# Patient Record
Sex: FEMALE | Race: WHITE | Employment: OTHER | URBAN - METROPOLITAN AREA
[De-identification: names, ages, dates, MRNs, and addresses within clinical notes are randomized per-mention and may not be internally consistent; named-entity substitution may affect disease eponyms.]

---

## 2017-01-14 ENCOUNTER — HOSPITAL ENCOUNTER (EMERGENCY)
Age: 72
Discharge: HOME | End: 2017-01-15
Attending: EMERGENCY MEDICINE
Payer: MEDICARE

## 2017-01-14 DIAGNOSIS — R00.1 BRADYCARDIA: Primary | ICD-10-CM

## 2017-01-14 PROCEDURE — 84484 ASSAY OF TROPONIN QUANT: CPT | Performed by: EMERGENCY MEDICINE

## 2017-01-14 PROCEDURE — 84100 ASSAY OF PHOSPHORUS: CPT | Performed by: EMERGENCY MEDICINE

## 2017-01-14 PROCEDURE — 83735 ASSAY OF MAGNESIUM: CPT | Performed by: EMERGENCY MEDICINE

## 2017-01-14 PROCEDURE — 85025 COMPLETE CBC W/AUTO DIFF WBC: CPT | Performed by: EMERGENCY MEDICINE

## 2017-01-14 PROCEDURE — 99284 EMERGENCY DEPT VISIT MOD MDM: CPT | Performed by: EMERGENCY MEDICINE

## 2017-01-14 PROCEDURE — 80053 COMPREHEN METABOLIC PANEL: CPT | Performed by: EMERGENCY MEDICINE

## 2017-01-14 PROCEDURE — 84443 ASSAY THYROID STIM HORMONE: CPT | Performed by: EMERGENCY MEDICINE

## 2017-01-14 PROCEDURE — 85610 PROTHROMBIN TIME: CPT | Performed by: EMERGENCY MEDICINE

## 2017-01-15 VITALS
DIASTOLIC BLOOD PRESSURE: 73 MMHG | RESPIRATION RATE: 16 BRPM | BODY MASS INDEX: 24.91 KG/M2 | WEIGHT: 155 LBS | HEART RATE: 67 BPM | HEIGHT: 66 IN | OXYGEN SATURATION: 95 % | TEMPERATURE: 98.5 F | SYSTOLIC BLOOD PRESSURE: 166 MMHG

## 2017-01-15 LAB
ALBUMIN SERPL BCP-MCNC: 3.3 G/DL (ref 3.2–4.6)
ALBUMIN/GLOB SERPL: 0.8 {RATIO} (ref 1.2–3.5)
ALP SERPL-CCNC: 120 U/L (ref 50–136)
ALT SERPL-CCNC: 37 U/L (ref 12–65)
ANION GAP BLD CALC-SCNC: 9 MMOL/L (ref 7–16)
AST SERPL W P-5'-P-CCNC: 25 U/L (ref 15–37)
BASOPHILS # BLD AUTO: 0 K/UL (ref 0–0.2)
BASOPHILS # BLD: 0 % (ref 0–2)
BILIRUB SERPL-MCNC: 0.2 MG/DL (ref 0.2–1.1)
BUN SERPL-MCNC: 14 MG/DL (ref 8–23)
CALCIUM SERPL-MCNC: 8.3 MG/DL (ref 8.3–10.4)
CHLORIDE SERPL-SCNC: 109 MMOL/L (ref 98–107)
CO2 SERPL-SCNC: 27 MMOL/L (ref 21–32)
CREAT SERPL-MCNC: 0.64 MG/DL (ref 0.6–1)
DIFFERENTIAL METHOD BLD: ABNORMAL
EOSINOPHIL # BLD: 0.1 K/UL (ref 0–0.8)
EOSINOPHIL NFR BLD: 2 % (ref 0.5–7.8)
ERYTHROCYTE [DISTWIDTH] IN BLOOD BY AUTOMATED COUNT: 17.9 % (ref 11.9–14.6)
GLOBULIN SER CALC-MCNC: 4.3 G/DL (ref 2.3–3.5)
GLUCOSE SERPL-MCNC: 112 MG/DL (ref 65–100)
HCT VFR BLD AUTO: 35 % (ref 35.8–46.3)
HGB BLD-MCNC: 10.3 G/DL (ref 11.7–15.4)
IMM GRANULOCYTES # BLD: 0 K/UL (ref 0–0.5)
IMM GRANULOCYTES NFR BLD AUTO: 0 % (ref 0–5)
INR PPP: 2 (ref 0.9–1.2)
LYMPHOCYTES # BLD AUTO: 28 % (ref 13–44)
LYMPHOCYTES # BLD: 2.1 K/UL (ref 0.5–4.6)
MAGNESIUM SERPL-MCNC: 2 MG/DL (ref 1.8–2.4)
MCH RBC QN AUTO: 24.9 PG (ref 26.1–32.9)
MCHC RBC AUTO-ENTMCNC: 29.4 G/DL (ref 31.4–35)
MCV RBC AUTO: 84.5 FL (ref 79.6–97.8)
MONOCYTES # BLD: 0.6 K/UL (ref 0.1–1.3)
MONOCYTES NFR BLD AUTO: 8 % (ref 4–12)
NEUTS SEG # BLD: 4.8 K/UL (ref 1.7–8.2)
NEUTS SEG NFR BLD AUTO: 62 % (ref 43–78)
PHOSPHATE SERPL-MCNC: 2.7 MG/DL (ref 2.3–3.7)
PLATELET # BLD AUTO: 308 K/UL (ref 150–450)
PMV BLD AUTO: 10.9 FL (ref 10.8–14.1)
POTASSIUM SERPL-SCNC: 3.8 MMOL/L (ref 3.5–5.1)
PROT SERPL-MCNC: 7.6 G/DL (ref 6.3–8.2)
PROTHROMBIN TIME: 21.7 SEC (ref 9.6–12)
RBC # BLD AUTO: 4.14 M/UL (ref 4.05–5.25)
SODIUM SERPL-SCNC: 145 MMOL/L (ref 136–145)
TROPONIN I SERPL-MCNC: <0.02 NG/ML (ref 0.02–0.05)
TSH SERPL DL<=0.005 MIU/L-ACNC: 1.59 UIU/ML (ref 0.36–3.74)
WBC # BLD AUTO: 7.6 K/UL (ref 4.3–11.1)

## 2017-01-15 PROCEDURE — 93005 ELECTROCARDIOGRAM TRACING: CPT | Performed by: EMERGENCY MEDICINE

## 2017-01-15 NOTE — DISCHARGE INSTRUCTIONS
Bradycardia: Care Instructions  Your Care Instructions    Bradycardia is a slow heart rate. If your heart beats too slowly, it can't supply your body with enough blood. This can make you weak or dizzy. Or it may make you pass out. Sometimes medicine can cause this problem. If this happens, your doctor may have you adjust one of your medicines. If a medicine is not the problem, your doctor may recommend a pacemaker. It is important to treat bradycardia so that you don't get more serious health problems. Your doctor will want to see you on a routine schedule to make sure that your heartbeat is normal.  Follow-up care is a key part of your treatment and safety. Be sure to make and go to all appointments, and call your doctor if you are having problems. It's also a good idea to know your test results and keep a list of the medicines you take. How can you care for yourself at home? · Take your medicines exactly as prescribed. Call your doctor if you think you are having a problem with your medicine. If your bradycardia is caused by another disease, your doctor will try to treat the disease. If it is caused by heart medicines, he or she will adjust your medicines. · Make lifestyle changes to improve your heart health. ¨ To control your cholesterol, avoid foods with a lot of fat, saturated fat, or sodium. Try to eat more fiber. And if your doctor says it's okay, get some exercise on most days. ¨ Do not smoke. Smoking can make your heart condition worse. If you need help quitting, talk to your doctor about stop-smoking programs and medicines. These can increase your chances of quitting for good. ¨ Limit alcohol to 2 drinks a day for men and 1 drink a day for women. Too much alcohol can cause health problems. Pacemaker  If you have a pacemaker, you will get more specific information about it. Be sure to:  · Check your pulse as your doctor tells you.   · Have your pacemaker checked as often as your doctor recommends. You may be able to do this over the phone or computer. · Avoid strong magnetic or electrical fields. These include wand metal detectors used in airports, MRIs, welding equipment, and generators. · You will be checked several times right after you get your pacemaker and when it is time to have the battery changed. Batteries last for 5 to 15 years. · You can talk on a cell phone. But keep it 6 inches away from your pacemaker. · Microwaves, TVs, radios, and kitchen and bathroom appliances won't harm you. When should you call for help? Call 911 anytime you think you may need emergency care. For example, call if:  · You passed out (lost consciousness). · You have symptoms of a heart attack. These may include:  ¨ Chest pain or pressure, or a strange feeling in the chest.  ¨ Sweating. ¨ Shortness of breath. ¨ Nausea or vomiting. ¨ Pain, pressure, or a strange feeling in the back, neck, jaw, or upper belly or in one or both shoulders or arms. ¨ Lightheadedness or sudden weakness. ¨ A fast or irregular heartbeat. After you call 911, the  may tell you to chew 1 adult-strength or 2 to 4 low-dose aspirin. Wait for an ambulance. Do not try to drive yourself. · You have symptoms of a stroke. These may include:  ¨ Sudden numbness, tingling, weakness, or loss of movement in your face, arm, or leg, especially on only one side of your body. ¨ Sudden vision changes. ¨ Sudden trouble speaking. ¨ Sudden confusion or trouble understanding simple statements. ¨ Sudden problems with walking or balance. ¨ A sudden, severe headache that is different from past headaches. Call your doctor now or seek immediate medical care if:  · You are dizzy or lightheaded, or you feel like you may faint. · You have new or increased shortness of breath. · You had a pacemaker implanted and you have signs of infection, such as:  ¨ Increased pain, swelling, warmth, or redness.   ¨ Red streaks leading from the cut (incision). ¨ Pus draining from the incision. ¨ A fever. Watch closely for changes in your health, and be sure to contact your doctor if you have any problems. Where can you learn more? Go to http://giulia-angelina.info/. Enter J107 in the search box to learn more about \"Bradycardia: Care Instructions. \"  Current as of: January 27, 2016  Content Version: 11.1  © 2310-5856 [employer]. Care instructions adapted under license by [employer] (which disclaims liability or warranty for this information). If you have questions about a medical condition or this instruction, always ask your healthcare professional. [address] any warranty or liability for your use of this information.

## 2017-01-15 NOTE — ED PROVIDER NOTES
HPI Comments: 56-year-old female states she felt weak upon waking this morning. She checked her heart rate and it was 44. She states she sat around for most the day and it increased to the 60s. Before going to bed, she had the same sensation of weakness and her heart rate was 40. She denies any chest pain, shortness of breath, headache, recent illness, vomiting, diarrhea. She has had no recent changes in medications except starting iron 3 weeks ago. She has a history of paroxysmal atrial fibrillation status post ablation in 2015. Also had mitral and aortic tissue valve replacements in 2015. She is currently on Coumadin and tikosyn, norvasc, and lopressor cardiac agents. Patient is a 80 y.o. female presenting with sinus bradycardia. The history is provided by the patient. Slow Heart Rate    Pertinent negatives include no abdominal pain, no back pain, no cough, no fever, no headaches, no nausea, no palpitations, no shortness of breath, no vomiting and no weakness.         Past Medical History:   Diagnosis Date    Afib ([address]      a-fib    Aortic valve insufficiency     CAD (coronary artery disease)      aortic and mitral valve     Diabetes (HCC)      Type II    Diabetes mellitus, insulin dependent (IDDM), controlled ([address] 5/11/2016    Fracture, femur ([address] 9/21/2009    GERD (gastroesophageal reflux disease)     Heart failure ([address]     History of cardioversion     History of petit-mal seizures      none in 15 years    Hyperlipidemia     Hypertension     Hypothyroidism     Hypothyroidism, postsurgical 10/21/2015    Mitral stenosis with insufficiency     Other unknown and unspecified cause of morbidity or mortality      HLD    Paroxysmal atrial fibrillation (HCC)     Seizure ([address] 9/21/2009    Seizures ([address]     Thyroid nodule 6/29/2011    Type 1 diabetes mellitus ([address]     Unspecified essential hypertension        Past Surgical History:   Procedure Laterality Date    Hx tubal ligation  Hx thyroidectomy      Hx heart catheterization      Hx hip fracture tx Left 2009     pins    Hx mitral valve replacement  5/4/15     Dr. Magallanes Odette Hx aortic valve replacement  5/4/15     Dr. Sona Pino Hx appendectomy      Hx orthopaedic       L hip fracture/repair    Hx heent       Thyroidectomy    Hx colonoscopy  2014     neg    Hx other surgical       ablation 10/6/2015, Dr Savannah Russell         Family History:   Problem Relation Age of Onset    Diabetes Mother     Heart Disease Mother     Heart Disease Father     Diabetes Sister     Diabetes Brother     Heart Disease Brother     Diabetes Brother     Heart Disease Brother     Diabetes Sister     Heart Disease Sister     Breast Cancer Neg Hx        Social History     Social History    Marital status:      Spouse name: N/A    Number of children: N/A    Years of education: N/A     Occupational History    Not on file. Social History Main Topics    Smoking status: Never Smoker    Smokeless tobacco: Never Used    Alcohol use Yes      Comment: special occasion     Drug use: No    Sexual activity: Not on file     Other Topics Concern    Not on file     Social History Narrative         ALLERGIES: Fosamax [alendronate] and Ibuprofen    Review of Systems   Constitutional: Positive for fatigue. Negative for chills and fever. HENT: Negative for hearing loss. Eyes: Negative for visual disturbance. Respiratory: Negative for cough and shortness of breath. Cardiovascular: Negative for chest pain and palpitations. Gastrointestinal: Negative for abdominal pain, diarrhea, nausea and vomiting. Musculoskeletal: Negative for back pain. Skin: Negative for rash. Neurological: Negative for weakness and headaches. Psychiatric/Behavioral: Negative for confusion.        Vitals:    01/14/17 2204   BP: 165/64   Pulse: 62   Resp: 16   Temp: 98.5 °F (36.9 °C)   SpO2: 96%   Weight: 70.3 kg (155 lb)   Height: 5' 6\" (1.676 m) Physical Exam   Constitutional: She appears well-developed and well-nourished. HENT:   Head: Normocephalic and atraumatic. Right Ear: External ear normal.   Left Ear: External ear normal.   Nose: Nose normal.   Mouth/Throat: Oropharynx is clear and moist.   Eyes: Conjunctivae are normal. Pupils are equal, round, and reactive to light. Neck: Normal range of motion. Neck supple. Cardiovascular: Regular rhythm, normal heart sounds and intact distal pulses. Pulmonary/Chest: Effort normal and breath sounds normal. No respiratory distress. She has no wheezes. Abdominal: Soft. Bowel sounds are normal. She exhibits no distension. There is no tenderness. Musculoskeletal: Normal range of motion. She exhibits no edema. Neurological: She is alert. Skin: Skin is warm and dry. Psychiatric: Judgment normal.   Nursing note and vitals reviewed. MDM  Number of Diagnoses or Management Options  Diagnosis management comments: Parts of this document were created using dragon voice recognition software. The chart has been reviewed but errors may still be present. HR currently in the 60's. Will check labs and repeat EKG.    1:31 AM  No episodes of profound bradycardia while in the emergency department. Discussed with cardiology, Dr. Bladimir Rivera. He suggested stopping metoprolol for now and close follow-up in the office. No ischemia on EKG. I discussed the results of all labs, procedures, radiographs, and treatments with the patient and available family. Treatment plan is agreed upon and the patient is ready for discharge. Questions about treatment in the ED and differential diagnosis of presenting condition were answered. Patient was given verbal discharge instructions including, but not limited to, importance of returning to the emergency department for any concern of worsening or continued symptoms. Instructions were given to follow up with a primary care provider or specialist within 1-2 days. Adverse effects of medications, if prescribed, were discussed and patient was advised to refrain from significant physical activity until followed up by primary care physician and to not drive or operate heavy machinery after taking any sedating substances.            Amount and/or Complexity of Data Reviewed  Clinical lab tests: ordered and reviewed (Results for orders placed or performed during the hospital encounter of 01/14/17  -CBC WITH AUTOMATED DIFF       Result                                            Value                         Ref Range                       WBC                                               7.6                           4.3 - 11.1 K/uL                 RBC                                               4.14                          4.05 - 5.25 M/uL                HGB                                               10.3 (L)                      11.7 - 15.4 g/dL                HCT                                               35.0 (L)                      35.8 - 46.3 %                   MCV                                               84.5                          79.6 - 97.8 FL                  MCH                                               24.9 (L)                      26.1 - 32.9 PG                  MCHC                                              29.4 (L)                      31.4 - 35.0 g/dL                RDW                                               17.9 (H)                      11.9 - 14.6 %                   PLATELET                                          308                           150 - 450 K/uL                  MPV                                               10.9                          10.8 - 14.1 FL                  DF                                                AUTOMATED                                                     NEUTROPHILS                                       62                            43 - 78 %                       LYMPHOCYTES 28                            13 - 44 %                       MONOCYTES                                         8                             4.0 - 12.0 %                    EOSINOPHILS                                       2                             0.5 - 7.8 %                     BASOPHILS                                         0                             0.0 - 2.0 %                     IMMATURE GRANULOCYTES                             0.0                           0.0 - 5.0 %                     ABS. NEUTROPHILS                                  4.8                           1.7 - 8.2 K/UL                  ABS. LYMPHOCYTES                                  2.1                           0.5 - 4.6 K/UL                  ABS. MONOCYTES                                    0.6                           0.1 - 1.3 K/UL                  ABS. EOSINOPHILS                                  0.1                           0.0 - 0.8 K/UL                  ABS. BASOPHILS                                    0.0                           0.0 - 0.2 K/UL                  ABS. IMM.  GRANS.                                  0.0                           0.0 - 0.5 K/UL             -METABOLIC PANEL, COMPREHENSIVE       Result                                            Value                         Ref Range                       Sodium                                            145                           136 - 145 mmol/L                Potassium                                         3.8                           3.5 - 5.1 mmol/L                Chloride                                          109 (H)                       98 - 107 mmol/L                 CO2                                               27                            21 - 32 mmol/L                  Anion gap                                         9                             7 - 16 mmol/L                   Glucose 112 (H)                       65 - 100 mg/dL                  BUN                                               14                            8 - 23 MG/DL                    Creatinine                                        0.64                          0.6 - 1.0 MG/DL                 GFR est AA                                        >60                           >60 ml/min/1.73m2               GFR est non-AA                                    >60                           >60 ml/min/1.73m2               Calcium                                           8.3                           8.3 - 10.4 MG/DL                Bilirubin, total                                  0.2                           0.2 - 1.1 MG/DL                 ALT                                               37                            12 - 65 U/L                     AST                                               25                            15 - 37 U/L                     Alk. phosphatase                                  120                           50 - 136 U/L                    Protein, total                                    7.6                           6.3 - 8.2 g/dL                  Albumin                                           3.3                           3.2 - 4.6 g/dL                  Globulin                                          4.3 (H)                       2.3 - 3.5 g/dL                  A-G Ratio                                         0.8 (L)                       1.2 - 3.5                  -MAGNESIUM       Result                                            Value                         Ref Range                       Magnesium                                         2.0                           1.8 - 2.4 mg/dL            -PHOSPHORUS       Result                                            Value                         Ref Range                       Phosphorus                                        2.7 2.3 - 3.7 MG/DL            -TSH 3RD GENERATION       Result                                            Value                         Ref Range                       TSH                                               1.590                         0.358 - 3.740 uIU/mL       -PROTHROMBIN TIME + INR       Result                                            Value                         Ref Range                       Prothrombin time                                  21.7 (H)                      9.6 - 12.0 sec                  INR                                               2.0 (H)                       0.9 - 1.2                  -TROPONIN I       Result                                            Value                         Ref Range                       Troponin-I, Qt.                                   <0.02 (L)                     0.02 - 0.05 NG/ML          )      ED Course       Procedures

## 2017-01-15 NOTE — ED TRIAGE NOTES
Pt c/o bradycardia x 1 day. Spouse states her HR was in the 40's today. Pt denies any pain. Pt denies any shortness of breath. Pt denies any N/V/D. Pt alert and oriented x 4. Respirations are even and unlabored. Pt appears in no acute distress at this time.

## 2017-01-16 ENCOUNTER — PATIENT OUTREACH (OUTPATIENT)
Dept: CASE MANAGEMENT | Age: 72
End: 2017-01-16

## 2017-01-16 LAB
ATRIAL RATE: 70 BPM
CALCULATED P AXIS, ECG09: 83 DEGREES
CALCULATED R AXIS, ECG10: 41 DEGREES
CALCULATED T AXIS, ECG11: 80 DEGREES
DIAGNOSIS, 93000: NORMAL
DIASTOLIC BP, ECG02: NORMAL MMHG
P-R INTERVAL, ECG05: 126 MS
Q-T INTERVAL, ECG07: 432 MS
QRS DURATION, ECG06: 88 MS
QTC CALCULATION (BEZET), ECG08: 466 MS
SYSTOLIC BP, ECG01: NORMAL MMHG
VENTRICULAR RATE, ECG03: 70 BPM

## 2017-01-16 NOTE — PROGRESS NOTES
This note will not be viewable in [address]. Date/Time of Call:   1/16/2017 12:23pm   What was the patient seen in the ED for? Patient was seen in ED for dx of:  bradycardia   Does the patient understand his/her diagnosis and/or treatment and what happened during the ED visit? Spoke with patient who verbalized understanding of diagnosis and treatment plan. Patient reports she is feeling better today, denies any dizziness, chest pain or other symptoms. Did the patient receive discharge instructions from the ED? yes   Review any discharge instructions (see notes in [facility]). Ask patient if they understand these. Do they have any questions? Care Coordinator reviewed discharge instructions. Patient verbalized understanding of instructions. Were home services ordered (nursing, PT, OT, ST, etc.)? No     If so, has the first visit occurred? If not, why? (Assist with coordination of services if necessary.) N/A   Was any DME ordered? No   If so, has it been received? If not, why?  (Assist with coordination of arranging DME orders if necessary.) N/A   Complete a review of all medications (new, continued and discontinued meds per the D/C instructions and medication tab in [facility]). Patient reviewed meds with Care Coordinator. Were all new prescriptions filled? If not, why?  (Assist with obtainment of medications if necessary.) No new medications prescribed. Verified with patient to hold metoprolol until seen by cardiologist per ER physician. Does the patient understand the purpose and dosing instructions for all medications? (If patient has questions, provide explanation and education.) Patient reports understanding of purpose and instructions for all medications. Does the patient have any problems in performing ADLs? (If patient is unable to perform ADLs  what is the limiting factor(s)?   Do they have a support system that can assist? If no support system is present, discuss possible assistance that they may be able to obtain.) Patient does not require assistance with ADLs. Does the patient have all follow-up appointments scheduled? Has transportation been arranged? [facility] follow-up should be within 7 days of discharge; all others should have PCP follow-up within 7 days of discharge; follow-ups with other specialists as appropriate or ordered.) Patient has scheduled a follow up appointment with cardiologist on 1/18/17. Patient also advised to call and schedule follow up with PCP within 7 days. Patient has no problems with transportation. Any other questions or concerns expressed by the patient? Patient verbalized no further concerns or questions and was appreciative of call.          TEJ Call Completed By: Ayla Rey LPN   Care Coordinator  [facility]

## 2017-01-18 PROBLEM — R00.1 BRADYCARDIA: Status: ACTIVE | Noted: 2017-01-18

## 2017-04-20 ENCOUNTER — HOSPITAL ENCOUNTER (OUTPATIENT)
Dept: MAMMOGRAPHY | Age: 72
Discharge: HOME | End: 2017-04-20
Attending: INTERNAL MEDICINE
Payer: MEDICARE

## 2017-04-20 DIAGNOSIS — Z12.31 VISIT FOR SCREENING MAMMOGRAM: ICD-10-CM

## 2017-04-20 PROCEDURE — 77067 SCR MAMMO BI INCL CAD: CPT

## 2018-04-26 ENCOUNTER — HOSPITAL ENCOUNTER (OUTPATIENT)
Dept: MAMMOGRAPHY | Age: 73
Discharge: HOME | End: 2018-04-26
Attending: INTERNAL MEDICINE
Payer: MEDICARE

## 2018-04-26 DIAGNOSIS — Z12.31 VISIT FOR SCREENING MAMMOGRAM: ICD-10-CM

## 2018-04-26 PROCEDURE — 77067 SCR MAMMO BI INCL CAD: CPT

## 2018-05-29 PROBLEM — Z79.01 LONG TERM (CURRENT) USE OF ANTICOAGULANTS: Status: ACTIVE | Noted: 2018-05-29

## 2018-08-16 PROBLEM — E11.65 TYPE 2 DIABETES MELLITUS WITH HYPERGLYCEMIA, WITH LONG-TERM CURRENT USE OF INSULIN (HCC): Status: ACTIVE | Noted: 2018-08-16

## 2018-08-16 PROBLEM — E11.29 TYPE 2 DIABETES MELLITUS WITH MICROALBUMINURIA, WITH LONG-TERM CURRENT USE OF INSULIN (HCC): Status: ACTIVE | Noted: 2018-08-16

## 2018-08-16 PROBLEM — R80.9 TYPE 2 DIABETES MELLITUS WITH MICROALBUMINURIA, WITH LONG-TERM CURRENT USE OF INSULIN (HCC): Status: ACTIVE | Noted: 2018-08-16

## 2018-08-16 PROBLEM — Z79.4 TYPE 2 DIABETES MELLITUS WITH MICROALBUMINURIA, WITH LONG-TERM CURRENT USE OF INSULIN (HCC): Status: ACTIVE | Noted: 2018-08-16

## 2018-08-16 PROBLEM — Z79.4 TYPE 2 DIABETES MELLITUS WITH HYPERGLYCEMIA, WITH LONG-TERM CURRENT USE OF INSULIN (HCC): Status: ACTIVE | Noted: 2018-08-16

## 2018-11-06 PROCEDURE — 75810000053 HC SPLINT APPLICATION: Performed by: EMERGENCY MEDICINE

## 2018-11-06 PROCEDURE — 99284 EMERGENCY DEPT VISIT MOD MDM: CPT | Performed by: EMERGENCY MEDICINE

## 2018-11-06 PROCEDURE — L3908 WHO COCK-UP NONMOLDE PRE OTS: HCPCS

## 2018-11-07 ENCOUNTER — APPOINTMENT (OUTPATIENT)
Dept: GENERAL RADIOLOGY | Age: 73
End: 2018-11-07
Attending: EMERGENCY MEDICINE
Payer: MEDICARE

## 2018-11-07 ENCOUNTER — HOSPITAL ENCOUNTER (EMERGENCY)
Age: 73
Discharge: HOME | End: 2018-11-07
Attending: EMERGENCY MEDICINE
Payer: MEDICARE

## 2018-11-07 VITALS
WEIGHT: 150 LBS | OXYGEN SATURATION: 100 % | DIASTOLIC BLOOD PRESSURE: 78 MMHG | BODY MASS INDEX: 24.11 KG/M2 | TEMPERATURE: 98 F | HEART RATE: 74 BPM | RESPIRATION RATE: 20 BRPM | HEIGHT: 66 IN | SYSTOLIC BLOOD PRESSURE: 156 MMHG

## 2018-11-07 DIAGNOSIS — M25.532 WRIST PAIN, LEFT: Primary | ICD-10-CM

## 2018-11-07 PROCEDURE — 74011250637 HC RX REV CODE- 250/637: Performed by: EMERGENCY MEDICINE

## 2018-11-07 PROCEDURE — 73110 X-RAY EXAM OF WRIST: CPT

## 2018-11-07 PROCEDURE — L3908 WHO COCK-UP NONMOLDE PRE OTS: HCPCS

## 2018-11-07 PROCEDURE — 75810000053 HC SPLINT APPLICATION: Performed by: EMERGENCY MEDICINE

## 2018-11-07 RX ORDER — TRAMADOL HYDROCHLORIDE 50 MG/1
50 TABLET ORAL
Status: COMPLETED | OUTPATIENT
Start: 2018-11-07 | End: 2018-11-07

## 2018-11-07 RX ORDER — TRAMADOL HYDROCHLORIDE 50 MG/1
50 TABLET ORAL
Qty: 15 TAB | Refills: 0 | Status: SHIPPED | OUTPATIENT
Start: 2018-11-07 | End: 2019-02-27

## 2018-11-07 RX ADMIN — TRAMADOL HYDROCHLORIDE 50 MG: 50 TABLET, FILM COATED ORAL at 00:29

## 2018-11-07 NOTE — ED NOTES
Medium Left wrist Velcro splint applied, patient tolerated well, sent home with ice pack to continue use at home

## 2018-11-07 NOTE — ED TRIAGE NOTES
Pt sts \"about 7pm my hand started hurting so bad. \" Pt denies any recent injury or strain to hand. Pt has broken that wrist before \"All I did was sweep the porch today\" Pt localizes pain to left wrist

## 2018-11-07 NOTE — ED NOTES
I have reviewed discharge instructions with the patient. The patient verbalized understanding. Patient left ED via Discharge Method: ambulatory to Home with ,dricing patient home. Opportunity for questions and clarification provided. Patient given 1 scripts. To continue your aftercare when you leave the hospital, you may receive an automated call from our care team to check in on how you are doing. This is a free service and part of our promise to provide the best care and service to meet your aftercare needs.  If you have questions, or wish to unsubscribe from this service please call [phone]. Thank you for Choosing our [facility] Emergency Department.

## 2018-11-07 NOTE — DISCHARGE INSTRUCTIONS
Joint Pain: Care Instructions  Your Care Instructions    Many people have small aches and pains from overuse or injury to muscles and joints. Joint injuries often happen during sports or recreation, work tasks, or projects around the home. An overuse injury can happen when you put too much stress on a joint or when you do an activity that stresses the joint over and over, such as using the computer or rowing a boat. You can take action at home to help your muscles and joints get better. You should feel better in 1 to 2 weeks, but it can take 3 months or more to heal completely. Follow-up care is a key part of your treatment and safety. Be sure to make and go to all appointments, and call your doctor if you are having problems. It's also a good idea to know your test results and keep a list of the medicines you take. How can you care for yourself at home? · Do not put weight on the injured joint for at least a day or two. · For the first day or two after an injury, do not take hot showers or baths, and do not use hot packs. The heat could make swelling worse. · Put ice or a cold pack on the sore joint for 10 to 20 minutes at a time. Try to do this every 1 to 2 hours for the next 3 days (when you are awake) or until the swelling goes down. Put a thin cloth between the ice and your skin. · Wrap the injury in an elastic bandage. Do not wrap it too tightly because this can cause more swelling. · Prop up the sore joint on a pillow when you ice it or anytime you sit or lie down during the next 3 days. Try to keep it above the level of your heart. This will help reduce swelling. · Take an over-the-counter pain medicine, such as acetaminophen (Tylenol), ibuprofen (Advil, Motrin), or naproxen (Aleve). Read and follow all instructions on the label. · After 1 or 2 days of rest, begin moving the joint gently.  While the joint is still healing, you can begin to exercise using activities that do not strain or hurt the painful joint. When should you call for help? Call your doctor now or seek immediate medical care if:    · You have signs of infection, such as:  ? Increased pain, swelling, warmth, and redness. ? Red streaks leading from the joint. ? A fever.    Watch closely for changes in your health, and be sure to contact your doctor if:    · Your movement or symptoms are not getting better after 1 to 2 weeks of home treatment. Where can you learn more? Go to http://giulia-angelina.info/. Enter P205 in the search box to learn more about \"Joint Pain: Care Instructions. \"  Current as of: November 29, 2017  Content Version: 11.8  © 7381-6285 [employer]. Care instructions adapted under license by [employer] (which disclaims liability or warranty for this information). If you have questions about a medical condition or this instruction, always ask your healthcare professional. [address] any warranty or liability for your use of this information.

## 2018-11-07 NOTE — ED PROVIDER NOTES
Patient is a 61-year-old female who is coming in with several hours of severe pain to the left wrist.  She has a history of Having a fracture about 5 years ago that healed nonsurgically. She denies any chronic pain. She denies any history of carpal tunnel. She denies doing anything different except for some sweeping tonight. But the pain actually started while she was watching TV. The pain has been severe and constant in the wrist since. She did take 2 Tylenol but has not tried anything else. Past Medical History:  
Diagnosis Date  Afib ([address] a-fib  Aortic valve insufficiency  CAD (coronary artery disease) aortic and mitral valve  Diabetes mellitus, insulin dependent (IDDM), controlled ([address] 5/11/2016  Fracture, femur ([address] 9/21/2009  GERD (gastroesophageal reflux disease)  Heart failure ([address]  History of cardioversion  History of petit-mal seizures   
 none in 15 years  Hyperlipidemia  Hypertension  Hypothyroidism  Hypothyroidism, postsurgical 10/21/2015  Mitral stenosis with insufficiency  Other unknown and unspecified cause of morbidity or mortality HLD  Paroxysmal atrial fibrillation (HCC)  Seizure ([address] 9/21/2009  Thyroid nodule 6/29/2011  Unspecified essential hypertension Past Surgical History:  
Procedure Laterality Date  HX AORTIC VALVE REPLACEMENT  5/4/15 Dr. Thierno Prakash  HX APPENDECTOMY  HX COLONOSCOPY  2014  
 neg  HX HEART CATHETERIZATION    
 HX HEENT Thyroidectomy  HX HIP FRACTURE TX Left 2009  
 pins  HX MITRAL VALVE REPLACEMENT  5/4/15 Dr. Thierno Prakash  HX ORTHOPAEDIC    
 L hip fracture/repair  HX OTHER SURGICAL    
 ablation 10/6/2015, Dr Mariama Horowitz  HX THYROIDECTOMY  HX TUBAL LIGATION Family History:  
Problem Relation Age of Onset  Diabetes Mother  Heart Disease Mother  Heart Disease Father  Diabetes Sister  Diabetes Brother  Heart Disease Brother  Diabetes Brother  Heart Disease Brother  Diabetes Sister  Heart Disease Sister  Breast Cancer Neg Hx Social History Socioeconomic History  Marital status:  Spouse name: Not on file  Number of children: Not on file  Years of education: Not on file  Highest education level: Not on file Social Needs  Financial resource strain: Not on file  Food insecurity - worry: Not on file  Food insecurity - inability: Not on file  Transportation needs - medical: Not on file  Transportation needs - non-medical: Not on file Occupational History  Not on file Tobacco Use  Smoking status: Never Smoker  Smokeless tobacco: Never Used Substance and Sexual Activity  Alcohol use: No  
  Frequency: Never  Drug use: No  
 Sexual activity: Yes  
  Partners: Male Other Topics Concern [address] Service Not Asked  Blood Transfusions Not Asked  Caffeine Concern Not Asked  Occupational Exposure Not Asked [employer] Hazards Not Asked  Sleep Concern Not Asked  Stress Concern Not Asked  Weight Concern Not Asked  Special Diet Not Asked  Back Care Not Asked  Exercise Not Asked  Bike Helmet Not Asked [address] Yes  Self-Exams Yes Social History Narrative Denies physical or sexual abuse ALLERGIES: Fosamax [alendronate] and Ibuprofen Review of Systems Constitutional: Negative for chills and fever. Musculoskeletal: Positive for joint swelling and myalgias. Negative for arthralgias. Skin: Negative for color change, pallor and wound. Neurological: Negative for weakness and numbness. Vitals:  
 11/07/18 0006 11/07/18 0016 BP: 149/65 Pulse: 68 Resp: 16 Temp: 97.6 °F (36.4 °C) SpO2: 93% 99% Weight: 68 kg (150 lb) Height: 5' 5.5\" (1.664 m) Physical Exam  
Constitutional: She is oriented to person, place, and time.  She appears well-developed and well-nourished. No distress. HENT:  
Head: Normocephalic and atraumatic. Eyes: Conjunctivae are normal. Right eye exhibits no discharge. Left eye exhibits no discharge. No scleral icterus. Neck: Normal range of motion. Neck supple. Cardiovascular: Intact distal pulses. Pulmonary/Chest: Effort normal. No respiratory distress. Musculoskeletal:  
Left wrist is swollen and very tender to palpation when I palpate the region. Movement is limited by pain. Neurological: She is alert and oriented to person, place, and time. No focal weakness speech normal  
Skin: Skin is warm and dry. Psychiatric: She has a normal mood and affect. Her behavior is normal.  
Nursing note and vitals reviewed. MDM Number of Diagnoses or Management Options Diagnosis management comments: Patient has Negative x-ray. I am treating symptomatically with some pain medication will immobilize joint and refer to orthopedics. Tendonitis is a possibility. Tasha Segovia MD; 11/7/2018 @1:09 AM Voice dictation software was used during the making of this note. This software is not perfect and grammatical and other typographical errors may be present. This note has not been proofread for errors. 
=================================================================== Amount and/or Complexity of Data Reviewed Tests in the radiology section of CPT®: ordered and reviewed (XR WRIST LT AP/LAT/OBL MIN 3V Final Result IMPRESSION:  Normal left wrist. ) Procedures

## 2019-01-08 ENCOUNTER — HOSPITAL ENCOUNTER (OUTPATIENT)
Dept: LAB | Age: 74
Discharge: HOME | End: 2019-01-08
Payer: MEDICARE

## 2019-01-08 DIAGNOSIS — I48.91 ATRIAL FIBRILLATION, UNSPECIFIED TYPE (HCC): ICD-10-CM

## 2019-01-08 LAB
ANION GAP SERPL CALC-SCNC: 6 MMOL/L
BUN SERPL-MCNC: 17 MG/DL (ref 8–23)
CALCIUM SERPL-MCNC: 8 MG/DL (ref 8.3–10.4)
CHLORIDE SERPL-SCNC: 108 MMOL/L (ref 98–107)
CO2 SERPL-SCNC: 26 MMOL/L (ref 21–32)
CREAT SERPL-MCNC: 0.7 MG/DL (ref 0.6–1)
GLUCOSE SERPL-MCNC: 135 MG/DL (ref 65–100)
MAGNESIUM SERPL-MCNC: 1.7 MG/DL (ref 1.8–2.4)
POTASSIUM SERPL-SCNC: 4.1 MMOL/L (ref 3.5–5.1)
SODIUM SERPL-SCNC: 140 MMOL/L (ref 136–145)

## 2019-01-08 PROCEDURE — 80048 BASIC METABOLIC PNL TOTAL CA: CPT

## 2019-01-08 PROCEDURE — 83735 ASSAY OF MAGNESIUM: CPT

## 2019-01-08 PROCEDURE — 36415 COLL VENOUS BLD VENIPUNCTURE: CPT

## 2019-05-10 ENCOUNTER — HOSPITAL ENCOUNTER (OUTPATIENT)
Dept: MAMMOGRAPHY | Age: 74
Discharge: HOME | End: 2019-05-10
Attending: INTERNAL MEDICINE
Payer: MEDICARE

## 2019-05-10 DIAGNOSIS — Z12.31 VISIT FOR SCREENING MAMMOGRAM: ICD-10-CM

## 2019-05-10 PROCEDURE — 77067 SCR MAMMO BI INCL CAD: CPT

## 2020-01-04 PROBLEM — R80.9 TYPE 2 DIABETES MELLITUS WITH MICROALBUMINURIA, WITH LONG-TERM CURRENT USE OF INSULIN (HCC): Chronic | Status: ACTIVE | Noted: 2018-08-16

## 2020-01-04 PROBLEM — E11.65 TYPE 2 DIABETES MELLITUS WITH HYPERGLYCEMIA, WITH LONG-TERM CURRENT USE OF INSULIN (HCC): Chronic | Status: ACTIVE | Noted: 2018-08-16

## 2020-01-04 PROBLEM — Z79.4 TYPE 2 DIABETES MELLITUS WITH HYPERGLYCEMIA, WITH LONG-TERM CURRENT USE OF INSULIN (HCC): Chronic | Status: ACTIVE | Noted: 2018-08-16

## 2020-01-04 PROBLEM — Z79.4 TYPE 2 DIABETES MELLITUS WITH MICROALBUMINURIA, WITH LONG-TERM CURRENT USE OF INSULIN (HCC): Chronic | Status: ACTIVE | Noted: 2018-08-16

## 2020-01-04 PROBLEM — E11.29 TYPE 2 DIABETES MELLITUS WITH MICROALBUMINURIA, WITH LONG-TERM CURRENT USE OF INSULIN (HCC): Chronic | Status: ACTIVE | Noted: 2018-08-16

## 2020-07-22 ENCOUNTER — HOSPITAL ENCOUNTER (OUTPATIENT)
Dept: MAMMOGRAPHY | Age: 75
Discharge: HOME | End: 2020-07-22
Attending: INTERNAL MEDICINE
Payer: MEDICARE

## 2020-07-22 DIAGNOSIS — Z12.31 VISIT FOR SCREENING MAMMOGRAM: ICD-10-CM

## 2020-07-22 PROCEDURE — 77067 SCR MAMMO BI INCL CAD: CPT

## 2021-01-08 ENCOUNTER — HOSPITAL ENCOUNTER (OUTPATIENT)
Dept: DIABETES SERVICES | Age: 76
Discharge: HOME | End: 2021-01-08
Payer: MEDICARE

## 2021-01-08 PROCEDURE — G0108 DIAB MANAGE TRN  PER INDIV: HCPCS

## 2021-01-08 NOTE — PROGRESS NOTES
This is a one on one appointment. Due to being during AV[place]- public health emergency, social distancing and mandatory precautions are in place and utilized. Client attended two hour yearly follow up session today. Topics discussed were client driven. Topics included exercise, treating low blood sugars, eating out, label reading, heart healthy food choices and blood sugar goals. Also educated on carbohydrate counting. Practiced  intense carbohydrate counting and  using insulin to carbohydrate ratios. Pt and spouse demonstrated counting carbohydrates correctly and could accurately figure out amount of insulin needed for different insulin to carbohydrate ratios. Provided written info.

## 2021-07-07 ENCOUNTER — TRANSCRIBE ORDER (OUTPATIENT)
Dept: REGISTRATION | Age: 76
End: 2021-07-07

## 2021-07-07 DIAGNOSIS — Z12.31 SCREENING MAMMOGRAM FOR HIGH-RISK PATIENT: Primary | ICD-10-CM

## 2021-07-20 ENCOUNTER — ANESTHESIA EVENT (OUTPATIENT)
Dept: ENDOSCOPY | Age: 76
End: 2021-07-20
Payer: MEDICARE

## 2021-07-20 RX ORDER — SODIUM CHLORIDE 0.9 % (FLUSH) 0.9 %
5-40 SYRINGE (ML) INJECTION AS NEEDED
Status: CANCELLED | OUTPATIENT
Start: 2021-07-20

## 2021-07-20 RX ORDER — SODIUM CHLORIDE 0.9 % (FLUSH) 0.9 %
5-40 SYRINGE (ML) INJECTION EVERY 8 HOURS
Status: CANCELLED | OUTPATIENT
Start: 2021-07-20

## 2021-07-20 RX ORDER — SODIUM CHLORIDE, SODIUM LACTATE, POTASSIUM CHLORIDE, CALCIUM CHLORIDE 600; 310; 30; 20 MG/100ML; MG/100ML; MG/100ML; MG/100ML
100 INJECTION, SOLUTION INTRAVENOUS CONTINUOUS
Status: CANCELLED | OUTPATIENT
Start: 2021-07-20

## 2021-07-21 ENCOUNTER — HOSPITAL ENCOUNTER (OUTPATIENT)
Age: 76
Setting detail: OUTPATIENT SURGERY
Discharge: HOME | End: 2021-07-21
Attending: INTERNAL MEDICINE | Admitting: INTERNAL MEDICINE
Payer: MEDICARE

## 2021-07-21 ENCOUNTER — ANESTHESIA (OUTPATIENT)
Dept: ENDOSCOPY | Age: 76
End: 2021-07-21
Payer: MEDICARE

## 2021-07-21 VITALS
OXYGEN SATURATION: 93 % | DIASTOLIC BLOOD PRESSURE: 63 MMHG | TEMPERATURE: 96.8 F | RESPIRATION RATE: 16 BRPM | HEART RATE: 54 BPM | SYSTOLIC BLOOD PRESSURE: 136 MMHG

## 2021-07-21 LAB
ANION GAP SERPL CALC-SCNC: 4 MMOL/L (ref 7–16)
ATRIAL RATE: 76 BPM
BUN SERPL-MCNC: 7 MG/DL (ref 8–23)
CALCIUM SERPL-MCNC: 8.4 MG/DL (ref 8.3–10.4)
CALCULATED R AXIS, ECG10: -18 DEGREES
CALCULATED T AXIS, ECG11: 57 DEGREES
CHLORIDE SERPL-SCNC: 110 MMOL/L (ref 98–107)
CO2 SERPL-SCNC: 29 MMOL/L (ref 21–32)
CREAT SERPL-MCNC: 0.53 MG/DL (ref 0.6–1)
DIAGNOSIS, 93000: NORMAL
GLUCOSE BLD STRIP.AUTO-MCNC: 107 MG/DL (ref 65–100)
GLUCOSE SERPL-MCNC: 102 MG/DL (ref 65–100)
INR BLD: 1.1 (ref 0.9–1.2)
MAGNESIUM SERPL-MCNC: 2 MG/DL (ref 1.8–2.4)
POTASSIUM SERPL-SCNC: 4.7 MMOL/L (ref 3.5–5.1)
PT BLD: 13.6 SECS (ref 9.6–11.6)
Q-T INTERVAL, ECG07: 464 MS
QRS DURATION, ECG06: 92 MS
QTC CALCULATION (BEZET), ECG08: 467 MS
SERVICE CMNT-IMP: ABNORMAL
SODIUM SERPL-SCNC: 143 MMOL/L (ref 136–145)
VENTRICULAR RATE, ECG03: 61 BPM

## 2021-07-21 PROCEDURE — 74011250636 HC RX REV CODE- 250/636: Performed by: ANESTHESIOLOGY

## 2021-07-21 PROCEDURE — 82962 GLUCOSE BLOOD TEST: CPT

## 2021-07-21 PROCEDURE — 74011250636 HC RX REV CODE- 250/636: Performed by: REGISTERED NURSE

## 2021-07-21 PROCEDURE — 93005 ELECTROCARDIOGRAM TRACING: CPT | Performed by: ANESTHESIOLOGY

## 2021-07-21 PROCEDURE — 2709999900 HC NON-CHARGEABLE SUPPLY: Performed by: INTERNAL MEDICINE

## 2021-07-21 PROCEDURE — 80048 BASIC METABOLIC PNL TOTAL CA: CPT

## 2021-07-21 PROCEDURE — 77030021593 HC FCPS BIOP ENDOSC BSC -A: Performed by: INTERNAL MEDICINE

## 2021-07-21 PROCEDURE — 76040000025: Performed by: INTERNAL MEDICINE

## 2021-07-21 PROCEDURE — 74011000250 HC RX REV CODE- 250: Performed by: REGISTERED NURSE

## 2021-07-21 PROCEDURE — 76060000032 HC ANESTHESIA 0.5 TO 1 HR: Performed by: INTERNAL MEDICINE

## 2021-07-21 PROCEDURE — 83735 ASSAY OF MAGNESIUM: CPT

## 2021-07-21 PROCEDURE — 77030013992 HC SNR POLYP ENDOSC BSC -B: Performed by: INTERNAL MEDICINE

## 2021-07-21 PROCEDURE — 88305 TISSUE EXAM BY PATHOLOGIST: CPT

## 2021-07-21 PROCEDURE — 85610 PROTHROMBIN TIME: CPT

## 2021-07-21 RX ORDER — GLYCOPYRROLATE 0.2 MG/ML
INJECTION INTRAMUSCULAR; INTRAVENOUS AS NEEDED
Status: DISCONTINUED | OUTPATIENT
Start: 2021-07-21 | End: 2021-07-21 | Stop reason: HOSPADM

## 2021-07-21 RX ORDER — LIDOCAINE HYDROCHLORIDE 20 MG/ML
INJECTION, SOLUTION EPIDURAL; INFILTRATION; INTRACAUDAL; PERINEURAL AS NEEDED
Status: DISCONTINUED | OUTPATIENT
Start: 2021-07-21 | End: 2021-07-21 | Stop reason: HOSPADM

## 2021-07-21 RX ORDER — PROPOFOL 10 MG/ML
INJECTION, EMULSION INTRAVENOUS
Status: DISCONTINUED | OUTPATIENT
Start: 2021-07-21 | End: 2021-07-21 | Stop reason: HOSPADM

## 2021-07-21 RX ORDER — SODIUM CHLORIDE, SODIUM LACTATE, POTASSIUM CHLORIDE, CALCIUM CHLORIDE 600; 310; 30; 20 MG/100ML; MG/100ML; MG/100ML; MG/100ML
100 INJECTION, SOLUTION INTRAVENOUS CONTINUOUS
Status: DISCONTINUED | OUTPATIENT
Start: 2021-07-21 | End: 2021-07-21 | Stop reason: HOSPADM

## 2021-07-21 RX ORDER — PROPOFOL 10 MG/ML
INJECTION, EMULSION INTRAVENOUS AS NEEDED
Status: DISCONTINUED | OUTPATIENT
Start: 2021-07-21 | End: 2021-07-21 | Stop reason: HOSPADM

## 2021-07-21 RX ADMIN — GLYCOPYRROLATE 0.2 MG: 0.2 INJECTION INTRAMUSCULAR; INTRAVENOUS at 08:14

## 2021-07-21 RX ADMIN — PROPOFOL 140 MCG/KG/MIN: 10 INJECTION, EMULSION INTRAVENOUS at 07:57

## 2021-07-21 RX ADMIN — SODIUM CHLORIDE, SODIUM LACTATE, POTASSIUM CHLORIDE, AND CALCIUM CHLORIDE 100 ML/HR: 600; 310; 30; 20 INJECTION, SOLUTION INTRAVENOUS at 07:02

## 2021-07-21 RX ADMIN — LIDOCAINE HYDROCHLORIDE 100 MG: 20 INJECTION, SOLUTION EPIDURAL; INFILTRATION; INTRACAUDAL; PERINEURAL at 07:57

## 2021-07-21 RX ADMIN — PROPOFOL 10 MG: 10 INJECTION, EMULSION INTRAVENOUS at 07:59

## 2021-07-21 RX ADMIN — PROPOFOL 50 MG: 10 INJECTION, EMULSION INTRAVENOUS at 07:57

## 2021-07-21 NOTE — PROCEDURES
Endoscopic Gastroduodenoscopy Procedure Note    Indications: ESDRAS    Anesthesia/Sedation: MAC IV     Pre-Procedure Physical:    Current Facility-Administered Medications   Medication Dose Route Frequency    lactated Ringers infusion  100 mL/hr IntraVENous CONTINUOUS     Facility-Administered Medications Ordered in Other Encounters   Medication Dose Route Frequency    propofoL (DIPRIVAN) 10 mg/mL injection   IntraVENous PRN    lidocaine (PF) (XYLOCAINE) 20 mg/mL (2 %) injection   IntraVENous PRN    propofoL (DIPRIVAN) 10 mg/mL injection   IntraVENous CONTINUOUS      Fosamax [alendronate] and Ibuprofen    Patient Vitals for the past 8 hrs:   BP Temp Pulse Resp SpO2   07/21/21 0754 (!) 166/69 -- 61 18 98 %   07/21/21 0700 (!) 145/64 97.6 °F (36.4 °C) 71 18 94 %       Exam      Airway: clear   Heart: normal S1and S2    Lungs: clear bilateral  Abdomen: soft, nontender, bowel sounds present and normal in all quads   Mental Status: awake, alert and oriented to person, place and time          Procedure Details     Informed consent was obtained for the procedure, including conscious sedation. Risks of pancreatitis, infection, perforation, hemorrhage, adverse drug reaction and aspiration were discussed. The patient was placed in the left lateral decubitus position. Based on the pre-procedure assessment, including review of the patient's medical history, medications, allergies, and review of systems, she had been deemed to be an appropriate candidate for conscious sedation; she was therefore sedated with the medications listed below. She was monitored continuously with ECG tracing, pulse oximetry, blood pressure monitoring, and direct observation. The EGD gastroscope was inserted into the mouth and advanced under direct vision to the second portion of the duodenum.   A careful inspection was made as the gastroscope was withdrawn, including a retroflexed view of the proximal stomach; findings and interventions are described below. Appropriate photodocumentation was obtained. Findings:   Esophagus- Normal.  Stomach- Normal.  Duodenum- Duodenal atrophy. Biopsied. Therapies: Biopsy    Specimens: Duodenal    Estimated Blood Loss: 0 cc           Complications:   None; patient tolerated the procedure well. Attending Attestation:  I performed the procedure. Impression:    Duodenal atrophy. Biopsied.     Recommendations:  Await path results

## 2021-07-21 NOTE — DISCHARGE INSTRUCTIONS
Gastrointestinal Esophagogastroduodenoscopy (EGD) - Upper Exam Discharge Instructions    1. Call Dr. Crystal Ball at [phone] for any problems or questions. 2. Contact the doctor's office for follow up appointment as directed. 3. Medication may cause drowsiness for several hours, therefore:  · Do not drive or operate machinery for remainder of the day. · No alcohol today. · Do not make any important or legal decisions for 24 hours. · Do not sign any legal documents for 24 hours. 5. Ordinarily, you may resume regular diet and activity after exam unless otherwise              specified by your physician. 6. For mild soreness in your throat you may use Cepacol throat lozenges or warm               salt-water gargles as needed. Any additional instructions: You may resume Coumadin on Friday and resume your Andi Joyce today. Instructions given to Aliza Flores and other family members. Gastrointestinal Colonoscopy/Flexible Sigmoidoscopy - Lower Exam Discharge Instructions  1. Call Dr. Crystal Ball at [phone] for any problems or questions. 2. Contact the doctors office for follow up appointment as directed  3. Medication may cause drowsiness for several hours, therefore:  · Do not drive or operate machinery for reminder of the day. · No alcohol today. · Do not make any important or legal decisions for 24 hours. · Do not sign any legal documents for 24 hours. 4. Ordinarily, you may resume regular diet and activity after exam unless otherwise specified by your physician. 5. Because of air put into your colon during exam, you may experience some abdominal distension, relieved by the passage of gas, for several hours. 6. Contact your physician if you have any of the following:  · Excessive amount of bleeding - large amount when having a bowel movement. Occasional specks of blood with bowel movement would not be unusual.  · Severe abdominal pain  · Fever or Chills  7.  Polyp Removal - follow these additional instructions  · Clear liquid diet for the next meal (jell-o, broth, clear drinks)  · Soft diet for 24 hours, then resume regular diet   · Take Metamucil - 1 tablespoon in juice every morning for 3 days  · No Aspirin, Advil, Aleve, Nuprin, Ibuprofen, or medications that contain these drugs for 2 weeks. Any additional instructions: Will call with pathology report. Instructions given to Thanh Kirklin and other family members.

## 2021-07-21 NOTE — PROCEDURES
Colonoscopy Procedure Note    Indications: [facility]    Anesthesia/Sedation: MAC IV     Pre-Procedure Physical:  Current Facility-Administered Medications   Medication Dose Route Frequency    lactated Ringers infusion  100 mL/hr IntraVENous CONTINUOUS     Facility-Administered Medications Ordered in Other Encounters   Medication Dose Route Frequency    propofoL (DIPRIVAN) 10 mg/mL injection   IntraVENous PRN    lidocaine (PF) (XYLOCAINE) 20 mg/mL (2 %) injection   IntraVENous PRN    propofoL (DIPRIVAN) 10 mg/mL injection   IntraVENous CONTINUOUS      Fosamax [alendronate] and Ibuprofen    Patient Vitals for the past 8 hrs:   BP Temp Pulse Resp SpO2   07/21/21 0754 (!) 166/69 -- 61 18 98 %   07/21/21 0700 (!) 145/64 97.6 °F (36.4 °C) 71 18 94 %       Exam:    Airway: clear   Heart: normal S1and S2    Lungs: clear bilateral  Abdomen: soft, nontender, bowel sounds present and normal in all quads   Mental Status: awake, alert and oriented to person, place and time        Procedure Details      Informed consent was obtained for the procedure, including sedation. Risks of perforation, hemorrhage, adverse drug reaction and aspiration were discussed. The patient was placed in the left lateral decubitus position. Based on the pre-procedure assessment, including review of the patient's medical history, medications, allergies, and review of systems, she had been deemed to be an appropriate candidate for conscious sedation; she was therefore sedated with the medications listed below. The patient was monitored continuously with ECG tracing, pulse oximetry, blood pressure monitoring, and direct observations. A rectal examination was performed. The colonoscope was inserted into the rectum and advanced under direct vision to the cecum. The quality of the colonic preparation was good.  A careful inspection was made as the colonoscope was withdrawn, including a retroflexed view of the rectum; findings and interventions are described below. Appropriate photodocumentation was obtained. Findings:   Large 2 cm semi-pedunculated polyp removed by hot snare from rectum (15 from anal verge). Two more smaller 8 mm sessile polyps removed by cold snare from the ascending and descending colon. Specimens: Polyps    Estimated Blood Loss: 0 cc           Complications: None; patient tolerated the procedure well. Attending Attestation: I performed the procedure. Impression:    Large 2 cm semi-pedunculated polyp removed by hot snare from rectum (15 from anal verge). Two more smaller 8 mm sessile polyps removed by cold snare from the ascending and descending colon.     Recommendations:   Await path results  Next colonoscopy in 1 year

## 2021-07-21 NOTE — ANESTHESIA POSTPROCEDURE EVALUATION
Procedure(s):  ESOPHAGOGASTRODUODENOSCOPY (EGD)  COLONOSCOPY/ BMI 27  ESOPHAGOGASTRODUODENAL (EGD) BIOPSY  ENDOSCOPIC POLYPECTOMY. total IV anesthesia    Anesthesia Post Evaluation      Multimodal analgesia: multimodal analgesia used between 6 hours prior to anesthesia start to PACU discharge  Patient location during evaluation: PACU  Patient participation: complete - patient participated  Level of consciousness: awake  Pain management: adequate  Airway patency: patent  Anesthetic complications: no  Cardiovascular status: acceptable and hemodynamically stable  Respiratory status: acceptable  Hydration status: acceptable  Comments: Acceptable for discharge from PACU. Post anesthesia nausea and vomiting:  none  Final Post Anesthesia Temperature Assessment:  Normothermia (36.0-37.5 degrees C)      INITIAL Post-op Vital signs:   Vitals Value Taken Time   /60 07/21/21 0839   Temp     Pulse 59 07/21/21 0846   Resp     SpO2 97 % 07/21/21 0846   Vitals shown include unvalidated device data.

## 2021-07-21 NOTE — ANESTHESIA PREPROCEDURE EVALUATION
Relevant Problems   CARDIOVASCULAR   (+) CAD (coronary artery disease)   (+) Essential hypertension   (+) Mitral valve insufficiency   (+) Paroxysmal atrial fibrillation (HCC)      GASTROINTESTINAL   (+) GERD (gastroesophageal reflux disease)      ENDOCRINE   (+) Hypothyroidism, postsurgical   (+) Type 2 diabetes mellitus with hyperglycemia, with long-term current use of insulin (HCC)   (+) Type 2 diabetes mellitus with microalbuminuria, with long-term current use of insulin (HCC)      HEMATOLOGY   (+) Anemia       Anesthetic History   No history of anesthetic complications            Review of Systems / Medical History  Patient summary reviewed and pertinent labs reviewed    Pulmonary  Within defined limits                 Neuro/Psych     seizures (Childhood - no meds)         Cardiovascular    Hypertension: well controlled  Valvular problems/murmurs (s/p AVR/MVR 2015)      Dysrhythmias (s/p ablation; on Tikosyn) : atrial fibrillation  Hyperlipidemia    Exercise tolerance: >4 METS  Comments: Echo 9/20 - normal EF, normal functioning prosthetic AV and MV    Coumadin held x 3d  Tikosyn last dose 7/20   GI/Hepatic/Renal     GERD: well controlled           Endo/Other    Diabetes: well controlled, type 2, using insulin  Hypothyroidism: well controlled       Other Findings              Physical Exam    Airway  Mallampati: III  TM Distance: 4 - 6 cm  Neck ROM: normal range of motion   Mouth opening: Normal     Cardiovascular    Rhythm: regular  Rate: normal         Dental  No notable dental hx       Pulmonary  Breath sounds clear to auscultation               Abdominal         Other Findings            Anesthetic Plan    ASA: 3  Anesthesia type: total IV anesthesia            Anesthetic plan and risks discussed with: Patient      QT normal, K and MG wnl

## 2021-07-21 NOTE — H&P
[facility] Consult Note           Referring Physician:     Consult Date: 7/21/2021    Reason for Consult: [place] [facility]    History of Present Illness:  Patient is a 77 y.o. female who is seen in consultation for [place] [facility].      Past Medical History:   Diagnosis Date    [address]ib ([facility]     a-fib    Aortic valve insufficiency     CAD (coronary artery disease)     aortic and mitral valve     Diabetes mellitus, insulin dependent (IDDM), controlled 5/11/2016    Fracture, femur ([address] 9/21/2009    GERD (gastroesophageal reflux disease)     Heart failure ([address]     History of cardioversion     History of petit-mal seizures     none in 15 years    Hyperlipidemia     Hypertension     Hypothyroidism     Hypothyroidism, postsurgical 10/21/2015    Mitral stenosis with insufficiency     Other unknown and unspecified cause of morbidity or mortality     HLD    Paroxysmal atrial fibrillation (HCC)     Seizure ([facility] 9/21/2009    Thyroid nodule 6/29/2011    Unspecified essential hypertension       Past Surgical History:   Procedure Laterality Date    HX APPENDECTOMY      HX COLONOSCOPY  2014    neg    HX HEART CATHETERIZATION      HX HEENT      Thyroidectomy    HX HIP FRACTURE TX Left 2009    pins    HX MITRAL VALVE REPLACEMENT  5/4/15     and Aortic Valve Replacement, Dr. Deedra Goldberg    HX ORTHOPAEDIC      L hip fracture/repair    HX OTHER SURGICAL      ablation 10/6/2015, Dr Shahla Orozco    HX THYROIDECTOMY      HX TUBAL LIGATION        Family History   Problem Relation Age of Onset    Diabetes Mother     Heart Disease Mother     Heart Disease Father     Diabetes Sister     Diabetes Brother     Heart Disease Brother     Diabetes Brother     Heart Disease Brother     Diabetes Sister     Heart Disease Sister     Breast Cancer Neg Hx      Social History     Occupational History    Not on file   Tobacco Use    Smoking status: Never Smoker    Smokeless tobacco: Never Used   Substance and Sexual Activity    Alcohol use: No    Drug use: No    Sexual activity: Yes     Partners: Male       Hospital Medications:  Current Facility-Administered Medications   Medication Dose Route Frequency    lactated Ringers infusion  100 mL/hr IntraVENous CONTINUOUS       Allergies: Allergies   Allergen Reactions    Fosamax [Alendronate] Unknown (comments)     \" I don't remember that\"    Ibuprofen Rash       Review of Systems:  A comprehensive review of systems was negative except for that written in the History of Present Illness. Objective:     Physical Exam:  Vitals:  Visit Vitals  BP (!) 145/64   Pulse 71   Temp 97.6 °F (36.4 °C)   Resp 18   SpO2 94%   Breastfeeding No       General: No acute distress. Skin:  Extremities and face reveal no rashes. No hansen erythema. No telangiectasias on the chest wall. HEENT: Sclerae anicteric. No oral ulcers. No abnormal pigmentation of the lips. The neck is supple. Cardiovascular: Regular rate and rhythm. No murmurs, gallops, or rubs. Respiratory:  Comfortable breathing  With no accessory muscle use. Clear breath sounds with no wheezes, rales, or rhonchi. GI:  Abdomen nondistended, soft, and nontender. Normal active bowel sounds. No enlargement of the liver or spleen. No masses palpable. Musculoskeletal:  No pitting edema of the lower legs. Extremities have good range of motion. Neurological:  Gross memory appears intact. Patient is alert and oriented. Psychiatric:  Mood appears appropriate with judgement intact. Lymphatic:  No cervical or supraclavicular adenopathy. Laboratory:    No results for input(s): WBC, RBC, HGB, HCT, PLT, HGBEXT, HCTEXT, PLTEXT in the last 72 hours. Recent Labs     07/21/21  0658   *      K 4.7   *   CO2 29   BUN 7*   CREA 0.53*   CA 8.4     Recent Labs     07/21/21  0652   INR 1.1     No results for input(s): TBIL, CBIL, AP, ALB, TP, AML, LPSE in the last 72 hours.     No lab exists for component: SGOT, GPT    Assessment:       A 77 y.o. female with ESDRAS and [facility]      Plan:       EGD and colonoscopy    Signed By: Larwence Schwab, MD     July 21, 2021

## 2021-07-22 ENCOUNTER — HOSPITAL ENCOUNTER (OUTPATIENT)
Dept: CT IMAGING | Age: 76
Discharge: HOME | End: 2021-07-22
Attending: INTERNAL MEDICINE
Payer: MEDICARE

## 2021-07-22 ENCOUNTER — TRANSCRIBE ORDER (OUTPATIENT)
Dept: SCHEDULING | Age: 76
End: 2021-07-22

## 2021-07-22 DIAGNOSIS — C80.1 MALIGNANT NEOPLASM WITHOUT SPECIFICATION OF SITE (HCC): Primary | ICD-10-CM

## 2021-07-22 DIAGNOSIS — R10.13 EPIGASTRIC PAIN: ICD-10-CM

## 2021-07-22 DIAGNOSIS — C80.1 MALIGNANT NEOPLASM WITHOUT SPECIFICATION OF SITE (HCC): ICD-10-CM

## 2021-07-22 PROCEDURE — 74011000258 HC RX REV CODE- 258: Performed by: INTERNAL MEDICINE

## 2021-07-22 PROCEDURE — 74177 CT ABD & PELVIS W/CONTRAST: CPT

## 2021-07-22 PROCEDURE — 74011000636 HC RX REV CODE- 636: Performed by: INTERNAL MEDICINE

## 2021-07-22 RX ORDER — SODIUM CHLORIDE 0.9 % (FLUSH) 0.9 %
10 SYRINGE (ML) INJECTION
Status: COMPLETED | OUTPATIENT
Start: 2021-07-22 | End: 2021-07-22

## 2021-07-22 RX ADMIN — SODIUM CHLORIDE 100 ML: 900 INJECTION, SOLUTION INTRAVENOUS at 15:48

## 2021-07-22 RX ADMIN — DIATRIZOATE MEGLUMINE AND DIATRIZOATE SODIUM 15 ML: 660; 100 LIQUID ORAL; RECTAL at 15:48

## 2021-07-22 RX ADMIN — Medication 10 ML: at 15:48

## 2021-07-22 RX ADMIN — IOPAMIDOL 100 ML: 755 INJECTION, SOLUTION INTRAVENOUS at 15:48

## 2021-08-06 ENCOUNTER — HOSPITAL ENCOUNTER (OUTPATIENT)
Dept: MAMMOGRAPHY | Age: 76
Discharge: HOME | End: 2021-08-06
Attending: NURSE PRACTITIONER
Payer: MEDICARE

## 2021-08-06 DIAGNOSIS — Z12.31 SCREENING MAMMOGRAM FOR HIGH-RISK PATIENT: ICD-10-CM

## 2021-08-06 PROCEDURE — 77067 SCR MAMMO BI INCL CAD: CPT

## 2021-08-13 PROBLEM — K63.89 COLONIC MASS: Status: ACTIVE | Noted: 2021-08-13

## 2021-08-17 ENCOUNTER — ANESTHESIA EVENT (OUTPATIENT)
Dept: ENDOSCOPY | Age: 76
End: 2021-08-17
Payer: MEDICARE

## 2021-08-17 RX ORDER — SODIUM CHLORIDE 0.9 % (FLUSH) 0.9 %
5-40 SYRINGE (ML) INJECTION AS NEEDED
Status: CANCELLED | OUTPATIENT
Start: 2021-08-17

## 2021-08-17 RX ORDER — SODIUM CHLORIDE 0.9 % (FLUSH) 0.9 %
5-40 SYRINGE (ML) INJECTION EVERY 8 HOURS
Status: CANCELLED | OUTPATIENT
Start: 2021-08-17

## 2021-08-17 RX ORDER — SODIUM CHLORIDE, SODIUM LACTATE, POTASSIUM CHLORIDE, CALCIUM CHLORIDE 600; 310; 30; 20 MG/100ML; MG/100ML; MG/100ML; MG/100ML
100 INJECTION, SOLUTION INTRAVENOUS CONTINUOUS
Status: CANCELLED | OUTPATIENT
Start: 2021-08-17

## 2021-08-17 NOTE — PROGRESS NOTES
Called and confirmed scheduled procedure for patient. Informed on patients arrival time (2 hours prior to scheduled procedure because patient is on Tikosyn), entry location, and  policy. Opportunity for questions provided, no voiced concerns.

## 2021-08-18 ENCOUNTER — ANESTHESIA (OUTPATIENT)
Dept: ENDOSCOPY | Age: 76
End: 2021-08-18
Payer: MEDICARE

## 2021-08-18 ENCOUNTER — HOSPITAL ENCOUNTER (OUTPATIENT)
Age: 76
Setting detail: OUTPATIENT SURGERY
Discharge: HOME | End: 2021-08-18
Attending: INTERNAL MEDICINE | Admitting: INTERNAL MEDICINE
Payer: MEDICARE

## 2021-08-18 VITALS
HEIGHT: 66 IN | TEMPERATURE: 98.5 F | DIASTOLIC BLOOD PRESSURE: 65 MMHG | OXYGEN SATURATION: 96 % | WEIGHT: 149 LBS | RESPIRATION RATE: 16 BRPM | BODY MASS INDEX: 23.95 KG/M2 | SYSTOLIC BLOOD PRESSURE: 151 MMHG | HEART RATE: 50 BPM

## 2021-08-18 LAB
ANION GAP SERPL CALC-SCNC: 5 MMOL/L (ref 7–16)
ATRIAL RATE: 55 BPM
BUN SERPL-MCNC: 9 MG/DL (ref 8–23)
CALCIUM SERPL-MCNC: 8.5 MG/DL (ref 8.3–10.4)
CALCULATED R AXIS, ECG10: 45 DEGREES
CALCULATED T AXIS, ECG11: 68 DEGREES
CHLORIDE SERPL-SCNC: 110 MMOL/L (ref 98–107)
CO2 SERPL-SCNC: 27 MMOL/L (ref 21–32)
CREAT SERPL-MCNC: 0.62 MG/DL (ref 0.6–1)
DIAGNOSIS, 93000: NORMAL
GLUCOSE BLD STRIP.AUTO-MCNC: 107 MG/DL (ref 65–100)
GLUCOSE SERPL-MCNC: 113 MG/DL (ref 65–100)
MAGNESIUM SERPL-MCNC: 2.2 MG/DL (ref 1.8–2.4)
P-R INTERVAL, ECG05: 168 MS
POTASSIUM SERPL-SCNC: 4.1 MMOL/L (ref 3.5–5.1)
Q-T INTERVAL, ECG07: 462 MS
QRS DURATION, ECG06: 88 MS
QTC CALCULATION (BEZET), ECG08: 441 MS
SERVICE CMNT-IMP: ABNORMAL
SODIUM SERPL-SCNC: 142 MMOL/L (ref 136–145)
VENTRICULAR RATE, ECG03: 55 BPM

## 2021-08-18 PROCEDURE — 76060000031 HC ANESTHESIA FIRST 0.5 HR: Performed by: INTERNAL MEDICINE

## 2021-08-18 PROCEDURE — 77030029929: Performed by: INTERNAL MEDICINE

## 2021-08-18 PROCEDURE — 83735 ASSAY OF MAGNESIUM: CPT

## 2021-08-18 PROCEDURE — 82962 GLUCOSE BLOOD TEST: CPT

## 2021-08-18 PROCEDURE — 2709999900 HC NON-CHARGEABLE SUPPLY: Performed by: INTERNAL MEDICINE

## 2021-08-18 PROCEDURE — 76040000025: Performed by: INTERNAL MEDICINE

## 2021-08-18 PROCEDURE — 93005 ELECTROCARDIOGRAM TRACING: CPT | Performed by: ANESTHESIOLOGY

## 2021-08-18 PROCEDURE — 80048 BASIC METABOLIC PNL TOTAL CA: CPT

## 2021-08-18 PROCEDURE — 77030020018 HC MRKR ENDOSC SPOT 5ML SYR GISP -B: Performed by: INTERNAL MEDICINE

## 2021-08-18 PROCEDURE — 74011250636 HC RX REV CODE- 250/636: Performed by: NURSE ANESTHETIST, CERTIFIED REGISTERED

## 2021-08-18 PROCEDURE — 74011250636 HC RX REV CODE- 250/636: Performed by: STUDENT IN AN ORGANIZED HEALTH CARE EDUCATION/TRAINING PROGRAM

## 2021-08-18 RX ORDER — SODIUM CHLORIDE, SODIUM LACTATE, POTASSIUM CHLORIDE, CALCIUM CHLORIDE 600; 310; 30; 20 MG/100ML; MG/100ML; MG/100ML; MG/100ML
100 INJECTION, SOLUTION INTRAVENOUS CONTINUOUS
Status: DISCONTINUED | OUTPATIENT
Start: 2021-08-18 | End: 2021-08-18 | Stop reason: HOSPADM

## 2021-08-18 RX ORDER — PROPOFOL 10 MG/ML
INJECTION, EMULSION INTRAVENOUS
Status: DISCONTINUED | OUTPATIENT
Start: 2021-08-18 | End: 2021-08-18 | Stop reason: HOSPADM

## 2021-08-18 RX ORDER — PROPOFOL 10 MG/ML
INJECTION, EMULSION INTRAVENOUS AS NEEDED
Status: DISCONTINUED | OUTPATIENT
Start: 2021-08-18 | End: 2021-08-18 | Stop reason: HOSPADM

## 2021-08-18 RX ADMIN — PROPOFOL 140 MCG/KG/MIN: 10 INJECTION, EMULSION INTRAVENOUS at 12:14

## 2021-08-18 RX ADMIN — SODIUM CHLORIDE, SODIUM LACTATE, POTASSIUM CHLORIDE, AND CALCIUM CHLORIDE 100 ML/HR: 600; 310; 30; 20 INJECTION, SOLUTION INTRAVENOUS at 10:29

## 2021-08-18 RX ADMIN — PROPOFOL 50 MG: 10 INJECTION, EMULSION INTRAVENOUS at 12:14

## 2021-08-18 NOTE — ANESTHESIA PREPROCEDURE EVALUATION
Relevant Problems   CARDIOVASCULAR   (+) CAD (coronary artery disease)   (+) Essential hypertension   (+) Mitral valve insufficiency   (+) Paroxysmal atrial fibrillation (HCC)      GASTROINTESTINAL   (+) GERD (gastroesophageal reflux disease)      ENDOCRINE   (+) Hypothyroidism, postsurgical   (+) Type 2 diabetes mellitus with hyperglycemia, with long-term current use of insulin (HCC)   (+) Type 2 diabetes mellitus with microalbuminuria, with long-term current use of insulin (HCC)      HEMATOLOGY   (+) Anemia       Anesthetic History   No history of anesthetic complications            Review of Systems / Medical History  Patient summary reviewed and pertinent labs reviewed    Pulmonary  Within defined limits                 Neuro/Psych     seizures (Childhood - no meds)         Cardiovascular    Hypertension: well controlled  Valvular problems/murmurs (s/p AVR/MVR 2015)      Dysrhythmias (s/p ablation; on Tikosyn) : atrial fibrillation  Hyperlipidemia    Exercise tolerance: >4 METS  Comments: Echo 9/20 - normal EF, normal functioning prosthetic AV and MV     GI/Hepatic/Renal     GERD: well controlled           Endo/Other    Diabetes: well controlled, type 2, using insulin  Hypothyroidism: well controlled       Other Findings              Physical Exam    Airway  Mallampati: III  TM Distance: 4 - 6 cm  Neck ROM: normal range of motion   Mouth opening: Normal     Cardiovascular    Rhythm: regular  Rate: normal         Dental  No notable dental hx       Pulmonary  Breath sounds clear to auscultation               Abdominal         Other Findings            Anesthetic Plan    ASA: 3  Anesthesia type: total IV anesthesia          Induction: Intravenous  Anesthetic plan and risks discussed with: Patient and Family      QT normal, K and MG wnl

## 2021-08-18 NOTE — PROCEDURES
Flex Sig Procedure Note    Indications: Tattoo rectal cancer polypectomy site     Anesthesia/Sedation: MAC IV     Pre-Procedure Physical:  Current Facility-Administered Medications   Medication Dose Route Frequency    lactated Ringers infusion  100 mL/hr IntraVENous CONTINUOUS     Facility-Administered Medications Ordered in Other Encounters   Medication Dose Route Frequency    propofoL (DIPRIVAN) 10 mg/mL injection   IntraVENous PRN    propofoL (DIPRIVAN) 10 mg/mL injection   IntraVENous CONTINUOUS      Fosamax [alendronate] and Ibuprofen    Patient Vitals for the past 8 hrs:   BP Temp Pulse Resp SpO2 Height Weight   08/18/21 1210 (!) 180/77 -- 63 18 -- -- --   08/18/21 1023 (!) 188/70 98.2 °F (36.8 °C) 61 15 97 % 5' 6\" (1.676 m) 67.6 kg (149 lb)       Exam:    Airway: clear   Heart: normal S1and S2    Lungs: clear bilateral  Abdomen: soft, nontender, bowel sounds present and normal in all quads   Mental Status: awake, alert and oriented to person, place and time        Procedure Details      Informed consent was obtained for the procedure, including sedation. Risks of perforation, hemorrhage, adverse drug reaction and aspiration were discussed. The patient was placed in the left lateral decubitus position. Based on the pre-procedure assessment, including review of the patient's medical history, medications, allergies, and review of systems, she had been deemed to be an appropriate candidate for conscious sedation; she was therefore sedated with the medications listed below. The patient was monitored continuously with ECG tracing, pulse oximetry, blood pressure monitoring, and direct observations. A rectal examination was performed. The colonoscope was inserted into the rectum and advanced under direct vision to the sigmoid. The quality of the colonic preparation was good.  A careful inspection was made as the colonoscope was withdrawn, including a retroflexed view of the rectum; findings and interventions are described below. Appropriate photodocumentation was obtained. Findings:   Prior rectal polypectomy site scar identified at 15 cm from anal verge. Tattoo ink was injected proximal and distal to the lesion. Specimens: None    Estimated Blood Loss: 0 cc           Complications: None; patient tolerated the procedure well. Attending Attestation: I performed the procedure. Impression:    Prior rectal polypectomy site scar identified at 15 cm from anal verge. Tattoo ink was injected proximal and distal to the lesion. Recommendations:   Dr. Ruby Stringer to proceed with surgical resection.

## 2021-08-18 NOTE — ANESTHESIA POSTPROCEDURE EVALUATION
Procedure(s):  SIGMOIDOSCOPY FLEXIBLE  INJECTION. total IV anesthesia    Anesthesia Post Evaluation      Multimodal analgesia: multimodal analgesia used between 6 hours prior to anesthesia start to PACU discharge  Patient location during evaluation: bedside  Patient participation: complete - patient participated  Level of consciousness: awake and alert  Pain management: adequate  Airway patency: patent  Anesthetic complications: no  Cardiovascular status: acceptable  Respiratory status: acceptable  Hydration status: acceptable  Post anesthesia nausea and vomiting:  controlled  Final Post Anesthesia Temperature Assessment:  Normothermia (36.0-37.5 degrees C)      INITIAL Post-op Vital signs:   Vitals Value Taken Time   /65 08/18/21 1258   Temp 36.9 °C (98.5 °F) 08/18/21 1228   Pulse 56 08/18/21 1301   Resp 16 08/18/21 1258   SpO2 95 % 08/18/21 1300   Vitals shown include unvalidated device data.

## 2021-08-18 NOTE — DISCHARGE INSTRUCTIONS
Gastrointestinal Colonoscopy/Flexible Sigmoidoscopy - Lower Exam Discharge Instructions  1. Call Dr. Ivone Pitts at [phone] for any problems or questions. 2. Contact the doctors office for follow up appointment as directed  3. Medication may cause drowsiness for several hours, therefore:  · Do not drive or operate machinery for reminder of the day. · No alcohol today. · Do not make any important or legal decisions for 24 hours. · Do not sign any legal documents for 24 hours. 4. Ordinarily, you may resume regular diet and activity after exam unless otherwise specified by your physician. 5. Because of air put into your colon during exam, you may experience some abdominal distension, relieved by the passage of gas, for several hours. 6. Contact your physician if you have any of the following:  · Excessive amount of bleeding - large amount when having a bowel movement.   Occasional specks of blood with bowel movement would not be unusual.  · Severe abdominal pain  · Fever or Chills  Any additional instructions:  Dr. Stacy Brice to proceed with surgical resection

## 2021-08-18 NOTE — PROGRESS NOTES
Spiritual Care Visit, initial visit. ,  Pre surgery visit. Visited with patient at bedside. Prayed for patient's healing and health. Visit by Elvi Ireland, Staff .  Rosalino., Gordon.B., B.A.

## 2021-08-18 NOTE — H&P
[facility] Consult Note           Referring Physician:     Consult Date: 8/18/2021    Reason for Consult: Tattoo rectal cancer site    History of Present Illness:  Patient is a 77 y.o. female who is seen in consultation for Tattoo rectal cancer site.      Past Medical History:   Diagnosis Date    Afib ([facility]     a-fib    Aortic valve insufficiency     CAD (coronary artery disease)     aortic and mitral valve     Colon cancer ([address]     Diabetes mellitus, insulin dependent (IDDM), controlled 5/11/2016    avg fbs 125-150, A1c 7.3, insulin    Fracture, femur ([address] 9/21/2009    GERD (gastroesophageal reflux disease)     states rarely PRN medication    Heart failure ([address]     History of cardioversion     History of petit-mal seizures     none in 15 years    Hyperlipidemia     Hypertension     Hypothyroidism     Hypothyroidism, postsurgical 10/21/2015    Mitral stenosis with insufficiency     Other unknown and unspecified cause of morbidity or mortality     HLD    Paroxysmal atrial fibrillation (HCC)     Seizure ([facility] 9/21/2009    Thyroid nodule 6/29/2011    Unspecified essential hypertension       Past Surgical History:   Procedure Laterality Date    COLONOSCOPY N/A 7/21/2021    COLONOSCOPY/ BMI 27 performed by Natacha Mustafa MD at [facility] ENDOSCOPY    HX APPENDECTOMY      HX COLONOSCOPY  2014    neg    HX HEART CATHETERIZATION      HX HEENT      Thyroidectomy    HX HIP FRACTURE TX Left 2009    pins    HX MITRAL VALVE REPLACEMENT  5/4/15     and Aortic Valve Replacement, Dr. Deedra Goldberg    HX ORTHOPAEDIC      L hip fracture/repair    HX OTHER SURGICAL      ablation 10/6/2015, Dr Shahla Orozco    HX THYROIDECTOMY      HX TUBAL LIGATION        Family History   Problem Relation Age of Onset    Diabetes Mother     Heart Disease Mother     Heart Disease Father     Diabetes Sister     Breast Cancer Sister 22    Diabetes Brother     Heart Disease Brother     Diabetes Brother     Heart Disease Brother     Diabetes Sister     Heart Disease Sister      Social History     Occupational History    Not on file   Tobacco Use    Smoking status: Never Smoker    Smokeless tobacco: Never Used   Vaping Use    Vaping Use: Never used   Substance and Sexual Activity    Alcohol use: No    Drug use: No    Sexual activity: Yes     Partners: Male       Hospital Medications:  Current Facility-Administered Medications   Medication Dose Route Frequency    lactated Ringers infusion  100 mL/hr IntraVENous CONTINUOUS       Allergies: Allergies   Allergen Reactions    Fosamax [Alendronate] Unknown (comments)     \" I don't remember that\"    Ibuprofen Rash       Review of Systems:  A comprehensive review of systems was negative except for that written in the History of Present Illness. Objective:     Physical Exam:  Vitals:  Visit Vitals  BP (!) 188/70   Pulse 61   Temp 98.2 °F (36.8 °C)   Resp 15   Ht 5' 6\" (1.676 m)   Wt 67.6 kg (149 lb)   SpO2 97%   BMI 24.05 kg/m²       General: No acute distress. Skin:  Extremities and face reveal no rashes. No hansen erythema. No telangiectasias on the chest wall. HEENT: Sclerae anicteric. No oral ulcers. No abnormal pigmentation of the lips. The neck is supple. Cardiovascular: Regular rate and rhythm. No murmurs, gallops, or rubs. Respiratory:  Comfortable breathing  With no accessory muscle use. Clear breath sounds with no wheezes, rales, or rhonchi. GI:  Abdomen nondistended, soft, and nontender. Normal active bowel sounds. No enlargement of the liver or spleen. No masses palpable. Musculoskeletal:  No pitting edema of the lower legs. Extremities have good range of motion. Neurological:  Gross memory appears intact. Patient is alert and oriented. Psychiatric:  Mood appears appropriate with judgement intact. Lymphatic:  No cervical or supraclavicular adenopathy.     Laboratory:    No results for input(s): WBC, RBC, HGB, HCT, PLT, HGBEXT, HCTEXT, PLTEXT in the last 72 hours. Recent Labs     08/18/21  1022   *      K 4.1   *   CO2 27   BUN 9   CREA 0.62   CA 8.5     No results for input(s): PTP, INR, APTT, INREXT in the last 72 hours. No results for input(s): TBIL, CBIL, AP, ALB, TP, AML, LPSE in the last 72 hours.     No lab exists for component: SGOT, GPT    Assessment:       A 77 y.o. female here for Tattoo rectal cancer site      Plan:       Flex sig    Signed By: Korey Dorman MD     August 18, 2021

## 2021-08-25 ENCOUNTER — HOSPITAL ENCOUNTER (OUTPATIENT)
Dept: SURGERY | Age: 76
Discharge: HOME | End: 2021-08-25
Attending: SURGERY
Payer: MEDICARE

## 2021-08-25 VITALS
BODY MASS INDEX: 24.53 KG/M2 | HEART RATE: 59 BPM | OXYGEN SATURATION: 95 % | DIASTOLIC BLOOD PRESSURE: 61 MMHG | TEMPERATURE: 97.5 F | HEIGHT: 66 IN | WEIGHT: 152.6 LBS | RESPIRATION RATE: 18 BRPM | SYSTOLIC BLOOD PRESSURE: 162 MMHG

## 2021-08-25 LAB
ERYTHROCYTE [DISTWIDTH] IN BLOOD BY AUTOMATED COUNT: 18.6 % (ref 11.9–14.6)
GLUCOSE BLD STRIP.AUTO-MCNC: 153 MG/DL (ref 65–100)
HCT VFR BLD AUTO: 35.7 % (ref 35.8–46.3)
HGB BLD-MCNC: 10.4 G/DL (ref 11.7–15.4)
MCH RBC QN AUTO: 25.3 PG (ref 26.1–32.9)
MCHC RBC AUTO-ENTMCNC: 29.1 G/DL (ref 31.4–35)
MCV RBC AUTO: 86.9 FL (ref 79.6–97.8)
NRBC # BLD: 0 K/UL (ref 0–0.2)
PLATELET # BLD AUTO: 284 K/UL (ref 150–450)
PMV BLD AUTO: 9.1 FL (ref 9.4–12.3)
RBC # BLD AUTO: 4.11 M/UL (ref 4.05–5.2)
SERVICE CMNT-IMP: ABNORMAL
WBC # BLD AUTO: 6.2 K/UL (ref 4.3–11.1)

## 2021-08-25 PROCEDURE — 82962 GLUCOSE BLOOD TEST: CPT

## 2021-08-25 PROCEDURE — 85027 COMPLETE CBC AUTOMATED: CPT

## 2021-08-25 NOTE — PERIOP NOTES
PLEASE CONTINUE TAKING ALL PRESCRIPTION MEDICATIONS UP TO THE DAY OF SURGERY UNLESS OTHERWISE DIRECTED BELOW. DISCONTINUE all vitamins and supplements 7 days prior to surgery. DISCONTINUE Non-Steriodal Anti-Inflammatory (NSAIDS) such as Advil and Aleve 5 days prior to surgery. Home Medications to take  the day of surgery   Amlodipine (Norvasc)  Aspirin 81 mg    Levothyroxine (Synthroid)  Loratadine (Claritin)   Omeprazole (Prilosec)    Per anesthesia protocol, take 80% of usual dose of Tresiba morning of procedure. Normal dose 54. Adjusted dose 43. Home Medications   to Hold   Hold warfarin 3-4 days prior to surgery as instructed by surgeon   AM of surgery hold tikosyn  Am of surgery hold regular insulin (Novolog), Losartan (Cozaar)       Comments    Covid test 8/30/21 @ 2 2 [facility],[address] [place], [place]    On the day before surgery please take Acetaminophen 1000mg in the morning and then again before bed. You may substitute for Tylenol 650 mg. Please do not bring home medications with you on the day of surgery unless otherwise directed by your nurse. If you are instructed to bring home medications, please give them to your nurse as they will be administered by the nursing staff. If you have any questions, please call [facility] [phone] or [facility] [phone]. A copy of this note was provided to the patient for reference.

## 2021-08-25 NOTE — PERIOP NOTES
Patient verified name and     Order for consent not found in EHR; patient verified procedure. Type III surgery, walk-in assessment complete. Labs per surgeon: none  Labs per anesthesia protocol: cbc, bmp (bmp result from 21 on chart); DOS EKG, bmp, Mag (pt on tikosyn) T&S, poc glucose, poc pt/inr(pt on coumadin)  EKG: done 21    Patient COVID test date 21; Patient aware of the appointment. The testing center is located at the [address], [place]. If appointment is needed patient provided telephone number of [phone]. Hospital approved surgical skin cleanser and instructions given per hospital policy. Patient provided with and instructed on educational handouts including Guide to Surgery, Pain Management, Hand Hygiene, Blood Transfusion Education, and [facility] Brochure. Patient answered medical/surgical history questions at their best of ability. All prior to admission medications documented in [facility]. Original medication prescription bottle not visualized during patient appointment. Patient instructed to hold all vitamins 7 days prior to surgery and NSAIDS 5 days prior to surgery, patient verbalized understanding. Patient teach back successful and patient demonstrates knowledge of instructions.

## 2021-08-25 NOTE — PERIOP NOTES
Recent Results (from the past 12 hour(s))   AMB POC PT/INR    Collection Time: 08/25/21  9:07 AM   Result Value Ref Range    VALID INTERNAL CONTROL POC Yes     Prothrombin time (POC)      INR POC 2.1    CBC W/O DIFF    Collection Time: 08/25/21 11:33 AM   Result Value Ref Range    WBC 6.2 4.3 - 11.1 K/uL    RBC 4.11 4.05 - 5.2 M/uL    HGB 10.4 (L) 11.7 - 15.4 g/dL    HCT 35.7 (L) 35.8 - 46.3 %    MCV 86.9 79.6 - 97.8 FL    MCH 25.3 (L) 26.1 - 32.9 PG    MCHC 29.1 (L) 31.4 - 35.0 g/dL    RDW 18.6 (H) 11.9 - 14.6 %    PLATELET 182 350 - 833 K/uL    MPV 9.1 (L) 9.4 - 12.3 FL    ABSOLUTE NRBC 0.00 0.0 - 0.2 K/uL   GLUCOSE, POC    Collection Time: 08/25/21 11:47 AM   Result Value Ref Range    Glucose (POC) 153 (H) 65 - 100 mg/dL    Performed by [employer]

## 2021-08-31 ENCOUNTER — ANESTHESIA EVENT (OUTPATIENT)
Dept: SURGERY | Age: 76
DRG: 331 | End: 2021-08-31
Payer: MEDICARE

## 2021-09-01 ENCOUNTER — HOSPITAL ENCOUNTER (INPATIENT)
Age: 76
LOS: 3 days | Discharge: HOME | DRG: 331 | End: 2021-09-04
Attending: SURGERY | Admitting: SURGERY
Payer: MEDICARE

## 2021-09-01 ENCOUNTER — ANESTHESIA (OUTPATIENT)
Dept: SURGERY | Age: 76
DRG: 331 | End: 2021-09-01
Payer: MEDICARE

## 2021-09-01 DIAGNOSIS — C18.9 MALIGNANT NEOPLASM OF COLON, UNSPECIFIED PART OF COLON (HCC): ICD-10-CM

## 2021-09-01 DIAGNOSIS — Z90.49 S/P PARTIAL RESECTION OF COLON: ICD-10-CM

## 2021-09-01 LAB
ABO + RH BLD: NORMAL
ANION GAP SERPL CALC-SCNC: 4 MMOL/L (ref 7–16)
ANION GAP SERPL CALC-SCNC: 7 MMOL/L (ref 7–16)
ATRIAL RATE: 51 BPM
BLOOD GROUP ANTIBODIES SERPL: NORMAL
BUN SERPL-MCNC: 8 MG/DL (ref 8–23)
BUN SERPL-MCNC: 9 MG/DL (ref 8–23)
CALCIUM SERPL-MCNC: 7.9 MG/DL (ref 8.3–10.4)
CALCIUM SERPL-MCNC: 8.1 MG/DL (ref 8.3–10.4)
CALCULATED R AXIS, ECG10: -10 DEGREES
CALCULATED T AXIS, ECG11: 66 DEGREES
CHLORIDE SERPL-SCNC: 111 MMOL/L (ref 98–107)
CHLORIDE SERPL-SCNC: 112 MMOL/L (ref 98–107)
CO2 SERPL-SCNC: 26 MMOL/L (ref 21–32)
CO2 SERPL-SCNC: 27 MMOL/L (ref 21–32)
CREAT SERPL-MCNC: 0.53 MG/DL (ref 0.6–1)
CREAT SERPL-MCNC: 0.57 MG/DL (ref 0.6–1)
DIAGNOSIS, 93000: NORMAL
GLUCOSE BLD STRIP.AUTO-MCNC: 105 MG/DL (ref 65–100)
GLUCOSE BLD STRIP.AUTO-MCNC: 160 MG/DL (ref 65–100)
GLUCOSE BLD STRIP.AUTO-MCNC: 172 MG/DL (ref 65–100)
GLUCOSE SERPL-MCNC: 105 MG/DL (ref 65–100)
GLUCOSE SERPL-MCNC: 170 MG/DL (ref 65–100)
INR BLD: 1.1 (ref 0.9–1.2)
INR PPP: 1.2
MAGNESIUM SERPL-MCNC: 1.7 MG/DL (ref 1.8–2.4)
MAGNESIUM SERPL-MCNC: 1.8 MG/DL (ref 1.8–2.4)
POTASSIUM SERPL-SCNC: 3 MMOL/L (ref 3.5–5.1)
POTASSIUM SERPL-SCNC: 3.5 MMOL/L (ref 3.5–5.1)
PROTHROMBIN TIME: 15.7 SEC (ref 12.6–14.5)
PT BLD: 13.4 SECS (ref 9.6–11.6)
Q-T INTERVAL, ECG07: 498 MS
QRS DURATION, ECG06: 92 MS
QTC CALCULATION (BEZET), ECG08: 480 MS
SERVICE CMNT-IMP: ABNORMAL
SODIUM SERPL-SCNC: 143 MMOL/L (ref 136–145)
SODIUM SERPL-SCNC: 144 MMOL/L (ref 136–145)
SPECIMEN EXP DATE BLD: NORMAL
VENTRICULAR RATE, ECG03: 56 BPM

## 2021-09-01 PROCEDURE — 76010000876 HC OR TIME 2 TO 2.5HR INTENSV - TIER 2: Performed by: SURGERY

## 2021-09-01 PROCEDURE — 74011250636 HC RX REV CODE- 250/636: Performed by: SURGERY

## 2021-09-01 PROCEDURE — 77030037088 HC TUBE ENDOTRACH ORAL NSL COVD-A: Performed by: NURSE ANESTHETIST, CERTIFIED REGISTERED

## 2021-09-01 PROCEDURE — 74011250636 HC RX REV CODE- 250/636: Performed by: ANESTHESIOLOGY

## 2021-09-01 PROCEDURE — 86901 BLOOD TYPING SEROLOGIC RH(D): CPT

## 2021-09-01 PROCEDURE — 77030003029 HC SUT VCRL J&J -B: Performed by: SURGERY

## 2021-09-01 PROCEDURE — 77030008462 HC STPLR SKN PROX J&J -A: Performed by: SURGERY

## 2021-09-01 PROCEDURE — 82962 GLUCOSE BLOOD TEST: CPT

## 2021-09-01 PROCEDURE — 2709999900 HC NON-CHARGEABLE SUPPLY

## 2021-09-01 PROCEDURE — 2709999900 HC NON-CHARGEABLE SUPPLY: Performed by: SURGERY

## 2021-09-01 PROCEDURE — 77030019908 HC STETH ESOPH SIMS -A: Performed by: NURSE ANESTHETIST, CERTIFIED REGISTERED

## 2021-09-01 PROCEDURE — 77030010285 HC STPLR INT PRSTRNG COVD -B: Performed by: SURGERY

## 2021-09-01 PROCEDURE — 74011250637 HC RX REV CODE- 250/637: Performed by: SURGERY

## 2021-09-01 PROCEDURE — 74011250636 HC RX REV CODE- 250/636: Performed by: NURSE ANESTHETIST, CERTIFIED REGISTERED

## 2021-09-01 PROCEDURE — 77030040923 HC STPLR ENDOSC ECHELON J&J -E: Performed by: SURGERY

## 2021-09-01 PROCEDURE — 44207 L COLECTOMY/COLOPROCTOSTOMY: CPT | Performed by: SURGERY

## 2021-09-01 PROCEDURE — 77030035278 HC STPLR SEAL ENDOWR INTU -B: Performed by: SURGERY

## 2021-09-01 PROCEDURE — 80048 BASIC METABOLIC PNL TOTAL CA: CPT

## 2021-09-01 PROCEDURE — 85610 PROTHROMBIN TIME: CPT

## 2021-09-01 PROCEDURE — 76060000035 HC ANESTHESIA 2 TO 2.5 HR: Performed by: SURGERY

## 2021-09-01 PROCEDURE — 77030027138 HC INCENT SPIROMETER -A

## 2021-09-01 PROCEDURE — 77030021678 HC GLIDESCP STAT DISP VERT -B: Performed by: ANESTHESIOLOGY

## 2021-09-01 PROCEDURE — 77030035277 HC OBTRTR BLDELSS DISP INTU -B: Performed by: SURGERY

## 2021-09-01 PROCEDURE — 74011000250 HC RX REV CODE- 250: Performed by: NURSE ANESTHETIST, CERTIFIED REGISTERED

## 2021-09-01 PROCEDURE — 74011250637 HC RX REV CODE- 250/637: Performed by: ANESTHESIOLOGY

## 2021-09-01 PROCEDURE — 77030002996 HC SUT SLK J&J -A: Performed by: SURGERY

## 2021-09-01 PROCEDURE — 77030008756 HC TU IRR SUC STRY -B: Performed by: SURGERY

## 2021-09-01 PROCEDURE — 74011000250 HC RX REV CODE- 250: Performed by: SURGERY

## 2021-09-01 PROCEDURE — 77030040361 HC SLV COMPR DVT MDII -B: Performed by: SURGERY

## 2021-09-01 PROCEDURE — 36415 COLL VENOUS BLD VENIPUNCTURE: CPT

## 2021-09-01 PROCEDURE — 83735 ASSAY OF MAGNESIUM: CPT

## 2021-09-01 PROCEDURE — 77030037088 HC TUBE ENDOTRACH ORAL NSL COVD-A: Performed by: ANESTHESIOLOGY

## 2021-09-01 PROCEDURE — 77030039283 HC SHR HARM INSRT DISP DAVNC INTU -F: Performed by: SURGERY

## 2021-09-01 PROCEDURE — 77030040830 HC CATH URETH FOL MDII -A: Performed by: SURGERY

## 2021-09-01 PROCEDURE — 88305 TISSUE EXAM BY PATHOLOGIST: CPT

## 2021-09-01 PROCEDURE — 65270000029 HC RM PRIVATE

## 2021-09-01 PROCEDURE — 93005 ELECTROCARDIOGRAM TRACING: CPT | Performed by: ANESTHESIOLOGY

## 2021-09-01 PROCEDURE — 77030016151 HC PROTCTR LNS DFOG COVD -B: Performed by: SURGERY

## 2021-09-01 PROCEDURE — 88307 TISSUE EXAM BY PATHOLOGIST: CPT

## 2021-09-01 PROCEDURE — 77030016154: Performed by: SURGERY

## 2021-09-01 PROCEDURE — 77030008606 HC TRCR ENDOSC KII AMR -B: Performed by: SURGERY

## 2021-09-01 PROCEDURE — 77030008518 HC TBNG INSUF ENDO STRY -B: Performed by: SURGERY

## 2021-09-01 PROCEDURE — 77030039425 HC BLD LARYNG TRULITE DISP TELE -A: Performed by: NURSE ANESTHETIST, CERTIFIED REGISTERED

## 2021-09-01 PROCEDURE — 77030039425 HC BLD LARYNG TRULITE DISP TELE -A: Performed by: ANESTHESIOLOGY

## 2021-09-01 PROCEDURE — 77030040922 HC BLNKT HYPOTHRM STRY -A: Performed by: NURSE ANESTHETIST, CERTIFIED REGISTERED

## 2021-09-01 PROCEDURE — 77030032523 HC RELD STPL PK ENDORS INTU -C: Performed by: SURGERY

## 2021-09-01 PROCEDURE — 77030035489 HC REDUCR CANN ENDOWR INTU -C: Performed by: SURGERY

## 2021-09-01 PROCEDURE — 76210000006 HC OR PH I REC 0.5 TO 1 HR: Performed by: SURGERY

## 2021-09-01 RX ORDER — POTASSIUM CHLORIDE 29.8 MG/ML
INJECTION INTRAVENOUS AS NEEDED
Status: DISCONTINUED | OUTPATIENT
Start: 2021-09-01 | End: 2021-09-01 | Stop reason: HOSPADM

## 2021-09-01 RX ORDER — SODIUM CHLORIDE 0.9 % (FLUSH) 0.9 %
5-40 SYRINGE (ML) INJECTION AS NEEDED
Status: DISCONTINUED | OUTPATIENT
Start: 2021-09-01 | End: 2021-09-01 | Stop reason: HOSPADM

## 2021-09-01 RX ORDER — CEFAZOLIN SODIUM/WATER 2 G/20 ML
2 SYRINGE (ML) INTRAVENOUS EVERY 8 HOURS
Status: COMPLETED | OUTPATIENT
Start: 2021-09-01 | End: 2021-09-02

## 2021-09-01 RX ORDER — OXYCODONE AND ACETAMINOPHEN 7.5; 325 MG/1; MG/1
1 TABLET ORAL
Status: DISCONTINUED | OUTPATIENT
Start: 2021-09-01 | End: 2021-09-04 | Stop reason: HOSPADM

## 2021-09-01 RX ORDER — MIDAZOLAM HYDROCHLORIDE 1 MG/ML
2 INJECTION, SOLUTION INTRAMUSCULAR; INTRAVENOUS ONCE
Status: DISCONTINUED | OUTPATIENT
Start: 2021-09-01 | End: 2021-09-01 | Stop reason: HOSPADM

## 2021-09-01 RX ORDER — METRONIDAZOLE 500 MG/100ML
500 INJECTION, SOLUTION INTRAVENOUS ONCE
Status: COMPLETED | OUTPATIENT
Start: 2021-09-01 | End: 2021-09-01

## 2021-09-01 RX ORDER — FLUMAZENIL 0.1 MG/ML
0.2 INJECTION INTRAVENOUS
Status: DISCONTINUED | OUTPATIENT
Start: 2021-09-01 | End: 2021-09-01 | Stop reason: HOSPADM

## 2021-09-01 RX ORDER — DEXAMETHASONE SODIUM PHOSPHATE 4 MG/ML
INJECTION, SOLUTION INTRA-ARTICULAR; INTRALESIONAL; INTRAMUSCULAR; INTRAVENOUS; SOFT TISSUE AS NEEDED
Status: DISCONTINUED | OUTPATIENT
Start: 2021-09-01 | End: 2021-09-01 | Stop reason: HOSPADM

## 2021-09-01 RX ORDER — ONDANSETRON 2 MG/ML
INJECTION INTRAMUSCULAR; INTRAVENOUS AS NEEDED
Status: DISCONTINUED | OUTPATIENT
Start: 2021-09-01 | End: 2021-09-01 | Stop reason: HOSPADM

## 2021-09-01 RX ORDER — NEOSTIGMINE METHYLSULFATE 1 MG/ML
INJECTION, SOLUTION INTRAVENOUS AS NEEDED
Status: DISCONTINUED | OUTPATIENT
Start: 2021-09-01 | End: 2021-09-01 | Stop reason: HOSPADM

## 2021-09-01 RX ORDER — MIDAZOLAM HYDROCHLORIDE 1 MG/ML
INJECTION, SOLUTION INTRAMUSCULAR; INTRAVENOUS AS NEEDED
Status: DISCONTINUED | OUTPATIENT
Start: 2021-09-01 | End: 2021-09-01 | Stop reason: HOSPADM

## 2021-09-01 RX ORDER — NALOXONE HYDROCHLORIDE 0.4 MG/ML
0.1 INJECTION, SOLUTION INTRAMUSCULAR; INTRAVENOUS; SUBCUTANEOUS AS NEEDED
Status: DISCONTINUED | OUTPATIENT
Start: 2021-09-01 | End: 2021-09-01 | Stop reason: HOSPADM

## 2021-09-01 RX ORDER — DIPHENHYDRAMINE HYDROCHLORIDE 50 MG/ML
12.5 INJECTION, SOLUTION INTRAMUSCULAR; INTRAVENOUS
Status: DISCONTINUED | OUTPATIENT
Start: 2021-09-01 | End: 2021-09-01 | Stop reason: HOSPADM

## 2021-09-01 RX ORDER — GLYCOPYRROLATE 0.2 MG/ML
INJECTION INTRAMUSCULAR; INTRAVENOUS AS NEEDED
Status: DISCONTINUED | OUTPATIENT
Start: 2021-09-01 | End: 2021-09-01 | Stop reason: HOSPADM

## 2021-09-01 RX ORDER — SODIUM CHLORIDE 9 MG/ML
50 INJECTION, SOLUTION INTRAVENOUS CONTINUOUS
Status: DISCONTINUED | OUTPATIENT
Start: 2021-09-01 | End: 2021-09-04 | Stop reason: HOSPADM

## 2021-09-01 RX ORDER — CEFAZOLIN SODIUM/WATER 2 G/20 ML
2 SYRINGE (ML) INTRAVENOUS ONCE
Status: COMPLETED | OUTPATIENT
Start: 2021-09-01 | End: 2021-09-01

## 2021-09-01 RX ORDER — KETAMINE HYDROCHLORIDE 50 MG/ML
INJECTION, SOLUTION INTRAMUSCULAR; INTRAVENOUS AS NEEDED
Status: DISCONTINUED | OUTPATIENT
Start: 2021-09-01 | End: 2021-09-01 | Stop reason: HOSPADM

## 2021-09-01 RX ORDER — PANTOPRAZOLE SODIUM 40 MG/1
40 TABLET, DELAYED RELEASE ORAL
Status: DISCONTINUED | OUTPATIENT
Start: 2021-09-02 | End: 2021-09-04 | Stop reason: HOSPADM

## 2021-09-01 RX ORDER — METRONIDAZOLE 500 MG/100ML
500 INJECTION, SOLUTION INTRAVENOUS EVERY 8 HOURS
Status: COMPLETED | OUTPATIENT
Start: 2021-09-01 | End: 2021-09-02

## 2021-09-01 RX ORDER — ONDANSETRON 2 MG/ML
4 INJECTION INTRAMUSCULAR; INTRAVENOUS
Status: DISCONTINUED | OUTPATIENT
Start: 2021-09-01 | End: 2021-09-04 | Stop reason: HOSPADM

## 2021-09-01 RX ORDER — FENTANYL CITRATE 50 UG/ML
100 INJECTION, SOLUTION INTRAMUSCULAR; INTRAVENOUS AS NEEDED
Status: DISCONTINUED | OUTPATIENT
Start: 2021-09-01 | End: 2021-09-01 | Stop reason: HOSPADM

## 2021-09-01 RX ORDER — HYDROMORPHONE HYDROCHLORIDE 2 MG/ML
0.5 INJECTION, SOLUTION INTRAMUSCULAR; INTRAVENOUS; SUBCUTANEOUS
Status: DISCONTINUED | OUTPATIENT
Start: 2021-09-01 | End: 2021-09-01 | Stop reason: HOSPADM

## 2021-09-01 RX ORDER — FENTANYL CITRATE 50 UG/ML
INJECTION, SOLUTION INTRAMUSCULAR; INTRAVENOUS AS NEEDED
Status: DISCONTINUED | OUTPATIENT
Start: 2021-09-01 | End: 2021-09-01 | Stop reason: HOSPADM

## 2021-09-01 RX ORDER — LIDOCAINE HYDROCHLORIDE 10 MG/ML
0.1 INJECTION INFILTRATION; PERINEURAL AS NEEDED
Status: DISCONTINUED | OUTPATIENT
Start: 2021-09-01 | End: 2021-09-01 | Stop reason: HOSPADM

## 2021-09-01 RX ORDER — HYDROMORPHONE HYDROCHLORIDE 1 MG/ML
1 INJECTION, SOLUTION INTRAMUSCULAR; INTRAVENOUS; SUBCUTANEOUS
Status: DISCONTINUED | OUTPATIENT
Start: 2021-09-01 | End: 2021-09-04 | Stop reason: HOSPADM

## 2021-09-01 RX ORDER — BUPIVACAINE HYDROCHLORIDE 5 MG/ML
INJECTION, SOLUTION EPIDURAL; INTRACAUDAL AS NEEDED
Status: DISCONTINUED | OUTPATIENT
Start: 2021-09-01 | End: 2021-09-01 | Stop reason: HOSPADM

## 2021-09-01 RX ORDER — APREPITANT 40 MG/1
40 CAPSULE ORAL ONCE
Status: COMPLETED | OUTPATIENT
Start: 2021-09-01 | End: 2021-09-01

## 2021-09-01 RX ORDER — LIDOCAINE HYDROCHLORIDE 20 MG/ML
INJECTION, SOLUTION EPIDURAL; INFILTRATION; INTRACAUDAL; PERINEURAL AS NEEDED
Status: DISCONTINUED | OUTPATIENT
Start: 2021-09-01 | End: 2021-09-01 | Stop reason: HOSPADM

## 2021-09-01 RX ORDER — SODIUM CHLORIDE 0.9 % (FLUSH) 0.9 %
5-40 SYRINGE (ML) INJECTION EVERY 8 HOURS
Status: DISCONTINUED | OUTPATIENT
Start: 2021-09-01 | End: 2021-09-01 | Stop reason: HOSPADM

## 2021-09-01 RX ORDER — SODIUM CHLORIDE, SODIUM LACTATE, POTASSIUM CHLORIDE, CALCIUM CHLORIDE 600; 310; 30; 20 MG/100ML; MG/100ML; MG/100ML; MG/100ML
100 INJECTION, SOLUTION INTRAVENOUS CONTINUOUS
Status: DISCONTINUED | OUTPATIENT
Start: 2021-09-01 | End: 2021-09-01 | Stop reason: HOSPADM

## 2021-09-01 RX ORDER — DIPHENHYDRAMINE HYDROCHLORIDE 50 MG/ML
25 INJECTION, SOLUTION INTRAMUSCULAR; INTRAVENOUS
Status: DISCONTINUED | OUTPATIENT
Start: 2021-09-01 | End: 2021-09-04 | Stop reason: HOSPADM

## 2021-09-01 RX ORDER — ROCURONIUM BROMIDE 10 MG/ML
INJECTION, SOLUTION INTRAVENOUS AS NEEDED
Status: DISCONTINUED | OUTPATIENT
Start: 2021-09-01 | End: 2021-09-01 | Stop reason: HOSPADM

## 2021-09-01 RX ORDER — SODIUM CHLORIDE, SODIUM LACTATE, POTASSIUM CHLORIDE, CALCIUM CHLORIDE 600; 310; 30; 20 MG/100ML; MG/100ML; MG/100ML; MG/100ML
75 INJECTION, SOLUTION INTRAVENOUS CONTINUOUS
Status: DISCONTINUED | OUTPATIENT
Start: 2021-09-01 | End: 2021-09-01 | Stop reason: HOSPADM

## 2021-09-01 RX ORDER — AMLODIPINE BESYLATE 5 MG/1
5 TABLET ORAL DAILY
Status: DISCONTINUED | OUTPATIENT
Start: 2021-09-02 | End: 2021-09-04 | Stop reason: HOSPADM

## 2021-09-01 RX ORDER — PROPOFOL 10 MG/ML
INJECTION, EMULSION INTRAVENOUS AS NEEDED
Status: DISCONTINUED | OUTPATIENT
Start: 2021-09-01 | End: 2021-09-01 | Stop reason: HOSPADM

## 2021-09-01 RX ORDER — OXYCODONE HYDROCHLORIDE 5 MG/1
5 TABLET ORAL
Status: COMPLETED | OUTPATIENT
Start: 2021-09-01 | End: 2021-09-01

## 2021-09-01 RX ORDER — ENOXAPARIN SODIUM 100 MG/ML
40 INJECTION SUBCUTANEOUS EVERY 24 HOURS
Status: DISCONTINUED | OUTPATIENT
Start: 2021-09-02 | End: 2021-09-04 | Stop reason: HOSPADM

## 2021-09-01 RX ORDER — EPHEDRINE SULFATE/0.9% NACL/PF 50 MG/5 ML
SYRINGE (ML) INTRAVENOUS AS NEEDED
Status: DISCONTINUED | OUTPATIENT
Start: 2021-09-01 | End: 2021-09-01 | Stop reason: HOSPADM

## 2021-09-01 RX ORDER — ACETAMINOPHEN 500 MG
1000 TABLET ORAL ONCE
Status: DISCONTINUED | OUTPATIENT
Start: 2021-09-01 | End: 2021-09-01 | Stop reason: HOSPADM

## 2021-09-01 RX ADMIN — GLYCOPYRROLATE 0.6 MG: 0.2 INJECTION, SOLUTION INTRAMUSCULAR; INTRAVENOUS at 10:15

## 2021-09-01 RX ADMIN — SODIUM CHLORIDE, SODIUM LACTATE, POTASSIUM CHLORIDE, AND CALCIUM CHLORIDE 100 ML/HR: 600; 310; 30; 20 INJECTION, SOLUTION INTRAVENOUS at 07:18

## 2021-09-01 RX ADMIN — GLYCOPYRROLATE 0.2 MG: 0.2 INJECTION, SOLUTION INTRAMUSCULAR; INTRAVENOUS at 08:49

## 2021-09-01 RX ADMIN — MIDAZOLAM 2 MG: 1 INJECTION INTRAMUSCULAR; INTRAVENOUS at 09:05

## 2021-09-01 RX ADMIN — CEFAZOLIN 2 G: 10 INJECTION, POWDER, FOR SOLUTION INTRAVENOUS at 15:54

## 2021-09-01 RX ADMIN — Medication 5 MG: at 10:15

## 2021-09-01 RX ADMIN — ONDANSETRON 4 MG: 2 INJECTION INTRAMUSCULAR; INTRAVENOUS at 08:48

## 2021-09-01 RX ADMIN — ROCURONIUM BROMIDE 50 MG: 10 INJECTION, SOLUTION INTRAVENOUS at 08:23

## 2021-09-01 RX ADMIN — DEXAMETHASONE SODIUM PHOSPHATE 4 MG: 4 INJECTION, SOLUTION INTRAMUSCULAR; INTRAVENOUS at 08:48

## 2021-09-01 RX ADMIN — SODIUM CHLORIDE, SODIUM LACTATE, POTASSIUM CHLORIDE, AND CALCIUM CHLORIDE: 600; 310; 30; 20 INJECTION, SOLUTION INTRAVENOUS at 10:00

## 2021-09-01 RX ADMIN — METRONIDAZOLE 500 MG: 500 INJECTION, SOLUTION INTRAVENOUS at 07:18

## 2021-09-01 RX ADMIN — OXYCODONE HYDROCHLORIDE AND ACETAMINOPHEN 1 TABLET: 7.5; 325 TABLET ORAL at 14:57

## 2021-09-01 RX ADMIN — APREPITANT 40 MG: 40 CAPSULE ORAL at 07:18

## 2021-09-01 RX ADMIN — OXYCODONE 5 MG: 5 TABLET ORAL at 12:25

## 2021-09-01 RX ADMIN — ROCURONIUM BROMIDE 5 MG: 10 INJECTION, SOLUTION INTRAVENOUS at 09:47

## 2021-09-01 RX ADMIN — Medication 7.5 MG: at 09:23

## 2021-09-01 RX ADMIN — Medication 10 MG: at 08:30

## 2021-09-01 RX ADMIN — CEFAZOLIN 2 G: 1 INJECTION, POWDER, FOR SOLUTION INTRAVENOUS at 08:30

## 2021-09-01 RX ADMIN — KETAMINE HYDROCHLORIDE 20 MG: 50 INJECTION INTRAMUSCULAR; INTRAVENOUS at 09:31

## 2021-09-01 RX ADMIN — KETAMINE HYDROCHLORIDE 40 MG: 50 INJECTION INTRAMUSCULAR; INTRAVENOUS at 08:30

## 2021-09-01 RX ADMIN — PROPOFOL 150 MG: 10 INJECTION, EMULSION INTRAVENOUS at 08:23

## 2021-09-01 RX ADMIN — CEFAZOLIN 2 G: 10 INJECTION, POWDER, FOR SOLUTION INTRAVENOUS at 23:38

## 2021-09-01 RX ADMIN — SODIUM CHLORIDE 75 ML/HR: 900 INJECTION, SOLUTION INTRAVENOUS at 12:58

## 2021-09-01 RX ADMIN — POTASSIUM CHLORIDE 40 MEQ: 400 INJECTION, SOLUTION INTRAVENOUS at 08:45

## 2021-09-01 RX ADMIN — LIDOCAINE HYDROCHLORIDE 100 MG: 20 INJECTION, SOLUTION EPIDURAL; INFILTRATION; INTRACAUDAL; PERINEURAL at 08:23

## 2021-09-01 RX ADMIN — METRONIDAZOLE 500 MG: 500 INJECTION, SOLUTION INTRAVENOUS at 23:40

## 2021-09-01 RX ADMIN — METRONIDAZOLE 500 MG: 500 INJECTION, SOLUTION INTRAVENOUS at 15:54

## 2021-09-01 RX ADMIN — OXYCODONE HYDROCHLORIDE AND ACETAMINOPHEN 1 TABLET: 7.5; 325 TABLET ORAL at 19:33

## 2021-09-01 RX ADMIN — FENTANYL CITRATE 100 MCG: 50 INJECTION INTRAMUSCULAR; INTRAVENOUS at 08:23

## 2021-09-01 NOTE — BRIEF OP NOTE
Brief Postoperative Note    Patient: Jasper Chappell  YOB: 1945  MRN: [id number]    Date of Procedure: 9/1/2021     Pre-Op Diagnosis: rectal cancer     Post-Op Diagnosis: same  Procedure(s):  ROBOTIC ASSISTED low anterior resection    Surgeon(s):  Evelyn Delgadillo MD    Surgical Assistant: None    Anesthesia: General     Estimated Blood Loss (mL): less than 50     Complications: None    Specimens:   ID Type Source Tests Collected by Time Destination   1 : Low anterior colon Fresh Colon  Evelyn Delgadillo MD 1/1/7321 0930 Pathology   2 : Margins Fresh Abdomen  Evelyn Delgadillo MD 1/6/6704 1012 Pathology        Implants: * No implants in log *    Drains: * No LDAs found *    Findings: tattoo approximately 3cm above the peritoneal reflexion     Electronically Signed by Laura Cisneros MD on 4/9/5909 at 10:31 AM

## 2021-09-01 NOTE — PERIOP NOTES
TRANSFER - OUT REPORT:    Verbal report given to Oxana Du on Lis Ranks  being transferred to  for routine progression of care       Report consisted of patients Situation, Background, Assessment and   Recommendations(SBAR). Information from the following report(s) SBAR, Kardex, OR Summary, Procedure Summary, Intake/Output, MAR, Recent Results and Cardiac Rhythm afib was reviewed with the receiving nurse. Lines:   Peripheral IV 09/01/21 Left;Posterior Hand (Active)   Site Assessment Clean, dry, & intact 09/01/21 1124   Phlebitis Assessment 0 09/01/21 1124   Infiltration Assessment 0 09/01/21 1124   Dressing Status Clean, dry, & intact 09/01/21 1124   Dressing Type Transparent;Tape 09/01/21 1124   Hub Color/Line Status Patent 09/01/21 1124        Opportunity for questions and clarification was provided. Patient transported with:   O2 @ 3 liters  Patient's medications from home  Tech    VTE prophylaxis orders have been written for [facility]. Patient and family given floor number and nurses name. Family updated re: pt status after security code verified.

## 2021-09-01 NOTE — PROGRESS NOTES
09/01/21 1317   Dual Skin Pressure Injury Assessment   Dual Skin Pressure Injury Assessment WDL   Second Care Provider (Based on [address] [place] [address]) CHIOMA Figueroa   Skin Integumentary   Skin Integumentary (WDL) X    Pressure  Injury Documentation No Pressure Injury Noted-Pressure Ulcer Prevention Initiated   Skin Integrity Incision (comment)  (Lap sites to abd)   Wound Prevention and Protection Methods   Orientation of Wound Prevention Posterior   Location of Wound Prevention Sacrum/Coccyx   Dressing Present  No   Wound Offloading (Prevention Methods) Bed, pressure reduction mattress

## 2021-09-01 NOTE — ANESTHESIA PREPROCEDURE EVALUATION
Relevant Problems   CARDIOVASCULAR   (+) CAD (coronary artery disease)   (+) Essential hypertension   (+) Mitral valve insufficiency   (+) Paroxysmal atrial fibrillation (HCC)      GASTROINTESTINAL   (+) GERD (gastroesophageal reflux disease)      ENDOCRINE   (+) Hypothyroidism, postsurgical   (+) Type 2 diabetes mellitus with hyperglycemia, with long-term current use of insulin (HCC)   (+) Type 2 diabetes mellitus with microalbuminuria, with long-term current use of insulin (HCC)      HEMATOLOGY   (+) Anemia       Anesthetic History   No history of anesthetic complications            Review of Systems / Medical History  Patient summary reviewed and pertinent labs reviewed    Pulmonary  Within defined limits                 Neuro/Psych     seizures (Childhood - no meds)         Cardiovascular    Hypertension: well controlled  Valvular problems/murmurs (s/p AVR/MVR 2015 - on warfarin - held perioperatively )      Dysrhythmias (s/p ablation; on Tikosyn) : atrial fibrillation  Hyperlipidemia    Exercise tolerance: >4 METS: Reports she remains fairly active and denied chest pain, SOB, syncope   Comments: Echo 9/20 - normal EF, normal functioning prosthetic AV and MV     GI/Hepatic/Renal     GERD: well controlled           Endo/Other    Diabetes: well controlled, type 2, using insulin  Hypothyroidism: well controlled  Cancer (GI) and anemia (Hb 10.5)     Other Findings            Physical Exam    Airway  Mallampati: II    Neck ROM: normal range of motion   Mouth opening: Normal    Comments: Short chin and mild overbite  Cardiovascular    Rhythm: irregular  Rate: normal    Murmur, Mitral area and Aortic area  Pertinent negatives: No peripheral edema   Dental  No notable dental hx       Pulmonary  Breath sounds clear to auscultation               Abdominal  GI exam deferred       Other Findings            Anesthetic Plan    ASA: 3  Anesthesia type: general  ETT  Glidescope   Ketamine         Induction: Intravenous  Anesthetic plan and risks discussed with: Patient      On tikosyn and warfarin (held perioperatively)- awaiting EKG, labs.  Discussed possible abdominal wall block in setting of conversion to open procedure

## 2021-09-01 NOTE — PROGRESS NOTES
END OF SHIFT NOTE:    INTAKE/OUTPUT  No intake/output data recorded. Voiding: YES  Catheter: YES  Drain:              Flatus: Patient does not have flatus present. Stool:  0 occurrences. Characteristics:       Emesis: 0 occurrences. Characteristics:        VITAL SIGNS  Patient Vitals for the past 12 hrs:   Temp Pulse Resp BP SpO2   09/01/21 1547 97.4 °F (36.3 °C) 75 16 (!) 154/73 93 %   09/01/21 1317 97.9 °F (36.6 °C) (!) 57 16 (!) 126/52 91 %   09/01/21 1229 -- (!) 55 17 128/61 93 %   09/01/21 1224 -- (!) 52 18 (!) 140/62 93 %   09/01/21 1219 -- (!) 55 16 130/63 91 %   09/01/21 1214 -- (!) 53 18 136/63 92 %   09/01/21 1209 -- (!) 52 17 139/63 92 %   09/01/21 1204 -- (!) 52 18 132/62 90 %   09/01/21 1159 -- (!) 51 16 130/63 90 %   09/01/21 1154 -- (!) 48 15 (!) 141/65 92 %   09/01/21 1149 -- (!) 51 16 (!) 140/62 91 %   09/01/21 1143 -- (!) 51 17 (!) 136/59 90 %   09/01/21 1135 -- (!) 46 16 134/61 91 %   09/01/21 1129 -- (!) 50 16 139/62 94 %   09/01/21 1124 98 °F (36.7 °C) (!) 52 18 133/63 93 %   09/01/21 1119 -- (!) 50 17 139/64 96 %   09/01/21 1114 -- (!) 51 16 (!) 146/65 100 %   09/01/21 1109 -- (!) 53 14 (!) 153/67 100 %   09/01/21 1104 -- (!) 51 14 (!) 143/65 100 %   09/01/21 1059 -- (!) 55 13 (!) 149/67 99 %   09/01/21 1053 -- (!) 54 12 (!) 152/65 96 %   09/01/21 1049 -- (!) 55 11 139/64 96 %   09/01/21 1043 99.2 °F (37.3 °C) (!) 56 12 137/64 96 %   09/01/21 0656 98.1 °F (36.7 °C) 62 17 (!) 118/58 93 %       Pain Assessment  Pain Intensity 1: 4 (09/01/21 1543)  Pain Location 1: Abdomen  Pain Intervention(s) 1: Medication (see MAR)  Patient Stated Pain Goal: 0    Ambulating  No    Shift report given to oncoming nurse at the bedside.     Julee Zaldivar RN

## 2021-09-01 NOTE — ADDENDUM NOTE
Addendum  created 09/01/21 1257 by Flaca Vasquez CRNA    Flowsheet accepted, Intraprocedure Flowsheets edited

## 2021-09-01 NOTE — PROGRESS NOTES
TRANSFER - IN REPORT:    Verbal report received from Jaimie Story RN on Christian Mcardle  being received from PACU for routine post - op      Report consisted of patients Situation, Background, Assessment and   Recommendations(SBAR). Information from the following report(s) SBAR, OR Summary, Intake/Output, MAR and Recent Results was reviewed with the receiving nurse. Opportunity for questions and clarification was provided. Assessment completed upon patients arrival to unit and care assumed.

## 2021-09-01 NOTE — ANESTHESIA POSTPROCEDURE EVALUATION
Procedure(s):  ROBOTIC ASSISTED SIGMOID COLECTOMY. general    Anesthesia Post Evaluation      Multimodal analgesia: multimodal analgesia used between 6 hours prior to anesthesia start to PACU discharge  Patient location during evaluation: PACU  Patient participation: complete - patient participated  Level of consciousness: awake  Pain management: adequate  Airway patency: patent  Anesthetic complications: no  Cardiovascular status: acceptable and hemodynamically stable  Respiratory status: acceptable  Hydration status: acceptable  Comments: Acceptable for discharge from PACU. Post anesthesia nausea and vomiting:  none  Final Post Anesthesia Temperature Assessment:  Normothermia (36.0-37.5 degrees C)      INITIAL Post-op Vital signs:   Vitals Value Taken Time   /67 09/01/21 1109   Temp 37.3 °C (99.2 °F) 09/01/21 1043   Pulse 52 09/01/21 1113   Resp 14 09/01/21 1109   SpO2 100 % 09/01/21 1113   Vitals shown include unvalidated device data.

## 2021-09-01 NOTE — PERIOP NOTES
Debrief completed yes    Correct procedure yes    Count completed and verified yes    Specimen collected and verified yes    Wound classification clean/contaminated    Other no questions/concerns

## 2021-09-01 NOTE — PROGRESS NOTES
Problem: Falls - Risk of  Goal: *Absence of Falls  Description: Document Ann Marie Damon Fall Risk and appropriate interventions in the flowsheet.   Outcome: Progressing Towards Goal  Note: Fall Risk Interventions:  Mobility Interventions: Communicate number of staff needed for ambulation/transfer, Patient to call before getting OOB         Medication Interventions: Teach patient to arise slowly, Patient to call before getting OOB    Elimination Interventions: Call light in reach, Patient to call for help with toileting needs              Problem: Patient Education: Go to Patient Education Activity  Goal: Patient/Family Education  Outcome: Progressing Towards Goal

## 2021-09-01 NOTE — PROGRESS NOTES
's pre-procedure visit and prayer with patient as requested.     Victor M Mccullough MDiv, BS  Board Certified

## 2021-09-02 LAB
GLUCOSE BLD STRIP.AUTO-MCNC: 131 MG/DL (ref 65–100)
GLUCOSE BLD STRIP.AUTO-MCNC: 138 MG/DL (ref 65–100)
GLUCOSE BLD STRIP.AUTO-MCNC: 156 MG/DL (ref 65–100)
GLUCOSE BLD STRIP.AUTO-MCNC: 190 MG/DL (ref 65–100)
GLUCOSE BLD STRIP.AUTO-MCNC: 209 MG/DL (ref 65–100)
GLUCOSE BLD STRIP.AUTO-MCNC: 212 MG/DL (ref 65–100)
SERVICE CMNT-IMP: ABNORMAL

## 2021-09-02 PROCEDURE — 74011636637 HC RX REV CODE- 636/637: Performed by: NURSE PRACTITIONER

## 2021-09-02 PROCEDURE — 74011250637 HC RX REV CODE- 250/637: Performed by: NURSE PRACTITIONER

## 2021-09-02 PROCEDURE — 74011000250 HC RX REV CODE- 250: Performed by: SURGERY

## 2021-09-02 PROCEDURE — 82962 GLUCOSE BLOOD TEST: CPT

## 2021-09-02 PROCEDURE — 2709999900 HC NON-CHARGEABLE SUPPLY

## 2021-09-02 PROCEDURE — 65270000029 HC RM PRIVATE

## 2021-09-02 PROCEDURE — 74011250636 HC RX REV CODE- 250/636: Performed by: SURGERY

## 2021-09-02 PROCEDURE — 74011636637 HC RX REV CODE- 636/637: Performed by: SURGERY

## 2021-09-02 PROCEDURE — 74011250637 HC RX REV CODE- 250/637: Performed by: SURGERY

## 2021-09-02 PROCEDURE — 99024 POSTOP FOLLOW-UP VISIT: CPT | Performed by: NURSE PRACTITIONER

## 2021-09-02 RX ORDER — LOSARTAN POTASSIUM 50 MG/1
50 TABLET ORAL 2 TIMES DAILY
Status: DISCONTINUED | OUTPATIENT
Start: 2021-09-02 | End: 2021-09-04 | Stop reason: HOSPADM

## 2021-09-02 RX ORDER — DOFETILIDE 0.5 MG/1
500 CAPSULE ORAL 2 TIMES DAILY
Status: DISCONTINUED | OUTPATIENT
Start: 2021-09-02 | End: 2021-09-04 | Stop reason: HOSPADM

## 2021-09-02 RX ADMIN — LEVOTHYROXINE SODIUM 137 MCG: 0.11 TABLET ORAL at 05:24

## 2021-09-02 RX ADMIN — INSULIN HUMAN 3 UNITS: 100 INJECTION, SOLUTION PARENTERAL at 17:11

## 2021-09-02 RX ADMIN — LOSARTAN POTASSIUM 50 MG: 50 TABLET, FILM COATED ORAL at 17:11

## 2021-09-02 RX ADMIN — LOSARTAN POTASSIUM 50 MG: 50 TABLET, FILM COATED ORAL at 11:55

## 2021-09-02 RX ADMIN — OXYCODONE HYDROCHLORIDE AND ACETAMINOPHEN 1 TABLET: 7.5; 325 TABLET ORAL at 03:42

## 2021-09-02 RX ADMIN — METRONIDAZOLE 500 MG: 500 INJECTION, SOLUTION INTRAVENOUS at 08:15

## 2021-09-02 RX ADMIN — INSULIN HUMAN 6 UNITS: 100 INJECTION, SOLUTION PARENTERAL at 11:52

## 2021-09-02 RX ADMIN — DOFETILIDE 500 MCG: 0.5 CAPSULE ORAL at 11:00

## 2021-09-02 RX ADMIN — ENOXAPARIN SODIUM 40 MG: 40 INJECTION SUBCUTANEOUS at 11:52

## 2021-09-02 RX ADMIN — OXYCODONE HYDROCHLORIDE AND ACETAMINOPHEN 1 TABLET: 7.5; 325 TABLET ORAL at 15:28

## 2021-09-02 RX ADMIN — SODIUM CHLORIDE 75 ML/HR: 900 INJECTION, SOLUTION INTRAVENOUS at 03:48

## 2021-09-02 RX ADMIN — PANTOPRAZOLE SODIUM 40 MG: 40 TABLET, DELAYED RELEASE ORAL at 05:22

## 2021-09-02 RX ADMIN — CEFAZOLIN 2 G: 10 INJECTION, POWDER, FOR SOLUTION INTRAVENOUS at 08:14

## 2021-09-02 RX ADMIN — SODIUM CHLORIDE 75 ML/HR: 900 INJECTION, SOLUTION INTRAVENOUS at 17:16

## 2021-09-02 RX ADMIN — INSULIN HUMAN 3 UNITS: 100 INJECTION, SOLUTION PARENTERAL at 08:53

## 2021-09-02 RX ADMIN — AMLODIPINE BESYLATE 5 MG: 5 TABLET ORAL at 08:21

## 2021-09-02 RX ADMIN — DOFETILIDE 500 MCG: 0.5 CAPSULE ORAL at 18:00

## 2021-09-02 NOTE — PROGRESS NOTES
Problem: Falls - Risk of  Goal: *Absence of Falls  Description: Document [place] Fall Risk and appropriate interventions in the flowsheet.   Outcome: Progressing Towards Goal  Note: Fall Risk Interventions:  Mobility Interventions: Communicate number of staff needed for ambulation/transfer, Patient to call before getting OOB         Medication Interventions: Patient to call before getting OOB, Teach patient to arise slowly    Elimination Interventions: Call light in reach, Patient to call for help with toileting needs              Problem: Patient Education: Go to Patient Education Activity  Goal: Patient/Family Education  Outcome: Progressing Towards Goal

## 2021-09-02 NOTE — PROGRESS NOTES
Problem: Falls - Risk of  Goal: *Absence of Falls  Description: Document [place] Fall Risk and appropriate interventions in the flowsheet.   Outcome: Progressing Towards Goal  Note: Fall Risk Interventions:  Mobility Interventions: Communicate number of staff needed for ambulation/transfer, Patient to call before getting OOB         Medication Interventions: Teach patient to arise slowly, Patient to call before getting OOB    Elimination Interventions: Call light in reach, Patient to call for help with toileting needs              Problem: Patient Education: Go to Patient Education Activity  Goal: Patient/Family Education  Outcome: Progressing Towards Goal

## 2021-09-02 NOTE — PROGRESS NOTES
END OF SHIFT NOTE:    INTAKE/OUTPUT  09/01 0701 - 09/02 0700  In: 2587 [P.O.:118; I.V.:2469]  Out: 9131 [Urine:1215]  Voiding: NO  Catheter: YES. Draining. Drain:              Flatus: Patient does not have flatus present. Stool:  0 occurrences. Characteristics:       Emesis: 0 occurrences. Characteristics:        VITAL SIGNS  Patient Vitals for the past 12 hrs:   Temp Pulse Resp BP SpO2   09/01/21 2334 97.4 °F (36.3 °C) 65 16 (!) 151/82 93 %   09/01/21 1829 97.5 °F (36.4 °C) 76 17 (!) 156/74 91 %       Pain Assessment  Pain Intensity 1: 0 (09/02/21 0517)  Pain Location 1: Abdomen  Pain Intervention(s) 1: Medication (see MAR)  Patient Stated Pain Goal: 0    Ambulating  No    Shift report to be given to oncoming nurse at the bedside.     Malinda Doran RN

## 2021-09-02 NOTE — PROGRESS NOTES
Pt concerned about glucose. Checked glucose level, resulted at 212. Will notify day shift of results. All needs met at this time.

## 2021-09-02 NOTE — PROGRESS NOTES
PLAN:  ADULT DIET Clear Liquid  IVFs  SCD/IS/Protonix/Lovenox for prophylactic measures  OOB and ambulate  Pain/nausea control  Monitor labs  DC vee  PTA meds   FSBS AC/HS with SSI      ASSESSMENT:  Admit Date: 9/1/2021   1 Day Post-Op  Procedure(s):  ROBOTIC ASSISTED SIGMOID COLECTOMY    Active Problems:    Colon cancer ([address] (9/1/2021)         SUBJECTIVE:  9/2/21 1 Day Post-Op awake in bed, no complaints. Pain controlled. AF, mildly  Hypertensive. Intake/Output Summary (Last 24 hours) at 9/2/2021 0906  Last data filed at 9/2/2021 0528  Gross per 24 hour   Intake 1787 ml   Output 1015 ml   Net 772 ml     OBJECTIVE:  Constitutional: Alert oriented cooperative patient in no acute distress; appears stated age   Visit Vitals  /65 (BP 1 Location: Right upper arm, BP Patient Position: At rest)   Pulse (!) 58   Temp 97.4 °F (36.3 °C)   Resp 16   Ht 5' 6\" (1.676 m)   Wt 151 lb 9.6 oz (68.8 kg)   SpO2 94%   BMI 24.47 kg/m²     Eyes:Sclera are clear. ENMT: no external lesions gross hearing normal; no obvious neck masses, no ear or lip lesions  CV: RRR. Normal perfusion  Resp: No JVD. Breathing is  non-labored; no audible wheezing. GI: soft and non-distended     Musculoskeletal: unremarkable with normal function. No embolic signs or cyanosis.    Neuro:  Oriented; moves all 4; no focal deficits  Psychiatric: normal affect and mood, no memory impairment      Patient Vitals for the past 24 hrs:   BP Temp Pulse Resp SpO2   09/02/21 0810 132/65 97.4 °F (36.3 °C) (!) 58 16 94 %   09/02/21 0625 (!) 141/59 98.2 °F (36.8 °C) 61 16 95 %   09/01/21 2334 (!) 151/82 97.4 °F (36.3 °C) 65 16 93 %   09/01/21 1829 (!) 156/74 97.5 °F (36.4 °C) 76 17 91 %   09/01/21 1547 (!) 154/73 97.4 °F (36.3 °C) 75 16 93 %   09/01/21 1317 (!) 126/52 97.9 °F (36.6 °C) (!) 57 16 91 %   09/01/21 1229 128/61 -- (!) 55 17 93 %   09/01/21 1224 (!) 140/62 -- (!) 52 18 93 %   09/01/21 1219 130/63 -- (!) 55 16 91 %   09/01/21 1214 136/63 -- (!) 53 18 92 %   09/01/21 1209 139/63 -- (!) 52 17 92 %   09/01/21 1204 132/62 -- (!) 52 18 90 %   09/01/21 1159 130/63 -- (!) 51 16 90 %   09/01/21 1154 (!) 141/65 -- (!) 48 15 92 %   09/01/21 1149 (!) 140/62 -- (!) 51 16 91 %   09/01/21 1143 (!) 136/59 -- (!) 51 17 90 %   09/01/21 1135 134/61 -- (!) 46 16 91 %   09/01/21 1129 139/62 -- (!) 50 16 94 %   09/01/21 1124 133/63 98 °F (36.7 °C) (!) 52 18 93 %   09/01/21 1119 139/64 -- (!) 50 17 96 %   09/01/21 1114 (!) 146/65 -- (!) 51 16 100 %   09/01/21 1109 (!) 153/67 -- (!) 53 14 100 %   09/01/21 1104 (!) 143/65 -- (!) 51 14 100 %   09/01/21 1059 (!) 149/67 -- (!) 55 13 99 %   09/01/21 1053 (!) 152/65 -- (!) 54 12 96 %   09/01/21 1049 139/64 -- (!) 55 11 96 %   09/01/21 1043 137/64 99.2 °F (37.3 °C) (!) 56 12 96 %     Labs:    Recent Labs     09/01/21  1333      K 3.5   *   CO2 27   BUN 9   CREA 0.57*   *   PTP 15.7*   INR 1.2       Signed:  Summer Alvarenga NP

## 2021-09-03 PROBLEM — C20 RECTAL CANCER (HCC): Status: ACTIVE | Noted: 2021-09-01

## 2021-09-03 LAB
ANION GAP SERPL CALC-SCNC: 4 MMOL/L (ref 7–16)
BUN SERPL-MCNC: 9 MG/DL (ref 8–23)
CALCIUM SERPL-MCNC: 7.9 MG/DL (ref 8.3–10.4)
CHLORIDE SERPL-SCNC: 111 MMOL/L (ref 98–107)
CO2 SERPL-SCNC: 28 MMOL/L (ref 21–32)
CREAT SERPL-MCNC: 0.56 MG/DL (ref 0.6–1)
ERYTHROCYTE [DISTWIDTH] IN BLOOD BY AUTOMATED COUNT: 18.6 % (ref 11.9–14.6)
GLUCOSE BLD STRIP.AUTO-MCNC: 145 MG/DL (ref 65–100)
GLUCOSE BLD STRIP.AUTO-MCNC: 148 MG/DL (ref 65–100)
GLUCOSE BLD STRIP.AUTO-MCNC: 163 MG/DL (ref 65–100)
GLUCOSE BLD STRIP.AUTO-MCNC: 181 MG/DL (ref 65–100)
GLUCOSE SERPL-MCNC: 159 MG/DL (ref 65–100)
HCT VFR BLD AUTO: 28.9 % (ref 35.8–46.3)
HGB BLD-MCNC: 8.2 G/DL (ref 11.7–15.4)
MCH RBC QN AUTO: 24.1 PG (ref 26.1–32.9)
MCHC RBC AUTO-ENTMCNC: 28.4 G/DL (ref 31.4–35)
MCV RBC AUTO: 85 FL (ref 79.6–97.8)
NRBC # BLD: 0 K/UL (ref 0–0.2)
PLATELET # BLD AUTO: 193 K/UL (ref 150–450)
PMV BLD AUTO: 8.5 FL (ref 9.4–12.3)
POTASSIUM SERPL-SCNC: 3.6 MMOL/L (ref 3.5–5.1)
RBC # BLD AUTO: 3.4 M/UL (ref 4.05–5.2)
SERVICE CMNT-IMP: ABNORMAL
SODIUM SERPL-SCNC: 143 MMOL/L (ref 136–145)
WBC # BLD AUTO: 8.7 K/UL (ref 4.3–11.1)

## 2021-09-03 PROCEDURE — 80048 BASIC METABOLIC PNL TOTAL CA: CPT

## 2021-09-03 PROCEDURE — 65270000029 HC RM PRIVATE

## 2021-09-03 PROCEDURE — 0DBN4ZZ EXCISION OF SIGMOID COLON, PERCUTANEOUS ENDOSCOPIC APPROACH: ICD-10-PCS | Performed by: SURGERY

## 2021-09-03 PROCEDURE — 74011250636 HC RX REV CODE- 250/636: Performed by: SURGERY

## 2021-09-03 PROCEDURE — 0DTP4ZZ RESECTION OF RECTUM, PERCUTANEOUS ENDOSCOPIC APPROACH: ICD-10-PCS | Performed by: SURGERY

## 2021-09-03 PROCEDURE — 74011636637 HC RX REV CODE- 636/637: Performed by: NURSE PRACTITIONER

## 2021-09-03 PROCEDURE — 74011250637 HC RX REV CODE- 250/637: Performed by: SURGERY

## 2021-09-03 PROCEDURE — 2709999900 HC NON-CHARGEABLE SUPPLY

## 2021-09-03 PROCEDURE — 74011250637 HC RX REV CODE- 250/637: Performed by: NURSE PRACTITIONER

## 2021-09-03 PROCEDURE — 82962 GLUCOSE BLOOD TEST: CPT

## 2021-09-03 PROCEDURE — 36415 COLL VENOUS BLD VENIPUNCTURE: CPT

## 2021-09-03 PROCEDURE — 8E0W4CZ ROBOTIC ASSISTED PROCEDURE OF TRUNK REGION, PERCUTANEOUS ENDOSCOPIC APPROACH: ICD-10-PCS | Performed by: SURGERY

## 2021-09-03 PROCEDURE — 85027 COMPLETE CBC AUTOMATED: CPT

## 2021-09-03 PROCEDURE — S2900 ROBOTIC SURGICAL SYSTEM: HCPCS | Performed by: SURGERY

## 2021-09-03 PROCEDURE — 0DJD8ZZ INSPECTION OF LOWER INTESTINAL TRACT, VIA NATURAL OR ARTIFICIAL OPENING ENDOSCOPIC: ICD-10-PCS | Performed by: SURGERY

## 2021-09-03 RX ADMIN — AMLODIPINE BESYLATE 5 MG: 5 TABLET ORAL at 09:03

## 2021-09-03 RX ADMIN — LEVOTHYROXINE SODIUM 137 MCG: 0.11 TABLET ORAL at 05:43

## 2021-09-03 RX ADMIN — INSULIN HUMAN 3 UNITS: 100 INJECTION, SOLUTION PARENTERAL at 21:02

## 2021-09-03 RX ADMIN — DOFETILIDE 500 MCG: 0.5 CAPSULE ORAL at 09:03

## 2021-09-03 RX ADMIN — ENOXAPARIN SODIUM 40 MG: 40 INJECTION SUBCUTANEOUS at 11:59

## 2021-09-03 RX ADMIN — DOFETILIDE 500 MCG: 0.5 CAPSULE ORAL at 17:14

## 2021-09-03 RX ADMIN — OXYCODONE HYDROCHLORIDE AND ACETAMINOPHEN 1 TABLET: 7.5; 325 TABLET ORAL at 05:43

## 2021-09-03 RX ADMIN — LOSARTAN POTASSIUM 50 MG: 50 TABLET, FILM COATED ORAL at 09:02

## 2021-09-03 RX ADMIN — SODIUM CHLORIDE 75 ML/HR: 900 INJECTION, SOLUTION INTRAVENOUS at 09:02

## 2021-09-03 RX ADMIN — LOSARTAN POTASSIUM 50 MG: 50 TABLET, FILM COATED ORAL at 17:12

## 2021-09-03 RX ADMIN — INSULIN HUMAN 3 UNITS: 100 INJECTION, SOLUTION PARENTERAL at 17:12

## 2021-09-03 RX ADMIN — PANTOPRAZOLE SODIUM 40 MG: 40 TABLET, DELAYED RELEASE ORAL at 05:43

## 2021-09-03 RX ADMIN — OXYCODONE HYDROCHLORIDE AND ACETAMINOPHEN 1 TABLET: 7.5; 325 TABLET ORAL at 21:03

## 2021-09-03 RX ADMIN — ONDANSETRON 4 MG: 2 INJECTION INTRAMUSCULAR; INTRAVENOUS at 00:41

## 2021-09-03 NOTE — PROGRESS NOTES
Problem: Falls - Risk of  Goal: *Absence of Falls  Description: Document [place] Fall Risk and appropriate interventions in the flowsheet.   Outcome: Progressing Towards Goal  Note: Fall Risk Interventions:  Mobility Interventions: Communicate number of staff needed for ambulation/transfer, Patient to call before getting OOB         Medication Interventions: Patient to call before getting OOB, Teach patient to arise slowly    Elimination Interventions: Call light in reach, Patient to call for help with toileting needs              Problem: Patient Education: Go to Patient Education Activity  Goal: Patient/Family Education  Outcome: Progressing Towards Goal     Problem: Surgical Pathway Post-Op Day 1  Goal: *No signs and symptoms of infection or wound complications  Outcome: Progressing Towards Goal  Goal: *Optimal pain control at patient's stated goal  Outcome: Progressing Towards Goal  Goal: *Adequate urinary output (equal to or greater than 30 milliliters/hour)  Outcome: Progressing Towards Goal  Goal: *Hemodynamically stable  Outcome: Progressing Towards Goal  Goal: *Tolerating diet  Outcome: Progressing Towards Goal  Goal: *Demonstrates progressive activity  Outcome: Progressing Towards Goal  Goal: *Lungs clear or at baseline  Outcome: Progressing Towards Goal

## 2021-09-03 NOTE — PROGRESS NOTES
PLAN:  ADULT DIET Full Liquid; No Concentrated Sweets  IVFs Decrease to 50mL hr  SCD/IS/Protonix/Lovenox for prophylactic measures  OOB and ambulate  Pain/nausea control  Monitor labs  Wu out 9/2/21  PTA meds   FSBS AC/HS with SSI  Possibly home 24-48 hours      ASSESSMENT:  Admit Date: 9/1/2021   2 Day Post-Op  Procedure(s):  ROBOTIC ASSISTED SIGMOID COLECTOMY    Active Problems:    Colon cancer ([address] (9/1/2021)         SUBJECTIVE:  9/2/21 1 Day Post-Op awake in bed, no complaints. Pain controlled. AF, mildly  Hypertensive. 9/3/21 2 Day Post-Op Up ambulating in room. No complaints. Pain controlled. +flatus/-BM. Reports some burping. AF, mildly  Hypertensive. Intake/Output Summary (Last 24 hours) at 9/3/2021 1018  Last data filed at 9/3/2021 0331  Gross per 24 hour   Intake 893 ml   Output 850 ml   Net 43 ml     OBJECTIVE:  Constitutional: Alert oriented cooperative patient in no acute distress; appears stated age   Visit Vitals  BP (!) 160/64 (BP 1 Location: Right upper arm, BP Patient Position: At rest;Sitting)   Pulse 69   Temp 98.1 °F (36.7 °C)   Resp 18   Ht 5' 6\" (1.676 m)   Wt 151 lb 9.6 oz (68.8 kg)   SpO2 93%   BMI 24.47 kg/m²     Eyes: Sclera are clear. ENMT: no external lesions gross hearing normal; no obvious neck masses, no ear or lip lesions  CV: RRR. Normal perfusion  Resp: No JVD. Breathing is  non-labored; no audible wheezing. GI: soft and non-distended, appropriately tender, Post op dressings removed. [place] c/d/i     Musculoskeletal: unremarkable with normal function. No embolic signs or cyanosis.    Neuro:  Oriented; moves all 4; no focal deficits  Psychiatric: normal affect and mood, no memory impairment      Patient Vitals for the past 24 hrs:   BP Temp Pulse Resp SpO2   09/03/21 0801 (!) 160/64 98.1 °F (36.7 °C) 69 18 93 %   09/03/21 0623 132/64 97.9 °F (36.6 °C) 75 17 91 %   09/03/21 0539 (!) 154/55 -- 73 -- --   09/03/21 0254 (!) 170/69 98.5 °F (36.9 °C) 71 17 91 % 09/02/21 2319 (!) 167/56 98.4 °F (36.9 °C) 69 17 92 %   09/02/21 1859 (!) 148/62 98 °F (36.7 °C) 65 18 90 %   09/02/21 1552 (!) 141/63 98.4 °F (36.9 °C) 62 17 90 %   09/02/21 1155 (!) 146/57 -- (!) 55 -- --   09/02/21 1118 (!) 146/57 98.4 °F (36.9 °C) (!) 55 17 95 %   09/02/21 1100 (!) 146/57 -- (!) 55 -- --     Labs:    Recent Labs     09/03/21  0409 09/01/21  1333 09/01/21  1333   WBC 8.7  --   --    HGB 8.2*  --   --      --   --       < > 143   K 3.6   < > 3.5   *   < > 112*   CO2 28   < > 27   BUN 9   < > 9   CREA 0.56*   < > 0.57*   *   < > 170*   PTP  --   --  15.7*   INR  --   --  1.2    < > = values in this interval not displayed.        Signed:  Micah Galeazzi, NP

## 2021-09-03 NOTE — PROGRESS NOTES
END OF SHIFT NOTE:    INTAKE/OUTPUT  09/02 0701 - 09/03 0700  In: 547 [I.V.:893]  Out: 850 [Urine:850]  Voiding: YES  Catheter: NO  Drain:              Flatus: Patient does have flatus present. Stool:  0 occurrences. Characteristics:       Emesis: 0 occurrences. Characteristics:        VITAL SIGNS  Patient Vitals for the past 12 hrs:   Temp Pulse Resp BP SpO2   09/03/21 0623 97.9 °F (36.6 °C) 75 17 132/64 91 %   09/03/21 0539 -- 73 -- (!) 154/55 --   09/03/21 0254 98.5 °F (36.9 °C) 71 17 (!) 170/69 91 %   09/02/21 2319 98.4 °F (36.9 °C) 69 17 (!) 167/56 92 %       Pain Assessment  Pain Intensity 1: 0 (09/02/21 1944)  Pain Location 1: Abdomen  Pain Intervention(s) 1: Medication (see MAR)  Patient Stated Pain Goal: 0    Ambulating  Yes    Shift report given to oncoming nurse at the bedside.     Angelique, RN

## 2021-09-03 NOTE — PROGRESS NOTES
CM continues to follow for discharge planning and/or CM needs. No needs noted or voiced at this time. Will continue to follow and update as needed.

## 2021-09-03 NOTE — PROGRESS NOTES
END OF SHIFT NOTE:    INTAKE/OUTPUT  09/02 0701 - 09/03 0700  In: 728 [I.V.:893]  Out: 850 [Urine:850]  Voiding: YES  Catheter: NO  Drain:              Flatus: Patient does have flatus present. Stool:  0 occurrences. Characteristics:       Emesis: 0 occurrences. Characteristics:        VITAL SIGNS  Patient Vitals for the past 12 hrs:   Temp Pulse Resp BP SpO2   09/03/21 1712 -- 81 -- (!) 148/69 --   09/03/21 1511 98.2 °F (36.8 °C) 77 18 (!) 165/55 93 %   09/03/21 1127 98.1 °F (36.7 °C) 69 16 (!) 144/61 91 %   09/03/21 0801 98.1 °F (36.7 °C) 69 18 (!) 160/64 93 %       Pain Assessment  Pain Intensity 1: 0 (09/03/21 0726)  Pain Location 1: Abdomen  Pain Intervention(s) 1: Medication (see MAR)  Patient Stated Pain Goal: 0    Ambulating  Yes    Shift report given to oncoming nurse at the bedside.     Maura Sanders, RN

## 2021-09-04 VITALS
WEIGHT: 151.6 LBS | OXYGEN SATURATION: 93 % | TEMPERATURE: 98.3 F | RESPIRATION RATE: 17 BRPM | HEIGHT: 66 IN | BODY MASS INDEX: 24.36 KG/M2 | HEART RATE: 79 BPM | DIASTOLIC BLOOD PRESSURE: 66 MMHG | SYSTOLIC BLOOD PRESSURE: 160 MMHG

## 2021-09-04 LAB
GLUCOSE BLD STRIP.AUTO-MCNC: 126 MG/DL (ref 65–100)
GLUCOSE BLD STRIP.AUTO-MCNC: 182 MG/DL (ref 65–100)
SERVICE CMNT-IMP: ABNORMAL
SERVICE CMNT-IMP: ABNORMAL

## 2021-09-04 PROCEDURE — 74011250636 HC RX REV CODE- 250/636: Performed by: SURGERY

## 2021-09-04 PROCEDURE — 77010033678 HC OXYGEN DAILY

## 2021-09-04 PROCEDURE — 74011250637 HC RX REV CODE- 250/637: Performed by: SURGERY

## 2021-09-04 PROCEDURE — 94761 N-INVAS EAR/PLS OXIMETRY MLT: CPT

## 2021-09-04 PROCEDURE — 82962 GLUCOSE BLOOD TEST: CPT

## 2021-09-04 PROCEDURE — 74011636637 HC RX REV CODE- 636/637: Performed by: NURSE PRACTITIONER

## 2021-09-04 PROCEDURE — 74011250637 HC RX REV CODE- 250/637: Performed by: NURSE PRACTITIONER

## 2021-09-04 RX ORDER — OXYCODONE AND ACETAMINOPHEN 5; 325 MG/1; MG/1
1 TABLET ORAL
Qty: 20 TABLET | Refills: 0 | Status: SHIPPED | OUTPATIENT
Start: 2021-09-04 | End: 2021-09-09

## 2021-09-04 RX ADMIN — LEVOTHYROXINE SODIUM 137 MCG: 0.11 TABLET ORAL at 05:45

## 2021-09-04 RX ADMIN — PANTOPRAZOLE SODIUM 40 MG: 40 TABLET, DELAYED RELEASE ORAL at 05:45

## 2021-09-04 RX ADMIN — DOFETILIDE 500 MCG: 0.5 CAPSULE ORAL at 08:49

## 2021-09-04 RX ADMIN — LOSARTAN POTASSIUM 50 MG: 50 TABLET, FILM COATED ORAL at 08:48

## 2021-09-04 RX ADMIN — INSULIN HUMAN 3 UNITS: 100 INJECTION, SOLUTION PARENTERAL at 12:46

## 2021-09-04 RX ADMIN — AMLODIPINE BESYLATE 5 MG: 5 TABLET ORAL at 08:48

## 2021-09-04 RX ADMIN — ENOXAPARIN SODIUM 40 MG: 40 INJECTION SUBCUTANEOUS at 12:46

## 2021-09-04 NOTE — DISCHARGE INSTRUCTIONS
Follow-up with Dr. Crystal Beltran 3/0/82 @ 00:24  Keep incisions clean and dry, may remain uncovered. Do not apply lotions, creams or ointments to incisions.     Diet - as tolerated - Soft foods diet. Activity - ambulate - as tolerated - no heavy lifting >10lb. May shower - no tub baths or soaking/submerging. Rx: Percocet   No driving while taking narcotics. Do not drink alcohol while taking narcotics. Resume other home medications.      If problems or questions arise, please call our office at [phone]. DISCHARGE SUMMARY from Nurse    PATIENT INSTRUCTIONS:    After general anesthesia or intravenous sedation, for 24 hours or while taking prescription Narcotics:  · Limit your activities  · Do not drive and operate hazardous machinery  · Do not make important personal or business decisions  · Do  not drink alcoholic beverages  · If you have not urinated within 8 hours after discharge, please contact your surgeon on call. Report the following to your surgeon:  · Excessive pain, swelling, redness or odor of or around the surgical area  · Temperature over 100.5  · Nausea and vomiting lasting longer than 4 hours or if unable to take medications  · Any signs of decreased circulation or nerve impairment to extremity: change in color, persistent  numbness, tingling, coldness or increase pain  · Any questions    What to do at Home:  Recommended activity: Activity as tolerated, no heavy lifting over 10 lbs. If you experience any of the following symptoms fever greater than 100.5, nausea/vomiting lasting longer than 4 hours and not relieved by medication, new onset of pain/swelling/redness/drainage/odor coming from surgical sites, please follow up with Dr. Crystal Beltran. *  Please give a list of your current medications to your Primary Care Provider.     *  Please update this list whenever your medications are discontinued, doses are      changed, or new medications (including over-the-counter products) are added.    *  Please carry medication information at all times in case of emergency situations. These are general instructions for a healthy lifestyle:    No smoking/ No tobacco products/ Avoid exposure to second hand smoke  Surgeon General's Warning:  Quitting smoking now greatly reduces serious risk to your health. Obesity, smoking, and sedentary lifestyle greatly increases your risk for illness    A healthy diet, regular physical exercise & weight monitoring are important for maintaining a healthy lifestyle    You may be retaining fluid if you have a history of heart failure or if you experience any of the following symptoms:  Weight gain of 3 pounds or more overnight or 5 pounds in a week, increased swelling in our hands or feet or shortness of breath while lying flat in bed. Please call your doctor as soon as you notice any of these symptoms; do not wait until your next office visit. The discharge information has been reviewed with the patient. The patient verbalized understanding. Discharge medications reviewed with the patient and appropriate educational materials and side effects teaching were provided. Patient Education   Patient Education        High Blood Pressure: Care Instructions  Overview     It's normal for blood pressure to go up and down throughout the day. But if it stays up, you have high blood pressure. Another name for high blood pressure is hypertension. Despite what a lot of people think, high blood pressure usually doesn't cause headaches or make you feel dizzy or lightheaded. It usually has no symptoms. But it does increase your risk of stroke, heart attack, and other problems. You and your doctor will talk about your risks of these problems based on your blood pressure. Your doctor will give you a goal for your blood pressure. Your goal will be based on your health and your age.   Lifestyle changes, such as eating healthy and being active, are always important to help lower blood pressure. You might also take medicine to reach your blood pressure goal.  Follow-up care is a key part of your treatment and safety. Be sure to make and go to all appointments, and call your doctor if you are having problems. It's also a good idea to know your test results and keep a list of the medicines you take. How can you care for yourself at home? Medical treatment  · If you stop taking your medicine, your blood pressure will go back up. You may take one or more types of medicine to lower your blood pressure. Be safe with medicines. Take your medicine exactly as prescribed. Call your doctor if you think you are having a problem with your medicine. · Talk to your doctor before you start taking aspirin every day. Aspirin can help certain people lower their risk of a heart attack or stroke. But taking aspirin isn't right for everyone, because it can cause serious bleeding. · See your doctor regularly. You may need to see the doctor more often at first or until your blood pressure comes down. · If you are taking blood pressure medicine, talk to your doctor before you take decongestants or anti-inflammatory medicine, such as ibuprofen. Some of these medicines can raise blood pressure. · Learn how to check your blood pressure at home. Lifestyle changes  · Stay at a healthy weight. This is especially important if you put on weight around the waist. Losing even 10 pounds can help you lower your blood pressure. · If your doctor recommends it, get more exercise. Walking is a good choice. Bit by bit, increase the amount you walk every day. Try for at least 30 minutes on most days of the week. You also may want to swim, bike, or do other activities. · Avoid or limit alcohol. Talk to your doctor about whether you can drink any alcohol. · Try to limit how much sodium you eat to less than 2,300 milligrams (mg) a day. Your doctor may ask you to try to eat less than 1,500 mg a day.   · Eat plenty of fruits (such as bananas and oranges), vegetables, legumes, whole grains, and low-fat dairy products. · Lower the amount of saturated fat in your diet. Saturated fat is found in animal products such as milk, cheese, and meat. Limiting these foods may help you lose weight and also lower your risk for heart disease. · Do not smoke. Smoking increases your risk for heart attack and stroke. If you need help quitting, talk to your doctor about stop-smoking programs and medicines. These can increase your chances of quitting for good. When should you call for help? Call  911 anytime you think you may need emergency care. This may mean having symptoms that suggest that your blood pressure is causing a serious heart or blood vessel problem. Your blood pressure may be over 180/120. For example, call 911 if:    · You have symptoms of a heart attack. These may include:  ? Chest pain or pressure, or a strange feeling in the chest.  ? Sweating. ? Shortness of breath. ? Nausea or vomiting. ? Pain, pressure, or a strange feeling in the back, neck, jaw, or upper belly or in one or both shoulders or arms. ? Lightheadedness or sudden weakness. ? A fast or irregular heartbeat.     · You have symptoms of a stroke. These may include:  ? Sudden numbness, tingling, weakness, or loss of movement in your face, arm, or leg, especially on only one side of your body. ? Sudden vision changes. ? Sudden trouble speaking. ? Sudden confusion or trouble understanding simple statements. ? Sudden problems with walking or balance. ? A sudden, severe headache that is different from past headaches.     · You have severe back or belly pain. Do not wait until your blood pressure comes down on its own. Get help right away.   Call your doctor now or seek immediate care if:    · Your blood pressure is much higher than normal (such as 180/120 or higher), but you don't have symptoms.     · You think high blood pressure is causing symptoms, such as:  ? Severe headache.  ? Blurry vision. Watch closely for changes in your health, and be sure to contact your doctor if:    · Your blood pressure measures higher than your doctor recommends at least 2 times. That means the top number is higher or the bottom number is higher, or both.     · You think you may be having side effects from your blood pressure medicine. Where can you learn more? Go to http://www.gray.com/  Enter [id number] in the search box to learn more about \"High Blood Pressure: Care Instructions. \"  Current as of: August 31, 2020               Content Version: 12.8  © 1667-2069 [employer]. Care instructions adapted under license by [employer] (which disclaims liability or warranty for this information). If you have questions about a medical condition or this instruction, always ask your healthcare professional. [address] any warranty or liability for your use of this information. Learning About Hypoxemia  What is hypoxemia? Hypoxemia means that you don't have enough oxygen in your blood. It's a result of diseases that affect your heart or lungs. These include heart failure, COPD, and pulmonary fibrosis (scarring of the lungs). Being at high altitudes can also lead to hypoxemia. What happens when you have hypoxemia? Oxygen gets into your blood through your lungs. Your blood carries the oxygen to all parts of your body. When you have too little oxygen in your blood, your body doesn't get enough of it. With too little oxygen, your heart and other parts of your body don't work very well. What are the symptoms? In addition to the symptoms of whatever is causing your hypoxemia, you may:  · Get tired quickly. · Be short of breath when you are active. · Feel like your heart is pounding or racing. · Feel weak or dizzy. · Become confused. How is hypoxemia treated?   Your doctor will do tests to find out how much oxygen is in your blood. He or she will look for the cause of your hypoxemia and treat that problem. For example, if you have heart failure, you may need medicines that help your heart pump better. · If your hypoxemia is not severe, your doctor may give you oxygen through a mask or nasal cannula (say \"Jennifer\"). A cannula is a thin tube with two openings that fit just inside your nose. · If your hypoxemia is severe, you may have a breathing tube put into your windpipe. The breathing tube is attached to a machine that pushes air into your lungs. This machine is called a ventilator. · If you have a long-term problem with hypoxemia, your doctor may recommend that you use oxygen regularly. Some people need it all the time. Others need it from time to time throughout the day or overnight. Your doctor will tell you how much oxygen you need and how often to use it. Follow-up care is a key part of your treatment and safety. Be sure to make and go to all appointments, and call your doctor if you are having problems. It's also a good idea to know your test results and keep a list of the medicines you take. Where can you learn more? Go to http://www.gray.com/  Enter M375 in the search box to learn more about \"Learning About Hypoxemia. \"  Current as of: October 26, 2020               Content Version: 12.8  © 2006-2021 [employer]. Care instructions adapted under license by [employer] (which disclaims liability or warranty for this information). If you have questions about a medical condition or this instruction, always ask your healthcare professional. [place] [address] any warranty or liability for your use of this information. Patient Education        Laparoscopic Bowel Resection: What to Expect at [facility] . had part of your small or large intestine taken out.  You are likely to have pain that comes and goes for the next few days. You may feel like you have the flu. You also may have a low fever and feel tired and nauseated. This is common. You should feel better after 1 to 2 weeks and will probably be back to normal in 2 to 4 weeks. Your bowel movements may not be regular for several weeks. Also, you may have some blood in your stool. This care sheet gives you a general idea about how long it will take for you to recover. But each person recovers at a different pace. Follow the steps below to get better as quickly as possible. How can you care for yourself at home? Activity    · Rest when you feel tired. Getting enough sleep will help you recover.     · Try to walk each day. Start by walking a little more than you did the day before. Bit by bit, increase the amount you walk. Walking boosts blood flow and helps prevent pneumonia and constipation.     · Avoid strenuous activities, such as biking, jogging, weight lifting, or aerobic exercise, until your doctor says it is okay.     · Avoid lifting anything that would make you strain. This may include heavy grocery bags and milk containers, a heavy briefcase or backpack, cat litter or dog food bags, a vacuum , or a child.     · Ask your doctor when you can drive again.     · You will probably need to take 2 to 4 weeks off from work. It depends on the type of work you do and how you feel.     · You may shower 24 to 48 hours after surgery, if your doctor says it is okay. Pat the cut (incision) dry. Do not take a bath for the first 2 weeks, or until your doctor tells you it is okay.     · Ask your doctor when it is okay for you to have sex. Diet    · You may not have much appetite after the surgery. But try to eat a healthy diet. Your doctor will tell you about any foods you should not eat.     · Eat a low-fiber diet for several weeks after surgery. Eat many small meals throughout the day. Add high-fiber foods a little at a time.     · Eat yogurt.  It puts good bacteria into your colon and helps prevent diarrhea.     · Try to avoid nuts, seeds, and corn for a while. They may be hard to digest.     · You may need to take vitamins that contain sodium and potassium. Your doctor will tell you whether you should take any vitamins or supplements.     · Drink plenty of fluids to prevent dehydration. Choose water and other caffeine-free clear liquids until you feel better. If you have kidney, heart, or liver disease and have to limit fluids, talk with your doctor before you increase the amount of fluids you drink. Medicines    · Your doctor will tell you if and when you can restart your medicines. You will also be given instructions about taking any new medicines.     · If you take aspirin or some other blood thinner, ask your doctor if and when to start taking it again. Make sure that you understand exactly what your doctor wants you to do.     · Take pain medicines exactly as directed. ? If the doctor gave you a prescription medicine for pain, take it as prescribed. ? If you are not taking a prescription pain medicine, ask your doctor if you can take an over-the-counter medicine.     · If your doctor prescribed antibiotics, take them as directed. Do not stop taking them just because you feel better. You need to take the full course of antibiotics.     · You may need to take some medicines in a different form. You will be told whether to crush pills or take a liquid form of the medicine.     · If your doctor gives you a stool softener, take it as directed. Incision care    · If you have strips of tape on the incisions, leave the tape on for a week or until it falls off.     · Wash the area daily with warm, soapy water and pat it dry. Don't use hydrogen peroxide or alcohol, which can slow healing. You may cover the area with a gauze bandage if it weeps or rubs against clothing. Change the bandage every day. Follow-up care is a key part of your treatment and safety.  Be sure to make and go to all appointments, and call your doctor if you are having problems. It's also a good idea to know your test results and keep a list of the medicines you take. When should you call for help? Call 911 anytime you think you may need emergency care. For example, call if:    · You passed out (lost consciousness).     · You are short of breath. Call your doctor now or seek immediate medical care if:    · You have pain that does not get better after you take pain medicine.     · You cannot pass stool or gas.     · You are sick to your stomach and cannot keep fluids down.     · Bright red blood has soaked through your bandage.     · You have loose stitches, or your incision comes open.     · You have signs of a blood clot in your leg (called a deep vein thrombosis), such as:  ? Pain in your calf, back of the knee, thigh, or groin. ? Redness and swelling in your leg or groin.     · You have signs of infection, such as:  ? Increased pain, swelling, warmth, or redness. ? Red streaks leading from the incision. ? Pus draining from the incision. ? A fever. Watch closely for any changes in your health, and be sure to contact your doctor if you have any problems. Where can you learn more? Go to http://www.gray.com/  Enter H191 in the search box to learn more about \"Laparoscopic Bowel Resection: What to Expect at Home. \"  Current as of: April 15, 2020               Content Version: 12.8  © 3855-3937 [employer]. Care instructions adapted under license by [employer] (which disclaims liability or warranty for this information). If you have questions about a medical condition or this instruction, always ask your healthcare professional. [address] any warranty or liability for your use of this information.          ___________________________________________________________________________________________________________________________________

## 2021-09-04 NOTE — PROGRESS NOTES
The patient is medically stable for discharge. CM spoke with patient via phone regarding discharge needs. Patient denies any/discharge supportive care needs at this time. Patient to discharge home this day. Spouse to transport the patient home. Please consult or notify CM if any needs shall arise. CM remains available. Care Management Interventions  PCP Verified by CM:  (Celeste Lewis NP)  Mode of Transport at Discharge:  Other (see comment) (Patient's Spouse Melodie Novoa [phone])  Transition of Care Consult (CM Consult): Discharge Planning  Discharge Durable Medical Equipment: No  Physical Therapy Consult: No  Occupational Therapy Consult: No  Speech Therapy Consult: No  Current Support Network: Lives with Spouse, Own Home  Confirm Follow Up Transport: Family  [place] Resource Information Provided?: No  Discharge Location  Discharge Placement: Home

## 2021-09-04 NOTE — PROGRESS NOTES
END OF SHIFT NOTE:    INTAKE/OUTPUT  09/03 0701 - 09/04 0700  In: 426 [I.V.:426]  Out: 1200 [Urine:1200]  Voiding: YES  Catheter: NO  Drain:              Flatus: Patient does have flatus present. Stool:  1 occurrences. Characteristics:  Stool Assessment  Stool Color: Red (per pt)  Stool Amount: Smear    Emesis: 0 occurrences. Characteristics:        VITAL SIGNS  Patient Vitals for the past 12 hrs:   Temp Pulse Resp BP SpO2   09/04/21 0620 -- -- -- -- 91 %   09/04/21 0239 98 °F (36.7 °C) 71 17 (!) 157/75 90 %   09/03/21 2254 98.6 °F (37 °C) 76 18 (!) 141/57 90 %   09/03/21 1940 98.2 °F (36.8 °C) 73 17 (!) 142/63 96 %       Pain Assessment  Pain Intensity 1: 0 (09/03/21 2133)  Pain Location 1: Abdomen  Pain Intervention(s) 1: Medication (see MAR)  Patient Stated Pain Goal: 0    Ambulating  Yes    Shift report given to oncoming nurse at the bedside.     Angelique RN

## 2021-09-04 NOTE — PROGRESS NOTES
Update: Patient requiring o2. DME order for home oxygen sent to [facility]. Portable o2 tank to be provided for discharge. Patient to discharge home this day with Home O2 arranged by [facility].    Please consult or notify CM if any needs shall arise. CM remains available. Care Management Interventions  PCP Verified by CM:  (Tee Grover, NP)  Mode of Transport at Discharge:  Other (see comment) (Patient's Spouse Evita Plan [phone])  Transition of Care Consult (CM Consult): Discharge Planning  Discharge Durable Medical Equipment: Yes (o2 ordered with [facility]. )  Physical Therapy Consult: No  Occupational Therapy Consult: No  Speech Therapy Consult: No  Current Support Network: Lives with Spouse, Own Home  Confirm Follow Up Transport: Family  [place] Resource Information Provided?: No  Discharge Location  Discharge Placement: Home

## 2021-09-04 NOTE — PROGRESS NOTES
PLAN:  Soft diet  DC IVFs   SCD/IS/Protonix/Lovenox for prophylactic measures  OOB and ambulate  Pain/nausea control  Monitor labs  Wu out 9/2/21  PTA meds   FSBS AC/HS with SSI  DC home today      ASSESSMENT:  Admit Date: 9/1/2021   3 Day Post-Op  Procedure(s):  ROBOTIC ASSISTED SIGMOID COLECTOMY    Principal Problem:    Rectal cancer ([facility] [address] (9/1/2021)    Active Problems:    Colon cancer ([facility] [address] (9/1/2021)         SUBJECTIVE:  9/2/21 1 Day Post-Op awake in bed, no complaints. Pain controlled. AF, mildly  Hypertensive. 9/3/21 2 Day Post-Op Up ambulating in room. No complaints. Pain controlled. +flatus/-BM. Reports some burping. AF, mildly  Hypertensive. 9/4/21 3 Day Post-Op Up Awake in bed. Feeling good and wants to go home. Pain controlled. Tolerating Full Liquids. +flatus/+BM. Voiding without difficulty. AF, mildly  Hypertensive. Intake/Output Summary (Last 24 hours) at 9/4/2021 7751  Last data filed at 9/3/2021 2103  Gross per 24 hour   Intake 426 ml   Output 1200 ml   Net -774 ml     OBJECTIVE:  Constitutional: Alert oriented cooperative patient in no acute distress; appears stated age   Visit Vitals  BP (!) 160/66   Pulse 79   Temp 98.3 °F (36.8 °C)   Resp 17   Ht 5' 6\" (1.676 m)   Wt 151 lb 9.6 oz (68.8 kg)   SpO2 91%   BMI 24.47 kg/m²     Eyes: Sclera are clear. ENMT: no external lesions gross hearing normal; no obvious neck masses, no ear or lip lesions  CV: RRR. Normal perfusion  Resp: No JVD. Breathing is  non-labored; no audible wheezing. GI: soft and non-distended, appropriately tender, Vi c/d/i     Musculoskeletal: unremarkable with normal function. No embolic signs or cyanosis.    Neuro:  Oriented; moves all 4; no focal deficits  Psychiatric: normal affect and mood, no memory impairment      Patient Vitals for the past 24 hrs:   BP Temp Pulse Resp SpO2   09/04/21 0752 (!) 160/66 98.3 °F (36.8 °C) 79 -- --   09/04/21 0620 -- -- -- -- 91 %   09/04/21 0239 (!) 157/75 98 °F (36.7 °C) 71 17 90 %   09/03/21 2254 (!) 141/57 98.6 °F (37 °C) 76 18 90 %   09/03/21 1940 (!) 142/63 98.2 °F (36.8 °C) 73 17 96 %   09/03/21 1712 (!) 148/69 -- 81 -- --   09/03/21 1511 (!) 165/55 98.2 °F (36.8 °C) 77 18 93 %   09/03/21 1127 (!) 144/61 98.1 °F (36.7 °C) 69 16 91 %     Labs:    Recent Labs     09/03/21  0409 09/01/21  1333 09/01/21  1333   WBC 8.7  --   --    HGB 8.2*  --   --      --   --       < > 143   K 3.6   < > 3.5   *   < > 112*   CO2 28   < > 27   BUN 9   < > 9   CREA 0.56*   < > 0.57*   *   < > 170*   PTP  --   --  15.7*   INR  --   --  1.2    < > = values in this interval not displayed.        Signed:  Shayla Greene NP

## 2021-09-04 NOTE — DISCHARGE SUMMARY
Physician Discharge Summary     Patient ID:  Rod Simmons  [id number]  77 y.o.  1945    Allergies: Fosamax [alendronate] and Ibuprofen    Admit Date: 9/1/2021    Discharge Date: 9/4/2021     HPI: Ms. Bari Marquez an 77 y.o. female who was referred for evaluation and treatment of a mass located in the sigmoid colon or rectsigmoid junction, measured at approximately 15cm on colonoscopy. Mass was identified by colonoscopy. Current symptoms include Anemia - 285.9. The mass was biopsied. Pathology is positive for adenocarcinoma. The lesion was not tattooed. Colonoscopy Colonoscopy was complete to cecum. Pt underwent a Robotic assisted Low anterior resection 6/6/49 by Dr. Kris Petty. * Admission Diagnoses: Malignant neoplasm of colon, unspecified part of colon ([facility] [C18.9]  Colon cancer ([facility] [C18.9]    * Discharge Diagnoses:    Hospital Problems as of 9/4/2021 Date Reviewed: 8/13/2021        Codes Class Noted - Resolved POA    Colon cancer ([facility] ICD-10-CM: C18.9  ICD-9-CM: 153.9  9/1/2021 - Present Unknown        * (Principal) Rectal cancer ([facility] ICD-10-CM: C20  ICD-9-CM: 154.1  9/1/2021 - Present Unknown               Admission Condition: Stable    * Discharge Condition: stable    * Procedures: Procedure(s):  ROBOTIC ASSISTED SIGMOID COLECTOMY    * Hospital Course:   Normal hospital course for this procedure. Consults: None    Significant Diagnostic Studies: labs    * Disposition: Home    Discharge Medications:   Current Discharge Medication List      START taking these medications    Details   oxyCODONE-acetaminophen (Percocet) 5-325 mg per tablet Take 1 Tablet by mouth every four (4) hours as needed for Pain for up to 5 days. Max Daily Amount: 6 Tablets.   Qty: 20 Tablet, Refills: 0  Start date: 9/4/2021, End date: 9/9/2021    Associated Diagnoses: Malignant neoplasm of colon, unspecified part of colon ([facility]         CONTINUE these medications which have NOT CHANGED    Details   potassium chloride (K-DUR, KLOR-CON) 20 mEq tablet Take 1 Tablet by mouth two (2) times a day. Qty: 90 Tablet, Refills: 3      levothyroxine (SYNTHROID) 137 mcg tablet Take 137 mcg by mouth Daily (before breakfast). Indications: additional treatment for thyroid cancer, a condition with low thyroid hormone levels  Qty: 90 Tablet, Refills: 1    Associated Diagnoses: Hypothyroidism, postsurgical      insulin degludec Estefanía Clas FlexTouch U-100) 100 unit/mL (3 mL) inpn 54 Units by SubCUTAneous route nightly. Indications: type 2 diabetes mellitus  Qty: 15 Pen, Refills: 3    Associated Diagnoses: Type 1 diabetes mellitus with microalbuminuria (HCC)      insulin aspart U-100 (NovoLOG Flexpen U-100 Insulin) 100 unit/mL (3 mL) inpn Before meals: If blood sugar is: Less than 150 No additional insulin; 151 to 200 2 units; 201 to 250 4 units; 251 to 300 6 units; 301 to 350 8 units; 351 to 400 10 units; 401 or greater 12 units  Indications: type 2 diabetes mellitus  Qty: 15 Adjustable Dose Pre-filled Pen Syringe, Refills: 3    Associated Diagnoses: Type 1 diabetes mellitus with microalbuminuria (HCC)      atorvastatin (LIPITOR) 20 mg tablet TAKE 1/2 TABLET DAILY  Qty: 45 Tab, Refills: 3    Associated Diagnoses: Coronary artery disease involving native coronary artery of native heart without angina pectoris; Mixed hyperlipidemia      amLODIPine (NORVASC) 5 mg tablet TAKE 1 TABLET DAILY  Qty: 90 Tab, Refills: 3    Associated Diagnoses: Essential hypertension with goal blood pressure less than 130/80      losartan (COZAAR) 50 mg tablet TAKE 1 TABLET TWICE A DAY  Qty: 180 Tab, Refills: 3      dofetilide (Tikosyn) 500 mcg capsule Take 1 Cap by mouth two (2) times a day. Qty: 180 Cap, Refills: 3      cholecalciferol (VITAMIN D3) 1,000 unit tablet Take 1,000 Units by mouth two (2) times a day. omeprazole (PRILOSEC) 20 mg capsule Take 1 Cap by mouth as needed.  Indications: HEARTBURN  Qty: 90 Cap, Refills: 3      aspirin delayed-release 81 mg tablet Take 1 Tab by mouth daily. Qty: 30 Tab, Refills: 3      multivitamin (ONE A DAY) tablet Take 1 Tab by mouth every morning. Indications: hold until after surgery      glucose blood VI test strips (Contour Next Test Strips) strip Check blood sugar four times daily  Dx E11.9  Qty: 400 Strip, Refills: 3    Associated Diagnoses: Type 2 diabetes mellitus with hyperglycemia, with long-term current use of insulin ([facility])      warfarin (Jantoven) 5 mg tablet TAKE 1 AND 1/2 TABLETS     DAILY AS DIRECTED  Qty: 135 Tab, Refills: 3      Insulin Syringe-Needle U-100 1 mL 31 gauge x 5/16 syrg 33 units q am and 53 units at dinner  Qty: 100 Syringe, Refills: 11    Associated Diagnoses: Type 2 diabetes mellitus with hyperglycemia, with long-term current use of insulin ([address] Type 2 diabetes mellitus with microalbuminuria, with long-term current use of insulin ([facility])      Insulin Needles, Disposable, (EASY TOUCH) 31 gauge x 5/16\" ndle by Does Not Apply route two (2) times a day. For injecting insulin two times daily  Qty: 200 Pen Needle, Refills: 4    Associated Diagnoses: Type 1 diabetes mellitus with microalbuminuria ([address] Type 1 diabetes mellitus with other specified complication ([facility])      Blood Sugar Diagnostic, Drum (ACCU-CHEK COMPACT PLUS TEST) strp USE TO CHECK BLOOD SUGARS 4 TIMES A DAY OR AS DIRECTED  Qty: 600 Strip, Refills: 3    Associated Diagnoses: Type 2 diabetes mellitus with hyperglycemia, with long-term current use of insulin ([address]  Type 2 diabetes mellitus with microalbuminuria, with long-term current use of insulin ([facility])      Blood-Glucose Meter monitoring kit DM type 1, Checks blood sugars 4 times aday  Qty: 1 Kit, Refills: 0    Associated Diagnoses: Type 1 diabetes mellitus with other specified complication ([facility])      lancets 28 gauge misc Check blood sugar four times daily or as directed  Qty: 200 Lancet, Refills: 2    Associated Diagnoses: Type 1 diabetes mellitus with other specified complication (HCC)      loratadine (CLARITIN) 10 mg tablet Take 10 mg by mouth daily as needed. fluticasone (FLONASE) 50 mcg/actuation nasal spray 2 Sprays by Both Nostrils route as needed. DOCUSATE SODIUM (COLACE PO) Take 1 Tab by mouth daily as needed. Insulin Syringes, Disposable, 1 mL syrg Injects insulin twice a day  Qty: 60 Syringe, Refills: 11    Associated Diagnoses: Type 1 diabetes mellitus without complication (HCC)         STOP taking these medications       amoxicillin 500 mg tab Comments:   Reason for Stopping:               * Follow-up Care/Patient Instructions: Activity: Activity as tolerated and No heavy lifting   Diet: Soft Diabetic diet  Wound Care: Keep wound clean and dry    Follow-up Information     Follow up With Specialties Details Why Angus Miles MD General Surgery On 9/9/2021 10:45 [address] [facility]   [address] Jhonny Shrestha NP Internal Medicine   [address] [place] [address] [place] [id number]  [phone]          Discharge Instructions/Follow-up Plans:   MD Instructions:     Follow-up with Dr. Sridhar Gates [birth year] @ 79:96  Keep incisions clean and dry, may remain uncovered. Do not apply lotions, creams or ointments to incisions.     Diet - as tolerated - Soft foods diet. Activity - ambulate - as tolerated - no heavy lifting >10lb. May shower - no tub baths or soaking/submerging. Rx: Percocet   No driving while taking narcotics. Do not drink alcohol while taking narcotics.   Resume other home medications.      If problems or questions arise, please call our office at [phone].     Greater than 30 minutes were spent discharging the patient        Signed:  Shayla Greene NP  9/4/2021  8:28 AM

## 2021-09-04 NOTE — PROGRESS NOTES
Oxygen Qualifier       Room air: SpO2 with O2 and liter flow   Resting SpO2  89%  95% on 3L   Ambulating SpO2  82% 85% on 1L  88% on 2L  92% on 3L     Pt ambulated with minimal SOB but does require supplemental O2 with exercise.      Completed by:    Janiya Frye

## 2021-09-04 NOTE — PROGRESS NOTES
D/c paperwork reviewed with pt. All questions answered. PIV removed. O2 education completed with pt. Prescriptions given to pt. No further questions at this time. No acute signs of distress.

## 2021-10-04 ENCOUNTER — HOSPITAL ENCOUNTER (OUTPATIENT)
Dept: GENERAL RADIOLOGY | Age: 76
Discharge: HOME | End: 2021-10-04

## 2021-10-04 DIAGNOSIS — R09.02 HYPOXIA: ICD-10-CM

## 2021-12-01 ENCOUNTER — HOSPITAL ENCOUNTER (OUTPATIENT)
Dept: CARDIAC CATH/INVASIVE PROCEDURES | Age: 76
Discharge: HOME | End: 2021-12-01
Attending: INTERNAL MEDICINE | Admitting: INTERNAL MEDICINE
Payer: MEDICARE

## 2021-12-01 VITALS
HEART RATE: 71 BPM | BODY MASS INDEX: 23.46 KG/M2 | HEIGHT: 66 IN | DIASTOLIC BLOOD PRESSURE: 61 MMHG | WEIGHT: 146 LBS | OXYGEN SATURATION: 94 % | SYSTOLIC BLOOD PRESSURE: 152 MMHG

## 2021-12-01 DIAGNOSIS — Z95.2 S/P AVR (AORTIC VALVE REPLACEMENT): ICD-10-CM

## 2021-12-01 DIAGNOSIS — Z95.2 S/P MITRAL VALVE REPLACEMENT: ICD-10-CM

## 2021-12-01 DIAGNOSIS — I48.0 PAROXYSMAL ATRIAL FIBRILLATION (HCC): Chronic | ICD-10-CM

## 2021-12-01 LAB
ANION GAP SERPL CALC-SCNC: 6 MMOL/L (ref 7–16)
ATRIAL RATE: 63 BPM
B PERT DNA SPEC QL NAA+PROBE: NOT DETECTED
BORDETELLA PARAPERTUSSIS PCR, BORPAR: NOT DETECTED
BUN SERPL-MCNC: 16 MG/DL (ref 8–23)
C PNEUM DNA SPEC QL NAA+PROBE: NOT DETECTED
CALCIUM SERPL-MCNC: 8.8 MG/DL (ref 8.3–10.4)
CALCULATED P AXIS, ECG09: 106 DEGREES
CALCULATED R AXIS, ECG10: 30 DEGREES
CALCULATED T AXIS, ECG11: 61 DEGREES
CHLORIDE SERPL-SCNC: 109 MMOL/L (ref 98–107)
CO2 SERPL-SCNC: 24 MMOL/L (ref 21–32)
CREAT SERPL-MCNC: 0.73 MG/DL (ref 0.6–1)
DIAGNOSIS, 93000: NORMAL
ERYTHROCYTE [DISTWIDTH] IN BLOOD BY AUTOMATED COUNT: 19.6 % (ref 11.9–14.6)
FLUAV SUBTYP SPEC NAA+PROBE: NOT DETECTED
FLUBV RNA SPEC QL NAA+PROBE: NOT DETECTED
GLUCOSE SERPL-MCNC: 175 MG/DL (ref 65–100)
HADV DNA SPEC QL NAA+PROBE: NOT DETECTED
HCOV 229E RNA SPEC QL NAA+PROBE: NOT DETECTED
HCOV HKU1 RNA SPEC QL NAA+PROBE: NOT DETECTED
HCOV NL63 RNA SPEC QL NAA+PROBE: NOT DETECTED
HCOV OC43 RNA SPEC QL NAA+PROBE: NOT DETECTED
HCT VFR BLD AUTO: 37.5 % (ref 35.8–46.3)
HGB BLD-MCNC: 10.9 G/DL (ref 11.7–15.4)
HMPV RNA SPEC QL NAA+PROBE: NOT DETECTED
HPIV1 RNA SPEC QL NAA+PROBE: NOT DETECTED
HPIV2 RNA SPEC QL NAA+PROBE: NOT DETECTED
HPIV3 RNA SPEC QL NAA+PROBE: NOT DETECTED
HPIV4 RNA SPEC QL NAA+PROBE: NOT DETECTED
INR PPP: 1.5
M PNEUMO DNA SPEC QL NAA+PROBE: NOT DETECTED
MAGNESIUM SERPL-MCNC: 2.1 MG/DL (ref 1.8–2.4)
MCH RBC QN AUTO: 25.2 PG (ref 26.1–32.9)
MCHC RBC AUTO-ENTMCNC: 29.1 G/DL (ref 31.4–35)
MCV RBC AUTO: 86.8 FL (ref 79.6–97.8)
NRBC # BLD: 0 K/UL (ref 0–0.2)
P-R INTERVAL, ECG05: 146 MS
PLATELET # BLD AUTO: 290 K/UL (ref 150–450)
PMV BLD AUTO: 9.2 FL (ref 9.4–12.3)
POTASSIUM SERPL-SCNC: 5.3 MMOL/L (ref 3.5–5.1)
PROTHROMBIN TIME: 18 SEC (ref 12.6–14.5)
Q-T INTERVAL, ECG07: 470 MS
QRS DURATION, ECG06: 82 MS
QTC CALCULATION (BEZET), ECG08: 480 MS
RBC # BLD AUTO: 4.32 M/UL (ref 4.05–5.2)
RSV RNA SPEC QL NAA+PROBE: NOT DETECTED
RV+EV RNA SPEC QL NAA+PROBE: NOT DETECTED
SARS-COV-2 PCR, COVPCR: NOT DETECTED
SODIUM SERPL-SCNC: 139 MMOL/L (ref 136–145)
VENTRICULAR RATE, ECG03: 63 BPM
WBC # BLD AUTO: 7.2 K/UL (ref 4.3–11.1)

## 2021-12-01 PROCEDURE — 80048 BASIC METABOLIC PNL TOTAL CA: CPT

## 2021-12-01 PROCEDURE — 93010 ELECTROCARDIOGRAM REPORT: CPT | Performed by: INTERNAL MEDICINE

## 2021-12-01 PROCEDURE — 85027 COMPLETE CBC AUTOMATED: CPT

## 2021-12-01 PROCEDURE — 93005 ELECTROCARDIOGRAM TRACING: CPT | Performed by: INTERNAL MEDICINE

## 2021-12-01 PROCEDURE — 0202U NFCT DS 22 TRGT SARS-COV-2: CPT

## 2021-12-01 PROCEDURE — 85610 PROTHROMBIN TIME: CPT

## 2021-12-01 PROCEDURE — 83735 ASSAY OF MAGNESIUM: CPT

## 2021-12-01 RX ORDER — METOPROLOL SUCCINATE 25 MG/1
12.5 TABLET, EXTENDED RELEASE ORAL DAILY
Qty: 45 TABLET | Refills: 3 | Status: SHIPPED | OUTPATIENT
Start: 2021-12-01 | End: 2021-12-02 | Stop reason: SDUPTHER

## 2021-12-01 RX ORDER — SODIUM CHLORIDE 9 MG/ML
75 INJECTION, SOLUTION INTRAVENOUS CONTINUOUS
Status: DISCONTINUED | OUTPATIENT
Start: 2021-12-01 | End: 2021-12-01 | Stop reason: HOSPADM

## 2021-12-01 NOTE — PROGRESS NOTES
Patient received to [facility] room # 15  Ambulatory from [facility]. Patient scheduled for SOL/CVN today with Dr Chirag Del Toro. Procedure reviewed & questions answered, voiced good understanding consent obtained & placed on chart. All medications and medical history reviewed. Will prep patient per orders. Patient & family updated on plan of care. The patient has a fraility score of 3-MANAGING WELL, based on patient A&Ox3, patient able to ambulate to room without difficulty.

## 2021-12-01 NOTE — PROGRESS NOTES
Patient in NSR. Dr. Abena Schmidt notified. Dr. Abena Schmidt at bedside. Will cancel procedure and follow up in clinic.

## 2022-01-23 PROBLEM — E61.1 IRON DEFICIENCY: Status: ACTIVE | Noted: 2022-01-23

## 2022-01-23 PROBLEM — Z79.899 LONG-TERM USE OF HIGH-RISK MEDICATION: Status: ACTIVE | Noted: 2022-01-23

## 2022-01-26 ENCOUNTER — TELEPHONE (OUTPATIENT)
Dept: DIABETES SERVICES | Age: 77
End: 2022-01-26

## 2022-01-26 NOTE — TELEPHONE ENCOUNTER
Call to patient about education. She scheduled a 2 hour f/u for Diabetes education . She will do an hour on Nutrition and an hour on Medical.    Discussed Descom G6. She has not received it yet from [facility] She will call me when it comes in the schedule Dexcom start.

## 2022-02-03 ENCOUNTER — HOSPITAL ENCOUNTER (OUTPATIENT)
Dept: DIABETES SERVICES | Age: 77
Discharge: HOME | End: 2022-02-03
Payer: MEDICARE

## 2022-02-03 PROCEDURE — G0108 DIAB MANAGE TRN  PER INDIV: HCPCS

## 2022-02-03 NOTE — PROGRESS NOTES
This is a one on one appointment. Due to being during LKMNX-21 public health emergency, social distancing and mandatory precautions are in place and utilized. Spouse and client attended one hour of her two hour yearly follow up diabetes education. Topics discussed were client driven. Topics included label reading, eating out, treating low blood sugars, eating a protein food with meals/snacks, protein food sources, coumadin and being consistent in her intake of high vitamin K foods, increasing iron and high iron foods and foods that may inhibit iron absorption, and using the [employer] cortney to identify healthier food options at the grocery store. Current diet: 3 meals, occasional snack of nuts. Drinks >64 oz water/day. Most meals eaten at home. Diet recall suggests low carbohydrate intake, low sodium and adequate water. Suggested using heart healthy cheeses with 3 grams fat or less. Exercise: Walks daily 30 minutes. Pt has second hour of her two hour yearly education with AMOR BEDOLLA on 2/21/22. Pt waiting for her CGM supplies to arrive. Provided written info and AMOR IVEY name and phone number for f/u questions.

## 2022-02-10 ENCOUNTER — TELEPHONE (OUTPATIENT)
Dept: DIABETES SERVICES | Age: 77
End: 2022-02-10

## 2022-02-10 NOTE — TELEPHONE ENCOUNTER
Called and asked if they have received their Dexcom supplies yet? Also reminded them of their appointment for medical diabetes education on 2-21-22. Have not forgotten will need assist with Dexcom once receive. Instructed to let me know if comes in before the 21st or can get an update then.

## 2022-02-10 NOTE — TELEPHONE ENCOUNTER
called back They still not have anything on the Dexcom. Instructed him to call Tessa Deras and let her know so she can follow up . He is calling her today.

## 2022-02-21 ENCOUNTER — HOSPITAL ENCOUNTER (OUTPATIENT)
Dept: DIABETES SERVICES | Age: 77
Discharge: HOME | End: 2022-02-21
Payer: MEDICARE

## 2022-02-21 PROCEDURE — G0108 DIAB MANAGE TRN  PER INDIV: HCPCS

## 2022-02-21 NOTE — PROGRESS NOTES
This is a one on one appointment. Due to being during YCXRP-91 public health emergency, social distancing and mandatory precautions are in place and utilized  Patient attended second hour of her two hour follow up today. Topics included: Characteristics/pathophysiology type 2 diabetes; Goal/acceptable blood glucose ranges/Hgb A1C/interpreting/using results;meters, continuous glucose monitors. Medications for safety such ad glucagon; Sick day management including ketone strip use ; treating low blood sugars, signs and symptoms of high/low blood sugars and  foot care. Verbalized understanding Goal for next session Two hour follow up next year.   -Anticipated adherence is good   -Problems/barriers none anticipated   Medication Reconciliation Completed. No surgery or procedure. Patient still has not received her Dexcom system. Sent in basket message to [employer]. They will also go by their office.

## 2022-03-07 ENCOUNTER — TELEPHONE (OUTPATIENT)
Dept: DIABETES SERVICES | Age: 77
End: 2022-03-07

## 2022-03-07 NOTE — TELEPHONE ENCOUNTER
Michaela Farrell called saying they heard from [address] [place] [address] last Thursday and Dexcom should come to them in 5-6 business days. Wants to schedule appointment.  Scheduled for 3- at 2 PM.

## 2022-03-16 ENCOUNTER — TELEPHONE (OUTPATIENT)
Dept: DIABETES SERVICES | Age: 77
End: 2022-03-16

## 2022-03-16 NOTE — TELEPHONE ENCOUNTER
Patient  called patient is sick and wants to reschedule Dexcom start. Rescheduled for next Monday afternoon.

## 2022-03-18 PROBLEM — C18.9 COLON CANCER (HCC): Status: ACTIVE | Noted: 2021-09-01

## 2022-03-18 PROBLEM — Z79.899 LONG-TERM USE OF HIGH-RISK MEDICATION: Status: ACTIVE | Noted: 2022-01-23

## 2022-03-18 PROBLEM — K63.89 COLONIC MASS: Status: ACTIVE | Noted: 2021-08-13

## 2022-03-18 PROBLEM — E61.1 IRON DEFICIENCY: Status: ACTIVE | Noted: 2022-01-23

## 2022-03-19 PROBLEM — E11.65 TYPE 2 DIABETES MELLITUS WITH HYPERGLYCEMIA, WITH LONG-TERM CURRENT USE OF INSULIN (HCC): Status: ACTIVE | Noted: 2018-08-16

## 2022-03-19 PROBLEM — Z79.4 TYPE 2 DIABETES MELLITUS WITH HYPERGLYCEMIA, WITH LONG-TERM CURRENT USE OF INSULIN (HCC): Status: ACTIVE | Noted: 2018-08-16

## 2022-03-19 PROBLEM — R00.1 BRADYCARDIA: Status: ACTIVE | Noted: 2017-01-18

## 2022-03-19 PROBLEM — E11.29 TYPE 2 DIABETES MELLITUS WITH MICROALBUMINURIA, WITH LONG-TERM CURRENT USE OF INSULIN (HCC): Status: ACTIVE | Noted: 2018-08-16

## 2022-03-19 PROBLEM — R80.9 TYPE 2 DIABETES MELLITUS WITH MICROALBUMINURIA, WITH LONG-TERM CURRENT USE OF INSULIN (HCC): Status: ACTIVE | Noted: 2018-08-16

## 2022-03-19 PROBLEM — C20 RECTAL CANCER (HCC): Status: ACTIVE | Noted: 2021-09-01

## 2022-03-19 PROBLEM — Z79.4 TYPE 2 DIABETES MELLITUS WITH MICROALBUMINURIA, WITH LONG-TERM CURRENT USE OF INSULIN (HCC): Status: ACTIVE | Noted: 2018-08-16

## 2022-03-21 ENCOUNTER — HOSPITAL ENCOUNTER (OUTPATIENT)
Dept: DIABETES SERVICES | Age: 77
Discharge: HOME | End: 2022-03-21
Payer: MEDICARE

## 2022-03-21 PROCEDURE — 95249 CONT GLUC MNTR PT PROV EQP: CPT

## 2022-03-21 NOTE — PROGRESS NOTES
This is a one on one appointment. Due to being [facility], social distancing and mandatory precautions are in place and utilized. Pt and  seen today for initial insertion of Dexcom G6 system. Pt instructed on overview of continuous glucose monitoring (CGM) device and use. Instructed on sensor, transmitter and .  and phone will be used. They are going to just use her phone so they do not have to keep up with two items. Pt instructed on download of Dexcom cortney to phone. Time and date and transmitter ID verified. Instructed on troubleshooting, alerts, alarms, calibration, communication between sensor and , insertion of sensor and transmitter, starting sensor session, start up calibration, ending sensor session, removing sensor pod and transmitter and site rotation. Reviewed CGM guidelines and restrictions on use including no MRI, CT scans, or diathermy electrical treatments, bug spray, sun tanning lotions medications such as-Tylenol greater than standard dose ( 1 gram or 1000 mg every 6 hours) can make Dexcom G6 sensor readings look higher than they really are, or Hydroxyurea used for some cancers or sickle cell anemia - Your sensor readings will be higher than your actual glucose. Instructed only need to calibrate Dexcom G6, if blood glucose is 20 points off when blood glucose is under 70 mg/dl, or if blood sugar is 20% off when over 70 mg/dl. Downloaded Clarity cortney. All questions and concerns addressed. Provided Dexcom technical support phone number. Medication Reconciliation. No surgery's or procedures.

## 2022-05-18 LAB — INR BLD: 2.4

## 2022-06-09 DIAGNOSIS — E11.29 TYPE 2 DIABETES MELLITUS WITH MICROALBUMINURIA, WITH LONG-TERM CURRENT USE OF INSULIN (HCC): ICD-10-CM

## 2022-06-09 DIAGNOSIS — I10 ESSENTIAL HYPERTENSION: Primary | ICD-10-CM

## 2022-06-09 DIAGNOSIS — I34.0 MITRAL VALVE INSUFFICIENCY, UNSPECIFIED ETIOLOGY: ICD-10-CM

## 2022-06-09 DIAGNOSIS — E78.2 MIXED HYPERLIPIDEMIA: ICD-10-CM

## 2022-06-09 DIAGNOSIS — Z79.4 TYPE 2 DIABETES MELLITUS WITH MICROALBUMINURIA, WITH LONG-TERM CURRENT USE OF INSULIN (HCC): ICD-10-CM

## 2022-06-09 DIAGNOSIS — R80.9 TYPE 2 DIABETES MELLITUS WITH MICROALBUMINURIA, WITH LONG-TERM CURRENT USE OF INSULIN (HCC): ICD-10-CM

## 2022-06-09 DIAGNOSIS — I25.10 CORONARY ARTERY DISEASE INVOLVING NATIVE HEART WITHOUT ANGINA PECTORIS, UNSPECIFIED VESSEL OR LESION TYPE: ICD-10-CM

## 2022-06-09 DIAGNOSIS — E61.1 IRON DEFICIENCY: ICD-10-CM

## 2022-06-15 ENCOUNTER — ANTI-COAG VISIT (OUTPATIENT)
Dept: CARDIOLOGY CLINIC | Age: 77
End: 2022-06-15
Payer: MEDICARE

## 2022-06-15 DIAGNOSIS — Z95.2 S/P MITRAL VALVE REPLACEMENT: ICD-10-CM

## 2022-06-15 DIAGNOSIS — Z79.01 LONG TERM CURRENT USE OF ANTICOAGULANT THERAPY: Primary | ICD-10-CM

## 2022-06-15 DIAGNOSIS — I48.0 PAROXYSMAL ATRIAL FIBRILLATION (HCC): ICD-10-CM

## 2022-06-15 DIAGNOSIS — Z95.2 S/P AVR (AORTIC VALVE REPLACEMENT): ICD-10-CM

## 2022-06-15 LAB
POC INR: 2.6
PROTHROMBIN TIME, POC: NORMAL
VALID INTERNAL CONTROL, POC: NORMAL

## 2022-06-15 PROCEDURE — 85610 PROTHROMBIN TIME: CPT | Performed by: INTERNAL MEDICINE

## 2022-06-15 PROCEDURE — 93793 ANTICOAG MGMT PT WARFARIN: CPT | Performed by: INTERNAL MEDICINE

## 2022-06-15 NOTE — PATIENT INSTRUCTIONS
Reminder: Please contact the Coumadin Clinic at [phone] when you have medication changes. Examples, new medications, antibiotics, discontinued medications, new supplements, missed doses of warfarin or if you took extra doses of warfarin. This also includes OTC medications. Notifying us helps reduce the possibility of high and low INR's. In addition, if warfarin needs to be held for any procedures, please have surgeon or physician's office contact us before holding anticoagulant. Thanks, [facility].

## 2022-06-15 NOTE — PROGRESS NOTES
Anticoagulation Summary  As of 6/15/2022    INR goal:  2.0-3.0   TTR:  --   INR used for dosin.40 (2022)   Warfarin maintenance plan:  7.5 mg (5 mg x 1.5) every Mon, Wed, Fri; 5 mg (5 mg x 1) all other days   Weekly warfarin total:  42.5 mg   Plan last modified:  Kyra Horne MA (6/15/2022)   Next INR check:  2022   Target end date: Indefinite    Indications    Long term current use of anticoagulant therapy [Z79.01]  S/P AVR (aortic valve replacement) [Z95.2]  Paroxysmal atrial fibrillation (HCC) [I48.0]  S/P mitral valve replacement [Z95.2]             Anticoagulation Episode Summary     INR check location:  Anticoagulation Clinic    Preferred lab:      [facility]ers [facility]  [address] [facility] [address]    Comments:  [facility]      Anticoagulation Care Providers     Provider Role Specialty Phone number    Alberto Bueno MD Responsible Cardiology [phone]          Tablet strength and weekly dosing schedule confirmed today.

## 2022-06-24 ENCOUNTER — TELEPHONE (OUTPATIENT)
Dept: CARDIOLOGY CLINIC | Age: 77
End: 2022-06-24

## 2022-06-24 NOTE — TELEPHONE ENCOUNTER
Cardiac Clearance        Physician or Practice Requesting:Dr Bunn  : same  Contact Phone Number: [phone]  Fax Number: [phone]  Date of Surgery/Procedure: 8/17/22  Type of Surgery or Procedure: COLO  Type of Anesthesia: na  Medication to Hold:Warfarin  Days to Hold: 5 days prior to procedure

## 2022-07-07 ASSESSMENT — ENCOUNTER SYMPTOMS
DIARRHEA: 0
PHOTOPHOBIA: 0
SHORTNESS OF BREATH: 0
ABDOMINAL PAIN: 0
COUGH: 0
CONSTIPATION: 0
SORE THROAT: 0
ABDOMINAL DISTENTION: 0

## 2022-07-07 NOTE — PROGRESS NOTES
[facility] CARDIOLOGY  [address], [address], [address]  [place], [address] [facility]  PHONE: [phone]        NAME:  Mary Brown  : 1945  MRN: [id number]     PCP:  RICCI Anderson NP      SUBJECTIVE:   Mary Brown is a 77 y.o. female seen for a follow up visit regarding the following:     Chief Complaint   Patient presents with    Coronary Artery Disease     had echo       HPI:  Doing well since last visit and exercising regularly without limitations but recently diagnosed with colon CA and s/p partial colon resection without adverse cardiac event. Having repeat surgery again soon, okay to hold warfarin 4 to 5 days prior to the procedure as needed, see below. No interval palpitations to suggest recurrence of paroxysmal A. fib with RVR since her last visit. She has been completely asymptomatic otherwise without any angina, CHF, presyncope or syncope and remains compliant with tikosyn and coumadin without any missed doses other than around the time of  surgery in 2021. Euvolemic and compliant with all meds. No bleeding with coumadin. Doing well since last visit in the hospital without interval angina, CHF, palpitations, edema, presyncope or syncope. Vitals controlled and tolerating meds well. Staying active without any significant limitations. Echo 2022:  Left Ventricle Left ventricle size is normal. Normal wall thickness. Normal wall motion. Normal left ventricular systolic function with a visually estimated EF of 55 - 60%. Indeterminate diastolic function due to mitral valve disease. Left Atrium Left atrial volume index is mildly increased (35-41 mL/m2). Interatrial Septum No interatrial shunt visualized with color Doppler. Right Ventricle Right ventricle size is normal. Normal wall thickness. Normal systolic function. Right Atrium Right atrium size is normal.   Aortic Valve CE bovine bioprosthetic valve with a size of 21 mm.  No regurgitation. Mild stenosis of the aortic valve. Noted by the apical view. AV mean gradient is 20 mmHg. AV peak gradient is 38 mmHg. AV peak velocity is 3.1 m/s. AV AT is 94.00 ms. LVOT:AV VTI Index is 0.41. AV area by continuity VTI is 1.2 Square Centimeter. Mitral Valve CE-2015 bovine bioprosthetic valve with a size of 25 mm. No transvalvular regurgitation. No paravalvular regurgitation. Mild stenosis noted. MV mean gradient is 7 mmHg. MV PHT is 127.0 ms. MV area by PHT is 1.7 Square Centimeter. Tricuspid Valve Valve structure is normal. Trace paravalvular regurgitation. No stenosis noted. Pulmonic Valve Valve structure is normal. No transvalvular regurgitation. No stenosis noted. Aorta Normal sized aortic root and ascending aorta. IVC/Hepatic Veins IVC diameter is less than or equal to 21 mm and decreases greater than 50% during inspiration; therefore the estimated right atrial pressure is normal (~3 mmHg). IVC size is normal.   Pericardium No pericardial effusion. Carotid duplex October 2021:   1. Chronically occluded left internal carotid artery   2. Mild less than 50% right internal carotid artery stenosis   3. Bilateral antegrade vertebral arteries    Past Medical History, Past Surgical History, Family history, Social History, and Medications were all reviewed with the patient today and updated as necessary.      Current Outpatient Medications   Medication Sig Dispense Refill    amLODIPine (NORVASC) 5 MG tablet Take 5 mg by mouth daily      atorvastatin (LIPITOR) 20 MG tablet Take 10 mg by mouth every evening      vitamin D (CHOLECALCIFEROL) 25 MCG (1000 UT) TABS tablet Take 1,000 Units by mouth 2 times daily      dofetilide (TIKOSYN) 500 MCG capsule Take 500 mcg by mouth 2 times daily      fluticasone (FLONASE) 50 MCG/ACT nasal spray 2 sprays by Nasal route as needed      CONTOUR NEXT TEST strip USE 1 STRIP TO CHECK GLUCOSE THREE TIMES DAILY      levothyroxine (SYNTHROID) 137 MCG tablet Take 137 mcg by mouth every morning (before breakfast)      loratadine (CLARITIN) 10 MG tablet Take 10 mg by mouth daily as needed      losartan (COZAAR) 50 MG tablet TAKE 1 TABLET TWICE A DAY      magnesium oxide (MAG-OX) 400 MG tablet Take 400 mg by mouth daily      omeprazole (PRILOSEC) 20 MG delayed release capsule Take 20 mg by mouth daily      potassium chloride (KLOR-CON M) 20 MEQ extended release tablet Take 20 mEq by mouth daily      warfarin (COUMADIN) 5 MG tablet TAKE 1 AND 1/2 TABLETS     DAILY AS DIRECTED. No current facility-administered medications for this visit. Allergies   Allergen Reactions    Ibuprofen Rash       Patient Active Problem List    Diagnosis Date Noted    Iron deficiency 01/23/2022    Long-term use of high-risk medication 01/23/2022    Blood loss anemia     Long term current use of anticoagulant therapy     Mixed hyperlipidemia     [facility] 09/01/2021    Rectal cancer ([facility] 09/01/2021    Colonic mass 08/13/2021    Type 2 diabetes mellitus with microalbuminuria, with long-term current use of insulin ([facility] 08/16/2018    Type 2 diabetes mellitus with hyperglycemia, with long-term current use of insulin ([facility] 08/16/2018    Bradycardia 01/18/2017    Osteoporosis      Overview Note:     BMD done 9/12/2005-Osteopenia. Treated with Fosamax. 3/3/2010.-   Osteoporosis. Forteo for 2 years. Back on Bisphosphonate but stopped due   to anemia.          Hypothyroidism, postsurgical 10/21/2015    S/P AVR (aortic valve replacement) 05/04/2015     Overview Note:     5/4/15:  Dr. Mandeep Balderrama with 25 mm Oliveira bovine pericardial valve   AORTIC VALVE REPLACEMENT (AVR) with 21 mm Oliveira bovine pericardial valve  MAZE PROCEDURE and closure CRISTEL        Mitral valve insufficiency 05/04/2015    Paroxysmal atrial fibrillation ([facility] 05/04/2015    Hypoxia 05/04/2015    S/P mitral valve replacement 05/04/2015     Overview Note: Bioprosthetic May 2015        Carotid stenosis     GERD (gastroesophageal reflux disease)     CAD (coronary artery disease)     Essential hypertension 09/21/2009        Past Surgical History:   Procedure Laterality Date    ABLATION OF DYSRHYTHMIC FOCUS  2015    AORTIC VALVE REPLACEMENT  2015    APPENDECTOMY      CARDIAC CATHETERIZATION      COLONOSCOPY N/A 7/21/2021    COLONOSCOPY/ BMI 27 performed by Brandie Roman MD at [facility]  2014    neg    FLEXIBLE SIGMOIDOSCOPY N/A 8/18/2021    SIGMOIDOSCOPY FLEXIBLE performed by Brandie Roman MD at [facility]      Thyroidectomy    HIP FRACTURE SURGERY Left 2009    pins    MITRAL VALVE REPLACEMENT  5/4/15     and Aortic Valve Replacement, Dr. Roel JHA hip fracture/repair    OTHER SURGICAL HISTORY      ablation 10/6/2015, Dr Julio Newton  2021    colon surg    THYROIDECTOMY      TUBAL LIGATION         Family History   Problem Relation Age of Onset    Diabetes Mother     Diabetes Sister     Heart Disease Father     Diabetes Sister     Breast Cancer Sister 22    Diabetes Brother     Heart Disease Mother     Heart Disease Sister     Heart Disease Brother     Diabetes Brother     Heart Disease Brother         Social History     Tobacco Use    Smoking status: Never Smoker    Smokeless tobacco: Never Used   Substance Use Topics    Alcohol use: No       ROS:    Review of Systems   Constitutional: Negative for appetite change, chills, diaphoresis and fatigue. HENT: Negative for congestion, mouth sores, nosebleeds, sore throat and tinnitus. Eyes: Negative for photophobia and visual disturbance. Respiratory: Negative for cough and shortness of breath. Cardiovascular: Negative for chest pain, palpitations and leg swelling. Gastrointestinal: Negative for abdominal distention, abdominal pain, constipation and diarrhea.    Endocrine: Negative for cold intolerance, heat intolerance, polydipsia and polyuria. Genitourinary: Negative for dysuria and hematuria. Musculoskeletal: Negative for arthralgias, joint swelling and myalgias. Skin: Negative for rash. Allergic/Immunologic: Negative for environmental allergies and food allergies. Neurological: Negative for dizziness, seizures, syncope and light-headedness. Hematological: Negative for adenopathy. Does not bruise/bleed easily. Psychiatric/Behavioral: Negative for agitation, behavioral problems, dysphoric mood and hallucinations. The patient is not nervous/anxious. PHYSICAL EXAM:     Vitals:    07/08/22 1407   BP: 112/62   Pulse: 80   Weight: 149 lb 6.4 oz (67.8 kg)   Height: 5' 5\" (1.651 m)      Wt Readings from Last 3 Encounters:   07/08/22 149 lb 6.4 oz (67.8 kg)   04/21/22 147 lb 3.2 oz (66.8 kg)   04/11/22 147 lb (66.7 kg)      BP Readings from Last 3 Encounters:   07/08/22 112/62   04/21/22 128/68   04/11/22 (!) 180/70        Physical Exam  Constitutional:       Appearance: Normal appearance. She is normal weight. HENT:      Head: Normocephalic and atraumatic. Nose: Nose normal.      Mouth/Throat:      Mouth: Mucous membranes are moist.      Pharynx: Oropharynx is clear. Eyes:      Extraocular Movements: Extraocular movements intact. Pupils: Pupils are equal, round, and reactive to light. Neck:      Vascular: Carotid bruit (Soft bilateral bruits, left greater than right) present. No JVD. Cardiovascular:      Rate and Rhythm: Normal rate and regular rhythm. Heart sounds: Murmur (1/6 PI, TR, MR murmurs. Soft systolic ejection murmur right upper sternal border) heard. No friction rub. No gallop. Pulmonary:      Effort: Pulmonary effort is normal.      Breath sounds: Normal breath sounds. No wheezing or rhonchi. Abdominal:      General: Abdomen is flat. Bowel sounds are normal. There is no distension. Palpations: Abdomen is soft. Tenderness: There is no abdominal tenderness. Musculoskeletal:         General: No swelling. Normal range of motion. Cervical back: Normal range of motion and neck supple. No tenderness. Skin:     General: Skin is warm and dry. Neurological:      General: No focal deficit present. Mental Status: She is alert and oriented to person, place, and time. Mental status is at baseline. Psychiatric:         Mood and Affect: Mood normal.         Behavior: Behavior normal.          Medical problems and test results were reviewed with the patient today. Lab Results   Component Value Date    CHOL 79 (L) 04/13/2022    CHOL 83 (L) 01/05/2022    CHOL 85 (L) 09/27/2021     Lab Results   Component Value Date    TRIG 57 04/13/2022    TRIG 87 01/05/2022    TRIG 74 09/27/2021     Lab Results   Component Value Date    HDL 28 (L) 04/13/2022    HDL 27 (L) 01/05/2022    HDL 29 (L) 09/27/2021     Lab Results   Component Value Date    LDLCALC 37 04/13/2022    LDLCALC 38 01/05/2022    LDLCALC 40 09/27/2021     Lab Results   Component Value Date    LABVLDL 17 07/29/2020    LABVLDL 23 04/22/2020    LABVLDL 19 12/19/2019    VLDL 14 04/13/2022    VLDL 18 01/05/2022    VLDL 16 09/27/2021     No results found for: [facility]     Lab Results   Component Value Date/Time     04/13/2022 11:33 AM    K 5.0 04/13/2022 11:33 AM     04/13/2022 11:33 AM    CO2 25 04/13/2022 11:33 AM    BUN 16 04/13/2022 11:33 AM    CREATININE 0.70 04/13/2022 11:33 AM    GLUCOSE 90 04/13/2022 11:33 AM    CALCIUM 8.6 04/13/2022 11:33 AM         No results for input(s): WBC, HGB, HCT, MCV, PLT in the last 720 hours.      Lab Results   Component Value Date    LABA1C 6.3 (H) 04/13/2022     Lab Results   Component Value Date     04/13/2022        No results found for: BNP     Lab Results   Component Value Date    TSH 4.450 04/13/2022        Results for orders placed or performed in visit on 07/08/22   PT/INR (Resulted at [facility]/in-office AND billed by [facility])   Result Value Ref Range    VALID INTERNAL CONTROL, POC yes     Prothrombin time, POC      POC INR 2.2         ASSESSMENT and PLAN    Diagnoses and all orders for this visit:      Atrial fibrillation and flutter ([facility] - s/p MAZE and eventual catheter based AF ablation, maintaining NSR on Tikosyn without interval tachycardia/palpitations since her last visit- She has been compliant  with Coumadin and Tikosyn without any missed doses. No interval palpitations or tachycardia and in normal sinus rhythm on ECG today. Continue current meds and clinical monitoring.   -     EKG      Essential hypertension with goal blood pressure less than 130/80 - increased norvasc to BID in the past and -130's consistently at home without postural symptoms, recently as low as 110mmHg. Discussed lifestyle modification with plans for low carb/low sugar/low fat diet. Try to eat more lean protein, vegetables, and salads. Try to get at least 20-30min moderate intensity cardiovascular exercise at least 4-5 times weekly as tolerated with  goal of weight loss in the next year. Call with systolic>140 at home. Coronary artery disease involving native coronary artery of native heart without unstable angina pectoris (HCC) - no angina or CHF, continue meds      Bilateral carotid artery stenosis- occluded left ICA, mild-mod (~50%) right ICA disease by recent duplex, re-check duplex next Summer 2023, check CTA carotids with any acute symptoms, continue coumadin  as tolerated      Non-rheumatic mitral regurgitation- resolved, continue meds       Diabetes mellitus, insulin dependent (IDDM), controlled ([facility]- stable, see above- per Dr. Saint Clair      Hypothyroidism, postsurgical- stable, continue meds with outpatient surveillance      Mixed hyperlipidemia-stable, outpatient surveillance per Dr. Saint Clair. OK from my standpoint to continue low dose 10mg lipitor since lipids excellent.       S/P mitral valve replacement- echo looks good, stable gradient compared to last echo, continue to follow yearly-recheck echo in 12 months to ensure gradients are not progressively increasing      S/P AVR (aortic valve replacement)-stable gradient compared to last echo, continue to follow- recheck echo in 12 months to ensure gradients are not progressively increasing      Bradycardia- asymptomatic, probably PAC induced and negative tele monitor (see above). Continue clinical surveillance and monitoring. Return in about 6 months (around 1/8/2023).          Albin Clements MD  7/8/2022  2:49 PM

## 2022-07-08 ENCOUNTER — ANTI-COAG VISIT (OUTPATIENT)
Dept: CARDIOLOGY CLINIC | Age: 77
End: 2022-07-08
Payer: MEDICARE

## 2022-07-08 ENCOUNTER — OFFICE VISIT (OUTPATIENT)
Dept: CARDIOLOGY CLINIC | Age: 77
End: 2022-07-08
Payer: MEDICARE

## 2022-07-08 VITALS
HEIGHT: 65 IN | HEART RATE: 80 BPM | WEIGHT: 149.4 LBS | DIASTOLIC BLOOD PRESSURE: 62 MMHG | BODY MASS INDEX: 24.89 KG/M2 | SYSTOLIC BLOOD PRESSURE: 112 MMHG

## 2022-07-08 DIAGNOSIS — E78.2 MIXED HYPERLIPIDEMIA: ICD-10-CM

## 2022-07-08 DIAGNOSIS — Z95.2 S/P AVR (AORTIC VALVE REPLACEMENT): ICD-10-CM

## 2022-07-08 DIAGNOSIS — I25.10 CORONARY ARTERY DISEASE INVOLVING NATIVE CORONARY ARTERY OF NATIVE HEART WITHOUT ANGINA PECTORIS: Primary | ICD-10-CM

## 2022-07-08 DIAGNOSIS — Z79.01 LONG TERM CURRENT USE OF ANTICOAGULANT THERAPY: Primary | ICD-10-CM

## 2022-07-08 DIAGNOSIS — Z95.2 S/P MITRAL VALVE REPLACEMENT: ICD-10-CM

## 2022-07-08 DIAGNOSIS — I48.0 PAROXYSMAL ATRIAL FIBRILLATION (HCC): ICD-10-CM

## 2022-07-08 DIAGNOSIS — I10 ESSENTIAL HYPERTENSION: ICD-10-CM

## 2022-07-08 LAB
POC INR: 2.2
PROTHROMBIN TIME, POC: NORMAL
VALID INTERNAL CONTROL, POC: YES

## 2022-07-08 PROCEDURE — 3017F COLORECTAL CA SCREEN DOC REV: CPT | Performed by: INTERNAL MEDICINE

## 2022-07-08 PROCEDURE — G8427 DOCREV CUR MEDS BY ELIG CLIN: HCPCS | Performed by: INTERNAL MEDICINE

## 2022-07-08 PROCEDURE — 85610 PROTHROMBIN TIME: CPT | Performed by: INTERNAL MEDICINE

## 2022-07-08 PROCEDURE — G8400 PT W/DXA NO RESULTS DOC: HCPCS | Performed by: INTERNAL MEDICINE

## 2022-07-08 PROCEDURE — 1036F TOBACCO NON-USER: CPT | Performed by: INTERNAL MEDICINE

## 2022-07-08 PROCEDURE — 1090F PRES/ABSN URINE INCON ASSESS: CPT | Performed by: INTERNAL MEDICINE

## 2022-07-08 PROCEDURE — G8420 CALC BMI NORM PARAMETERS: HCPCS | Performed by: INTERNAL MEDICINE

## 2022-07-08 PROCEDURE — 99214 OFFICE O/P EST MOD 30 MIN: CPT | Performed by: INTERNAL MEDICINE

## 2022-07-08 PROCEDURE — 1123F ACP DISCUSS/DSCN MKR DOCD: CPT | Performed by: INTERNAL MEDICINE

## 2022-07-08 RX ORDER — DOFETILIDE 0.5 MG/1
500 CAPSULE ORAL 2 TIMES DAILY
COMMUNITY
Start: 2022-01-04

## 2022-07-08 RX ORDER — FLUTICASONE PROPIONATE 50 MCG
2 SPRAY, SUSPENSION (ML) NASAL PRN
COMMUNITY

## 2022-07-08 RX ORDER — AMLODIPINE BESYLATE 5 MG/1
2.5 TABLET ORAL 2 TIMES DAILY
COMMUNITY
Start: 2022-01-04

## 2022-07-08 RX ORDER — ATORVASTATIN CALCIUM 20 MG/1
10 TABLET, FILM COATED ORAL EVERY EVENING
COMMUNITY
Start: 2022-01-04

## 2022-07-08 RX ORDER — LORATADINE 10 MG/1
10 TABLET ORAL DAILY PRN
COMMUNITY

## 2022-07-08 RX ORDER — MAGNESIUM OXIDE 400 MG/1
400 TABLET ORAL NIGHTLY
COMMUNITY

## 2022-07-08 RX ORDER — LEVOTHYROXINE SODIUM 137 UG/1
137 TABLET ORAL
COMMUNITY
Start: 2022-01-11

## 2022-07-08 RX ORDER — OMEPRAZOLE 20 MG/1
20 CAPSULE, DELAYED RELEASE ORAL AS NEEDED
COMMUNITY
Start: 2022-01-04

## 2022-07-08 RX ORDER — WARFARIN SODIUM 5 MG/1
TABLET ORAL
COMMUNITY
Start: 2022-01-04

## 2022-07-08 RX ORDER — LOSARTAN POTASSIUM 50 MG/1
TABLET ORAL
COMMUNITY
Start: 2022-01-04

## 2022-07-08 RX ORDER — PERPHENAZINE 16 MG/1
TABLET, FILM COATED ORAL
COMMUNITY
Start: 2022-05-06 | End: 2022-08-15

## 2022-07-08 RX ORDER — POTASSIUM CHLORIDE 20 MEQ/1
20 TABLET, EXTENDED RELEASE ORAL DAILY
COMMUNITY
Start: 2022-01-04

## 2022-07-08 NOTE — PROGRESS NOTES
Anticoagulation Summary  As of 2022    INR goal:  2.0-3.0   TTR:  100.0 % (2 wk)   INR used for dosin.2 (2022)   Warfarin maintenance plan:  7.5 mg (5 mg x 1.5) every Mon, Wed, Fri; 5 mg (5 mg x 1) all other days   Weekly warfarin total:  42.5 mg   Plan last modified:  Rosendo Aguilar MA (6/15/2022)   Next INR check:  2022   Target end date:   Indefinite    Indications    Long term current use of anticoagulant therapy [Z79.01]  S/P AVR (aortic valve replacement) [Z95.2]  Paroxysmal atrial fibrillation (HCC) [I48.0]  S/P mitral valve replacement [Z95.2]             Anticoagulation Episode Summary     INR check location:  Anticoagulation Clinic    Preferred lab:      Send IN[facility] reminders [facility]:  [id number] [facility] [address]    Comments:  [address]      Anticoagulation Care Providers     Provider Role Specialty Phone number    Funmilayo Escobar MD [facility] Cardiology [phone]

## 2022-07-17 SDOH — HEALTH STABILITY: PHYSICAL HEALTH: ON AVERAGE, HOW MANY DAYS PER WEEK DO YOU ENGAGE IN MODERATE TO STRENUOUS EXERCISE (LIKE A BRISK WALK)?: 5 DAYS

## 2022-07-17 SDOH — HEALTH STABILITY: PHYSICAL HEALTH: ON AVERAGE, HOW MANY MINUTES DO YOU ENGAGE IN EXERCISE AT THIS LEVEL?: 30 MIN

## 2022-07-17 ASSESSMENT — PATIENT HEALTH QUESTIONNAIRE - PHQ9
SUM OF ALL RESPONSES TO PHQ QUESTIONS 1-9: 0
SUM OF ALL RESPONSES TO PHQ QUESTIONS 1-9: 0
2. FEELING DOWN, DEPRESSED OR HOPELESS: 0
1. LITTLE INTEREST OR PLEASURE IN DOING THINGS: 0
SUM OF ALL RESPONSES TO PHQ QUESTIONS 1-9: 0
SUM OF ALL RESPONSES TO PHQ9 QUESTIONS 1 & 2: 0
SUM OF ALL RESPONSES TO PHQ QUESTIONS 1-9: 0

## 2022-07-17 ASSESSMENT — LIFESTYLE VARIABLES
HOW MANY STANDARD DRINKS CONTAINING ALCOHOL DO YOU HAVE ON A TYPICAL DAY: 0
HOW OFTEN DO YOU HAVE SIX OR MORE DRINKS ON ONE OCCASION: 1
HOW OFTEN DO YOU HAVE A DRINK CONTAINING ALCOHOL: 1
HOW MANY STANDARD DRINKS CONTAINING ALCOHOL DO YOU HAVE ON A TYPICAL DAY: PATIENT DOES NOT DRINK
HOW OFTEN DO YOU HAVE A DRINK CONTAINING ALCOHOL: NEVER

## 2022-07-26 ENCOUNTER — OFFICE VISIT (OUTPATIENT)
Dept: INTERNAL MEDICINE CLINIC | Facility: CLINIC | Age: 77
End: 2022-07-26
Payer: MEDICARE

## 2022-07-26 VITALS
HEIGHT: 66 IN | BODY MASS INDEX: 23.63 KG/M2 | OXYGEN SATURATION: 97 % | SYSTOLIC BLOOD PRESSURE: 136 MMHG | DIASTOLIC BLOOD PRESSURE: 70 MMHG | HEART RATE: 66 BPM | WEIGHT: 147 LBS

## 2022-07-26 DIAGNOSIS — E61.1 IRON DEFICIENCY: ICD-10-CM

## 2022-07-26 DIAGNOSIS — Z78.0 ASYMPTOMATIC MENOPAUSAL STATE: ICD-10-CM

## 2022-07-26 DIAGNOSIS — E11.29 TYPE 2 DIABETES MELLITUS WITH MICROALBUMINURIA, WITH LONG-TERM CURRENT USE OF INSULIN (HCC): ICD-10-CM

## 2022-07-26 DIAGNOSIS — I10 ESSENTIAL HYPERTENSION: ICD-10-CM

## 2022-07-26 DIAGNOSIS — I25.10 CORONARY ARTERY DISEASE INVOLVING NATIVE HEART WITHOUT ANGINA PECTORIS, UNSPECIFIED VESSEL OR LESION TYPE: ICD-10-CM

## 2022-07-26 DIAGNOSIS — I34.0 MITRAL VALVE INSUFFICIENCY, UNSPECIFIED ETIOLOGY: ICD-10-CM

## 2022-07-26 DIAGNOSIS — R80.9 TYPE 2 DIABETES MELLITUS WITH MICROALBUMINURIA, WITH LONG-TERM CURRENT USE OF INSULIN (HCC): ICD-10-CM

## 2022-07-26 DIAGNOSIS — E78.2 MIXED HYPERLIPIDEMIA: ICD-10-CM

## 2022-07-26 DIAGNOSIS — Z79.4 TYPE 2 DIABETES MELLITUS WITH MICROALBUMINURIA, WITH LONG-TERM CURRENT USE OF INSULIN (HCC): ICD-10-CM

## 2022-07-26 DIAGNOSIS — Z00.00 MEDICARE ANNUAL WELLNESS VISIT, SUBSEQUENT: Primary | ICD-10-CM

## 2022-07-26 LAB
ALBUMIN SERPL-MCNC: 3.6 G/DL (ref 3.2–4.6)
ALBUMIN/GLOB SERPL: 0.9 {RATIO} (ref 1.2–3.5)
ALP SERPL-CCNC: 106 U/L (ref 50–136)
ALT SERPL-CCNC: 46 U/L (ref 12–65)
ANION GAP SERPL CALC-SCNC: ABNORMAL MMOL/L (ref 7–16)
AST SERPL-CCNC: 25 U/L (ref 15–37)
BASOPHILS # BLD: 0 K/UL (ref 0–0.2)
BASOPHILS NFR BLD: 1 % (ref 0–2)
BILIRUB SERPL-MCNC: 0.2 MG/DL (ref 0.2–1.1)
BUN SERPL-MCNC: 24 MG/DL (ref 8–23)
CALCIUM SERPL-MCNC: 8.8 MG/DL (ref 8.3–10.4)
CHLORIDE SERPL-SCNC: 111 MMOL/L (ref 98–107)
CO2 SERPL-SCNC: 29 MMOL/L (ref 21–32)
CREAT SERPL-MCNC: 0.8 MG/DL (ref 0.6–1)
DIFFERENTIAL METHOD BLD: ABNORMAL
EOSINOPHIL # BLD: 0.1 K/UL (ref 0–0.8)
EOSINOPHIL NFR BLD: 1 % (ref 0.5–7.8)
ERYTHROCYTE [DISTWIDTH] IN BLOOD BY AUTOMATED COUNT: 14.9 % (ref 11.9–14.6)
EST. AVERAGE GLUCOSE BLD GHB EST-MCNC: 128 MG/DL
GLOBULIN SER CALC-MCNC: 3.9 G/DL (ref 2.3–3.5)
GLUCOSE SERPL-MCNC: 97 MG/DL (ref 65–100)
HBA1C MFR BLD: 6.1 % (ref 4.8–5.6)
HCT VFR BLD AUTO: 43.3 % (ref 35.8–46.3)
HGB BLD-MCNC: 13.4 G/DL (ref 11.7–15.4)
IMM GRANULOCYTES # BLD AUTO: 0 K/UL (ref 0–0.5)
IMM GRANULOCYTES NFR BLD AUTO: 0 % (ref 0–5)
IRON SERPL-MCNC: 173 UG/DL (ref 35–150)
LYMPHOCYTES # BLD: 1.5 K/UL (ref 0.5–4.6)
LYMPHOCYTES NFR BLD: 22 % (ref 13–44)
MCH RBC QN AUTO: 31.4 PG (ref 26.1–32.9)
MCHC RBC AUTO-ENTMCNC: 30.9 G/DL (ref 31.4–35)
MCV RBC AUTO: 101.4 FL (ref 79.6–97.8)
MONOCYTES # BLD: 0.6 K/UL (ref 0.1–1.3)
MONOCYTES NFR BLD: 9 % (ref 4–12)
NEUTS SEG # BLD: 4.8 K/UL (ref 1.7–8.2)
NEUTS SEG NFR BLD: 67 % (ref 43–78)
NRBC # BLD: 0 K/UL (ref 0–0.2)
PLATELET # BLD AUTO: 248 K/UL (ref 150–450)
PMV BLD AUTO: 10 FL (ref 9.4–12.3)
POTASSIUM SERPL-SCNC: 4.8 MMOL/L (ref 3.5–5.1)
PROT SERPL-MCNC: 7.5 G/DL (ref 6.3–8.2)
RBC # BLD AUTO: 4.27 M/UL (ref 4.05–5.2)
SODIUM SERPL-SCNC: 139 MMOL/L (ref 136–145)
TIBC SERPL-MCNC: 360 UG/DL (ref 250–450)
TSH, 3RD GENERATION: 0.69 UIU/ML (ref 0.36–3.74)
WBC # BLD AUTO: 7.1 K/UL (ref 4.3–11.1)

## 2022-07-26 PROCEDURE — G0439 PPPS, SUBSEQ VISIT: HCPCS | Performed by: NURSE PRACTITIONER

## 2022-07-26 PROCEDURE — 3017F COLORECTAL CA SCREEN DOC REV: CPT | Performed by: NURSE PRACTITIONER

## 2022-07-26 PROCEDURE — 1123F ACP DISCUSS/DSCN MKR DOCD: CPT | Performed by: NURSE PRACTITIONER

## 2022-07-26 ASSESSMENT — PATIENT HEALTH QUESTIONNAIRE - PHQ9
SUM OF ALL RESPONSES TO PHQ QUESTIONS 1-9: 0
2. FEELING DOWN, DEPRESSED OR HOPELESS: 0
SUM OF ALL RESPONSES TO PHQ9 QUESTIONS 1 & 2: 0
1. LITTLE INTEREST OR PLEASURE IN DOING THINGS: 0
SUM OF ALL RESPONSES TO PHQ QUESTIONS 1-9: 0

## 2022-07-26 NOTE — PROGRESS NOTES
Medicare Annual Wellness Visit    Adalid Calvillo is here for Medicare AWV    Assessment & Plan   Medicare annual wellness visit, subsequent  Asymptomatic menopausal state  -     DEXA Bone Density Axial Skeleton; Future    Recommendations for Preventive Services Due: see orders and patient instructions/AVS.  Recommended screening schedule for the next 5-10 years is provided to the patient in written form: see Patient Instructions/AVS.     Return for Medicare Annual Wellness Visit in 1 year. Subjective       Patient's complete Health Risk Assessment and screening values have been reviewed and are found in Flowsheets. The following problems were reviewed today and where indicated follow up appointments were made and/or referrals ordered. Positive Risk Factor Screenings with Interventions:             General Health and ACP:  General  In general, how would you say your health is?: Good  In the past 7 days, have you experienced any of the following: New or Increased Pain, New or Increased Fatigue, Loneliness, Social Isolation, Stress or Anger?: No  Do you get the social and emotional support that you need?: Yes  Do you have a Living Will?: Yes    Advance Directives       Power of  Living Will ACP-Advance Directive ACP-Power of     Not on File Not on File Not on File Not on File          General Health Risk Interventions:  Durable power of  is in media 9/21/21              Objective   Vitals:    07/26/22 0815 07/26/22 0903   BP: (!) 160/66 136/70   Site:  Left Upper Arm   Position:  Sitting   Cuff Size:  Medium Adult   Pulse: 66    SpO2: 97%    Weight: 147 lb (66.7 kg)    Height: 5' 6\" (1.676 m)       Body mass index is 23.73 kg/m². Allergies   Allergen Reactions    Ibuprofen Rash     Prior to Visit Medications    Medication Sig Taking?  Authorizing Provider   amLODIPine (NORVASC) 5 MG tablet Take 5 mg by mouth daily Yes Historical Provider, MD   atorvastatin (LIPITOR) 20 MG tablet Take 10 mg by mouth every evening Yes Historical Provider, MD   vitamin D (CHOLECALCIFEROL) 25 MCG (1000 UT) TABS tablet Take 1,000 Units by mouth 2 times daily Yes Historical Provider, MD   dofetilide (TIKOSYN) 500 MCG capsule Take 500 mcg by mouth 2 times daily Yes Historical Provider, MD   fluticasone (FLONASE) 50 MCG/ACT nasal spray 2 sprays by Nasal route as needed Yes Historical Provider, MD   CONTOUR NEXT TEST strip USE 1 STRIP TO CHECK GLUCOSE THREE TIMES DAILY Yes Historical Provider, MD   levothyroxine (SYNTHROID) 137 MCG tablet Take 137 mcg by mouth every morning (before breakfast) Yes Historical Provider, MD   loratadine (CLARITIN) 10 MG tablet Take 10 mg by mouth daily as needed Yes Historical Provider, MD   losartan (COZAAR) 50 MG tablet TAKE 1 TABLET TWICE A DAY Yes Historical Provider, MD   magnesium oxide (MAG-OX) 400 MG tablet Take 400 mg by mouth daily Yes Historical Provider, MD   omeprazole (PRILOSEC) 20 MG delayed release capsule Take 20 mg by mouth daily Yes Historical Provider, MD   potassium chloride (KLOR-CON M) 20 MEQ extended release tablet Take 20 mEq by mouth daily Yes Historical Provider, MD   warfarin (COUMADIN) 5 MG tablet TAKE 1 AND 1/2 TABLETS     DAILY AS DIRECTED.  Yes Historical Provider, MD [place] (Including outside providers/suppliers regularly involved in providing care):   Patient Care Team:  RICCI Lennon NP as PCP - [facility] RICCI Abbasi NP as PCP - [facility] Empaneled Provider  Akua Madera MD as Consulting Physician (Cardiology)     Reviewed and updated this visit:  Tobacco  Allergies  Meds  Problems  Med Hx  Surg Hx  Soc Hx  Fam Hx

## 2022-07-26 NOTE — PATIENT INSTRUCTIONS
Personalized Preventive Plan for Millicent Tate - 7/26/2022  Medicare offers a range of preventive health benefits. Some of the tests and screenings are paid in full while other may be subject to a deductible, co-insurance, and/or copay. Some of these benefits include a comprehensive review of your medical history including lifestyle, illnesses that may run in your family, and various assessments and screenings as appropriate. After reviewing your medical record and screening and assessments performed today your provider may have ordered immunizations, labs, imaging, and/or referrals for you. A list of these orders (if applicable) as well as your Preventive Care list are included within your After Visit Summary for your review. Other Preventive Recommendations:    A preventive eye exam performed by an eye specialist is recommended every 1-2 years to screen for glaucoma; cataracts, macular degeneration, and other eye disorders. A preventive dental visit is recommended every 6 months. Try to get at least 150 minutes of exercise per week or 10,000 steps per day on a pedometer . Order or download the FREE \"Exercise & Physical Activity: Your Everyday Guide\" from [employer]. Call 3-292.941.2479 or search [employer] online. You need 4087-9949 mg of calcium and 9886-7537 IU of vitamin D per day. It is possible to meet your calcium requirement with diet alone, but a vitamin D supplement is usually necessary to meet this goal.  When exposed to the sun, use a sunscreen that protects against both UVA and UVB radiation with an SPF of 30 or greater. Reapply every 2 to 3 hours or after sweating, drying off with a towel, or swimming. Always wear a seat belt when traveling in a car. Always wear a helmet when riding a bicycle or motorcycle.

## 2022-08-01 ENCOUNTER — OFFICE VISIT (OUTPATIENT)
Dept: INTERNAL MEDICINE CLINIC | Facility: CLINIC | Age: 77
End: 2022-08-01
Payer: MEDICARE

## 2022-08-01 VITALS
SYSTOLIC BLOOD PRESSURE: 136 MMHG | DIASTOLIC BLOOD PRESSURE: 68 MMHG | WEIGHT: 147 LBS | BODY MASS INDEX: 23.73 KG/M2 | OXYGEN SATURATION: 97 % | HEART RATE: 82 BPM

## 2022-08-01 DIAGNOSIS — Z79.01 LONG TERM CURRENT USE OF ANTICOAGULANT THERAPY: Chronic | ICD-10-CM

## 2022-08-01 DIAGNOSIS — E89.0 HYPOTHYROIDISM, POSTSURGICAL: Chronic | ICD-10-CM

## 2022-08-01 DIAGNOSIS — I10 ESSENTIAL HYPERTENSION: Chronic | ICD-10-CM

## 2022-08-01 DIAGNOSIS — Z79.899 LONG-TERM USE OF HIGH-RISK MEDICATION: Chronic | ICD-10-CM

## 2022-08-01 DIAGNOSIS — E78.2 MIXED HYPERLIPIDEMIA: Chronic | ICD-10-CM

## 2022-08-01 DIAGNOSIS — R80.9 TYPE 2 DIABETES MELLITUS WITH MICROALBUMINURIA, WITH LONG-TERM CURRENT USE OF INSULIN (HCC): Primary | Chronic | ICD-10-CM

## 2022-08-01 DIAGNOSIS — E61.1 IRON DEFICIENCY: ICD-10-CM

## 2022-08-01 DIAGNOSIS — Z79.4 TYPE 2 DIABETES MELLITUS WITH MICROALBUMINURIA, WITH LONG-TERM CURRENT USE OF INSULIN (HCC): Primary | Chronic | ICD-10-CM

## 2022-08-01 DIAGNOSIS — E11.29 TYPE 2 DIABETES MELLITUS WITH MICROALBUMINURIA, WITH LONG-TERM CURRENT USE OF INSULIN (HCC): Primary | Chronic | ICD-10-CM

## 2022-08-01 DIAGNOSIS — I48.0 PAROXYSMAL ATRIAL FIBRILLATION (HCC): Chronic | ICD-10-CM

## 2022-08-01 PROCEDURE — 3044F HG A1C LEVEL LT 7.0%: CPT | Performed by: NURSE PRACTITIONER

## 2022-08-01 PROCEDURE — 1036F TOBACCO NON-USER: CPT | Performed by: NURSE PRACTITIONER

## 2022-08-01 PROCEDURE — 2022F DILAT RTA XM EVC RTNOPTHY: CPT | Performed by: NURSE PRACTITIONER

## 2022-08-01 PROCEDURE — G8400 PT W/DXA NO RESULTS DOC: HCPCS | Performed by: NURSE PRACTITIONER

## 2022-08-01 PROCEDURE — G8427 DOCREV CUR MEDS BY ELIG CLIN: HCPCS | Performed by: NURSE PRACTITIONER

## 2022-08-01 PROCEDURE — 1090F PRES/ABSN URINE INCON ASSESS: CPT | Performed by: NURSE PRACTITIONER

## 2022-08-01 PROCEDURE — 1123F ACP DISCUSS/DSCN MKR DOCD: CPT | Performed by: NURSE PRACTITIONER

## 2022-08-01 PROCEDURE — 99214 OFFICE O/P EST MOD 30 MIN: CPT | Performed by: NURSE PRACTITIONER

## 2022-08-01 PROCEDURE — G8420 CALC BMI NORM PARAMETERS: HCPCS | Performed by: NURSE PRACTITIONER

## 2022-08-01 PROCEDURE — 3017F COLORECTAL CA SCREEN DOC REV: CPT | Performed by: NURSE PRACTITIONER

## 2022-08-01 ASSESSMENT — ENCOUNTER SYMPTOMS
BACK PAIN: 0
SHORTNESS OF BREATH: 0
CHEST TIGHTNESS: 0
WHEEZING: 0
ABDOMINAL PAIN: 0
COUGH: 0
COLOR CHANGE: 0

## 2022-08-01 ASSESSMENT — PATIENT HEALTH QUESTIONNAIRE - PHQ9
SUM OF ALL RESPONSES TO PHQ QUESTIONS 1-9: 0
1. LITTLE INTEREST OR PLEASURE IN DOING THINGS: 0
SUM OF ALL RESPONSES TO PHQ QUESTIONS 1-9: 0
SUM OF ALL RESPONSES TO PHQ9 QUESTIONS 1 & 2: 0
SUM OF ALL RESPONSES TO PHQ QUESTIONS 1-9: 0
SUM OF ALL RESPONSES TO PHQ QUESTIONS 1-9: 0
2. FEELING DOWN, DEPRESSED OR HOPELESS: 0

## 2022-08-01 NOTE — PROGRESS NOTES
PROGRESS NOTE    SUBJECTIVE:   Nuha Wilson is a 77 y.o. female seen for a follow up visit for   Chief Complaint   Patient presents with    Hypothyroidism    Hyperlipidemia    Diabetes     Hyperlipidemia  This is a chronic problem. Episode onset: greater than 20 years ago. The problem is controlled. Recent lipid tests were reviewed and are low. Exacerbating diseases include diabetes and hypothyroidism. There are no known factors aggravating her hyperlipidemia. Pertinent negatives include no focal sensory loss, focal weakness or shortness of breath. Current antihyperlipidemic treatment includes statins. There are no compliance problems. Risk factors for coronary artery disease include diabetes mellitus, dyslipidemia, family history and post-menopausal.   Thyroid Problem  The history is provided by the patient and medical records (hypo, follow up). This is a chronic problem. Episode onset: approx 2010. The problem occurs constantly. The problem has not changed since onset. Pertinent negatives include no chest pain, no abdominal pain, no headaches and no shortness of breath. Nothing aggravates the symptoms. The symptoms are relieved by medications. Diabetes  The history is provided by the patient (follow up). This is a chronic problem. Episode onset: approx 2005. The problem occurs constantly. The problem has been gradually improving. Pertinent negatives include no chest pain, no abdominal pain, no headaches and no shortness of breath. The symptoms are aggravated by drinking and eating. The symptoms are relieved by medications (diet, insulin. ). Valvular heart disease  Aortic valve replacement:  CE bovine bioprosthetic valve with a size of 21 mm. No regurgitation. Mild stenosis of the aortic valve. Mitral valve:  CE-2015 bovine bioprosthetic valve with a size of 25 mm. No transvalvular regurgitation. No paravalvular regurgitation. Mild stenosis noted.          Reviewed and updated this visit by provider:  Tobacco  Allergies  Meds  Problems  Med Hx  Surg Hx  Fam Hx         Review of Systems   Constitutional:  Negative for chills and fever. HENT:  Negative for hearing loss and sneezing. Eyes:  Negative for visual disturbance. Respiratory:  Negative for cough, chest tightness, shortness of breath and wheezing. Cardiovascular:  Negative for palpitations and leg swelling. Gastrointestinal:  Negative for abdominal pain. Genitourinary:         Nocturia x 2 each night   Musculoskeletal:  Negative for arthralgias and back pain. Skin:  Negative for color change. Allergic/Immunologic: Negative for environmental allergies. Neurological:  Negative for focal weakness and syncope. Hematological:  Does not bruise/bleed easily. OBJECTIVE:    /68   Pulse 82   Wt 147 lb (66.7 kg)   SpO2 97%   BMI 23.73 kg/m²      Physical Exam  Vitals and nursing note reviewed. Constitutional:       Appearance: Normal appearance. HENT:      Head: Normocephalic. Mouth/Throat:      Lips: Pink. Mouth: Mucous membranes are moist.   Eyes:      General: Lids are normal.   Neck:      Vascular: No carotid bruit. Cardiovascular:      Rate and Rhythm: Normal rate and regular rhythm. Heart sounds: Murmur heard. Pulmonary:      Effort: Pulmonary effort is normal.      Breath sounds: Normal breath sounds. Musculoskeletal:      Cervical back: Neck supple. Right lower leg: No edema. Left lower leg: No edema. Skin:     General: Skin is warm and dry. Neurological:      Mental Status: She is alert and oriented to person, place, and time. Mental status is at baseline. Gait: Gait normal.   Psychiatric:         Mood and Affect: Mood normal.         Behavior: Behavior normal.        ASSESSMENT and PLAN    1.  Type 2 diabetes mellitus with microalbuminuria, with long-term current use of insulin ([facility])  Assessment & Plan:   Well-controlled, continue current medications  Orders:  - Lipid Panel; Future  -     Hemoglobin A1C; Future  -     Comprehensive Metabolic Panel; Future  -     Microalbumin / Creatinine Urine Ratio; Future  2. Hypothyroidism, postsurgical  Assessment & Plan:   Well-controlled, continue current medications and medication adherence emphasized  Orders:  -     TSH; Future  3. Essential hypertension  Assessment & Plan:   Well-controlled, medication adherence emphasized  Orders:  -     Magnesium; Future  -     Uric Acid; Future  -     CBC with Auto Differential; Future  -     Comprehensive Metabolic Panel; Future  4. Paroxysmal atrial fibrillation (HCC)  Assessment & Plan:   Well-controlled, continue current medications and heart rate is confirmed regular today. Orders:  -     CBC with Auto Differential; Future  -     Comprehensive Metabolic Panel; Future  5. Long-term use of high-risk medication  Assessment & Plan:   Tikosyn -- through cardiology. Orders:  -     Comprehensive Metabolic Panel; Future  6. Iron deficiency  Assessment & Plan:   iron now replaced. stop supplementation. Orders:  -     CBC with Auto Differential; Future  -     Comprehensive Metabolic Panel; Future  7. Long term current use of anticoagulant therapy  Assessment & Plan:   Monitored by specialist- no acute findings meriting change in the plan   On Warfarin therapy secondary to atrial and mitral valve replacements monitored through cardiology  Orders:  -     CBC with Auto Differential; Future  -     Comprehensive Metabolic Panel; Future  8. Mixed hyperlipidemia  Assessment & Plan:   lipids not drawn with lab.  she prefers to wait until next appointment and I agree. Orders:  -     Lipid Panel; Future  -     Comprehensive Metabolic Panel; Future    The night before colonoscopy take 1/2 Tresiba dose. During prep ok to add fluids with glucose especially if blood sugar is dropping low. Return in about 3 months (around 11/1/2022) for Follow-Up, DM2, Hypothyroidism, HLD, with labs.   current treatment plan is effective, no change in therapy  lab results and schedule of future lab studies reviewed with patient  reviewed diet, exercise and weight control  cardiovascular risk and specific lipid/LDL goals reviewed  reviewed medications and side effects in detail  specific diabetic recommendations: low cholesterol diet, weight control and daily exercise discussed, home glucose monitoring emphasized, all medications, side effects and compliance discussed carefully, foot care discussed and Podiatry visits discussed, annual eye examinations at Ophthalmology discussed, glycohemoglobin and other lab monitoring discussed, long term diabetic complications discussed, and labs immediately prior to next visit      RICCI Ford NP    Dictated using voice recognition software.  Proofread, but unrecognized voice recognition errors may exist.

## 2022-08-05 ENCOUNTER — ANTI-COAG VISIT (OUTPATIENT)
Dept: CARDIOLOGY CLINIC | Age: 77
End: 2022-08-05
Payer: MEDICARE

## 2022-08-05 DIAGNOSIS — Z95.2 S/P MITRAL VALVE REPLACEMENT: ICD-10-CM

## 2022-08-05 DIAGNOSIS — Z95.2 S/P AVR (AORTIC VALVE REPLACEMENT): ICD-10-CM

## 2022-08-05 DIAGNOSIS — Z79.01 LONG TERM CURRENT USE OF ANTICOAGULANT THERAPY: Primary | ICD-10-CM

## 2022-08-05 DIAGNOSIS — I48.0 PAROXYSMAL ATRIAL FIBRILLATION (HCC): ICD-10-CM

## 2022-08-05 LAB
POC INR: 3.1
PROTHROMBIN TIME, POC: NORMAL

## 2022-08-05 PROCEDURE — 85610 PROTHROMBIN TIME: CPT | Performed by: INTERNAL MEDICINE

## 2022-08-05 PROCEDURE — 93793 ANTICOAG MGMT PT WARFARIN: CPT | Performed by: INTERNAL MEDICINE

## 2022-08-05 NOTE — PATIENT INSTRUCTIONS
Reminder: Please contact the Coumadin Clinic at [phone] when you have medication changes. Examples, new medications, antibiotics, discontinued medications, new supplements, missed doses of warfarin or if you took extra doses of warfarin. This also includes OTC medications. Notifying us helps reduce the possibility of high and low INR's. In addition, if warfarin needs to be held for any procedures, please have surgeon or physician's office contact us before holding anticoagulant. Thanks, [facility].

## 2022-08-07 PROBLEM — C18.9 COLON CANCER (HCC): Chronic | Status: ACTIVE | Noted: 2021-09-01

## 2022-08-07 PROBLEM — E61.1 IRON DEFICIENCY: Chronic | Status: ACTIVE | Noted: 2022-01-23

## 2022-08-07 NOTE — ASSESSMENT & PLAN NOTE
Monitored by specialist- no acute findings meriting change in the plan   On Warfarin therapy secondary to atrial and mitral valve replacements monitored through cardiology

## 2022-08-15 RX ORDER — ASPIRIN 81 MG/1
81 TABLET, CHEWABLE ORAL DAILY
COMMUNITY

## 2022-08-15 NOTE — PERIOP NOTE
Patient verified name, , and procedure. Type: 1a; abbreviated assessment per anesthesia guidelines    Labs per anesthesia: POC glucose, BMP, Mag, EKG DOS. Coumadin Clearance- noted in EHR on 22. Pt instructed to bring Tikosyn DOS. Instructed pt that they will be notified the day before their procedure by the GI Lab for time of arrival if their procedure is [facility] and Pre-op for [facility] [place] [id number] [facility]. Arrival times should be called by 5 pm. If no phone is received the patient should contact their respective hospital. The GI lab telephone number is [phone] and ES Pre-op is [phone]. Follow diet and prep instructions per office including NPO status. If patient has NOT received instructions from office patient is advised to call surgeon office, verbalizes understanding. Bath or shower the night before and the am of surgery with non-mositurizing soap. No lotions, oils, powders, cologne on skin. No make up, eye make up or jewelry. Wear loose fitting comfortable, clean clothing. Must have adult present in building the entire time . Medications for the day of procedure Norvasc, Aspirin,Synthroid, and Prilosec. patient to hold Warfarin, Vitamin D, and Magnesium. Pt instructed to take Tresiba 34 Units the night before surgery. 70/30- Pt instructed to take 1/2 of her normal dosage the morning of surgery if BS greater than 120 and no 70/30 if BS less than 120. Pt instructed to hold Coumadin as instructed by surgeons office. The following discharge instructions reviewed with patient: medication given during procedure may cause drowsiness for several hours, therefore, do not drive or operate machinery for remainder of the day. You may not drink alcohol on the day of your procedure, please resume regular diet and activity unless otherwise directed. You may experience abdominal distention for several hours that is relieved by the passage of gas.  Contact your physician if you have any of the following: fever or chills, severe abdominal pain or excessive amount of bleeding or a large amount when having a bowel movement.  Occasional specks of blood with bowel movement would not be unusual.

## 2022-08-16 ENCOUNTER — ANESTHESIA EVENT (OUTPATIENT)
Dept: ENDOSCOPY | Age: 77
End: 2022-08-16
Payer: MEDICARE

## 2022-08-16 NOTE — PROGRESS NOTES
Unable to confirm scheduled procedure with patient.  No answer, voicemail message left instructing patient to call [phone] to confirm arrival.

## 2022-08-17 ENCOUNTER — HOSPITAL ENCOUNTER (OUTPATIENT)
Age: 77
Setting detail: OUTPATIENT SURGERY
Discharge: HOME | End: 2022-08-17
Attending: INTERNAL MEDICINE | Admitting: INTERNAL MEDICINE
Payer: MEDICARE

## 2022-08-17 ENCOUNTER — ANESTHESIA (OUTPATIENT)
Dept: ENDOSCOPY | Age: 77
End: 2022-08-17
Payer: MEDICARE

## 2022-08-17 VITALS
HEART RATE: 64 BPM | BODY MASS INDEX: 23.63 KG/M2 | WEIGHT: 147 LBS | OXYGEN SATURATION: 94 % | SYSTOLIC BLOOD PRESSURE: 117 MMHG | DIASTOLIC BLOOD PRESSURE: 59 MMHG | HEIGHT: 66 IN | RESPIRATION RATE: 16 BRPM | TEMPERATURE: 98 F

## 2022-08-17 LAB
ANION GAP SERPL CALC-SCNC: 5 MMOL/L (ref 7–16)
BUN SERPL-MCNC: 14 MG/DL (ref 8–23)
CALCIUM SERPL-MCNC: 8.8 MG/DL (ref 8.3–10.4)
CHLORIDE SERPL-SCNC: 106 MMOL/L (ref 98–107)
CO2 SERPL-SCNC: 29 MMOL/L (ref 21–32)
CREAT SERPL-MCNC: 0.8 MG/DL (ref 0.6–1)
EKG ATRIAL RATE: 86 BPM
EKG DIAGNOSIS: NORMAL
EKG P-R INTERVAL: 136 MS
EKG Q-T INTERVAL: 390 MS
EKG QRS DURATION: 88 MS
EKG QTC CALCULATION (BAZETT): 466 MS
EKG R AXIS: -27 DEGREES
EKG T AXIS: 59 DEGREES
EKG VENTRICULAR RATE: 86 BPM
GLUCOSE SERPL-MCNC: 116 MG/DL (ref 65–100)
MAGNESIUM SERPL-MCNC: 1.8 MG/DL (ref 1.8–2.4)
POTASSIUM SERPL-SCNC: 3.7 MMOL/L (ref 3.5–5.1)
SODIUM SERPL-SCNC: 140 MMOL/L (ref 136–145)

## 2022-08-17 PROCEDURE — 6360000002 HC RX W HCPCS: Performed by: ANESTHESIOLOGY

## 2022-08-17 PROCEDURE — 3609010600 HC COLONOSCOPY POLYPECTOMY SNARE/COLD BIOPSY: Performed by: INTERNAL MEDICINE

## 2022-08-17 PROCEDURE — 93005 ELECTROCARDIOGRAM TRACING: CPT | Performed by: ANESTHESIOLOGY

## 2022-08-17 PROCEDURE — 88305 TISSUE EXAM BY PATHOLOGIST: CPT

## 2022-08-17 PROCEDURE — 7100000010 HC PHASE II RECOVERY - FIRST 15 MIN: Performed by: INTERNAL MEDICINE

## 2022-08-17 PROCEDURE — 83735 ASSAY OF MAGNESIUM: CPT

## 2022-08-17 PROCEDURE — 80048 BASIC METABOLIC PNL TOTAL CA: CPT

## 2022-08-17 PROCEDURE — 3700000001 HC ADD 15 MINUTES (ANESTHESIA): Performed by: INTERNAL MEDICINE

## 2022-08-17 PROCEDURE — 2500000003 HC RX 250 WO HCPCS

## 2022-08-17 PROCEDURE — 2580000003 HC RX 258: Performed by: ANESTHESIOLOGY

## 2022-08-17 PROCEDURE — 6360000002 HC RX W HCPCS

## 2022-08-17 PROCEDURE — 2709999900 HC NON-CHARGEABLE SUPPLY: Performed by: INTERNAL MEDICINE

## 2022-08-17 PROCEDURE — 7100000011 HC PHASE II RECOVERY - ADDTL 15 MIN: Performed by: INTERNAL MEDICINE

## 2022-08-17 PROCEDURE — 3700000000 HC ANESTHESIA ATTENDED CARE: Performed by: INTERNAL MEDICINE

## 2022-08-17 RX ORDER — PROPOFOL 10 MG/ML
INJECTION, EMULSION INTRAVENOUS CONTINUOUS PRN
Status: DISCONTINUED | OUTPATIENT
Start: 2022-08-17 | End: 2022-08-17 | Stop reason: SDUPTHER

## 2022-08-17 RX ORDER — ONDANSETRON 2 MG/ML
4 INJECTION INTRAMUSCULAR; INTRAVENOUS
Status: DISCONTINUED | OUTPATIENT
Start: 2022-08-17 | End: 2022-08-17 | Stop reason: HOSPADM

## 2022-08-17 RX ORDER — MAGNESIUM SULFATE IN WATER 40 MG/ML
2000 INJECTION, SOLUTION INTRAVENOUS ONCE
Status: COMPLETED | OUTPATIENT
Start: 2022-08-17 | End: 2022-08-17

## 2022-08-17 RX ORDER — SODIUM CHLORIDE 9 MG/ML
INJECTION, SOLUTION INTRAVENOUS CONTINUOUS
Status: DISCONTINUED | OUTPATIENT
Start: 2022-08-17 | End: 2022-08-17 | Stop reason: HOSPADM

## 2022-08-17 RX ORDER — LIDOCAINE HYDROCHLORIDE 20 MG/ML
INJECTION, SOLUTION EPIDURAL; INFILTRATION; INTRACAUDAL; PERINEURAL PRN
Status: DISCONTINUED | OUTPATIENT
Start: 2022-08-17 | End: 2022-08-17 | Stop reason: SDUPTHER

## 2022-08-17 RX ORDER — PROPOFOL 10 MG/ML
INJECTION, EMULSION INTRAVENOUS PRN
Status: DISCONTINUED | OUTPATIENT
Start: 2022-08-17 | End: 2022-08-17 | Stop reason: SDUPTHER

## 2022-08-17 RX ORDER — LIDOCAINE HYDROCHLORIDE 10 MG/ML
1 INJECTION, SOLUTION INFILTRATION; PERINEURAL
Status: DISCONTINUED | OUTPATIENT
Start: 2022-08-17 | End: 2022-08-17 | Stop reason: HOSPADM

## 2022-08-17 RX ADMIN — SODIUM CHLORIDE: 900 INJECTION, SOLUTION INTRAVENOUS at 08:37

## 2022-08-17 RX ADMIN — PROPOFOL 40 MG: 10 INJECTION, EMULSION INTRAVENOUS at 11:05

## 2022-08-17 RX ADMIN — PROPOFOL 160 MCG/KG/MIN: 10 INJECTION, EMULSION INTRAVENOUS at 11:05

## 2022-08-17 RX ADMIN — MAGNESIUM SULFATE HEPTAHYDRATE 2000 MG: 40 INJECTION, SOLUTION INTRAVENOUS at 10:54

## 2022-08-17 RX ADMIN — LIDOCAINE HYDROCHLORIDE 50 MG: 20 INJECTION, SOLUTION EPIDURAL; INFILTRATION; INTRACAUDAL; PERINEURAL at 11:05

## 2022-08-17 ASSESSMENT — PAIN - FUNCTIONAL ASSESSMENT
PAIN_FUNCTIONAL_ASSESSMENT: NONE - DENIES PAIN
PAIN_FUNCTIONAL_ASSESSMENT: 0-10
PAIN_FUNCTIONAL_ASSESSMENT: NONE - DENIES PAIN

## 2022-08-17 NOTE — PROCEDURES
Colonoscopy Procedure Note    Indications: History of rectal cancer    Anesthesia/Sedation: MAC IV     Pre-Procedure Physical:  Current Facility-Administered Medications   Medication Dose Route Frequency    ondansetron (ZOFRAN) injection 4 mg  4 mg IntraVENous Once PRN    lidocaine 1 % injection 1 mL  1 mL IntraDERmal Once PRN    0.9 % sodium chloride infusion   IntraVENous Continuous     Facility-Administered Medications Ordered in Other Encounters   Medication Dose Route Frequency    lidocaine PF 2 % injection   IntraVENous PRN    propofol injection   IntraVENous PRN    propofol injection   IntraVENous Continuous PRN      Ibuprofen    Patient Vitals for the past 8 hrs:   BP Temp Temp src Pulse Resp SpO2 Height Weight   08/17/22 0820 (!) 145/70 98.5 °F (36.9 °C) Oral 81 18 94 % 5' 6\" (1.676 m) 147 lb (66.7 kg)       Exam:    Airway: clear   Heart: normal S1and S2    Lungs: clear bilateral  Abdomen: soft, nontender, bowel sounds present and normal in all quads   Mental Status: awake, alert and oriented to person, place and time        Procedure Details      Informed consent was obtained for the procedure, including sedation. Risks of perforation, hemorrhage, adverse drug reaction and aspiration were discussed. The patient was placed in the left lateral decubitus position. Based on the pre-procedure assessment, including review of the patient's medical history, medications, allergies, and review of systems, she had been deemed to be an appropriate candidate for conscious sedation; she was therefore sedated with the medications listed below. The patient was monitored continuously with ECG tracing, pulse oximetry, blood pressure monitoring, and direct observations. A rectal examination was performed. The colonoscope was inserted into the rectum and advanced under direct vision to the cecum. The quality of the colonic preparation was fair.  A careful inspection was made as the colonoscope was withdrawn, including a retroflexed view of the rectum; findings and interventions are described below. Appropriate photodocumentation was obtained. Findings:   Six 6-8 mm sessile polyps removed by cold snare from throughout the colon. [place]-rectal anastomosis from prior rectal cancer resection seen. Specimens: Colon polyps    Estimated Blood Loss: 0 cc           Complications: None; patient tolerated the procedure well. Attending Attestation: I performed the procedure. Impression:    Six 6-8 mm sessile polyps removed by cold snare from throughout the colon. [place]-rectal anastomosis from prior rectal cancer resection seen.     Recommendations:    Await path results  Repeat colonoscopy in 1 year with 2 day prep

## 2022-08-17 NOTE — ANESTHESIA POSTPROCEDURE EVALUATION
Department of Anesthesiology  Postprocedure Note    Patient: Adalid Calvillo  MRN: [id number]  YOB: 1945  Date of evaluation: 8/17/2022      Procedure Summary     Date: 08/17/22 Room / Location: [facility] ENDO 03 / [facility] ENDOSCOPY    Anesthesia Start: 1053 Anesthesia Stop: 1133    Procedure: COLONOSCOPY POLYPECTOMY SNARE-COLD Diagnosis:       Colon cancer screening      (Colon cancer screening [Z12.11])    Surgeons: Lady Chacon MD Responsible Provider: Guerrero Benson MD    Anesthesia Type: TIVA ASA Status: 3          Anesthesia Type: TIVA    Lana Phase I: Lana Score: 10    Lana Phase II: Lana Score: 7      Anesthesia Post Evaluation    Patient location during evaluation: PACU  Patient participation: complete - patient participated  Level of consciousness: awake and alert  Airway patency: patent  Nausea & Vomiting: no nausea and no vomiting  Complications: no  Cardiovascular status: hemodynamically stable  Respiratory status: acceptable  Hydration status: euvolemic  Comments: Blood pressure 104/67, pulse 66, temperature 98 °F (36.7 °C), temperature source Skin, resp. rate 16, height 5' 6\" (1.676 m), weight 147 lb (66.7 kg), SpO2 93 %.       Pt stable for discharge from PACU  Multimodal analgesia pain management approach

## 2022-08-17 NOTE — DISCHARGE INSTRUCTIONS
Gastrointestinal Colonoscopy/Flexible Sigmoidoscopy - Lower Exam Discharge Instructions  Call Dr. Gorge Coates at [phone] for any problems or questions. Contact the doctors office for follow up appointment as directed  Medication may cause drowsiness for several hours, therefore, do not drive or operate machinery for remainder of the day. No alcohol today. Do not make any important decisions such signing legal paperwork. Ordinarily, you may resume regular diet and activity after exam unless otherwise specified by your physician. Because of air put into your colon during exam, you may experience some abdominal distension, relieved by the passage of gas, for several hours. Contact your physician if you have any of the following:  Excessive amount of bleeding - large amount when having a bowel movement.   Occasional specks of blood with bowel movement would not be unusual.  Severe abdominal pain  Fever or Chills    Any additional instructions:   Await path results  Repeat colonoscopy in 1 year with 2 day prep

## 2022-08-17 NOTE — ANESTHESIA PRE PROCEDURE
Department of Anesthesiology  Preprocedure Note       Name:  Andres Oro   Age:  77 y.o.  :  1945                                          MRN:  [id number]         Date:  2022      Surgeon: Amy Mauricio):  Michaela Shaver MD    Procedure: Procedure(s):  COLORECTAL CANCER SCREENING, NOT HIGH RISK/25    Medications prior to admission:   Prior to Admission medications    Medication Sig Start Date End Date Taking? Authorizing Provider   Insulin NPH Isophane & Regular (HUMULIN 70/30 PEN SC) Inject into the skin in the morning, at noon, and at bedtime SSI   Yes Historical Provider, MD   Insulin Degludec (TRESIBA SC) Inject 43 Units into the skin at bedtime   Yes Historical Provider, MD   aspirin 81 MG chewable tablet Take 81 mg by mouth in the morning.    Yes Historical Provider, MD   Probiotic Product (PROBIOTIC-10 PO) Take by mouth daily   Yes Historical Provider, MD   amLODIPine (NORVASC) 5 MG tablet Take 2.5 mg by mouth in the morning and at bedtime 22   Historical Provider, MD   atorvastatin (LIPITOR) 20 MG tablet Take 10 mg by mouth every evening 22   Historical Provider, MD   vitamin D (CHOLECALCIFEROL) 25 MCG (1000 UT) TABS tablet Take 1,000 Units by mouth 2 times daily    Historical Provider, MD   dofetilide (TIKOSYN) 500 MCG capsule Take 500 mcg by mouth 2 times daily 22   Historical Provider, MD   fluticasone (FLONASE) 50 MCG/ACT nasal spray 2 sprays by Nasal route as needed    Historical Provider, MD   levothyroxine (SYNTHROID) 137 MCG tablet Take 137 mcg by mouth every morning (before breakfast) 22   Historical Provider, MD   loratadine (CLARITIN) 10 MG tablet Take 10 mg by mouth daily as needed    Historical Provider, MD   losartan (COZAAR) 50 MG tablet TAKE 1 TABLET TWICE A DAY 22   Historical Provider, MD   magnesium oxide (MAG-OX) 400 MG tablet Take 400 mg by mouth at bedtime    Historical Provider, MD   omeprazole (PRILOSEC) 20 MG delayed release capsule Take 20 mg by mouth as needed 1/4/22   Historical Provider, MD   potassium chloride (KLOR-CON M) 20 MEQ extended release tablet Take 20 mEq by mouth daily 1/4/22   Historical Provider, MD   warfarin (COUMADIN) 5 MG tablet 1.5 tablets on Monday/Wed/Friday and 1 tablet all other days 1/4/22   Historical Provider, MD       Current medications:    Current Facility-Administered Medications   Medication Dose Route Frequency Provider Last Rate Last Admin    ondansetron (ZOFRAN) injection 4 mg  4 mg IntraVENous Once PRN YUDELKA Morocho MD        lidocaine 1 % injection 1 mL  1 mL IntraDERmal Once PRN YUDELKA Morocho MD           Allergies:     Allergies   Allergen Reactions    Ibuprofen Rash       Problem List:    Patient Active Problem List   Diagnosis Code    Colon cancer ([facility] [address] C18.9    Carotid stenosis I65.29    Iron deficiency E61.1    Long-term use of high-risk medication Z79.899    Osteoporosis M81.0    Colonic mass K63.89    Blood loss anemia D50.0    Type 2 diabetes mellitus with microalbuminuria, with long-term current use of insulin ([facility]) E11.29, R80.9, Z79.4    Type 2 diabetes mellitus with hyperglycemia, with long-term current use of insulin ([facility]) E11.65, Z79.4    Essential hypertension I10    Bradycardia R00.1    Long term current use of anticoagulant therapy Z79.01    GERD (gastroesophageal reflux disease) K21.9    S/P AVR (aortic valve replacement) Z95.2    Hypothyroidism, postsurgical E89.0    Mitral valve insufficiency I34.0    Rectal cancer (HCC) C20    Paroxysmal atrial fibrillation (HCC) I48.0    Hypoxia R09.02    S/P mitral valve replacement Z95.2    CAD (coronary artery disease) I25.10    Mixed hyperlipidemia E78.2       Past Medical History:        Diagnosis Date    Anesthesia complication     slow to wake up    Aortic valve insufficiency     CAD (coronary artery disease)     Followed by [facility]    Current use of long term anticoagulation     Warfarin and Aspirin    Diabetes mellitus, insulin dependent (IDDM), controlled 5/11/2016    insulin reliant/avg fbs 100-130, pt not sure of BS reading when experiencing s.s of hypoglycemia/A1c 6.1,    Fracture, femur ([address] 9/21/2009    GERD (gastroesophageal reflux disease)     states rarely PRN medication    Heart failure (HCC)     EF 55-60 % 9/9/20    History of cardioversion     History of colon cancer     History of petit-mal seizures     none in 15 years    History of seizure     no seizure activity since 1980's    Hyperlipidemia     Hypertension     Hypothyroidism     Mitral stenosis with insufficiency     Other unknown and unspecified cause of morbidity or mortality     HLD    Paroxysmal atrial fibrillation (HCC)     PONV (postoperative nausea and vomiting)     post op nausea. pt does well with antiemetic    Thyroid nodule 6/29/2011    Unspecified essential hypertension     Visit for monitoring Tikosyn therapy     pt takes Tikosyn BID       Past Surgical History:        Procedure Laterality Date    ABLATION OF DYSRHYTHMIC FOCUS  2015    AORTIC VALVE REPLACEMENT  2015    APPENDECTOMY      CARDIAC CATHETERIZATION      COLONOSCOPY N/A 7/21/2021    COLONOSCOPY/ BMI 27 performed by Maria Esther Vincent MD at [address] ([place])  2014    neg    FLEXIBLE SIGMOIDOSCOPY N/A 8/18/2021    SIGMOIDOSCOPY FLEXIBLE performed by Maria Esther Vincent MD at [facility] Left 2009    pins    MITRAL VALVE REPLACEMENT  5/4/15     and Aortic Valve Replacement, Dr. Ovi JHA hip fracture/repair    OTHER SURGICAL HISTORY      ablation 10/6/2015, Dr Homar De La Rosa  2021    colon surg    THYROIDECTOMY      TUBAL LIGATION         Social History:    Social History     Tobacco Use    Smoking status: Never    Smokeless tobacco: Never   Substance Use Topics    Alcohol use:  No                                Counseling given: Not Answered      Vital Signs (Current):   Vitals:    08/15/22 1552   Weight: 147 lb (66.7 kg)   Height: 5' 6\" (1.676 m)                                              BP Readings from Last 3 Encounters:   08/01/22 136/68   07/26/22 136/70   07/08/22 112/62       NPO Status:                                                                                 BMI:   Wt Readings from Last 3 Encounters:   08/15/22 147 lb (66.7 kg)   08/01/22 147 lb (66.7 kg)   07/26/22 147 lb (66.7 kg)     Body mass index is 23.73 kg/m². CBC:   Lab Results   Component Value Date/Time    WBC 7.1 07/26/2022 09:20 AM    RBC 4.27 07/26/2022 09:20 AM    HGB 13.4 07/26/2022 09:20 AM    HCT 43.3 07/26/2022 09:20 AM    .4 07/26/2022 09:20 AM    RDW 14.9 07/26/2022 09:20 AM     07/26/2022 09:20 AM       CMP:   Lab Results   Component Value Date/Time     07/26/2022 09:20 AM    K 4.8 07/26/2022 09:20 AM     07/26/2022 09:20 AM    CO2 29 07/26/2022 09:20 AM    BUN 24 07/26/2022 09:20 AM    CREATININE 0.80 07/26/2022 09:20 AM    GFRAA >60 07/26/2022 09:20 AM    AGRATIO 1.2 04/13/2022 11:33 AM    LABGLOM >60 07/26/2022 09:20 AM    GLUCOSE 97 07/26/2022 09:20 AM    PROT 7.5 07/26/2022 09:20 AM    CALCIUM 8.8 07/26/2022 09:20 AM    BILITOT 0.2 07/26/2022 09:20 AM    ALKPHOS 106 07/26/2022 09:20 AM    ALKPHOS 103 04/13/2022 11:33 AM    AST 25 07/26/2022 09:20 AM    ALT 46 07/26/2022 09:20 AM       POC Tests: No results for input(s): POCGLU, POCNA, POCK, POCCL, POCBUN, POCHEMO, POCHCT in the last 72 hours. Coags:   Lab Results   Component Value Date/Time    PROTIME 18.0 12/01/2021 09:34 AM    INR 3.1 08/05/2022 09:05 AM    INR 2.4 05/18/2022 09:34 AM       HCG (If Applicable): No results found for: PREGTESTUR, PREGSERUM, HCG, HCGQUANT     ABGs: No results found for: PHART, PO2ART, MIJ9GBC, HFS5YYU, BEART, X1LDSLEZ     Type & Screen (If Applicable):  No results found for: LABABO, LABRH    Drug/Infectious Status (If Applicable):   No results found for: HIV, HEPCAB    COVID-19 Screening (If Applicable):   Lab Results   Component Value Date/Time    COVID19 Not Detected 08/30/2021 09:01 AM           Anesthesia Evaluation  Patient summary reviewed and Nursing notes reviewed   history of anesthetic complications: PONV. Airway: Mallampati: II  TM distance: <3 FB     Mouth opening: > = 3 FB   Dental:    (+) caps      Pulmonary:Negative Pulmonary ROS breath sounds clear to auscultation                             Cardiovascular:  Exercise tolerance: good (>4 METS),   (+) hypertension:, valvular problems/murmurs (s/p AVR/MVR 2015):, dysrhythmias (on Tikosyn; (Coumadin held since 8/12)): atrial fibrillation, hyperlipidemia        Rhythm: regular  Rate: normal                 ROS comment: Echo 4/22 - normal EF, mild stenosis across both prosthetic AV and MV     Neuro/Psych:   Negative Neuro/Psych ROS              GI/Hepatic/Renal:   (+) GERD: well controlled,           Endo/Other:    (+) DiabetesType II DM, using insulin, hypothyroidism::., .                 Abdominal:             Vascular: negative vascular ROS. Other Findings:           Anesthesia Plan      TIVA     ASA 3     (QTc ok on EKG today. Mg/K pending)  Induction: intravenous. Anesthetic plan and risks discussed with patient.                         Yane Mercedes MD   8/17/2022

## 2022-08-17 NOTE — H&P
[facility] Consult Note           Referring Physician:     Consult Date: 8/17/2022    Reason for Consult: History of rectal cancer    History of Present Illness:  Patient is a 77 y.o. female who is seen in consultation for History of rectal cancer. Past Medical History:   Diagnosis Date    Anesthesia complication     slow to wake up    Aortic valve insufficiency     CAD (coronary artery disease)     Followed by [facility]    Current use of long term anticoagulation     Warfarin and Aspirin    Diabetes mellitus, insulin dependent (IDDM), controlled 5/11/2016    insulin reliant/avg fbs 100-130, pt not sure of BS reading when experiencing s.s of hypoglycemia/A1c 6.1,    Fracture, femur ([address] 9/21/2009    GERD (gastroesophageal reflux disease)     states rarely PRN medication    Heart failure (HCC)     EF 55-60 % 9/9/20    History of cardioversion     History of colon cancer     History of petit-mal seizures     none in 15 years    History of seizure     no seizure activity since 1980's    Hyperlipidemia     Hypertension     Hypothyroidism     Mitral stenosis with insufficiency     Other unknown and unspecified cause of morbidity or mortality     HLD    Paroxysmal atrial fibrillation (HCC)     PONV (postoperative nausea and vomiting)     post op nausea.  pt does well with antiemetic    Thyroid nodule 6/29/2011    Unspecified essential hypertension     Visit for monitoring Tikosyn therapy     pt takes Tikosyn BID      Past Surgical History:   Procedure Laterality Date    ABLATION OF DYSRHYTHMIC FOCUS  2015    AORTIC VALVE REPLACEMENT  2015    APPENDECTOMY      CARDIAC CATHETERIZATION      COLONOSCOPY N/A 7/21/2021    COLONOSCOPY/ BMI 27 performed by Francisco Javier Anderson MD at [facility] ENDOSCOPY    COLONOSCOPY  2014    neg    FLEXIBLE SIGMOIDOSCOPY N/A 8/18/2021    SIGMOIDOSCOPY FLEXIBLE performed by Francisco Javier Anderson MD at [place] [id number] Left 2009    pins    MITRAL VALVE REPLACEMENT  5/4/15     and Aortic Valve Replacement, Dr. Portillo Degree      L hip fracture/repair    OTHER SURGICAL HISTORY      ablation 10/6/2015, Dr Elena Paula  2021    colon surg    THYROIDECTOMY      TUBAL LIGATION        Family History   Problem Relation Age of Onset    Diabetes Mother     Diabetes Sister     Heart Disease Father     Diabetes Sister     Breast Cancer Sister 22    Diabetes Brother     Heart Disease Mother     Heart Disease Sister     Heart Disease Brother     Diabetes Brother     Heart Disease Brother      Social History     Occupational History    Not on file   Tobacco Use    Smoking status: Never    Smokeless tobacco: Never   Vaping Use    Vaping Use: Never used   Substance and Sexual Activity    Alcohol use: No    Drug use: No    Sexual activity: Not on file       Hospital Medications:  Current Facility-Administered Medications   Medication Dose Route Frequency    ondansetron (ZOFRAN) injection 4 mg  4 mg IntraVENous Once PRN    lidocaine 1 % injection 1 mL  1 mL IntraDERmal Once PRN       Allergies: Allergies   Allergen Reactions    Ibuprofen Rash       Review of Systems:  A comprehensive review of systems was negative except for that written in the History of Present Illness. Objective:     Physical Exam:  Vitals:  Visit Vitals  BP (!) 145/70   Pulse 81   Temp 98.5 °F (36.9 °C) (Oral)   Resp 18   Ht 5' 6\" (1.676 m)   Wt 147 lb (66.7 kg)   SpO2 94%   BMI 23.73 kg/m²       General: No acute distress. Skin:  Extremities and face reveal no rashes. No carlisle erythema. No telangiectasias on the chest wall. HEENT: Sclerae anicteric. No oral ulcers. No abnormal pigmentation of the lips. The neck is supple. Cardiovascular: Regular rate and rhythm. No murmurs, gallops, or rubs. Respiratory:  Comfortable breathing  With no accessory muscle use. Clear breath sounds with no wheezes, rales, or rhonchi.   GI:  Abdomen nondistended, soft, and nontender. Normal active bowel sounds. No enlargement of the liver or spleen. No masses palpable. Musculoskeletal:  No pitting edema of the lower legs. Extremities have good range of motion. Neurological:  Gross memory appears intact. Patient is alert and oriented. Psychiatric:  Mood appears appropriate with judgement intact. Lymphatic:  No cervical or supraclavicular adenopathy. Laboratory:    No results for input(s): WBC, RBC, HGB, HCT, PLT in the last 72 hours. No results for input(s): GLU, NA, K, CL, CO2, BUN, CREA, CA in the last 72 hours. No results for input(s): INR, APTT in the last 72 hours. Invalid input(s): PTP  No results for input(s): ALB, TP, AML in the last 72 hours. Invalid input(s): TBIL, CBIL, SGOT, GPT, AP, LPSE    Assessment:       A 77 y.o. female with History of rectal cancer      Plan:       Colonoscopy    Signed By: Navjot Schrader.  MD Oni     August 17, 2022

## 2022-08-26 ENCOUNTER — APPOINTMENT (OUTPATIENT)
Dept: MAMMOGRAPHY | Age: 77
End: 2022-08-26
Payer: MEDICARE

## 2022-08-26 ENCOUNTER — HOSPITAL ENCOUNTER (OUTPATIENT)
Dept: MAMMOGRAPHY | Age: 77
Discharge: HOME | End: 2022-08-29
Payer: MEDICARE

## 2022-08-26 VITALS — BODY MASS INDEX: 23.63 KG/M2 | HEIGHT: 66 IN | WEIGHT: 147 LBS

## 2022-08-26 DIAGNOSIS — Z12.31 VISIT FOR SCREENING MAMMOGRAM: ICD-10-CM

## 2022-08-26 PROCEDURE — 77063 BREAST TOMOSYNTHESIS BI: CPT

## 2022-08-26 PROCEDURE — 77080 DXA BONE DENSITY AXIAL: CPT

## 2022-08-29 ENCOUNTER — TELEPHONE (OUTPATIENT)
Dept: INTERNAL MEDICINE CLINIC | Facility: CLINIC | Age: 77
End: 2022-08-29

## 2022-08-29 NOTE — TELEPHONE ENCOUNTER
----- Message from RICCI Godoy NP sent at 8/26/2022  9:36 PM EDT -----  Your mammogram shows no worrisome findings. Plan repeat in one year.

## 2022-09-07 ENCOUNTER — ANTI-COAG VISIT (OUTPATIENT)
Dept: CARDIOLOGY CLINIC | Age: 77
End: 2022-09-07
Payer: MEDICARE

## 2022-09-07 DIAGNOSIS — Z95.2 S/P MITRAL VALVE REPLACEMENT: ICD-10-CM

## 2022-09-07 DIAGNOSIS — Z95.2 S/P AVR (AORTIC VALVE REPLACEMENT): ICD-10-CM

## 2022-09-07 DIAGNOSIS — I48.0 PAROXYSMAL ATRIAL FIBRILLATION (HCC): ICD-10-CM

## 2022-09-07 DIAGNOSIS — Z79.01 LONG TERM CURRENT USE OF ANTICOAGULANT THERAPY: Primary | ICD-10-CM

## 2022-09-07 LAB
POC INR: 4.3
PROTHROMBIN TIME, POC: ABNORMAL

## 2022-09-07 PROCEDURE — 93793 ANTICOAG MGMT PT WARFARIN: CPT | Performed by: INTERNAL MEDICINE

## 2022-09-07 PROCEDURE — 85610 PROTHROMBIN TIME: CPT | Performed by: INTERNAL MEDICINE

## 2022-09-07 NOTE — Clinical Note
Abnormal INR today of 4.3. Goal of 2.0-3.0. patient held coumadin 5 days prior to colonoscopy on 8/17/22. Hx of S/P AVR, S/P MVP, and PAF. Please advise on dose adjustment. Thank you.

## 2022-09-07 NOTE — PROGRESS NOTES
Patient's  notified of physicians recommendations and verbalized understanding. Appt scheduled for recheck on Monday.

## 2022-09-07 NOTE — PATIENT INSTRUCTIONS
Reminder: Please contact the Coumadin Clinic at [phone] when you have medication changes. Examples, new medications, antibiotics, discontinued medications, new supplements, missed doses of warfarin or if you took extra doses of warfarin. This also includes OTC medications. Notifying us helps reduce the possibility of high and low INR's. In addition, if warfarin needs to be held for any procedures, please have surgeon or physician's office contact us before holding anticoagulant. Thanks, [facility] Cou[facility]in [facility].

## 2022-09-07 NOTE — PROGRESS NOTES
Abnormal INR today of 4.3. Goal of 2.0-3.0. patient held coumadin 5 days prior to colonoscopy on 8/17/22. Hx of S/P AVR, S/P MVP, and PAF. Please advise on dose adjustment.

## 2022-09-12 ENCOUNTER — ANTI-COAG VISIT (OUTPATIENT)
Dept: CARDIOLOGY CLINIC | Age: 77
End: 2022-09-12
Payer: MEDICARE

## 2022-09-12 DIAGNOSIS — Z95.2 S/P AVR (AORTIC VALVE REPLACEMENT): ICD-10-CM

## 2022-09-12 DIAGNOSIS — I48.0 PAROXYSMAL ATRIAL FIBRILLATION (HCC): ICD-10-CM

## 2022-09-12 DIAGNOSIS — Z95.2 S/P MITRAL VALVE REPLACEMENT: ICD-10-CM

## 2022-09-12 DIAGNOSIS — Z79.01 LONG TERM CURRENT USE OF ANTICOAGULANT THERAPY: Primary | ICD-10-CM

## 2022-09-12 LAB
POC INR: 1.6
PROTHROMBIN TIME, POC: ABNORMAL

## 2022-09-12 PROCEDURE — 93793 ANTICOAG MGMT PT WARFARIN: CPT | Performed by: INTERNAL MEDICINE

## 2022-09-12 PROCEDURE — 85610 PROTHROMBIN TIME: CPT | Performed by: INTERNAL MEDICINE

## 2022-09-12 NOTE — PATIENT INSTRUCTIONS
Reminder: Please contact the Coumadin Clinic at [phone] when you have medication changes. Examples, new medications, antibiotics, discontinued medications, new supplements, missed doses of warfarin or if you took extra doses of warfarin. This also includes OTC medications. Notifying us helps reduce the possibility of high and low INR's. In addition, if warfarin needs to be held for any procedures, please have surgeon or physician's office contact us before holding anticoagulant. Thanks, [facility] Coumadin [facility].

## 2022-09-19 ENCOUNTER — ANTI-COAG VISIT (OUTPATIENT)
Dept: CARDIOLOGY CLINIC | Age: 77
End: 2022-09-19
Payer: MEDICARE

## 2022-09-19 DIAGNOSIS — Z95.2 S/P MITRAL VALVE REPLACEMENT: ICD-10-CM

## 2022-09-19 DIAGNOSIS — I48.0 PAROXYSMAL ATRIAL FIBRILLATION (HCC): ICD-10-CM

## 2022-09-19 DIAGNOSIS — Z95.2 S/P AVR (AORTIC VALVE REPLACEMENT): ICD-10-CM

## 2022-09-19 DIAGNOSIS — Z79.01 LONG TERM CURRENT USE OF ANTICOAGULANT THERAPY: Primary | ICD-10-CM

## 2022-09-19 LAB
POC INR: 2.5
PROTHROMBIN TIME, POC: NORMAL

## 2022-09-19 PROCEDURE — 93793 ANTICOAG MGMT PT WARFARIN: CPT | Performed by: INTERNAL MEDICINE

## 2022-09-19 PROCEDURE — 85610 PROTHROMBIN TIME: CPT | Performed by: INTERNAL MEDICINE

## 2022-09-19 NOTE — PATIENT INSTRUCTIONS
Reminder: Please contact the Coumadin Clinic at [phone] when you have medication changes. Examples, new medications, antibiotics, discontinued medications, new supplements, missed doses of warfarin or if you took extra doses of warfarin. This also includes OTC medications. Notifying us helps reduce the possibility of high and low INR's. In addition, if warfarin needs to be held for any procedures, please have surgeon or physician's office contact us before holding anticoagulant. Thanks, [facility] Coumadin [facility].

## 2022-10-03 ENCOUNTER — ANTI-COAG VISIT (OUTPATIENT)
Dept: CARDIOLOGY CLINIC | Age: 77
End: 2022-10-03
Payer: MEDICARE

## 2022-10-03 DIAGNOSIS — I48.0 PAROXYSMAL ATRIAL FIBRILLATION (HCC): ICD-10-CM

## 2022-10-03 DIAGNOSIS — Z95.2 S/P MITRAL VALVE REPLACEMENT: ICD-10-CM

## 2022-10-03 DIAGNOSIS — Z79.01 LONG TERM CURRENT USE OF ANTICOAGULANT THERAPY: Primary | ICD-10-CM

## 2022-10-03 DIAGNOSIS — Z95.2 S/P AVR (AORTIC VALVE REPLACEMENT): ICD-10-CM

## 2022-10-03 LAB
POC INR: 3.9
PROTHROMBIN TIME, POC: ABNORMAL

## 2022-10-03 PROCEDURE — 93793 ANTICOAG MGMT PT WARFARIN: CPT | Performed by: INTERNAL MEDICINE

## 2022-10-03 PROCEDURE — 85610 PROTHROMBIN TIME: CPT | Performed by: INTERNAL MEDICINE

## 2022-10-03 NOTE — PROGRESS NOTES
Tablet strength and weekly dosing schedule confirmed today. Patient knows of no reason for elevated INR.

## 2022-10-03 NOTE — PATIENT INSTRUCTIONS
Reminder: Please contact the Coumadin Clinic at [phone] when you have medication changes. Examples, new medications, antibiotics, discontinued medications, new supplements, missed doses of warfarin or if you took extra doses of warfarin. This also includes OTC medications. Notifying us helps reduce the possibility of high and low INR's. In addition, if warfarin needs to be held for any procedures, please have surgeon or physician's office contact us before holding anticoagulant. Thanks, [facility] Coumadin [facility].

## 2022-10-17 ENCOUNTER — ANTI-COAG VISIT (OUTPATIENT)
Dept: CARDIOLOGY CLINIC | Age: 77
End: 2022-10-17
Payer: MEDICARE

## 2022-10-17 DIAGNOSIS — Z95.2 S/P MITRAL VALVE REPLACEMENT: ICD-10-CM

## 2022-10-17 DIAGNOSIS — Z95.2 S/P AVR (AORTIC VALVE REPLACEMENT): ICD-10-CM

## 2022-10-17 DIAGNOSIS — Z79.01 LONG TERM CURRENT USE OF ANTICOAGULANT THERAPY: Primary | ICD-10-CM

## 2022-10-17 DIAGNOSIS — I48.0 PAROXYSMAL ATRIAL FIBRILLATION (HCC): ICD-10-CM

## 2022-10-17 LAB
POC INR: 3.1
PROTHROMBIN TIME, POC: ABNORMAL

## 2022-10-17 PROCEDURE — 93793 ANTICOAG MGMT PT WARFARIN: CPT | Performed by: INTERNAL MEDICINE

## 2022-10-17 PROCEDURE — 85610 PROTHROMBIN TIME: CPT | Performed by: INTERNAL MEDICINE

## 2022-10-17 NOTE — PATIENT INSTRUCTIONS
Reminder: Please contact the Coumadin Clinic at [phone] when you have medication changes. Examples, new medications, antibiotics, discontinued medications, new supplements, missed doses of warfarin or if you took extra doses of warfarin. This also includes OTC medications. Notifying us helps reduce the possibility of high and low INR's. In addition, if warfarin needs to be held for any procedures, please have surgeon or physician's office contact us before holding anticoagulant. Thanks, [facility] Coumadin [facility].

## 2022-10-31 ENCOUNTER — ANTI-COAG VISIT (OUTPATIENT)
Dept: CARDIOLOGY CLINIC | Age: 77
End: 2022-10-31
Payer: MEDICARE

## 2022-10-31 DIAGNOSIS — I48.0 PAROXYSMAL ATRIAL FIBRILLATION (HCC): ICD-10-CM

## 2022-10-31 DIAGNOSIS — Z95.2 S/P MITRAL VALVE REPLACEMENT: ICD-10-CM

## 2022-10-31 DIAGNOSIS — Z79.01 LONG TERM CURRENT USE OF ANTICOAGULANT THERAPY: Primary | ICD-10-CM

## 2022-10-31 DIAGNOSIS — Z95.2 S/P AVR (AORTIC VALVE REPLACEMENT): ICD-10-CM

## 2022-10-31 LAB
POC INR: 3.4
PROTHROMBIN TIME, POC: ABNORMAL

## 2022-10-31 PROCEDURE — 93793 ANTICOAG MGMT PT WARFARIN: CPT | Performed by: INTERNAL MEDICINE

## 2022-10-31 PROCEDURE — 85610 PROTHROMBIN TIME: CPT | Performed by: INTERNAL MEDICINE

## 2022-10-31 NOTE — PATIENT INSTRUCTIONS
Reminder: Please contact the Coumadin Clinic at [phone] when you have medication changes. Examples, new medications, antibiotics, discontinued medications, new supplements, missed doses of warfarin or if you took extra doses of warfarin. This also includes OTC medications. Notifying us helps reduce the possibility of high and low INR's. In addition, if warfarin needs to be held for any procedures, please have surgeon or physician's office contact us before holding anticoagulant. Thanks, [facility] Coumadin [facility].

## 2022-11-04 DIAGNOSIS — Z79.899 LONG-TERM USE OF HIGH-RISK MEDICATION: Chronic | ICD-10-CM

## 2022-11-04 DIAGNOSIS — R80.9 TYPE 2 DIABETES MELLITUS WITH MICROALBUMINURIA, WITH LONG-TERM CURRENT USE OF INSULIN (HCC): Chronic | ICD-10-CM

## 2022-11-04 DIAGNOSIS — Z79.01 LONG TERM CURRENT USE OF ANTICOAGULANT THERAPY: Chronic | ICD-10-CM

## 2022-11-04 DIAGNOSIS — I10 ESSENTIAL HYPERTENSION: Chronic | ICD-10-CM

## 2022-11-04 DIAGNOSIS — E89.0 HYPOTHYROIDISM, POSTSURGICAL: Chronic | ICD-10-CM

## 2022-11-04 DIAGNOSIS — I48.0 PAROXYSMAL ATRIAL FIBRILLATION (HCC): Chronic | ICD-10-CM

## 2022-11-04 DIAGNOSIS — E78.2 MIXED HYPERLIPIDEMIA: Chronic | ICD-10-CM

## 2022-11-04 DIAGNOSIS — Z79.4 TYPE 2 DIABETES MELLITUS WITH MICROALBUMINURIA, WITH LONG-TERM CURRENT USE OF INSULIN (HCC): Chronic | ICD-10-CM

## 2022-11-04 DIAGNOSIS — E11.29 TYPE 2 DIABETES MELLITUS WITH MICROALBUMINURIA, WITH LONG-TERM CURRENT USE OF INSULIN (HCC): Chronic | ICD-10-CM

## 2022-11-04 DIAGNOSIS — E61.1 IRON DEFICIENCY: ICD-10-CM

## 2022-11-04 LAB
ALBUMIN SERPL-MCNC: 3.3 G/DL (ref 3.2–4.6)
ALBUMIN/GLOB SERPL: 0.9 {RATIO} (ref 0.4–1.6)
ALP SERPL-CCNC: 103 U/L (ref 50–136)
ALT SERPL-CCNC: 31 U/L (ref 12–65)
ANION GAP SERPL CALC-SCNC: 8 MMOL/L (ref 2–11)
AST SERPL-CCNC: 21 U/L (ref 15–37)
BASOPHILS # BLD: 0 K/UL (ref 0–0.2)
BASOPHILS NFR BLD: 1 % (ref 0–2)
BILIRUB SERPL-MCNC: 0.4 MG/DL (ref 0.2–1.1)
BUN SERPL-MCNC: 17 MG/DL (ref 8–23)
CALCIUM SERPL-MCNC: 8.6 MG/DL (ref 8.3–10.4)
CHLORIDE SERPL-SCNC: 107 MMOL/L (ref 101–110)
CHOLEST SERPL-MCNC: 78 MG/DL
CO2 SERPL-SCNC: 27 MMOL/L (ref 21–32)
CREAT SERPL-MCNC: 0.8 MG/DL (ref 0.6–1)
CREAT UR-MCNC: 90 MG/DL
DIFFERENTIAL METHOD BLD: ABNORMAL
EOSINOPHIL # BLD: 0.2 K/UL (ref 0–0.8)
EOSINOPHIL NFR BLD: 2 % (ref 0.5–7.8)
ERYTHROCYTE [DISTWIDTH] IN BLOOD BY AUTOMATED COUNT: 13.6 % (ref 11.9–14.6)
EST. AVERAGE GLUCOSE BLD GHB EST-MCNC: 131 MG/DL
GLOBULIN SER CALC-MCNC: 3.8 G/DL (ref 2.8–4.5)
GLUCOSE SERPL-MCNC: 135 MG/DL (ref 65–100)
HBA1C MFR BLD: 6.2 % (ref 4.8–5.6)
HCT VFR BLD AUTO: 36.2 % (ref 35.8–46.3)
HDLC SERPL-MCNC: 30 MG/DL (ref 40–60)
HDLC SERPL: 2.6 {RATIO}
HGB BLD-MCNC: 11 G/DL (ref 11.7–15.4)
IMM GRANULOCYTES # BLD AUTO: 0 K/UL (ref 0–0.5)
IMM GRANULOCYTES NFR BLD AUTO: 0 % (ref 0–5)
LDLC SERPL CALC-MCNC: 30.6 MG/DL
LYMPHOCYTES # BLD: 1.9 K/UL (ref 0.5–4.6)
LYMPHOCYTES NFR BLD: 22 % (ref 13–44)
MAGNESIUM SERPL-MCNC: 2.1 MG/DL (ref 1.8–2.4)
MCH RBC QN AUTO: 30.6 PG (ref 26.1–32.9)
MCHC RBC AUTO-ENTMCNC: 30.4 G/DL (ref 31.4–35)
MCV RBC AUTO: 100.8 FL (ref 82–102)
MICROALBUMIN UR-MCNC: 28 MG/DL
MICROALBUMIN/CREAT UR-RTO: 311 MG/G (ref 0–30)
MONOCYTES # BLD: 0.8 K/UL (ref 0.1–1.3)
MONOCYTES NFR BLD: 9 % (ref 4–12)
NEUTS SEG # BLD: 5.8 K/UL (ref 1.7–8.2)
NEUTS SEG NFR BLD: 66 % (ref 43–78)
NRBC # BLD: 0 K/UL (ref 0–0.2)
PLATELET # BLD AUTO: 223 K/UL (ref 150–450)
PMV BLD AUTO: 11 FL (ref 9.4–12.3)
POTASSIUM SERPL-SCNC: 4.1 MMOL/L (ref 3.5–5.1)
PROT SERPL-MCNC: 7.1 G/DL (ref 6.3–8.2)
RBC # BLD AUTO: 3.59 M/UL (ref 4.05–5.2)
SODIUM SERPL-SCNC: 142 MMOL/L (ref 133–143)
TRIGL SERPL-MCNC: 87 MG/DL (ref 35–150)
TSH, 3RD GENERATION: 1.72 UIU/ML (ref 0.36–3.74)
URATE SERPL-MCNC: 4.5 MG/DL (ref 2.6–6)
VLDLC SERPL CALC-MCNC: 17.4 MG/DL (ref 6–23)
WBC # BLD AUTO: 8.8 K/UL (ref 4.3–11.1)

## 2022-11-21 ENCOUNTER — ANTI-COAG VISIT (OUTPATIENT)
Dept: CARDIOLOGY CLINIC | Age: 77
End: 2022-11-21
Payer: MEDICARE

## 2022-11-21 ENCOUNTER — OFFICE VISIT (OUTPATIENT)
Dept: INTERNAL MEDICINE CLINIC | Facility: CLINIC | Age: 77
End: 2022-11-21
Payer: MEDICARE

## 2022-11-21 VITALS
DIASTOLIC BLOOD PRESSURE: 70 MMHG | HEART RATE: 50 BPM | BODY MASS INDEX: 24.78 KG/M2 | SYSTOLIC BLOOD PRESSURE: 120 MMHG | WEIGHT: 151.2 LBS | OXYGEN SATURATION: 95 %

## 2022-11-21 DIAGNOSIS — Z79.4 TYPE 2 DIABETES MELLITUS WITH MICROALBUMINURIA, WITH LONG-TERM CURRENT USE OF INSULIN (HCC): Primary | ICD-10-CM

## 2022-11-21 DIAGNOSIS — R80.9 TYPE 2 DIABETES MELLITUS WITH MICROALBUMINURIA, WITH LONG-TERM CURRENT USE OF INSULIN (HCC): Primary | ICD-10-CM

## 2022-11-21 DIAGNOSIS — I48.0 PAROXYSMAL ATRIAL FIBRILLATION (HCC): ICD-10-CM

## 2022-11-21 DIAGNOSIS — Z95.2 S/P AVR (AORTIC VALVE REPLACEMENT): ICD-10-CM

## 2022-11-21 DIAGNOSIS — Z95.2 S/P MITRAL VALVE REPLACEMENT: ICD-10-CM

## 2022-11-21 DIAGNOSIS — I10 ESSENTIAL HYPERTENSION: Chronic | ICD-10-CM

## 2022-11-21 DIAGNOSIS — E11.29 TYPE 2 DIABETES MELLITUS WITH MICROALBUMINURIA, WITH LONG-TERM CURRENT USE OF INSULIN (HCC): Primary | ICD-10-CM

## 2022-11-21 DIAGNOSIS — Z95.2 S/P AVR (AORTIC VALVE REPLACEMENT): Chronic | ICD-10-CM

## 2022-11-21 DIAGNOSIS — Z79.899 LONG-TERM USE OF HIGH-RISK MEDICATION: Chronic | ICD-10-CM

## 2022-11-21 DIAGNOSIS — I48.0 PAROXYSMAL ATRIAL FIBRILLATION (HCC): Chronic | ICD-10-CM

## 2022-11-21 DIAGNOSIS — E78.2 MIXED HYPERLIPIDEMIA: Chronic | ICD-10-CM

## 2022-11-21 DIAGNOSIS — E89.0 HYPOTHYROIDISM, POSTSURGICAL: Chronic | ICD-10-CM

## 2022-11-21 DIAGNOSIS — Z79.01 LONG TERM CURRENT USE OF ANTICOAGULANT THERAPY: Chronic | ICD-10-CM

## 2022-11-21 DIAGNOSIS — Z79.01 LONG TERM CURRENT USE OF ANTICOAGULANT THERAPY: Primary | ICD-10-CM

## 2022-11-21 LAB
POC INR: 3.5
PROTHROMBIN TIME, POC: ABNORMAL

## 2022-11-21 PROCEDURE — G8484 FLU IMMUNIZE NO ADMIN: HCPCS | Performed by: NURSE PRACTITIONER

## 2022-11-21 PROCEDURE — 3074F SYST BP LT 130 MM HG: CPT | Performed by: NURSE PRACTITIONER

## 2022-11-21 PROCEDURE — G8420 CALC BMI NORM PARAMETERS: HCPCS | Performed by: NURSE PRACTITIONER

## 2022-11-21 PROCEDURE — 3078F DIAST BP <80 MM HG: CPT | Performed by: NURSE PRACTITIONER

## 2022-11-21 PROCEDURE — 1123F ACP DISCUSS/DSCN MKR DOCD: CPT | Performed by: NURSE PRACTITIONER

## 2022-11-21 PROCEDURE — 93793 ANTICOAG MGMT PT WARFARIN: CPT | Performed by: INTERNAL MEDICINE

## 2022-11-21 PROCEDURE — 85610 PROTHROMBIN TIME: CPT | Performed by: INTERNAL MEDICINE

## 2022-11-21 PROCEDURE — G8399 PT W/DXA RESULTS DOCUMENT: HCPCS | Performed by: NURSE PRACTITIONER

## 2022-11-21 PROCEDURE — G8427 DOCREV CUR MEDS BY ELIG CLIN: HCPCS | Performed by: NURSE PRACTITIONER

## 2022-11-21 PROCEDURE — 1036F TOBACCO NON-USER: CPT | Performed by: NURSE PRACTITIONER

## 2022-11-21 PROCEDURE — 99214 OFFICE O/P EST MOD 30 MIN: CPT | Performed by: NURSE PRACTITIONER

## 2022-11-21 PROCEDURE — 1090F PRES/ABSN URINE INCON ASSESS: CPT | Performed by: NURSE PRACTITIONER

## 2022-11-21 RX ORDER — INSULIN ASPART 100 [IU]/ML
INJECTION, SOLUTION INTRAVENOUS; SUBCUTANEOUS
Qty: 5 ADJUSTABLE DOSE PRE-FILLED PEN SYRINGE | Refills: 3
Start: 2022-11-21

## 2022-11-21 RX ORDER — AMOXICILLIN 500 MG/1
TABLET, FILM COATED ORAL
COMMUNITY
Start: 2022-09-09

## 2022-11-21 ASSESSMENT — PATIENT HEALTH QUESTIONNAIRE - PHQ9
2. FEELING DOWN, DEPRESSED OR HOPELESS: 0
SUM OF ALL RESPONSES TO PHQ QUESTIONS 1-9: 0
SUM OF ALL RESPONSES TO PHQ QUESTIONS 1-9: 0
1. LITTLE INTEREST OR PLEASURE IN DOING THINGS: 0
SUM OF ALL RESPONSES TO PHQ QUESTIONS 1-9: 0
SUM OF ALL RESPONSES TO PHQ QUESTIONS 1-9: 0
SUM OF ALL RESPONSES TO PHQ9 QUESTIONS 1 & 2: 0

## 2022-11-21 NOTE — PATIENT INSTRUCTIONS
Reminder: Please contact the Coumadin Clinic at [phone] when you have medication changes. Examples, new medications, antibiotics, discontinued medications, new supplements, missed doses of warfarin or if you took extra doses of warfarin. This also includes OTC medications. Notifying us helps reduce the possibility of high and low INR's. In addition, if warfarin needs to be held for any procedures, please have surgeon or physician's office contact us before holding anticoagulant. Thanks, [facility] Coumadin [facility].

## 2022-11-21 NOTE — PROGRESS NOTES
Tablet strength and weekly dosing schedule confirmed today. Permanent change made to 5 mg every Friday. .  Recheck in two weeks.

## 2022-11-21 NOTE — PROGRESS NOTES
PROGRESS NOTE    SUBJECTIVE:   Eli Cline is a 69 y.o. female seen for a follow up visit for   Chief Complaint   Patient presents with    Follow-up Chronic Condition      Follow-Up, DM2, Hypothyroidism, HLD, with labs     Hyperlipidemia  This is a chronic problem. Episode onset: greater than 20 years ago. Exacerbating diseases include diabetes and hypothyroidism. Current antihyperlipidemic treatment includes statins. There are no compliance problems. Risk factors for coronary artery disease include diabetes mellitus, dyslipidemia, family history and post-menopausal.     Hypothyroid (post-surgical)  This is a chronic problem. Episode onset: approx 2010. Denies weight changes. Denies heat/cold intolerances. Diabetes, Type II on long term insulin therapy  This is a chronic problem. Episode onset: approx 2005. Blood sugars improved with  medications (diet, insulin. ). Using CGM:  Dexcom G    Aortic valve replacement  Aortic valve replacement:  CE bovine bioprosthetic valve with a size of 21 mm. No regurgitation. Mild stenosis of the aortic valve. Mitral valve:  CE-2015 bovine bioprosthetic valve with a size of 25 mm. No transvalvular regurgitation. No paravalvular regurgitation. Mild stenosis noted. Requires lifetime anticoagulation therapy with warfarin. History colon cancer       Reviewed and updated this visit by provider:  Tobacco  Allergies  Meds  Problems  Med Hx  Surg Hx  Fam Hx         Review of Systems   Constitutional:  Negative for chills and fever. HENT:  Negative for hearing loss and sneezing. Eyes:  Negative for visual disturbance. Respiratory:  Negative for cough, chest tightness, shortness of breath and wheezing. Cardiovascular:  Negative for palpitations and leg swelling. Gastrointestinal:  Negative for abdominal pain. Genitourinary:         Nocturia x 2 each night   Musculoskeletal:  Negative for arthralgias and back pain.    Skin:  Negative for color change. Allergic/Immunologic: Negative for environmental allergies. Neurological:  Negative for syncope. Hematological:  Does not bruise/bleed easily. OBJECTIVE:    /70 (Site: Left Upper Arm, Position: Sitting, Cuff Size: Small Adult)   Pulse 50   Wt 151 lb 3.2 oz (68.6 kg)   SpO2 95%   BMI 24.78 kg/m²      Physical Exam  Vitals and nursing note reviewed. Constitutional:       Appearance: Normal appearance. HENT:      Head: Normocephalic. Mouth/Throat:      Lips: Pink. Mouth: Mucous membranes are moist.   Eyes:      General: Lids are normal.   Neck:      Vascular: No carotid bruit. Cardiovascular:      Rate and Rhythm: Normal rate and regular rhythm. Heart sounds: Murmur heard. Pulmonary:      Effort: Pulmonary effort is normal.      Breath sounds: Normal breath sounds. Musculoskeletal:      Cervical back: Neck supple. Right lower leg: No edema. Left lower leg: No edema. Skin:     General: Skin is warm and dry. Neurological:      Mental Status: She is alert and oriented to person, place, and time. Mental status is at baseline. Gait: Gait normal.   Psychiatric:         Mood and Affect: Mood normal.         Behavior: Behavior normal.        ASSESSMENT and PLAN    1. Type 2 diabetes mellitus with microalbuminuria, with long-term current use of insulin (HCC)  -     insulin aspart (NOVOLOG FLEXPEN) 100 UNIT/ML injection pen; By sliding scale, Disp-5 Adjustable Dose Pre-filled Pen Syringe, [id number] PRINT  -     Comprehensive Metabolic Panel; Future  -     Hemoglobin A1C; Future  2. Essential hypertension  -     CBC with Auto Differential; Future  -     Comprehensive Metabolic Panel; Future  -     Magnesium; Future  -     Uric Acid; Future  3. Hypothyroidism, postsurgical  -     TSH; Future  4. Mixed hyperlipidemia  -     Comprehensive Metabolic Panel; Future  -     Lipid Panel; Future  5.  S/P AVR (aortic valve replacement)  -     CBC with Auto Differential; Future  6. Long term current use of anticoagulant therapy  -     CBC with Auto Differential; Future  7. Paroxysmal atrial fibrillation (HCC)  -     CBC with Auto Differential; Future  8. Long-term use of high-risk medication    Return in about 3 months (around 2/21/2023) for Follow-Up, Hypothyroidism, DM2, HTN, HLD, with labs. current treatment plan is effective, no change in therapy  lab results and schedule of future lab studies reviewed with patient  reviewed diet, exercise and weight control  cardiovascular risk and specific lipid/LDL goals reviewed  reviewed medications and side effects in detail  specific diabetic recommendations: low cholesterol diet, weight control and daily exercise discussed, all medications, side effects and compliance discussed carefully, foot care discussed and Podiatry visits discussed, annual eye examinations at Ophthalmology discussed, glycohemoglobin and other lab monitoring discussed, long term diabetic complications discussed, and labs immediately prior to next visit      RICCI Garnica NP    Dictated using voice recognition software.  Proofread, but unrecognized voice recognition errors may exist.

## 2022-11-21 NOTE — PATIENT INSTRUCTIONS
Recent Results (from the past 672 hour(s))   PT/INR (Resulted at [facility]/in-office AND billed by [facility])    Collection Time: 10/31/22 12:55 PM   Result Value Ref Range    Prothrombin time, POC      POC INR 3.4    TSH    Collection Time: 11/04/22  8:10 AM   Result Value Ref Range    TSH, 3RD GENERATION 1.720 0.358 - 3.740 uIU/mL   Microalbumin / Creatinine Urine Ratio    Collection Time: 11/04/22  8:10 AM   Result Value Ref Range    Microalbumin, Random Urine 28.00 (H) <2.0 MG/DL    Creatinine, Ur 90.00 mg/dL    Microalbumin Creatinine Ratio 311 (H) 0 - 30 mg/g   Comprehensive Metabolic Panel    Collection Time: 11/04/22  8:10 AM   Result Value Ref Range    Sodium 142 133 - 143 mmol/L    Potassium 4.1 3.5 - 5.1 mmol/L    Chloride 107 101 - 110 mmol/L    CO2 27 21 - 32 mmol/L    Anion Gap 8 2 - 11 mmol/L    Glucose 135 (H) 65 - 100 mg/dL    BUN 17 8 - 23 MG/DL    Creatinine 0.80 0.6 - 1.0 MG/DL    Est, Glom Filt Rate >60 >60 ml/min/1.73m2    Calcium 8.6 8.3 - 10.4 MG/DL    Total Bilirubin 0.4 0.2 - 1.1 MG/DL    ALT 31 12 - 65 U/L    AST 21 15 - 37 U/L    Alk Phosphatase 103 50 - 136 U/L    Total Protein 7.1 6.3 - 8.2 g/dL    Albumin 3.3 3.2 - 4.6 g/dL    Globulin 3.8 2.8 - 4.5 g/dL    Albumin/Globulin Ratio 0.9 0.4 - 1.6     CBC with Auto Differential    Collection Time: 11/04/22  8:10 AM   Result Value Ref Range    WBC 8.8 4.3 - 11.1 K/uL    RBC 3.59 (L) 4.05 - 5.2 M/uL    Hemoglobin 11.0 (L) 11.7 - 15.4 g/dL    Hematocrit 36.2 35.8 - 46.3 %    .8 82 - 102 FL    MCH 30.6 26.1 - 32.9 PG    MCHC 30.4 (L) 31.4 - 35.0 g/dL    RDW 13.6 11.9 - 14.6 %    Platelets 334 248 - 396 K/uL    MPV 11.0 9.4 - 12.3 FL    nRBC 0.00 0.0 - 0.2 K/uL    Differential Type AUTOMATED      Seg Neutrophils 66 43 - 78 %    Lymphocytes 22 13 - 44 %    Monocytes 9 4.0 - 12.0 %    Eosinophils % 2 0.5 - 7.8 %    Basophils 1 0.0 - 2.0 %    Immature Granulocytes 0 0.0 - 5.0 %    Segs Absolute 5.8 1.7 - 8.2 K/UL    Absolute Lymph # 1.9 0.5 - 4.6 K/UL    Absolute Mono # 0.8 0.1 - 1.3 K/UL    Absolute Eos # 0.2 0.0 - 0.8 K/UL    Basophils Absolute 0.0 0.0 - 0.2 K/UL    Absolute Immature Granulocyte 0.0 0.0 - 0.5 K/UL   Hemoglobin A1C    Collection Time: 11/04/22  8:10 AM   Result Value Ref Range    Hemoglobin A1C 6.2 (H) 4.8 - 5.6 %    eAG 131 mg/dL   Uric Acid    Collection Time: 11/04/22  8:10 AM   Result Value Ref Range    Uric Acid 4.5 2.6 - 6.0 MG/DL   Magnesium    Collection Time: 11/04/22  8:10 AM   Result Value Ref Range    Magnesium 2.1 1.8 - 2.4 mg/dL   Lipid Panel    Collection Time: 11/04/22  8:10 AM   Result Value Ref Range    Cholesterol, Total 78 <200 MG/DL    Triglycerides 87 35 - 150 MG/DL    HDL 30 (L) 40 - 60 MG/DL    LDL Calculated 30.6 <100 MG/DL    VLDL Cholesterol Calculated 17.4 6.0 - 23.0 MG/DL    Chol/HDL Ratio 2.6     PT/INR (Resulted at [facility]/in-office AND billed by [facility])    Collection Time: 11/21/22  1:05 PM   Result Value Ref Range    Prothrombin time, POC      POC INR 3.5

## 2022-12-05 ENCOUNTER — ANTI-COAG VISIT (OUTPATIENT)
Dept: CARDIOLOGY CLINIC | Age: 77
End: 2022-12-05
Payer: MEDICARE

## 2022-12-05 DIAGNOSIS — Z79.01 LONG TERM CURRENT USE OF ANTICOAGULANT THERAPY: Primary | ICD-10-CM

## 2022-12-05 DIAGNOSIS — I48.0 PAROXYSMAL ATRIAL FIBRILLATION (HCC): ICD-10-CM

## 2022-12-05 DIAGNOSIS — Z95.2 S/P MITRAL VALVE REPLACEMENT: ICD-10-CM

## 2022-12-05 DIAGNOSIS — Z95.2 S/P AVR (AORTIC VALVE REPLACEMENT): ICD-10-CM

## 2022-12-05 LAB
POC INR: 1.6
PROTHROMBIN TIME, POC: ABNORMAL

## 2022-12-05 PROCEDURE — 85610 PROTHROMBIN TIME: CPT | Performed by: INTERNAL MEDICINE

## 2022-12-05 PROCEDURE — 93793 ANTICOAG MGMT PT WARFARIN: CPT | Performed by: INTERNAL MEDICINE

## 2022-12-05 NOTE — PROGRESS NOTES
Tablet strength and weekly dosing schedule confirmed today.   Pt unaware of reason for decreased PT/INR

## 2022-12-05 NOTE — PATIENT INSTRUCTIONS
Reminder: Please contact the Coumadin Clinic at [phone] when you have medication changes. Examples, new medications, antibiotics, discontinued medications, new supplements, missed doses of warfarin or if you took extra doses of warfarin. This also includes OTC medications. Notifying us helps reduce the possibility of high and low INR's. In addition, if warfarin needs to be held for any procedures, please have surgeon or physician's office contact us before holding anticoagulant. Thanks, [facility].

## 2022-12-19 ENCOUNTER — ANTI-COAG VISIT (OUTPATIENT)
Dept: CARDIOLOGY CLINIC | Age: 77
End: 2022-12-19
Payer: MEDICARE

## 2022-12-19 DIAGNOSIS — Z79.01 LONG TERM CURRENT USE OF ANTICOAGULANT THERAPY: Primary | ICD-10-CM

## 2022-12-19 DIAGNOSIS — Z95.2 S/P MITRAL VALVE REPLACEMENT: ICD-10-CM

## 2022-12-19 DIAGNOSIS — I48.0 PAROXYSMAL ATRIAL FIBRILLATION (HCC): ICD-10-CM

## 2022-12-19 DIAGNOSIS — Z95.2 S/P AVR (AORTIC VALVE REPLACEMENT): ICD-10-CM

## 2022-12-19 LAB
POC INR: 2.1
PROTHROMBIN TIME, POC: NORMAL

## 2022-12-19 PROCEDURE — 93793 ANTICOAG MGMT PT WARFARIN: CPT | Performed by: INTERNAL MEDICINE

## 2022-12-19 PROCEDURE — 85610 PROTHROMBIN TIME: CPT | Performed by: INTERNAL MEDICINE

## 2022-12-20 ENCOUNTER — OFFICE VISIT (OUTPATIENT)
Dept: INTERNAL MEDICINE CLINIC | Facility: CLINIC | Age: 77
End: 2022-12-20
Payer: MEDICARE

## 2022-12-20 VITALS
WEIGHT: 151.8 LBS | SYSTOLIC BLOOD PRESSURE: 122 MMHG | OXYGEN SATURATION: 95 % | BODY MASS INDEX: 24.88 KG/M2 | HEART RATE: 62 BPM | DIASTOLIC BLOOD PRESSURE: 64 MMHG

## 2022-12-20 DIAGNOSIS — Z79.01 LONG TERM CURRENT USE OF ANTICOAGULANT THERAPY: ICD-10-CM

## 2022-12-20 DIAGNOSIS — J01.90 ACUTE NON-RECURRENT SINUSITIS, UNSPECIFIED LOCATION: Primary | ICD-10-CM

## 2022-12-20 DIAGNOSIS — I48.0 PAROXYSMAL ATRIAL FIBRILLATION (HCC): ICD-10-CM

## 2022-12-20 DIAGNOSIS — Z79.899 LONG-TERM USE OF HIGH-RISK MEDICATION: ICD-10-CM

## 2022-12-20 PROCEDURE — G8399 PT W/DXA RESULTS DOCUMENT: HCPCS | Performed by: NURSE PRACTITIONER

## 2022-12-20 PROCEDURE — G8420 CALC BMI NORM PARAMETERS: HCPCS | Performed by: NURSE PRACTITIONER

## 2022-12-20 PROCEDURE — 99213 OFFICE O/P EST LOW 20 MIN: CPT | Performed by: NURSE PRACTITIONER

## 2022-12-20 PROCEDURE — 1123F ACP DISCUSS/DSCN MKR DOCD: CPT | Performed by: NURSE PRACTITIONER

## 2022-12-20 PROCEDURE — 1090F PRES/ABSN URINE INCON ASSESS: CPT | Performed by: NURSE PRACTITIONER

## 2022-12-20 PROCEDURE — 3078F DIAST BP <80 MM HG: CPT | Performed by: NURSE PRACTITIONER

## 2022-12-20 PROCEDURE — 3074F SYST BP LT 130 MM HG: CPT | Performed by: NURSE PRACTITIONER

## 2022-12-20 PROCEDURE — 1036F TOBACCO NON-USER: CPT | Performed by: NURSE PRACTITIONER

## 2022-12-20 PROCEDURE — G8484 FLU IMMUNIZE NO ADMIN: HCPCS | Performed by: NURSE PRACTITIONER

## 2022-12-20 PROCEDURE — G8427 DOCREV CUR MEDS BY ELIG CLIN: HCPCS | Performed by: NURSE PRACTITIONER

## 2022-12-20 RX ORDER — AMOXICILLIN 500 MG/1
500 CAPSULE ORAL 3 TIMES DAILY
Qty: 30 CAPSULE | Refills: 0 | Status: SHIPPED | OUTPATIENT
Start: 2022-12-20 | End: 2022-12-30

## 2022-12-20 RX ORDER — GUAIFENESIN 600 MG/1
600 TABLET, EXTENDED RELEASE ORAL 4 TIMES DAILY
Qty: 40 TABLET | Refills: 0 | Status: SHIPPED | OUTPATIENT
Start: 2022-12-20 | End: 2022-12-30

## 2022-12-20 RX ORDER — BENZONATATE 200 MG/1
200 CAPSULE ORAL 3 TIMES DAILY PRN
Qty: 30 CAPSULE | Refills: 0 | Status: SHIPPED | OUTPATIENT
Start: 2022-12-20 | End: 2022-12-27

## 2022-12-20 NOTE — PATIENT INSTRUCTIONS
Use Afrin 2 sprays to each nostril twice a day for 3 days maximum    Use sterile nasal saline, such as \"Simply Saline,\" to irrigate nasal passages and moisturize mucosal membranes. Gently blow nose after using sterile nasal saline.

## 2022-12-20 NOTE — PROGRESS NOTES
Subjective:       Enrique Jeans is a 69 y.o. female who presents for evaluation of symptoms of a URI, possible sinusitis. Symptoms include congestion, cough described as without wheezing, dyspnea or hemoptysis, productive of green/yellow sputum, harsh, worsening over time, nasal congestion, no  fever, post nasal drip, and productive cough with  green colored sputum. Onset of symptoms was 5 days ago, gradually worsening since that time. Treatment to date: none. Past Medical History:   Diagnosis Date    Anesthesia complication     slow to wake up    Aortic valve insufficiency     CAD (coronary artery disease)     Followed by [facility]    Current use of long term anticoagulation     Warfarin and Aspirin    Diabetes mellitus, insulin dependent (IDDM), controlled 5/11/2016    insulin reliant/avg fbs 100-130, pt not sure of BS reading when experiencing s.s of hypoglycemia/A1c 6.1,    Fracture, femur ([facility] [address] 9/21/2009    GERD (gastroesophageal reflux disease)     states rarely PRN medication    Heart failure (HCC)     EF 55-60 % 9/9/20    History of cardioversion     History of colon cancer     History of petit-mal seizures     none in 15 years    History of seizure     no seizure activity since 1980's    Hyperlipidemia     Hypertension     Hypothyroidism     Mitral stenosis with insufficiency     Other unknown and unspecified cause of morbidity or mortality     HLD    Paroxysmal atrial fibrillation (HCC)     PONV (postoperative nausea and vomiting)     post op nausea.  pt does well with antiemetic    Thyroid nodule 6/29/2011    Unspecified essential hypertension     Visit for monitoring Tikosyn therapy     pt takes Tikosyn BID     Patient Active Problem List    Diagnosis Date Noted    Iron deficiency 01/23/2022    Long-term use of high-risk medication 01/23/2022    Blood loss anemia     Long term current use of anticoagulant therapy     Mixed hyperlipidemia     Colon cancer ([facility] [address] 09/01/2021    Rectal cancer ([facility] [address] 09/01/2021    Colonic mass 08/13/2021    Type 2 diabetes mellitus with microalbuminuria, with long-term current use of insulin ([facility] 08/16/2018    Type 2 diabetes mellitus with hyperglycemia, with long-term current use of insulin ([facility] 08/16/2018    Bradycardia 01/18/2017    Osteoporosis     Hypothyroidism, postsurgical 10/21/2015    S/P AVR (aortic valve replacement) 05/04/2015    Mitral valve insufficiency 05/04/2015    Paroxysmal atrial fibrillation ([facility] 05/04/2015    Hypoxia 05/04/2015    S/P mitral valve replacement 05/04/2015    Carotid stenosis     GERD (gastroesophageal reflux disease)     CAD (coronary artery disease)     Essential hypertension 09/21/2009     Current Outpatient Medications on File Prior to Visit   Medication Sig Dispense Refill    Amoxicillin 500 MG TABS       insulin aspart (NOVOLOG FLEXPEN) 100 UNIT/ML injection pen By sliding scale 5 Adjustable Dose Pre-filled Pen Syringe 3    Insulin Degludec (TRESIBA SC) Inject 43 Units into the skin at bedtime      aspirin 81 MG chewable tablet Take 81 mg by mouth in the morning.       Probiotic Product (PROBIOTIC-10 PO) Take by mouth daily      amLODIPine (NORVASC) 5 MG tablet Take 2.5 mg by mouth in the morning and at bedtime      atorvastatin (LIPITOR) 20 MG tablet Take 10 mg by mouth every evening      vitamin D (CHOLECALCIFEROL) 25 MCG (1000 UT) TABS tablet Take 1,000 Units by mouth 2 times daily      dofetilide (TIKOSYN) 500 MCG capsule Take 500 mcg by mouth 2 times daily      fluticasone (FLONASE) 50 MCG/ACT nasal spray 2 sprays by Nasal route as needed      levothyroxine (SYNTHROID) 137 MCG tablet Take 137 mcg by mouth every morning (before breakfast)      loratadine (CLARITIN) 10 MG tablet Take 10 mg by mouth daily as needed      losartan (COZAAR) 50 MG tablet TAKE 1 TABLET TWICE A DAY      magnesium oxide (MAG-OX) 400 MG tablet Take 400 mg by mouth at bedtime      omeprazole (PRILOSEC) 20 MG delayed release capsule Take 20 mg by mouth as needed      potassium chloride (KLOR-CON M) 20 MEQ extended release tablet Take 20 mEq by mouth daily      warfarin (COUMADIN) 5 MG tablet 1.5 tablets on Monday/Wed/Friday and 1 tablet all other days       No current facility-administered medications on file prior to visit. Allergies   Allergen Reactions    Ibuprofen Rash       Review of Systems  Pertinent items are noted in HPI. Objective:      /64 (Site: Left Upper Arm, Position: Sitting, Cuff Size: Small Adult)   Pulse 62   Wt 151 lb 12.8 oz (68.9 kg)   SpO2 95%   BMI 24.88 kg/m²   Physical Exam  Vitals and nursing note reviewed. Constitutional:       Appearance: Normal appearance. HENT:      Head: Normocephalic. Nose: Mucosal edema and congestion present. Right Turbinates: Swollen. Left Turbinates: Swollen. Right Sinus: Maxillary sinus tenderness and frontal sinus tenderness present. Left Sinus: Maxillary sinus tenderness and frontal sinus tenderness present. Mouth/Throat:      Lips: Pink. Mouth: Mucous membranes are moist.   Eyes:      General: Lids are normal.   Neck:      Vascular: No carotid bruit. Cardiovascular:      Rate and Rhythm: Normal rate and regular rhythm. Heart sounds: Murmur heard. Pulmonary:      Effort: Pulmonary effort is normal.      Breath sounds: Normal breath sounds. Musculoskeletal:      Cervical back: Neck supple. Right lower leg: No edema. Left lower leg: No edema. Skin:     General: Skin is warm and dry. Neurological:      Mental Status: She is alert and oriented to person, place, and time. Mental status is at baseline. Gait: Gait normal.   Psychiatric:         Mood and Affect: Mood normal.         Behavior: Behavior normal.         Assessment:      sinusitis     Plan:      Discussed the dx and tx of sinusitis. Suggested symptomatic OTC remedies. Nasal saline sprays for congestion. Amoxicillin per orders. RTC prn.      Ruthy Roman, RICCI - NP

## 2022-12-27 ENCOUNTER — ANTI-COAG VISIT (OUTPATIENT)
Dept: CARDIOLOGY CLINIC | Age: 77
End: 2022-12-27

## 2022-12-27 DIAGNOSIS — Z95.2 S/P AVR (AORTIC VALVE REPLACEMENT): ICD-10-CM

## 2022-12-27 DIAGNOSIS — Z95.2 S/P MITRAL VALVE REPLACEMENT: ICD-10-CM

## 2022-12-27 DIAGNOSIS — Z79.01 LONG TERM CURRENT USE OF ANTICOAGULANT THERAPY: Primary | ICD-10-CM

## 2022-12-27 DIAGNOSIS — I48.0 PAROXYSMAL ATRIAL FIBRILLATION (HCC): ICD-10-CM

## 2022-12-27 RX ORDER — LEVOTHYROXINE SODIUM 137 UG/1
TABLET ORAL
Qty: 90 TABLET | Refills: 3 | OUTPATIENT
Start: 2022-12-27

## 2023-01-02 ASSESSMENT — ENCOUNTER SYMPTOMS
SHORTNESS OF BREATH: 0
ABDOMINAL PAIN: 0
COUGH: 0
BACK PAIN: 0
WHEEZING: 0
CHEST TIGHTNESS: 0
COLOR CHANGE: 0

## 2023-01-03 ENCOUNTER — ANTI-COAG VISIT (OUTPATIENT)
Dept: CARDIOLOGY CLINIC | Age: 78
End: 2023-01-03
Payer: MEDICARE

## 2023-01-03 DIAGNOSIS — Z95.2 S/P MITRAL VALVE REPLACEMENT: ICD-10-CM

## 2023-01-03 DIAGNOSIS — Z95.2 S/P AVR (AORTIC VALVE REPLACEMENT): ICD-10-CM

## 2023-01-03 DIAGNOSIS — I48.0 PAROXYSMAL ATRIAL FIBRILLATION (HCC): ICD-10-CM

## 2023-01-03 DIAGNOSIS — E89.0 HYPOTHYROIDISM, POSTSURGICAL: Primary | ICD-10-CM

## 2023-01-03 DIAGNOSIS — Z79.01 LONG TERM CURRENT USE OF ANTICOAGULANT THERAPY: Primary | ICD-10-CM

## 2023-01-03 LAB
POC INR: 2.1
PROTHROMBIN TIME, POC: NORMAL

## 2023-01-03 PROCEDURE — 93793 ANTICOAG MGMT PT WARFARIN: CPT | Performed by: INTERNAL MEDICINE

## 2023-01-03 PROCEDURE — 85610 PROTHROMBIN TIME: CPT | Performed by: INTERNAL MEDICINE

## 2023-01-03 RX ORDER — LEVOTHYROXINE SODIUM 137 UG/1
137 TABLET ORAL
Qty: 90 TABLET | Refills: 3 | Status: SHIPPED | OUTPATIENT
Start: 2023-01-03

## 2023-01-03 NOTE — PATIENT INSTRUCTIONS
Reminder: Please contact the Coumadin Clinic at [phone] when you have medication changes. Examples, new medications, antibiotics, discontinued medications, new supplements, missed doses of warfarin or if you took extra doses of warfarin. This also includes OTC medications. Notifying us helps reduce the possibility of high and low INR's. In addition, if warfarin needs to be held for any procedures, please have surgeon or physician's office contact us before holding anticoagulant. Thanks, [facility].

## 2023-01-13 ASSESSMENT — ENCOUNTER SYMPTOMS
DIARRHEA: 0
SORE THROAT: 0
CONSTIPATION: 0
SHORTNESS OF BREATH: 0
ABDOMINAL PAIN: 0
COUGH: 0
PHOTOPHOBIA: 0
ABDOMINAL DISTENTION: 0

## 2023-01-13 NOTE — PROGRESS NOTES
[facility] [[email or web address]] CARDIOLOGY  [address], [address]  PHONE: [phone]        NAME:  Tavia Sierra  : 1945  MRN: [id number]     PCP:  Suzen Dubin, APRN - NP      SUBJECTIVE:   Tavia Sierra is a 69 y.o. female seen for a follow up visit regarding the following:     Chief Complaint   Patient presents with    Coronary Artery Disease       HPI:    Doing well since last visit and exercising regularly without limitations but recently diagnosed with colon CA and s/p partial colon resection without adverse cardiac event. No interval palpitations to suggest recurrence of paroxysmal A. fib with RVR since her last visit. She has been completely asymptomatic otherwise without any angina, CHF, presyncope or syncope and remains compliant with tikosyn. INRs have been labile only when holding Coumadin for serial colonoscopy, otherwise well controlled and usually in the mid 2 range. Euvolemic and compliant with all meds. No bleeding with coumadin. Doing well since last visit in the hospital without interval angina, CHF, palpitations, edema, presyncope or syncope. Vitals controlled and tolerating meds well. Staying active without any significant limitations. Echo 2022:  Left Ventricle Left ventricle size is normal. Normal wall thickness. Normal wall motion. Normal left ventricular systolic function with a visually estimated EF of 55 - 60%. Indeterminate diastolic function due to mitral valve disease. Left Atrium Left atrial volume index is mildly increased (35-41 mL/m2). Interatrial Septum No interatrial shunt visualized with color Doppler. Right Ventricle Right ventricle size is normal. Normal wall thickness. Normal systolic function. Right Atrium Right atrium size is normal.   Aortic Valve CE bovine bioprosthetic valve with a size of 21 mm. No regurgitation. Mild stenosis of the aortic valve. Noted by the apical view.  AV mean gradient is 20 mmHg. AV peak gradient is 38 mmHg. AV peak velocity is 3.1 m/s. AV AT is 94.00 ms. LVOT:AV VTI Index is 0.41. AV area by continuity VTI is 1.2 Square Centimeter. Mitral Valve CE-2015 bovine bioprosthetic valve with a size of 25 mm. No transvalvular regurgitation. No paravalvular regurgitation. Mild stenosis noted. MV mean gradient is 7 mmHg. MV PHT is 127.0 ms. MV area by PHT is 1.7 Square Centimeter. Tricuspid Valve Valve structure is normal. Trace paravalvular regurgitation. No stenosis noted. Pulmonic Valve Valve structure is normal. No transvalvular regurgitation. No stenosis noted. Aorta Normal sized aortic root and ascending aorta. IVC/Hepatic Veins IVC diameter is less than or equal to 21 mm and decreases greater than 50% during inspiration; therefore the estimated right atrial pressure is normal (~3 mmHg). IVC size is normal.   Pericardium No pericardial effusion. Carotid duplex October 2021:   1. Chronically occluded left internal carotid artery   2. Mild less than 50% right internal carotid artery stenosis   3. Bilateral antegrade vertebral arteries    Past Medical History, Past Surgical History, Family history, Social History, and Medications were all reviewed with the patient today and updated as necessary.      Current Outpatient Medications   Medication Sig Dispense Refill    dofetilide (TIKOSYN) 500 MCG capsule Take 1 capsule by mouth 2 times daily 60 capsule 11    losartan (COZAAR) 50 MG tablet TAKE 1 TABLET TWICE A DAY 90 tablet 3    potassium chloride (KLOR-CON M) 20 MEQ extended release tablet Take 1 tablet by mouth daily 90 tablet 3    levothyroxine (SYNTHROID) 137 MCG tablet Take 1 tablet by mouth every morning (before breakfast) 90 tablet 3    Amoxicillin 500 MG TABS       insulin aspart (NOVOLOG FLEXPEN) 100 UNIT/ML injection pen By sliding scale 5 Adjustable Dose Pre-filled Pen Syringe 3    Insulin Degludec (TRESIBA SC) Inject 43 Units into the skin at bedtime aspirin 81 MG chewable tablet Take 81 mg by mouth in the morning. Probiotic Product (PROBIOTIC-10 PO) Take by mouth daily      amLODIPine (NORVASC) 5 MG tablet Take 2.5 mg by mouth in the morning and at bedtime      atorvastatin (LIPITOR) 20 MG tablet Take 10 mg by mouth every evening      vitamin D (CHOLECALCIFEROL) 25 MCG (1000 UT) TABS tablet Take 1,000 Units by mouth 2 times daily      fluticasone (FLONASE) 50 MCG/ACT nasal spray 2 sprays by Nasal route as needed      loratadine (CLARITIN) 10 MG tablet Take 10 mg by mouth daily as needed      magnesium oxide (MAG-OX) 400 MG tablet Take 400 mg by mouth at bedtime      omeprazole (PRILOSEC) 20 MG delayed release capsule Take 20 mg by mouth as needed      warfarin (COUMADIN) 5 MG tablet 1.5 tablets on Monday/Wed/Friday and 1 tablet all other days       No current facility-administered medications for this visit. Allergies   Allergen Reactions    Ibuprofen Rash       Patient Active Problem List    Diagnosis Date Noted    Iron deficiency 01/23/2022     Priority: Low    Long-term use of high-risk medication 01/23/2022     Priority: Low    Blood loss anemia      Priority: Low    Long term current use of anticoagulant therapy      Priority: Low     Overview Note:     On Warfarin therapy secondary to atrial and mitral valve replacements monitored through cardiology      Mixed hyperlipidemia      Priority: Low    Colon cancer ([facility] 09/01/2021     Priority: Low    Rectal cancer ([facility] 09/01/2021     Priority: Low    Colonic mass 08/13/2021     Priority: Low    Type 2 diabetes mellitus with microalbuminuria, with long-term current use of insulin ([facility] 08/16/2018     Priority: Low    Type 2 diabetes mellitus with hyperglycemia, with long-term current use of insulin ([facility] 08/16/2018     Priority: Low    Bradycardia 01/18/2017     Priority: Low    Osteoporosis      Priority: Low     Overview Note:     BMD done 9/12/2005-Osteopenia. Treated with Fosamax. 3/3/2010.-   Osteoporosis. Forteo for 2 years. Back on Bisphosphonate but stopped due   to anemia.          Hypothyroidism, postsurgical 10/21/2015     Priority: Low    S/P AVR (aortic valve replacement) 05/04/2015     Priority: Low     Overview Note:     5/4/15:  Dr. Rochelle Martin with 25 mm Oliveira bovine pericardial valve   AORTIC VALVE REPLACEMENT (AVR) with 21 mm Oliveira bovine pericardial valve  MAZE PROCEDURE and closure CRISTEL        Mitral valve insufficiency 05/04/2015     Priority: Low    Paroxysmal atrial fibrillation ([address] 05/04/2015     Priority: Low    Hypoxia 05/04/2015     Priority: Low    S/P mitral valve replacement 05/04/2015     Priority: Low     Overview Note:     Bioprosthetic May 2015        Carotid stenosis      Priority: Low    GERD (gastroesophageal reflux disease)      Priority: Low    CAD (coronary artery disease)      Priority: Low    Essential hypertension 09/21/2009     Priority: Low        Past Surgical History:   Procedure Laterality Date    ABLATION OF DYSRHYTHMIC FOCUS  2015    AORTIC VALVE REPLACEMENT  2015    APPENDECTOMY      CARDIAC CATHETERIZATION      COLONOSCOPY N/A 7/21/2021    COLONOSCOPY/ BMI 27 performed by Moi Santamaria MD at [facility] ENDOSCOPY    COLONOSCOPY  2014    neg    COLONOSCOPY N/A 8/17/2022    COLONOSCOPY POLYPECTOMY SNARE-COLD performed by Ronen Raphael MD at [address] N/A 8/18/2021    SIGMOIDOSCOPY FLEXIBLE performed by Moi Santamaria MD at [address] Left 2009    pins    MITRAL VALVE REPLACEMENT  5/4/15     and Aortic Valve Replacement, Dr. Messi [facility] hip fracture/repair    OTHER SURGICAL HISTORY      ablation 10/6/2015, Dr Shirley Lizarraga  2021    colon surg    THYROIDECTOMY      TUBAL LIGATION         Family History   Problem Relation Age of Onset    Diabetes Mother     Diabetes Sister Heart Disease Father     Diabetes Sister     Breast Cancer Sister 22    Diabetes Brother     Heart Disease Mother     Heart Disease Sister     Heart Disease Brother     Diabetes Brother     Heart Disease Brother         Social History     Tobacco Use    Smoking status: Never    Smokeless tobacco: Never   Substance Use Topics    Alcohol use: No       ROS:    Review of Systems   Constitutional:  Negative for appetite change, chills, diaphoresis and fatigue. HENT:  Negative for congestion, mouth sores, nosebleeds, sore throat and tinnitus. Eyes:  Negative for photophobia and visual disturbance. Respiratory:  Negative for cough and shortness of breath. Cardiovascular:  Negative for chest pain, palpitations and leg swelling. Gastrointestinal:  Negative for abdominal distention, abdominal pain, constipation and diarrhea. Endocrine: Negative for cold intolerance, heat intolerance, polydipsia and polyuria. Genitourinary:  Negative for dysuria and hematuria. Musculoskeletal:  Negative for arthralgias, joint swelling and myalgias. Skin:  Negative for rash. Allergic/Immunologic: Negative for environmental allergies and food allergies. Neurological:  Negative for dizziness, seizures, syncope and light-headedness. Hematological:  Negative for adenopathy. Does not bruise/bleed easily. Psychiatric/Behavioral:  Negative for agitation, behavioral problems, dysphoric mood and hallucinations. The patient is not nervous/anxious.        PHYSICAL EXAM:     Vitals:    01/16/23 0857 01/16/23 0902 01/16/23 0910   BP: (!) 156/62 (!) 160/80 (!) 145/60   Pulse: 66     Weight: 150 lb (68 kg)     Height: 5' 5\" (1.651 m)        Wt Readings from Last 3 Encounters:   01/16/23 150 lb (68 kg)   12/20/22 151 lb 12.8 oz (68.9 kg)   11/21/22 151 lb 3.2 oz (68.6 kg)      BP Readings from Last 3 Encounters:   01/16/23 (!) 145/60   12/20/22 122/64   11/21/22 120/70        Physical Exam  Constitutional:       Appearance: Normal appearance. She is normal weight. HENT:      Head: Normocephalic and atraumatic. Nose: Nose normal.      Mouth/Throat:      Mouth: Mucous membranes are moist.      Pharynx: Oropharynx is clear. Eyes:      Extraocular Movements: Extraocular movements intact. Pupils: Pupils are equal, round, and reactive to light. Neck:      Vascular: Carotid bruit (Soft bilateral bruits, left greater than right) present. No JVD. Cardiovascular:      Rate and Rhythm: Normal rate and regular rhythm. Heart sounds: Murmur (1/6 PI, TR, MR murmurs. Soft systolic ejection murmur right upper sternal border) heard. No friction rub. No gallop. Pulmonary:      Effort: Pulmonary effort is normal.      Breath sounds: Normal breath sounds. No wheezing or rhonchi. Abdominal:      General: Abdomen is flat. Bowel sounds are normal. There is no distension. Palpations: Abdomen is soft. Tenderness: There is no abdominal tenderness. Musculoskeletal:         General: No swelling. Normal range of motion. Cervical back: Normal range of motion and neck supple. No tenderness. Skin:     General: Skin is warm and dry. Neurological:      General: No focal deficit present. Mental Status: She is alert and oriented to person, place, and time. Mental status is at baseline. Psychiatric:         Mood and Affect: Mood normal.         Behavior: Behavior normal.        Medical problems and test results were reviewed with the patient today.        Lab Results   Component Value Date    CHOL 78 11/04/2022    CHOL 79 (L) 04/13/2022    CHOL 83 (L) 01/05/2022     Lab Results   Component Value Date    TRIG 87 11/04/2022    TRIG 57 04/13/2022    TRIG 87 01/05/2022     Lab Results   Component Value Date    HDL 30 (L) 11/04/2022    HDL 28 (L) 04/13/2022    HDL 27 (L) 01/05/2022     Lab Results   Component Value Date    LDLCALC 30.6 11/04/2022    LDLCALC 37 04/13/2022    LDLCALC 38 01/05/2022     Lab Results   Component Value Date    LABVLDL 17.4 11/04/2022    LABVLDL 17 07/29/2020    LABVLDL 23 04/22/2020    VLDL 14 04/13/2022    VLDL 18 01/05/2022    VLDL 16 09/27/2021     Lab Results   Component Value Date    CHOLHDLRATIO 2.6 11/04/2022        Lab Results   Component Value Date/Time     11/04/2022 08:10 AM    K 4.1 11/04/2022 08:10 AM     11/04/2022 08:10 AM    CO2 27 11/04/2022 08:10 AM    BUN 17 11/04/2022 08:10 AM    CREATININE 0.80 11/04/2022 08:10 AM    GLUCOSE 135 11/04/2022 08:10 AM    CALCIUM 8.6 11/04/2022 08:10 AM         No results for input(s): WBC, HGB, HCT, MCV, PLT in the last 720 hours. Lab Results   Component Value Date    LABA1C 6.2 (H) 11/04/2022     Lab Results   Component Value Date     11/04/2022        No results found for: BNP     Lab Results   Component Value Date    TSH 4.450 04/13/2022        Results for orders placed or performed in visit on 01/16/23   EKG 12 Lead    Impression    Sinus  Rhythm 62 bpm  Normal axis and intervals  -RSR(V1) -nondiagnostic. Nonspecific ST-T wave changes inferolaterally        ASSESSMENT and PLAN:    Diagnoses and all orders for this visit:      Atrial fibrillation and flutter ([address] - s/p MAZE and eventual catheter based AF ablation, maintaining NSR on Tikosyn without interval tachycardia/palpitations since her last visit- She has been compliant with Coumadin and Tikosyn without any missed doses. No interval palpitations or tachycardia and in normal sinus rhythm on ECG today. Continue current meds and clinical monitoring.   -     EKG      Essential hypertension with goal blood pressure less than 130/80 - increased norvasc to BID in the past and -130's consistently at home without postural symptoms, recently as low as 110mmHg. Discussed lifestyle modification with plans for low carb/low sugar/low fat diet. Try to eat more lean protein, vegetables, and salads.   Try to get at least 20-30min moderate intensity cardiovascular exercise at least 4-5 times weekly as tolerated with  goal of weight loss in the next year. Call with systolic>140 at home. Coronary artery disease involving native coronary artery of native heart without unstable angina pectoris (HCC) - no angina or CHF, continue meds      Bilateral carotid artery stenosis - occluded left ICA, mild-mod (~50%) right ICA disease by recent duplex, re-check duplex next Summer 2023, check CTA carotids with any acute symptoms, continue coumadin  as tolerated      Non-rheumatic mitral regurgitation - resolved, continue meds - echo in 6 mos      Diabetes mellitus, insulin dependent (IDDM), controlled ([address]- stable, see above- per  [facility]      Hypothyroidism, postsurgical- stable, continue meds with outpatient surveillance      Mixed hyperlipidemia-stable, outpatient surveillance per  [facility]. OK from my standpoint to continue low dose 10mg lipitor since lipids excellent. S/P mitral valve replacement- echo looks good, stable gradient compared to last echo, continue to follow yearly-recheck echo in 6 months to ensure gradients are not progressively increasing      S/P AVR (aortic valve replacement)-stable gradient compared to last echo, continue to follow- recheck echo in 6 months to ensure gradients are not progressively increasing      Bradycardia- asymptomatic, probably PAC induced and negative tele monitor (see above). Continue clinical surveillance and monitoring. Return in about 6 months (around 7/16/2023).          Debby Stout MD  1/16/2023  9:11 AM

## 2023-01-16 ENCOUNTER — OFFICE VISIT (OUTPATIENT)
Dept: CARDIOLOGY CLINIC | Age: 78
End: 2023-01-16

## 2023-01-16 VITALS
DIASTOLIC BLOOD PRESSURE: 60 MMHG | HEIGHT: 65 IN | BODY MASS INDEX: 24.99 KG/M2 | SYSTOLIC BLOOD PRESSURE: 145 MMHG | WEIGHT: 150 LBS | HEART RATE: 66 BPM

## 2023-01-16 DIAGNOSIS — I25.10 CORONARY ARTERY DISEASE INVOLVING NATIVE CORONARY ARTERY OF NATIVE HEART WITHOUT ANGINA PECTORIS: ICD-10-CM

## 2023-01-16 DIAGNOSIS — I65.23 BILATERAL CAROTID ARTERY STENOSIS WITHOUT CEREBRAL INFARCTION: ICD-10-CM

## 2023-01-16 DIAGNOSIS — I10 ESSENTIAL HYPERTENSION: Chronic | ICD-10-CM

## 2023-01-16 DIAGNOSIS — Z79.01 LONG TERM CURRENT USE OF ANTICOAGULANT THERAPY: Chronic | ICD-10-CM

## 2023-01-16 DIAGNOSIS — Z95.2 S/P AVR (AORTIC VALVE REPLACEMENT): Chronic | ICD-10-CM

## 2023-01-16 DIAGNOSIS — E78.2 MIXED HYPERLIPIDEMIA: Chronic | ICD-10-CM

## 2023-01-16 DIAGNOSIS — I48.0 PAROXYSMAL ATRIAL FIBRILLATION (HCC): Primary | Chronic | ICD-10-CM

## 2023-01-16 DIAGNOSIS — Z95.2 S/P MITRAL VALVE REPLACEMENT: Chronic | ICD-10-CM

## 2023-01-16 RX ORDER — POTASSIUM CHLORIDE 20 MEQ/1
20 TABLET, EXTENDED RELEASE ORAL DAILY
Qty: 90 TABLET | Refills: 3 | Status: SHIPPED | OUTPATIENT
Start: 2023-01-16

## 2023-01-16 RX ORDER — DOFETILIDE 0.5 MG/1
500 CAPSULE ORAL 2 TIMES DAILY
Qty: 60 CAPSULE | Refills: 11 | Status: SHIPPED | OUTPATIENT
Start: 2023-01-16 | End: 2023-01-17 | Stop reason: SDUPTHER

## 2023-01-16 RX ORDER — LOSARTAN POTASSIUM 50 MG/1
TABLET ORAL
Qty: 90 TABLET | Refills: 3 | Status: SHIPPED | OUTPATIENT
Start: 2023-01-16

## 2023-01-17 RX ORDER — DOFETILIDE 0.5 MG/1
500 CAPSULE ORAL 2 TIMES DAILY
Qty: 180 CAPSULE | Refills: 3 | Status: SHIPPED | OUTPATIENT
Start: 2023-01-17

## 2023-01-20 ENCOUNTER — PATIENT MESSAGE (OUTPATIENT)
Dept: CARDIOLOGY CLINIC | Age: 78
End: 2023-01-20

## 2023-01-20 RX ORDER — WARFARIN SODIUM 5 MG/1
TABLET ORAL
Qty: 90 TABLET | Refills: 3 | Status: SHIPPED | OUTPATIENT
Start: 2023-01-20

## 2023-01-20 RX ORDER — ATORVASTATIN CALCIUM 20 MG/1
10 TABLET, FILM COATED ORAL EVERY EVENING
Qty: 45 TABLET | Refills: 3 | Status: SHIPPED | OUTPATIENT
Start: 2023-01-20

## 2023-01-20 RX ORDER — AMLODIPINE BESYLATE 5 MG/1
2.5 TABLET ORAL 2 TIMES DAILY
Qty: 45 TABLET | Refills: 3 | Status: SHIPPED | OUTPATIENT
Start: 2023-01-20

## 2023-01-20 NOTE — TELEPHONE ENCOUNTER
From: Kaden Ramon  To: Dr. Wallace Galena: 1/20/2023 10:30 AM EST  Subject: New Prescriptions     Wenena received some of my medications, but for some reason [facility] did not get the new prescriptions for Atorvastatin, Amlodipine or Warfarin. These should be sent to [facility]. Please resend these with my new prescription card. Thank you so much.   Tonie Juan

## 2023-01-31 ENCOUNTER — ANTI-COAG VISIT (OUTPATIENT)
Dept: CARDIOLOGY CLINIC | Age: 78
End: 2023-01-31
Payer: MEDICARE

## 2023-01-31 DIAGNOSIS — I48.0 PAROXYSMAL ATRIAL FIBRILLATION (HCC): ICD-10-CM

## 2023-01-31 DIAGNOSIS — Z79.01 LONG TERM CURRENT USE OF ANTICOAGULANT THERAPY: Primary | ICD-10-CM

## 2023-01-31 DIAGNOSIS — Z95.2 S/P MITRAL VALVE REPLACEMENT: ICD-10-CM

## 2023-01-31 DIAGNOSIS — Z95.2 S/P AVR (AORTIC VALVE REPLACEMENT): ICD-10-CM

## 2023-01-31 LAB
POC INR: 2.4
PROTHROMBIN TIME, POC: NORMAL

## 2023-01-31 PROCEDURE — 93793 ANTICOAG MGMT PT WARFARIN: CPT | Performed by: INTERNAL MEDICINE

## 2023-01-31 PROCEDURE — 85610 PROTHROMBIN TIME: CPT | Performed by: INTERNAL MEDICINE

## 2023-01-31 NOTE — PATIENT INSTRUCTIONS
Reminder: Please contact the Coumadin Clinic at [phone] when you have medication changes. Examples, new medications, antibiotics, discontinued medications, new supplements, missed doses of warfarin or if you took extra doses of warfarin. This also includes OTC medications. Notifying us helps reduce the possibility of high and low INR's. In addition, if warfarin needs to be held for any procedures, please have surgeon or physician's office contact us before holding anticoagulant. Thanks, [address] [facility] Coumadin Clinic.

## 2023-02-06 ENCOUNTER — OFFICE VISIT (OUTPATIENT)
Dept: INTERNAL MEDICINE CLINIC | Facility: CLINIC | Age: 78
End: 2023-02-06
Payer: MEDICARE

## 2023-02-06 VITALS
TEMPERATURE: 97.9 F | RESPIRATION RATE: 16 BRPM | OXYGEN SATURATION: 91 % | WEIGHT: 148 LBS | HEIGHT: 65 IN | HEART RATE: 74 BPM | SYSTOLIC BLOOD PRESSURE: 132 MMHG | DIASTOLIC BLOOD PRESSURE: 56 MMHG | BODY MASS INDEX: 24.66 KG/M2

## 2023-02-06 DIAGNOSIS — I10 ESSENTIAL HYPERTENSION: ICD-10-CM

## 2023-02-06 DIAGNOSIS — R05.8 PRODUCTIVE COUGH: ICD-10-CM

## 2023-02-06 DIAGNOSIS — J20.8 ACUTE BACTERIAL BRONCHITIS: Primary | ICD-10-CM

## 2023-02-06 DIAGNOSIS — I48.0 PAROXYSMAL ATRIAL FIBRILLATION (HCC): ICD-10-CM

## 2023-02-06 DIAGNOSIS — Z79.899 LONG-TERM USE OF HIGH-RISK MEDICATION: Chronic | ICD-10-CM

## 2023-02-06 DIAGNOSIS — B96.89 ACUTE BACTERIAL BRONCHITIS: Primary | ICD-10-CM

## 2023-02-06 DIAGNOSIS — Z79.01 LONG TERM CURRENT USE OF ANTICOAGULANT THERAPY: ICD-10-CM

## 2023-02-06 PROCEDURE — G8427 DOCREV CUR MEDS BY ELIG CLIN: HCPCS | Performed by: NURSE PRACTITIONER

## 2023-02-06 PROCEDURE — 3078F DIAST BP <80 MM HG: CPT | Performed by: NURSE PRACTITIONER

## 2023-02-06 PROCEDURE — 1036F TOBACCO NON-USER: CPT | Performed by: NURSE PRACTITIONER

## 2023-02-06 PROCEDURE — G8420 CALC BMI NORM PARAMETERS: HCPCS | Performed by: NURSE PRACTITIONER

## 2023-02-06 PROCEDURE — G8399 PT W/DXA RESULTS DOCUMENT: HCPCS | Performed by: NURSE PRACTITIONER

## 2023-02-06 PROCEDURE — 99214 OFFICE O/P EST MOD 30 MIN: CPT | Performed by: NURSE PRACTITIONER

## 2023-02-06 PROCEDURE — 1090F PRES/ABSN URINE INCON ASSESS: CPT | Performed by: NURSE PRACTITIONER

## 2023-02-06 PROCEDURE — 1123F ACP DISCUSS/DSCN MKR DOCD: CPT | Performed by: NURSE PRACTITIONER

## 2023-02-06 PROCEDURE — G8484 FLU IMMUNIZE NO ADMIN: HCPCS | Performed by: NURSE PRACTITIONER

## 2023-02-06 PROCEDURE — 3074F SYST BP LT 130 MM HG: CPT | Performed by: NURSE PRACTITIONER

## 2023-02-06 RX ORDER — LEVALBUTEROL TARTRATE 45 UG/1
1-2 AEROSOL, METERED ORAL EVERY 6 HOURS PRN
Qty: 1 EACH | Refills: 0 | Status: SHIPPED | OUTPATIENT
Start: 2023-02-06

## 2023-02-06 RX ORDER — GUAIFENESIN AND CODEINE PHOSPHATE 100; 10 MG/5ML; MG/5ML
5 SOLUTION ORAL 3 TIMES DAILY PRN
Qty: 120 ML | Refills: 0 | Status: SHIPPED | OUTPATIENT
Start: 2023-02-06 | End: 2023-02-14

## 2023-02-06 RX ORDER — AMOXICILLIN 875 MG/1
875 TABLET, COATED ORAL 2 TIMES DAILY
Qty: 20 TABLET | Refills: 0 | Status: SHIPPED | OUTPATIENT
Start: 2023-02-06 | End: 2023-02-16

## 2023-02-06 SDOH — ECONOMIC STABILITY: FOOD INSECURITY: WITHIN THE PAST 12 MONTHS, YOU WORRIED THAT YOUR FOOD WOULD RUN OUT BEFORE YOU GOT MONEY TO BUY MORE.: NEVER TRUE

## 2023-02-06 SDOH — ECONOMIC STABILITY: INCOME INSECURITY: HOW HARD IS IT FOR YOU TO PAY FOR THE VERY BASICS LIKE FOOD, HOUSING, MEDICAL CARE, AND HEATING?: NOT HARD AT ALL

## 2023-02-06 SDOH — ECONOMIC STABILITY: FOOD INSECURITY: WITHIN THE PAST 12 MONTHS, THE FOOD YOU BOUGHT JUST DIDN'T LAST AND YOU DIDN'T HAVE MONEY TO GET MORE.: NEVER TRUE

## 2023-02-06 SDOH — ECONOMIC STABILITY: HOUSING INSECURITY
IN THE LAST 12 MONTHS, WAS THERE A TIME WHEN YOU DID NOT HAVE A STEADY PLACE TO SLEEP OR SLEPT IN A SHELTER (INCLUDING NOW)?: NO

## 2023-02-06 ASSESSMENT — PATIENT HEALTH QUESTIONNAIRE - PHQ9
SUM OF ALL RESPONSES TO PHQ9 QUESTIONS 1 & 2: 0
1. LITTLE INTEREST OR PLEASURE IN DOING THINGS: 0
SUM OF ALL RESPONSES TO PHQ QUESTIONS 1-9: 0
2. FEELING DOWN, DEPRESSED OR HOPELESS: 0
SUM OF ALL RESPONSES TO PHQ QUESTIONS 1-9: 0

## 2023-02-06 NOTE — PROGRESS NOTES
PROGRESS NOTE    SUBJECTIVE:   Ingris Vasquez is a 69 y.o. female seen for a follow up visit for   Chief Complaint   Patient presents with    Cough     Since Thursday and progressed , productive worse at night     Congestion     Tested neg for covid this am      New problem  Cough frequent since last Wednesday night. Productive of phlegm tan to dark tan/brown. Cough interferes with sleep and with conversation. Chronic medical problems    Hyperlipidemia  This is a chronic problem. Episode onset: greater than 20 years ago. Exacerbating diseases include diabetes and hypothyroidism. Current antihyperlipidemic treatment includes statins. There are no compliance problems. Risk factors for coronary artery disease include diabetes mellitus, dyslipidemia, family history and post-menopausal.     Hypothyroid (post-surgical)  This is a chronic problem. Episode onset: approx 2010. Denies weight changes. Denies heat/cold intolerances. Diabetes, Type II on long term insulin therapy  This is a chronic problem. Episode onset: approx 2005. Blood sugars improved with  medications (diet, insulin. ). Using CGM:  Dexcom G6    Aortic valve replacement  Aortic valve replacement:  CE bovine bioprosthetic valve with a size of 21 mm. No regurgitation. Mild stenosis of the aortic valve. Mitral valve:  CE-2015 bovine bioprosthetic valve with a size of 25 mm. No transvalvular regurgitation. No paravalvular regurgitation. Mild stenosis noted. Requires lifetime anticoagulation therapy with warfarin. Paroxysmal Atrial fibrillation  Onset:  remote. Compliant to warfarin anticoagulation therapy managed through Coumadin clinic of [facility]. Chronic anticoagulation  Onset:  remote -- related to AVR and pAF. Warfarin therapy. On Tikosyn. History colon cancer    Cough frequent since last Wednesday night. Productive of phlegm tan to dark tan/brown.            Reviewed and updated this visit by provider:  Tobacco  Allergies  Meds  Problems  Med Hx  Surg Hx  Fam Hx         Review of Systems   Constitutional:  Negative for chills and fever. HENT:  Negative for hearing loss and sneezing. Eyes:  Negative for visual disturbance. Respiratory:  Positive for cough (frequent and productive). Negative for chest tightness, shortness of breath and wheezing. Cardiovascular:  Negative for palpitations and leg swelling. Gastrointestinal:  Negative for abdominal pain. Genitourinary:         Nocturia x 2 each night   Musculoskeletal:  Negative for arthralgias and back pain. Skin:  Negative for color change. Allergic/Immunologic: Negative for environmental allergies. Neurological:  Negative for syncope. Hematological:  Does not bruise/bleed easily. OBJECTIVE:    BP (!) 132/56 (Site: Left Upper Arm, Position: Sitting, Cuff Size: Small Adult)   Pulse 74   Temp 97.9 °F (36.6 °C) (Temporal)   Resp 16   Ht 5' 5\" (1.651 m)   Wt 148 lb (67.1 kg)   SpO2 91%   BMI 24.63 kg/m²      Physical Exam  Vitals and nursing note reviewed. Constitutional:       Appearance: Normal appearance. HENT:      Head: Normocephalic. Mouth/Throat:      Lips: Pink. Mouth: Mucous membranes are moist.   Eyes:      General: Lids are normal.   Neck:      Vascular: No carotid bruit. Cardiovascular:      Rate and Rhythm: Normal rate and regular rhythm. Heart sounds: Murmur heard. Pulmonary:      Effort: Pulmonary effort is normal.      Breath sounds: Wheezing present. Musculoskeletal:      Cervical back: Neck supple. Right lower leg: No edema. Left lower leg: No edema. Skin:     General: Skin is warm and dry. Neurological:      Mental Status: She is alert and oriented to person, place, and time. Mental status is at baseline. Gait: Gait normal.   Psychiatric:         Mood and Affect: Mood normal.         Behavior: Behavior normal.        ASSESSMENT and PLAN    1.  Acute bacterial bronchitis  -     levalbuterol (XOPENEX HFA) 45 MCG/ACT inhaler; Inhale 1-2 puffs into the lungs every 6 hours as needed for Wheezing, Disp-1 each, R-0Normal  -     amoxicillin (AMOXIL) 875 MG tablet; Take 1 tablet by mouth 2 times daily for 10 days, Disp-20 tablet, R-0Normal  -     guaiFENesin-codeine (TUSSI-ORGANIDIN NR) 100-10 MG/5ML syrup; Take 5 mLs by mouth 3 times daily as needed for Cough for up to 8 days. Max Daily Amount: 15 mLs, Disp-120 mL, R-0Normal  2. Productive cough  -     guaiFENesin-codeine (TUSSI-ORGANIDIN NR) 100-10 MG/5ML syrup; Take 5 mLs by mouth 3 times daily as needed for Cough for up to 8 days. Max Daily Amount: 15 mLs, Disp-120 mL, R-0Normal  -     XR CHEST PA LAT (2 VIEWS); Future  3. Essential hypertension  4. Paroxysmal atrial fibrillation (HCC)  5. Long term current use of anticoagulant therapy  6. Long-term use of high-risk medication    Return for Follow-Up, regularly scheduled appointment or sooner prn.  the following changes in treatment are made: As outlined below  reviewed medications and side effects in detail    Will use guaifenesin with codeine for cough suppression. We will need to use Xopenex to avoid increasing heart rate. Xopenex inhaler discussed. Proper technique discussed. Advised 1 to 2 puffs every 6 hours as needed for the wheezing and cough. We will also initiate amoxicillin 875 mg twice daily. Choices of medications for treatment were selected to lessen interaction with Tikosyn and warfarin therapy. Also she using Xopenex to avoid increasing heart rate in the presence of atrial fibrillation. She is also advised to notify the Coumadin clinic at [facility] of the initiation of antibiotic therapy. On this date 02/06/23 I have spent 33 minutes reviewing previous notes, test results and face to face with the patient.  Over 50% of today's office visit was spent in face to face time in counseling reviewing test results, importance of compliance, education about disease process, benefits and side-effects of medications and follow-up plan. RICCI Cortes - KARSON    Dictated using voice recognition software.  Proofread, but unrecognized voice recognition errors may exist.

## 2023-02-07 ENCOUNTER — HOSPITAL ENCOUNTER (OUTPATIENT)
Dept: GENERAL RADIOLOGY | Age: 78
Discharge: HOME | End: 2023-02-10
Payer: MEDICARE

## 2023-02-07 DIAGNOSIS — R05.8 PRODUCTIVE COUGH: ICD-10-CM

## 2023-02-07 PROCEDURE — 71046 X-RAY EXAM CHEST 2 VIEWS: CPT

## 2023-02-10 ENCOUNTER — ANTI-COAG VISIT (OUTPATIENT)
Dept: CARDIOLOGY CLINIC | Age: 78
End: 2023-02-10

## 2023-02-10 DIAGNOSIS — Z95.2 S/P MITRAL VALVE REPLACEMENT: ICD-10-CM

## 2023-02-10 DIAGNOSIS — I48.0 PAROXYSMAL ATRIAL FIBRILLATION (HCC): ICD-10-CM

## 2023-02-10 DIAGNOSIS — Z95.2 S/P AVR (AORTIC VALVE REPLACEMENT): ICD-10-CM

## 2023-02-10 DIAGNOSIS — Z79.01 LONG TERM CURRENT USE OF ANTICOAGULANT THERAPY: Primary | ICD-10-CM

## 2023-02-10 LAB
POC INR: 2.6
PROTHROMBIN TIME, POC: NORMAL

## 2023-02-10 NOTE — PATIENT INSTRUCTIONS
Reminder: Please contact the Coumadin Clinic at [phone] when you have medication changes. Examples, new medications, antibiotics, discontinued medications, new supplements, missed doses of warfarin or if you took extra doses of warfarin. This also includes OTC medications. Notifying us helps reduce the possibility of high and low INR's. In addition, if warfarin needs to be held for any procedures, please have surgeon or physician's office contact us before holding anticoagulant. Thanks, [facility] Coumadin Clinic.

## 2023-02-17 ENCOUNTER — ANTI-COAG VISIT (OUTPATIENT)
Dept: CARDIOLOGY CLINIC | Age: 78
End: 2023-02-17

## 2023-02-17 DIAGNOSIS — Z79.01 LONG TERM CURRENT USE OF ANTICOAGULANT THERAPY: Primary | ICD-10-CM

## 2023-02-17 DIAGNOSIS — Z95.2 S/P MITRAL VALVE REPLACEMENT: ICD-10-CM

## 2023-02-17 DIAGNOSIS — Z95.2 S/P AVR (AORTIC VALVE REPLACEMENT): ICD-10-CM

## 2023-02-17 DIAGNOSIS — I48.0 PAROXYSMAL ATRIAL FIBRILLATION (HCC): ICD-10-CM

## 2023-02-17 LAB
POC INR: 2.1
PROTHROMBIN TIME, POC: NORMAL

## 2023-02-17 NOTE — PATIENT INSTRUCTIONS
Reminder: Please contact the Coumadin Clinic at [phone] when you have medication changes. Examples, new medications, antibiotics, discontinued medications, new supplements, missed doses of warfarin or if you took extra doses of warfarin. This also includes OTC medications. Notifying us helps reduce the possibility of high and low INR's. In addition, if warfarin needs to be held for any procedures, please have surgeon or physician's office contact us before holding anticoagulant. Thanks, [place] [facility]in [facility].

## 2023-02-20 ASSESSMENT — ENCOUNTER SYMPTOMS
SHORTNESS OF BREATH: 0
COUGH: 1
BACK PAIN: 0
CHEST TIGHTNESS: 0
WHEEZING: 0
ABDOMINAL PAIN: 0
COLOR CHANGE: 0

## 2023-02-24 DIAGNOSIS — E89.0 HYPOTHYROIDISM, POSTSURGICAL: Chronic | ICD-10-CM

## 2023-02-24 DIAGNOSIS — Z79.4 TYPE 2 DIABETES MELLITUS WITH MICROALBUMINURIA, WITH LONG-TERM CURRENT USE OF INSULIN (HCC): ICD-10-CM

## 2023-02-24 DIAGNOSIS — R80.9 TYPE 2 DIABETES MELLITUS WITH MICROALBUMINURIA, WITH LONG-TERM CURRENT USE OF INSULIN (HCC): ICD-10-CM

## 2023-02-24 DIAGNOSIS — E11.29 TYPE 2 DIABETES MELLITUS WITH MICROALBUMINURIA, WITH LONG-TERM CURRENT USE OF INSULIN (HCC): ICD-10-CM

## 2023-02-24 DIAGNOSIS — Z79.01 LONG TERM CURRENT USE OF ANTICOAGULANT THERAPY: Chronic | ICD-10-CM

## 2023-02-24 DIAGNOSIS — E78.2 MIXED HYPERLIPIDEMIA: Chronic | ICD-10-CM

## 2023-02-24 DIAGNOSIS — Z95.2 S/P AVR (AORTIC VALVE REPLACEMENT): Chronic | ICD-10-CM

## 2023-02-24 DIAGNOSIS — I10 ESSENTIAL HYPERTENSION: Chronic | ICD-10-CM

## 2023-02-24 DIAGNOSIS — I48.0 PAROXYSMAL ATRIAL FIBRILLATION (HCC): Chronic | ICD-10-CM

## 2023-02-24 LAB
ALBUMIN SERPL-MCNC: 3.6 G/DL (ref 3.2–4.6)
ALBUMIN/GLOB SERPL: 0.8 (ref 0.4–1.6)
ALP SERPL-CCNC: 98 U/L (ref 50–136)
ALT SERPL-CCNC: 39 U/L (ref 12–65)
ANION GAP SERPL CALC-SCNC: 4 MMOL/L (ref 2–11)
AST SERPL-CCNC: 19 U/L (ref 15–37)
BASOPHILS # BLD: 0 K/UL (ref 0–0.2)
BASOPHILS NFR BLD: 1 % (ref 0–2)
BILIRUB SERPL-MCNC: 0.3 MG/DL (ref 0.2–1.1)
BUN SERPL-MCNC: 20 MG/DL (ref 8–23)
CALCIUM SERPL-MCNC: 8.8 MG/DL (ref 8.3–10.4)
CHLORIDE SERPL-SCNC: 108 MMOL/L (ref 101–110)
CHOLEST SERPL-MCNC: 77 MG/DL
CO2 SERPL-SCNC: 27 MMOL/L (ref 21–32)
CREAT SERPL-MCNC: 0.8 MG/DL (ref 0.6–1)
DIFFERENTIAL METHOD BLD: ABNORMAL
EOSINOPHIL # BLD: 0.3 K/UL (ref 0–0.8)
EOSINOPHIL NFR BLD: 4 % (ref 0.5–7.8)
ERYTHROCYTE [DISTWIDTH] IN BLOOD BY AUTOMATED COUNT: 15.7 % (ref 11.9–14.6)
EST. AVERAGE GLUCOSE BLD GHB EST-MCNC: 128 MG/DL
GLOBULIN SER CALC-MCNC: 4.3 G/DL (ref 2.8–4.5)
GLUCOSE SERPL-MCNC: 101 MG/DL (ref 65–100)
HBA1C MFR BLD: 6.1 % (ref 4.8–5.6)
HCT VFR BLD AUTO: 39.9 % (ref 35.8–46.3)
HDLC SERPL-MCNC: 29 MG/DL (ref 40–60)
HDLC SERPL: 2.7
HGB BLD-MCNC: 11.7 G/DL (ref 11.7–15.4)
IMM GRANULOCYTES # BLD AUTO: 0 K/UL (ref 0–0.5)
IMM GRANULOCYTES NFR BLD AUTO: 0 % (ref 0–5)
LDLC SERPL CALC-MCNC: 30.6 MG/DL
LYMPHOCYTES # BLD: 1.9 K/UL (ref 0.5–4.6)
LYMPHOCYTES NFR BLD: 24 % (ref 13–44)
MAGNESIUM SERPL-MCNC: 2.3 MG/DL (ref 1.8–2.4)
MCH RBC QN AUTO: 28.3 PG (ref 26.1–32.9)
MCHC RBC AUTO-ENTMCNC: 29.3 G/DL (ref 31.4–35)
MCV RBC AUTO: 96.4 FL (ref 82–102)
MONOCYTES # BLD: 0.7 K/UL (ref 0.1–1.3)
MONOCYTES NFR BLD: 9 % (ref 4–12)
NEUTS SEG # BLD: 4.8 K/UL (ref 1.7–8.2)
NEUTS SEG NFR BLD: 62 % (ref 43–78)
NRBC # BLD: 0 K/UL (ref 0–0.2)
PLATELET # BLD AUTO: 314 K/UL (ref 150–450)
PMV BLD AUTO: 10.1 FL (ref 9.4–12.3)
POTASSIUM SERPL-SCNC: 4.7 MMOL/L (ref 3.5–5.1)
PROT SERPL-MCNC: 7.9 G/DL (ref 6.3–8.2)
RBC # BLD AUTO: 4.14 M/UL (ref 4.05–5.2)
SODIUM SERPL-SCNC: 139 MMOL/L (ref 133–143)
TRIGL SERPL-MCNC: 87 MG/DL (ref 35–150)
TSH, 3RD GENERATION: 0.1 UIU/ML (ref 0.36–3.74)
URATE SERPL-MCNC: 4.9 MG/DL (ref 2.6–6)
VLDLC SERPL CALC-MCNC: 17.4 MG/DL (ref 6–23)
WBC # BLD AUTO: 7.8 K/UL (ref 4.3–11.1)

## 2023-03-03 ENCOUNTER — OFFICE VISIT (OUTPATIENT)
Dept: INTERNAL MEDICINE CLINIC | Facility: CLINIC | Age: 78
End: 2023-03-03
Payer: MEDICARE

## 2023-03-03 VITALS
HEART RATE: 58 BPM | BODY MASS INDEX: 24.4 KG/M2 | OXYGEN SATURATION: 96 % | WEIGHT: 146.6 LBS | DIASTOLIC BLOOD PRESSURE: 52 MMHG | SYSTOLIC BLOOD PRESSURE: 140 MMHG

## 2023-03-03 DIAGNOSIS — I25.10 CORONARY ARTERY DISEASE INVOLVING NATIVE CORONARY ARTERY OF NATIVE HEART WITHOUT ANGINA PECTORIS: ICD-10-CM

## 2023-03-03 DIAGNOSIS — Z79.4 TYPE 2 DIABETES MELLITUS WITH HYPERGLYCEMIA, WITH LONG-TERM CURRENT USE OF INSULIN (HCC): ICD-10-CM

## 2023-03-03 DIAGNOSIS — Z79.01 LONG TERM CURRENT USE OF ANTICOAGULANT THERAPY: Chronic | ICD-10-CM

## 2023-03-03 DIAGNOSIS — E78.2 MIXED HYPERLIPIDEMIA: Chronic | ICD-10-CM

## 2023-03-03 DIAGNOSIS — I10 ESSENTIAL HYPERTENSION: Chronic | ICD-10-CM

## 2023-03-03 DIAGNOSIS — E11.29 TYPE 2 DIABETES MELLITUS WITH MICROALBUMINURIA, WITH LONG-TERM CURRENT USE OF INSULIN (HCC): Chronic | ICD-10-CM

## 2023-03-03 DIAGNOSIS — Z79.899 LONG-TERM USE OF HIGH-RISK MEDICATION: Chronic | ICD-10-CM

## 2023-03-03 DIAGNOSIS — E89.0 HYPOTHYROIDISM, POSTSURGICAL: Primary | ICD-10-CM

## 2023-03-03 DIAGNOSIS — R80.9 TYPE 2 DIABETES MELLITUS WITH MICROALBUMINURIA, WITH LONG-TERM CURRENT USE OF INSULIN (HCC): Chronic | ICD-10-CM

## 2023-03-03 DIAGNOSIS — E11.65 TYPE 2 DIABETES MELLITUS WITH HYPERGLYCEMIA, WITH LONG-TERM CURRENT USE OF INSULIN (HCC): ICD-10-CM

## 2023-03-03 DIAGNOSIS — Z79.4 TYPE 2 DIABETES MELLITUS WITH MICROALBUMINURIA, WITH LONG-TERM CURRENT USE OF INSULIN (HCC): Chronic | ICD-10-CM

## 2023-03-03 PROCEDURE — 1036F TOBACCO NON-USER: CPT | Performed by: NURSE PRACTITIONER

## 2023-03-03 PROCEDURE — 1090F PRES/ABSN URINE INCON ASSESS: CPT | Performed by: NURSE PRACTITIONER

## 2023-03-03 PROCEDURE — 1123F ACP DISCUSS/DSCN MKR DOCD: CPT | Performed by: NURSE PRACTITIONER

## 2023-03-03 PROCEDURE — G8484 FLU IMMUNIZE NO ADMIN: HCPCS | Performed by: NURSE PRACTITIONER

## 2023-03-03 PROCEDURE — 3078F DIAST BP <80 MM HG: CPT | Performed by: NURSE PRACTITIONER

## 2023-03-03 PROCEDURE — G8427 DOCREV CUR MEDS BY ELIG CLIN: HCPCS | Performed by: NURSE PRACTITIONER

## 2023-03-03 PROCEDURE — 3074F SYST BP LT 130 MM HG: CPT | Performed by: NURSE PRACTITIONER

## 2023-03-03 PROCEDURE — 3044F HG A1C LEVEL LT 7.0%: CPT | Performed by: NURSE PRACTITIONER

## 2023-03-03 PROCEDURE — G8420 CALC BMI NORM PARAMETERS: HCPCS | Performed by: NURSE PRACTITIONER

## 2023-03-03 PROCEDURE — G8399 PT W/DXA RESULTS DOCUMENT: HCPCS | Performed by: NURSE PRACTITIONER

## 2023-03-03 PROCEDURE — 99214 OFFICE O/P EST MOD 30 MIN: CPT | Performed by: NURSE PRACTITIONER

## 2023-03-03 RX ORDER — LEVOTHYROXINE SODIUM 0.12 MG/1
125 TABLET ORAL
Qty: 90 TABLET | Refills: 3 | Status: SHIPPED | OUTPATIENT
Start: 2023-03-03

## 2023-03-03 RX ORDER — LEVOTHYROXINE SODIUM 0.12 MG/1
125 TABLET ORAL
Qty: 14 TABLET | Refills: 0 | Status: SHIPPED | OUTPATIENT
Start: 2023-03-03 | End: 2023-03-03 | Stop reason: SDUPTHER

## 2023-03-03 ASSESSMENT — PATIENT HEALTH QUESTIONNAIRE - PHQ9
SUM OF ALL RESPONSES TO PHQ QUESTIONS 1-9: 0
SUM OF ALL RESPONSES TO PHQ9 QUESTIONS 1 & 2: 0
1. LITTLE INTEREST OR PLEASURE IN DOING THINGS: 0
2. FEELING DOWN, DEPRESSED OR HOPELESS: 0
SUM OF ALL RESPONSES TO PHQ QUESTIONS 1-9: 0

## 2023-03-03 NOTE — PROGRESS NOTES
PROGRESS NOTE    SUBJECTIVE:   Una Petty is a 69 y.o. female seen for a follow up visit for   Chief Complaint   Patient presents with    Diabetes    Cholesterol Problem    Hypertension     Hyperlipidemia  This is a chronic problem. Episode onset: greater than 20 years ago. Exacerbating diseases include diabetes and hypothyroidism. Current antihyperlipidemic treatment includes statins. There are no compliance problems. Risk factors for coronary artery disease include diabetes mellitus, dyslipidemia, family history and post-menopausal.     Hypothyroid (post-surgical)  This is a chronic problem. Episode onset: approx 2010. Denies weight changes. Denies heat/cold intolerances. Diabetes, Type II on long term insulin therapy  This is a chronic problem. Episode onset: approx 2005. Blood sugars improved with  medications (diet, insulin. ). Using CGM:  Dexcom G6    Aortic valve replacement  Aortic valve replacement:  CE bovine bioprosthetic valve with a size of 21 mm. No regurgitation. Mild stenosis of the aortic valve. Mitral valve:  CE-2015 bovine bioprosthetic valve with a size of 25 mm. No transvalvular regurgitation. No paravalvular regurgitation. Mild stenosis noted. Requires lifetime anticoagulation therapy with warfarin. History colon cancer    Phlegm in throat since bronchitis about a month ago. Reviewed and updated this visit by provider:  Tobacco  Allergies  Meds  Problems  Med Hx  Surg Hx  Fam Hx         Review of Systems   Constitutional:  Negative for chills and fever. HENT:  Negative for hearing loss and sneezing. Eyes:  Negative for visual disturbance. Respiratory:  Negative for cough, chest tightness, shortness of breath and wheezing. Cardiovascular:  Negative for palpitations and leg swelling. Gastrointestinal:  Negative for abdominal pain. Genitourinary:         Nocturia x 2 each night   Musculoskeletal:  Negative for arthralgias and back pain.    Skin: Negative for color change. Allergic/Immunologic: Negative for environmental allergies. Neurological:  Negative for syncope. Hematological:  Does not bruise/bleed easily. OBJECTIVE:    BP (!) 140/52 (Site: Left Upper Arm, Position: Sitting)   Pulse 58   Wt 146 lb 9.6 oz (66.5 kg)   SpO2 96%   BMI 24.40 kg/m²      Physical Exam  Vitals and nursing note reviewed. Constitutional:       Appearance: Normal appearance. HENT:      Head: Normocephalic. Mouth/Throat:      Lips: Pink. Mouth: Mucous membranes are moist.   Eyes:      General: Lids are normal.   Neck:      Vascular: No carotid bruit. Cardiovascular:      Rate and Rhythm: Normal rate and regular rhythm. Heart sounds: Murmur heard. Pulmonary:      Effort: Pulmonary effort is normal.      Breath sounds: Normal breath sounds. Musculoskeletal:      Cervical back: Neck supple. Right lower leg: No edema. Left lower leg: No edema. Skin:     General: Skin is warm and dry. Neurological:      Mental Status: She is alert and oriented to person, place, and time. Mental status is at baseline. Gait: Gait normal.   Psychiatric:         Mood and Affect: Mood normal.         Behavior: Behavior normal.        ASSESSMENT and PLAN    1. Hypothyroidism, postsurgical  -     levothyroxine (SYNTHROID) 125 MCG tablet; Take 1 tablet by mouth every morning (before breakfast), Disp-90 tablet, R-3Normal  -     TSH; Future  2. Essential hypertension  -     Comprehensive Metabolic Panel; Future  -     CBC with Auto Differential; Future  3. Type 2 diabetes mellitus with microalbuminuria, with long-term current use of insulin (HCC)  -     Comprehensive Metabolic Panel; Future  -     Hemoglobin A1C; Future  4. Type 2 diabetes mellitus with hyperglycemia, with long-term current use of insulin (HCC)  -     Comprehensive Metabolic Panel; Future  -     Hemoglobin A1C; Future  5. Mixed hyperlipidemia  -     Lipid Panel;  Future  - Comprehensive Metabolic Panel; Future  6. Long-term use of high-risk medication  7. Long term current use of anticoagulant therapy  -     CBC with Auto Differential; Future  8. Coronary artery disease involving native coronary artery of native heart without angina pectoris  -     Lipid Panel; Future  -     Comprehensive Metabolic Panel; Future    Return in about 3 months (around 6/3/2023) for Follow-Up, Hypothyroidism, HTN, HLD, DM2, with labs. current treatment plan is effective, no change in therapy except decrease levothyroxine  lab results and schedule of future lab studies reviewed with patient  reviewed diet, exercise and weight control  cardiovascular risk and specific lipid/LDL goals reviewed  reviewed medications and side effects in detail  specific diabetic recommendations: low cholesterol diet, weight control and daily exercise discussed, home glucose monitoring emphasized, all medications, side effects and compliance discussed carefully, foot care discussed and Podiatry visits discussed, annual eye examinations at Ophthalmology discussed, glycohemoglobin and other lab monitoring discussed, long term diabetic complications discussed, and labs immediately prior to next visit  Excellent blood sugar control using Dexcom G6. TSH suppressed. Decrease levothyroxine to 125 mcg daily before breakfast.    Tolerating Tikosyn. Reminded no OTC meds without clearance. As excessive phlegm in throat -- sour lemon juice tsp to tablespoon. rationale discussed. On this date 03/03/23 I have spent 23 minutes reviewing previous notes, test results and face to face with the patient. Over 50% of today's office visit was spent in face to face time in counseling reviewing test results, importance of compliance, education about disease process, benefits and side-effects of medications and follow-up plan. RICCI Feliz NP    Dictated using voice recognition software.  Proofread, but unrecognized voice recognition errors may exist.

## 2023-03-03 NOTE — PATIENT INSTRUCTIONS
Use sterile nasal saline, such as \"Simply Saline,\" to irrigate nasal passages and moisturize mucosal membranes. Gently blow nose after using sterile nasal saline. Use nasal saline gel -- AYR is a brand of saline gel. Sour lemon-head. One teaspoon of lemon juice.

## 2023-03-14 ASSESSMENT — ENCOUNTER SYMPTOMS
ABDOMINAL PAIN: 0
CHEST TIGHTNESS: 0
WHEEZING: 0
BACK PAIN: 0
COUGH: 0
SHORTNESS OF BREATH: 0
COLOR CHANGE: 0

## 2023-03-21 ENCOUNTER — ANTI-COAG VISIT (OUTPATIENT)
Dept: CARDIOLOGY CLINIC | Age: 78
End: 2023-03-21
Payer: MEDICARE

## 2023-03-21 DIAGNOSIS — Z95.2 S/P AVR (AORTIC VALVE REPLACEMENT): ICD-10-CM

## 2023-03-21 DIAGNOSIS — I48.0 PAROXYSMAL ATRIAL FIBRILLATION (HCC): ICD-10-CM

## 2023-03-21 DIAGNOSIS — Z79.01 LONG TERM CURRENT USE OF ANTICOAGULANT THERAPY: Primary | ICD-10-CM

## 2023-03-21 DIAGNOSIS — Z95.2 S/P MITRAL VALVE REPLACEMENT: ICD-10-CM

## 2023-03-21 LAB
POC INR: 2.6
PROTHROMBIN TIME, POC: NORMAL

## 2023-03-21 PROCEDURE — 93793 ANTICOAG MGMT PT WARFARIN: CPT | Performed by: INTERNAL MEDICINE

## 2023-03-21 PROCEDURE — 85610 PROTHROMBIN TIME: CPT | Performed by: INTERNAL MEDICINE

## 2023-03-21 NOTE — PATIENT INSTRUCTIONS
Reminder: Please contact the Coumadin Clinic at [phone] when you have medication changes. Examples, new medications, antibiotics, discontinued medications, new supplements, missed doses of warfarin or if you took extra doses of warfarin. This also includes OTC medications. Notifying us helps reduce the possibility of high and low INR's. In addition, if warfarin needs to be held for any procedures, please have surgeon or physician's office contact us before holding anticoagulant. Thanks, [facility] Coumadin Clinic.

## 2023-04-18 ENCOUNTER — ANTI-COAG VISIT (OUTPATIENT)
Dept: CARDIOLOGY CLINIC | Age: 78
End: 2023-04-18
Payer: MEDICARE

## 2023-04-18 DIAGNOSIS — I48.0 PAROXYSMAL ATRIAL FIBRILLATION (HCC): Chronic | ICD-10-CM

## 2023-04-18 DIAGNOSIS — Z79.01 LONG TERM CURRENT USE OF ANTICOAGULANT THERAPY: Primary | Chronic | ICD-10-CM

## 2023-04-18 DIAGNOSIS — Z95.2 S/P MITRAL VALVE REPLACEMENT: Chronic | ICD-10-CM

## 2023-04-18 DIAGNOSIS — Z95.2 S/P AVR (AORTIC VALVE REPLACEMENT): Chronic | ICD-10-CM

## 2023-04-18 LAB
POC INR: 2.2
PROTHROMBIN TIME, POC: NORMAL

## 2023-04-18 PROCEDURE — 85610 PROTHROMBIN TIME: CPT | Performed by: INTERNAL MEDICINE

## 2023-04-18 PROCEDURE — 93793 ANTICOAG MGMT PT WARFARIN: CPT | Performed by: INTERNAL MEDICINE

## 2023-04-18 NOTE — PATIENT INSTRUCTIONS
Reminder: Please contact the Coumadin Clinic at [phone] when you have medication changes. Examples, new medications, antibiotics, discontinued medications, new supplements, missed doses of warfarin or if you took extra doses of warfarin. This also includes OTC medications. Notifying us helps reduce the possibility of high and low INR's. In addition, if warfarin needs to be held for any procedures, please have surgeon or physician's office contact us before holding anticoagulant. Thanks, [facility] Coumadin Clinic.

## 2023-05-15 ENCOUNTER — TELEMEDICINE (OUTPATIENT)
Dept: INTERNAL MEDICINE CLINIC | Facility: CLINIC | Age: 78
End: 2023-05-15
Payer: MEDICARE

## 2023-05-15 DIAGNOSIS — E11.29 TYPE 2 DIABETES MELLITUS WITH MICROALBUMINURIA, WITH LONG-TERM CURRENT USE OF INSULIN (HCC): Chronic | ICD-10-CM

## 2023-05-15 DIAGNOSIS — Z79.899 LONG-TERM USE OF HIGH-RISK MEDICATION: Chronic | ICD-10-CM

## 2023-05-15 DIAGNOSIS — R80.9 TYPE 2 DIABETES MELLITUS WITH MICROALBUMINURIA, WITH LONG-TERM CURRENT USE OF INSULIN (HCC): Chronic | ICD-10-CM

## 2023-05-15 DIAGNOSIS — U07.1 COVID-19: Primary | ICD-10-CM

## 2023-05-15 DIAGNOSIS — Z79.4 TYPE 2 DIABETES MELLITUS WITH MICROALBUMINURIA, WITH LONG-TERM CURRENT USE OF INSULIN (HCC): Chronic | ICD-10-CM

## 2023-05-15 DIAGNOSIS — Z79.01 LONG TERM CURRENT USE OF ANTICOAGULANT THERAPY: Chronic | ICD-10-CM

## 2023-05-15 DIAGNOSIS — I48.0 PAROXYSMAL ATRIAL FIBRILLATION (HCC): Chronic | ICD-10-CM

## 2023-05-15 PROCEDURE — G8427 DOCREV CUR MEDS BY ELIG CLIN: HCPCS | Performed by: NURSE PRACTITIONER

## 2023-05-15 PROCEDURE — 1123F ACP DISCUSS/DSCN MKR DOCD: CPT | Performed by: NURSE PRACTITIONER

## 2023-05-15 PROCEDURE — 1090F PRES/ABSN URINE INCON ASSESS: CPT | Performed by: NURSE PRACTITIONER

## 2023-05-15 PROCEDURE — 3044F HG A1C LEVEL LT 7.0%: CPT | Performed by: NURSE PRACTITIONER

## 2023-05-15 PROCEDURE — 99214 OFFICE O/P EST MOD 30 MIN: CPT | Performed by: NURSE PRACTITIONER

## 2023-05-15 PROCEDURE — G8399 PT W/DXA RESULTS DOCUMENT: HCPCS | Performed by: NURSE PRACTITIONER

## 2023-05-15 ASSESSMENT — PATIENT HEALTH QUESTIONNAIRE - PHQ9
SUM OF ALL RESPONSES TO PHQ QUESTIONS 1-9: 0
2. FEELING DOWN, DEPRESSED OR HOPELESS: 0
SUM OF ALL RESPONSES TO PHQ QUESTIONS 1-9: 0
SUM OF ALL RESPONSES TO PHQ9 QUESTIONS 1 & 2: 0
1. LITTLE INTEREST OR PLEASURE IN DOING THINGS: 0

## 2023-05-15 ASSESSMENT — ENCOUNTER SYMPTOMS
DIARRHEA: 1
SORE THROAT: 1
COUGH: 0
SHORTNESS OF BREATH: 0
SINUS PRESSURE: 1
SINUS PAIN: 0
RHINORRHEA: 1

## 2023-05-15 NOTE — PROGRESS NOTES
Zoe Duverney (:  1945) is a Established patient, presenting virtually for evaluation of the following:    Assessment & Plan   Below is the assessment and plan developed based on review of pertinent history, physical exam, labs, studies, and medications. 1. COVID-19  2. Type 2 diabetes mellitus with microalbuminuria, with long-term current use of insulin (HCC)  3. Long term current use of anticoagulant therapy  4. Long-term use of high-risk medication  5. Paroxysmal atrial fibrillation ([address]    Return for Follow-Up, regularly scheduled appointment or sooner prn. Discussed Paxlovid when she has interaction with her Tikosyn, warfarin and her atorvastatin. She declines. May use tylenol for fever or headache/aches. May use Afrin as directed on bottle if needed. Saline nasal irrigation and then blow nose. If SpO2 drops below 90% or if having significant shortness of breath recommend to go to the emergency room. If cough gets worse she continues Tessalon Perles 200 mg 3 times daily as needed. Subjective   HPI  Symptoms started yesterday. Post-nasal drip significant. Little sore throat yesterday but not today. Feeling fatigued/worn out Worsened over the evening. Positive headache today. Mild diarrhea last evening. Cough productive of clear phlegm. Congestion feeling nose and face. Drainage is less today. SpO2 93 - 95%  Denies shortness of breath. Taking no OTC meds. Review of Systems   Constitutional:  Positive for fatigue. Negative for chills, diaphoresis and fever. HENT:  Positive for congestion, postnasal drip, rhinorrhea, sinus pressure and sore throat (mild last night). Negative for sinus pain. Respiratory:  Negative for cough and shortness of breath. Cardiovascular:  Negative for chest pain and leg swelling. Gastrointestinal:  Positive for diarrhea (last night only).         Objective   Patient-Reported Vitals  No data recorded     Physical Exam  [INSTRUCTIONS:

## 2023-05-23 ENCOUNTER — ANTI-COAG VISIT (OUTPATIENT)
Age: 78
End: 2023-05-23
Payer: MEDICARE

## 2023-05-23 DIAGNOSIS — I48.0 PAROXYSMAL ATRIAL FIBRILLATION (HCC): Chronic | ICD-10-CM

## 2023-05-23 DIAGNOSIS — Z79.01 LONG TERM CURRENT USE OF ANTICOAGULANT THERAPY: Primary | Chronic | ICD-10-CM

## 2023-05-23 DIAGNOSIS — Z95.2 S/P AVR (AORTIC VALVE REPLACEMENT): Chronic | ICD-10-CM

## 2023-05-23 DIAGNOSIS — Z95.2 S/P MITRAL VALVE REPLACEMENT: Chronic | ICD-10-CM

## 2023-05-23 LAB
POC INR: 3.3
PROTHROMBIN TIME, POC: ABNORMAL

## 2023-05-23 PROCEDURE — 93793 ANTICOAG MGMT PT WARFARIN: CPT | Performed by: INTERNAL MEDICINE

## 2023-05-23 PROCEDURE — 85610 PROTHROMBIN TIME: CPT | Performed by: INTERNAL MEDICINE

## 2023-05-23 NOTE — PATIENT INSTRUCTIONS
Reminder: Please contact the Coumadin Clinic at [phone] when you have medication changes. Examples, new medications, antibiotics, discontinued medications, new supplements, missed doses of warfarin or if you took extra doses of warfarin. This also includes OTC medications. Notifying us helps reduce the possibility of high and low INR's. In addition, if warfarin needs to be held for any procedures, please have surgeon or physician's office contact us before holding anticoagulant. Thanks, [facility] Coumadin Clinic.

## 2023-05-23 NOTE — PROGRESS NOTES
Continue current maintenance plan (see Anticoag Dosing Calendar). INR to be rechecked in one week(s). Tablet strength and weekly dosing schedule confirmed today.

## 2023-05-24 ENCOUNTER — TELEPHONE (OUTPATIENT)
Dept: INTERNAL MEDICINE CLINIC | Facility: CLINIC | Age: 78
End: 2023-05-24

## 2023-05-24 NOTE — TELEPHONE ENCOUNTER
LVM that [facility]  and [facility] [employer] are here ready for pickup.

## 2023-05-30 ENCOUNTER — ANTI-COAG VISIT (OUTPATIENT)
Age: 78
End: 2023-05-30
Payer: MEDICARE

## 2023-05-30 DIAGNOSIS — I48.0 PAROXYSMAL ATRIAL FIBRILLATION (HCC): Chronic | ICD-10-CM

## 2023-05-30 DIAGNOSIS — Z95.2 S/P MITRAL VALVE REPLACEMENT: Chronic | ICD-10-CM

## 2023-05-30 DIAGNOSIS — Z79.01 LONG TERM CURRENT USE OF ANTICOAGULANT THERAPY: Primary | Chronic | ICD-10-CM

## 2023-05-30 DIAGNOSIS — Z95.2 S/P AVR (AORTIC VALVE REPLACEMENT): Chronic | ICD-10-CM

## 2023-05-30 LAB
POC INR: 4.3
PROTHROMBIN TIME, POC: ABNORMAL

## 2023-05-30 PROCEDURE — 93793 ANTICOAG MGMT PT WARFARIN: CPT | Performed by: INTERNAL MEDICINE

## 2023-05-30 PROCEDURE — 85610 PROTHROMBIN TIME: CPT | Performed by: INTERNAL MEDICINE

## 2023-05-30 NOTE — PATIENT INSTRUCTIONS
Reminder: Please contact the Coumadin Clinic at [phone] when you have medication changes. Examples, new medications, antibiotics, discontinued medications, new supplements, missed doses of warfarin or if you took extra doses of warfarin. This also includes OTC medications. Notifying us helps reduce the possibility of high and low INR's. In addition, if warfarin needs to be held for any procedures, please have surgeon or physician's office contact us before holding anticoagulant. Thanks, [facility]iology Coumadin Clinic.

## 2023-06-02 DIAGNOSIS — Z79.01 LONG TERM CURRENT USE OF ANTICOAGULANT THERAPY: Chronic | ICD-10-CM

## 2023-06-02 DIAGNOSIS — Z79.4 TYPE 2 DIABETES MELLITUS WITH HYPERGLYCEMIA, WITH LONG-TERM CURRENT USE OF INSULIN (HCC): ICD-10-CM

## 2023-06-02 DIAGNOSIS — I25.10 CORONARY ARTERY DISEASE INVOLVING NATIVE CORONARY ARTERY OF NATIVE HEART WITHOUT ANGINA PECTORIS: ICD-10-CM

## 2023-06-02 DIAGNOSIS — Z79.4 TYPE 2 DIABETES MELLITUS WITH MICROALBUMINURIA, WITH LONG-TERM CURRENT USE OF INSULIN (HCC): Chronic | ICD-10-CM

## 2023-06-02 DIAGNOSIS — I10 ESSENTIAL HYPERTENSION: Chronic | ICD-10-CM

## 2023-06-02 DIAGNOSIS — E78.2 MIXED HYPERLIPIDEMIA: Chronic | ICD-10-CM

## 2023-06-02 DIAGNOSIS — E11.29 TYPE 2 DIABETES MELLITUS WITH MICROALBUMINURIA, WITH LONG-TERM CURRENT USE OF INSULIN (HCC): Chronic | ICD-10-CM

## 2023-06-02 DIAGNOSIS — R80.9 TYPE 2 DIABETES MELLITUS WITH MICROALBUMINURIA, WITH LONG-TERM CURRENT USE OF INSULIN (HCC): Chronic | ICD-10-CM

## 2023-06-02 DIAGNOSIS — E89.0 HYPOTHYROIDISM, POSTSURGICAL: ICD-10-CM

## 2023-06-02 DIAGNOSIS — E11.65 TYPE 2 DIABETES MELLITUS WITH HYPERGLYCEMIA, WITH LONG-TERM CURRENT USE OF INSULIN (HCC): ICD-10-CM

## 2023-06-02 LAB
ALBUMIN SERPL-MCNC: 3.2 G/DL (ref 3.2–4.6)
ALBUMIN/GLOB SERPL: 0.8 (ref 0.4–1.6)
ALP SERPL-CCNC: 91 U/L (ref 50–136)
ALT SERPL-CCNC: 30 U/L (ref 12–65)
ANION GAP SERPL CALC-SCNC: 4 MMOL/L (ref 2–11)
AST SERPL-CCNC: 23 U/L (ref 15–37)
BASOPHILS # BLD: 0 K/UL (ref 0–0.2)
BASOPHILS NFR BLD: 0 % (ref 0–2)
BILIRUB SERPL-MCNC: 0.3 MG/DL (ref 0.2–1.1)
BUN SERPL-MCNC: 23 MG/DL (ref 8–23)
CALCIUM SERPL-MCNC: 8.2 MG/DL (ref 8.3–10.4)
CHLORIDE SERPL-SCNC: 109 MMOL/L (ref 101–110)
CHOLEST SERPL-MCNC: 61 MG/DL
CO2 SERPL-SCNC: 27 MMOL/L (ref 21–32)
CREAT SERPL-MCNC: 0.8 MG/DL (ref 0.6–1)
DIFFERENTIAL METHOD BLD: ABNORMAL
EOSINOPHIL # BLD: 0.2 K/UL (ref 0–0.8)
EOSINOPHIL NFR BLD: 2 % (ref 0.5–7.8)
ERYTHROCYTE [DISTWIDTH] IN BLOOD BY AUTOMATED COUNT: 16.5 % (ref 11.9–14.6)
EST. AVERAGE GLUCOSE BLD GHB EST-MCNC: 143 MG/DL
GLOBULIN SER CALC-MCNC: 4.1 G/DL (ref 2.8–4.5)
GLUCOSE SERPL-MCNC: 127 MG/DL (ref 65–100)
HBA1C MFR BLD: 6.6 % (ref 4.8–5.6)
HCT VFR BLD AUTO: 32.8 % (ref 35.8–46.3)
HDLC SERPL-MCNC: 25 MG/DL (ref 40–60)
HDLC SERPL: 2.4
HGB BLD-MCNC: 9.5 G/DL (ref 11.7–15.4)
IMM GRANULOCYTES # BLD AUTO: 0 K/UL (ref 0–0.5)
IMM GRANULOCYTES NFR BLD AUTO: 0 % (ref 0–5)
LDLC SERPL CALC-MCNC: 18 MG/DL
LYMPHOCYTES # BLD: 1.7 K/UL (ref 0.5–4.6)
LYMPHOCYTES NFR BLD: 18 % (ref 13–44)
MCH RBC QN AUTO: 27.9 PG (ref 26.1–32.9)
MCHC RBC AUTO-ENTMCNC: 29 G/DL (ref 31.4–35)
MCV RBC AUTO: 96.2 FL (ref 82–102)
MONOCYTES # BLD: 0.8 K/UL (ref 0.1–1.3)
MONOCYTES NFR BLD: 9 % (ref 4–12)
NEUTS SEG # BLD: 6.4 K/UL (ref 1.7–8.2)
NEUTS SEG NFR BLD: 70 % (ref 43–78)
NRBC # BLD: 0 K/UL (ref 0–0.2)
PLATELET # BLD AUTO: 329 K/UL (ref 150–450)
PMV BLD AUTO: 10 FL (ref 9.4–12.3)
POTASSIUM SERPL-SCNC: 5.4 MMOL/L (ref 3.5–5.1)
PROT SERPL-MCNC: 7.3 G/DL (ref 6.3–8.2)
RBC # BLD AUTO: 3.41 M/UL (ref 4.05–5.2)
SODIUM SERPL-SCNC: 140 MMOL/L (ref 133–143)
TRIGL SERPL-MCNC: 90 MG/DL (ref 35–150)
TSH, 3RD GENERATION: 0.71 UIU/ML (ref 0.36–3.74)
VLDLC SERPL CALC-MCNC: 18 MG/DL (ref 6–23)
WBC # BLD AUTO: 9.1 K/UL (ref 4.3–11.1)

## 2023-06-06 LAB
FERRITIN SERPL-MCNC: 32 NG/ML (ref 8–388)
IRON SATN MFR SERPL: 9 %
IRON SERPL-MCNC: 34 UG/DL (ref 35–150)
TIBC SERPL-MCNC: 374 UG/DL (ref 250–450)

## 2023-06-15 ENCOUNTER — OFFICE VISIT (OUTPATIENT)
Dept: INTERNAL MEDICINE CLINIC | Facility: CLINIC | Age: 78
End: 2023-06-15

## 2023-06-15 VITALS
SYSTOLIC BLOOD PRESSURE: 146 MMHG | HEIGHT: 65 IN | DIASTOLIC BLOOD PRESSURE: 60 MMHG | WEIGHT: 146.6 LBS | BODY MASS INDEX: 24.43 KG/M2 | RESPIRATION RATE: 18 BRPM

## 2023-06-15 DIAGNOSIS — I48.0 PAROXYSMAL ATRIAL FIBRILLATION (HCC): Chronic | ICD-10-CM

## 2023-06-15 DIAGNOSIS — E78.2 MIXED HYPERLIPIDEMIA: Chronic | ICD-10-CM

## 2023-06-15 DIAGNOSIS — Z79.899 LONG-TERM USE OF HIGH-RISK MEDICATION: Chronic | ICD-10-CM

## 2023-06-15 DIAGNOSIS — Z79.4 TYPE 2 DIABETES MELLITUS WITH MICROALBUMINURIA, WITH LONG-TERM CURRENT USE OF INSULIN (HCC): Chronic | ICD-10-CM

## 2023-06-15 DIAGNOSIS — E61.1 IRON DEFICIENCY: Primary | ICD-10-CM

## 2023-06-15 DIAGNOSIS — E89.0 HYPOTHYROIDISM, POSTSURGICAL: Chronic | ICD-10-CM

## 2023-06-15 DIAGNOSIS — Z79.01 LONG TERM CURRENT USE OF ANTICOAGULANT THERAPY: Chronic | ICD-10-CM

## 2023-06-15 DIAGNOSIS — D50.9 IRON DEFICIENCY ANEMIA, UNSPECIFIED IRON DEFICIENCY ANEMIA TYPE: ICD-10-CM

## 2023-06-15 DIAGNOSIS — I10 ESSENTIAL HYPERTENSION: Chronic | ICD-10-CM

## 2023-06-15 DIAGNOSIS — E11.29 TYPE 2 DIABETES MELLITUS WITH MICROALBUMINURIA, WITH LONG-TERM CURRENT USE OF INSULIN (HCC): Chronic | ICD-10-CM

## 2023-06-15 DIAGNOSIS — R80.9 TYPE 2 DIABETES MELLITUS WITH MICROALBUMINURIA, WITH LONG-TERM CURRENT USE OF INSULIN (HCC): Chronic | ICD-10-CM

## 2023-06-15 PROBLEM — C18.9 COLON CANCER (HCC): Chronic | Status: RESOLVED | Noted: 2021-09-01 | Resolved: 2023-06-15

## 2023-06-15 RX ORDER — OMEPRAZOLE MAGNESIUM 20 MG
CAPSULE,DELAYED RELEASE (ENTERIC COATED) ORAL
COMMUNITY

## 2023-06-15 RX ORDER — AMOXICILLIN 500 MG/1
TABLET, FILM COATED ORAL
COMMUNITY
Start: 2023-03-29

## 2023-06-15 SDOH — ECONOMIC STABILITY: INCOME INSECURITY: HOW HARD IS IT FOR YOU TO PAY FOR THE VERY BASICS LIKE FOOD, HOUSING, MEDICAL CARE, AND HEATING?: PATIENT DECLINED

## 2023-06-15 SDOH — ECONOMIC STABILITY: FOOD INSECURITY: WITHIN THE PAST 12 MONTHS, THE FOOD YOU BOUGHT JUST DIDN'T LAST AND YOU DIDN'T HAVE MONEY TO GET MORE.: PATIENT DECLINED

## 2023-06-15 SDOH — ECONOMIC STABILITY: FOOD INSECURITY: WITHIN THE PAST 12 MONTHS, YOU WORRIED THAT YOUR FOOD WOULD RUN OUT BEFORE YOU GOT MONEY TO BUY MORE.: PATIENT DECLINED

## 2023-06-15 ASSESSMENT — PATIENT HEALTH QUESTIONNAIRE - PHQ9
1. LITTLE INTEREST OR PLEASURE IN DOING THINGS: 0
SUM OF ALL RESPONSES TO PHQ9 QUESTIONS 1 & 2: 0
SUM OF ALL RESPONSES TO PHQ QUESTIONS 1-9: 0
2. FEELING DOWN, DEPRESSED OR HOPELESS: 0
SUM OF ALL RESPONSES TO PHQ QUESTIONS 1-9: 0

## 2023-06-25 ASSESSMENT — ENCOUNTER SYMPTOMS
COUGH: 0
ABDOMINAL PAIN: 0
CHEST TIGHTNESS: 0
SHORTNESS OF BREATH: 0
WHEEZING: 0
COLOR CHANGE: 0
BACK PAIN: 0

## 2023-07-03 RX ORDER — LOSARTAN POTASSIUM 50 MG/1
TABLET ORAL
Qty: 180 TABLET | Refills: 3 | Status: SHIPPED | OUTPATIENT
Start: 2023-07-03

## 2023-07-07 RX ORDER — AMLODIPINE BESYLATE 5 MG/1
TABLET ORAL
Qty: 90 TABLET | Refills: 3 | Status: SHIPPED | OUTPATIENT
Start: 2023-07-07

## 2023-07-10 ASSESSMENT — ENCOUNTER SYMPTOMS
SORE THROAT: 0
ABDOMINAL DISTENTION: 0
SHORTNESS OF BREATH: 0
COUGH: 0
CONSTIPATION: 0
ABDOMINAL PAIN: 0
DIARRHEA: 0
PHOTOPHOBIA: 0

## 2023-07-10 NOTE — PROGRESS NOTES
[facility] CARDIOLOGY  [address] [facility], [address] [place] [address]  PHONE: [phone]        NAME:  Felisha Altamirano  : 1945  MRN: [id number]     PCP:  RICCI Davies NP      SUBJECTIVE:   Felisha Altamirano is a 69 y.o. female seen for a follow up visit regarding the following:     Chief Complaint   Patient presents with    Coronary Artery Disease       HPI:  Recovering from Covid 2 mos ago but still with prolonged fatigue and malaise but no angina, CHF or volume overload/arrhythmic symptoms. She had one night of pAF in the middle of the viral syndrome but none since and sinus with PAC's today. Recently diagnosed with colon CA and s/p partial colon resection without adverse cardiac event. No interval palpitations to suggest recurrence of paroxysmal A. fib with RVR since her last visit. She has been completely asymptomatic otherwise without any angina, CHF, presyncope or syncope and remains compliant with tikosyn. INRs have been labile only when holding Coumadin for serial colonoscopy, otherwise well controlled and usually in the mid 2 range. Euvolemic and compliant with all meds. No bleeding with coumadin. She is iron deficient however and getting iron infusions periodically. She is maintaining sinus rhythm by ECG and clinically on Tikosyn with good compliance. Okay to hold or interrupt warfarin as needed for symptomatic anemia in the future, see below. Aortic valve bioprosthetic gradients have started rising fairly dramatically over the past 6 to 12 months. She has at least moderate severity bioprosthetic valve restenosis and we will check echo every 6 months, referring for TAVR consideration once she reaches the severe stenosis range. Echo 2022:  Left Ventricle Left ventricle size is normal. Normal wall thickness. Normal wall motion. Normal left ventricular systolic function with a visually estimated EF of 55 - 60%.  Indeterminate

## 2023-07-12 ENCOUNTER — OFFICE VISIT (OUTPATIENT)
Age: 78
End: 2023-07-12
Payer: MEDICARE

## 2023-07-12 ENCOUNTER — ANTI-COAG VISIT (OUTPATIENT)
Age: 78
End: 2023-07-12
Payer: MEDICARE

## 2023-07-12 VITALS
WEIGHT: 146 LBS | SYSTOLIC BLOOD PRESSURE: 138 MMHG | HEIGHT: 65 IN | BODY MASS INDEX: 24.32 KG/M2 | HEART RATE: 72 BPM | DIASTOLIC BLOOD PRESSURE: 60 MMHG

## 2023-07-12 DIAGNOSIS — E78.2 MIXED HYPERLIPIDEMIA: Chronic | ICD-10-CM

## 2023-07-12 DIAGNOSIS — Z79.4 TYPE 2 DIABETES MELLITUS WITH HYPERGLYCEMIA, WITH LONG-TERM CURRENT USE OF INSULIN (HCC): ICD-10-CM

## 2023-07-12 DIAGNOSIS — Z95.2 S/P AVR (AORTIC VALVE REPLACEMENT): Chronic | ICD-10-CM

## 2023-07-12 DIAGNOSIS — I65.23 BILATERAL CAROTID ARTERY STENOSIS: ICD-10-CM

## 2023-07-12 DIAGNOSIS — I25.10 CORONARY ARTERY DISEASE INVOLVING NATIVE CORONARY ARTERY OF NATIVE HEART WITHOUT ANGINA PECTORIS: ICD-10-CM

## 2023-07-12 DIAGNOSIS — I48.0 PAROXYSMAL ATRIAL FIBRILLATION (HCC): Chronic | ICD-10-CM

## 2023-07-12 DIAGNOSIS — Z79.01 LONG TERM CURRENT USE OF ANTICOAGULANT THERAPY: Primary | Chronic | ICD-10-CM

## 2023-07-12 DIAGNOSIS — I10 ESSENTIAL HYPERTENSION: Chronic | ICD-10-CM

## 2023-07-12 DIAGNOSIS — Z95.2 S/P MITRAL VALVE REPLACEMENT: Primary | Chronic | ICD-10-CM

## 2023-07-12 DIAGNOSIS — Z95.2 S/P MITRAL VALVE REPLACEMENT: Chronic | ICD-10-CM

## 2023-07-12 DIAGNOSIS — E11.65 TYPE 2 DIABETES MELLITUS WITH HYPERGLYCEMIA, WITH LONG-TERM CURRENT USE OF INSULIN (HCC): ICD-10-CM

## 2023-07-12 LAB
POC INR: 2.2
PROTHROMBIN TIME, POC: NORMAL

## 2023-07-12 PROCEDURE — 1090F PRES/ABSN URINE INCON ASSESS: CPT | Performed by: INTERNAL MEDICINE

## 2023-07-12 PROCEDURE — 3044F HG A1C LEVEL LT 7.0%: CPT | Performed by: INTERNAL MEDICINE

## 2023-07-12 PROCEDURE — 3075F SYST BP GE 130 - 139MM HG: CPT | Performed by: INTERNAL MEDICINE

## 2023-07-12 PROCEDURE — G8399 PT W/DXA RESULTS DOCUMENT: HCPCS | Performed by: INTERNAL MEDICINE

## 2023-07-12 PROCEDURE — 1036F TOBACCO NON-USER: CPT | Performed by: INTERNAL MEDICINE

## 2023-07-12 PROCEDURE — 99214 OFFICE O/P EST MOD 30 MIN: CPT | Performed by: INTERNAL MEDICINE

## 2023-07-12 PROCEDURE — G8420 CALC BMI NORM PARAMETERS: HCPCS | Performed by: INTERNAL MEDICINE

## 2023-07-12 PROCEDURE — 85610 PROTHROMBIN TIME: CPT | Performed by: INTERNAL MEDICINE

## 2023-07-12 PROCEDURE — 3078F DIAST BP <80 MM HG: CPT | Performed by: INTERNAL MEDICINE

## 2023-07-12 PROCEDURE — G8427 DOCREV CUR MEDS BY ELIG CLIN: HCPCS | Performed by: INTERNAL MEDICINE

## 2023-07-12 PROCEDURE — 1123F ACP DISCUSS/DSCN MKR DOCD: CPT | Performed by: INTERNAL MEDICINE

## 2023-07-12 PROCEDURE — 93000 ELECTROCARDIOGRAM COMPLETE: CPT | Performed by: INTERNAL MEDICINE

## 2023-07-12 NOTE — PROGRESS NOTES
Anticoagulation Summary  As of 7/12/2023      INR goal:  2.0-3.0   TTR:  59.4 % (11.9 mo)   INR used for dosing:     Warfarin maintenance plan:  5 mg (5 mg x 1) every day   Weekly warfarin total:  35 mg   Plan last modified:  Sandra Cooper MA (5/30/2023)   Next INR check:     Priority:  Maintenance   Target end date:   Indefinite    Indications    Long term current use of anticoagulant therapy [Z79.01]  S/P AVR (aortic valve replacement) [Z95.2]  Paroxysmal atrial fibrillation (HCC) [I48.0]  S/P mitral valve replacement [Z95.2]                 Anticoagulation Episode Summary       INR check location:  Anticoagulation Clinic    Preferred lab:      Send INR reminders to:  [facility]    Comments:  [address] [facility]          Anticoagulation Care Providers       Provider Role Specialty Phone number    Amanda Soler MD [facility] Cardiology [phone]

## 2023-07-17 ENCOUNTER — APPOINTMENT (OUTPATIENT)
Dept: GENERAL RADIOLOGY | Age: 78
DRG: 189 | End: 2023-07-17
Payer: MEDICARE

## 2023-07-17 ENCOUNTER — HOSPITAL ENCOUNTER (INPATIENT)
Age: 78
LOS: 3 days | Discharge: HOME HEALTH | DRG: 189 | End: 2023-07-20
Attending: EMERGENCY MEDICINE | Admitting: FAMILY MEDICINE
Payer: MEDICARE

## 2023-07-17 ENCOUNTER — APPOINTMENT (OUTPATIENT)
Dept: CT IMAGING | Age: 78
DRG: 189 | End: 2023-07-17
Payer: MEDICARE

## 2023-07-17 DIAGNOSIS — R91.8 PULMONARY INFILTRATES: ICD-10-CM

## 2023-07-17 DIAGNOSIS — J96.01 ACUTE RESPIRATORY FAILURE WITH HYPOXEMIA (HCC): Primary | ICD-10-CM

## 2023-07-17 DIAGNOSIS — R06.02 SHORTNESS OF BREATH: ICD-10-CM

## 2023-07-17 PROBLEM — Z79.4 TYPE 2 DIABETES MELLITUS WITH HYPERGLYCEMIA, WITH LONG-TERM CURRENT USE OF INSULIN (HCC): Status: ACTIVE | Noted: 2018-08-16

## 2023-07-17 PROBLEM — E11.65 TYPE 2 DIABETES MELLITUS WITH HYPERGLYCEMIA, WITH LONG-TERM CURRENT USE OF INSULIN (HCC): Status: ACTIVE | Noted: 2018-08-16

## 2023-07-17 PROBLEM — R93.89 ABNORMAL CT OF THE CHEST: Status: ACTIVE | Noted: 2023-07-17

## 2023-07-17 LAB
ABO + RH BLD: NORMAL
ALBUMIN SERPL-MCNC: 3.1 G/DL (ref 3.2–4.6)
ALBUMIN/GLOB SERPL: 0.7 (ref 0.4–1.6)
ALP SERPL-CCNC: 88 U/L (ref 50–136)
ALT SERPL-CCNC: 29 U/L (ref 12–65)
ANION GAP SERPL CALC-SCNC: 4 MMOL/L (ref 2–11)
ARTERIAL PATENCY WRIST A: POSITIVE
AST SERPL-CCNC: 23 U/L (ref 15–37)
B PERT DNA SPEC QL NAA+PROBE: NOT DETECTED
BASE EXCESS BLD CALC-SCNC: 2.4 MMOL/L
BASOPHILS # BLD: 0 K/UL (ref 0–0.2)
BASOPHILS NFR BLD: 0 % (ref 0–2)
BDY SITE: ABNORMAL
BILIRUB SERPL-MCNC: 0.4 MG/DL (ref 0.2–1.1)
BLOOD GROUP ANTIBODIES SERPL: NORMAL
BORDETELLA PARAPERTUSSIS BY PCR: NOT DETECTED
BUN SERPL-MCNC: 19 MG/DL (ref 8–23)
C PNEUM DNA SPEC QL NAA+PROBE: NOT DETECTED
CALCIUM SERPL-MCNC: 8.1 MG/DL (ref 8.3–10.4)
CHLORIDE SERPL-SCNC: 114 MMOL/L (ref 101–110)
CO2 SERPL-SCNC: 28 MMOL/L (ref 21–32)
CREAT SERPL-MCNC: 0.8 MG/DL (ref 0.6–1)
CRP SERPL-MCNC: 0.9 MG/DL (ref 0–0.9)
D DIMER PPP FEU-MCNC: 0.72 UG/ML(FEU)
DIFFERENTIAL METHOD BLD: ABNORMAL
EKG ATRIAL RATE: 0 BPM
EKG DIAGNOSIS: NORMAL
EKG P AXIS: 67 DEGREES
EKG P-R INTERVAL: 137 MS
EKG Q-T INTERVAL: 434 MS
EKG QRS DURATION: 88 MS
EKG QTC CALCULATION (BAZETT): 475 MS
EKG R AXIS: 56 DEGREES
EKG T AXIS: 76 DEGREES
EKG VENTRICULAR RATE: 72 BPM
EOSINOPHIL # BLD: 0.3 K/UL (ref 0–0.8)
EOSINOPHIL NFR BLD: 3 % (ref 0.5–7.8)
ERYTHROCYTE [DISTWIDTH] IN BLOOD BY AUTOMATED COUNT: 17.6 % (ref 11.9–14.6)
FLUAV SUBTYP SPEC NAA+PROBE: NOT DETECTED
FLUBV RNA SPEC QL NAA+PROBE: NOT DETECTED
GAS FLOW.O2 O2 DELIVERY SYS: ABNORMAL
GLOBULIN SER CALC-MCNC: 4.2 G/DL (ref 2.8–4.5)
GLUCOSE BLD STRIP.AUTO-MCNC: 103 MG/DL (ref 65–100)
GLUCOSE BLD STRIP.AUTO-MCNC: 171 MG/DL (ref 65–100)
GLUCOSE BLD STRIP.AUTO-MCNC: 195 MG/DL (ref 65–100)
GLUCOSE BLD STRIP.AUTO-MCNC: 200 MG/DL (ref 65–100)
GLUCOSE BLD STRIP.AUTO-MCNC: 226 MG/DL (ref 65–100)
GLUCOSE SERPL-MCNC: 91 MG/DL (ref 65–100)
HADV DNA SPEC QL NAA+PROBE: NOT DETECTED
HCO3 BLD-SCNC: 28.1 MMOL/L (ref 22–26)
HCOV 229E RNA SPEC QL NAA+PROBE: NOT DETECTED
HCOV HKU1 RNA SPEC QL NAA+PROBE: NOT DETECTED
HCOV NL63 RNA SPEC QL NAA+PROBE: NOT DETECTED
HCOV OC43 RNA SPEC QL NAA+PROBE: NOT DETECTED
HCT VFR BLD AUTO: 33.7 % (ref 35.8–46.3)
HGB BLD-MCNC: 9.6 G/DL (ref 11.7–15.4)
HMPV RNA SPEC QL NAA+PROBE: NOT DETECTED
HPIV1 RNA SPEC QL NAA+PROBE: NOT DETECTED
HPIV2 RNA SPEC QL NAA+PROBE: NOT DETECTED
HPIV3 RNA SPEC QL NAA+PROBE: NOT DETECTED
HPIV4 RNA SPEC QL NAA+PROBE: NOT DETECTED
IMM GRANULOCYTES # BLD AUTO: 0 K/UL (ref 0–0.5)
IMM GRANULOCYTES NFR BLD AUTO: 0 % (ref 0–5)
INR PPP: 2
LACTATE SERPL-SCNC: 1.2 MMOL/L (ref 0.4–2)
LACTATE SERPL-SCNC: 1.3 MMOL/L (ref 0.4–2)
LYMPHOCYTES # BLD: 1.8 K/UL (ref 0.5–4.6)
LYMPHOCYTES NFR BLD: 18 % (ref 13–44)
M PNEUMO DNA SPEC QL NAA+PROBE: NOT DETECTED
MAGNESIUM SERPL-MCNC: 2 MG/DL (ref 1.8–2.4)
MCH RBC QN AUTO: 27.2 PG (ref 26.1–32.9)
MCHC RBC AUTO-ENTMCNC: 28.5 G/DL (ref 31.4–35)
MCV RBC AUTO: 95.5 FL (ref 82–102)
MONOCYTES # BLD: 0.9 K/UL (ref 0.1–1.3)
MONOCYTES NFR BLD: 9 % (ref 4–12)
NEUTS SEG # BLD: 7.1 K/UL (ref 1.7–8.2)
NEUTS SEG NFR BLD: 70 % (ref 43–78)
NRBC # BLD: 0 K/UL (ref 0–0.2)
NT PRO BNP: 409 PG/ML
PCO2 BLD: 47 MMHG (ref 35–45)
PH BLD: 7.38 (ref 7.35–7.45)
PLATELET # BLD AUTO: 264 K/UL (ref 150–450)
PMV BLD AUTO: 8.9 FL (ref 9.4–12.3)
PO2 BLD: 57 MMHG (ref 75–100)
POC FIO2: 4
POTASSIUM SERPL-SCNC: 4.8 MMOL/L (ref 3.5–5.1)
PROCALCITONIN SERPL-MCNC: <0.05 NG/ML (ref 0–0.49)
PROT SERPL-MCNC: 7.3 G/DL (ref 6.3–8.2)
PROTHROMBIN TIME: 23.3 SEC (ref 12.6–14.3)
RBC # BLD AUTO: 3.53 M/UL (ref 4.05–5.2)
RSV RNA SPEC QL NAA+PROBE: NOT DETECTED
RV+EV RNA SPEC QL NAA+PROBE: NOT DETECTED
SAO2 % BLD: 88.6 % (ref 95–98)
SARS-COV-2 RNA RESP QL NAA+PROBE: NOT DETECTED
SERVICE CMNT-IMP: ABNORMAL
SODIUM SERPL-SCNC: 146 MMOL/L (ref 133–143)
SPECIMEN EXP DATE BLD: NORMAL
SPECIMEN TYPE: ABNORMAL
TROPONIN I SERPL HS-MCNC: 98.2 PG/ML (ref 0–14)
TROPONIN I SERPL HS-MCNC: 98.6 PG/ML (ref 0–14)
TSH W FREE THYROID IF ABNORMAL: 0.95 UIU/ML (ref 0.36–3.74)
WBC # BLD AUTO: 10.2 K/UL (ref 4.3–11.1)

## 2023-07-17 PROCEDURE — 99285 EMERGENCY DEPT VISIT HI MDM: CPT

## 2023-07-17 PROCEDURE — 6370000000 HC RX 637 (ALT 250 FOR IP): Performed by: FAMILY MEDICINE

## 2023-07-17 PROCEDURE — 86140 C-REACTIVE PROTEIN: CPT

## 2023-07-17 PROCEDURE — 2580000003 HC RX 258: Performed by: FAMILY MEDICINE

## 2023-07-17 PROCEDURE — 6360000004 HC RX CONTRAST MEDICATION: Performed by: EMERGENCY MEDICINE

## 2023-07-17 PROCEDURE — 84443 ASSAY THYROID STIM HORMONE: CPT

## 2023-07-17 PROCEDURE — 71260 CT THORAX DX C+: CPT

## 2023-07-17 PROCEDURE — 86901 BLOOD TYPING SEROLOGIC RH(D): CPT

## 2023-07-17 PROCEDURE — 83605 ASSAY OF LACTIC ACID: CPT

## 2023-07-17 PROCEDURE — 2700000000 HC OXYGEN THERAPY PER DAY

## 2023-07-17 PROCEDURE — 86850 RBC ANTIBODY SCREEN: CPT

## 2023-07-17 PROCEDURE — 85025 COMPLETE CBC W/AUTO DIFF WBC: CPT

## 2023-07-17 PROCEDURE — 87040 BLOOD CULTURE FOR BACTERIA: CPT

## 2023-07-17 PROCEDURE — 2580000003 HC RX 258: Performed by: INTERNAL MEDICINE

## 2023-07-17 PROCEDURE — 6360000002 HC RX W HCPCS: Performed by: EMERGENCY MEDICINE

## 2023-07-17 PROCEDURE — 85610 PROTHROMBIN TIME: CPT

## 2023-07-17 PROCEDURE — 84145 PROCALCITONIN (PCT): CPT

## 2023-07-17 PROCEDURE — 71045 X-RAY EXAM CHEST 1 VIEW: CPT

## 2023-07-17 PROCEDURE — 83880 ASSAY OF NATRIURETIC PEPTIDE: CPT

## 2023-07-17 PROCEDURE — 86900 BLOOD TYPING SEROLOGIC ABO: CPT

## 2023-07-17 PROCEDURE — 0202U NFCT DS 22 TRGT SARS-COV-2: CPT

## 2023-07-17 PROCEDURE — 83735 ASSAY OF MAGNESIUM: CPT

## 2023-07-17 PROCEDURE — 82962 GLUCOSE BLOOD TEST: CPT

## 2023-07-17 PROCEDURE — 85379 FIBRIN DEGRADATION QUANT: CPT

## 2023-07-17 PROCEDURE — 96374 THER/PROPH/DIAG INJ IV PUSH: CPT

## 2023-07-17 PROCEDURE — 94760 N-INVAS EAR/PLS OXIMETRY 1: CPT

## 2023-07-17 PROCEDURE — 2580000003 HC RX 258: Performed by: EMERGENCY MEDICINE

## 2023-07-17 PROCEDURE — 36415 COLL VENOUS BLD VENIPUNCTURE: CPT

## 2023-07-17 PROCEDURE — 36600 WITHDRAWAL OF ARTERIAL BLOOD: CPT

## 2023-07-17 PROCEDURE — 93005 ELECTROCARDIOGRAM TRACING: CPT | Performed by: EMERGENCY MEDICINE

## 2023-07-17 PROCEDURE — 1100000003 HC PRIVATE W/ TELEMETRY

## 2023-07-17 PROCEDURE — 99223 1ST HOSP IP/OBS HIGH 75: CPT | Performed by: INTERNAL MEDICINE

## 2023-07-17 PROCEDURE — 86738 MYCOPLASMA ANTIBODY: CPT

## 2023-07-17 PROCEDURE — 82803 BLOOD GASES ANY COMBINATION: CPT

## 2023-07-17 PROCEDURE — 94762 N-INVAS EAR/PLS OXIMTRY CONT: CPT

## 2023-07-17 PROCEDURE — 93010 ELECTROCARDIOGRAM REPORT: CPT | Performed by: INTERNAL MEDICINE

## 2023-07-17 PROCEDURE — 84484 ASSAY OF TROPONIN QUANT: CPT

## 2023-07-17 PROCEDURE — 80053 COMPREHEN METABOLIC PANEL: CPT

## 2023-07-17 PROCEDURE — 2500000003 HC RX 250 WO HCPCS: Performed by: INTERNAL MEDICINE

## 2023-07-17 RX ORDER — WARFARIN SODIUM 5 MG/1
5 TABLET ORAL
Status: DISCONTINUED | OUTPATIENT
Start: 2023-07-18 | End: 2023-07-17

## 2023-07-17 RX ORDER — LOSARTAN POTASSIUM 50 MG/1
50 TABLET ORAL 2 TIMES DAILY
Status: DISCONTINUED | OUTPATIENT
Start: 2023-07-17 | End: 2023-07-20 | Stop reason: HOSPADM

## 2023-07-17 RX ORDER — DOFETILIDE 0.5 MG/1
500 CAPSULE ORAL 2 TIMES DAILY
Status: DISCONTINUED | OUTPATIENT
Start: 2023-07-17 | End: 2023-07-20 | Stop reason: HOSPADM

## 2023-07-17 RX ORDER — PANTOPRAZOLE SODIUM 40 MG/1
40 TABLET, DELAYED RELEASE ORAL
Status: DISCONTINUED | OUTPATIENT
Start: 2023-07-17 | End: 2023-07-20 | Stop reason: HOSPADM

## 2023-07-17 RX ORDER — ONDANSETRON 2 MG/ML
4 INJECTION INTRAMUSCULAR; INTRAVENOUS EVERY 6 HOURS PRN
Status: DISCONTINUED | OUTPATIENT
Start: 2023-07-17 | End: 2023-07-20 | Stop reason: HOSPADM

## 2023-07-17 RX ORDER — ASPIRIN 81 MG/1
81 TABLET, CHEWABLE ORAL DAILY
Status: DISCONTINUED | OUTPATIENT
Start: 2023-07-17 | End: 2023-07-20 | Stop reason: HOSPADM

## 2023-07-17 RX ORDER — LEVALBUTEROL TARTRATE 45 UG/1
2 AEROSOL, METERED ORAL EVERY 6 HOURS PRN
Status: DISCONTINUED | OUTPATIENT
Start: 2023-07-17 | End: 2023-07-17 | Stop reason: CLARIF

## 2023-07-17 RX ORDER — SODIUM CHLORIDE 0.9 % (FLUSH) 0.9 %
5-40 SYRINGE (ML) INJECTION PRN
Status: DISCONTINUED | OUTPATIENT
Start: 2023-07-17 | End: 2023-07-20 | Stop reason: HOSPADM

## 2023-07-17 RX ORDER — FERROUS SULFATE 325(65) MG
325 TABLET ORAL 2 TIMES DAILY WITH MEALS
Status: DISCONTINUED | OUTPATIENT
Start: 2023-07-17 | End: 2023-07-20 | Stop reason: HOSPADM

## 2023-07-17 RX ORDER — FUROSEMIDE 10 MG/ML
40 INJECTION INTRAMUSCULAR; INTRAVENOUS ONCE
Status: COMPLETED | OUTPATIENT
Start: 2023-07-17 | End: 2023-07-17

## 2023-07-17 RX ORDER — WARFARIN SODIUM 7.5 MG/1
7.5 TABLET ORAL
Status: DISCONTINUED | OUTPATIENT
Start: 2023-07-17 | End: 2023-07-17

## 2023-07-17 RX ORDER — SODIUM CHLORIDE 9 MG/ML
INJECTION, SOLUTION INTRAVENOUS PRN
Status: DISCONTINUED | OUTPATIENT
Start: 2023-07-17 | End: 2023-07-20 | Stop reason: HOSPADM

## 2023-07-17 RX ORDER — MAGNESIUM OXIDE 400 MG/1
400 TABLET ORAL NIGHTLY
Status: DISCONTINUED | OUTPATIENT
Start: 2023-07-17 | End: 2023-07-17 | Stop reason: CLARIF

## 2023-07-17 RX ORDER — ALBUTEROL SULFATE 90 UG/1
2 AEROSOL, METERED RESPIRATORY (INHALATION) EVERY 6 HOURS PRN
Status: DISCONTINUED | OUTPATIENT
Start: 2023-07-17 | End: 2023-07-18 | Stop reason: HOSPADM

## 2023-07-17 RX ORDER — SODIUM CHLORIDE 0.9 % (FLUSH) 0.9 %
5-40 SYRINGE (ML) INJECTION EVERY 12 HOURS SCHEDULED
Status: DISCONTINUED | OUTPATIENT
Start: 2023-07-17 | End: 2023-07-20 | Stop reason: HOSPADM

## 2023-07-17 RX ORDER — ACETAMINOPHEN 650 MG/1
650 SUPPOSITORY RECTAL EVERY 6 HOURS PRN
Status: DISCONTINUED | OUTPATIENT
Start: 2023-07-17 | End: 2023-07-20 | Stop reason: HOSPADM

## 2023-07-17 RX ORDER — INSULIN DEGLUDEC 100 U/ML
43 INJECTION, SOLUTION SUBCUTANEOUS NIGHTLY
Status: DISCONTINUED | OUTPATIENT
Start: 2023-07-17 | End: 2023-07-17 | Stop reason: CLARIF

## 2023-07-17 RX ORDER — ONDANSETRON 4 MG/1
4 TABLET, ORALLY DISINTEGRATING ORAL EVERY 8 HOURS PRN
Status: DISCONTINUED | OUTPATIENT
Start: 2023-07-17 | End: 2023-07-20 | Stop reason: HOSPADM

## 2023-07-17 RX ORDER — INSULIN GLARGINE 100 [IU]/ML
34 INJECTION, SOLUTION SUBCUTANEOUS NIGHTLY
Status: DISCONTINUED | OUTPATIENT
Start: 2023-07-17 | End: 2023-07-20 | Stop reason: HOSPADM

## 2023-07-17 RX ORDER — CETIRIZINE HYDROCHLORIDE 5 MG/1
5 TABLET ORAL DAILY
Status: DISCONTINUED | OUTPATIENT
Start: 2023-07-17 | End: 2023-07-20 | Stop reason: HOSPADM

## 2023-07-17 RX ORDER — SODIUM CHLORIDE 0.9 % (FLUSH) 0.9 %
5 SYRINGE (ML) INJECTION EVERY 8 HOURS
Status: DISCONTINUED | OUTPATIENT
Start: 2023-07-17 | End: 2023-07-20 | Stop reason: HOSPADM

## 2023-07-17 RX ORDER — ACETAMINOPHEN 325 MG/1
650 TABLET ORAL EVERY 6 HOURS PRN
Status: DISCONTINUED | OUTPATIENT
Start: 2023-07-17 | End: 2023-07-20 | Stop reason: HOSPADM

## 2023-07-17 RX ORDER — VITAMIN B COMPLEX
1000 TABLET ORAL 2 TIMES DAILY
Status: DISCONTINUED | OUTPATIENT
Start: 2023-07-17 | End: 2023-07-20 | Stop reason: HOSPADM

## 2023-07-17 RX ORDER — FERROUS SULFATE 325(65) MG
325 TABLET ORAL
COMMUNITY

## 2023-07-17 RX ORDER — ATORVASTATIN CALCIUM 10 MG/1
10 TABLET, FILM COATED ORAL EVERY EVENING
Status: DISCONTINUED | OUTPATIENT
Start: 2023-07-17 | End: 2023-07-20 | Stop reason: HOSPADM

## 2023-07-17 RX ORDER — POTASSIUM CHLORIDE 20 MEQ/1
20 TABLET, EXTENDED RELEASE ORAL DAILY
Status: DISCONTINUED | OUTPATIENT
Start: 2023-07-17 | End: 2023-07-20 | Stop reason: HOSPADM

## 2023-07-17 RX ORDER — LEVOTHYROXINE SODIUM 0.12 MG/1
125 TABLET ORAL
Status: DISCONTINUED | OUTPATIENT
Start: 2023-07-17 | End: 2023-07-20 | Stop reason: HOSPADM

## 2023-07-17 RX ORDER — 0.9 % SODIUM CHLORIDE 0.9 %
100 INTRAVENOUS SOLUTION INTRAVENOUS
Status: DISCONTINUED | OUTPATIENT
Start: 2023-07-17 | End: 2023-07-20 | Stop reason: HOSPADM

## 2023-07-17 RX ORDER — INSULIN LISPRO 100 [IU]/ML
0-8 INJECTION, SOLUTION INTRAVENOUS; SUBCUTANEOUS
Status: DISCONTINUED | OUTPATIENT
Start: 2023-07-17 | End: 2023-07-20 | Stop reason: HOSPADM

## 2023-07-17 RX ORDER — AMLODIPINE BESYLATE 5 MG/1
2.5 TABLET ORAL 2 TIMES DAILY
Status: DISCONTINUED | OUTPATIENT
Start: 2023-07-17 | End: 2023-07-20 | Stop reason: HOSPADM

## 2023-07-17 RX ORDER — WARFARIN SODIUM 2.5 MG/1
5 TABLET ORAL DAILY
Status: DISCONTINUED | OUTPATIENT
Start: 2023-07-17 | End: 2023-07-20 | Stop reason: HOSPADM

## 2023-07-17 RX ORDER — INSULIN LISPRO 100 [IU]/ML
0-4 INJECTION, SOLUTION INTRAVENOUS; SUBCUTANEOUS NIGHTLY
Status: DISCONTINUED | OUTPATIENT
Start: 2023-07-17 | End: 2023-07-20 | Stop reason: HOSPADM

## 2023-07-17 RX ORDER — SODIUM CHLORIDE 0.9 % (FLUSH) 0.9 %
5 SYRINGE (ML) INJECTION AS NEEDED
Status: DISCONTINUED | OUTPATIENT
Start: 2023-07-17 | End: 2023-07-20 | Stop reason: HOSPADM

## 2023-07-17 RX ORDER — POLYETHYLENE GLYCOL 3350 17 G/17G
17 POWDER, FOR SOLUTION ORAL DAILY PRN
Status: DISCONTINUED | OUTPATIENT
Start: 2023-07-17 | End: 2023-07-20 | Stop reason: HOSPADM

## 2023-07-17 RX ORDER — SODIUM CHLORIDE 0.9 % (FLUSH) 0.9 %
10 SYRINGE (ML) INJECTION
Status: DISCONTINUED | OUTPATIENT
Start: 2023-07-17 | End: 2023-07-20 | Stop reason: HOSPADM

## 2023-07-17 RX ORDER — FLUTICASONE PROPIONATE 50 MCG
2 SPRAY, SUSPENSION (ML) NASAL PRN
Status: DISCONTINUED | OUTPATIENT
Start: 2023-07-17 | End: 2023-07-20 | Stop reason: HOSPADM

## 2023-07-17 RX ORDER — LANOLIN ALCOHOL/MO/W.PET/CERES
400 CREAM (GRAM) TOPICAL DAILY
Status: DISCONTINUED | OUTPATIENT
Start: 2023-07-17 | End: 2023-07-20 | Stop reason: HOSPADM

## 2023-07-17 RX ADMIN — LOSARTAN POTASSIUM 50 MG: 50 TABLET, FILM COATED ORAL at 21:15

## 2023-07-17 RX ADMIN — DOFETILIDE 500 MCG: 0.5 CAPSULE ORAL at 21:15

## 2023-07-17 RX ADMIN — ASPIRIN 81 MG 81 MG: 81 TABLET ORAL at 12:16

## 2023-07-17 RX ADMIN — SODIUM CHLORIDE, PRESERVATIVE FREE 10 ML: 5 INJECTION INTRAVENOUS at 12:27

## 2023-07-17 RX ADMIN — WARFARIN SODIUM 5 MG: 2.5 TABLET ORAL at 17:55

## 2023-07-17 RX ADMIN — SODIUM CHLORIDE, PRESERVATIVE FREE 5 ML: 5 INJECTION INTRAVENOUS at 01:40

## 2023-07-17 RX ADMIN — ATORVASTATIN CALCIUM 10 MG: 10 TABLET, FILM COATED ORAL at 17:55

## 2023-07-17 RX ADMIN — SODIUM CHLORIDE, PRESERVATIVE FREE 5 ML: 5 INJECTION INTRAVENOUS at 17:55

## 2023-07-17 RX ADMIN — INSULIN GLARGINE 34 UNITS: 100 INJECTION, SOLUTION SUBCUTANEOUS at 21:18

## 2023-07-17 RX ADMIN — IOPAMIDOL 100 ML: 755 INJECTION, SOLUTION INTRAVENOUS at 03:40

## 2023-07-17 RX ADMIN — DOFETILIDE 500 MCG: 0.5 CAPSULE ORAL at 12:15

## 2023-07-17 RX ADMIN — AMLODIPINE BESYLATE 2.5 MG: 5 TABLET ORAL at 12:17

## 2023-07-17 RX ADMIN — POTASSIUM CHLORIDE 20 MEQ: 20 TABLET, EXTENDED RELEASE ORAL at 12:16

## 2023-07-17 RX ADMIN — VITAMIN D, TAB 1000IU (100/BT) 1000 UNITS: 25 TAB at 12:17

## 2023-07-17 RX ADMIN — VITAMIN D, TAB 1000IU (100/BT) 1000 UNITS: 25 TAB at 21:15

## 2023-07-17 RX ADMIN — AMLODIPINE BESYLATE 2.5 MG: 5 TABLET ORAL at 21:15

## 2023-07-17 RX ADMIN — SODIUM CHLORIDE, PRESERVATIVE FREE 10 ML: 5 INJECTION INTRAVENOUS at 21:19

## 2023-07-17 RX ADMIN — SODIUM CHLORIDE, PRESERVATIVE FREE 5 ML: 5 INJECTION INTRAVENOUS at 01:41

## 2023-07-17 RX ADMIN — INSULIN LISPRO 2 UNITS: 100 INJECTION, SOLUTION INTRAVENOUS; SUBCUTANEOUS at 12:18

## 2023-07-17 RX ADMIN — SODIUM CHLORIDE, PRESERVATIVE FREE 5 ML: 5 INJECTION INTRAVENOUS at 12:27

## 2023-07-17 RX ADMIN — LOSARTAN POTASSIUM 50 MG: 50 TABLET, FILM COATED ORAL at 12:16

## 2023-07-17 RX ADMIN — MAGNESIUM GLUCONATE 500 MG ORAL TABLET 400 MG: 500 TABLET ORAL at 12:15

## 2023-07-17 RX ADMIN — DOXYCYCLINE 100 MG: 100 INJECTION, POWDER, LYOPHILIZED, FOR SOLUTION INTRAVENOUS at 14:54

## 2023-07-17 RX ADMIN — FUROSEMIDE 40 MG: 10 INJECTION, SOLUTION INTRAMUSCULAR; INTRAVENOUS at 04:07

## 2023-07-17 ASSESSMENT — ENCOUNTER SYMPTOMS
EYE DISCHARGE: 0
COUGH: 0
RHINORRHEA: 0
DIARRHEA: 0
EYE REDNESS: 0
CONSTIPATION: 0
CHOKING: 0
NAUSEA: 0
WHEEZING: 0
ABDOMINAL PAIN: 0
STRIDOR: 0
EYE PAIN: 0
SHORTNESS OF BREATH: 1
VOMITING: 0
FACIAL SWELLING: 0
BACK PAIN: 0

## 2023-07-17 ASSESSMENT — LIFESTYLE VARIABLES
HOW MANY STANDARD DRINKS CONTAINING ALCOHOL DO YOU HAVE ON A TYPICAL DAY: PATIENT DOES NOT DRINK
HOW OFTEN DO YOU HAVE A DRINK CONTAINING ALCOHOL: NEVER

## 2023-07-17 ASSESSMENT — PAIN SCALES - GENERAL: PAINLEVEL_OUTOF10: 0

## 2023-07-17 ASSESSMENT — PAIN - FUNCTIONAL ASSESSMENT: PAIN_FUNCTIONAL_ASSESSMENT: 0-10

## 2023-07-17 NOTE — ACP (ADVANCE CARE PLANNING)
Advance Care Planning     General Advance Care Planning (ACP) Conversation    Date of Conversation: 7/17/2023  Conducted with: Patient with Decision Making Capacity    Healthcare Decision Maker:    Primary Decision Maker: Olivia Elie - [phone]    Secondary Decision Maker: Aramis Caban - [phone]  Click here to complete Healthcare Decision Makers including selection of the Healthcare Decision Maker Relationship (ie \"Primary\"). Today we documented Decision Maker(s). The patient will provide ACP documents. Content/Action Overview: Has ACP document(s) NOT on file - requested patient to provide  Reviewed DNR/DNI and patient elects Full Code (Attempt Resuscitation)  treatment goals, hospitalization preferences, and resuscitation preferences  No ACP documents on file. Full Code per physician order.     Length of Voluntary ACP Conversation in minutes:  <16 minutes (Non-Billable)    PERRY Hinds

## 2023-07-17 NOTE — H&P
Hospitalist History and Physical   Admit Date:  2023  1:23 AM   Name:  Nelly Rosales   Age:  69 y.o. Sex:  female  :  1945   MRN:  [id number]     Presenting Complaint: SOB  Reason(s) for Admission: Acute respiratory failure with hypoxia ([address]) [J96.01]     History of Present Illness:   Nelly Rosales is a 69 y.o. female with medical history of HTN, DM2, afib on coumadin, valve replacement, iron deficiency anemia, CHF who presented to ED with shortness of breath. Symptoms started abruptly late last night. Upon evaluation, patient found to be hypoxic in the 70s. Denies fevers, chills. Lab work is overall unremarkable. WBC is normal 10.2. Procal is negative. Lactic acid is 1.2. CT shows:  IMPRESSION:  1. No pulmonary embolus. 2. Patchy groundglass opacification throughout the lungs that could be secondary   to small airways or small vessels disease. It does not have the appearance of   pneumonia or edema. 3. Mediastinal adenopathy with a fairly broad differential, tissue sampling   would be required for definitive diagnosis. Respiratory viral panel is pan-negative. Patient is still requiring 5lpm supplemental O2 to maintain O2 sat >90. Hospitalist asked to admit. Review of Systems:  10 systems reviewed and negative except as noted in HPI.   Assessment & Plan:   * Acute respiratory failure with hypoxia (HCC)  Assessment & Plan  - Requiring 5lpm supplemental O2  - Negative for PE  - Rad does not think groundglass opacities are pneumonia or edema  - No fevers or leukocytosis, procal neg  - Echo from 23 showed normal EF of 55-60%  - Consult to Pulmonology for further eval    CAD (coronary artery disease)  Assessment & Plan  - No chest pain  - Continue home aspirin, statin    Paroxysmal atrial fibrillation (HCC)  Assessment & Plan  - Rate-controlled  - Continue home tikosyn and coumadin    Hypothyroidism, postsurgical  Assessment & Plan  - Continue home synthroid    S/P AVR

## 2023-07-17 NOTE — ED PROVIDER NOTES
Emergency Department Provider Note       PCP: RICCI Washington NP   Age: 69 y.o. Sex: female     DISPOSITION Decision To Admit 07/17/2023 05:26:41 AM       ICD-10-CM    1. Acute respiratory failure with hypoxemia (HCC)  J96.01       2. Shortness of breath  R06.02           Medical Decision Making     Complexity of Problems Addressed:  1 or more acute illnesses that pose a threat to life or bodily function. Data Reviewed and Analyzed:  Category 1:   I independently ordered and reviewed each unique test.     The patients assessment required an independent historian:  at bedside. The reason they were needed is important historical information not provided by the patient. Category 2:   I independently ordered and interpreted the ED EKG in the absence of a Cardiologist.    Rate: 72   EKG Interpretation: EKG Interpretation: sinus rhythm, no evidence of arrhythmia  ST Segments: Normal ST segments - NO STEMI  I interpreted the X-rays perihilar lymphadenopathy. I interpreted the CT Scan multiple mediastinal nonspecific lymphadenopathy. Bilateral groundglass opacities suggestive of inflammatory findings rather than infectious. .  I interpreted the laboratory studies which revealed no significant abnormalities other than hypoxia on blood gas. .    Category 3: Discussion of management or test interpretation. Appears to have acute respiratory failure with hypoxia. Not exactly sure what the causes. Does not appear to be pneumonia. Clinically improved on nasal cannula at 5 L. Requires admission. I will discuss this patient with Dr. Tere Rizzo, attending hospitalist on-call, to request evaluation patient. I sincerely appreciate her involvement in the ongoing care of this patient. The patient was admitted and I have discussed patient management with the admitting provider.     Risk of Complications and/or Morbidity of Patient Management:  Shared medical decision making was utilized in creating the

## 2023-07-17 NOTE — ASSESSMENT & PLAN NOTE
- Requiring 5lpm supplemental O2  - Negative for PE  - Rad does not think groundglass opacities are pneumonia or edema  - No fevers or leukocytosis, procal neg  - Echo from 7/6/23 showed normal EF of 55-60%  - Consult to Pulmonology for further eval

## 2023-07-17 NOTE — H&P (VIEW-ONLY)
available images, labs, cultures. , discussed case in detail with NPP, performed a medically appropriate history and exam, counseled and educated the patient and/or family member, ordered and/or reviewed medications, tests or procedures, documented information in EMR, independently interpreted images, and coordinated care. which accounted for 25 minutes clinical time. Impression: Mrs. Ros Baires developed abrupt onset of dyspnea after lingering fatigue had remained after a COVID infection in May. Her CT shows patchy GGO and some interstitial prominence in the bases. The ddx includes viral infections/atypical bacterial infections, small airways disease like bronchiolitis or asthma, atypical cardiogenic/noncardiogenic pulmonary edema, PJP, drug reactions, organizing pneumonia. The respiratory panel was negative for most common viral/bacterial pathogens and I don't see any toxic medications or exposures to explain. Recommend bronchoscopy tomorrow with BAL to r/o DAH, assess for pulmonary eosinophilia. Start doxy for atypical coverage. Supportive care otherwise. Agree with current mgmt.     Codi Barragan MD

## 2023-07-17 NOTE — ED TRIAGE NOTES
Pt recently dx with anemia. Pt scheduled for an infusion but due on 8/1. Pt o2 on RA 75%. Pt reports SHOB with exertion. No retractions or labored breathing noted.

## 2023-07-17 NOTE — ED NOTES
Unsuccessful in blood draw after 3 staff attempted. Called lab to help draw blood.      Kiley York RN  07/17/23 5482

## 2023-07-17 NOTE — ED NOTES
Pt placed on NC at 4lpm with o2 sat increasing to low 90s. Pts color improved and pt states \"I feel better\".       Nish Herbert, DANIELLA  07/17/23 4824

## 2023-07-17 NOTE — ASSESSMENT & PLAN NOTE
- Has dexcom for blood glucose monitoring, may use in hospital  - Restart home tresiba  - SSI  - Diabetic diet

## 2023-07-17 NOTE — ED NOTES
TRANSFER - OUT REPORT:    Verbal report given to Africa Pantoja on Cherelle Grafton  being transferred to   for routine progression of patient care       Report consisted of patient's Situation, Background, Assessment and   Recommendations(SBAR). Information from the following report(s) ED SBAR was reviewed with the receiving nurse. Kinder Fall Assessment:                           Lines:   Peripheral IV 07/17/23 Right Antecubital (Active)   Site Assessment Clean, dry & intact; Clean;Dry 07/17/23 0138   Line Status Blood return noted; Flushed;Normal saline locked 07/17/23 0138       Peripheral IV 07/17/23 Distal;Left; Anterior Forearm (Active)        Opportunity for questions and clarification was provided.       Patient transported with:  Regan Moreno RN  07/17/23 5989

## 2023-07-18 ENCOUNTER — TELEPHONE (OUTPATIENT)
Age: 78
End: 2023-07-18

## 2023-07-18 LAB
ANION GAP SERPL CALC-SCNC: 0 MMOL/L (ref 2–11)
BASOPHILS # BLD: 0 K/UL (ref 0–0.2)
BASOPHILS NFR BLD: 1 % (ref 0–2)
BUN SERPL-MCNC: 22 MG/DL (ref 8–23)
CALCIUM SERPL-MCNC: 8.7 MG/DL (ref 8.3–10.4)
CHLORIDE SERPL-SCNC: 110 MMOL/L (ref 101–110)
CO2 SERPL-SCNC: 31 MMOL/L (ref 21–32)
CREAT SERPL-MCNC: 0.8 MG/DL (ref 0.6–1)
DIFFERENTIAL METHOD BLD: ABNORMAL
DIFFERENTIAL: NORMAL
EOSINOPHIL # BLD: 0.3 K/UL (ref 0–0.8)
EOSINOPHIL NFR BLD: 4 % (ref 0.5–7.8)
EOSINOPHIL NFR BRONCH MANUAL: 2 %
ERYTHROCYTE [DISTWIDTH] IN BLOOD BY AUTOMATED COUNT: 17.5 % (ref 11.9–14.6)
GLUCOSE BLD STRIP.AUTO-MCNC: 114 MG/DL (ref 65–100)
GLUCOSE BLD STRIP.AUTO-MCNC: 195 MG/DL (ref 65–100)
GLUCOSE BLD STRIP.AUTO-MCNC: 247 MG/DL (ref 65–100)
GLUCOSE BLD STRIP.AUTO-MCNC: 91 MG/DL (ref 65–100)
GLUCOSE SERPL-MCNC: 70 MG/DL (ref 65–100)
HCT VFR BLD AUTO: 31.8 % (ref 35.8–46.3)
HGB BLD-MCNC: 8.9 G/DL (ref 11.7–15.4)
IMM GRANULOCYTES # BLD AUTO: 0 K/UL (ref 0–0.5)
IMM GRANULOCYTES NFR BLD AUTO: 0 % (ref 0–5)
INR PPP: 2.1
LYMPHOCYTES # BLD: 1.2 K/UL (ref 0.5–4.6)
LYMPHOCYTES NFR BLD: 16 % (ref 13–44)
LYMPHOCYTES NFR BRONCH MANUAL: 7 %
M PNEUMO IGG SER IA-ACNC: 308 U/ML (ref 0–99)
M PNEUMO IGM SER IA-ACNC: <770 U/ML (ref 0–769)
MACROPHAGES NFR BRONCH MANUAL: 10 %
MCH RBC QN AUTO: 27.2 PG (ref 26.1–32.9)
MCHC RBC AUTO-ENTMCNC: 28 G/DL (ref 31.4–35)
MCV RBC AUTO: 97.2 FL (ref 82–102)
MONOCYTES # BLD: 0.7 K/UL (ref 0.1–1.3)
MONOCYTES NFR BLD: 10 % (ref 4–12)
NEUTROPHILS NFR BRONCH MANUAL: 81 %
NEUTS SEG # BLD: 5.2 K/UL (ref 1.7–8.2)
NEUTS SEG NFR BLD: 70 % (ref 43–78)
NRBC # BLD: 0 K/UL (ref 0–0.2)
OTHER CELLS NFR BLD MANUAL: 3
PLATELET # BLD AUTO: 233 K/UL (ref 150–450)
PMV BLD AUTO: 9.2 FL (ref 9.4–12.3)
POTASSIUM SERPL-SCNC: 4.7 MMOL/L (ref 3.5–5.1)
PROTHROMBIN TIME: 23.8 SEC (ref 12.6–14.3)
RBC # BLD AUTO: 3.27 M/UL (ref 4.05–5.2)
SERVICE CMNT-IMP: ABNORMAL
SERVICE CMNT-IMP: NORMAL
SODIUM SERPL-SCNC: 141 MMOL/L (ref 133–143)
WBC # BLD AUTO: 7.5 K/UL (ref 4.3–11.1)

## 2023-07-18 PROCEDURE — 85025 COMPLETE CBC W/AUTO DIFF WBC: CPT

## 2023-07-18 PROCEDURE — 87486 CHLMYD PNEUM DNA AMP PROBE: CPT

## 2023-07-18 PROCEDURE — 89051 BODY FLUID CELL COUNT: CPT

## 2023-07-18 PROCEDURE — 3609010800 HC BRONCHOSCOPY ALVEOLAR LAVAGE: Performed by: INTERNAL MEDICINE

## 2023-07-18 PROCEDURE — 86356 MONONUCLEAR CELL ANTIGEN: CPT

## 2023-07-18 PROCEDURE — 87070 CULTURE OTHR SPECIMN AEROBIC: CPT

## 2023-07-18 PROCEDURE — 88112 CYTOPATH CELL ENHANCE TECH: CPT

## 2023-07-18 PROCEDURE — 85610 PROTHROMBIN TIME: CPT

## 2023-07-18 PROCEDURE — 36415 COLL VENOUS BLD VENIPUNCTURE: CPT

## 2023-07-18 PROCEDURE — 2500000003 HC RX 250 WO HCPCS: Performed by: INTERNAL MEDICINE

## 2023-07-18 PROCEDURE — 6370000000 HC RX 637 (ALT 250 FOR IP): Performed by: FAMILY MEDICINE

## 2023-07-18 PROCEDURE — 99232 SBSQ HOSP IP/OBS MODERATE 35: CPT | Performed by: INTERNAL MEDICINE

## 2023-07-18 PROCEDURE — 2580000003 HC RX 258: Performed by: INTERNAL MEDICINE

## 2023-07-18 PROCEDURE — 7100000011 HC PHASE II RECOVERY - ADDTL 15 MIN: Performed by: INTERNAL MEDICINE

## 2023-07-18 PROCEDURE — 31624 DX BRONCHOSCOPE/LAVAGE: CPT | Performed by: INTERNAL MEDICINE

## 2023-07-18 PROCEDURE — 87799 DETECT AGENT NOS DNA QUANT: CPT

## 2023-07-18 PROCEDURE — 6370000000 HC RX 637 (ALT 250 FOR IP): Performed by: INTERNAL MEDICINE

## 2023-07-18 PROCEDURE — 87116 MYCOBACTERIA CULTURE: CPT

## 2023-07-18 PROCEDURE — 97165 OT EVAL LOW COMPLEX 30 MIN: CPT

## 2023-07-18 PROCEDURE — 7100000010 HC PHASE II RECOVERY - FIRST 15 MIN: Performed by: INTERNAL MEDICINE

## 2023-07-18 PROCEDURE — 76937 US GUIDE VASCULAR ACCESS: CPT

## 2023-07-18 PROCEDURE — 97112 NEUROMUSCULAR REEDUCATION: CPT

## 2023-07-18 PROCEDURE — 2700000000 HC OXYGEN THERAPY PER DAY

## 2023-07-18 PROCEDURE — 99152 MOD SED SAME PHYS/QHP 5/>YRS: CPT | Performed by: INTERNAL MEDICINE

## 2023-07-18 PROCEDURE — 82962 GLUCOSE BLOOD TEST: CPT

## 2023-07-18 PROCEDURE — 87205 SMEAR GRAM STAIN: CPT

## 2023-07-18 PROCEDURE — 97535 SELF CARE MNGMENT TRAINING: CPT

## 2023-07-18 PROCEDURE — 2580000003 HC RX 258: Performed by: EMERGENCY MEDICINE

## 2023-07-18 PROCEDURE — 97530 THERAPEUTIC ACTIVITIES: CPT

## 2023-07-18 PROCEDURE — 1100000003 HC PRIVATE W/ TELEMETRY

## 2023-07-18 PROCEDURE — 80048 BASIC METABOLIC PNL TOTAL CA: CPT

## 2023-07-18 PROCEDURE — 87102 FUNGUS ISOLATION CULTURE: CPT

## 2023-07-18 PROCEDURE — 87635 SARS-COV-2 COVID-19 AMP PRB: CPT

## 2023-07-18 PROCEDURE — 94760 N-INVAS EAR/PLS OXIMETRY 1: CPT

## 2023-07-18 PROCEDURE — 97161 PT EVAL LOW COMPLEX 20 MIN: CPT

## 2023-07-18 PROCEDURE — 87581 M.PNEUMON DNA AMP PROBE: CPT

## 2023-07-18 PROCEDURE — 2580000003 HC RX 258: Performed by: FAMILY MEDICINE

## 2023-07-18 PROCEDURE — 0B9D8ZX DRAINAGE OF RIGHT MIDDLE LUNG LOBE, VIA NATURAL OR ARTIFICIAL OPENING ENDOSCOPIC, DIAGNOSTIC: ICD-10-PCS | Performed by: INTERNAL MEDICINE

## 2023-07-18 PROCEDURE — 6360000002 HC RX W HCPCS: Performed by: INTERNAL MEDICINE

## 2023-07-18 PROCEDURE — 87206 SMEAR FLUORESCENT/ACID STAI: CPT

## 2023-07-18 PROCEDURE — 2709999900 HC NON-CHARGEABLE SUPPLY: Performed by: INTERNAL MEDICINE

## 2023-07-18 RX ORDER — LIDOCAINE HYDROCHLORIDE 40 MG/ML
SOLUTION TOPICAL PRN
Status: DISCONTINUED | OUTPATIENT
Start: 2023-07-18 | End: 2023-07-18 | Stop reason: ALTCHOICE

## 2023-07-18 RX ORDER — FENTANYL CITRATE 50 UG/ML
INJECTION, SOLUTION INTRAMUSCULAR; INTRAVENOUS PRN
Status: DISCONTINUED | OUTPATIENT
Start: 2023-07-18 | End: 2023-07-18 | Stop reason: ALTCHOICE

## 2023-07-18 RX ORDER — LIDOCAINE HYDROCHLORIDE 20 MG/ML
JELLY TOPICAL PRN
Status: DISCONTINUED | OUTPATIENT
Start: 2023-07-18 | End: 2023-07-18 | Stop reason: ALTCHOICE

## 2023-07-18 RX ORDER — MIDAZOLAM HYDROCHLORIDE 2 MG/2ML
INJECTION, SOLUTION INTRAMUSCULAR; INTRAVENOUS PRN
Status: DISCONTINUED | OUTPATIENT
Start: 2023-07-18 | End: 2023-07-18 | Stop reason: ALTCHOICE

## 2023-07-18 RX ORDER — SODIUM CHLORIDE, SODIUM LACTATE, POTASSIUM CHLORIDE, CALCIUM CHLORIDE 600; 310; 30; 20 MG/100ML; MG/100ML; MG/100ML; MG/100ML
INJECTION, SOLUTION INTRAVENOUS CONTINUOUS PRN
Status: COMPLETED | OUTPATIENT
Start: 2023-07-18 | End: 2023-07-18

## 2023-07-18 RX ADMIN — SODIUM CHLORIDE, PRESERVATIVE FREE 5 ML: 5 INJECTION INTRAVENOUS at 01:13

## 2023-07-18 RX ADMIN — WARFARIN SODIUM 5 MG: 2.5 TABLET ORAL at 17:11

## 2023-07-18 RX ADMIN — AMLODIPINE BESYLATE 2.5 MG: 5 TABLET ORAL at 21:06

## 2023-07-18 RX ADMIN — DOXYCYCLINE 100 MG: 100 INJECTION, POWDER, LYOPHILIZED, FOR SOLUTION INTRAVENOUS at 12:59

## 2023-07-18 RX ADMIN — INSULIN GLARGINE 34 UNITS: 100 INJECTION, SOLUTION SUBCUTANEOUS at 21:06

## 2023-07-18 RX ADMIN — VITAMIN D, TAB 1000IU (100/BT) 1000 UNITS: 25 TAB at 21:06

## 2023-07-18 RX ADMIN — DOFETILIDE 500 MCG: 0.5 CAPSULE ORAL at 21:06

## 2023-07-18 RX ADMIN — ATORVASTATIN CALCIUM 10 MG: 10 TABLET, FILM COATED ORAL at 17:10

## 2023-07-18 RX ADMIN — LOSARTAN POTASSIUM 50 MG: 50 TABLET, FILM COATED ORAL at 08:23

## 2023-07-18 RX ADMIN — DOFETILIDE 500 MCG: 0.5 CAPSULE ORAL at 08:22

## 2023-07-18 RX ADMIN — DOXYCYCLINE 100 MG: 100 INJECTION, POWDER, LYOPHILIZED, FOR SOLUTION INTRAVENOUS at 01:18

## 2023-07-18 RX ADMIN — SODIUM CHLORIDE, PRESERVATIVE FREE 10 ML: 5 INJECTION INTRAVENOUS at 08:41

## 2023-07-18 RX ADMIN — SODIUM CHLORIDE, PRESERVATIVE FREE 10 ML: 5 INJECTION INTRAVENOUS at 21:07

## 2023-07-18 RX ADMIN — SODIUM CHLORIDE, PRESERVATIVE FREE 5 ML: 5 INJECTION INTRAVENOUS at 17:12

## 2023-07-18 RX ADMIN — LOSARTAN POTASSIUM 50 MG: 50 TABLET, FILM COATED ORAL at 21:06

## 2023-07-18 RX ADMIN — AMLODIPINE BESYLATE 2.5 MG: 5 TABLET ORAL at 08:22

## 2023-07-18 ASSESSMENT — PAIN - FUNCTIONAL ASSESSMENT
PAIN_FUNCTIONAL_ASSESSMENT: NONE - DENIES PAIN
PAIN_FUNCTIONAL_ASSESSMENT: WONG-BAKER FACES
PAIN_FUNCTIONAL_ASSESSMENT: 0-10
PAIN_FUNCTIONAL_ASSESSMENT: WONG-BAKER FACES

## 2023-07-18 ASSESSMENT — PAIN SCALES - GENERAL
PAINLEVEL_OUTOF10: 0

## 2023-07-18 NOTE — OP NOTE
Operative Note      Patient: Douglas Jain  YOB: 1945  MRN: [id number]    Date of Procedure: 7/18/2023    Pre-Op Diagnosis Codes:     * Pulmonary infiltrates [R91.8]    Post-Op Diagnosis: Post-Op Diagnosis Codes:     * Pulmonary infiltrates [R91.8]       Procedure(s):  BRONCHOSCOPY ALVEOLAR LAVAGE    Surgeon(s):  Caryn Ayers MD    Assistant:   * No surgical staff found *    Anesthesia: IV Sedation    Estimated Blood Loss (mL): Minimal    Complications: None    Specimens:   ID Type Source Tests Collected by Time Destination   A : BAL Body Fluid Lung CYTOLOGY, NON-GYN, FLOW CYTOMETRY/HEMATOPATHOLOGY [address] [place] [address], MD 7/18/2023 114        Implants:  * No implants in log *      Drains: * No LDAs found *    Detailed Description of Procedure:   PROCEDURE    Bronchoscopy with airway inspection/BAL. INDICATION   Pulmonary infiltrates    EQUIPMENT:  Olympus  Bronchhoscope. ANESTHESIA    Concious sedation with: Fentanyl 50  mcg IV; Versed 2 mg IV; Lidocaine 180 mg to tracheo-bronchial tree and vocal cords. AIRWAY INSPECTION    After obtaining informed consent, using a bite block, an Olympus  video bronchoscope was  introduced into the trachea through the vocal chords, without complication.          RIGHT    LOCATION NORM/ABNORM DESCRIPTION   VOCAL CORDS NL    TRACHEA NL    KATHERINE NL    RMSB NL    RUL NL    BI NL    RML NL BAL- no evidence foe alveolar hemorrhage   SUP SEGM RLL NL    MED BASAL NL    ANTERIOR BASAL NL    LATERAL BASAL NL    POSTERIOR BASAL NL                                              LEFT    LOCATION NORMAL/ABNORMAL TYPE   LMSB NL    JADEN NL    LINGULA NL    SUPERIOR DIVISION NL    SUPERIOR SEG LLL NL    WESTON-MEDIAL LLL NL    LATERAL LLL NL    POSTERIOR LLL NL      The following samples were obtained:    BAL:RML    Samples were sent for:  Cell count with diff  CD4:8  AFB  Fungus  Routine cultures and G stain  CMV  PJP  Adenovirus  Cytology  Flow

## 2023-07-18 NOTE — PRE SEDATION
Medication Information:  none      Pre-Sedation Documentation and Exam:   I have personally completed a history, physical exam & review of systems for this patient (see notes).     Mallampati Airway Assessment:  normal, dentition not prohibitive, normal neck range of motion, Mallampati Class III - (soft palate & base of uvula are visible)    Prior History of Anesthesia Complications:   none    ASA Classification:  Class 2 - A normal healthy patient with mild systemic disease    Sedation/ Anesthesia Plan:   intravenous sedation    Medications Planned:   midazolam (Versed) intravenously and fentanyl intravenously    Patient is an appropriate candidate for plan of sedation: yes    Electronically signed by Verenice Florentino MD on 7/18/2023 at 11:10 AM

## 2023-07-18 NOTE — TELEPHONE ENCOUNTER
Sonny Hahn an RN at [facility] called stating the patient wanted to make Dr Conchis Garcia aware that she was hospitalized at [facility]. Sonny Hahn stated the patient was there for SOB and is presently in Room 505. Please call and advise.       Tele [phone]

## 2023-07-18 NOTE — INTERVAL H&P NOTE
Update History & Physical    The patient's History and Physical of July 18, 2023 was reviewed with the patient and I examined the patient. There was no change. The surgical site was confirmed by the patient and me. Plan: The risks, benefits, expected outcome, and alternative to the recommended procedure have been discussed with the patient. Patient understands and wants to proceed with the procedure.      Electronically signed by Verenice Florentino MD on 7/18/2023 at 11:10 AM

## 2023-07-18 NOTE — CONSULTS
History and Physical Initial Visit NOTE           7/17/2023    Paris Skiff                        Date of Admission:  7/17/2023    The patient's chart is reviewed and the patient is discussed with the staff. Subjective:     Patient is a 69 y.o.  female seen and evaluated at the request of Dr. Becca Elder for acute respiratory failure and GGO on CT scan. She has a PMH of recent dx of colon CA s/p partial colon resection- never received chemo, a-fib RVR on coumadin and tikosyn, aortic and mitral valve replacement, HTN, and DM2. She presented to ED on the morning of 7/17 with hypoxia (sats 75% on RA), short of breath on exertion which started abruptly overnight. Denies fevers or chills. WBC normal, procal negative, lactic acid normal. No PE seen on CT chest however patchy GGO throughout both lungs. Also had some mediastinal adenopathy. RVP negative. ProBNP was 409 (low). ABG in ER showed normal pH 7.38, pCO2 47, pO2 57, HCO3 28.1. She is still requiring 5 lpm NC to maintain sat >95%. She states her and  had COVID Mother's Day weekend this year and self-treated at home. She has been overly fatigued since then but not really short of breath. She states she was diagnosed with iron deficiency anemia after she presented to her doctors with the fatigue. She has no history of lung disease, never been a smoker, no exposures to molds, dusts, asbestos, or birds. She has never had to wear O2 in the past.     She reports an occasional cough that she coughs up clear sputum only rarely. Reports some sinus congestion that she thought was due to allergies. States she feels like \"something is stuck\" in throat/upper chest but no trouble swallowing. Admits to waking up sometimes coughing and was prescribed omeprazole but never took it.        Review of Systems: Comprehensive ROS negative except in HPI    Current Outpatient Medications   Medication Instructions    amLODIPine (NORVASC) 5 MG tablet
History of cardioversion     History of colon cancer     History of petit-mal seizures     none in 15 years    History of seizure     no seizure activity since 1980's    Hyperlipidemia     Hypertension     Hypothyroidism     Mitral stenosis with insufficiency     Other unknown and unspecified cause of morbidity or mortality     HLD    Paroxysmal atrial fibrillation (HCC)     PONV (postoperative nausea and vomiting)     post op nausea.  pt does well with antiemetic    Thyroid nodule 6/29/2011    Unspecified essential hypertension     Visit for monitoring Tikosyn therapy     pt takes Tikosyn BID     Past Surgical History:   Procedure Laterality Date    ABLATION OF DYSRHYTHMIC FOCUS  2015    AORTIC VALVE REPLACEMENT  2015    APPENDECTOMY      CARDIAC CATHETERIZATION      COLONOSCOPY N/A 7/21/2021    COLONOSCOPY/ BMI 27 performed by Abran Franklin MD at [facility] ENDOSCOPY    COLONOSCOPY  2014    neg    COLONOSCOPY N/A 8/17/2022    COLONOSCOPY POLYPECTOMY SNARE-COLD performed by Abran Franklin MD at [address] N/A 8/18/2021    SIGMOIDOSCOPY FLEXIBLE performed by Abran Franklin MD at [address] Left 2009    pins    MITRAL VALVE REPLACEMENT  5/4/15     and Aortic Valve Replacement, Dr. Douglas [facility] hip fracture/repair    OTHER SURGICAL HISTORY      ablation 10/6/2015, Dr Jj Sour  2021    colon surg    THYROIDECTOMY      TUBAL LIGATION       Social History     Socioeconomic History    Marital status:      Spouse name: Not on file    Number of children: Not on file    Years of education: Not on file    Highest education level: Not on file   Occupational History    Not on file   Tobacco Use    Smoking status: Never    Smokeless tobacco: Never   Vaping Use    Vaping Use: Never used   Substance and Sexual Activity    Alcohol use: No    Drug use: No    Sexual activity: Not

## 2023-07-19 LAB
ANION GAP SERPL CALC-SCNC: 7 MMOL/L (ref 2–11)
BASOPHILS # BLD: 0 K/UL (ref 0–0.2)
BASOPHILS NFR BLD: 0 % (ref 0–2)
BUN SERPL-MCNC: 23 MG/DL (ref 8–23)
CALCIUM SERPL-MCNC: 8.2 MG/DL (ref 8.3–10.4)
CHLORIDE SERPL-SCNC: 109 MMOL/L (ref 101–110)
CO2 SERPL-SCNC: 28 MMOL/L (ref 21–32)
CREAT SERPL-MCNC: 0.7 MG/DL (ref 0.6–1)
DIFFERENTIAL METHOD BLD: ABNORMAL
EOSINOPHIL # BLD: 0.1 K/UL (ref 0–0.8)
EOSINOPHIL NFR BLD: 1 % (ref 0.5–7.8)
ERYTHROCYTE [DISTWIDTH] IN BLOOD BY AUTOMATED COUNT: 17.4 % (ref 11.9–14.6)
GLUCOSE BLD STRIP.AUTO-MCNC: 122 MG/DL (ref 65–100)
GLUCOSE BLD STRIP.AUTO-MCNC: 127 MG/DL (ref 65–100)
GLUCOSE BLD STRIP.AUTO-MCNC: 183 MG/DL (ref 65–100)
GLUCOSE BLD STRIP.AUTO-MCNC: 226 MG/DL (ref 65–100)
GLUCOSE SERPL-MCNC: 114 MG/DL (ref 65–100)
HCT VFR BLD AUTO: 29.7 % (ref 35.8–46.3)
HGB BLD-MCNC: 8.4 G/DL (ref 11.7–15.4)
IMM GRANULOCYTES # BLD AUTO: 0.1 K/UL (ref 0–0.5)
IMM GRANULOCYTES NFR BLD AUTO: 1 % (ref 0–5)
INR PPP: 2.3
LYMPHOCYTES # BLD: 1.1 K/UL (ref 0.5–4.6)
LYMPHOCYTES NFR BLD: 10 % (ref 13–44)
MCH RBC QN AUTO: 26.8 PG (ref 26.1–32.9)
MCHC RBC AUTO-ENTMCNC: 28.3 G/DL (ref 31.4–35)
MCV RBC AUTO: 94.9 FL (ref 82–102)
MONOCYTES # BLD: 0.9 K/UL (ref 0.1–1.3)
MONOCYTES NFR BLD: 9 % (ref 4–12)
NEUTS SEG # BLD: 8.3 K/UL (ref 1.7–8.2)
NEUTS SEG NFR BLD: 80 % (ref 43–78)
NRBC # BLD: 0 K/UL (ref 0–0.2)
PLATELET # BLD AUTO: 205 K/UL (ref 150–450)
PMV BLD AUTO: 9.2 FL (ref 9.4–12.3)
POTASSIUM SERPL-SCNC: 4.6 MMOL/L (ref 3.5–5.1)
PROTHROMBIN TIME: 25.5 SEC (ref 12.6–14.3)
RBC # BLD AUTO: 3.13 M/UL (ref 4.05–5.2)
SARS-COV-2 RNA SPEC QL NAA+PROBE: NOT DETECTED
SERVICE CMNT-IMP: ABNORMAL
SODIUM SERPL-SCNC: 144 MMOL/L (ref 133–143)
WBC # BLD AUTO: 10.4 K/UL (ref 4.3–11.1)

## 2023-07-19 PROCEDURE — 80048 BASIC METABOLIC PNL TOTAL CA: CPT

## 2023-07-19 PROCEDURE — 36415 COLL VENOUS BLD VENIPUNCTURE: CPT

## 2023-07-19 PROCEDURE — 2500000003 HC RX 250 WO HCPCS: Performed by: INTERNAL MEDICINE

## 2023-07-19 PROCEDURE — 2580000003 HC RX 258: Performed by: FAMILY MEDICINE

## 2023-07-19 PROCEDURE — 85025 COMPLETE CBC W/AUTO DIFF WBC: CPT

## 2023-07-19 PROCEDURE — 2580000003 HC RX 258: Performed by: EMERGENCY MEDICINE

## 2023-07-19 PROCEDURE — 6370000000 HC RX 637 (ALT 250 FOR IP): Performed by: FAMILY MEDICINE

## 2023-07-19 PROCEDURE — 2580000003 HC RX 258: Performed by: INTERNAL MEDICINE

## 2023-07-19 PROCEDURE — 99232 SBSQ HOSP IP/OBS MODERATE 35: CPT | Performed by: INTERNAL MEDICINE

## 2023-07-19 PROCEDURE — 97116 GAIT TRAINING THERAPY: CPT

## 2023-07-19 PROCEDURE — 82962 GLUCOSE BLOOD TEST: CPT

## 2023-07-19 PROCEDURE — 1100000003 HC PRIVATE W/ TELEMETRY

## 2023-07-19 PROCEDURE — 85610 PROTHROMBIN TIME: CPT

## 2023-07-19 PROCEDURE — 6370000000 HC RX 637 (ALT 250 FOR IP): Performed by: INTERNAL MEDICINE

## 2023-07-19 RX ORDER — DOXYCYCLINE HYCLATE 100 MG/1
100 CAPSULE ORAL EVERY 12 HOURS SCHEDULED
Status: DISCONTINUED | OUTPATIENT
Start: 2023-07-19 | End: 2023-07-20 | Stop reason: HOSPADM

## 2023-07-19 RX ADMIN — DOXYCYCLINE HYCLATE 100 MG: 100 CAPSULE ORAL at 20:51

## 2023-07-19 RX ADMIN — FERROUS SULFATE TAB 325 MG (65 MG ELEMENTAL FE) 325 MG: 325 (65 FE) TAB at 08:20

## 2023-07-19 RX ADMIN — SODIUM CHLORIDE, PRESERVATIVE FREE 10 ML: 5 INJECTION INTRAVENOUS at 20:52

## 2023-07-19 RX ADMIN — VITAMIN D, TAB 1000IU (100/BT) 1000 UNITS: 25 TAB at 08:21

## 2023-07-19 RX ADMIN — AMLODIPINE BESYLATE 2.5 MG: 5 TABLET ORAL at 20:51

## 2023-07-19 RX ADMIN — AMLODIPINE BESYLATE 2.5 MG: 5 TABLET ORAL at 08:20

## 2023-07-19 RX ADMIN — SODIUM CHLORIDE, PRESERVATIVE FREE 10 ML: 5 INJECTION INTRAVENOUS at 08:40

## 2023-07-19 RX ADMIN — DOFETILIDE 500 MCG: 0.5 CAPSULE ORAL at 08:21

## 2023-07-19 RX ADMIN — PANTOPRAZOLE SODIUM 40 MG: 40 TABLET, DELAYED RELEASE ORAL at 08:20

## 2023-07-19 RX ADMIN — INSULIN LISPRO 2 UNITS: 100 INJECTION, SOLUTION INTRAVENOUS; SUBCUTANEOUS at 17:52

## 2023-07-19 RX ADMIN — INSULIN GLARGINE 34 UNITS: 100 INJECTION, SOLUTION SUBCUTANEOUS at 20:52

## 2023-07-19 RX ADMIN — LOSARTAN POTASSIUM 50 MG: 50 TABLET, FILM COATED ORAL at 20:51

## 2023-07-19 RX ADMIN — POTASSIUM CHLORIDE 20 MEQ: 20 TABLET, EXTENDED RELEASE ORAL at 08:20

## 2023-07-19 RX ADMIN — DOXYCYCLINE 100 MG: 100 INJECTION, POWDER, LYOPHILIZED, FOR SOLUTION INTRAVENOUS at 02:04

## 2023-07-19 RX ADMIN — VITAMIN D, TAB 1000IU (100/BT) 1000 UNITS: 25 TAB at 20:51

## 2023-07-19 RX ADMIN — SODIUM CHLORIDE, PRESERVATIVE FREE 5 ML: 5 INJECTION INTRAVENOUS at 17:56

## 2023-07-19 RX ADMIN — DOFETILIDE 500 MCG: 0.5 CAPSULE ORAL at 20:51

## 2023-07-19 RX ADMIN — LEVOTHYROXINE SODIUM 125 MCG: 0.12 TABLET ORAL at 08:21

## 2023-07-19 RX ADMIN — LOSARTAN POTASSIUM 50 MG: 50 TABLET, FILM COATED ORAL at 08:20

## 2023-07-19 RX ADMIN — SODIUM CHLORIDE, PRESERVATIVE FREE 5 ML: 5 INJECTION INTRAVENOUS at 08:40

## 2023-07-19 RX ADMIN — MAGNESIUM GLUCONATE 500 MG ORAL TABLET 400 MG: 500 TABLET ORAL at 08:20

## 2023-07-19 RX ADMIN — SODIUM CHLORIDE, PRESERVATIVE FREE 5 ML: 5 INJECTION INTRAVENOUS at 01:34

## 2023-07-19 RX ADMIN — ASPIRIN 81 MG 81 MG: 81 TABLET ORAL at 08:21

## 2023-07-19 RX ADMIN — WARFARIN SODIUM 5 MG: 2.5 TABLET ORAL at 17:53

## 2023-07-19 RX ADMIN — ATORVASTATIN CALCIUM 10 MG: 10 TABLET, FILM COATED ORAL at 17:53

## 2023-07-19 ASSESSMENT — PAIN SCALES - GENERAL
PAINLEVEL_OUTOF10: 0
PAINLEVEL_OUTOF10: 0

## 2023-07-19 ASSESSMENT — PAIN SCALES - WONG BAKER: WONGBAKER_NUMERICALRESPONSE: 0

## 2023-07-20 ENCOUNTER — TELEPHONE (OUTPATIENT)
Dept: PULMONOLOGY | Age: 78
End: 2023-07-20

## 2023-07-20 VITALS
OXYGEN SATURATION: 94 % | HEART RATE: 86 BPM | RESPIRATION RATE: 19 BRPM | DIASTOLIC BLOOD PRESSURE: 61 MMHG | TEMPERATURE: 98.4 F | SYSTOLIC BLOOD PRESSURE: 133 MMHG

## 2023-07-20 DIAGNOSIS — R09.02 HYPOXIA: Primary | ICD-10-CM

## 2023-07-20 LAB
ACID FAST STN SPEC: NEGATIVE
ANION GAP SERPL CALC-SCNC: 4 MMOL/L (ref 2–11)
BACTERIA SPEC CULT: NORMAL
BASOPHILS # BLD: 0 K/UL (ref 0–0.2)
BASOPHILS NFR BLD: 0 % (ref 0–2)
BUN SERPL-MCNC: 26 MG/DL (ref 8–23)
C PNEUMONIAE PCR: NOT DETECTED
CALCIUM SERPL-MCNC: 8.3 MG/DL (ref 8.3–10.4)
CHLORIDE SERPL-SCNC: 113 MMOL/L (ref 101–110)
CO2 SERPL-SCNC: 25 MMOL/L (ref 21–32)
CREAT SERPL-MCNC: 0.7 MG/DL (ref 0.6–1)
DIFFERENTIAL METHOD BLD: ABNORMAL
EOSINOPHIL # BLD: 0.4 K/UL (ref 0–0.8)
EOSINOPHIL NFR BLD: 4 % (ref 0.5–7.8)
ERYTHROCYTE [DISTWIDTH] IN BLOOD BY AUTOMATED COUNT: 17.2 % (ref 11.9–14.6)
GLUCOSE BLD STRIP.AUTO-MCNC: 115 MG/DL (ref 65–100)
GLUCOSE BLD STRIP.AUTO-MCNC: 142 MG/DL (ref 65–100)
GLUCOSE SERPL-MCNC: 86 MG/DL (ref 65–100)
GRAM STN SPEC: NORMAL
HADV DNA # BAL NAA+PROBE: NOT DETECTED COPIES/ML
HCT VFR BLD AUTO: 30.9 % (ref 35.8–46.3)
HGB BLD-MCNC: 8.9 G/DL (ref 11.7–15.4)
IMM GRANULOCYTES # BLD AUTO: 0 K/UL (ref 0–0.5)
IMM GRANULOCYTES NFR BLD AUTO: 0 % (ref 0–5)
INR PPP: 2.4
LYMPHOCYTES # BLD: 1.5 K/UL (ref 0.5–4.6)
LYMPHOCYTES NFR BLD: 17 % (ref 13–44)
MCH RBC QN AUTO: 27.4 PG (ref 26.1–32.9)
MCHC RBC AUTO-ENTMCNC: 28.8 G/DL (ref 31.4–35)
MCV RBC AUTO: 95.1 FL (ref 82–102)
MONOCYTES # BLD: 0.9 K/UL (ref 0.1–1.3)
MONOCYTES NFR BLD: 10 % (ref 4–12)
MYCOBACTERIUM SPEC QL CULT: NORMAL
MYCOPLASMA PNEUMONIAE PCR: NOT DETECTED
NEUTS SEG # BLD: 6.2 K/UL (ref 1.7–8.2)
NEUTS SEG NFR BLD: 69 % (ref 43–78)
NRBC # BLD: 0 K/UL (ref 0–0.2)
PLATELET # BLD AUTO: 206 K/UL (ref 150–450)
PMV BLD AUTO: 10.2 FL (ref 9.4–12.3)
PNEUMOCYSTIS JIROVECII PCR: NORMAL
POTASSIUM SERPL-SCNC: 5 MMOL/L (ref 3.5–5.1)
PROTHROMBIN TIME: 26.7 SEC (ref 12.6–14.3)
RBC # BLD AUTO: 3.25 M/UL (ref 4.05–5.2)
SERVICE CMNT-IMP: ABNORMAL
SERVICE CMNT-IMP: ABNORMAL
SERVICE CMNT-IMP: NORMAL
SODIUM SERPL-SCNC: 142 MMOL/L (ref 133–143)
SPECIMEN PREPARATION: NORMAL
SPECIMEN SOURCE: NORMAL
SPECIMEN SOURCE: NORMAL
WBC # BLD AUTO: 9 K/UL (ref 4.3–11.1)

## 2023-07-20 PROCEDURE — 85610 PROTHROMBIN TIME: CPT

## 2023-07-20 PROCEDURE — 82962 GLUCOSE BLOOD TEST: CPT

## 2023-07-20 PROCEDURE — 2580000003 HC RX 258: Performed by: FAMILY MEDICINE

## 2023-07-20 PROCEDURE — 36415 COLL VENOUS BLD VENIPUNCTURE: CPT

## 2023-07-20 PROCEDURE — 6370000000 HC RX 637 (ALT 250 FOR IP): Performed by: INTERNAL MEDICINE

## 2023-07-20 PROCEDURE — 85025 COMPLETE CBC W/AUTO DIFF WBC: CPT

## 2023-07-20 PROCEDURE — 97535 SELF CARE MNGMENT TRAINING: CPT

## 2023-07-20 PROCEDURE — 2700000000 HC OXYGEN THERAPY PER DAY

## 2023-07-20 PROCEDURE — 99233 SBSQ HOSP IP/OBS HIGH 50: CPT | Performed by: INTERNAL MEDICINE

## 2023-07-20 PROCEDURE — 94761 N-INVAS EAR/PLS OXIMETRY MLT: CPT

## 2023-07-20 PROCEDURE — 80048 BASIC METABOLIC PNL TOTAL CA: CPT

## 2023-07-20 PROCEDURE — 2580000003 HC RX 258: Performed by: EMERGENCY MEDICINE

## 2023-07-20 PROCEDURE — 6370000000 HC RX 637 (ALT 250 FOR IP): Performed by: FAMILY MEDICINE

## 2023-07-20 PROCEDURE — 97530 THERAPEUTIC ACTIVITIES: CPT

## 2023-07-20 RX ORDER — DOXYCYCLINE HYCLATE 100 MG/1
100 CAPSULE ORAL EVERY 12 HOURS SCHEDULED
Qty: 8 CAPSULE | Refills: 0 | Status: SHIPPED | OUTPATIENT
Start: 2023-07-20 | End: 2023-07-24

## 2023-07-20 RX ADMIN — ASPIRIN 81 MG 81 MG: 81 TABLET ORAL at 07:47

## 2023-07-20 RX ADMIN — SODIUM CHLORIDE, PRESERVATIVE FREE 5 ML: 5 INJECTION INTRAVENOUS at 01:25

## 2023-07-20 RX ADMIN — FERROUS SULFATE TAB 325 MG (65 MG ELEMENTAL FE) 325 MG: 325 (65 FE) TAB at 07:47

## 2023-07-20 RX ADMIN — POTASSIUM CHLORIDE 20 MEQ: 20 TABLET, EXTENDED RELEASE ORAL at 07:48

## 2023-07-20 RX ADMIN — MAGNESIUM GLUCONATE 500 MG ORAL TABLET 400 MG: 500 TABLET ORAL at 07:47

## 2023-07-20 RX ADMIN — SODIUM CHLORIDE, PRESERVATIVE FREE 10 ML: 5 INJECTION INTRAVENOUS at 07:48

## 2023-07-20 RX ADMIN — DOXYCYCLINE HYCLATE 100 MG: 100 CAPSULE ORAL at 07:47

## 2023-07-20 RX ADMIN — LEVOTHYROXINE SODIUM 125 MCG: 0.12 TABLET ORAL at 07:48

## 2023-07-20 RX ADMIN — PANTOPRAZOLE SODIUM 40 MG: 40 TABLET, DELAYED RELEASE ORAL at 07:48

## 2023-07-20 RX ADMIN — LOSARTAN POTASSIUM 50 MG: 50 TABLET, FILM COATED ORAL at 07:48

## 2023-07-20 RX ADMIN — VITAMIN D, TAB 1000IU (100/BT) 1000 UNITS: 25 TAB at 07:47

## 2023-07-20 RX ADMIN — SODIUM CHLORIDE, PRESERVATIVE FREE 5 ML: 5 INJECTION INTRAVENOUS at 07:51

## 2023-07-20 RX ADMIN — CETIRIZINE HYDROCHLORIDE 5 MG: 5 TABLET ORAL at 07:48

## 2023-07-20 RX ADMIN — AMLODIPINE BESYLATE 2.5 MG: 5 TABLET ORAL at 07:47

## 2023-07-20 RX ADMIN — DOFETILIDE 500 MCG: 0.5 CAPSULE ORAL at 07:48

## 2023-07-20 NOTE — PLAN OF CARE
Problem: Pain  Goal: Verbalizes/displays adequate comfort level or baseline comfort level  7/20/2023 0734 by Jennifer Hood RN  Outcome: Progressing  7/20/2023 0134 by Naomy Isaacs RN  Outcome: Progressing  Flowsheets (Taken 7/19/2023 2045)  Verbalizes/displays adequate comfort level or baseline comfort level: Encourage patient to monitor pain and request assistance     Problem: ABCDS Injury Assessment  Goal: Absence of physical injury  7/20/2023 0734 by Jennifer Hood RN  Outcome: Progressing  7/20/2023 0134 by Naomy Isaacs RN  Outcome: Progressing  Flowsheets (Taken 7/19/2023 2045)  Absence of Physical Injury: Implement safety measures based on patient assessment     Problem: Safety - Adult  Goal: Free from fall injury  7/20/2023 0734 by Jennifer Hood RN  Outcome: Progressing  7/20/2023 0134 by Naomy Isaacs RN  Outcome: Martin Finnegan (Taken 7/19/2023 2045)  Free From Fall Injury: Instruct family/caregiver on patient safety     Problem: Chronic Conditions and Co-morbidities  Goal: Patient's chronic conditions and co-morbidity symptoms are monitored and maintained or improved  7/20/2023 0734 by Jennifer Hood RN  Outcome: Progressing  7/20/2023 0134 by Naomy Isaacs RN  Outcome: Progressing  Flowsheets (Taken 7/19/2023 2045)  Care Plan - Patient's Chronic Conditions and Co-Morbidity Symptoms are Monitored and Maintained or Improved: Monitor and assess patient's chronic conditions and comorbid symptoms for stability, deterioration, or improvement     Problem: Neurosensory - Adult  Goal: Achieves stable or improved neurological status  Outcome: Progressing  Goal: Achieves maximal functionality and self care  Outcome: Progressing
Problem: Pain  Goal: Verbalizes/displays adequate comfort level or baseline comfort level  Outcome: Progressing     Problem: ABCDS Injury Assessment  Goal: Absence of physical injury  Outcome: Progressing
Problem: Pain  Goal: Verbalizes/displays adequate comfort level or baseline comfort level  Outcome: Progressing     Problem: ABCDS Injury Assessment  Goal: Absence of physical injury  Outcome: Progressing     Problem: Safety - Adult  Goal: Free from fall injury  Outcome: Progressing
Problem: Pain  Goal: Verbalizes/displays adequate comfort level or baseline comfort level  Outcome: Progressing     Problem: ABCDS Injury Assessment  Goal: Absence of physical injury  Outcome: Progressing  Flowsheets (Taken 7/17/2023 2120 by Mor Fish RN)  Absence of Physical Injury: Implement safety measures based on patient assessment     Problem: Safety - Adult  Goal: Free from fall injury  Outcome: Progressing
Problem: Pain  Goal: Verbalizes/displays adequate comfort level or baseline comfort level  Outcome: Progressing  Flowsheets (Taken 7/19/2023 2045)  Verbalizes/displays adequate comfort level or baseline comfort level: Encourage patient to monitor pain and request assistance     Problem: ABCDS Injury Assessment  Goal: Absence of physical injury  Outcome: Progressing  Flowsheets (Taken 7/19/2023 2045)  Absence of Physical Injury: Implement safety measures based on patient assessment     Problem: Safety - Adult  Goal: Free from fall injury  Outcome: Progressing  Flowsheets (Taken 7/19/2023 2045)  Free From Fall Injury: Instruct family/caregiver on patient safety     Problem: Chronic Conditions and Co-morbidities  Goal: Patient's chronic conditions and co-morbidity symptoms are monitored and maintained or improved  Outcome: Progressing  Flowsheets (Taken 7/19/2023 2045)  Care Plan - Patient's Chronic Conditions and Co-Morbidity Symptoms are Monitored and Maintained or Improved: Monitor and assess patient's chronic conditions and comorbid symptoms for stability, deterioration, or improvement
Waiting ambulance service

## 2023-07-20 NOTE — TELEPHONE ENCOUNTER
Please contact patient and arrange a 40 minute hospital f/u visit in 2-3 weeks with ambulating oximetry, PA/LAT CXR prior. She should ideally be seen by Camelia Barrientos, or Pilar who she saw in the hospital.       Thank you,   Kiesha Gant MD      Additional message from Dr Nery Hancock that needs to be TCM. Patient admitted 7/20 with likely d/c today. Karl Aquino

## 2023-07-20 NOTE — CARE COORDINATION
Pt discussed during IDR and chart screened by CM for d/c planning. RT conducted a 6MWT and pt required 6L O2 with exertion to maintain SAT of 93%. On 7/18 pt underwent a bronch alveolar lavage and samples were sent to the lab. PT/OT have evaluated pt and recommend HH at d/c. Pulm is following and must give clearance for d/c. CM will continue to follow. LOS = 2 days.
Pt to d/c home today. Spouse is at the bedside and will provide transportation. RT conducted a second 6MWT and pt requires 4L O2 with exertion. Home O2 ordered through [facility] and a portable O2 tank was delivered to pt's room prior to d/c.    PT/OT have evaluated pt and recommend HH at d/c. Pt declined [facility] services. No other supportive care needs identified. Pt agrees with d/c plan. Milestones met. LOS = 3 days       07/17/23 1017   Service Assessment   Patient Orientation Alert and Oriented;Person;Place; Self   Cognition Alert   History Provided By Patient;Medical Record   Primary Caregiver Self   Accompanied By/Relationship No One   Support Systems Spouse/Significant Other;Children;Family Members   Patient's Healthcare Decision Maker is: Legal Next of Kin   PCP Verified by CM Yes  ([place], [place])   Last Visit to PCP Within last 3 months   Prior Functional Level Independent in ADLs/IADLs   Current Functional Level Independent in ADLs/IADLs   Can patient return to prior living arrangement Yes   Ability to make needs known: Good   Family able to assist with home care needs: Yes   Would you like for me to discuss the discharge plan with any other family members/significant others, and if so, who? No   Financial Resources Medicare; Other (Comment)  (Secondary: [employer])   Community Resources None   Social/Functional History   Lives With Spouse   Type of [address] [place] [address] Dr One level   [address]   ([address])   [address]   Homemaking Assistance Independent   Ambulation Assistance Independent   Transfer Assistance Independent   Active  Yes   Mode of Transportation Car   Occupation Retired   Discharge Planning   Type of [facility] Prior To Admission [employer]   Current DME Prior to
Yes  Would you like Case Management to discuss the discharge plan with any other family members/significant others, and if so, who? No  Plans to Return to Present Housing: Yes  Other Identified Issues/Barriers to RETURNING to current housing: None  Potential Assistance needed at discharge: [facility] [facility]            Potential DME: Oxygen Therapy (Comment) (Pt is currently requiring 5L O2 NC)  Patient expects to discharge to: [address] [place] [address] for transportation at discharge: Family    Financial    Payor: [address] / Plan: MEDICARE PART A AND B / Product Type: *No Product type* /     Does insurance require precert for SNF: No    Potential assistance Purchasing Medications: No        Mike Martins [address] [facility], [address] [address]  Phone: [phone] Fax: [phone]    [facility] [address] [facility] [phone] Richard Jacobsen [place] [address]  Phone: [phone] Fax: [phone]      Notes:    Factors facilitating achievement of predicted outcomes: Family support, Motivated, and Cooperative    Barriers to discharge: Medical complications    Additional Case Management Notes: 68 y/o female pt who resides with her spouse Augusto Cousin [phone]. Pt has insurance and a PCP. At baseline pt is independent with ADLs. Currently requiring 5L O2 NC - wean as tolerated. Pt does not have home O2. If unable to wean O2 at d/c pt will require a 6MWT. There have been no PT/OT consults ordered. CM will continue to follow and remain available if any needs arise.     The Plan for Transition of Care is related to the following treatment goals of Shortness of breath [R06.02]  Acute respiratory failure with hypoxia (HCC) [J96.01]  Acute respiratory failure with hypoxemia ([address]) [G82.32]    IF APPLICABLE: The Patient and/or patient representative

## 2023-07-20 NOTE — DISCHARGE SUMMARY
Hospitalist Discharge Summary   Admit Date:  2023  1:23 AM   DC Note date: 2023  Name:  Elie Durbin   Age:  69 y.o. Sex:  female  :  1945   MRN:  [id number]   Room:    PCP:  RICCI Wilkins NP    Presenting Complaint: Shortness of Breath     Initial Admission Diagnosis: Shortness of breath [R06.02]  Acute respiratory failure with hypoxia ([address]) [J96.01]  Acute respiratory failure with hypoxemia ([address]) [J96.01]     Problem List for this Hospitalization (present on admission):    Principal Problem:    Acute respiratory failure with hypoxia ([address])  Active Problems:    Iron deficiency    Type 2 diabetes mellitus with hyperglycemia, with long-term current use of insulin (HCC)    Essential hypertension    S/P AVR (aortic valve replacement)    Hypothyroidism, postsurgical    Paroxysmal atrial fibrillation (HCC)    CAD (coronary artery disease)    Abnormal CT of the chest    Acute respiratory failure with hypoxemia ([address])  Resolved Problems:    * No resolved hospital problems. *      Hospital Course:    Elie Durbin is a 69 y.o. female with medical history of   Hypertension  Diabetes mellitus type 2  Atrial fibrillation on warfarin  Status post valve replacement  Iron deficiency anemia  CHF  Recent COVID infection in May 2023. who presented with shortness of breath. Symptoms started abruptly. In the emergency room patient was found to be hypoxic with oxygen saturation in the 70s. CT shows;      IMPRESSION:  1. No pulmonary embolus. 2. Patchy groundglass opacification throughout the lungs that could be secondary   to small airways or small vessels disease. It does not have the appearance of   pneumonia or edema. 3. Mediastinal adenopathy with a fairly broad differential, tissue sampling   would be required for definitive diagnosis. Respiratory viral panel is negative  Patient is admitted due to acute hypoxic respiratory failure.   She is maintaining her

## 2023-07-20 NOTE — DISCHARGE INSTRUCTIONS
[facility]  [address] [facility]   [address]   [phone]    The pulmonary office will call you in 1-2 business days to arrange follow up in the pulmonary office. If you have not heard from the office after 2 full business days, please call the office to arrange follow up. If your condition is worse after hospital discharge, contact your healthcare provider or return to hospital.  You are advised to follow-up with your primary doctor and/or specialist.  With a follow-up visit, please make sure that your provider is aware of your admission and have them review your hospital record for any follow-up plans or investigations that need to be done. You may need prescription renewal when you complete your current prescription, at the time of follow-up. Please consult your healthcare provider regarding that.

## 2023-07-21 ENCOUNTER — CARE COORDINATION (OUTPATIENT)
Dept: CARE COORDINATION | Facility: CLINIC | Age: 78
End: 2023-07-21

## 2023-07-21 LAB
CYTOMEGALOVIRUS PCR DETECTION: NEGATIVE
FUNGAL CULT/SMEAR: NORMAL
SOURCE: NORMAL
SPECIMEN PROCESSING: NORMAL
SPECIMEN SOURCE: NORMAL

## 2023-07-21 NOTE — CARE COORDINATION
reconciliation was performed with patient, who verbalizes understanding of administration of home medications. Medications reviewed, 1111F entered: N/A    Was patient discharged with a pulse oximeter? no    Non-face-to-face services provided:  Obtained and reviewed discharge summary and/or continuity of care documents    Offered patient enrollment in the Remote Patient Monitoring (RPM) program for in-home monitoring: NA.    Care Transitions 24 Hour Call    Do you have a copy of your discharge instructions?: Yes  Do you have all of your prescriptions and are they filled?: Yes  Have you been contacted by a [address]?: No  Have you scheduled your follow up appointment?: Yes (Comment: PCP 7/24/2023)  How are you going to get to your appointment?: Car - family or friend to transport  Do you have support at home?: Partner/Spouse/SO  Do you feel like you have everything you need to keep you well at home?: Yes  Are you an active caregiver in your home?: No  Care Transitions Interventions  No Identified Needs         Discussed follow-up appointments. If no appointment was previously scheduled, appointment scheduling offered: Yes. Is follow up appointment scheduled within 7 days of discharge? Yes. Follow Up  Future Appointments   Date Time Provider [address] [place] [address]   7/24/2023 10:00 AM RICCI Nascimento NP MLMIM [facility] AMB   7/28/2023  3:00 PM Emmy Estrella MD PPS [facility] AMB   8/2/2023  2:00 PM Sammy Lopez MD [facility] AMB   8/9/2023 10:45 AM [facility] PT UCDG [facility] AMB   9/26/2023  9:30 AM RICCI Nascimento NP MLMIM [facility] AMB   1/19/2024  1:30 PM [facility] ECHO 60 UCDE [facility] AMB   1/26/2024  1:15 PM Sarah Barrera MD UCDE [facility] AMB      Goals Addressed                   This Visit's Progress     Attends follow up appointments on schedule        Patient will attend follow up appointment on 7/24/2023       Medication Management        I will take my medication as directed.     Barriers:

## 2023-07-21 NOTE — TELEPHONE ENCOUNTER
Care Transitions Initial Follow Up Call    Outreach made within 2 business days of discharge: Yes    Patient: Shaka Mera Patient : 1945   MRN: [id number]      Reason for Admission:     Acute respiratory failure with hypoxia ([address])    Iron deficiency    Type 2 diabetes mellitus with hyperglycemia, with long-term current use of insulin ([address])    Essential hypertension    S/P AVR (aortic valve replacement)    Hypothyroidism, postsurgical    Paroxysmal atrial fibrillation (HCC)    CAD (coronary artery disease)    Abnormal CT of the chest    Acute respiratory failure with hypoxemia [facility])    Discharge Date: 23       Spoke with: Neeraj Ortega    Discharge department/facility: [facility] 5th floor    TCM Interactive Patient Contact:  Was patient able to fill all prescriptions: Yes  Was patient instructed to bring all medications to the follow-up visit: Yes  Is patient taking all medications as directed in the discharge summary?  Yes  Does patient understand their discharge instructions: Yes  Does patient have questions or concerns that need addressed prior to 7-14 day follow up office visit: no    DME: [address] [place], [place] [address]     Scheduled appointment with Pulmonary within 7-14 days    Follow Up  Future Appointments   Date Time Provider [address]   2023  3:00 PM Luis Armando Ireland MD PPS [facility] AMB   2023  2:00 PM Hussein Robbins MD [facility] AMB   2023 10:45 AM [facility] BARRY PT UCDG [facility] AMB   2023  9:30 AM Latisha Cook APRN - NP MLMIM [facility] AMB   2024  1:30 PM [facility] ARSENIO ECHO 60 UCDE [facility] AMB   2024  1:15 PM Tyshawn Ann MD UCDE [facility] AMB       Nel Solis RN

## 2023-07-22 LAB
BACTERIA SPEC CULT: NORMAL
BACTERIA SPEC CULT: NORMAL
SERVICE CMNT-IMP: NORMAL
SERVICE CMNT-IMP: NORMAL

## 2023-07-23 LAB
FUNGUS SMEAR: NORMAL
SPECIMEN SOURCE: NORMAL

## 2023-07-24 ENCOUNTER — OFFICE VISIT (OUTPATIENT)
Dept: INTERNAL MEDICINE CLINIC | Facility: CLINIC | Age: 78
End: 2023-07-24

## 2023-07-24 VITALS
OXYGEN SATURATION: 96 % | DIASTOLIC BLOOD PRESSURE: 64 MMHG | SYSTOLIC BLOOD PRESSURE: 110 MMHG | WEIGHT: 146.8 LBS | HEART RATE: 68 BPM | BODY MASS INDEX: 24.43 KG/M2

## 2023-07-24 DIAGNOSIS — Z79.4 TYPE 2 DIABETES MELLITUS WITH MICROALBUMINURIA, WITH LONG-TERM CURRENT USE OF INSULIN (HCC): Chronic | ICD-10-CM

## 2023-07-24 DIAGNOSIS — J96.01 ACUTE RESPIRATORY FAILURE WITH HYPOXIA (HCC): ICD-10-CM

## 2023-07-24 DIAGNOSIS — E11.29 TYPE 2 DIABETES MELLITUS WITH MICROALBUMINURIA, WITH LONG-TERM CURRENT USE OF INSULIN (HCC): Chronic | ICD-10-CM

## 2023-07-24 DIAGNOSIS — D50.9 IRON DEFICIENCY ANEMIA, UNSPECIFIED IRON DEFICIENCY ANEMIA TYPE: ICD-10-CM

## 2023-07-24 DIAGNOSIS — R80.9 TYPE 2 DIABETES MELLITUS WITH MICROALBUMINURIA, WITH LONG-TERM CURRENT USE OF INSULIN (HCC): Chronic | ICD-10-CM

## 2023-07-24 DIAGNOSIS — R93.89 ABNORMAL CT OF THE CHEST: ICD-10-CM

## 2023-07-24 DIAGNOSIS — Z09 HOSPITAL DISCHARGE FOLLOW-UP: Primary | ICD-10-CM

## 2023-07-24 DIAGNOSIS — I10 ESSENTIAL HYPERTENSION: ICD-10-CM

## 2023-07-24 LAB
BASOPHILS # BLD: 0 K/UL (ref 0–0.2)
BASOPHILS NFR BLD: 1 % (ref 0–2)
DIFFERENTIAL METHOD BLD: ABNORMAL
EOSINOPHIL # BLD: 0.2 K/UL (ref 0–0.8)
EOSINOPHIL NFR BLD: 2 % (ref 0.5–7.8)
ERYTHROCYTE [DISTWIDTH] IN BLOOD BY AUTOMATED COUNT: 17 % (ref 11.9–14.6)
HCT VFR BLD AUTO: 33.3 % (ref 35.8–46.3)
HGB BLD-MCNC: 9.4 G/DL (ref 11.7–15.4)
IMM GRANULOCYTES # BLD AUTO: 0 K/UL (ref 0–0.5)
IMM GRANULOCYTES NFR BLD AUTO: 0 % (ref 0–5)
LYMPHOCYTES # BLD: 1.4 K/UL (ref 0.5–4.6)
LYMPHOCYTES NFR BLD: 17 % (ref 13–44)
MCH RBC QN AUTO: 27.1 PG (ref 26.1–32.9)
MCHC RBC AUTO-ENTMCNC: 28.2 G/DL (ref 31.4–35)
MCV RBC AUTO: 96 FL (ref 82–102)
MONOCYTES # BLD: 0.5 K/UL (ref 0.1–1.3)
MONOCYTES NFR BLD: 7 % (ref 4–12)
NEUTS SEG # BLD: 5.9 K/UL (ref 1.7–8.2)
NEUTS SEG NFR BLD: 74 % (ref 43–78)
NRBC # BLD: 0 K/UL (ref 0–0.2)
PLATELET # BLD AUTO: 319 K/UL (ref 150–450)
PMV BLD AUTO: 10.4 FL (ref 9.4–12.3)
RBC # BLD AUTO: 3.47 M/UL (ref 4.05–5.2)
WBC # BLD AUTO: 8.1 K/UL (ref 4.3–11.1)

## 2023-07-24 ASSESSMENT — PATIENT HEALTH QUESTIONNAIRE - PHQ9
SUM OF ALL RESPONSES TO PHQ QUESTIONS 1-9: 0
1. LITTLE INTEREST OR PLEASURE IN DOING THINGS: 0
SUM OF ALL RESPONSES TO PHQ QUESTIONS 1-9: 0
SUM OF ALL RESPONSES TO PHQ9 QUESTIONS 1 & 2: 0
2. FEELING DOWN, DEPRESSED OR HOPELESS: 0

## 2023-07-25 LAB
ALBUMIN SERPL-MCNC: 3.3 G/DL (ref 3.2–4.6)
ALBUMIN/GLOB SERPL: 0.8 (ref 0.4–1.6)
ALP SERPL-CCNC: 80 U/L (ref 50–136)
ALT SERPL-CCNC: 30 U/L (ref 12–65)
ANION GAP SERPL CALC-SCNC: 7 MMOL/L (ref 2–11)
AST SERPL-CCNC: 23 U/L (ref 15–37)
BILIRUB SERPL-MCNC: 0.4 MG/DL (ref 0.2–1.1)
BUN SERPL-MCNC: 22 MG/DL (ref 8–23)
CALCIUM SERPL-MCNC: 9.2 MG/DL (ref 8.3–10.4)
CD3 CELLS # SPEC: 75 %
CD3+CD4+ CELLS # BLD: 47 %
CD4/CD8 RATIO: 1.96 RATIO
CD8: 24 %
CHLORIDE SERPL-SCNC: 108 MMOL/L (ref 101–110)
CO2 SERPL-SCNC: 29 MMOL/L (ref 21–32)
CREAT SERPL-MCNC: 0.9 MG/DL (ref 0.6–1)
FLOW CYTOMETRY RESULTS: NORMAL
GLOBULIN SER CALC-MCNC: 4.4 G/DL (ref 2.8–4.5)
GLUCOSE SERPL-MCNC: 83 MG/DL (ref 65–100)
POTASSIUM SERPL-SCNC: 4.9 MMOL/L (ref 3.5–5.1)
PROT SERPL-MCNC: 7.7 G/DL (ref 6.3–8.2)
SODIUM SERPL-SCNC: 144 MMOL/L (ref 133–143)
SPECIMEN SOURCE: NORMAL
SPECIMEN SOURCE: NORMAL
TEST ORDERED: NORMAL

## 2023-07-28 ENCOUNTER — HOSPITAL ENCOUNTER (OUTPATIENT)
Dept: GENERAL RADIOLOGY | Age: 78
End: 2023-07-28
Payer: MEDICARE

## 2023-07-28 ENCOUNTER — CARE COORDINATION (OUTPATIENT)
Dept: CARE COORDINATION | Facility: CLINIC | Age: 78
End: 2023-07-28

## 2023-07-28 ENCOUNTER — OFFICE VISIT (OUTPATIENT)
Dept: PULMONOLOGY | Age: 78
End: 2023-07-28

## 2023-07-28 VITALS
BODY MASS INDEX: 23.3 KG/M2 | TEMPERATURE: 98 F | OXYGEN SATURATION: 96 % | HEIGHT: 66 IN | SYSTOLIC BLOOD PRESSURE: 114 MMHG | HEART RATE: 62 BPM | DIASTOLIC BLOOD PRESSURE: 80 MMHG | WEIGHT: 145 LBS | RESPIRATION RATE: 20 BRPM

## 2023-07-28 DIAGNOSIS — R09.02 HYPOXIA: ICD-10-CM

## 2023-07-28 DIAGNOSIS — R91.8 PULMONARY INFILTRATES: Primary | ICD-10-CM

## 2023-07-28 DIAGNOSIS — J96.01 ACUTE RESPIRATORY FAILURE WITH HYPOXIA (HCC): ICD-10-CM

## 2023-07-28 DIAGNOSIS — J18.9 PNEUMONIA OF BOTH LUNGS DUE TO INFECTIOUS ORGANISM, UNSPECIFIED PART OF LUNG: ICD-10-CM

## 2023-07-28 PROCEDURE — 71046 X-RAY EXAM CHEST 2 VIEWS: CPT

## 2023-07-28 NOTE — CARE COORDINATION
Care Transitions Outreach Attempt    Call within 2 business days of discharge: Yes   Attempted to reach patient for transitions of care follow up. Unable to reach patient. Will attempt to contact next business day. Patient: Greg Lin Patient : 1945 MRN: [id number]    Last Discharge [address] [facility]       Date Complaint Diagnosis Description Type Department Provider    23 Shortness of Breath Acute respiratory failure with hypoxemia ([address]) . .. ED to Hosp-Admission (Discharged) (ADMITTED) Syed Alonso MD; Agatha Abdul . .. Was this an external facility discharge?  No Discharge Facility: [facility]    Noted following upcoming appointments from discharge chart review:   [facility] follow up appointment(s):   Future Appointments   Date Time Provider [address]   2023  2:00 PM [facility] RAD WALK-IN SFDRAD SFD   2023  3:00 PM Adalgisa Saldaña MD PPS [facility] AMB   2023  2:00 PM Twanna Scheuermann, MD [facility] AMB   2023 10:45 AM [facility] PT UCDG G[facility] AMB   2023  9:30 AM RICCI Fernandez - NP MLMIM [facility] AMB   2024  1:30 PM [facility] ECHO 60 UCDE [facility] AMB   2024  1:15 PM Kisha Mata MD UC[facility] AMB     Non-[facility] follow up appointment(s): n/a

## 2023-07-28 NOTE — PROGRESS NOTES
Name:  Jenna Nayak  YOB: 1945   MRN: [id number]      Office Visit: 7/28/2023        ASSESSMENT AND PLAN:  (Medical Decision Making)    Impression: 69 y.o. female with no past pulmonary history, no past smoking history, admitted with sudden onset of shortness of breath after getting out of bed to go to bathroom. No associated symptoms. CT with mosaicism. Bronch showed neutrophil predominance but negative cultures. Bronch was neutrophil predominant. Imaging was suggestive of bronchiolitis but no clear cause of this. Her mycoplasma IgG was borderline elevated but this is not definitive. Now feeling better but still using O2.       -fernando on room air.   -walking O2 sat check today: shows she is ok on RA at rest and needs 2L with walking. Will repeat this on RA at her next appointment. -repeat CT scan mid September. If persistent infiltrates may need to consider lung biopsy.   -refer to pulm rehab  -cPFT\"s at next appt if she seems back to baseline. Follow up in 3 months. 1. Pulmonary infiltrates    - CT CHEST WO CONTRAST; Future    2. Acute respiratory failure with hypoxia (HCC)    - Pulse oximetry, overnight  - Ambulatory referral to Pulmonary Rehab    3. Pneumonia of both lungs due to infectious organism, unspecified part of lung      No orders of the defined types were placed in this encounter. No orders of the defined types were placed in this encounter. Follow-up and Dispositions    Return in about 3 months (around 10/28/2023) for CT chest mid Sept. refer to pulm rehab. , With Me or Noemi.        Adriana Oconnor MD    DME: [facility]        _________________________________________________________________________    HISTORY OF PRESENT ILLNESS:    Ms. Serg Dumont is a 69 y.o. female who is seen at [facility] today for  Follow-Up from Hospital (TCM, SOB, Acute Respiratory Failure)     She has a history of colon CA s/p partial resection, a fib RVR on coumadin, HTN,

## 2023-07-31 ENCOUNTER — CARE COORDINATION (OUTPATIENT)
Dept: CARE COORDINATION | Facility: CLINIC | Age: 78
End: 2023-07-31

## 2023-07-31 NOTE — PROGRESS NOTES
New Patient Abstract     Reason for Referral: Iron deficiency anemia, unspecified iron deficiency anemia type    Referring Provider: RICCI Alfaro NP    Primary Care Provider: RICCI Alfaro NP    Family History of Cancer/Hematologic Disorders: Family history significant for sister with breast cancer. Presenting Symptoms: Low H&H levels, low iron level    Narrative with recent with Results/Procedures/Biopsies and Dates completed:     78-year-old white female     Patient followed by Cardiology, on Warfarin  Patient followed by Pulmonology  PMH: recent dx of colon CA s/p partial colon resection- never received chemo, a-fib RVR on coumadin and tikosyn, aortic and mitral valve replacement, HTN, DM2, HLD, and Hypothyroidism     6/15/23 - Met with PCP (EPIC)  Patient is noted to have an anemia since her last visit on 5/15/23. Hgb dropped from 11.7-9.5; Hct dropped from 39.9-32.8. Transferrin sat - 9% with iron level - 34; TIBC - 374. Recommend begin Slow Fe one tablet daily on an empty stomach with vitamin C 1000 mg. Patient is on anticoagulation therapy: Warfarin. Will refer to hematology for consultation/treatment of the iron deficiency. 7/17/23 Ilana Mccrary to [facility] ED with c/o SOB with exertion (EPIC)  Patient admitted for acute respiratory hypoxemia  Bronchoscope performed with cytology revealing no malignant cells identified.   Abnormal labs initially in ED 7/17/23: (EPIC)  RBC - 3.53  Hgb - 9.6  Hct - 33.7  Prothrombin Time - 23.3  Mycoplasma pneumo IgG - 308    H/o H&H - (EPIC)   06/02/23 07:09 07/17/23 01:35 07/18/23 06:10 07/19/23 04:49 07/20/23 05:41 07/24/23 11:30   Hemoglobin Quant 9.5 (L) 9.6 (L) 8.9 (L) 8.4 (L) 8.9 (L) 9.4 (L)   Hematocrit 32.8 (L) 33.7 (L) 31.8 (L) 29.7 (L) 30.9 (L) 33.3 (L)     H/o iron levels: (EPIC)   Latest Reference Range & Units 09/27/21 08:08 01/05/22 11:32 07/26/22 09:20 06/02/23 08:45   Ferritin 8 - 388 NG/ML 16   32   Iron 35 - 150 ug/dL 33 39 173 (H) 34 (L)

## 2023-08-01 LAB
ACID FAST STN SPEC: NEGATIVE
MYCOBACTERIUM SPEC QL CULT: POSITIVE
SPECIMEN PREPARATION: ABNORMAL
SPECIMEN SOURCE: ABNORMAL

## 2023-08-02 ENCOUNTER — APPOINTMENT (OUTPATIENT)
Dept: CT IMAGING | Age: 78
End: 2023-08-02
Payer: MEDICARE

## 2023-08-02 ENCOUNTER — APPOINTMENT (OUTPATIENT)
Dept: GENERAL RADIOLOGY | Age: 78
End: 2023-08-02
Payer: MEDICARE

## 2023-08-02 ENCOUNTER — HOSPITAL ENCOUNTER (INPATIENT)
Age: 78
LOS: 3 days | Discharge: HOME | End: 2023-08-05
Attending: EMERGENCY MEDICINE | Admitting: HOSPITALIST
Payer: MEDICARE

## 2023-08-02 DIAGNOSIS — I35.0 AORTIC STENOSIS: ICD-10-CM

## 2023-08-02 DIAGNOSIS — J96.21 ACUTE ON CHRONIC RESPIRATORY FAILURE WITH HYPOXEMIA (HCC): ICD-10-CM

## 2023-08-02 DIAGNOSIS — R09.02 HYPOXIA: Primary | ICD-10-CM

## 2023-08-02 DIAGNOSIS — Z95.2 S/P AVR (AORTIC VALVE REPLACEMENT): Chronic | ICD-10-CM

## 2023-08-02 DIAGNOSIS — J18.9 ACUTE PNEUMONITIS: ICD-10-CM

## 2023-08-02 PROBLEM — J98.4 ACUTE PNEUMONITIS: Status: ACTIVE | Noted: 2023-08-02

## 2023-08-02 LAB
ALBUMIN SERPL-MCNC: 3.4 G/DL (ref 3.2–4.6)
ALBUMIN/GLOB SERPL: 0.7 (ref 0.4–1.6)
ALP SERPL-CCNC: 79 U/L (ref 50–136)
ALT SERPL-CCNC: 37 U/L (ref 12–65)
ANION GAP SERPL CALC-SCNC: 6 MMOL/L (ref 2–11)
AST SERPL-CCNC: 26 U/L (ref 15–37)
BASOPHILS # BLD: 0.1 K/UL (ref 0–0.2)
BASOPHILS NFR BLD: 0 % (ref 0–2)
BILIRUB SERPL-MCNC: 0.5 MG/DL (ref 0.2–1.1)
BUN SERPL-MCNC: 20 MG/DL (ref 8–23)
CALCIUM SERPL-MCNC: 8.5 MG/DL (ref 8.3–10.4)
CHLORIDE SERPL-SCNC: 111 MMOL/L (ref 101–110)
CO2 SERPL-SCNC: 27 MMOL/L (ref 21–32)
CREAT SERPL-MCNC: 0.8 MG/DL (ref 0.6–1)
DIFFERENTIAL METHOD BLD: ABNORMAL
EKG ATRIAL RATE: 81 BPM
EKG DIAGNOSIS: NORMAL
EKG Q-T INTERVAL: 416 MS
EKG QRS DURATION: 95 MS
EKG QTC CALCULATION (BAZETT): 462 MS
EKG R AXIS: 63 DEGREES
EKG T AXIS: 65 DEGREES
EKG VENTRICULAR RATE: 74 BPM
EOSINOPHIL # BLD: 0.2 K/UL (ref 0–0.8)
EOSINOPHIL NFR BLD: 2 % (ref 0.5–7.8)
ERYTHROCYTE [DISTWIDTH] IN BLOOD BY AUTOMATED COUNT: 17.1 % (ref 11.9–14.6)
GLOBULIN SER CALC-MCNC: 4.6 G/DL (ref 2.8–4.5)
GLUCOSE BLD STRIP.AUTO-MCNC: 133 MG/DL (ref 65–100)
GLUCOSE BLD STRIP.AUTO-MCNC: 216 MG/DL (ref 65–100)
GLUCOSE BLD STRIP.AUTO-MCNC: 237 MG/DL (ref 65–100)
GLUCOSE SERPL-MCNC: 135 MG/DL (ref 65–100)
HCT VFR BLD AUTO: 33.1 % (ref 35.8–46.3)
HGB BLD-MCNC: 9.4 G/DL (ref 11.7–15.4)
IMM GRANULOCYTES # BLD AUTO: 0.1 K/UL (ref 0–0.5)
IMM GRANULOCYTES NFR BLD AUTO: 1 % (ref 0–5)
INR PPP: 2
LACTATE SERPL-SCNC: 1.8 MMOL/L (ref 0.4–2)
LYMPHOCYTES # BLD: 1.9 K/UL (ref 0.5–4.6)
LYMPHOCYTES NFR BLD: 15 % (ref 13–44)
M AVIUM CMPLX RRNA SPEC QL PROBE: POSITIVE
M GORDONAE RRNA SPEC QL PROBE: ABNORMAL
M KANSASII RRNA SPEC QL PROBE: ABNORMAL
M TB CMPLX RRNA SPEC QL PROBE: NEGATIVE
MAGNESIUM SERPL-MCNC: 1.7 MG/DL (ref 1.8–2.4)
MCH RBC QN AUTO: 26.6 PG (ref 26.1–32.9)
MCHC RBC AUTO-ENTMCNC: 28.4 G/DL (ref 31.4–35)
MCV RBC AUTO: 93.8 FL (ref 82–102)
MONOCYTES # BLD: 0.9 K/UL (ref 0.1–1.3)
MONOCYTES NFR BLD: 7 % (ref 4–12)
MRSA DNA SPEC QL NAA+PROBE: NOT DETECTED
NEUTS SEG # BLD: 9.7 K/UL (ref 1.7–8.2)
NEUTS SEG NFR BLD: 76 % (ref 43–78)
NRBC # BLD: 0 K/UL (ref 0–0.2)
NT PRO BNP: 330 PG/ML
OTHER: ABNORMAL
PLATELET # BLD AUTO: 353 K/UL (ref 150–450)
PMV BLD AUTO: 9.3 FL (ref 9.4–12.3)
POTASSIUM SERPL-SCNC: 4 MMOL/L (ref 3.5–5.1)
PROT SERPL-MCNC: 8 G/DL (ref 6.3–8.2)
PROTHROMBIN TIME: 23.5 SEC (ref 12.6–14.3)
RBC # BLD AUTO: 3.53 M/UL (ref 4.05–5.2)
S AUREUS CPE NOSE QL NAA+PROBE: NOT DETECTED
SERVICE CMNT-IMP: ABNORMAL
SODIUM SERPL-SCNC: 144 MMOL/L (ref 133–143)
SPECIMEN SOURCE: ABNORMAL
SUSCEPT TESTING: ABNORMAL
WBC # BLD AUTO: 12.8 K/UL (ref 4.3–11.1)

## 2023-08-02 PROCEDURE — 2700000000 HC OXYGEN THERAPY PER DAY

## 2023-08-02 PROCEDURE — 97161 PT EVAL LOW COMPLEX 20 MIN: CPT

## 2023-08-02 PROCEDURE — 6360000002 HC RX W HCPCS: Performed by: HOSPITALIST

## 2023-08-02 PROCEDURE — 83605 ASSAY OF LACTIC ACID: CPT

## 2023-08-02 PROCEDURE — 87040 BLOOD CULTURE FOR BACTERIA: CPT

## 2023-08-02 PROCEDURE — 80053 COMPREHEN METABOLIC PANEL: CPT

## 2023-08-02 PROCEDURE — 71250 CT THORAX DX C-: CPT

## 2023-08-02 PROCEDURE — 6370000000 HC RX 637 (ALT 250 FOR IP): Performed by: INTERNAL MEDICINE

## 2023-08-02 PROCEDURE — 2580000003 HC RX 258: Performed by: HOSPITALIST

## 2023-08-02 PROCEDURE — 96365 THER/PROPH/DIAG IV INF INIT: CPT

## 2023-08-02 PROCEDURE — 83735 ASSAY OF MAGNESIUM: CPT

## 2023-08-02 PROCEDURE — 85025 COMPLETE CBC W/AUTO DIFF WBC: CPT

## 2023-08-02 PROCEDURE — 87641 MR-STAPH DNA AMP PROBE: CPT

## 2023-08-02 PROCEDURE — 93010 ELECTROCARDIOGRAM REPORT: CPT | Performed by: INTERNAL MEDICINE

## 2023-08-02 PROCEDURE — 97530 THERAPEUTIC ACTIVITIES: CPT

## 2023-08-02 PROCEDURE — 94761 N-INVAS EAR/PLS OXIMETRY MLT: CPT

## 2023-08-02 PROCEDURE — 94760 N-INVAS EAR/PLS OXIMETRY 1: CPT

## 2023-08-02 PROCEDURE — 83880 ASSAY OF NATRIURETIC PEPTIDE: CPT

## 2023-08-02 PROCEDURE — 82962 GLUCOSE BLOOD TEST: CPT

## 2023-08-02 PROCEDURE — 93005 ELECTROCARDIOGRAM TRACING: CPT | Performed by: EMERGENCY MEDICINE

## 2023-08-02 PROCEDURE — 6370000000 HC RX 637 (ALT 250 FOR IP): Performed by: HOSPITALIST

## 2023-08-02 PROCEDURE — 85610 PROTHROMBIN TIME: CPT

## 2023-08-02 PROCEDURE — 99285 EMERGENCY DEPT VISIT HI MDM: CPT

## 2023-08-02 PROCEDURE — 99223 1ST HOSP IP/OBS HIGH 75: CPT | Performed by: NURSE PRACTITIONER

## 2023-08-02 PROCEDURE — 71045 X-RAY EXAM CHEST 1 VIEW: CPT

## 2023-08-02 PROCEDURE — 6360000002 HC RX W HCPCS: Performed by: EMERGENCY MEDICINE

## 2023-08-02 PROCEDURE — 1100000000 HC RM PRIVATE

## 2023-08-02 RX ORDER — DOFETILIDE 0.25 MG/1
500 CAPSULE ORAL 2 TIMES DAILY
Status: DISCONTINUED | OUTPATIENT
Start: 2023-08-02 | End: 2023-08-05 | Stop reason: HOSPADM

## 2023-08-02 RX ORDER — WARFARIN SODIUM 5 MG/1
5 TABLET ORAL DAILY
Status: DISCONTINUED | OUTPATIENT
Start: 2023-08-02 | End: 2023-08-05 | Stop reason: HOSPADM

## 2023-08-02 RX ORDER — ACETAMINOPHEN 325 MG/1
650 TABLET ORAL EVERY 6 HOURS PRN
Status: DISCONTINUED | OUTPATIENT
Start: 2023-08-02 | End: 2023-08-05 | Stop reason: HOSPADM

## 2023-08-02 RX ORDER — ATORVASTATIN CALCIUM 10 MG/1
10 TABLET, FILM COATED ORAL EVERY EVENING
Status: DISCONTINUED | OUTPATIENT
Start: 2023-08-02 | End: 2023-08-05 | Stop reason: HOSPADM

## 2023-08-02 RX ORDER — IBUPROFEN 600 MG/1
1 TABLET ORAL PRN
Status: DISCONTINUED | OUTPATIENT
Start: 2023-08-02 | End: 2023-08-05 | Stop reason: HOSPADM

## 2023-08-02 RX ORDER — ACETAMINOPHEN 650 MG/1
650 SUPPOSITORY RECTAL EVERY 6 HOURS PRN
Status: DISCONTINUED | OUTPATIENT
Start: 2023-08-02 | End: 2023-08-05 | Stop reason: HOSPADM

## 2023-08-02 RX ORDER — SODIUM CHLORIDE 9 MG/ML
INJECTION, SOLUTION INTRAVENOUS PRN
Status: DISCONTINUED | OUTPATIENT
Start: 2023-08-02 | End: 2023-08-05 | Stop reason: HOSPADM

## 2023-08-02 RX ORDER — INSULIN GLARGINE 100 [IU]/ML
34 INJECTION, SOLUTION SUBCUTANEOUS NIGHTLY
Status: DISCONTINUED | OUTPATIENT
Start: 2023-08-02 | End: 2023-08-03

## 2023-08-02 RX ORDER — ONDANSETRON 2 MG/ML
4 INJECTION INTRAMUSCULAR; INTRAVENOUS EVERY 6 HOURS PRN
Status: DISCONTINUED | OUTPATIENT
Start: 2023-08-02 | End: 2023-08-05 | Stop reason: HOSPADM

## 2023-08-02 RX ORDER — LEVOTHYROXINE SODIUM 0.12 MG/1
125 TABLET ORAL
Status: DISCONTINUED | OUTPATIENT
Start: 2023-08-02 | End: 2023-08-05 | Stop reason: HOSPADM

## 2023-08-02 RX ORDER — ONDANSETRON 4 MG/1
4 TABLET, ORALLY DISINTEGRATING ORAL EVERY 8 HOURS PRN
Status: DISCONTINUED | OUTPATIENT
Start: 2023-08-02 | End: 2023-08-05 | Stop reason: HOSPADM

## 2023-08-02 RX ORDER — DEXTROSE MONOHYDRATE 100 MG/ML
INJECTION, SOLUTION INTRAVENOUS CONTINUOUS PRN
Status: DISCONTINUED | OUTPATIENT
Start: 2023-08-02 | End: 2023-08-05 | Stop reason: HOSPADM

## 2023-08-02 RX ORDER — FERROUS SULFATE 325(65) MG
325 TABLET ORAL
Status: DISCONTINUED | OUTPATIENT
Start: 2023-08-02 | End: 2023-08-05 | Stop reason: HOSPADM

## 2023-08-02 RX ORDER — POLYETHYLENE GLYCOL 3350 17 G/17G
17 POWDER, FOR SOLUTION ORAL DAILY PRN
Status: DISCONTINUED | OUTPATIENT
Start: 2023-08-02 | End: 2023-08-05 | Stop reason: HOSPADM

## 2023-08-02 RX ORDER — IPRATROPIUM BROMIDE AND ALBUTEROL SULFATE 2.5; .5 MG/3ML; MG/3ML
1 SOLUTION RESPIRATORY (INHALATION) EVERY 4 HOURS PRN
Status: DISCONTINUED | OUTPATIENT
Start: 2023-08-02 | End: 2023-08-05 | Stop reason: HOSPADM

## 2023-08-02 RX ORDER — INSULIN LISPRO 100 [IU]/ML
0-10 INJECTION, SOLUTION INTRAVENOUS; SUBCUTANEOUS
Status: DISCONTINUED | OUTPATIENT
Start: 2023-08-02 | End: 2023-08-05 | Stop reason: HOSPADM

## 2023-08-02 RX ORDER — INSULIN LISPRO 100 [IU]/ML
0-8 INJECTION, SOLUTION INTRAVENOUS; SUBCUTANEOUS NIGHTLY
Status: DISCONTINUED | OUTPATIENT
Start: 2023-08-02 | End: 2023-08-05 | Stop reason: HOSPADM

## 2023-08-02 RX ORDER — SODIUM CHLORIDE 0.9 % (FLUSH) 0.9 %
5-40 SYRINGE (ML) INJECTION EVERY 12 HOURS SCHEDULED
Status: DISCONTINUED | OUTPATIENT
Start: 2023-08-02 | End: 2023-08-05 | Stop reason: HOSPADM

## 2023-08-02 RX ORDER — SODIUM CHLORIDE 0.9 % (FLUSH) 0.9 %
5-40 SYRINGE (ML) INJECTION PRN
Status: DISCONTINUED | OUTPATIENT
Start: 2023-08-02 | End: 2023-08-05 | Stop reason: HOSPADM

## 2023-08-02 RX ORDER — AMLODIPINE BESYLATE 5 MG/1
5 TABLET ORAL DAILY
Status: DISCONTINUED | OUTPATIENT
Start: 2023-08-02 | End: 2023-08-03

## 2023-08-02 RX ORDER — MAGNESIUM SULFATE IN WATER 40 MG/ML
2000 INJECTION, SOLUTION INTRAVENOUS ONCE
Status: COMPLETED | OUTPATIENT
Start: 2023-08-02 | End: 2023-08-02

## 2023-08-02 RX ORDER — ENOXAPARIN SODIUM 100 MG/ML
40 INJECTION SUBCUTANEOUS DAILY
Status: DISCONTINUED | OUTPATIENT
Start: 2023-08-02 | End: 2023-08-02

## 2023-08-02 RX ADMIN — DOFETILIDE 500 MCG: 0.25 CAPSULE ORAL at 20:29

## 2023-08-02 RX ADMIN — DOFETILIDE 500 MCG: 0.25 CAPSULE ORAL at 08:49

## 2023-08-02 RX ADMIN — INSULIN GLARGINE 34 UNITS: 100 INJECTION, SOLUTION SUBCUTANEOUS at 20:28

## 2023-08-02 RX ADMIN — AMLODIPINE BESYLATE 5 MG: 5 TABLET ORAL at 08:34

## 2023-08-02 RX ADMIN — CEFEPIME 2000 MG: 2 INJECTION, POWDER, FOR SOLUTION INTRAVENOUS at 05:46

## 2023-08-02 RX ADMIN — INSULIN LISPRO 3 UNITS: 100 INJECTION, SOLUTION INTRAVENOUS; SUBCUTANEOUS at 12:27

## 2023-08-02 RX ADMIN — LEVOTHYROXINE SODIUM 125 MCG: 0.12 TABLET ORAL at 07:50

## 2023-08-02 RX ADMIN — FERROUS SULFATE TAB 325 MG (65 MG ELEMENTAL FE) 325 MG: 325 (65 FE) TAB at 11:08

## 2023-08-02 RX ADMIN — ATORVASTATIN CALCIUM 10 MG: 10 TABLET, FILM COATED ORAL at 17:44

## 2023-08-02 RX ADMIN — VANCOMYCIN HYDROCHLORIDE 1500 MG: 10 INJECTION, POWDER, LYOPHILIZED, FOR SOLUTION INTRAVENOUS at 06:36

## 2023-08-02 RX ADMIN — CEFEPIME 2000 MG: 2 INJECTION, POWDER, FOR SOLUTION INTRAVENOUS at 17:44

## 2023-08-02 RX ADMIN — SODIUM CHLORIDE, PRESERVATIVE FREE 10 ML: 5 INJECTION INTRAVENOUS at 20:29

## 2023-08-02 RX ADMIN — WARFARIN SODIUM 5 MG: 5 TABLET ORAL at 17:44

## 2023-08-02 RX ADMIN — MAGNESIUM SULFATE IN WATER 2000 MG: 40 INJECTION, SOLUTION INTRAVENOUS at 04:32

## 2023-08-02 ASSESSMENT — ENCOUNTER SYMPTOMS
ABDOMINAL PAIN: 0
VOMITING: 0
COUGH: 0
DIARRHEA: 0
FACIAL SWELLING: 0
SHORTNESS OF BREATH: 1

## 2023-08-02 ASSESSMENT — PAIN SCALES - GENERAL: PAINLEVEL_OUTOF10: 0

## 2023-08-02 NOTE — CARE COORDINATION
Patient and spouse reside in a mother in law suite attached to her daughter and son in laws house. They are independent and drive (when well) but have family available to help if needed. She has been on oxygen at home supplied by [address] since her recent hospitalization and is currently on Airvo O2 in the ED. Patient confirms POA, PCP and insurance. CM to follow clinical course to determine needs at discharge. 08/02/23 1013   Service Assessment   Patient Orientation Alert and Oriented   Cognition Alert   History Provided By Patient; Child/Family;Significant Other   Primary Caregiver Family   Accompanied By/Relationship spouse and daughter   Support Systems Spouse/Significant Other;Children   Patient's [address] is: Legal Next of [address] [place] [address]   PCP Verified by CM Yes   Last Visit to PCP Within last 3 months   Prior Functional Level Independent in ADLs/IADLs   Current Functional Level Assistance with the following:;Mobility;Cooking;Housework; Shopping   Can patient return to prior living arrangement Yes   Ability to make needs known: Good   Family able to assist with home care needs: Yes   Financial Resources [employer] Other (Comment)  (oxygen through [employer] [facility])   Social/Functional History   Lives With Spouse; Family  (patient lives with spouse in an attached Mother in law suite to her daughter and son in laws house)   Type of [address] [place] [address] Dr One level   [address] to enter with rails   Entrance Stairs - Number of Steps 3   Bathroom Shower/Tub Walk-in shower   Bathroom Toilet Handicap height   [address] bars in shower;Built-in shower seat   [address] [facility]  (has needed assistance over the last few weeks related to being short of breath with activity)   Homemaking Responsibilities Yes   Ambulation

## 2023-08-02 NOTE — ACP (ADVANCE CARE PLANNING)
Advance Care Planning   Healthcare Decision Maker:    Primary Decision Maker: Freida Hand - [phone]    Secondary Decision Maker: Marlen [place] - [phone]    Click here to complete Healthcare Decision Makers including selection of the Healthcare Decision Maker Relationship (ie \"Primary\").

## 2023-08-02 NOTE — PROGRESS NOTES
Warfarin dosing per pharmacist    Guanako Basstroy is a 69 y.o. female. Indication:  Afib; AVR; MVR    Goal INR:  2.0 - 3.0 (per anticoag note on 7/12/2023 at [facility] cardiology)    Home dose:  5 mg daily    Risk factors or significant drug interactions:  none    Other anticoagulants:  none    Lab Results   Component Value Date/Time    HGB 9.4 08/02/2023 03:30 AM    HGB 9.4 07/24/2023 11:30 AM    HGB 8.9 07/20/2023 05:41 AM       Daily Monitoring  Date  INR     Warfarin dose Notes  8/2  2.0  5 mg          Pharmacy to continue warfarin inpatient. Dosing and INR goal verified per [facility] cardiology anticoagulation note from 7/12/23    INR 2.0 today. Continue home dose of 5 mg dialy. Following daily INR.       Thank you,  Hodan Pike, [facility]

## 2023-08-02 NOTE — H&P
Hospitalist History and Physical   Admit Date:  2023  3:14 AM   Name:  Tony Gallego   Age:  69 y.o. Sex:  female  :  1945   MRN:  [id number]   Room:  ER04/    Presenting/Chief Complaint: Shortness of Breath     Reason(s) for Admission: Acute on chronic respiratory failure with hypoxemia (HCC) [J96.21]     History of Present Illness:     69years old female with past medical history of recently diagnosed hypoxic respiratory failure currently on 4 L of oxygen, no clear reason was identified, considered hypersensitivity pneumonitis presented to emergency room today with chief complaint of worsening shortness of breath since 10 PM last night. Last admission was on  with similar complaint. Patient oxygen saturations dropped to 70 at home, has been increased oxygen to 5 L. Denies any fever, chills, nausea, vomiting, abdominal pain. Denies any orthopnea, paroxysmal nocturnal dyspnea, lower extremity edema. Evaluated in ER, CT scan of chest was obtained shows no pulmonary embolism but patchy groundglass opacification throughout the lung. Mediastinal lymphadenopathy noted as well. Emergency room physician requested admission of this patient for further evaluation and management. Assessment & Plan:     Acute on chronic respiratory failure with hypoxia: CT scan shows groundglass opacities bilaterally, initiated on broad-spectrum antibiotics, will request pulmonary to evaluate and decide further plan. Patient's family was concerned about using steroids as her blood sugars were fluctuated during steroid use during last time. Chronic atrial fibrillation: Currently rate controlled, will continue on anticoagulation, Tikosyn. We will check PT/INR    Diabetes type 2: Placed Scale insulin, will monitor blood sugars. Hypothyroidism: Continue levothyroxine. Lipidemia: Continue on statins. Diet: ADULT DIET;  Regular  VTE prophylaxis: Lovenox and SCD's   Code (65.8 kg). No intake or output data in the 24 hours ending 08/02/23 9673      Physical Exam:    General:    In moderate respiratory distress. Head:  Normocephalic, atraumatic  Eyes:  Sclerae appear normal.  Pupils equally round. ENT:  Nares appear normal.  Moist oral mucosa  Neck:  No restricted ROM. Trachea midline   CV:   RRR. No m/r/g. No jugular venous distension. Lungs:   Bibasal Rales noted. Abdomen:   Soft, nontender, nondistended. Extremities: No cyanosis or clubbing. No edema  Skin:     No rashes and normal coloration. Warm and dry. Neuro:  CN II-XII grossly intact. Sensation intact. Psych:  Normal mood and affect.       Orders Placed This Encounter   Medications    magnesium sulfate 2000 mg in 50 mL IVPB premix    cefepime (MAXIPIME) 1,000 mg in sodium chloride 0.9 % 50 mL IVPB (mini-bag)     Order Specific Question:   Antimicrobial Indications     Answer:   Pneumonia (HAP)     Order Specific Question:   HAP duration of therapy     Answer:   7 days    sodium chloride flush 0.9 % injection 5-40 mL    sodium chloride flush 0.9 % injection 5-40 mL    0.9 % sodium chloride infusion    OR Linked Order Group     ondansetron (ZOFRAN-ODT) disintegrating tablet 4 mg     ondansetron (ZOFRAN) injection 4 mg    polyethylene glycol (GLYCOLAX) packet 17 g    enoxaparin (LOVENOX) injection 40 mg     Order Specific Question:   Indication of Use     Answer:   Prophylaxis-DVT/PE    OR Linked Order Group     acetaminophen (TYLENOL) tablet 650 mg     acetaminophen (TYLENOL) suppository 650 mg    ipratropium 0.5 mg-albuterol 2.5 mg (DUONEB) nebulizer solution 1 Dose     Order Specific Question:   Initiate RT Bronchodilator Protocol     Answer:   Yes - Inpatient Protocol       I have personally reviewed labs and tests:  Recent Labs:  Recent Results (from the past 24 hour(s))   CBC with Auto Differential    Collection Time: 08/02/23  3:30 AM   Result Value Ref Range    WBC 12.8 (H) 4.3 - 11.1 K/uL    RBC 3.53

## 2023-08-02 NOTE — ED NOTES
TRANSFER - OUT REPORT:    Verbal report given to Shanon on Nelly Allerudy  being transferred to [phone] for routine progression of patient care       Report consisted of patient's Situation, Background, Assessment and   Recommendations(SBAR). Information from the following report(s) ED SBAR was reviewed with the receiving nurse. [place] Assessment:    Presents to emergency department  because of falls (Syncope, seizure, or loss of consciousness): No  Age > 70: Yes  Altered Mental Status, Intoxication with alcohol or substance confusion (Disorientation, impaired judgment, poor safety awaremess, or inability to follow instructions): No  Impaired Mobility: Ambulates or transfers with assistive devices or assistance; Unable to ambulate or transer.: Yes  Nursing Judgement: Yes          Lines:   Peripheral IV 08/02/23 Right Forearm (Active)        Opportunity for questions and clarification was provided.       Patient transported with:  Registered Nurse           El Rose RN  08/02/23 7558

## 2023-08-02 NOTE — PROGRESS NOTES
ACUTE PHYSICAL THERAPY GOALS:   (Developed with and agreed upon by patient and/or caregiver.)    While maintaining O2>90%:  (1.) Larry Call will transfer from bed to chair and chair to bed with INDEPENDENT using the least restrictive device within 7 treatment day(s). (2.) Larry Call will ambulate with MODIFIED INDEPENDENCE for 500+ feet with the least restrictive device within 7 treatment day(s). (3.) Larry Call will perform standing static and dynamic balance activities x 10 minutes with MODIFIED INDEPENDENCE to improve safety within 7 treatment day(s). (4.) Larry Call will ascend and descend 3 stairs using 1 hand rail(s) with SUPERVISION to improve functional mobility and safety within 7 treatment day(s). (5.) Larry Call will perform therapeutic exercises x 15 min for HEP with MODIFIED INDEPENDENCE to improve strength, endurance, and functional mobility within 7 treatment day(s). PHYSICAL THERAPY Initial Assessment, Daily Note, and PM  (Link to Caseload Tracking: PT Visit Days : 1  Acknowledge Orders  Time In/Out  PT Charge Capture  Rehab Caseload Tracker    Larry Call is a 69 y.o. female   PRIMARY DIAGNOSIS: Acute on chronic respiratory failure with hypoxemia (HCC)  Hypoxia [R09.02]  Acute on chronic respiratory failure with hypoxemia ([address]) [J96.21]  Acute pneumonitis [J18.9]       Reason for Referral: Generalized Muscle Weakness (M62.81)  Inpatient: Payor: MEDICARE / Plan: MEDICARE PART A AND B / Product Type: *No Product type* /     ASSESSMENT:     REHAB RECOMMENDATIONS:   Recommendation to date pending progress:  Setting:  Home Health Therapy    Equipment:    None     ASSESSMENT:  Ms. Beto Cisneros is a 69year old female who presents with worsening SOB/hypoxia and is admitted with acute on chronic respiratory failure. At baseline she lives with her spouse and performs mobility independently with no AD.  Today she performs bed mobility, transfers, assistance over the last few weeks related to being short of breath with activity)  Homemaking Responsibilities: Yes  Ambulation Assistance: Independent  Transfer Assistance: Independent  Active : Yes (can drive but has not since she has been ill)    OBJECTIVE:     PAIN: Arloa Knuckles / O2: Angela Blancaer / Justin Porter / [place]:   Pre Treatment: 0/10         Post Treatment: 0/10 Vitals        Oxygen: on 6L: 97% at rest, 91% following ambulation, 95% with rest after activity       None    RESTRICTIONS/PRECAUTIONS:                    GROSS EVALUATION:    Intact Impaired (Comments):   AROM [x]     PROM [x]    Strength []  Grossly 4/5 in BLE    Balance [] Sitting - Static: Good  Sitting - Dynamic: Good  Standing - Static: Fair, +  Standing - Dynamic: Fair   Posture [] WFL   Sensation []     Coordination []      Tone []     Edema []    Activity Tolerance []  Limited by O2 needs     []      COGNITION/  PERCEPTION: Intact Impaired (Comments):   Orientation [x]     Vision [x]     Hearing [x]     Cognition  [x]       MOBILITY: I Mod I S SBA CGA Min Mod Max Total  NT x2 Comments:   Bed Mobility    Rolling [] [] [x] [] [] [] [] [] [] [] []    Supine to Sit [] [] [x] [] [] [] [] [] [] [] []    Scooting [] [] [x] [] [] [] [] [] [] [] []    Sit to Supine [] [] [x] [] [] [] [] [] [] [] []    Transfers    Sit to Stand [] [] [] [x] [] [] [] [] [] [] []    Bed to Chair [] [] [] [x] [] [] [] [] [] [] []    Stand to Sit [] [] [] [x] [] [] [] [] [] [] []     [] [] [] [] [] [] [] [] [] [] []    I=Independent, Mod I=Modified Independent, S=Supervision, SBA=Standby Assistance, CGA=Contact Guard Assistance,   Min=Minimal Assistance, Mod=Moderate Assistance, Max=Maximal Assistance, Total=Total Assistance, NT=Not Tested    GAIT: I Mod I S SBA CGA Min Mod Max Total  NT x2 Comments:   Level of Assistance [] [] [] [x] [] [] [] [] [] [] []    Distance   250 feet    DME None    Gait Quality Decreased step clearance and Decreased step length    Weightbearing

## 2023-08-02 NOTE — CARE COORDINATION
Patient DC from hospital 3 weeks ago with home oxygen through [facility]. Initially she was on 5 l at discharge and weaned down to 1 Liter nc at home. Yesterday patient was sob and pulse ox was reading low and while they increased the oxygen she did not read above 90%. Patient states she understood her DC instructions, took her medication, and saw her PCP, pulmonologist and had an appt today with hematologist.  She believes their is more investigation to be done regarding her current state of health before she can get well. 08/02/23 1009   Readmission Assessment   Number of Days since last admission? 8-30 days   Previous Disposition Home with Family   Who is being Interviewed Patient;Caregiver   What was the patient's/caregiver's perception as to why they think they needed to return back to the hospital? Other (Comment)  (clinical decline)   Did you visit your Primary Care Physician after you left the hospital, before you returned this time? Yes   Did you see a specialist, such as Cardiac, Pulmonary, Orthopedic Physician, etc. after you left the hospital? Yes   Who advised the patient to return to the hospital? Self-referral   Does the patient report anything that got in the way of taking their medications? No   In our efforts to provide the best possible care to you and others like you, can you think of anything that we could have done to help you after you left the hospital the first time, so that you might not have needed to return so soon?  Other (Comment)

## 2023-08-02 NOTE — ED NOTES
Pt resting in bed with even and unlabored respirations. Remains on airvo, continuous pulse ox and cardiac monitoring. Denies any needs. Family at bedside. Call light within reach.      Anjel Bynum RN  08/02/23 1367

## 2023-08-02 NOTE — ED TRIAGE NOTES
Pt arrives to ed from home with c/o shob that began today. Pot normally wears 2 L Nc now on 6L NC o2 at 85%, pt recently admitted last week for shob.

## 2023-08-02 NOTE — ED PROVIDER NOTES
Emergency Department Provider Note       PCP: RICCI Villalobos NP   Age: 69 y.o. Sex: female     DISPOSITION Decision To Admit 2023 04:22:40 AM       ICD-10-CM    1. Hypoxia  R09.02       2. Acute pneumonitis  J18.9           Medical Decision Making     Complexity of Problems Addressed:  1 or more chronic illnesses with a severe exacerbation or progression. Data Reviewed and Analyzed:  Category 1:   I independently ordered and reviewed each unique test.  I reviewed external records: ED visit note from an outside group. I reviewed external records: provider visit note from PCP. I reviewed external records: provider visit note from outside specialist.  I reviewed external records: previous EKG including cardiologist interpretation. I reviewed external records: previous lab results from outside ED. I reviewed external records: previous imaging study including radiologist interpretation. Admit Date:     2023  1:23 AM   DC Note date: 2023  Name:              Suzanne Torres   Age:                 69 y.o.   Sex:                 female  :               1945   MRN:               [id number]   Room:                PCP:                RICCI Villalobos NP     Presenting Complaint: Shortness of Breath     Initial Admission Diagnosis: Shortness of breath [R06.02]  Acute respiratory failure with hypoxia ([address]) [J96.01]  Acute respiratory failure with hypoxemia ([address]) [J96.01]      Problem List for this Hospitalization (present on admission):     Principal Problem:    Acute respiratory failure with hypoxia ([address])  Active Problems:    Iron deficiency    Type 2 diabetes mellitus with hyperglycemia, with long-term current use of insulin (HCC)    Essential hypertension    S/P AVR (aortic valve replacement)    Hypothyroidism, postsurgical    Paroxysmal atrial fibrillation (HCC)    CAD (coronary artery disease)    Abnormal CT of the chest    Acute respiratory failure with Needs to be taken on empty stomach before breakfast    INSULIN ASPART (NOVOLOG FLEXPEN) 100 UNIT/ML INJECTION PEN    By sliding scale    INSULIN DEGLUDEC (TRESIBA SC)    Inject 43 Units into the skin at bedtime    LEVOTHYROXINE (SYNTHROID) 125 MCG TABLET    Take 1 tablet by mouth every morning (before breakfast)    LOSARTAN (COZAAR) 50 MG TABLET    TAKE 1 TABLET TWICE A DAY    MAGNESIUM OXIDE (MAG-OX) 400 MG TABLET    Take 1 tablet by mouth at bedtime    OXYGEN    Inhale into the lungs    PROBIOTIC PRODUCT (PROBIOTIC-10 PO)    Take by mouth daily    VITAMIN D (CHOLECALCIFEROL) 25 MCG (1000 UT) TABS TABLET    Take 1 tablet by mouth 2 times daily    WARFARIN (COUMADIN) 5 MG TABLET    1.5 tablets on Monday/Wed/Friday and 1 tablet all other days        Results for orders placed or performed during the hospital encounter of 08/02/23   XR CHEST PORTABLE    Narrative    FRONTAL VIEW OF THE CHEST    CLINICAL INDICATION: Dyspnea. COMPARISON: 7/28/2023. FINDINGS: Stable heart size. Worsening bilateral perihilar interstitial and   patchy alveolar densities are present. No pleural effusion or pneumothorax. Impression    Increased perihilar pulmonary edema pattern. Superimposed pneumonia   not excluded.      Millicent Drummond M.D.   8/2/2023 4:17:00 AM   CBC with Auto Differential   Result Value Ref Range    WBC 12.8 (H) 4.3 - 11.1 K/uL    RBC 3.53 (L) 4.05 - 5.2 M/uL    Hemoglobin 9.4 (L) 11.7 - 15.4 g/dL    Hematocrit 33.1 (L) 35.8 - 46.3 %    MCV 93.8 82 - 102 FL    MCH 26.6 26.1 - 32.9 PG    MCHC 28.4 (L) 31.4 - 35.0 g/dL    RDW 17.1 (H) 11.9 - 14.6 %    Platelets 684 165 - 688 K/uL    MPV 9.3 (L) 9.4 - 12.3 FL    nRBC 0.00 0.0 - 0.2 K/uL    Differential Type AUTOMATED      Neutrophils % 76 43 - 78 %    Lymphocytes % 15 13 - 44 %    Monocytes % 7 4.0 - 12.0 %    Eosinophils % 2 0.5 - 7.8 %    Basophils % 0 0.0 - 2.0 %    Immature Granulocytes 1 0.0 - 5.0 %    Neutrophils Absolute 9.7 (H) 1.7 - 8.2 K/UL    Lymphocytes

## 2023-08-02 NOTE — PROGRESS NOTES
TRANSFER - IN REPORT:    Verbal report received from [facility] on Larry Call  being received from ER for routine progression of patient care      Report consisted of patient's Situation, Background, Assessment and   Recommendations(SBAR). Information from the following report(s) Nurse Handoff Report, ED Encounter Summary, ED SBAR, MAR, Recent Results, and Med Rec Status was reviewed with the receiving nurse. Opportunity for questions and clarification was provided. Assessment completed upon patient's arrival to unit and care assumed.

## 2023-08-02 NOTE — ED NOTES
Report given to DANIELLA Hernandez. Transfer of care at this time.       Alexandria Sim  08/02/23 5832

## 2023-08-02 NOTE — CONSULTS
unspecified cause of morbidity or mortality     HLD    Paroxysmal atrial fibrillation (HCC)     PONV (postoperative nausea and vomiting)     post op nausea.  pt does well with antiemetic    Thyroid nodule 6/29/2011    Unspecified essential hypertension     Visit for monitoring Tikosyn therapy     pt takes Tikosyn BID     Past Surgical History:   Procedure Laterality Date    ABLATION OF DYSRHYTHMIC FOCUS  2015    AORTIC VALVE REPLACEMENT  2015    APPENDECTOMY      BRONCHOSCOPY N/A 7/18/2023    BRONCHOSCOPY ALVEOLAR LAVAGE performed by Krystina Jones MD at [facility] ENDOSCOPY    CARDIAC CATHETERIZATION      COLONOSCOPY N/A 7/21/2021    COLONOSCOPY/ BMI 27 performed by Rosanne Walters MD at [facility] ENDOSCOPY    COLONOSCOPY  2014    neg    COLONOSCOPY N/A 8/17/2022    COLONOSCOPY POLYPECTOMY SNARE-COLD performed by Rosanne Walters MD at [address] N/A 8/18/2021    SIGMOIDOSCOPY FLEXIBLE performed by Rosanne Walters MD at [address] Left 2009    pins    MITRAL VALVE REPLACEMENT  5/4/15     and Aortic Valve Replacement, Dr. Zackery [facility] hip fracture/repair    OTHER SURGICAL HISTORY      ablation 10/6/2015, Dr Mike Patrick  2021    colon surg    THYROIDECTOMY      TUBAL LIGATION       Social History     Socioeconomic History    Marital status:      Spouse name: Not on file    Number of children: Not on file    Years of education: Not on file    Highest education level: Not on file   Occupational History    Not on file   Tobacco Use    Smoking status: Never    Smokeless tobacco: Never   Vaping Use    Vaping Use: Never used   Substance and Sexual Activity    Alcohol use: No    Drug use: No    Sexual activity: Not on file   Other Topics Concern    Not on file   Social History Narrative    Denies physical or sexual abuse     Social Determinants of Health     Financial Resource Strain: lb 12.8 oz 146 lb 146 lb 146 lb 146 lb 9.6 oz   BMI (Calculated) 25.7 kg/m2 23.5 kg/m2 0 kg/m2 24.3 kg/m2 24.3 kg/m2 24.3 kg/m2 24.4 kg/m2       Imaging: I performed an independent interpretation of the patient's images. Chest CT     7/17/23         Recent Labs     08/02/23  0329 08/02/23  0330   WBC  --  12.8*   HGB  --  9.4*   HCT  --  33.1*   PLT  --  353   INR 2.0  --    NA  --  144*   K  --  4.0   CL  --  111*   CO2  --  27   BUN  --  20   CREATININE  --  0.80   MG  --  1.7*   BILITOT  --  0.5   AST  --  26   ALT  --  37   ALKPHOS  --  79   NTPROBNP  --  330     Kirk Wilcox MD ET:[id number]    ACID FAST CULTURE   Positive Abnormal   [facility] [place]   Comment: (NOTE)      MYCOBACTERIUM AVIUM COMPLEX   Positive Abnormal      Lab Results   Component Value Date/Time     08/02/2023 03:30 AM    K 4.0 08/02/2023 03:30 AM     08/02/2023 03:30 AM    CO2 27 08/02/2023 03:30 AM    BUN 20 08/02/2023 03:30 AM    CREATININE 0.80 08/02/2023 03:30 AM    GLUCOSE 135 08/02/2023 03:30 AM    CALCIUM 8.5 08/02/2023 03:30 AM        BAL differential      No results found for: BNP    ECHO: 07/06/23    TRANSTHORACIC ECHOCARDIOGRAM (TTE) COMPLETE (CONTRAST/BUBBLE/3D PRN) 07/06/2023  5:09 PM (Final)    Interpretation Summary    Left Ventricle: Normal left ventricular systolic function with a visually estimated EF of 55 - 60%. Left ventricle size is normal. Mildly increased wall thickness. Normal wall motion. Aortic Valve: Doreen Oliveira bovine bioprosthetic valve with a size of 21 mm. AV mean gradient is 36 mmHg. No paravalvular regurgitation. Noted by the apical view. AV peak gradient is 57 mmHg. AV peak velocity is 3.8 m/s. AV AT is 99.0 ms. LVOT:AV VTI Index is 0.32. AV area by continuity VTI is 1.0 Square Centimeter. Gradients have increased in comparison to echocardiogram 1 year ago    Mitral Valve: Doreen Oliveira bovine bioprosthetic valve with a size of 25 mm. MV mean gradient is 8 mmHg.  Trace

## 2023-08-03 ENCOUNTER — APPOINTMENT (OUTPATIENT)
Dept: NON INVASIVE DIAGNOSTICS | Age: 78
End: 2023-08-03
Attending: INTERNAL MEDICINE
Payer: MEDICARE

## 2023-08-03 LAB
ALBUMIN SERPL-MCNC: 2.9 G/DL (ref 3.2–4.6)
ALBUMIN/GLOB SERPL: 0.7 (ref 0.4–1.6)
ALP SERPL-CCNC: 77 U/L (ref 50–136)
ALT SERPL-CCNC: 31 U/L (ref 12–65)
ANION GAP SERPL CALC-SCNC: 7 MMOL/L (ref 2–11)
AST SERPL-CCNC: 35 U/L (ref 15–37)
B PERT DNA SPEC QL NAA+PROBE: NOT DETECTED
BASOPHILS # BLD: 0 K/UL (ref 0–0.2)
BASOPHILS NFR BLD: 1 % (ref 0–2)
BILIRUB SERPL-MCNC: 0.7 MG/DL (ref 0.2–1.1)
BORDETELLA PARAPERTUSSIS BY PCR: NOT DETECTED
BUN SERPL-MCNC: 16 MG/DL (ref 8–23)
C PNEUM DNA SPEC QL NAA+PROBE: NOT DETECTED
CALCIUM SERPL-MCNC: 8.3 MG/DL (ref 8.3–10.4)
CHLORIDE SERPL-SCNC: 112 MMOL/L (ref 101–110)
CO2 SERPL-SCNC: 24 MMOL/L (ref 21–32)
CREAT SERPL-MCNC: 0.7 MG/DL (ref 0.6–1)
CRP SERPL-MCNC: 3 MG/DL (ref 0–0.9)
DIFFERENTIAL METHOD BLD: ABNORMAL
ECHO AV AREA PEAK VELOCITY: 1 CM2
ECHO AV AREA VTI: 1 CM2
ECHO AV AREA/BSA PEAK VELOCITY: 0.6 CM2/M2
ECHO AV AREA/BSA VTI: 0.6 CM2/M2
ECHO AV MEAN GRADIENT: 42 MMHG
ECHO AV MEAN VELOCITY: 3 M/S
ECHO AV PEAK GRADIENT: 69 MMHG
ECHO AV PEAK VELOCITY: 4.1 M/S
ECHO AV VELOCITY RATIO: 0.34
ECHO AV VTI: 106 CM
ECHO BSA: 1.71 M2
ECHO LV EDV A2C: 93 ML
ECHO LV EDV A4C: 80 ML
ECHO LV EDV INDEX A4C: 47 ML/M2
ECHO LV EDV NDEX A2C: 55 ML/M2
ECHO LV EJECTION FRACTION A2C: 61 %
ECHO LV EJECTION FRACTION A4C: 56 %
ECHO LV EJECTION FRACTION BIPLANE: 59 % (ref 55–100)
ECHO LV ESV A2C: 36 ML
ECHO LV ESV A4C: 35 ML
ECHO LV ESV INDEX A2C: 21 ML/M2
ECHO LV ESV INDEX A4C: 21 ML/M2
ECHO LV FRACTIONAL SHORTENING: 38 % (ref 28–44)
ECHO LV INTERNAL DIMENSION DIASTOLE INDEX: 2.78 CM/M2
ECHO LV INTERNAL DIMENSION DIASTOLIC: 4.7 CM (ref 3.9–5.3)
ECHO LV INTERNAL DIMENSION SYSTOLIC INDEX: 1.72 CM/M2
ECHO LV INTERNAL DIMENSION SYSTOLIC: 2.9 CM
ECHO LV IVSD: 0.8 CM (ref 0.6–0.9)
ECHO LV MASS 2D: 132.3 G (ref 67–162)
ECHO LV MASS INDEX 2D: 78.3 G/M2 (ref 43–95)
ECHO LV POSTERIOR WALL DIASTOLIC: 0.9 CM (ref 0.6–0.9)
ECHO LV RELATIVE WALL THICKNESS RATIO: 0.38
ECHO LVOT AREA: 2.8 CM2
ECHO LVOT AV VTI INDEX: 0.36
ECHO LVOT DIAM: 1.9 CM
ECHO LVOT MEAN GRADIENT: 5 MMHG
ECHO LVOT PEAK GRADIENT: 8 MMHG
ECHO LVOT PEAK VELOCITY: 1.4 M/S
ECHO LVOT STROKE VOLUME INDEX: 63.9 ML/M2
ECHO LVOT SV: 108 ML
ECHO LVOT VTI: 38.1 CM
ECHO MV AREA VTI: 1.3 CM2
ECHO MV LVOT VTI INDEX: 2.17
ECHO MV MAX VELOCITY: 3.1 M/S
ECHO MV MEAN GRADIENT: 13 MMHG
ECHO MV MEAN VELOCITY: 1.6 M/S
ECHO MV PEAK GRADIENT: 39 MMHG
ECHO MV VTI: 82.5 CM
ECHO RV INTERNAL DIMENSION: 3.1 CM
EOSINOPHIL # BLD: 0.2 K/UL (ref 0–0.8)
EOSINOPHIL NFR BLD: 3 % (ref 0.5–7.8)
ERYTHROCYTE [DISTWIDTH] IN BLOOD BY AUTOMATED COUNT: 17 % (ref 11.9–14.6)
ERYTHROCYTE [SEDIMENTATION RATE] IN BLOOD: 57 MM/HR (ref 0–30)
EST. AVERAGE GLUCOSE BLD GHB EST-MCNC: 117 MG/DL
FLUAV SUBTYP SPEC NAA+PROBE: NOT DETECTED
FLUBV RNA SPEC QL NAA+PROBE: NOT DETECTED
GLOBULIN SER CALC-MCNC: 4.4 G/DL (ref 2.8–4.5)
GLUCOSE BLD STRIP.AUTO-MCNC: 146 MG/DL (ref 65–100)
GLUCOSE BLD STRIP.AUTO-MCNC: 167 MG/DL (ref 65–100)
GLUCOSE BLD STRIP.AUTO-MCNC: 234 MG/DL (ref 65–100)
GLUCOSE BLD STRIP.AUTO-MCNC: 65 MG/DL (ref 65–100)
GLUCOSE BLD STRIP.AUTO-MCNC: 85 MG/DL (ref 65–100)
GLUCOSE SERPL-MCNC: 58 MG/DL (ref 65–100)
HADV DNA SPEC QL NAA+PROBE: NOT DETECTED
HBA1C MFR BLD: 5.7 % (ref 4.8–5.6)
HCOV 229E RNA SPEC QL NAA+PROBE: NOT DETECTED
HCOV HKU1 RNA SPEC QL NAA+PROBE: NOT DETECTED
HCOV NL63 RNA SPEC QL NAA+PROBE: NOT DETECTED
HCOV OC43 RNA SPEC QL NAA+PROBE: NOT DETECTED
HCT VFR BLD AUTO: 30.3 % (ref 35.8–46.3)
HGB BLD-MCNC: 8.6 G/DL (ref 11.7–15.4)
HMPV RNA SPEC QL NAA+PROBE: NOT DETECTED
HPIV1 RNA SPEC QL NAA+PROBE: NOT DETECTED
HPIV2 RNA SPEC QL NAA+PROBE: NOT DETECTED
HPIV3 RNA SPEC QL NAA+PROBE: NOT DETECTED
HPIV4 RNA SPEC QL NAA+PROBE: NOT DETECTED
IMM GRANULOCYTES # BLD AUTO: 0.1 K/UL (ref 0–0.5)
IMM GRANULOCYTES NFR BLD AUTO: 1 % (ref 0–5)
INR PPP: 2.3
LYMPHOCYTES # BLD: 1 K/UL (ref 0.5–4.6)
LYMPHOCYTES NFR BLD: 13 % (ref 13–44)
M PNEUMO DNA SPEC QL NAA+PROBE: NOT DETECTED
MCH RBC QN AUTO: 27.1 PG (ref 26.1–32.9)
MCHC RBC AUTO-ENTMCNC: 28.4 G/DL (ref 31.4–35)
MCV RBC AUTO: 95.6 FL (ref 82–102)
MONOCYTES # BLD: 0.6 K/UL (ref 0.1–1.3)
MONOCYTES NFR BLD: 8 % (ref 4–12)
NEUTS SEG # BLD: 6 K/UL (ref 1.7–8.2)
NEUTS SEG NFR BLD: 76 % (ref 43–78)
NRBC # BLD: 0 K/UL (ref 0–0.2)
PLATELET # BLD AUTO: 283 K/UL (ref 150–450)
PMV BLD AUTO: 9.3 FL (ref 9.4–12.3)
POTASSIUM SERPL-SCNC: 4.8 MMOL/L (ref 3.5–5.1)
PROT SERPL-MCNC: 7.3 G/DL (ref 6.3–8.2)
PROTHROMBIN TIME: 26.3 SEC (ref 12.6–14.3)
RBC # BLD AUTO: 3.17 M/UL (ref 4.05–5.2)
RSV RNA SPEC QL NAA+PROBE: NOT DETECTED
RV+EV RNA SPEC QL NAA+PROBE: NOT DETECTED
SARS-COV-2 RNA RESP QL NAA+PROBE: NOT DETECTED
SERVICE CMNT-IMP: ABNORMAL
SERVICE CMNT-IMP: NORMAL
SERVICE CMNT-IMP: NORMAL
SODIUM SERPL-SCNC: 143 MMOL/L (ref 133–143)
VANCOMYCIN SERPL-MCNC: 14.1 UG/ML
WBC # BLD AUTO: 8 K/UL (ref 4.3–11.1)

## 2023-08-03 PROCEDURE — 2580000003 HC RX 258: Performed by: INTERNAL MEDICINE

## 2023-08-03 PROCEDURE — 0202U NFCT DS 22 TRGT SARS-COV-2: CPT

## 2023-08-03 PROCEDURE — 85025 COMPLETE CBC W/AUTO DIFF WBC: CPT

## 2023-08-03 PROCEDURE — 94760 N-INVAS EAR/PLS OXIMETRY 1: CPT

## 2023-08-03 PROCEDURE — 86331 IMMUNODIFFUSION OUCHTERLONY: CPT

## 2023-08-03 PROCEDURE — 80053 COMPREHEN METABOLIC PANEL: CPT

## 2023-08-03 PROCEDURE — 86671 FUNGUS NES ANTIBODY: CPT

## 2023-08-03 PROCEDURE — 97535 SELF CARE MNGMENT TRAINING: CPT

## 2023-08-03 PROCEDURE — 6370000000 HC RX 637 (ALT 250 FOR IP): Performed by: INTERNAL MEDICINE

## 2023-08-03 PROCEDURE — 2580000003 HC RX 258: Performed by: HOSPITALIST

## 2023-08-03 PROCEDURE — 86140 C-REACTIVE PROTEIN: CPT

## 2023-08-03 PROCEDURE — 80202 ASSAY OF VANCOMYCIN: CPT

## 2023-08-03 PROCEDURE — 83036 HEMOGLOBIN GLYCOSYLATED A1C: CPT

## 2023-08-03 PROCEDURE — 85610 PROTHROMBIN TIME: CPT

## 2023-08-03 PROCEDURE — 6370000000 HC RX 637 (ALT 250 FOR IP): Performed by: HOSPITALIST

## 2023-08-03 PROCEDURE — 6360000002 HC RX W HCPCS: Performed by: INTERNAL MEDICINE

## 2023-08-03 PROCEDURE — 86606 ASPERGILLUS ANTIBODY: CPT

## 2023-08-03 PROCEDURE — 97116 GAIT TRAINING THERAPY: CPT

## 2023-08-03 PROCEDURE — 2700000000 HC OXYGEN THERAPY PER DAY

## 2023-08-03 PROCEDURE — 99232 SBSQ HOSP IP/OBS MODERATE 35: CPT | Performed by: PHYSICIAN ASSISTANT

## 2023-08-03 PROCEDURE — 1100000000 HC RM PRIVATE

## 2023-08-03 PROCEDURE — 85652 RBC SED RATE AUTOMATED: CPT

## 2023-08-03 PROCEDURE — 36415 COLL VENOUS BLD VENIPUNCTURE: CPT

## 2023-08-03 PROCEDURE — 82962 GLUCOSE BLOOD TEST: CPT

## 2023-08-03 PROCEDURE — 97165 OT EVAL LOW COMPLEX 30 MIN: CPT

## 2023-08-03 PROCEDURE — 97530 THERAPEUTIC ACTIVITIES: CPT

## 2023-08-03 PROCEDURE — 93308 TTE F-UP OR LMTD: CPT

## 2023-08-03 PROCEDURE — 6360000002 HC RX W HCPCS: Performed by: HOSPITALIST

## 2023-08-03 PROCEDURE — 86602 ANTINOMYCES ANTIBODY: CPT

## 2023-08-03 RX ORDER — INSULIN GLARGINE 100 [IU]/ML
28 INJECTION, SOLUTION SUBCUTANEOUS NIGHTLY
Status: DISCONTINUED | OUTPATIENT
Start: 2023-08-03 | End: 2023-08-05 | Stop reason: HOSPADM

## 2023-08-03 RX ORDER — AMLODIPINE BESYLATE 5 MG/1
2.5 TABLET ORAL 2 TIMES DAILY
Status: DISCONTINUED | OUTPATIENT
Start: 2023-08-03 | End: 2023-08-05 | Stop reason: HOSPADM

## 2023-08-03 RX ADMIN — CEFEPIME 2000 MG: 2 INJECTION, POWDER, FOR SOLUTION INTRAVENOUS at 05:14

## 2023-08-03 RX ADMIN — CEFEPIME 2000 MG: 2 INJECTION, POWDER, FOR SOLUTION INTRAVENOUS at 15:33

## 2023-08-03 RX ADMIN — CEFEPIME 2000 MG: 2 INJECTION, POWDER, FOR SOLUTION INTRAVENOUS at 23:20

## 2023-08-03 RX ADMIN — INSULIN GLARGINE 28 UNITS: 100 INJECTION, SOLUTION SUBCUTANEOUS at 20:52

## 2023-08-03 RX ADMIN — FERROUS SULFATE TAB 325 MG (65 MG ELEMENTAL FE) 325 MG: 325 (65 FE) TAB at 08:36

## 2023-08-03 RX ADMIN — ATORVASTATIN CALCIUM 10 MG: 10 TABLET, FILM COATED ORAL at 17:15

## 2023-08-03 RX ADMIN — DOFETILIDE 500 MCG: 0.25 CAPSULE ORAL at 20:52

## 2023-08-03 RX ADMIN — VANCOMYCIN HYDROCHLORIDE 1000 MG: 1 INJECTION, POWDER, LYOPHILIZED, FOR SOLUTION INTRAVENOUS at 00:04

## 2023-08-03 RX ADMIN — LEVOTHYROXINE SODIUM 125 MCG: 0.12 TABLET ORAL at 05:14

## 2023-08-03 RX ADMIN — WARFARIN SODIUM 5 MG: 5 TABLET ORAL at 17:15

## 2023-08-03 RX ADMIN — AMLODIPINE BESYLATE 2.5 MG: 5 TABLET ORAL at 10:55

## 2023-08-03 RX ADMIN — DOFETILIDE 500 MCG: 0.25 CAPSULE ORAL at 08:36

## 2023-08-03 RX ADMIN — SODIUM CHLORIDE, PRESERVATIVE FREE 10 ML: 5 INJECTION INTRAVENOUS at 21:00

## 2023-08-03 RX ADMIN — AMLODIPINE BESYLATE 2.5 MG: 5 TABLET ORAL at 20:52

## 2023-08-03 ASSESSMENT — PAIN SCALES - GENERAL: PAINLEVEL_OUTOF10: 0

## 2023-08-03 NOTE — THERAPY EVALUATION
mobility training, functional transfer training, toilet transfer training, functional mobility of household distances, bathroom mobility, sitting tolerance activity, and standing tolerance activity with minimal verbal cues, tactile cues, visual cues, and education in order to increase safety awareness, increase activity tolerance, prepare for functional activity, and prepare for functional transfers. Self Care (20 minutes): Patient participated in upper body bathing, lower body bathing, upper body dressing, lower body dressing, and grooming ADLs in unsupported sitting and standing with minimal verbal cueing to increase activity tolerance and increase safety awareness. Patient also participated in functional mobility, functional transfer, and energy conservation training to increase activity tolerance and increase safety awareness. TREATMENT GRID:  N/A    AFTER TREATMENT PRECAUTIONS: Bed, Bed/Chair Locked, Call light within reach, Needs within reach, RN notified, and Visitors at bedside    INTERDISCIPLINARY COLLABORATION:  RN/ PCT, PT/ PTA, and OT/ GUAMAN    EDUCATION:  Education Given To: Patient; Family  Education Provided: Role of Therapy;Plan of Care;Transfer Training;Energy Conservation; ADL Adaptive Strategies; Fall Prevention Strategies  Education Provided Comments: sit while threading lower body clothing, energy conservation  Education Method: Verbal  Barriers to Learning: None  Education Outcome: Verbalized understanding;Continued education needed    TOTAL TREATMENT DURATION AND TIME:  Time In: 5869  Time Out: 0940  Minutes: Wayne Madrid OT

## 2023-08-03 NOTE — PLAN OF CARE
Problem: Discharge Planning  Goal: Discharge to home or other facility with appropriate resources  Outcome: Progressing  Flowsheets (Taken 8/2/2023 1504 by Romi Espana, DANIELLA)  Discharge to home or other facility with appropriate resources:   Identify barriers to discharge with patient and caregiver   Arrange for needed discharge resources and transportation as appropriate   Identify discharge learning needs (meds, wound care, etc)     Problem: Safety - Adult  Goal: Free from fall injury  Outcome: Progressing  Flowsheets  Taken 8/2/2023 1955 by Kathy Blair RN  Free From Fall Injury: Instruct family/caregiver on patient safety  Taken 8/2/2023 1513 by Romi Espana RN  Free From Fall Injury: Instruct family/caregiver on patient safety     Problem: ABCDS Injury Assessment  Goal: Absence of physical injury  Outcome: Progressing  Flowsheets  Taken 8/2/2023 1955 by Kathy Blair RN  Absence of Physical Injury: Implement safety measures based on patient assessment  Taken 8/2/2023 1513 by Romi Espana RN  Absence of Physical Injury: Implement safety measures based on patient assessment

## 2023-08-03 NOTE — PROGRESS NOTES
ACUTE PHYSICAL THERAPY GOALS:   (Developed with and agreed upon by patient and/or caregiver.)  While maintaining O2>90%:  (1.) Douglas Jain will transfer from bed to chair and chair to bed with INDEPENDENT using the least restrictive device within 7 treatment day(s). (2.) Douglas Jain will ambulate with MODIFIED INDEPENDENCE for 500+ feet with the least restrictive device within 7 treatment day(s). (3.) Douglas Jain will perform standing static and dynamic balance activities x 10 minutes with MODIFIED INDEPENDENCE to improve safety within 7 treatment day(s). (4.) Douglas Jain will ascend and descend 3 stairs using 1 hand rail(s) with SUPERVISION to improve functional mobility and safety within 7 treatment day(s). (5.) Douglas Jain will perform therapeutic exercises x 15 min for HEP with MODIFIED INDEPENDENCE to improve strength, endurance, and functional mobility within 7 treatment day(s). PHYSICAL THERAPY: Daily Note PM   (Link to Caseload Tracking: PT Visit Days : 2  Time In/Out PT Charge Capture  Rehab Caseload Tracker  Orders    Douglas Jain is a 69 y.o. female   PRIMARY DIAGNOSIS: Acute on chronic respiratory failure with hypoxemia (HCC)  Hypoxia [R09.02]  Acute on chronic respiratory failure with hypoxemia (HCC) [J96.21]  Acute pneumonitis [J18.9]       Inpatient: Payor: MEDICARE / Plan: MEDICARE PART A AND B / Product Type: *No Product type* /     ASSESSMENT:     REHAB RECOMMENDATIONS:   Recommendation to date pending progress:  Setting:  Home Health Therapy    Equipment:    To Be Determined     ASSESSMENT:  Ms. Sadie Sandoval presents sitting up on EOB, with  in room, agreeable to walk with PT. Pt currently on 4L O2, 95% at rest. Pt able to ambulate 250' no AD with Supervision for safety, SpO2 at end of ambulation was low to mid 80s, returned to low to mid 90s after 1 min 30 sec.  Pt rested and stated that she felt like she could do another lap, pt Mod=Moderate Assistance, Max=Maximal Assistance, Total=Total Assistance, NT=Not Tested    BALANCE: Good Fair+ Fair Fair- Poor NT Comments   Sitting Static [x] [] [] [] [] []    Sitting Dynamic [x] [] [] [] [] []              Standing Static [] [x] [] [] [] []    Standing Dynamic [] [x] [] [] [] []      GAIT: I Mod I S SBA CGA Min Mod Max Total  NT x2 Comments:   Level of Assistance [] [] [] [x] [] [] [] [] [] [] []    Distance 250 x2  feet    DME None    Gait Quality Good    Weightbearing Status      Stairs      I=Independent, Mod I=Modified Independent, S=Supervision, SBA=Standby Assistance, CGA=Contact Guard Assistance,   Min=Minimal Assistance, Mod=Moderate Assistance, Max=Maximal Assistance, Total=Total Assistance, NT=Not Tested    PLAN:   FREQUENCY AND DURATION: 3 times/week for duration of hospital stay or until stated goals are met, whichever comes first.    TREATMENT:   TREATMENT:   Gait Training (23 Minutes): Gait training for 250 x2 feet utilizing None. Patient required Verbal cueing to improve Activity Pacing and Gait Mechanics. TREATMENT GRID:  N/A    AFTER TREATMENT PRECAUTIONS: Bed, Bed/Chair Locked, Call light within reach, Needs within reach, and Visitors at bedside    INTERDISCIPLINARY COLLABORATION:  RN/ PCT    EDUCATION: Education Given To: Patient  Education Provided: Transfer Training; Fall Prevention Strategies  Education Method: Verbal  Barriers to Learning: None  Education Outcome: Verbalized understanding    TIME IN/OUT:  Time In: 1457  Time Out: [address]  Minutes: [facility],

## 2023-08-03 NOTE — PLAN OF CARE
Problem: Discharge Planning  Goal: Discharge to home or other facility with appropriate resources  8/3/2023 0802 by Keyana Hayes RN  Outcome: Progressing  8/3/2023 0121 by Dai Pitt RN  Outcome: Progressing  Flowsheets (Taken 8/2/2023 1504 by Kaylan Acosta RN)  Discharge to home or other facility with appropriate resources:   Identify barriers to discharge with patient and caregiver   Arrange for needed discharge resources and transportation as appropriate   Identify discharge learning needs (meds, wound care, etc)     Problem: Safety - Adult  Goal: Free from fall injury  8/3/2023 0802 by Keyana Hayes RN  Outcome: Progressing  8/3/2023 0121 by Dai Pitt RN  Outcome: Progressing  Flowsheets  Taken 8/2/2023 1955 by Dai Pitt RN  Free From Fall Injury: Instruct family/caregiver on patient safety  Taken 8/2/2023 1513 by Kaylan Acosta RN  Free From Fall Injury: Instruct family/caregiver on patient safety     Problem: ABCDS Injury Assessment  Goal: Absence of physical injury  8/3/2023 0802 by Keyana Hayes RN  Outcome: Progressing  8/3/2023 0121 by Dai Pitt RN  Outcome: Progressing  Flowsheets  Taken 8/2/2023 1955 by Dai Pitt RN  Absence of Physical Injury: Implement safety measures based on patient assessment  Taken 8/2/2023 1513 by Kaylan Acosta RN  Absence of Physical Injury: Implement safety measures based on patient assessment

## 2023-08-03 NOTE — PROGRESS NOTES
Rounds performed throughout shift. Pt denies needs at this time. Bed in low position, locked and call light/personal items within reach. Will report to night shift nurse.

## 2023-08-03 NOTE — PROGRESS NOTES
Warfarin dosing per pharmacist    Jessie Villanueva is a 69 y.o. female. Indication:  Afib; AVR; MVR    Goal INR:  2.0 - 3.0 (per anticoag note on 7/12/2023 at [facility] cardiology)    Home dose:  5 mg daily    Risk factors or significant drug interactions:  none    Other anticoagulants:  none    Lab Results   Component Value Date/Time    HGB 8.6 08/03/2023 07:46 AM    HGB 9.4 08/02/2023 03:30 AM    HGB 9.4 07/24/2023 11:30 AM       Daily Monitoring  Date  INR     Warfarin dose Notes  8/2  2.0  5 mg     8/3  2.3  5 mg      Pharmacy to continue warfarin inpatient. Dosing and INR goal verified per [facility] cardiology anticoagulation note from 7/12/23    INR therapeutic today at 2.3. Continue home dose of 5 mg daily. Following daily INR.       Thank you,  Devi Peraza, [facility]

## 2023-08-03 NOTE — PROGRESS NOTES
colon cancer diagnosis s/p resection without chemo, HTN, AS, MR s/p MVR, afib on coumadin and DM2. She was recently hospitalized 7/17-7/20/23 for acute respiratory failure and pt underwent bronch. BAL was nondiagnostic but AFB + MAC 8/1. Pt was seen in the office 7/28 and was doing well but presented to the ER on 8/2 with acute respiratory failure. Pt admitted and we were consulted to assist. HRCT with persistent GGO. Principal Problem:    Acute on chronic respiratory failure with hypoxemia (HCC)  Plan: wean O2 as tolerated, pt was on 2L O2 with exertion at home. Active Problems:    Type 2 diabetes mellitus with microalbuminuria, with long-term current use of insulin (HCC)  Plan: SSI per primary     Essential hypertension  Plan: fairly well controlled, monitor     Hypothyroidism, postsurgical  Plan: continue home meds    Paroxysmal atrial fibrillation (HCC)  Plan: chronic, on coumadin    Abnormal CT of the chest  Plan: persistent GGO, consider repeat bronch with BAL prior to steroid initiation vs surgery consult for open lung biopsy. Check HP panel now. Check RVP. Acute pneumonitis  Plan: pt currently on cefepim/vanc day 2. Pt remains off steroids. Check HP panel and RVP now. Echo is still pending. More than 50% of the time documented was spent in face-to-face contact with the patient and in the care of the patient on the floor/unit where the patient is located. In this split/shared evaluation I performed performed a medically appropriate history and exam, counseled and educated the patient and/or family member, ordered medications, tests or procedures, documented information in EMR, and coordinated care. which accounted for 18 minutes of clinical time. Gayleen Cushing, PA    In this split/shared evaluation I performed reviewed the patients's H&P, available images, labs, cultures. , discussed case in detail with NPP, performed a medically appropriate history and exam, counseled and

## 2023-08-03 NOTE — PROGRESS NOTES
Hospitalist Progress Note   Admit Date:  2023  3:14 AM   Name:  Ed Green   Age:  69 y.o. Sex:  female  :  1945   MRN:  [id number]   Room:  [facility]/    Presenting/Chief Complaint: Shortness of Breath     Reason(s) for Admission: Hypoxia [R09.02]  Acute on chronic respiratory failure with hypoxemia [facility]) [J96.21]  Acute pneumonitis [J18.9]     Hospital Course:   Please refer to the admission H&P for details of presentation. In summary, Ed Green is a 69 y.o. female with medical history significant for COVID infection in 2023, recent diagnosis of colon cancer status post partial colectomy kidney, atrial fibrillation on Coumadin, hypertension, type 2 diabetes, chronic respiratory failure with hypoxia on 2 to 2L O2 nasal cannula possibly due to hypersensitivity pneumonitis who presented to emergency room with worsening shortness of breath since 10 PM on the day prior to admission. Patient was recently admitted on  with similar complaint. CT chest shows no pulm embolism but showed patchy groundglass apractic aeration throughout the lung with Mediastinal lymphadenopathy. Subjective/24 hr Events (23) : Patient is seen and examined at bedside. No acute events reported overnight by nursing staff. Patient is sitting on side of the bed. Family at bedside. Currently on 6 L O2 nasal cannula. Reports that her breathing is improved but not back to baseline. Patient denies fever, chills, chest pains, shortness of breath, n/v, abdominal pain. Review of Systems: 10 point review of systems is otherwise negative with the exception of the elements mentioned above. Assessment & Plan:   Acute on chronic respiratory failure hypoxia  Thought to be due to hypersensitivity pneumonitis and prior admission  23: Respiratory status not at baseline. Currently requiring 6 L O2 nasal cannula to maintain saturation. Previously on 2 L at home.   Independent review of Neutrophils % 76 43 - 78 %    Lymphocytes % 13 13 - 44 %    Monocytes % 8 4.0 - 12.0 %    Eosinophils % 3 0.5 - 7.8 %    Basophils % 1 0.0 - 2.0 %    Immature Granulocytes 1 0.0 - 5.0 %    Neutrophils Absolute 6.0 1.7 - 8.2 K/UL    Lymphocytes Absolute 1.0 0.5 - 4.6 K/UL    Monocytes Absolute 0.6 0.1 - 1.3 K/UL    Eosinophils Absolute 0.2 0.0 - 0.8 K/UL    Basophils Absolute 0.0 0.0 - 0.2 K/UL    Absolute Immature Granulocyte 0.1 0.0 - 0.5 K/UL   Comprehensive Metabolic Panel w/ Reflex to MG    Collection Time: 08/03/23  7:46 AM   Result Value Ref Range    Sodium 143 133 - 143 mmol/L    Potassium 4.8 3.5 - 5.1 mmol/L    Chloride 112 (H) 101 - 110 mmol/L    CO2 24 21 - 32 mmol/L    Anion Gap 7 2 - 11 mmol/L    Glucose 58 (L) 65 - 100 mg/dL    BUN 16 8 - 23 MG/DL    Creatinine 0.70 0.6 - 1.0 MG/DL    Est, Glom Filt Rate >60 >60 ml/min/1.73m2    Calcium 8.3 8.3 - 10.4 MG/DL    Total Bilirubin 0.7 0.2 - 1.1 MG/DL    ALT 31 12 - 65 U/L    AST 35 15 - 37 U/L    Alk Phosphatase 77 50 - 136 U/L    Total Protein 7.3 6.3 - 8.2 g/dL    Albumin 2.9 (L) 3.2 - 4.6 g/dL    Globulin 4.4 2.8 - 4.5 g/dL    Albumin/Globulin Ratio 0.7 0.4 - 1.6     Vancomycin Level, Random    Collection Time: 08/03/23  7:46 AM   Result Value Ref Range    Vancomycin Rm 14.1 UG/ML   Protime-INR    Collection Time: 08/03/23  7:46 AM   Result Value Ref Range    Protime 26.3 (H) 12.6 - 14.3 sec    INR 2.3     Hemoglobin A1c    Collection Time: 08/03/23  7:46 AM   Result Value Ref Range    Hemoglobin A1C 5.7 (H) 4.8 - 5.6 %    eAG 117 mg/dL   C-Reactive Protein    Collection Time: 08/03/23  7:46 AM   Result Value Ref Range    CRP 3.0 (H) 0.0 - 0.9 mg/dL   Respiratory Panel, Molecular, with COVID-19 (Restricted: peds pts or suitable admitted adults)    Collection Time: 08/03/23 10:31 AM    Specimen: Nasopharyngeal   Result Value Ref Range    Adenovirus by PCR NOT DETECTED NOTDET      Coronavirus 229E by PCR NOT DETECTED NOTDET      Coronavirus HKU1 by PCR

## 2023-08-04 ENCOUNTER — APPOINTMENT (OUTPATIENT)
Dept: CARDIAC CATH/INVASIVE PROCEDURES | Age: 78
End: 2023-08-04
Attending: INTERNAL MEDICINE
Payer: MEDICARE

## 2023-08-04 DIAGNOSIS — J84.9 ILD (INTERSTITIAL LUNG DISEASE) (HCC): Primary | ICD-10-CM

## 2023-08-04 PROBLEM — I35.0 AORTIC STENOSIS: Status: ACTIVE | Noted: 2023-08-04

## 2023-08-04 LAB
ANION GAP SERPL CALC-SCNC: 7 MMOL/L (ref 2–11)
BASOPHILS # BLD: 0.1 K/UL (ref 0–0.2)
BASOPHILS NFR BLD: 1 % (ref 0–2)
BUN SERPL-MCNC: 17 MG/DL (ref 8–23)
CALCIUM SERPL-MCNC: 8.6 MG/DL (ref 8.3–10.4)
CHLORIDE SERPL-SCNC: 112 MMOL/L (ref 101–110)
CO2 SERPL-SCNC: 27 MMOL/L (ref 21–32)
CREAT SERPL-MCNC: 0.6 MG/DL (ref 0.6–1)
DIFFERENTIAL METHOD BLD: ABNORMAL
ECHO AV MEAN GRADIENT: 24 MMHG
ECHO AV MEAN VELOCITY: 2.3 M/S
ECHO AV PEAK GRADIENT: 40 MMHG
ECHO AV PEAK VELOCITY: 3.2 M/S
ECHO AV VTI: 63.1 CM
ECHO BSA: 1.71 M2
ECHO MV MAX VELOCITY: 2 M/S
ECHO MV MEAN GRADIENT: 8 MMHG
ECHO MV MEAN VELOCITY: 1.4 M/S
ECHO MV PEAK GRADIENT: 17 MMHG
ECHO MV VTI: 53.4 CM
EOSINOPHIL # BLD: 0.2 K/UL (ref 0–0.8)
EOSINOPHIL NFR BLD: 3 % (ref 0.5–7.8)
ERYTHROCYTE [DISTWIDTH] IN BLOOD BY AUTOMATED COUNT: 16.9 % (ref 11.9–14.6)
FERRITIN SERPL-MCNC: 80 NG/ML (ref 8–388)
FOLATE SERPL-MCNC: 6.3 NG/ML (ref 3.1–17.5)
GLUCOSE BLD STRIP.AUTO-MCNC: 202 MG/DL (ref 65–100)
GLUCOSE BLD STRIP.AUTO-MCNC: 256 MG/DL (ref 65–100)
GLUCOSE BLD STRIP.AUTO-MCNC: 81 MG/DL (ref 65–100)
GLUCOSE BLD STRIP.AUTO-MCNC: 87 MG/DL (ref 65–100)
GLUCOSE BLD STRIP.AUTO-MCNC: 97 MG/DL (ref 65–100)
GLUCOSE SERPL-MCNC: 68 MG/DL (ref 65–100)
HCT VFR BLD AUTO: 28.3 % (ref 35.8–46.3)
HGB BLD-MCNC: 8.1 G/DL (ref 11.7–15.4)
IMM GRANULOCYTES # BLD AUTO: 0 K/UL (ref 0–0.5)
IMM GRANULOCYTES NFR BLD AUTO: 1 % (ref 0–5)
INR PPP: 2.4
IRON SATN MFR SERPL: 11 %
IRON SERPL-MCNC: 35 UG/DL (ref 35–150)
LYMPHOCYTES # BLD: 1.3 K/UL (ref 0.5–4.6)
LYMPHOCYTES NFR BLD: 16 % (ref 13–44)
MCH RBC QN AUTO: 27 PG (ref 26.1–32.9)
MCHC RBC AUTO-ENTMCNC: 28.6 G/DL (ref 31.4–35)
MCV RBC AUTO: 94.3 FL (ref 82–102)
MONOCYTES # BLD: 0.7 K/UL (ref 0.1–1.3)
MONOCYTES NFR BLD: 9 % (ref 4–12)
NEUTS SEG # BLD: 5.8 K/UL (ref 1.7–8.2)
NEUTS SEG NFR BLD: 72 % (ref 43–78)
NRBC # BLD: 0 K/UL (ref 0–0.2)
PLATELET # BLD AUTO: 275 K/UL (ref 150–450)
PMV BLD AUTO: 9.4 FL (ref 9.4–12.3)
POTASSIUM SERPL-SCNC: 3.8 MMOL/L (ref 3.5–5.1)
PROTHROMBIN TIME: 27 SEC (ref 12.6–14.3)
RBC # BLD AUTO: 3 M/UL (ref 4.05–5.2)
SERVICE CMNT-IMP: ABNORMAL
SERVICE CMNT-IMP: ABNORMAL
SERVICE CMNT-IMP: NORMAL
SODIUM SERPL-SCNC: 146 MMOL/L (ref 133–143)
TIBC SERPL-MCNC: 316 UG/DL (ref 250–450)
VIT B12 SERPL-MCNC: 560 PG/ML (ref 193–986)
WBC # BLD AUTO: 8.1 K/UL (ref 4.3–11.1)

## 2023-08-04 PROCEDURE — 6360000002 HC RX W HCPCS: Performed by: INTERNAL MEDICINE

## 2023-08-04 PROCEDURE — 86235 NUCLEAR ANTIGEN ANTIBODY: CPT

## 2023-08-04 PROCEDURE — 80048 BASIC METABOLIC PNL TOTAL CA: CPT

## 2023-08-04 PROCEDURE — 93312 ECHO TRANSESOPHAGEAL: CPT

## 2023-08-04 PROCEDURE — 93325 DOPPLER ECHO COLOR FLOW MAPG: CPT | Performed by: INTERNAL MEDICINE

## 2023-08-04 PROCEDURE — 85610 PROTHROMBIN TIME: CPT

## 2023-08-04 PROCEDURE — 93320 DOPPLER ECHO COMPLETE: CPT | Performed by: INTERNAL MEDICINE

## 2023-08-04 PROCEDURE — 82607 VITAMIN B-12: CPT

## 2023-08-04 PROCEDURE — 99222 1ST HOSP IP/OBS MODERATE 55: CPT | Performed by: INTERNAL MEDICINE

## 2023-08-04 PROCEDURE — 82962 GLUCOSE BLOOD TEST: CPT

## 2023-08-04 PROCEDURE — 93312 ECHO TRANSESOPHAGEAL: CPT | Performed by: INTERNAL MEDICINE

## 2023-08-04 PROCEDURE — 83550 IRON BINDING TEST: CPT

## 2023-08-04 PROCEDURE — 99152 MOD SED SAME PHYS/QHP 5/>YRS: CPT

## 2023-08-04 PROCEDURE — 99152 MOD SED SAME PHYS/QHP 5/>YRS: CPT | Performed by: INTERNAL MEDICINE

## 2023-08-04 PROCEDURE — 86037 ANCA TITER EACH ANTIBODY: CPT

## 2023-08-04 PROCEDURE — 6370000000 HC RX 637 (ALT 250 FOR IP): Performed by: HOSPITALIST

## 2023-08-04 PROCEDURE — 82746 ASSAY OF FOLIC ACID SERUM: CPT

## 2023-08-04 PROCEDURE — 86430 RHEUMATOID FACTOR TEST QUAL: CPT

## 2023-08-04 PROCEDURE — 36415 COLL VENOUS BLD VENIPUNCTURE: CPT

## 2023-08-04 PROCEDURE — 83540 ASSAY OF IRON: CPT

## 2023-08-04 PROCEDURE — 1100000000 HC RM PRIVATE

## 2023-08-04 PROCEDURE — 2580000003 HC RX 258: Performed by: HOSPITALIST

## 2023-08-04 PROCEDURE — 6370000000 HC RX 637 (ALT 250 FOR IP): Performed by: INTERNAL MEDICINE

## 2023-08-04 PROCEDURE — 83516 IMMUNOASSAY NONANTIBODY: CPT

## 2023-08-04 PROCEDURE — 85025 COMPLETE CBC W/AUTO DIFF WBC: CPT

## 2023-08-04 PROCEDURE — 86225 DNA ANTIBODY NATIVE: CPT

## 2023-08-04 PROCEDURE — 82728 ASSAY OF FERRITIN: CPT

## 2023-08-04 PROCEDURE — 2580000003 HC RX 258: Performed by: INTERNAL MEDICINE

## 2023-08-04 PROCEDURE — 99233 SBSQ HOSP IP/OBS HIGH 50: CPT | Performed by: NURSE PRACTITIONER

## 2023-08-04 RX ORDER — LIDOCAINE HYDROCHLORIDE 20 MG/ML
SOLUTION OROPHARYNGEAL PRN
Status: COMPLETED | OUTPATIENT
Start: 2023-08-04 | End: 2023-08-04

## 2023-08-04 RX ORDER — FUROSEMIDE 10 MG/ML
20 INJECTION INTRAMUSCULAR; INTRAVENOUS ONCE
Status: COMPLETED | OUTPATIENT
Start: 2023-08-04 | End: 2023-08-04

## 2023-08-04 RX ORDER — FENTANYL CITRATE 50 UG/ML
INJECTION, SOLUTION INTRAMUSCULAR; INTRAVENOUS PRN
Status: COMPLETED | OUTPATIENT
Start: 2023-08-04 | End: 2023-08-04

## 2023-08-04 RX ORDER — MIDAZOLAM HYDROCHLORIDE 1 MG/ML
INJECTION INTRAMUSCULAR; INTRAVENOUS PRN
Status: COMPLETED | OUTPATIENT
Start: 2023-08-04 | End: 2023-08-04

## 2023-08-04 RX ADMIN — MIDAZOLAM 2 MG: 1 INJECTION INTRAMUSCULAR; INTRAVENOUS at 14:14

## 2023-08-04 RX ADMIN — FUROSEMIDE 20 MG: 10 INJECTION, SOLUTION INTRAMUSCULAR; INTRAVENOUS at 08:34

## 2023-08-04 RX ADMIN — CEFEPIME 2000 MG: 2 INJECTION, POWDER, FOR SOLUTION INTRAVENOUS at 15:02

## 2023-08-04 RX ADMIN — FERROUS SULFATE TAB 325 MG (65 MG ELEMENTAL FE) 325 MG: 325 (65 FE) TAB at 08:35

## 2023-08-04 RX ADMIN — WARFARIN SODIUM 5 MG: 5 TABLET ORAL at 17:24

## 2023-08-04 RX ADMIN — LEVOTHYROXINE SODIUM 125 MCG: 0.12 TABLET ORAL at 05:11

## 2023-08-04 RX ADMIN — AMLODIPINE BESYLATE 2.5 MG: 5 TABLET ORAL at 08:34

## 2023-08-04 RX ADMIN — CEFEPIME 2000 MG: 2 INJECTION, POWDER, FOR SOLUTION INTRAVENOUS at 06:39

## 2023-08-04 RX ADMIN — SODIUM CHLORIDE, PRESERVATIVE FREE 5 ML: 5 INJECTION INTRAVENOUS at 08:35

## 2023-08-04 RX ADMIN — INSULIN GLARGINE 28 UNITS: 100 INJECTION, SOLUTION SUBCUTANEOUS at 20:33

## 2023-08-04 RX ADMIN — DOFETILIDE 500 MCG: 0.25 CAPSULE ORAL at 08:35

## 2023-08-04 RX ADMIN — INSULIN LISPRO 3 UNITS: 100 INJECTION, SOLUTION INTRAVENOUS; SUBCUTANEOUS at 12:41

## 2023-08-04 RX ADMIN — ATORVASTATIN CALCIUM 10 MG: 10 TABLET, FILM COATED ORAL at 17:24

## 2023-08-04 RX ADMIN — MIDAZOLAM 1 MG: 1 INJECTION INTRAMUSCULAR; INTRAVENOUS at 14:16

## 2023-08-04 RX ADMIN — DOFETILIDE 500 MCG: 0.25 CAPSULE ORAL at 20:33

## 2023-08-04 RX ADMIN — LIDOCAINE HYDROCHLORIDE 15 ML: 20 SOLUTION ORAL at 13:59

## 2023-08-04 RX ADMIN — FENTANYL CITRATE 50 MCG: 50 INJECTION, SOLUTION INTRAMUSCULAR; INTRAVENOUS at 14:14

## 2023-08-04 RX ADMIN — CEFEPIME 2000 MG: 2 INJECTION, POWDER, FOR SOLUTION INTRAVENOUS at 23:51

## 2023-08-04 RX ADMIN — SODIUM CHLORIDE, PRESERVATIVE FREE 10 ML: 5 INJECTION INTRAVENOUS at 20:40

## 2023-08-04 ASSESSMENT — ENCOUNTER SYMPTOMS
SHORTNESS OF BREATH: 1
CHEST TIGHTNESS: 0

## 2023-08-04 NOTE — PROGRESS NOTES
08/04/23  5:09 AM   Result Value Ref Range    Ferritin 80 8 - 388 NG/ML   Folate    Collection Time: 08/04/23  5:09 AM   Result Value Ref Range    Folate 6.3 3.1 - 17.5 ng/mL   POCT Glucose    Collection Time: 08/04/23  7:25 AM   Result Value Ref Range    POC Glucose 97 65 - 100 mg/dL    Performed by:  Chan        Current Meds:  Current Facility-Administered Medications   Medication Dose Route Frequency    amLODIPine (NORVASC) tablet 2.5 mg  2.5 mg Oral BID    ceFEPIme (MAXIPIME) 2,000 mg in sodium chloride 0.9 % 50 mL IVPB (mini-bag)  2,000 mg IntraVENous Q8H    insulin glargine (LANTUS) injection vial 28 Units  28 Units SubCUTAneous Nightly    sodium chloride flush 0.9 % injection 5-40 mL  5-40 mL IntraVENous 2 times per day    sodium chloride flush 0.9 % injection 5-40 mL  5-40 mL IntraVENous PRN    0.9 % sodium chloride infusion   IntraVENous PRN    ondansetron (ZOFRAN-ODT) disintegrating tablet 4 mg  4 mg Oral Q8H PRN    Or    ondansetron (ZOFRAN) injection 4 mg  4 mg IntraVENous Q6H PRN    polyethylene glycol (GLYCOLAX) packet 17 g  17 g Oral Daily PRN    acetaminophen (TYLENOL) tablet 650 mg  650 mg Oral Q6H PRN    Or    acetaminophen (TYLENOL) suppository 650 mg  650 mg Rectal Q6H PRN    ipratropium 0.5 mg-albuterol 2.5 mg (DUONEB) nebulizer solution 1 Dose  1 Dose Inhalation Q4H PRN    atorvastatin (LIPITOR) tablet 10 mg  10 mg Oral QPM    ferrous sulfate (IRON 325) tablet 325 mg  325 mg Oral Daily with breakfast    dofetilide (TIKOSYN) capsule 500 mcg  500 mcg Oral BID    levothyroxine (SYNTHROID) tablet 125 mcg  125 mcg Oral QAM AC    warfarin (COUMADIN) tablet 5 mg  5 mg Oral Daily    insulin lispro (HUMALOG) injection vial 0-10 Units  0-10 Units SubCUTAneous TID WC    insulin lispro (HUMALOG) injection vial 0-8 Units  0-8 Units SubCUTAneous Nightly    glucose chewable tablet 16 g  4 tablet Oral PRN    dextrose bolus 10% 125 mL  125 mL IntraVENous PRN    Or    dextrose bolus 10% 250 mL  250 mL IntraVENous PRN    Glucagon Emergency KIT 1 mg  1 mg SubCUTAneous PRN    dextrose 10 % infusion   IntraVENous Continuous PRN       Signed:  eNmo Howard MD    Part of this note may have been written by using a voice dictation software. The note has been proof read but may still contain some grammatical/other typographical errors.

## 2023-08-04 NOTE — H&P
[facility] Cardiology Initial Cardiac Evaluation                 Date of  Admission: 8/2/2023  3:14 AM     Primary Care Physician: RICCI Guerin - KARSON  Primary Cardiologist: Dr Bernardo Carranza  Referring Physician: Dr Estela Ziegler  Attending Physician: Dr Bing Velasquez    CC: abnormal aortic valve       Lena David is a 69 y.o. female admitted for Hypoxia [R09.02]  Acute on chronic respiratory failure with hypoxemia ([address]) [J96.21]  Acute pneumonitis [J18.9]. H/o htn, B carotid artery stenosis, colon cancer s/p colon resection, pAF on tikosyn/coumadin, ELIZABETH, DM, s/p bioprosthetic AVR/MVR, bradycardia. Admission 7/2023 after Covid 5/2023, admit 7/17/23 w new respiratory failure w CT showing mosaicism, ground glass opacities, mediastinal adenopathy, no PE- s/p bronch w normal findings with  neutrophil predominance but negative cultures - ? Bronchiolitis and hypoxia improved- d/c on 4L O2. F?U w pulmonary- consider lung biopsy if infiltrates persistent- 2L O2 by NC.  ER 8/2/23 w SOB. In ER O2 on 6L 85%. , K 3.8, cr .6, pBNP 330, WBC 12.8, hgb 9.4, platelets 725, high resolution CT lungs:     Persistent groundglass opacities with mosaic perfusion pattern possibly  reflecting small airways disease or other infectious or inflammatory  pneumonitis. Improving intralobular septal thickening could indicate mildly  improving edema. Trace pleural effusions are new since prior exam.    Pt states her SOB came on fairly suddenly, and she was SOB at rest, worse w exertion, no orthopnea. No weight change or LE edema. No cough or wheezing. No CP, dizziness, palpitations. EKG ? SR w rate 81- poor baseline. CRP 3, cefepime/vanc. Seen b y pulmonary- may need lung biopsy. /66. Given a dose of IV lasix this morning. Her SOB was significantly improved when placed on increased supplemental O2 and antibiotics.      Past cardiac work up:  4/2022 echo w EF 55-60%, bovine bioprosthetic AVR w AV gradient 38 mmHg, AV peak gradient is 38 Mother     Heart Disease Sister     Heart Disease Brother     Diabetes Brother     Heart Disease Brother       Social History     Tobacco Use    Smoking status: Never    Smokeless tobacco: Never   Substance Use Topics    Alcohol use: No        Current Facility-Administered Medications   Medication Dose Route Frequency    furosemide (LASIX) injection 20 mg  20 mg IntraVENous Once    amLODIPine (NORVASC) tablet 2.5 mg  2.5 mg Oral BID    ceFEPIme (MAXIPIME) 2,000 mg in sodium chloride 0.9 % 50 mL IVPB (mini-bag)  2,000 mg IntraVENous Q8H    insulin glargine (LANTUS) injection vial 28 Units  28 Units SubCUTAneous Nightly    sodium chloride flush 0.9 % injection 5-40 mL  5-40 mL IntraVENous 2 times per day    sodium chloride flush 0.9 % injection 5-40 mL  5-40 mL IntraVENous PRN    0.9 % sodium chloride infusion   IntraVENous PRN    ondansetron (ZOFRAN-ODT) disintegrating tablet 4 mg  4 mg Oral Q8H PRN    Or    ondansetron (ZOFRAN) injection 4 mg  4 mg IntraVENous Q6H PRN    polyethylene glycol (GLYCOLAX) packet 17 g  17 g Oral Daily PRN    acetaminophen (TYLENOL) tablet 650 mg  650 mg Oral Q6H PRN    Or    acetaminophen (TYLENOL) suppository 650 mg  650 mg Rectal Q6H PRN    ipratropium 0.5 mg-albuterol 2.5 mg (DUONEB) nebulizer solution 1 Dose  1 Dose Inhalation Q4H PRN    atorvastatin (LIPITOR) tablet 10 mg  10 mg Oral QPM    ferrous sulfate (IRON 325) tablet 325 mg  325 mg Oral Daily with breakfast    dofetilide (TIKOSYN) capsule 500 mcg  500 mcg Oral BID    levothyroxine (SYNTHROID) tablet 125 mcg  125 mcg Oral QAM AC    warfarin (COUMADIN) tablet 5 mg  5 mg Oral Daily    insulin lispro (HUMALOG) injection vial 0-10 Units  0-10 Units SubCUTAneous TID WC    insulin lispro (HUMALOG) injection vial 0-8 Units  0-8 Units SubCUTAneous Nightly    glucose chewable tablet 16 g  4 tablet Oral PRN    dextrose bolus 10% 125 mL  125 mL IntraVENous PRN    Or    dextrose bolus 10% 250 mL  250 mL IntraVENous PRN    Glucagon

## 2023-08-04 NOTE — PROGRESS NOTES
CLAUDIO by Dr Raymundo Gutierrez  II ASA II Mallampati  3mg versed  50mcg fentanyl  Viscous Solution given at 1359  Pt tolerated well.

## 2023-08-04 NOTE — PROGRESS NOTES
Colten Lacey  Admission Date: 8/2/2023         Daily Progress Note: 8/4/2023    The patient's chart is reviewed and the patient is discussed with the staff. Background: Pt is a 68 yo female with a history of COVID 5/2023 with resultant B GGO and mediastinal adenopathy, recent diagnosis of colon cancer s/p partial colon resection, afib on coumadin, MVR, HTN, and DM2. Pt was recently admitted 7/17-7/20 for acute respiratory failure with hypoxemia requiring bronch with no pulmonary alveolar hemorrhage noted. She was treated with doxycycline. She did require O2 2L at discharge. Pt was seen in the office 7/28 and only required O2 with exertion- she is using 2lpm at all times. AFB culture from bronch resulted + MAC on 8/1. She presented to the ER on 8/2 with shortness of breath and hypoxemia. HRCT obtained and revealed persistent B GGO. Subjective:     Patient states she is feeling better today. Currently on 4lpm. States has occasional cough in the morning with clear sputum. She denies any MCCURDY or SOB while wearing O2. She was able to ambulate around the halls yesterday.      Current Facility-Administered Medications   Medication Dose Route Frequency    amLODIPine (NORVASC) tablet 2.5 mg  2.5 mg Oral BID    ceFEPIme (MAXIPIME) 2,000 mg in sodium chloride 0.9 % 50 mL IVPB (mini-bag)  2,000 mg IntraVENous Q8H    insulin glargine (LANTUS) injection vial 28 Units  28 Units SubCUTAneous Nightly    sodium chloride flush 0.9 % injection 5-40 mL  5-40 mL IntraVENous 2 times per day    sodium chloride flush 0.9 % injection 5-40 mL  5-40 mL IntraVENous PRN    0.9 % sodium chloride infusion   IntraVENous PRN    ondansetron (ZOFRAN-ODT) disintegrating tablet 4 mg  4 mg Oral Q8H PRN    Or    ondansetron (ZOFRAN) injection 4 mg  4 mg IntraVENous Q6H PRN    polyethylene glycol (GLYCOLAX) packet 17 g  17 g Oral Daily PRN    acetaminophen (TYLENOL) tablet 650 mg  650 mg Oral Q6H PRN    Or    acetaminophen

## 2023-08-04 NOTE — DISCHARGE INSTRUCTIONS
[facility]  [address]   [address]   [phone]    The pulmonary office will call you in 1-2 business days to arrange follow up in the pulmonary office. If you have not heard from the office after 2 full business days, please call the office to arrange follow up.

## 2023-08-04 NOTE — PROGRESS NOTES
Warfarin dosing per pharmacist    Eve Baltazar is a 69 y.o. female. Indication:  Afib; AVR; MVR    Goal INR:  2 - 3 (per anticoag note on 7/12/2023 at [facility] cardiology)    Home dose:  5 mg daily    Risk factors or significant drug interactions:  none    Other anticoagulants:  none    Lab Results   Component Value Date/Time    HGB 8.1 08/04/2023 05:09 AM    HGB 8.6 08/03/2023 07:46 AM    HGB 9.4 08/02/2023 03:30 AM       Daily Monitoring  Date  INR     Warfarin dose Notes  8/2  2.0  5 mg     8/3  2.3  5 mg  8/4  2.4  5 mg      Pharmacy is consulted to manage warfarin for Ms. Keysha Ramírez during this admission. She presented with therapeutic INR and her home dose has been continued. INR 2.4. Continue warfarin 5 mg qhs. Daily INR. Pharmacy will continue to follow. Please call with any questions. Thank you,  Binh Pratt.  Abi Tinajero, [facility]

## 2023-08-04 NOTE — CARE COORDINATION
Dispo update:  CM spoke to Ms. Hook and her  again in room 612 about discharge planning. We discussed that OT said that she does not need home health, but the PT is recommending it. After discussion, she declines home health. No other discharge needs voiced or identified (she has home supplemental oxygen), but CM available as needed.

## 2023-08-05 VITALS
WEIGHT: 145 LBS | OXYGEN SATURATION: 93 % | BODY MASS INDEX: 25.69 KG/M2 | HEIGHT: 63 IN | HEART RATE: 69 BPM | DIASTOLIC BLOOD PRESSURE: 55 MMHG | TEMPERATURE: 97.9 F | SYSTOLIC BLOOD PRESSURE: 144 MMHG | RESPIRATION RATE: 16 BRPM

## 2023-08-05 LAB
ANION GAP SERPL CALC-SCNC: 2 MMOL/L (ref 2–11)
BASOPHILS # BLD: 0 K/UL (ref 0–0.2)
BASOPHILS NFR BLD: 1 % (ref 0–2)
BUN SERPL-MCNC: 17 MG/DL (ref 8–23)
CALCIUM SERPL-MCNC: 8.6 MG/DL (ref 8.3–10.4)
CENTROMERE B AB SER-ACNC: <0.2 AI (ref 0–0.9)
CHLORIDE SERPL-SCNC: 110 MMOL/L (ref 101–110)
CHROMATIN AB SERPL-ACNC: <0.2 AI (ref 0–0.9)
CO2 SERPL-SCNC: 30 MMOL/L (ref 21–32)
CREAT SERPL-MCNC: 0.6 MG/DL (ref 0.6–1)
DIFFERENTIAL METHOD BLD: ABNORMAL
DSDNA AB SER-ACNC: 2 IU/ML (ref 0–9)
ENA JO1 AB SER-ACNC: <0.2 AI (ref 0–0.9)
ENA JO1 AB SER-ACNC: <0.2 AI (ref 0–0.9)
ENA RNP AB SER-ACNC: 0.3 AI (ref 0–0.9)
ENA SCL70 AB SER-ACNC: 0.3 AI (ref 0–0.9)
ENA SCL70 AB SER-ACNC: 0.3 AI (ref 0–0.9)
ENA SM AB SER-ACNC: <0.2 AI (ref 0–0.9)
ENA SS-A AB SER-ACNC: <0.2 AI (ref 0–0.9)
ENA SS-A AB SER-ACNC: <0.2 AI (ref 0–0.9)
ENA SS-B AB SER-ACNC: <0.2 AI (ref 0–0.9)
ENA SS-B AB SER-ACNC: <0.2 AI (ref 0–0.9)
EOSINOPHIL # BLD: 0.3 K/UL (ref 0–0.8)
EOSINOPHIL NFR BLD: 5 % (ref 0.5–7.8)
ERYTHROCYTE [DISTWIDTH] IN BLOOD BY AUTOMATED COUNT: 17 % (ref 11.9–14.6)
GLUCOSE BLD STRIP.AUTO-MCNC: 185 MG/DL (ref 65–100)
GLUCOSE BLD STRIP.AUTO-MCNC: 237 MG/DL (ref 65–100)
GLUCOSE BLD STRIP.AUTO-MCNC: 84 MG/DL (ref 65–100)
GLUCOSE SERPL-MCNC: 74 MG/DL (ref 65–100)
HCT VFR BLD AUTO: 28.2 % (ref 35.8–46.3)
HGB BLD-MCNC: 8.1 G/DL (ref 11.7–15.4)
IMM GRANULOCYTES # BLD AUTO: 0 K/UL (ref 0–0.5)
IMM GRANULOCYTES NFR BLD AUTO: 0 % (ref 0–5)
INR PPP: 2.5
LYMPHOCYTES # BLD: 0.8 K/UL (ref 0.5–4.6)
LYMPHOCYTES NFR BLD: 14 % (ref 13–44)
Lab: NORMAL
MCH RBC QN AUTO: 27.3 PG (ref 26.1–32.9)
MCHC RBC AUTO-ENTMCNC: 28.7 G/DL (ref 31.4–35)
MCV RBC AUTO: 94.9 FL (ref 82–102)
MONOCYTES # BLD: 0.6 K/UL (ref 0.1–1.3)
MONOCYTES NFR BLD: 10 % (ref 4–12)
NEUTS SEG # BLD: 4.2 K/UL (ref 1.7–8.2)
NEUTS SEG NFR BLD: 71 % (ref 43–78)
NRBC # BLD: 0 K/UL (ref 0–0.2)
PLATELET # BLD AUTO: 227 K/UL (ref 150–450)
PMV BLD AUTO: 9.4 FL (ref 9.4–12.3)
POTASSIUM SERPL-SCNC: 3.6 MMOL/L (ref 3.5–5.1)
PROTHROMBIN TIME: 27.7 SEC (ref 12.6–14.3)
RBC # BLD AUTO: 2.97 M/UL (ref 4.05–5.2)
SERVICE CMNT-IMP: ABNORMAL
SERVICE CMNT-IMP: ABNORMAL
SERVICE CMNT-IMP: NORMAL
SODIUM SERPL-SCNC: 142 MMOL/L (ref 133–143)
WBC # BLD AUTO: 6 K/UL (ref 4.3–11.1)

## 2023-08-05 PROCEDURE — 99232 SBSQ HOSP IP/OBS MODERATE 35: CPT | Performed by: NURSE PRACTITIONER

## 2023-08-05 PROCEDURE — 85610 PROTHROMBIN TIME: CPT

## 2023-08-05 PROCEDURE — 85025 COMPLETE CBC W/AUTO DIFF WBC: CPT

## 2023-08-05 PROCEDURE — 6370000000 HC RX 637 (ALT 250 FOR IP): Performed by: INTERNAL MEDICINE

## 2023-08-05 PROCEDURE — 36415 COLL VENOUS BLD VENIPUNCTURE: CPT

## 2023-08-05 PROCEDURE — 6360000002 HC RX W HCPCS: Performed by: INTERNAL MEDICINE

## 2023-08-05 PROCEDURE — 80048 BASIC METABOLIC PNL TOTAL CA: CPT

## 2023-08-05 PROCEDURE — 6370000000 HC RX 637 (ALT 250 FOR IP): Performed by: HOSPITALIST

## 2023-08-05 PROCEDURE — 82962 GLUCOSE BLOOD TEST: CPT

## 2023-08-05 PROCEDURE — 2580000003 HC RX 258: Performed by: HOSPITALIST

## 2023-08-05 PROCEDURE — 2580000003 HC RX 258: Performed by: INTERNAL MEDICINE

## 2023-08-05 RX ADMIN — SODIUM CHLORIDE, PRESERVATIVE FREE 10 ML: 5 INJECTION INTRAVENOUS at 08:19

## 2023-08-05 RX ADMIN — CEFEPIME 2000 MG: 2 INJECTION, POWDER, FOR SOLUTION INTRAVENOUS at 06:31

## 2023-08-05 RX ADMIN — INSULIN LISPRO 3 UNITS: 100 INJECTION, SOLUTION INTRAVENOUS; SUBCUTANEOUS at 12:51

## 2023-08-05 RX ADMIN — AMLODIPINE BESYLATE 2.5 MG: 5 TABLET ORAL at 08:19

## 2023-08-05 RX ADMIN — LEVOTHYROXINE SODIUM 125 MCG: 0.12 TABLET ORAL at 05:13

## 2023-08-05 RX ADMIN — DOFETILIDE 500 MCG: 0.25 CAPSULE ORAL at 08:19

## 2023-08-05 RX ADMIN — FERROUS SULFATE TAB 325 MG (65 MG ELEMENTAL FE) 325 MG: 325 (65 FE) TAB at 08:19

## 2023-08-05 NOTE — PROGRESS NOTES
Noted pt may be discharged soon          [address] [facility]!           Tracie Muhammad    [phone]

## 2023-08-05 NOTE — PROGRESS NOTES
Warfarin dosing per pharmacist    Edy Richards is a 69 y.o. female. Indication:  Afib; AVR; MVR    Goal INR:  2 - 3 (per anticoag note on 7/12/2023 at [facility] cardiology)    Home dose:  5 mg daily    Risk factors or significant drug interactions:  none    Other anticoagulants:  none    Lab Results   Component Value Date/Time    HGB 8.1 08/05/2023 05:54 AM    HGB 8.1 08/04/2023 05:09 AM    HGB 8.6 08/03/2023 07:46 AM       Daily Monitoring  Date  INR     Warfarin dose Notes  8/2  2.0  5 mg     8/3  2.3  5 mg  8/4  2.4  5 mg  8/5  2.5  5 mg      Pharmacy is consulted to manage warfarin for Ms. Bonifacio Ferris during this admission. She presented with therapeutic INR and her home dose has been continued. INR therapeutic at 2.5. Continue warfarin 5 mg daily. Daily INR to continue. Pharmacy will continue to follow. Please call with any questions.     Thank you,  Anisa Gomes, [facility]

## 2023-08-05 NOTE — CARE COORDINATION
Pt is for discharge home today with family and no needs/supportive care orders recieved for CM at this time. 08/05/23 1411   Service Assessment   Patient's Healthcare Decision Maker is: Legal Next of Kin   Social/Functional History   Lives With Spouse; Family  (patient lives with spouse in an attached Mother in law suite to her daughter and son in laws house)   Type of [address] [place] [address] Dr One level   [address] to enter with rails   Entrance Stairs - Number of Steps 3   Bathroom Shower/Tub Walk-in shower   Bathroom Toilet Handicap height   [address] bars in shower;Built-in [address]  (has needed assistance over the last few weeks related to being short of breath with activity)   Homemaking Responsibilities Yes   Ambulation Assistance Independent   Transfer Assistance Independent   Active  Yes  (can drive but has not since she has been ill)   [address] Discharge   Transition of Care Consult (CM Consult) Discharge [facility] None   [employer] Information Provided? No   Mode of Transport at Discharge Other (see comment)  (Family)   Confirm Follow Up Transport Family   Condition of Participation: Discharge Planning   The Plan for Transition of Care is related to the following treatment goals: Pt will return home at discharge. The Patient and/Or Patient Representative agree with the Discharge Plan? Yes   Freedom of Choice list was provided with basic dialogue that supports the patient's individualized plan of care/goals, treatment preferences, and shares the quality data associated with the providers?   Yes

## 2023-08-05 NOTE — PROGRESS NOTES
Daily Progress Note: 8/5/2023    Nelly Rosales  Admission Date: 8/2/2023     Length of Stay: 3 days      Background:Pt is a 68 yo female with a history of COVID 5/2023 with resultant B GGO and mediastinal adenopathy, recent diagnosis of colon cancer s/p partial colon resection, afib on coumadin, MVR, HTN, and DM2. Pt was recently admitted 7/17-7/20 for acute respiratory failure with hypoxemia requiring bronch with no pulmonary alveolar hemorrhage noted. She was treated with doxycycline. She did require O2 2L at discharge. Pt was seen in the office 7/28 and only required O2 with exertion- she is using 2lpm at all times. AFB culture from bronch resulted + MAC on 8/1, no tx to date. She presented to the ER on 8/2 with shortness of breath and hypoxemia. HRCT obtained and revealed persistent B GGO. Subjective:     Dr Gina Perrin consulted for Sandeep Finch. On 4 lpm NC, sat 96%, was discharged home on 2 lpm last admission.    Asking if she will need to stay or if she will come back for OLBX  She is also on coumadin    Review of Systems  Constitutional: negative for fever, chills, sweats  Cardiovascular: negative for chest pain, palpitations, syncope, edema  Gastrointestinal:  negative for dysphagia, reflux, vomiting, diarrhea, abdominal pain, or melena  Neurologic:  negative for focal weakness, numbness, headache    Current Facility-Administered Medications   Medication Dose Route Frequency    amLODIPine (NORVASC) tablet 2.5 mg  2.5 mg Oral BID    ceFEPIme (MAXIPIME) 2,000 mg in sodium chloride 0.9 % 50 mL IVPB (mini-bag)  2,000 mg IntraVENous Q8H    insulin glargine (LANTUS) injection vial 28 Units  28 Units SubCUTAneous Nightly    sodium chloride flush 0.9 % injection 5-40 mL  5-40 mL IntraVENous 2 times per day    sodium chloride flush 0.9 % injection 5-40 mL  5-40 mL IntraVENous PRN    0.9 % sodium chloride infusion   IntraVENous PRN    ondansetron (ZOFRAN-ODT) disintegrating tablet 4 mg  4 mg Oral Q8H PRN    Or elevated prosthetic gradient. Left Atrium: Left atrium is dilated. Signed by: Onelia Howe MD on 8/4/2023  2:49 PM      Assessment/Plan:   Impression: Pt is a 68 yo female with a history of recent COVID 5/2023, recent colon cancer diagnosis s/p resection without chemo, HTN, AS, MR s/p MVR, afib on coumadin and DM2. She was recently hospitalized 7/17-7/20/23 for acute respiratory failure and pt underwent bronch. BAL was nondiagnostic but AFB + MAC 8/1. Pt was seen in the office 7/28 and was doing well but presented to the ER on 8/2 with acute respiratory failure. Pt admitted and we were consulted to assist. HRCT with persistent GGO. Acute on chronic respiratory failure with hypoxemia (HCC)    Abnormal CT of the chest  Plan: now on 4 lpm, Chest CT with GGO with mosaic perfusion pattern on HRCT on 8/2. Previous BAL nondiagnostic but culture + MAC. HP panel, Sjogrens Ab , Georgie-I ab, ANCA panel, JAYE all sent- P. Dr Dennis Keys was consulted for [facility]. Steroids have not been started. Request that biopsies target areas with ground glass opacification on imaging, and that they be sent out to Brandt Staton at [facility] for review. Aortic stenosis  Plan: CLAUDIO negative for severe valvular disease and plan to proceed with surgical lung biopsy for undetermined ILD      Type 2 diabetes mellitus with microalbuminuria, with long-term current use of insulin (HCC)  Plan: SSI      Essential hypertension  Plan: controlled      Hypothyroidism, postsurgical  Plan: synthroid      Paroxysmal atrial fibrillation (HCC)  Plan: on coumadin      Acute pneumonitis  Plan: persistent patchy GGO on imaging. HP panel checked, Sed rate 57, CRP 3.0. ILD labs sent. I spoke with Np with surgery and they will come by today and discuss POC, bx timing, coumadin et [place].        Full Code    In this split/shared evaluation I performed performed a medically appropriate history and exam, counseled and educated the patient and/or family

## 2023-08-05 NOTE — DISCHARGE SUMMARY
4:22:00 AM       Labs: Results:       BMP, Mg, Phos Recent Labs     08/03/23  0746 08/04/23  0509 08/05/23  0554    146* 142   K 4.8 3.8 3.6   * 112* 110   CO2 24 27 30   ANIONGAP 7 7 2   BUN 16 17 17   CREATININE 0.70 0.60 0.60   LABGLOM >60 >60 >60   CALCIUM 8.3 8.6 8.6   GLUCOSE 58* 68 74      CBC @LABRCNT(WBC:3,RBC:3,HGB:3,HCT:3,MCV:3,MCH:3,MCHC:3,RDW:3,PLT:3,MPV:3,NRBC:3,SEGS:3,LYMPHOPCT:3,EOSRELPCT:3,MONOPCT:3,BASOPCT:3,IMMGRAN:3,SEGSABS:3,LYMPHSABS:3,EOSABS:3,MONOSABS:3,BASOSABS:3,ABSIMMGRAN:3)@   LFT Recent Labs     08/03/23  0746   BILITOT 0.7   ALKPHOS 77   AST 35   ALT 31   PROT 7.3   LABALBU 2.9*   GLOB 4.4      Cardiac  Lab Results   Component Value Date/Time    NTPROBNP 330 08/02/2023 03:30 AM    NTPROBNP 409 07/17/2023 01:35 AM    TROPHS 98.2 07/17/2023 09:14 AM    TROPHS 98.6 07/17/2023 01:35 AM      Coags Lab Results   Component Value Date/Time    PROTIME 27.7 08/05/2023 05:54 AM    PROTIME 27.0 08/04/2023 05:09 AM    PROTIME 26.3 08/03/2023 07:46 AM    INR 2.5 08/05/2023 05:54 AM    INR 2.4 08/04/2023 05:09 AM    INR 2.3 08/03/2023 07:46 AM    INR 2.4 05/18/2022 09:34 AM    INR 1.9 04/21/2022 09:23 AM    INR 2.1 03/21/2022 10:21 AM      A1c Lab Results   Component Value Date/Time    LABA1C 5.7 08/03/2023 07:46 AM    LABA1C 6.6 06/02/2023 07:09 AM    LABA1C 6.1 02/24/2023 07:50 AM     08/03/2023 07:46 AM     06/02/2023 07:09 AM     02/24/2023 07:50 AM      Lipids Lab Results   Component Value Date/Time    CHOL 61 06/02/2023 07:09 AM    LDLCALC 18 06/02/2023 07:09 AM    LABVLDL 18 06/02/2023 07:09 AM    HDL 25 06/02/2023 07:09 AM    CHOLHDLRATIO 2.4 06/02/2023 07:09 AM    TRIG 90 06/02/2023 07:09 AM      Thyroid  Lab Results   Component Value Date/Time    TSHELE 0.95 07/17/2023 09:14 AM    UEO8SQU 0.711 06/02/2023 07:09 AM        Most Recent UA Lab Results   Component Value Date/Time    COLORU Yellow 03/02/2021 10:24 AM    SPECGRAV 1.027 03/02/2021 10:24 AM Updated: 08/02/23 1208     MRSA by PCR Not detected        Comment: A positive test result does not necessarily indicate the presence of a viable organism. A positive result is indicative of the presence of SA or MRSA DNA. The BD Max StaphSR assay is intended to aid in the prevention and control of MRSA and SA infections in healthcare settings. It is not intended to diagnose MRSA or SA infections nor guide or monitor treatment for MRSA/SA infections. A negative result does not preclude nasal colonization. SA by PCR Not detected       Culture, Blood 1 [[id number]] Collected: 08/02/23 0539    Order Status: Completed Specimen: Blood Updated: 08/04/23 0801     Special Requests --        RIGHT  FOREARM       Culture NO GROWTH 2 DAYS       Culture, Blood 1 [[id number]] Collected: 08/02/23 0539    Order Status: Completed Specimen: Blood Updated: 08/04/23 0801     Special Requests --        LEFT  HAND       Culture NO GROWTH 2 DAYS               All Labs from Last 24 Hrs:  Recent Results (from the past 24 hour(s))   Transesophageal echocardiogram (CLAUDIO) with contrast and 3D PRN    Collection Time: 08/04/23  2:25 PM   Result Value Ref Range    AV Mean Velocity 2.3 m/s    AV Mean Gradient 24 mmHg    AV VTI 63.1 cm    AV Peak Velocity 3.2 m/s    AV Peak Gradient 40 mmHg    MV Mean Gradient 8 mmHg    MV VTI 53.4 cm    MV Mean Velocity 1.4 m/s    MV Max Velocity 2.0 m/s    MV Peak Gradient 17 mmHg    Body Surface Area 1.71 m2   POCT Glucose    Collection Time: 08/04/23  5:24 PM   Result Value Ref Range    POC Glucose 87 65 - 100 mg/dL    Performed by:  Chan    POCT Glucose    Collection Time: 08/04/23  7:52 PM   Result Value Ref Range    POC Glucose 256 (H) 65 - 100 mg/dL    Performed by: Marjorie Finn    Protime-INR    Collection Time: 08/05/23  5:54 AM   Result Value Ref Range    Protime 27.7 (H) 12.6 - 14.3 sec    INR 2.5     CBC with Auto Differential    Collection Time: 08/05/23  5:54 AM   Result Value Ref

## 2023-08-05 NOTE — PROGRESS NOTES
Oxygen Qualifier       Room air: SpO2 with O2 and liter flow   Resting SpO2  84%  88% on 1L / 91% on 2L   Ambulating SpO2  91% on 2L      90 to 93% ambulating on 2L    Completed by:    Todd Ignacio RCP

## 2023-08-07 ENCOUNTER — CARE COORDINATION (OUTPATIENT)
Dept: CARE COORDINATION | Facility: CLINIC | Age: 78
End: 2023-08-07

## 2023-08-07 NOTE — CARE COORDINATION
[facility] Care Transitions Initial Follow Up Call    Call within 2 business days of discharge: Yes    Patient Current Location:  Home: [address] [place] [address] Dr Roz Aviles [id number]    LPN Care Coordinator contacted the family by telephone to perform post hospital discharge assessment. Verified name and  with family as identifiers. Provided introduction to self, and explanation of the LPN Care Coordinator role. Patient: Colten Lacey Patient : 1945   MRN: [id number]  Reason for Admission: Acute on chronic respiratory failure with hypoxemia  Discharge Date: 23 RARS: Readmission Risk Score: 17.9      Last Discharge [address] [facility]       Date Complaint Diagnosis Description Type Department Provider    23 Shortness of Breath Hypoxia . .. ED to Hosp-Admission (Discharged) (ADMITTED) [id number] Dennise Jerome MD; Milton Jerome,... Was this an external facility discharge? No Discharge Facility: sfd    Challenges to be reviewed by the provider   Additional needs identified to be addressed with provider: No  none               Method of communication with provider: none. Mr. Guilherme Arriaga states patient is wearing O2 at 2.5L comfortably, reports appointment with Dr Milla Leon has been rescheduled to tomorrow . Deny needs or concerns at this time. LPN Care Coordinator reviewed discharge instructions, medical action plan, and red flags with family who verbalized understanding. The family was given an opportunity to ask questions and does not have any further questions or concerns at this time. Were discharge instructions available to patient? Yes. Reviewed appropriate site of care based on symptoms and resources available to patient including: PCP  Specialist  When to call 911. The family agrees to contact the PCP office for questions related to their healthcare. Advance Care Planning:   Does patient have an Advance Directive:  decision maker verified .     Medication reconciliation was performed with

## 2023-08-08 ENCOUNTER — TELEPHONE (OUTPATIENT)
Dept: PULMONOLOGY | Age: 78
End: 2023-08-08

## 2023-08-08 ENCOUNTER — PREP FOR PROCEDURE (OUTPATIENT)
Dept: SURGERY | Age: 78
End: 2023-08-08

## 2023-08-08 ENCOUNTER — OFFICE VISIT (OUTPATIENT)
Dept: SURGERY | Age: 78
End: 2023-08-08
Payer: MEDICARE

## 2023-08-08 ENCOUNTER — TELEPHONE (OUTPATIENT)
Age: 78
End: 2023-08-08

## 2023-08-08 VITALS
OXYGEN SATURATION: 96 % | DIASTOLIC BLOOD PRESSURE: 60 MMHG | BODY MASS INDEX: 25.69 KG/M2 | WEIGHT: 145 LBS | SYSTOLIC BLOOD PRESSURE: 142 MMHG | HEIGHT: 63 IN | HEART RATE: 71 BPM

## 2023-08-08 DIAGNOSIS — R93.89 ABNORMAL CT OF THE CHEST: Primary | ICD-10-CM

## 2023-08-08 LAB — RHEUMATOID FACT SER QL LA: NEGATIVE

## 2023-08-08 PROCEDURE — 1123F ACP DISCUSS/DSCN MKR DOCD: CPT | Performed by: SURGERY

## 2023-08-08 PROCEDURE — G8427 DOCREV CUR MEDS BY ELIG CLIN: HCPCS | Performed by: SURGERY

## 2023-08-08 PROCEDURE — G8399 PT W/DXA RESULTS DOCUMENT: HCPCS | Performed by: SURGERY

## 2023-08-08 PROCEDURE — 3077F SYST BP >= 140 MM HG: CPT | Performed by: SURGERY

## 2023-08-08 PROCEDURE — 1111F DSCHRG MED/CURRENT MED MERGE: CPT | Performed by: SURGERY

## 2023-08-08 PROCEDURE — 3078F DIAST BP <80 MM HG: CPT | Performed by: SURGERY

## 2023-08-08 PROCEDURE — 1090F PRES/ABSN URINE INCON ASSESS: CPT | Performed by: SURGERY

## 2023-08-08 PROCEDURE — G8419 CALC BMI OUT NRM PARAM NOF/U: HCPCS | Performed by: SURGERY

## 2023-08-08 PROCEDURE — 1036F TOBACCO NON-USER: CPT | Performed by: SURGERY

## 2023-08-08 PROCEDURE — 99203 OFFICE O/P NEW LOW 30 MIN: CPT | Performed by: SURGERY

## 2023-08-08 RX ORDER — SODIUM CHLORIDE 0.9 % (FLUSH) 0.9 %
5-40 SYRINGE (ML) INJECTION EVERY 12 HOURS SCHEDULED
Status: CANCELLED | OUTPATIENT
Start: 2023-08-08

## 2023-08-08 RX ORDER — SODIUM CHLORIDE 0.9 % (FLUSH) 0.9 %
5-40 SYRINGE (ML) INJECTION PRN
Status: CANCELLED | OUTPATIENT
Start: 2023-08-08

## 2023-08-08 RX ORDER — SODIUM CHLORIDE 9 MG/ML
INJECTION, SOLUTION INTRAVENOUS PRN
Status: CANCELLED | OUTPATIENT
Start: 2023-08-08

## 2023-08-08 NOTE — TELEPHONE ENCOUNTER
*late entry* patient was in office prior to appt w/ Dr. Ace Cole across the forrest. Patient and spouse came to office and express to staff that they had some questions about increased oxygen need. This staff introduced as RN for office and helps w/ MD orders, advised patient front office staff expressed that there are questions about O2 needs and increaed need, asked to explain if O2 is needed at higher rate than hosp d/c  last week. Has tank in w/c with her and is using in office/lobby concentrator @ 4L. Patient explains that she is needing more O2 at different times. Spouse give recent hospitalzation overview and that each time she has gone home has needed higher O2 setting, but continues to have problems maintaining sats at home, asks if concentrator at home could be malfunctioning, notes that they have oximeter and are adjusting O2 settings based on sats below 90% & that Dr. Martin Cortez indicated she needed a 10L concentrator. They have contacted [facility] (current DME) to ask when 10L concentrator will come & were told no orders received by DME. Spouse notes that is becoming difficult to remain calm when asking for supplies and services because it seems patient is worse every time she comes home from hospital.     Advised they are using good method to determine O2 needs, shares she had problem last night feeling sob and sats in 80s w/ any activity and O2 on 5L because that was as high as the concentrator would go. Advised needs to report to appt w/ Dr. Ace Cole. Our office will check for orders for 10L concentrator and depending on plan from surgeon may ancelmo to move pulmonary appt sooner. Reassured that our office will communicate today regarding research on 10L concentrator order & that f/u for all parts of treatment team is currently depended on surgeon's plan. Provided therapeutic listening.

## 2023-08-08 NOTE — TELEPHONE ENCOUNTER
[phone]  Cardiac Clearance        Physician or Practice Requesting:[facility]  : Office  Contact Phone Number: [phone]  Fax Number: [phone]  Date of Surgery/Procedure: 08/17/2023  Type of Surgery or Procedure: Right VATS & Bioscopy  Type of Anesthesia: General    Type of Clearance Requested:   Medication to Hold:Coumadin  Days to Hold: 7 days prior

## 2023-08-08 NOTE — PROGRESS NOTES
[facility]  3 . [address] [place] [address], [address] [place] [address] Nimco Rudolph, [address] [place] [address]  [phone]     2023  Patient:  Larry Call  : 1945    CHRISTOPHER Call is a 69 y.o. female who is seen in consultation for a lung biopsy of groundglass opacities on CT scan with unknown etiology. The patient states that about a month ago she developed shortness of breath at night and was unable to improve. She states her pulse ox was found to be in the 80's and she went to the emergency department. She had a CTA on 2023 that revealed patchy groundglass opacification throughout the lungs that could be secondary to small airways or small vessels disease. There was also associated mediastinal adenopathy. She states she was treated with antibiotics and was discharged after a few days on home oxygen. She then had a similar episode where she was admitted on 2023 where she had another CT scan with similar findings of persistent groundglass opacities with mosaic perfusion pattern possibly reflecting small airways disease or other infectious or inflammatory pneumonitis. She was hospitalized again for a few days, and no clear etiology for her symptoms. She states she has been at home on 3L oxygen. The patient states that prior to her initial hospitalization she had not been oxygen. She states she did have bronchitis earlier this year as well as COVID in May of this year. The patient does have a significant history of an aortic valve and mitral valve replacement in . She is on coumadin. Also has a history of atrial fibrillation. She also has a history of colon cancer that was treated with LAR. She did not require any chemo or radiation. She is an insulin dependent diabetic.          Past Medical History:   Diagnosis Date    Anesthesia complication     slow to wake up    Aortic valve insufficiency     CAD (coronary artery disease)     Followed by [facility] Card    Colon cancer ([address] ) 2021

## 2023-08-08 NOTE — TELEPHONE ENCOUNTER
Review of case management notes indicate no needs at d/c. No note to indicate O2 orders, but that O2 is in the home. F/u call w/ care coordination (8/7) indicates d/c 8/2 w/ resp fail, wearing O2 @ 2.5L comfortable, has f/u appt w/ Dr. Ace Cole 8/8, opportunity for questions & not d/c with oximeter, has f/u appts scheduled and replied yes to feeling like has everything needed to keep well at home. D/c summary indicates patient needs bx, will have surgery appt 8/7 or 8/8, will need 1 week f/u w/ pulmonary and walk test showed O2 need to be 2L @ rest & ambulation. Last pulmonary note indicates recent increase in O2 needs from just exertion to at rest as well, @ 2L but now needing 4L @ times and 2L @ rest. Bx needed. No notes about concentrator changes. Has f/u appt w/ our office 9/1. Was seen @ surgeon office this morning and surgery was scheduled (8/17): \"It was discussed with the patient and her  that we will schedule her to undergo a right VATS for a biopsy of her lung where she has these findings on CT scan as her workup thus far has been unable to identify the source. She will need to be off her coumadin for 7 days prior to the surgery. \"

## 2023-08-08 NOTE — TELEPHONE ENCOUNTER
MD Indigo Resendiz, DONNAP  Can you please help make sure Dr. Hira Ibarra sees this patient in the next few days in the office, like early this week? She needs a surgical lung biopsy and it needs to be sent to [facility] patient is scheduled to see Dr Hira Ibarra 8/8 with notes attached to appointment that tissue needs to go to [facility].

## 2023-08-09 ENCOUNTER — TELEPHONE (OUTPATIENT)
Age: 78
End: 2023-08-09

## 2023-08-09 LAB
A FUMIGATUS1 AB SER QL ID: NEGATIVE
A PULLULANS AB SER QL: NEGATIVE
C-ANCA TITR SER IF: NORMAL TITER
LACEYELLA SACCHARI AB SER QL: NEGATIVE
MYELOPEROXIDASE AB SER IA-ACNC: <0.2 UNITS (ref 0–0.9)
P-ANCA ATYPICAL TITR SER IF: NORMAL TITER
P-ANCA TITR SER IF: NORMAL TITER
PIGEON SERUM AB QL ID: NEGATIVE
PROTEINASE3 AB SER IA-ACNC: <0.2 UNITS (ref 0–0.9)
S RECTIVIRGULA IGG SER QL ID: NEGATIVE
T VULGARIS AB SER QL ID: NEGATIVE

## 2023-08-09 RX ORDER — DOFETILIDE 0.5 MG/1
500 CAPSULE ORAL 2 TIMES DAILY
Qty: 180 CAPSULE | Refills: 3 | Status: SHIPPED | OUTPATIENT
Start: 2023-08-09

## 2023-08-09 NOTE — TELEPHONE ENCOUNTER
called coumadin voice mail. Patient is having open lung biopsy next Thursday 8-17-23. She will start holding her warfarin tomorrow. Will be having pre assessment at [facility] tomorrow at 11:00. Would like to bring patient in after pre assessment for PT check. Will find out if they will draw pt/inr tomorrow at [facility]. Patient has a very hard time to travel, when she moves, her oxygen drops to the 80's. Would like to make it all in one trip if possible. I rescheduled patient to 1:15 pm 8-10-23.  Will call if PT/INR is done at pre assessment//[email or web address]

## 2023-08-09 NOTE — TELEPHONE ENCOUNTER
Pt  Jose Luis Srinivasan) called and states that the pharmacy they were using for Tikosyn is no longer avaliable. Rx for Dofetilide (Tikosyn) 500 MCG needs to be faxed to [facility]. org  Fax: [phone]. Please call Jose Luis Srinivasan with any questions. Thank you.

## 2023-08-09 NOTE — TELEPHONE ENCOUNTER
Low to moderate risk  Okay to hold warfarin as requested, resume postoperatively only when okay from a postoperative bleeding standpoint, defer to operating physician  Do not interrupt Tikosyn if at all possible. Needs to continue this through hospitalization, holding only a single dose on the morning of the procedure.

## 2023-08-10 ENCOUNTER — ANESTHESIA EVENT (OUTPATIENT)
Dept: SURGERY | Age: 78
End: 2023-08-10
Payer: MEDICARE

## 2023-08-10 NOTE — PERIOP NOTE
Walk-in Pre-assessment was scheduled for 8/10/23 at 1300. Spouse called requesting that PAT appointment be done over the phone as patient was recently Dc'd from hospital (8/5/23) and labs and EKG done during admission and patient gets SOB with exertion. Case discussed with Dr. Catrina Villareal including Abnormal labs Hgb 8.1(8/5/23),  (8/5/23), Mag 1.7 (8/2/23) and  Medical hx , CAD, Aortic stenosis, Bioprostatic Valve replacement, A-Fib, on Coumadin and Tikosyn, DM. Per Dr. Catrina Villareal, Patient needs to have walk-in PAT assessment and can be rescheduled no later than 8/14/23. Verbal order received for BMP, CBC, Mag to be drawn during PAT appointment. Follow Anesthesia protocol for all other labs needed. POC Glucose DOS, T&S DOS, EKG, Mag, and  BMP DOS (Tikosyn protocol) PT/INR DOS ( Coumadin protocol). No In-person Anesthesia Assessment needed unless patient's PAT assessment indicates Anesthesiologist be notified. PAT walk-in assessment rescheduled to 8/14/23 at [address] [place] [address]. Patient's Spouse very appreciative and verbalizes understanding of newly scheduled appointment.

## 2023-08-11 LAB
ACID FAST STN SPEC: NEGATIVE
AMIKACIN ISLT MIC: ABNORMAL
CIPROFLOXACIN ISLT MIC: ABNORMAL
CLARITHRO ISLT MIC: ABNORMAL
CLOFAZIMINE: ABNORMAL
DOXYCYCLINE: ABNORMAL
LINEZOLID ISLT MIC: ABNORMAL
M AVIUM CMPLX RRNA SPEC QL PROBE: POSITIVE
M GORDONAE RRNA SPEC QL PROBE: ABNORMAL
M KANSASII RRNA SPEC QL PROBE: ABNORMAL
M TB CMPLX RRNA SPEC QL PROBE: NEGATIVE
MICROORGANISM/AGENT SPEC: ABNORMAL
MINOCYCLINE: ABNORMAL
MOXIFLOXACIN ISLT MIC: ABNORMAL
MYCOBACTERIUM SPEC QL CULT: POSITIVE
OTHER: ABNORMAL
RIFABUTIN: ABNORMAL
RIFAMPIN ISLT MIC: ABNORMAL
SPECIMEN PREPARATION: ABNORMAL
SPECIMEN SOURCE: ABNORMAL
STREPTOMYCIN ISLT MIC: ABNORMAL
SUSCEPT TESTING: ABNORMAL
TRIMETHOPRIM/SULFA: ABNORMAL

## 2023-08-11 NOTE — PERIOP NOTE
PLEASE CONTINUE TAKING ALL PRESCRIPTION MEDICATIONS UP TO THE DAY OF SURGERY UNLESS OTHERWISE DIRECTED BELOW. DISCONTINUE all vitamins and supplements 7 days prior to surgery. DISCONTINUE Non-Steriodal Anti-Inflammatory (NSAIDS) such as Advil and Aleve 5 days prior to surgery. Home Medications to take  the day of surgery    Aspirin  Amlodipine (Norvasc)    Levothyroxine (Synthroid)    Per anesthesia protocol: Insulin Degludec (Tresiba)-Take 80% of usual dose evening before procedure. Adjusted dose = 34 units. Home Medications   to Hold   Insulin aspart (Novolog) hold morning of surgery  Losartan (Cozaar) hold morning of surgery    Dofetilide (Tikosyn) hold morning of surgery and bring in original bottle to Pre-op     Warfarin (Coumadin) hold as instructed by Surgeon/Cardiologist     Comments      On the day before surgery please take Acetaminophen 1000mg in the morning and then again before bed. You may substitute for Tylenol 650 mg. Please do not bring home medications with you on the day of surgery unless otherwise directed by your nurse. If you are instructed to bring home medications, please give them to your nurse as they will be administered by the nursing staff. If you have any questions, please call [address] [place] [address] [phone]. A copy of this note was provided to the patient for reference.

## 2023-08-14 ENCOUNTER — HOSPITAL ENCOUNTER (OUTPATIENT)
Dept: SURGERY | Age: 78
Discharge: HOME | End: 2023-08-17
Payer: MEDICARE

## 2023-08-14 ENCOUNTER — TELEPHONE (OUTPATIENT)
Age: 78
End: 2023-08-14

## 2023-08-14 VITALS
HEIGHT: 66 IN | DIASTOLIC BLOOD PRESSURE: 70 MMHG | TEMPERATURE: 97.7 F | BODY MASS INDEX: 23.24 KG/M2 | OXYGEN SATURATION: 92 % | SYSTOLIC BLOOD PRESSURE: 145 MMHG | RESPIRATION RATE: 18 BRPM | WEIGHT: 144.6 LBS | HEART RATE: 70 BPM

## 2023-08-14 LAB
ANION GAP SERPL CALC-SCNC: 2 MMOL/L (ref 2–11)
BUN SERPL-MCNC: 18 MG/DL (ref 8–23)
CALCIUM SERPL-MCNC: 8.6 MG/DL (ref 8.3–10.4)
CHLORIDE SERPL-SCNC: 107 MMOL/L (ref 101–110)
CO2 SERPL-SCNC: 33 MMOL/L (ref 21–32)
CREAT SERPL-MCNC: 0.65 MG/DL (ref 0.6–1)
ERYTHROCYTE [DISTWIDTH] IN BLOOD BY AUTOMATED COUNT: 18 % (ref 11.9–14.6)
GLUCOSE SERPL-MCNC: 149 MG/DL (ref 65–100)
HCT VFR BLD AUTO: 31.2 % (ref 35.8–46.3)
HGB BLD-MCNC: 8.7 G/DL (ref 11.7–15.4)
MAGNESIUM SERPL-MCNC: 1.9 MG/DL (ref 1.8–2.4)
MCH RBC QN AUTO: 26.8 PG (ref 26.1–32.9)
MCHC RBC AUTO-ENTMCNC: 27.9 G/DL (ref 31.4–35)
MCV RBC AUTO: 96 FL (ref 82–102)
NRBC # BLD: 0 K/UL (ref 0–0.2)
PLATELET # BLD AUTO: 254 K/UL (ref 150–450)
PMV BLD AUTO: 9.4 FL (ref 9.4–12.3)
POTASSIUM SERPL-SCNC: 4.4 MMOL/L (ref 3.5–5.1)
RBC # BLD AUTO: 3.25 M/UL (ref 4.05–5.2)
SODIUM SERPL-SCNC: 142 MMOL/L (ref 133–143)
WBC # BLD AUTO: 8.4 K/UL (ref 4.3–11.1)

## 2023-08-14 PROCEDURE — 85027 COMPLETE CBC AUTOMATED: CPT

## 2023-08-14 PROCEDURE — 83735 ASSAY OF MAGNESIUM: CPT

## 2023-08-14 PROCEDURE — 80048 BASIC METABOLIC PNL TOTAL CA: CPT

## 2023-08-14 NOTE — PERIOP NOTE
Hgb 8.7, Magnesium 1.9 called to Dr. Roderick Newton. Verbal order received to add Hgb DOS. and Notify Dr. Lang Rosales. Call Placed to Dr. Lang Rosales. Reported hgb 8.7. No additional orders received. Type & Screen as ordred Pre-op.  Pre-op orders per anesthesia protocol DOS are: PT/INR., Mag, BMP, Hgb, T&S, POC Glucose, and EKG

## 2023-08-14 NOTE — PERIOP NOTE
Patient verified name and     Order for consent was found in EHR and matches case posting; patient verified. Type III surgery, walk-in assessment complete. Labs per surgeon: PAT protocol;   Labs per anesthesia protocol: CBC, BMP, MAG ; DOS per Tikosyn protocol ( EKG, BMP, MAG), PT/INR DOS, POC Glucose DOS   EKG: not required today EKG 23 in EHR    Cardiac clearance for surgery and medication hold (Coumadin) from Dr. Itzel Wheatley (23). Case previously discussed with Dr. Serena Leija (see note 8/10/23)      [facility] approved surgical skin cleanser and instructions given per hospital policy. Patient provided with and instructed on educational handouts including Guide to Surgery, Pain Management, Hand Hygiene, Blood Transfusion Education, and [facility] Brochure. Patient answered medical/surgical history questions at their best of ability. All prior to admission medications documented in [facility]. Original medication prescription bottle  visualized during patient appointment. Patient instructed to hold all vitamins 7 days prior to surgery and NSAIDS 5 days prior to surgery, patient verbalized understanding. Patient teach back successful and patient demonstrates knowledge of instructions.

## 2023-08-15 ENCOUNTER — CARE COORDINATION (OUTPATIENT)
Dept: CARE COORDINATION | Facility: CLINIC | Age: 78
End: 2023-08-15

## 2023-08-15 RX ORDER — DOFETILIDE 0.5 MG/1
500 CAPSULE ORAL 2 TIMES DAILY
Qty: 180 CAPSULE | Refills: 3 | Status: SHIPPED | OUTPATIENT
Start: 2023-08-15 | End: 2023-08-15 | Stop reason: SDUPTHER

## 2023-08-15 RX ORDER — DOFETILIDE 0.5 MG/1
500 CAPSULE ORAL 2 TIMES DAILY
Qty: 180 CAPSULE | Refills: 3 | Status: SHIPPED | OUTPATIENT
Start: 2023-08-15

## 2023-08-15 NOTE — CARE COORDINATION
readmission:  comorbidities and/or treatment  Interventions to address risk factors:  compliance with all plans of care    Offered patient enrollment in the Remote Patient Monitoring (RPM) program for in-home monitoring: NA.     Care Transitions Subsequent and Final Call    Schedule Follow Up Appointment with PCP: Completed  Subsequent and Final Calls  Do you have any ongoing symptoms?: Yes  Have your medications changed?: No  Do you have any questions related to your medications?: No  Do you currently have any active services?: No  Do you have any needs or concerns that I can assist you with?: No  Identified Barriers: None  Care Transitions Interventions  No Identified Needs  Other Interventions:             LPN Care Coordinator provided contact information for future needs. No further follow-up call indicated based on plan for readmission.  Plan for next call:  graduated from [address], LPN

## 2023-08-17 ENCOUNTER — HOSPITAL ENCOUNTER (INPATIENT)
Age: 78
LOS: 2 days | Discharge: HOME | End: 2023-08-19
Attending: SURGERY | Admitting: SURGERY
Payer: MEDICARE

## 2023-08-17 ENCOUNTER — ANESTHESIA (OUTPATIENT)
Dept: SURGERY | Age: 78
End: 2023-08-17
Payer: MEDICARE

## 2023-08-17 ENCOUNTER — APPOINTMENT (OUTPATIENT)
Dept: GENERAL RADIOLOGY | Age: 78
End: 2023-08-17
Attending: SURGERY
Payer: MEDICARE

## 2023-08-17 DIAGNOSIS — J96.21 ACUTE ON CHRONIC RESPIRATORY FAILURE WITH HYPOXEMIA (HCC): ICD-10-CM

## 2023-08-17 DIAGNOSIS — R91.8 LUNG NODULES: Primary | ICD-10-CM

## 2023-08-17 DIAGNOSIS — J18.9 ACUTE PNEUMONITIS: ICD-10-CM

## 2023-08-17 LAB
ABO + RH BLD: NORMAL
AMIKACIN ISLT MIC: ABNORMAL
ANION GAP SERPL CALC-SCNC: 4 MMOL/L (ref 2–11)
ARTERIAL PATENCY WRIST A: ABNORMAL
BASE EXCESS BLD CALC-SCNC: 6.8 MMOL/L
BDY SITE: ABNORMAL
BLD PROD TYP BPU: NORMAL
BLD PROD TYP BPU: NORMAL
BLOOD BANK DISPENSE STATUS: NORMAL
BLOOD BANK DISPENSE STATUS: NORMAL
BLOOD GROUP ANTIBODIES SERPL: NORMAL
BPU ID: NORMAL
BPU ID: NORMAL
BUN SERPL-MCNC: 18 MG/DL (ref 8–23)
CALCIUM SERPL-MCNC: 8.7 MG/DL (ref 8.3–10.4)
CHLORIDE SERPL-SCNC: 109 MMOL/L (ref 101–110)
CIPROFLOXACIN ISLT MIC: ABNORMAL
CLARITHRO ISLT MIC: ABNORMAL
CLOFAZIMINE: ABNORMAL
CO2 SERPL-SCNC: 32 MMOL/L (ref 21–32)
CREAT SERPL-MCNC: 0.7 MG/DL (ref 0.6–1)
CROSSMATCH RESULT: NORMAL
CROSSMATCH RESULT: NORMAL
DOXYCYCLINE: ABNORMAL
EKG ATRIAL RATE: 68 BPM
EKG DIAGNOSIS: NORMAL
EKG Q-T INTERVAL: 424 MS
EKG QRS DURATION: 88 MS
EKG QTC CALCULATION (BAZETT): 450 MS
EKG R AXIS: 24 DEGREES
EKG T AXIS: 69 DEGREES
EKG VENTRICULAR RATE: 68 BPM
GAS FLOW.O2 O2 DELIVERY SYS: ABNORMAL
GLUCOSE BLD STRIP.AUTO-MCNC: 133 MG/DL (ref 65–100)
GLUCOSE BLD STRIP.AUTO-MCNC: 167 MG/DL (ref 65–100)
GLUCOSE BLD STRIP.AUTO-MCNC: 255 MG/DL (ref 65–100)
GLUCOSE SERPL-MCNC: 136 MG/DL (ref 65–100)
HCO3 BLD-SCNC: 33.7 MMOL/L (ref 22–26)
HGB BLD-MCNC: 8.6 G/DL (ref 11.7–15.4)
HISTORY CHECK: NORMAL
INR BLD: 1 (ref 0.9–1.2)
INR PPP: 1.1
LINEZOLID ISLT MIC: ABNORMAL
MAGNESIUM SERPL-MCNC: 2.1 MG/DL (ref 1.8–2.4)
MICROORGANISM/AGENT SPEC: ABNORMAL
MINOCYCLINE: ABNORMAL
MOXIFLOXACIN ISLT MIC: ABNORMAL
O2/TOTAL GAS SETTING VFR VENT: 80 %
PCO2 BLD: 60.9 MMHG (ref 35–45)
PH BLD: 7.35 (ref 7.35–7.45)
PO2 BLD: 92 MMHG (ref 75–100)
POTASSIUM BLD-SCNC: 4.5 MMOL/L (ref 3.5–5.1)
POTASSIUM SERPL-SCNC: 4.5 MMOL/L (ref 3.5–5.1)
PROTHROMBIN TIME: 14.3 SEC (ref 12.6–14.3)
PT BLD: 12.4 SECS (ref 9.6–11.6)
RIFABUTIN: ABNORMAL
RIFAMPIN ISLT MIC: ABNORMAL
SAO2 % BLD: 96.4 % (ref 95–98)
SERVICE CMNT-IMP: ABNORMAL
SODIUM SERPL-SCNC: 145 MMOL/L (ref 133–143)
SPECIMEN EXP DATE BLD: NORMAL
SPECIMEN SOURCE: ABNORMAL
SPECIMEN TYPE: ABNORMAL
STREPTOMYCIN ISLT MIC: ABNORMAL
TRIMETHOPRIM/SULFA: ABNORMAL
UNIT DIVISION: 0
UNIT DIVISION: 0

## 2023-08-17 PROCEDURE — 6370000000 HC RX 637 (ALT 250 FOR IP): Performed by: PHYSICIAN ASSISTANT

## 2023-08-17 PROCEDURE — 0W9930Z DRAINAGE OF RIGHT PLEURAL CAVITY WITH DRAINAGE DEVICE, PERCUTANEOUS APPROACH: ICD-10-PCS | Performed by: SURGERY

## 2023-08-17 PROCEDURE — 6360000002 HC RX W HCPCS: Performed by: PHYSICIAN ASSISTANT

## 2023-08-17 PROCEDURE — C9113 INJ PANTOPRAZOLE SODIUM, VIA: HCPCS | Performed by: PHYSICIAN ASSISTANT

## 2023-08-17 PROCEDURE — 84132 ASSAY OF SERUM POTASSIUM: CPT

## 2023-08-17 PROCEDURE — 2700000000 HC OXYGEN THERAPY PER DAY

## 2023-08-17 PROCEDURE — 32607 THORACOSCOPY W/BX INFILTRATE: CPT | Performed by: SURGERY

## 2023-08-17 PROCEDURE — 88305 TISSUE EXAM BY PATHOLOGIST: CPT

## 2023-08-17 PROCEDURE — 86900 BLOOD TYPING SEROLOGIC ABO: CPT

## 2023-08-17 PROCEDURE — 88307 TISSUE EXAM BY PATHOLOGIST: CPT

## 2023-08-17 PROCEDURE — 94761 N-INVAS EAR/PLS OXIMETRY MLT: CPT

## 2023-08-17 PROCEDURE — 83735 ASSAY OF MAGNESIUM: CPT

## 2023-08-17 PROCEDURE — 80048 BASIC METABOLIC PNL TOTAL CA: CPT

## 2023-08-17 PROCEDURE — 6360000002 HC RX W HCPCS: Performed by: ANESTHESIOLOGY

## 2023-08-17 PROCEDURE — 2709999900 HC NON-CHARGEABLE SUPPLY: Performed by: SURGERY

## 2023-08-17 PROCEDURE — 2500000003 HC RX 250 WO HCPCS: Performed by: PHYSICIAN ASSISTANT

## 2023-08-17 PROCEDURE — 6360000002 HC RX W HCPCS: Performed by: SURGERY

## 2023-08-17 PROCEDURE — 7100000000 HC PACU RECOVERY - FIRST 15 MIN: Performed by: SURGERY

## 2023-08-17 PROCEDURE — 86923 COMPATIBILITY TEST ELECTRIC: CPT

## 2023-08-17 PROCEDURE — C2618 PROBE/NEEDLE, CRYO: HCPCS | Performed by: SURGERY

## 2023-08-17 PROCEDURE — 2500000003 HC RX 250 WO HCPCS: Performed by: NURSE ANESTHETIST, CERTIFIED REGISTERED

## 2023-08-17 PROCEDURE — C9399 UNCLASSIFIED DRUGS OR BIOLOG: HCPCS | Performed by: NURSE ANESTHETIST, CERTIFIED REGISTERED

## 2023-08-17 PROCEDURE — 93010 ELECTROCARDIOGRAM REPORT: CPT | Performed by: INTERNAL MEDICINE

## 2023-08-17 PROCEDURE — 3700000001 HC ADD 15 MINUTES (ANESTHESIA): Performed by: SURGERY

## 2023-08-17 PROCEDURE — 86901 BLOOD TYPING SEROLOGIC RH(D): CPT

## 2023-08-17 PROCEDURE — 1100000000 HC RM PRIVATE

## 2023-08-17 PROCEDURE — C9399 UNCLASSIFIED DRUGS OR BIOLOG: HCPCS

## 2023-08-17 PROCEDURE — 93005 ELECTROCARDIOGRAM TRACING: CPT | Performed by: STUDENT IN AN ORGANIZED HEALTH CARE EDUCATION/TRAINING PROGRAM

## 2023-08-17 PROCEDURE — 2580000003 HC RX 258: Performed by: PHYSICIAN ASSISTANT

## 2023-08-17 PROCEDURE — 76942 ECHO GUIDE FOR BIOPSY: CPT | Performed by: ANESTHESIOLOGY

## 2023-08-17 PROCEDURE — 6360000002 HC RX W HCPCS: Performed by: NURSE ANESTHETIST, CERTIFIED REGISTERED

## 2023-08-17 PROCEDURE — 2500000003 HC RX 250 WO HCPCS: Performed by: ANESTHESIOLOGY

## 2023-08-17 PROCEDURE — 86850 RBC ANTIBODY SCREEN: CPT

## 2023-08-17 PROCEDURE — 3600000004 HC SURGERY LEVEL 4 BASE: Performed by: SURGERY

## 2023-08-17 PROCEDURE — 2580000003 HC RX 258: Performed by: ANESTHESIOLOGY

## 2023-08-17 PROCEDURE — 88112 CYTOPATH CELL ENHANCE TECH: CPT

## 2023-08-17 PROCEDURE — 37799 UNLISTED PX VASCULAR SURGERY: CPT

## 2023-08-17 PROCEDURE — A4216 STERILE WATER/SALINE, 10 ML: HCPCS | Performed by: PHYSICIAN ASSISTANT

## 2023-08-17 PROCEDURE — 85610 PROTHROMBIN TIME: CPT

## 2023-08-17 PROCEDURE — 2580000003 HC RX 258: Performed by: NURSE ANESTHETIST, CERTIFIED REGISTERED

## 2023-08-17 PROCEDURE — 7100000001 HC PACU RECOVERY - ADDTL 15 MIN: Performed by: SURGERY

## 2023-08-17 PROCEDURE — 3700000000 HC ANESTHESIA ATTENDED CARE: Performed by: SURGERY

## 2023-08-17 PROCEDURE — 3E0T3BZ INTRODUCTION OF ANESTHETIC AGENT INTO PERIPHERAL NERVES AND PLEXI, PERCUTANEOUS APPROACH: ICD-10-PCS | Performed by: SURGERY

## 2023-08-17 PROCEDURE — C1729 CATH, DRAINAGE: HCPCS | Performed by: SURGERY

## 2023-08-17 PROCEDURE — 2720000010 HC SURG SUPPLY STERILE: Performed by: SURGERY

## 2023-08-17 PROCEDURE — 71045 X-RAY EXAM CHEST 1 VIEW: CPT

## 2023-08-17 PROCEDURE — 82962 GLUCOSE BLOOD TEST: CPT

## 2023-08-17 PROCEDURE — 85018 HEMOGLOBIN: CPT

## 2023-08-17 PROCEDURE — 0BBF4ZX EXCISION OF RIGHT LOWER LUNG LOBE, PERCUTANEOUS ENDOSCOPIC APPROACH, DIAGNOSTIC: ICD-10-PCS | Performed by: SURGERY

## 2023-08-17 PROCEDURE — 6360000002 HC RX W HCPCS

## 2023-08-17 PROCEDURE — 82803 BLOOD GASES ANY COMBINATION: CPT

## 2023-08-17 PROCEDURE — 3600000014 HC SURGERY LEVEL 4 ADDTL 15MIN: Performed by: SURGERY

## 2023-08-17 PROCEDURE — C1713 ANCHOR/SCREW BN/BN,TIS/BN: HCPCS | Performed by: SURGERY

## 2023-08-17 RX ORDER — SODIUM CHLORIDE 9 MG/ML
INJECTION, SOLUTION INTRAVENOUS PRN
Status: DISCONTINUED | OUTPATIENT
Start: 2023-08-17 | End: 2023-08-17 | Stop reason: HOSPADM

## 2023-08-17 RX ORDER — INSULIN LISPRO 100 [IU]/ML
0-16 INJECTION, SOLUTION INTRAVENOUS; SUBCUTANEOUS
Status: DISCONTINUED | OUTPATIENT
Start: 2023-08-17 | End: 2023-08-19 | Stop reason: HOSPADM

## 2023-08-17 RX ORDER — ONDANSETRON 4 MG/1
4 TABLET, ORALLY DISINTEGRATING ORAL EVERY 8 HOURS PRN
Status: DISCONTINUED | OUTPATIENT
Start: 2023-08-17 | End: 2023-08-19 | Stop reason: HOSPADM

## 2023-08-17 RX ORDER — SODIUM CHLORIDE, SODIUM LACTATE, POTASSIUM CHLORIDE, CALCIUM CHLORIDE 600; 310; 30; 20 MG/100ML; MG/100ML; MG/100ML; MG/100ML
INJECTION, SOLUTION INTRAVENOUS CONTINUOUS
Status: DISCONTINUED | OUTPATIENT
Start: 2023-08-17 | End: 2023-08-18

## 2023-08-17 RX ORDER — AMLODIPINE BESYLATE 5 MG/1
2.5 TABLET ORAL 2 TIMES DAILY
Status: DISCONTINUED | OUTPATIENT
Start: 2023-08-17 | End: 2023-08-19 | Stop reason: HOSPADM

## 2023-08-17 RX ORDER — PROCHLORPERAZINE EDISYLATE 5 MG/ML
10 INJECTION INTRAMUSCULAR; INTRAVENOUS EVERY 6 HOURS PRN
Status: DISCONTINUED | OUTPATIENT
Start: 2023-08-17 | End: 2023-08-19 | Stop reason: HOSPADM

## 2023-08-17 RX ORDER — ACETAMINOPHEN 500 MG
1000 TABLET ORAL EVERY 8 HOURS PRN
Status: DISCONTINUED | OUTPATIENT
Start: 2023-08-17 | End: 2023-08-19 | Stop reason: HOSPADM

## 2023-08-17 RX ORDER — BUPIVACAINE HYDROCHLORIDE 5 MG/ML
INJECTION, SOLUTION EPIDURAL; INTRACAUDAL PRN
Status: DISCONTINUED | OUTPATIENT
Start: 2023-08-17 | End: 2023-08-17 | Stop reason: HOSPADM

## 2023-08-17 RX ORDER — GABAPENTIN 300 MG/1
300 CAPSULE ORAL 3 TIMES DAILY
Status: DISCONTINUED | OUTPATIENT
Start: 2023-08-17 | End: 2023-08-19 | Stop reason: HOSPADM

## 2023-08-17 RX ORDER — ATORVASTATIN CALCIUM 10 MG/1
10 TABLET, FILM COATED ORAL EVERY EVENING
Status: DISCONTINUED | OUTPATIENT
Start: 2023-08-17 | End: 2023-08-19 | Stop reason: HOSPADM

## 2023-08-17 RX ORDER — GABAPENTIN 300 MG/1
300 CAPSULE ORAL 3 TIMES DAILY
Status: DISCONTINUED | OUTPATIENT
Start: 2023-08-17 | End: 2023-08-17

## 2023-08-17 RX ORDER — OXYCODONE HYDROCHLORIDE 5 MG/1
10 TABLET ORAL EVERY 4 HOURS PRN
Status: DISCONTINUED | OUTPATIENT
Start: 2023-08-17 | End: 2023-08-19 | Stop reason: HOSPADM

## 2023-08-17 RX ORDER — FENTANYL CITRATE 50 UG/ML
INJECTION, SOLUTION INTRAMUSCULAR; INTRAVENOUS PRN
Status: DISCONTINUED | OUTPATIENT
Start: 2023-08-17 | End: 2023-08-17 | Stop reason: SDUPTHER

## 2023-08-17 RX ORDER — ONDANSETRON 2 MG/ML
4 INJECTION INTRAMUSCULAR; INTRAVENOUS
Status: DISCONTINUED | OUTPATIENT
Start: 2023-08-17 | End: 2023-08-17 | Stop reason: HOSPADM

## 2023-08-17 RX ORDER — BUPIVACAINE HYDROCHLORIDE AND EPINEPHRINE 2.5; 5 MG/ML; UG/ML
INJECTION, SOLUTION EPIDURAL; INFILTRATION; INTRACAUDAL; PERINEURAL
Status: COMPLETED | OUTPATIENT
Start: 2023-08-17 | End: 2023-08-17

## 2023-08-17 RX ORDER — SODIUM CHLORIDE 9 MG/ML
INJECTION, SOLUTION INTRAVENOUS PRN
Status: DISCONTINUED | OUTPATIENT
Start: 2023-08-17 | End: 2023-08-19 | Stop reason: HOSPADM

## 2023-08-17 RX ORDER — OXYCODONE HYDROCHLORIDE 5 MG/1
5 TABLET ORAL
Status: DISCONTINUED | OUTPATIENT
Start: 2023-08-17 | End: 2023-08-17 | Stop reason: HOSPADM

## 2023-08-17 RX ORDER — EPHEDRINE SULFATE/0.9% NACL/PF 50 MG/5 ML
SYRINGE (ML) INTRAVENOUS PRN
Status: DISCONTINUED | OUTPATIENT
Start: 2023-08-17 | End: 2023-08-17 | Stop reason: SDUPTHER

## 2023-08-17 RX ORDER — DEXTROSE MONOHYDRATE 100 MG/ML
INJECTION, SOLUTION INTRAVENOUS CONTINUOUS PRN
Status: DISCONTINUED | OUTPATIENT
Start: 2023-08-17 | End: 2023-08-19 | Stop reason: HOSPADM

## 2023-08-17 RX ORDER — OXYCODONE HYDROCHLORIDE 5 MG/1
5 TABLET ORAL EVERY 4 HOURS PRN
Status: DISCONTINUED | OUTPATIENT
Start: 2023-08-17 | End: 2023-08-19 | Stop reason: HOSPADM

## 2023-08-17 RX ORDER — LOSARTAN POTASSIUM 50 MG/1
50 TABLET ORAL 2 TIMES DAILY
Status: DISCONTINUED | OUTPATIENT
Start: 2023-08-17 | End: 2023-08-19 | Stop reason: HOSPADM

## 2023-08-17 RX ORDER — MIDAZOLAM HYDROCHLORIDE 2 MG/2ML
2 INJECTION, SOLUTION INTRAMUSCULAR; INTRAVENOUS
Status: COMPLETED | OUTPATIENT
Start: 2023-08-17 | End: 2023-08-17

## 2023-08-17 RX ORDER — SODIUM CHLORIDE 0.9 % (FLUSH) 0.9 %
5-40 SYRINGE (ML) INJECTION EVERY 12 HOURS SCHEDULED
Status: DISCONTINUED | OUTPATIENT
Start: 2023-08-17 | End: 2023-08-19 | Stop reason: HOSPADM

## 2023-08-17 RX ORDER — SODIUM CHLORIDE 0.9 % (FLUSH) 0.9 %
5-40 SYRINGE (ML) INJECTION PRN
Status: DISCONTINUED | OUTPATIENT
Start: 2023-08-17 | End: 2023-08-19 | Stop reason: HOSPADM

## 2023-08-17 RX ORDER — HYDROMORPHONE HYDROCHLORIDE 1 MG/ML
1 INJECTION, SOLUTION INTRAMUSCULAR; INTRAVENOUS; SUBCUTANEOUS EVERY 4 HOURS PRN
Status: DISCONTINUED | OUTPATIENT
Start: 2023-08-17 | End: 2023-08-19 | Stop reason: HOSPADM

## 2023-08-17 RX ORDER — ROCURONIUM BROMIDE 10 MG/ML
INJECTION, SOLUTION INTRAVENOUS PRN
Status: DISCONTINUED | OUTPATIENT
Start: 2023-08-17 | End: 2023-08-17 | Stop reason: SDUPTHER

## 2023-08-17 RX ORDER — INSULIN LISPRO 100 [IU]/ML
0-4 INJECTION, SOLUTION INTRAVENOUS; SUBCUTANEOUS NIGHTLY
Status: DISCONTINUED | OUTPATIENT
Start: 2023-08-17 | End: 2023-08-19 | Stop reason: HOSPADM

## 2023-08-17 RX ORDER — LEVOTHYROXINE SODIUM 0.12 MG/1
125 TABLET ORAL
Status: DISCONTINUED | OUTPATIENT
Start: 2023-08-18 | End: 2023-08-19 | Stop reason: HOSPADM

## 2023-08-17 RX ORDER — DEXAMETHASONE SODIUM PHOSPHATE 4 MG/ML
INJECTION, SOLUTION INTRA-ARTICULAR; INTRALESIONAL; INTRAMUSCULAR; INTRAVENOUS; SOFT TISSUE PRN
Status: DISCONTINUED | OUTPATIENT
Start: 2023-08-17 | End: 2023-08-17 | Stop reason: SDUPTHER

## 2023-08-17 RX ORDER — LIDOCAINE HYDROCHLORIDE 10 MG/ML
1 INJECTION, SOLUTION INFILTRATION; PERINEURAL
Status: DISCONTINUED | OUTPATIENT
Start: 2023-08-17 | End: 2023-08-17 | Stop reason: HOSPADM

## 2023-08-17 RX ORDER — SODIUM CHLORIDE, SODIUM LACTATE, POTASSIUM CHLORIDE, CALCIUM CHLORIDE 600; 310; 30; 20 MG/100ML; MG/100ML; MG/100ML; MG/100ML
INJECTION, SOLUTION INTRAVENOUS CONTINUOUS
Status: DISCONTINUED | OUTPATIENT
Start: 2023-08-17 | End: 2023-08-17 | Stop reason: HOSPADM

## 2023-08-17 RX ORDER — HYDROMORPHONE HYDROCHLORIDE 2 MG/ML
0.5 INJECTION, SOLUTION INTRAMUSCULAR; INTRAVENOUS; SUBCUTANEOUS EVERY 5 MIN PRN
Status: DISCONTINUED | OUTPATIENT
Start: 2023-08-17 | End: 2023-08-17 | Stop reason: HOSPADM

## 2023-08-17 RX ORDER — FENTANYL CITRATE 50 UG/ML
50 INJECTION, SOLUTION INTRAMUSCULAR; INTRAVENOUS PRN
Status: DISCONTINUED | OUTPATIENT
Start: 2023-08-17 | End: 2023-08-17 | Stop reason: HOSPADM

## 2023-08-17 RX ORDER — DOCUSATE SODIUM 100 MG/1
100 CAPSULE, LIQUID FILLED ORAL 2 TIMES DAILY
Status: DISCONTINUED | OUTPATIENT
Start: 2023-08-17 | End: 2023-08-19 | Stop reason: HOSPADM

## 2023-08-17 RX ORDER — PROPOFOL 10 MG/ML
INJECTION, EMULSION INTRAVENOUS PRN
Status: DISCONTINUED | OUTPATIENT
Start: 2023-08-17 | End: 2023-08-17 | Stop reason: SDUPTHER

## 2023-08-17 RX ORDER — ALBUTEROL SULFATE 2.5 MG/3ML
2.5 SOLUTION RESPIRATORY (INHALATION) ONCE
Status: COMPLETED | OUTPATIENT
Start: 2023-08-17 | End: 2023-08-17

## 2023-08-17 RX ORDER — SODIUM CHLORIDE, SODIUM LACTATE, POTASSIUM CHLORIDE, CALCIUM CHLORIDE 600; 310; 30; 20 MG/100ML; MG/100ML; MG/100ML; MG/100ML
INJECTION, SOLUTION INTRAVENOUS CONTINUOUS PRN
Status: DISCONTINUED | OUTPATIENT
Start: 2023-08-17 | End: 2023-08-17 | Stop reason: SDUPTHER

## 2023-08-17 RX ORDER — SODIUM CHLORIDE 0.9 % (FLUSH) 0.9 %
5-40 SYRINGE (ML) INJECTION PRN
Status: DISCONTINUED | OUTPATIENT
Start: 2023-08-17 | End: 2023-08-17 | Stop reason: HOSPADM

## 2023-08-17 RX ORDER — SODIUM CHLORIDE, SODIUM LACTATE, POTASSIUM CHLORIDE, CALCIUM CHLORIDE 600; 310; 30; 20 MG/100ML; MG/100ML; MG/100ML; MG/100ML
INJECTION, SOLUTION INTRAVENOUS CONTINUOUS
Status: DISCONTINUED | OUTPATIENT
Start: 2023-08-17 | End: 2023-08-17

## 2023-08-17 RX ORDER — LIDOCAINE HYDROCHLORIDE 20 MG/ML
INJECTION, SOLUTION EPIDURAL; INFILTRATION; INTRACAUDAL; PERINEURAL PRN
Status: DISCONTINUED | OUTPATIENT
Start: 2023-08-17 | End: 2023-08-17 | Stop reason: SDUPTHER

## 2023-08-17 RX ORDER — DOFETILIDE 0.5 MG/1
500 CAPSULE ORAL 2 TIMES DAILY
Status: DISCONTINUED | OUTPATIENT
Start: 2023-08-17 | End: 2023-08-19 | Stop reason: HOSPADM

## 2023-08-17 RX ORDER — FENTANYL CITRATE 50 UG/ML
100 INJECTION, SOLUTION INTRAMUSCULAR; INTRAVENOUS PRN
Status: DISCONTINUED | OUTPATIENT
Start: 2023-08-17 | End: 2023-08-17 | Stop reason: HOSPADM

## 2023-08-17 RX ORDER — ONDANSETRON 2 MG/ML
4 INJECTION INTRAMUSCULAR; INTRAVENOUS EVERY 6 HOURS PRN
Status: DISCONTINUED | OUTPATIENT
Start: 2023-08-17 | End: 2023-08-19 | Stop reason: HOSPADM

## 2023-08-17 RX ORDER — ENOXAPARIN SODIUM 100 MG/ML
40 INJECTION SUBCUTANEOUS DAILY
Status: DISCONTINUED | OUTPATIENT
Start: 2023-08-18 | End: 2023-08-19 | Stop reason: HOSPADM

## 2023-08-17 RX ORDER — SODIUM CHLORIDE 0.9 % (FLUSH) 0.9 %
5-40 SYRINGE (ML) INJECTION EVERY 12 HOURS SCHEDULED
Status: DISCONTINUED | OUTPATIENT
Start: 2023-08-17 | End: 2023-08-17 | Stop reason: HOSPADM

## 2023-08-17 RX ADMIN — SODIUM CHLORIDE, SODIUM LACTATE, POTASSIUM CHLORIDE, AND CALCIUM CHLORIDE: 600; 310; 30; 20 INJECTION, SOLUTION INTRAVENOUS at 06:36

## 2023-08-17 RX ADMIN — FENTANYL CITRATE 50 MCG: 50 INJECTION, SOLUTION INTRAMUSCULAR; INTRAVENOUS at 10:20

## 2023-08-17 RX ADMIN — PHENYLEPHRINE HYDROCHLORIDE 100 MCG: 0.1 INJECTION, SOLUTION INTRAVENOUS at 10:37

## 2023-08-17 RX ADMIN — SUGAMMADEX 200 MG: 100 INJECTION, SOLUTION INTRAVENOUS at 11:34

## 2023-08-17 RX ADMIN — DOCUSATE SODIUM 100 MG: 100 CAPSULE, LIQUID FILLED ORAL at 20:01

## 2023-08-17 RX ADMIN — SODIUM CHLORIDE, SODIUM LACTATE, POTASSIUM CHLORIDE, AND CALCIUM CHLORIDE: 600; 310; 30; 20 INJECTION, SOLUTION INTRAVENOUS at 10:04

## 2023-08-17 RX ADMIN — MIDAZOLAM 2 MG: 1 INJECTION INTRAMUSCULAR; INTRAVENOUS at 08:07

## 2023-08-17 RX ADMIN — SODIUM CHLORIDE, POTASSIUM CHLORIDE, SODIUM LACTATE AND CALCIUM CHLORIDE: 600; 310; 30; 20 INJECTION, SOLUTION INTRAVENOUS at 16:26

## 2023-08-17 RX ADMIN — Medication 10 MG: at 11:02

## 2023-08-17 RX ADMIN — GABAPENTIN 300 MG: 300 CAPSULE ORAL at 20:01

## 2023-08-17 RX ADMIN — CEFAZOLIN SODIUM 2000 MG: 100 INJECTION, POWDER, LYOPHILIZED, FOR SOLUTION INTRAVENOUS at 17:58

## 2023-08-17 RX ADMIN — DEXAMETHASONE SODIUM PHOSPHATE 4 MG: 4 INJECTION, SOLUTION INTRAMUSCULAR; INTRAVENOUS at 11:00

## 2023-08-17 RX ADMIN — SODIUM CHLORIDE 40 MG: 9 INJECTION INTRAMUSCULAR; INTRAVENOUS; SUBCUTANEOUS at 17:58

## 2023-08-17 RX ADMIN — HYDROMORPHONE HYDROCHLORIDE 1 MG: 1 INJECTION, SOLUTION INTRAMUSCULAR; INTRAVENOUS; SUBCUTANEOUS at 16:16

## 2023-08-17 RX ADMIN — LOSARTAN POTASSIUM 50 MG: 50 TABLET, FILM COATED ORAL at 20:01

## 2023-08-17 RX ADMIN — ATORVASTATIN CALCIUM 10 MG: 10 TABLET, FILM COATED ORAL at 17:57

## 2023-08-17 RX ADMIN — FENTANYL CITRATE 50 MCG: 50 INJECTION, SOLUTION INTRAMUSCULAR; INTRAVENOUS at 11:23

## 2023-08-17 RX ADMIN — SODIUM CHLORIDE, PRESERVATIVE FREE 10 ML: 5 INJECTION INTRAVENOUS at 20:02

## 2023-08-17 RX ADMIN — PROPOFOL 150 MG: 10 INJECTION, EMULSION INTRAVENOUS at 10:25

## 2023-08-17 RX ADMIN — BUPIVACAINE HYDROCHLORIDE AND EPINEPHRINE BITARTRATE 30 ML: 2.5; .005 INJECTION, SOLUTION EPIDURAL; INFILTRATION; INTRACAUDAL; PERINEURAL at 08:06

## 2023-08-17 RX ADMIN — ALBUTEROL SULFATE 2.5 MG: 2.5 SOLUTION RESPIRATORY (INHALATION) at 12:20

## 2023-08-17 RX ADMIN — HYDROMORPHONE HYDROCHLORIDE 0.5 MG: 2 INJECTION, SOLUTION INTRAMUSCULAR; INTRAVENOUS; SUBCUTANEOUS at 12:49

## 2023-08-17 RX ADMIN — PHENYLEPHRINE HYDROCHLORIDE 100 MCG: 0.1 INJECTION, SOLUTION INTRAVENOUS at 11:02

## 2023-08-17 RX ADMIN — ROCURONIUM BROMIDE 50 MG: 50 INJECTION, SOLUTION INTRAVENOUS at 10:25

## 2023-08-17 RX ADMIN — SUGAMMADEX 200 MG: 100 INJECTION, SOLUTION INTRAVENOUS at 12:15

## 2023-08-17 RX ADMIN — LIDOCAINE HYDROCHLORIDE 70 MG: 20 INJECTION, SOLUTION EPIDURAL; INFILTRATION; INTRACAUDAL; PERINEURAL at 10:20

## 2023-08-17 RX ADMIN — AMLODIPINE BESYLATE 2.5 MG: 5 TABLET ORAL at 20:01

## 2023-08-17 RX ADMIN — DOFETILIDE 500 MCG: 0.5 CAPSULE ORAL at 20:01

## 2023-08-17 RX ADMIN — Medication 2000 MG: at 11:00

## 2023-08-17 ASSESSMENT — PAIN DESCRIPTION - LOCATION
LOCATION: CHEST
LOCATION: CHEST

## 2023-08-17 ASSESSMENT — PAIN DESCRIPTION - ORIENTATION
ORIENTATION: RIGHT;OTHER (COMMENT)
ORIENTATION: RIGHT

## 2023-08-17 ASSESSMENT — PAIN SCALES - GENERAL
PAINLEVEL_OUTOF10: 10
PAINLEVEL_OUTOF10: 6

## 2023-08-17 ASSESSMENT — PAIN - FUNCTIONAL ASSESSMENT: PAIN_FUNCTIONAL_ASSESSMENT: 0-10

## 2023-08-17 ASSESSMENT — PAIN DESCRIPTION - DESCRIPTORS
DESCRIPTORS: ACHING;TENDER
DESCRIPTORS: ACHING

## 2023-08-17 ASSESSMENT — ENCOUNTER SYMPTOMS: SHORTNESS OF BREATH: 1

## 2023-08-17 NOTE — ANESTHESIA PROCEDURE NOTES
Airway  Date/Time: 8/17/2023 10:30 AM  Urgency: elective    Airway not difficult    General Information and Staff    Patient location during procedure: OR  Performed: resident/CRNA     Indications and Patient Condition  Indications for airway management: anesthesia  Spontaneous Ventilation: absent  Sedation level: deep  Preoxygenated: yes  Patient position: sniffing  MILS not maintained throughout  Mask difficulty assessment: vent by bag mask    Final Airway Details  Final airway type: endotracheal airway      Successful airway: ETT - double lumen left  Cuffed: yes   Successful intubation technique: direct laryngoscopy  Facilitating devices/methods: intubating stylet  Endotracheal tube insertion site: oral  Blade: Bruce  Blade size: #3  ETT DL size (fr): 35  Cormack-Lehane Classification: grade I - full view of glottis  Placement verified by: chest auscultation and capnometry   Measured from: gums  ETT to gums (cm): 26  Number of attempts at approach: 2  Ventilation between attempts: bag mask  Number of other approaches attempted: 0    Additional Comments  First attempt DL w MAC 3. Grade 1 blue cuff of 37 fr. Left Lumen passed through glottic opening. Met with resistance when trying to advance. Removed and 35 fr placed w 2nd DL without issue.     no

## 2023-08-17 NOTE — ANESTHESIA POSTPROCEDURE EVALUATION
Department of Anesthesiology  Postprocedure Note    Patient: Guanako Shaw  MRN: [id number]  YOB: 1945  Date of evaluation: 8/17/2023      Procedure Summary     Date: 08/17/23 Room / Location: [facility] MAIN OR 02 / [facility] MAIN OR    Anesthesia Start: 1004 Anesthesia Stop: 1218    Procedure: THORACOSCOPY RIGHT WITH LUNG BIOPSY (Right: Chest) Diagnosis:       Pulmonary infiltration eosinophilia (HCC)      (Pulmonary infiltration eosinophilia ([address]) [J82.81])    Providers: Miracle Cortez MD Responsible Provider: Yovanny Chu MD    Anesthesia Type: general ASA Status: 4          Anesthesia Type: No value filed.     Lana Phase I: Lana Score: 6    Lana Phase II:        Anesthesia Post Evaluation    Patient location during evaluation: PACU  Patient participation: complete - patient participated  Level of consciousness: awake  Pain score: 0  Airway patency: patent  Nausea & Vomiting: no nausea and no vomiting  Complications: no  Cardiovascular status: blood pressure returned to baseline and hemodynamically stable  Respiratory status: acceptable, spontaneous ventilation and nonlabored ventilation  Hydration status: euvolemic  Multimodal analgesia pain management approach  Pain management: adequate

## 2023-08-17 NOTE — BRIEF OP NOTE
Brief Postoperative Note      Patient: Antonino Guillory  YOB: 1945  MRN: [id number]    Date of Procedure: 8/17/2023    Pre-Op Diagnosis Codes:     * Pulmonary infiltration eosinophilia ([address]) [J82.81]    Post-Op Diagnosis: Same       Procedure(s):  THORACOSCOPY RIGHT WITH LUNG BIOPSY    Surgeon(s):  Reta Hall MD    Assistant:  Surgical Assistant: Erum Zelaya    Anesthesia: General    Estimated Blood Loss (mL): Minimal    Complications: None    Specimens:   ID Type Source Tests Collected by Time Destination   A : RIGHT LUNG BIOPSY Tissue Chest SURGICAL PATHOLOGY Reta Hall MD 8/17/2023 1121    B : PLEURAL FLUID Body Fluid Chest CYTOLOGY, NON-GYN Reta Hall MD 8/17/2023 1122        Implants:  * No implants in log *      Drains:   Chest Tube Right 1 (Active)       Urinary Catheter 08/17/23 Ann (Active)       Findings: moderate pleural effusion, serous       Electronically signed by Reta Hall MD on 8/17/2023 at 11:34 AM

## 2023-08-17 NOTE — PROGRESS NOTES
Patient out of OR with SpO2 83% on aerosol mask. Patient placed on heated high flow nasal cannula 60L/80%. SpO2 94%.

## 2023-08-17 NOTE — PROGRESS NOTES
TRANSFER - IN REPORT:    Verbal report received from Bianka Macario RN on Paris Skiff  being received from PACU for routine progression of patient care      Report consisted of patient's Situation, Background, Assessment and   Recommendations(SBAR). Information from the following report(s) Nurse Handoff Report was reviewed with the receiving nurse. Opportunity for questions and clarification was provided. Assessment completed upon patient's arrival to unit and care assumed.

## 2023-08-17 NOTE — PROGRESS NOTES
's pre-procedure visit requested by patient. Conveyed care and concern for patient and family. Offered prayer as requested for patient, family, and staff.     Dank Brooks MDiv, BS  Board Certified

## 2023-08-17 NOTE — CARE COORDINATION
This CM met with pt and spouse at bedside this day to complete assessment. Verified pt's PCP, insurance, emergency contact, and home address. They report pt with no difficulty obtaining her medications in the community. She lives at home with spouse and family with 2-3 steps to enter. Her home DME includes a cane, walker, and oxygen concentrator ([address] D[employer]). She confirms she has been doing well with her ADLs prior to admission including bathing, dressing, and ambulating. Pt to return home with spouse when medically stable. CM to remain available for any discharge planning needs. Will continue to monitor and update as needed. 08/17/23 1128   Service Assessment   Patient Orientation Alert and Oriented   Cognition Alert   History Provided By Patient   Primary Caregiver Self   Support Systems Spouse/Significant Other   PCP Verified by CM Yes   Last Visit to PCP Within last 3 months   Prior Functional Level Independent in ADLs/IADLs   Current Functional Level Other (see comment)  (TBD by clinical team)   Can patient return to prior living arrangement Yes   Ability to make needs known: Good   Family able to assist with home care needs: Yes   Would you like for me to discuss the discharge plan with any other family members/significant others, and if so, who? No   Social/Functional History   Type of Home House   Discharge Planning   Type of Residence House   Living Arrangements Spouse/Significant Other   Current Services Prior To Admission Oxygen Therapy   Current DME Prior to Arrival Other (Comment); [facility]; Oxygen Therapy (Comment); [employer]  ([address])   Patient expects to be discharged to: [facility]   Confirm Follow Up Transport Family   Condition of Participation: Discharge Planning   The Plan for Transition of Care is related to the following treatment goals: Home with spouse

## 2023-08-17 NOTE — OP NOTE
[facility]  OPERATIVE REPORT    Name:  Ed Garcia  MR#:  [id number]  :  1945  ACCOUNT #:  [de-identified]  DATE OF SERVICE:  2023    PREOPERATIVE DIAGNOSIS:  Hypoxia. POSTOPERATIVE DIAGNOSIS:  Hypoxia. PROCEDURE PERFORMED:  Right thoracoscopy with lung biopsy. SURGEON:  MD Tracy Wayne    ANESTHESIA:  general.    COMPLICATIONS:  none. SPECIMENS REMOVED:  as above. IMPLANTS:  CT x 1.    ESTIMATED BLOOD LOSS:  minimal.    INDICATION:  This is a 71-year-old female. She has hypoxia without clear etiology. Lung biopsy was requested. The patient understood risks and benefits, agreed to proceed. FINDING:  She has a moderate amount of clear pleural effusion on the right side. The lung looked grossly normal.    PROCEDURE:  After informed consent obtained, the patient brought into the operating room. General anesthesia was administered. Double-lumen tube placed per Anesthesia. The patient then positioned to left decubitus position with right side up. Her right chest was prepped and draped in a routine fashion. The first trocar placed in the seventh intercostal space anterior axillary line. The second trocar was placed also anterior axillary line about the fourth intercostal space. The findings as described above. Then, I were able to put in Endo-MEETA stapler with blue load and the lower edge of the right lower lobe were wedged out. One fire was required and the specimen removed from the trocar site. Then, the pleural effusion was removed and 28-Tristanian chest tube was placed to the apex. I did perform intercostal nerve block with local anesthetics under direct vision and then the skin incision closed with a 4-0 Vicryl stitch in a subcuticular fashion. The patient tolerated the procedure well and transferred to recovery room in stable condition. All the instrument count and lap count correct.   Estimated blood loss was minimum.       Toshia Benedict MD      [id number]  D:  08/17/2023 11:38  T:  08/17/2023 13:05  JOB #:  [id number]

## 2023-08-17 NOTE — ANESTHESIA PROCEDURE NOTES
Peripheral Block    Patient location during procedure: procedure area  Reason for block: post-op pain management and at surgeon's request  Start time: 8/17/2023 8:06 AM  End time: 8/17/2023 8:14 AM  Staffing  Performed: anesthesiologist   Anesthesiologist: Jesi Mercado MD  Preanesthetic Checklist  Completed: patient identified, IV checked, site marked, risks and benefits discussed, surgical/procedural consents, equipment checked, pre-op evaluation, timeout performed, anesthesia consent given, oxygen available, monitors applied/VS acknowledged, fire risk safety assessment completed and verbalized and blood product R/B/A discussed and consented  Peripheral Block   Patient position: sitting  Prep: ChloraPrep  Provider prep: mask and sterile gloves  Patient monitoring: continuous pulse ox, cardiac monitor, frequent blood pressure checks, IV access, oxygen and responsive to questions  Block type: Erector spinae  Laterality: right  Injection technique: single-shot  Guidance: nerve stimulator and ultrasound guided    Needle   Needle type: insulated echogenic nerve stimulator needle   Needle gauge: 20 G  Needle localization: nerve stimulator and ultrasound guidance  Needle insertion depth: 2 cm  Needle length: 5 cm  Assessment   Injection assessment: negative aspiration for heme, no paresthesia on injection, local visualized surrounding nerve on ultrasound and no intravascular symptoms  Paresthesia pain: none  Slow fractionated injection: yes  Hemodynamics: stable  Real-time US image taken/store: yes  Outcomes: uncomplicated    Additional Notes  Potential access sites were examined with ultrasound and the acceptable patent access site was selected (site noted above). The needle path and vein access were visualized in real time using ultrasonography, and an image was recorded for permanent record.      30cc 0.25% bupivacaine  with  epi  Medications Administered  bupivacaine 0.25%-EPINEPHrine injection 1:200000 -

## 2023-08-17 NOTE — ANESTHESIA PROCEDURE NOTES
Arterial Line:    An arterial line was placed using surface landmarks, in the OR for the following indication(s): continuous blood pressure monitoring and blood sampling needed. A 20 gauge (size), 1 and 3/4 inch (length), Arrow (type) catheter was placed, Seldinger technique used, into the left radial artery, secured by Tegaderm and tape. Events:  patient tolerated procedure well with no complications. Additional notes:  Potential access sites were examined with ultrasound and the acceptable patent access site was selected. The needle path and vessel access were visualized in real time using ultrasonography, an image of the wire in the vessel was recorded for the permanent record.   8/17/2023 10:53 AM8/17/2023 11:06 AM  Anesthesiologist: Sacha Choudhary MD  Performed: Anesthesiologist   Preanesthetic Checklist  Completed: patient identified, IV checked, risks and benefits discussed, equipment checked, pre-op evaluation, timeout performed, anesthesia consent given, oxygen available and monitors applied/VS acknowledged

## 2023-08-18 ENCOUNTER — APPOINTMENT (OUTPATIENT)
Dept: GENERAL RADIOLOGY | Age: 78
End: 2023-08-18
Attending: SURGERY
Payer: MEDICARE

## 2023-08-18 LAB
ANION GAP SERPL CALC-SCNC: 5 MMOL/L (ref 2–11)
BASOPHILS # BLD: 0 K/UL (ref 0–0.2)
BASOPHILS NFR BLD: 0 % (ref 0–2)
BUN SERPL-MCNC: 17 MG/DL (ref 8–23)
CALCIUM SERPL-MCNC: 8.4 MG/DL (ref 8.3–10.4)
CHLORIDE SERPL-SCNC: 104 MMOL/L (ref 101–110)
CO2 SERPL-SCNC: 31 MMOL/L (ref 21–32)
CREAT SERPL-MCNC: 0.8 MG/DL (ref 0.6–1)
DIFFERENTIAL METHOD BLD: ABNORMAL
EOSINOPHIL # BLD: 0.1 K/UL (ref 0–0.8)
EOSINOPHIL NFR BLD: 1 % (ref 0.5–7.8)
ERYTHROCYTE [DISTWIDTH] IN BLOOD BY AUTOMATED COUNT: 18.4 % (ref 11.9–14.6)
FUNGAL CULT/SMEAR: NORMAL
FUNGUS (MYCOLOGY) CULTURE: NORMAL
FUNGUS SMEAR: NORMAL
GLUCOSE BLD STRIP.AUTO-MCNC: 171 MG/DL (ref 65–100)
GLUCOSE BLD STRIP.AUTO-MCNC: 201 MG/DL (ref 65–100)
GLUCOSE BLD STRIP.AUTO-MCNC: 203 MG/DL (ref 65–100)
GLUCOSE BLD STRIP.AUTO-MCNC: 203 MG/DL (ref 65–100)
GLUCOSE BLD STRIP.AUTO-MCNC: 222 MG/DL (ref 65–100)
GLUCOSE SERPL-MCNC: 182 MG/DL (ref 65–100)
HCT VFR BLD AUTO: 28.2 % (ref 35.8–46.3)
HGB BLD-MCNC: 8 G/DL (ref 11.7–15.4)
IMM GRANULOCYTES # BLD AUTO: 0 K/UL (ref 0–0.5)
IMM GRANULOCYTES NFR BLD AUTO: 0 % (ref 0–5)
LYMPHOCYTES # BLD: 1.1 K/UL (ref 0.5–4.6)
LYMPHOCYTES NFR BLD: 12 % (ref 13–44)
MAGNESIUM SERPL-MCNC: 1.7 MG/DL (ref 1.8–2.4)
MCH RBC QN AUTO: 27.6 PG (ref 26.1–32.9)
MCHC RBC AUTO-ENTMCNC: 28.4 G/DL (ref 31.4–35)
MCV RBC AUTO: 97.2 FL (ref 82–102)
MONOCYTES # BLD: 0.8 K/UL (ref 0.1–1.3)
MONOCYTES NFR BLD: 9 % (ref 4–12)
NEUTS SEG # BLD: 7.3 K/UL (ref 1.7–8.2)
NEUTS SEG NFR BLD: 78 % (ref 43–78)
NRBC # BLD: 0 K/UL (ref 0–0.2)
PHOSPHATE SERPL-MCNC: 3.9 MG/DL (ref 2.3–3.7)
PLATELET # BLD AUTO: 255 K/UL (ref 150–450)
PMV BLD AUTO: 9.2 FL (ref 9.4–12.3)
POTASSIUM SERPL-SCNC: 4.9 MMOL/L (ref 3.5–5.1)
RBC # BLD AUTO: 2.9 M/UL (ref 4.05–5.2)
REFLEX TO ID: NORMAL
SERVICE CMNT-IMP: ABNORMAL
SODIUM SERPL-SCNC: 140 MMOL/L (ref 133–143)
SPECIMEN PROCESSING: NORMAL
SPECIMEN SOURCE: NORMAL
SPECIMEN SOURCE: NORMAL
WBC # BLD AUTO: 9.3 K/UL (ref 4.3–11.1)

## 2023-08-18 PROCEDURE — A4216 STERILE WATER/SALINE, 10 ML: HCPCS | Performed by: PHYSICIAN ASSISTANT

## 2023-08-18 PROCEDURE — 2700000000 HC OXYGEN THERAPY PER DAY

## 2023-08-18 PROCEDURE — 83735 ASSAY OF MAGNESIUM: CPT

## 2023-08-18 PROCEDURE — 71045 X-RAY EXAM CHEST 1 VIEW: CPT

## 2023-08-18 PROCEDURE — 36415 COLL VENOUS BLD VENIPUNCTURE: CPT

## 2023-08-18 PROCEDURE — 85025 COMPLETE CBC W/AUTO DIFF WBC: CPT

## 2023-08-18 PROCEDURE — 84100 ASSAY OF PHOSPHORUS: CPT

## 2023-08-18 PROCEDURE — 2580000003 HC RX 258: Performed by: PHYSICIAN ASSISTANT

## 2023-08-18 PROCEDURE — 6360000002 HC RX W HCPCS: Performed by: PHYSICIAN ASSISTANT

## 2023-08-18 PROCEDURE — 80048 BASIC METABOLIC PNL TOTAL CA: CPT

## 2023-08-18 PROCEDURE — 6370000000 HC RX 637 (ALT 250 FOR IP): Performed by: PHYSICIAN ASSISTANT

## 2023-08-18 PROCEDURE — 1100000000 HC RM PRIVATE

## 2023-08-18 PROCEDURE — C9113 INJ PANTOPRAZOLE SODIUM, VIA: HCPCS | Performed by: PHYSICIAN ASSISTANT

## 2023-08-18 PROCEDURE — 94761 N-INVAS EAR/PLS OXIMETRY MLT: CPT

## 2023-08-18 PROCEDURE — 82962 GLUCOSE BLOOD TEST: CPT

## 2023-08-18 RX ADMIN — DOFETILIDE 500 MCG: 0.5 CAPSULE ORAL at 07:32

## 2023-08-18 RX ADMIN — AMLODIPINE BESYLATE 2.5 MG: 5 TABLET ORAL at 20:36

## 2023-08-18 RX ADMIN — OXYCODONE HYDROCHLORIDE 5 MG: 5 TABLET ORAL at 07:33

## 2023-08-18 RX ADMIN — OXYCODONE HYDROCHLORIDE 5 MG: 5 TABLET ORAL at 12:33

## 2023-08-18 RX ADMIN — DOCUSATE SODIUM 100 MG: 100 CAPSULE, LIQUID FILLED ORAL at 20:35

## 2023-08-18 RX ADMIN — DOCUSATE SODIUM 100 MG: 100 CAPSULE, LIQUID FILLED ORAL at 07:33

## 2023-08-18 RX ADMIN — DOFETILIDE 500 MCG: 0.5 CAPSULE ORAL at 20:37

## 2023-08-18 RX ADMIN — INSULIN LISPRO 4 UNITS: 100 INJECTION, SOLUTION INTRAVENOUS; SUBCUTANEOUS at 13:06

## 2023-08-18 RX ADMIN — CEFAZOLIN SODIUM 2000 MG: 100 INJECTION, POWDER, LYOPHILIZED, FOR SOLUTION INTRAVENOUS at 03:19

## 2023-08-18 RX ADMIN — GABAPENTIN 300 MG: 300 CAPSULE ORAL at 20:35

## 2023-08-18 RX ADMIN — SODIUM CHLORIDE, PRESERVATIVE FREE 10 ML: 5 INJECTION INTRAVENOUS at 20:40

## 2023-08-18 RX ADMIN — AMLODIPINE BESYLATE 2.5 MG: 5 TABLET ORAL at 07:33

## 2023-08-18 RX ADMIN — LEVOTHYROXINE SODIUM 125 MCG: 0.12 TABLET ORAL at 05:53

## 2023-08-18 RX ADMIN — ATORVASTATIN CALCIUM 10 MG: 10 TABLET, FILM COATED ORAL at 20:35

## 2023-08-18 RX ADMIN — LOSARTAN POTASSIUM 50 MG: 50 TABLET, FILM COATED ORAL at 20:35

## 2023-08-18 RX ADMIN — GABAPENTIN 300 MG: 300 CAPSULE ORAL at 12:33

## 2023-08-18 RX ADMIN — SODIUM CHLORIDE 40 MG: 9 INJECTION INTRAMUSCULAR; INTRAVENOUS; SUBCUTANEOUS at 07:35

## 2023-08-18 RX ADMIN — ENOXAPARIN SODIUM 40 MG: 100 INJECTION SUBCUTANEOUS at 07:35

## 2023-08-18 RX ADMIN — GABAPENTIN 300 MG: 300 CAPSULE ORAL at 07:33

## 2023-08-18 RX ADMIN — LOSARTAN POTASSIUM 50 MG: 50 TABLET, FILM COATED ORAL at 07:33

## 2023-08-18 RX ADMIN — INSULIN LISPRO 4 UNITS: 100 INJECTION, SOLUTION INTRAVENOUS; SUBCUTANEOUS at 17:35

## 2023-08-18 RX ADMIN — OXYCODONE HYDROCHLORIDE 5 MG: 5 TABLET ORAL at 01:05

## 2023-08-18 ASSESSMENT — PAIN SCALES - GENERAL
PAINLEVEL_OUTOF10: 0
PAINLEVEL_OUTOF10: 6
PAINLEVEL_OUTOF10: 0
PAINLEVEL_OUTOF10: 6
PAINLEVEL_OUTOF10: 6

## 2023-08-18 ASSESSMENT — PAIN DESCRIPTION - DESCRIPTORS
DESCRIPTORS: ACHING
DESCRIPTORS: SHARP
DESCRIPTORS: SHARP

## 2023-08-18 ASSESSMENT — PAIN DESCRIPTION - LOCATION
LOCATION: CHEST;INCISION
LOCATION: RIB CAGE;INCISION
LOCATION: CHEST

## 2023-08-18 ASSESSMENT — PAIN DESCRIPTION - ORIENTATION
ORIENTATION: RIGHT

## 2023-08-18 NOTE — PROGRESS NOTES
Right CT removed at the end of inspiration. Pt tolerated procedure well. Dressing applied and should remain in place x 48 hours.        Aden Connors, FNP-BC

## 2023-08-19 ENCOUNTER — APPOINTMENT (OUTPATIENT)
Dept: GENERAL RADIOLOGY | Age: 78
End: 2023-08-19
Attending: SURGERY
Payer: MEDICARE

## 2023-08-19 VITALS
HEIGHT: 66 IN | TEMPERATURE: 98.1 F | OXYGEN SATURATION: 91 % | DIASTOLIC BLOOD PRESSURE: 47 MMHG | RESPIRATION RATE: 12 BRPM | WEIGHT: 145.8 LBS | HEART RATE: 68 BPM | BODY MASS INDEX: 23.43 KG/M2 | SYSTOLIC BLOOD PRESSURE: 135 MMHG

## 2023-08-19 LAB
ANION GAP SERPL CALC-SCNC: 3 MMOL/L (ref 2–11)
BUN SERPL-MCNC: 16 MG/DL (ref 8–23)
CALCIUM SERPL-MCNC: 8.3 MG/DL (ref 8.3–10.4)
CHLORIDE SERPL-SCNC: 107 MMOL/L (ref 101–110)
CO2 SERPL-SCNC: 34 MMOL/L (ref 21–32)
CREAT SERPL-MCNC: 0.8 MG/DL (ref 0.6–1)
GLUCOSE BLD STRIP.AUTO-MCNC: 173 MG/DL (ref 65–100)
GLUCOSE BLD STRIP.AUTO-MCNC: 239 MG/DL (ref 65–100)
GLUCOSE SERPL-MCNC: 197 MG/DL (ref 65–100)
POTASSIUM SERPL-SCNC: 4 MMOL/L (ref 3.5–5.1)
SERVICE CMNT-IMP: ABNORMAL
SERVICE CMNT-IMP: ABNORMAL
SODIUM SERPL-SCNC: 144 MMOL/L (ref 133–143)

## 2023-08-19 PROCEDURE — 6360000002 HC RX W HCPCS: Performed by: PHYSICIAN ASSISTANT

## 2023-08-19 PROCEDURE — 2700000000 HC OXYGEN THERAPY PER DAY

## 2023-08-19 PROCEDURE — 71045 X-RAY EXAM CHEST 1 VIEW: CPT

## 2023-08-19 PROCEDURE — A4216 STERILE WATER/SALINE, 10 ML: HCPCS | Performed by: PHYSICIAN ASSISTANT

## 2023-08-19 PROCEDURE — 36415 COLL VENOUS BLD VENIPUNCTURE: CPT

## 2023-08-19 PROCEDURE — 94761 N-INVAS EAR/PLS OXIMETRY MLT: CPT

## 2023-08-19 PROCEDURE — C9113 INJ PANTOPRAZOLE SODIUM, VIA: HCPCS | Performed by: PHYSICIAN ASSISTANT

## 2023-08-19 PROCEDURE — 6370000000 HC RX 637 (ALT 250 FOR IP): Performed by: PHYSICIAN ASSISTANT

## 2023-08-19 PROCEDURE — 82962 GLUCOSE BLOOD TEST: CPT

## 2023-08-19 PROCEDURE — 2580000003 HC RX 258: Performed by: PHYSICIAN ASSISTANT

## 2023-08-19 PROCEDURE — 80048 BASIC METABOLIC PNL TOTAL CA: CPT

## 2023-08-19 RX ORDER — PSEUDOEPHEDRINE HCL 30 MG
100 TABLET ORAL 2 TIMES DAILY
COMMUNITY
Start: 2023-08-19

## 2023-08-19 RX ORDER — GABAPENTIN 300 MG/1
300 CAPSULE ORAL 3 TIMES DAILY
Qty: 30 CAPSULE | Refills: 0 | Status: SHIPPED | OUTPATIENT
Start: 2023-08-19 | End: 2023-09-01

## 2023-08-19 RX ORDER — OXYCODONE HYDROCHLORIDE AND ACETAMINOPHEN 5; 325 MG/1; MG/1
1 TABLET ORAL EVERY 6 HOURS PRN
Qty: 15 TABLET | Refills: 0 | Status: SHIPPED | OUTPATIENT
Start: 2023-08-19 | End: 2023-08-24

## 2023-08-19 RX ADMIN — LOSARTAN POTASSIUM 50 MG: 50 TABLET, FILM COATED ORAL at 09:17

## 2023-08-19 RX ADMIN — GABAPENTIN 300 MG: 300 CAPSULE ORAL at 09:16

## 2023-08-19 RX ADMIN — SODIUM CHLORIDE 40 MG: 9 INJECTION INTRAMUSCULAR; INTRAVENOUS; SUBCUTANEOUS at 09:18

## 2023-08-19 RX ADMIN — OXYCODONE HYDROCHLORIDE 5 MG: 5 TABLET ORAL at 09:22

## 2023-08-19 RX ADMIN — ENOXAPARIN SODIUM 40 MG: 100 INJECTION SUBCUTANEOUS at 11:47

## 2023-08-19 RX ADMIN — SODIUM CHLORIDE, PRESERVATIVE FREE 10 ML: 5 INJECTION INTRAVENOUS at 09:33

## 2023-08-19 RX ADMIN — LEVOTHYROXINE SODIUM 125 MCG: 0.12 TABLET ORAL at 05:52

## 2023-08-19 RX ADMIN — AMLODIPINE BESYLATE 2.5 MG: 5 TABLET ORAL at 09:15

## 2023-08-19 RX ADMIN — DOFETILIDE 500 MCG: 0.5 CAPSULE ORAL at 09:16

## 2023-08-19 RX ADMIN — DOCUSATE SODIUM 100 MG: 100 CAPSULE, LIQUID FILLED ORAL at 09:16

## 2023-08-19 ASSESSMENT — PAIN SCALES - GENERAL: PAINLEVEL_OUTOF10: 5

## 2023-08-19 ASSESSMENT — PAIN DESCRIPTION - FREQUENCY: FREQUENCY: INTERMITTENT

## 2023-08-19 ASSESSMENT — PAIN DESCRIPTION - DESCRIPTORS: DESCRIPTORS: SORE

## 2023-08-19 ASSESSMENT — PAIN DESCRIPTION - ONSET: ONSET: ON-GOING

## 2023-08-19 ASSESSMENT — PAIN DESCRIPTION - LOCATION: LOCATION: INCISION;CHEST

## 2023-08-19 ASSESSMENT — PAIN DESCRIPTION - PAIN TYPE: TYPE: SURGICAL PAIN

## 2023-08-19 ASSESSMENT — PAIN DESCRIPTION - ORIENTATION: ORIENTATION: RIGHT

## 2023-08-19 ASSESSMENT — PAIN - FUNCTIONAL ASSESSMENT: PAIN_FUNCTIONAL_ASSESSMENT: PREVENTS OR INTERFERES SOME ACTIVE ACTIVITIES AND ADLS

## 2023-08-19 NOTE — DISCHARGE SUMMARY
Physician Discharge Summary     Patient ID:  Nathan Bailon  [id number]  12 y.o.  1945    Admit date: 8/17/2023    Discharge date: 08/19/23    Admitting Physician: Ermelinda Seip, MD     Discharge Physician: Dr Juliet Meraz    Admission Diagnoses: Pulmonary infiltration eosinophilia ([address]) [J82.81]  Lung nodules [R91.8]      Admission Condition: stable    Discharged Condition: good    Indication for Admission: Surgery    Hospital Course:   HPI  Nathan Bailon is a 69 y.o. female who is seen in consultation for a lung biopsy of groundglass opacities on CT scan with unknown etiology. The patient states that about a month ago she developed shortness of breath at night and was unable to improve. She states her pulse ox was found to be in the 80's and she went to the emergency department. She had a CTA on 07/17/2023 that revealed patchy groundglass opacification throughout the lungs that could be secondary to small airways or small vessels disease. There was also associated mediastinal adenopathy. She states she was treated with antibiotics and was discharged after a few days on home oxygen. She then had a similar episode where she was admitted on 08/20/2023 where she had another CT scan with similar findings of persistent groundglass opacities with mosaic perfusion pattern possibly reflecting small airways disease or other infectious or inflammatory pneumonitis. She was hospitalized again for a few days, and no clear etiology for her symptoms. She states she has been at home on 3L oxygen. The patient states that prior to her initial hospitalization she had not been oxygen. She states she did have bronchitis earlier this year as well as COVID in May of this year. The patient does have a significant history of an aortic valve and mitral valve replacement in 2015. She is on coumadin. Also has a history of atrial fibrillation. She also has a history of colon cancer that was treated with LAR.  She did not

## 2023-08-19 NOTE — PROGRESS NOTES
Oxygen lowered to 2L HFNC but O2 sat dropped to 87-89% .  Increased to 3L and patient now saturating at 91%/

## 2023-08-19 NOTE — CARE COORDINATION
Pt is for discharge home today with family and no needs/supportive care orders recieved for CM at this time. 08/19/23 1321   Social/Functional History   Type of [address] At/After Discharge   Transition of Care Consult (CM Consult) Discharge Planning   Services [address] Discharge None   [place] Resource Information Provided? No   Mode of Transport at Discharge Other (see comment)  (Family)   Confirm Follow Up Transport Family   Condition of Participation: Discharge Planning   The Plan for Transition of Care is related to the following treatment goals: Home with spouse   The Patient and/Or Patient Representative agree with the Discharge Plan? Yes   Freedom of Choice list was provided with basic dialogue that supports the patient's individualized plan of care/goals, treatment preferences, and shares the quality data associated with the providers?   Yes

## 2023-08-19 NOTE — PROGRESS NOTES
Discharge instructions reviewed with patient and  at bedside. Patient will need to call Dr Davis Pimentel office on Monday to schedule follow up appointment. Prescriptions sent to patient's pharmacy. Patient has own portable oxygen tank to go home with. Wheeled down to lobby by CNA.

## 2023-08-19 NOTE — PROGRESS NOTES
Patient on HFNC at 6L, oxygen sat at 97%. Oxygen lowered to 4L, patient saturating at 94-96%. Will continue to monitor to wean off oxygen.

## 2023-08-19 NOTE — DISCHARGE INSTRUCTIONS
Discharge Instructions/Follow-up Plans:   MD Instructions: Follow-up with Dr. Joaquin Brar in 7-10 days, Call office on Monday to schedule appt time  Leave dressing in place to Right chest until Sunday, then keep incisions clean and dry, may remain uncovered. Do not apply lotions, creams or ointments to incisions. Diet - as tolerated - Regular diet. Activity - ambulate - as tolerated - no heavy lifting >10lb. May shower - no tub baths or soaking/submerging. No driving while taking narcotics. Do not drink alcohol while taking narcotics. Resume other home medications. If problems or questions arise, please call our office at [phone].

## 2023-08-19 NOTE — PLAN OF CARE
Problem: Chronic Conditions and Co-morbidities  Goal: Patient's chronic conditions and co-morbidity symptoms are monitored and maintained or improved  Outcome: Progressing     Problem: Pain  Goal: Verbalizes/displays adequate comfort level or baseline comfort level  Outcome: Progressing     Problem: Safety - Adult  Goal: Free from fall injury  Outcome: Progressing     Problem: Discharge Planning  Goal: Discharge to home or other facility with appropriate resources  Outcome: Progressing     Problem: Skin/Tissue Integrity  Goal: Absence of new skin breakdown  Description: 1. Monitor for areas of redness and/or skin breakdown  2. Assess vascular access sites hourly  3. Every 4-6 hours minimum:  Change oxygen saturation probe site  4. Every 4-6 hours:  If on nasal continuous positive airway pressure, respiratory therapy assess nares and determine need for appliance change or resting period.   Outcome: Progressing

## 2023-08-19 NOTE — PROGRESS NOTES
Physician Progress Note      PATIENT:               Esther Hidalgo  CSN #:                  [id number]  :                       1945  ADMIT DATE:       2023 5:52 AM  DISCH DATE:  RESPONDING  PROVIDER #:        Caitlin Mayorga NP        QUERY TEXT:    Type of Pleural Effusion: Please provide further specificity, if known. Clinical indicators include: pleural fluid, pleural effusion  Options provided:  -- Pleural effusion caused by malignancy  -- Pleural effusion caused by congestive heart failure  -- Hydrothorax or chylous effusion  -- Hemothorax  -- Influenzal pleural effusion  -- Other - I will add my own diagnosis  -- Disagree - Not applicable / Not valid  -- Disagree - Clinically Unable to determine / Unknown        PROVIDER RESPONSE TEXT:    Provider was unable to determine a response for this query.       Electronically signed by:  Caitlin Mayorga NP 2023 11:58 AM

## 2023-08-21 ENCOUNTER — CARE COORDINATION (OUTPATIENT)
Dept: CARE COORDINATION | Facility: CLINIC | Age: 78
End: 2023-08-21

## 2023-08-21 LAB
ABO + RH BLD: NORMAL
ACID FAST STN SPEC: NEGATIVE
BLD PROD TYP BPU: NORMAL
BLD PROD TYP BPU: NORMAL
BLOOD BANK DISPENSE STATUS: NORMAL
BLOOD BANK DISPENSE STATUS: NORMAL
BLOOD GROUP ANTIBODIES SERPL: NORMAL
BPU ID: NORMAL
BPU ID: NORMAL
CROSSMATCH RESULT: NORMAL
CROSSMATCH RESULT: NORMAL
MYCOBACTERIUM SPEC QL CULT: POSITIVE
SPECIMEN EXP DATE BLD: NORMAL
SPECIMEN PREPARATION: ABNORMAL
SPECIMEN SOURCE: ABNORMAL
UNIT DIVISION: 0
UNIT DIVISION: 0

## 2023-08-21 NOTE — CARE COORDINATION
[facility] Care Transitions Initial Follow Up Call    Call within 2 business days of discharge: Yes    Patient Current Location:  Home: [address] [place] [address] Dr Eleni Hyman [id number]    LPN Care Coordinator contacted the patient and family by telephone to perform post hospital discharge assessment. Verified name and  with patient and family as identifiers. Provided introduction to self, and explanation of the LPN Care Coordinator role. Patient: Tono Ravi Patient : 1945   MRN: [id number]  Reason for Admission: elective surgery: THORACOSCOPY RIGHT WITH LUNG BIOPSY  Discharge Date: 23 RARS: Readmission Risk Score: 21.5      Last Discharge [address] [facility] [facility]       Date Complaint Diagnosis Description Type Department Provider    23  Lung nodules . .. Admission (Discharged) Abhi Smith MD            Was this an external facility discharge? No Discharge Facility: [facility]    Challenges to be reviewed by the provider   Additional needs identified to be addressed with provider: No  none               Method of communication with provider: none. LPN Care Coordinator reviewed discharge instructions, medical action plan, and red flags with patient and family who verbalized understanding. The patient and family was given an opportunity to ask questions and does not have any further questions or concerns at this time. Were discharge instructions available to patient? Yes. Reviewed appropriate site of care based on symptoms and resources available to patient including: PCP  Specialist  When to call 911. The family agrees to contact the PCP office for questions related to their healthcare. Advance Care Planning:   Does patient have an Advance Directive:  decision maker verified . Medication reconciliation was performed with family, who verbalizes understanding of administration of home medications. Medications reviewed, 1111F entered: N/A    Was patient discharged with a pulse oximeter?

## 2023-08-22 VITALS
WEIGHT: 145.8 LBS | SYSTOLIC BLOOD PRESSURE: 135 MMHG | HEART RATE: 68 BPM | RESPIRATION RATE: 12 BRPM | OXYGEN SATURATION: 91 % | BODY MASS INDEX: 23.43 KG/M2 | DIASTOLIC BLOOD PRESSURE: 47 MMHG | HEIGHT: 66 IN | TEMPERATURE: 33.8 F

## 2023-08-22 NOTE — RESULT ENCOUNTER NOTE
Can you let the patient know that her NATA on 4L still showed significant desaturation. Can we refer her for in house sleep study to titrate her nocturnal respiratory needs. Based on past ABG's she may benefit from night time bipap. Thank you.      Susy Lara MD

## 2023-08-23 ENCOUNTER — TELEPHONE (OUTPATIENT)
Dept: PULMONOLOGY | Age: 78
End: 2023-08-23

## 2023-08-23 DIAGNOSIS — G47.34 NOCTURNAL HYPOXEMIA: Primary | ICD-10-CM

## 2023-08-23 NOTE — TELEPHONE ENCOUNTER
----- Message from Ni Ko MD sent at 8/22/2023 12:11 PM EDT -----  Can you let the patient know that her NATA on 4L still showed significant desaturation. Can we refer her for in house sleep study to titrate her nocturnal respiratory needs. Based on past ABG's she may benefit from night time bipap. Thank you.      Ni Ko MD

## 2023-08-23 NOTE — TELEPHONE ENCOUNTER
Spoke with the patient in regards to their Overnight results, explained per Dr. Alexandria Myers that the NATA on 4 lpm still showed significant desaturation. I explained that Dr. Alexandria Myers would like for her to do a HST to titrate her nocturnal respiratory needs since her past ABG showed that she may benefit on night time Bipap. Patient understood the results and was agreeable with the HST. Order has been established and patient did not have any further questions or concerns at this time. // Alexus . A.

## 2023-08-23 NOTE — TELEPHONE ENCOUNTER
Left patient message via voicemail to please give me a call as soon as they are available. // Perfecto Tavo. A.

## 2023-08-24 ENCOUNTER — TELEPHONE (OUTPATIENT)
Dept: INTERNAL MEDICINE CLINIC | Facility: CLINIC | Age: 78
End: 2023-08-24

## 2023-08-24 ENCOUNTER — ANTI-COAG VISIT (OUTPATIENT)
Age: 78
End: 2023-08-24

## 2023-08-24 ENCOUNTER — TELEPHONE (OUTPATIENT)
Dept: PULMONOLOGY | Age: 78
End: 2023-08-24

## 2023-08-24 DIAGNOSIS — I48.0 PAROXYSMAL ATRIAL FIBRILLATION (HCC): Chronic | ICD-10-CM

## 2023-08-24 DIAGNOSIS — Z95.2 S/P AVR (AORTIC VALVE REPLACEMENT): Chronic | ICD-10-CM

## 2023-08-24 DIAGNOSIS — Z79.01 LONG TERM CURRENT USE OF ANTICOAGULANT THERAPY: Primary | Chronic | ICD-10-CM

## 2023-08-24 DIAGNOSIS — Z95.2 S/P MITRAL VALVE REPLACEMENT: Chronic | ICD-10-CM

## 2023-08-24 LAB
POC INR: 1.2
PROTHROMBIN TIME, POC: ABNORMAL

## 2023-08-24 NOTE — TELEPHONE ENCOUNTER
Dr. Siobhan Lujan from [facility] pathology called. They have reviewed her case and though Dr. Yane Still hasn't yet looked it over, he reports there is no evidence of a diffuse lung condition which he thinks would be steroid-responsive - no granulomas suggestive of HP, no clear pattern of diffuse ILD. There is pulmonary edema and venous vascular changes suggestive of congestion. Note the patient did have a CLAUDIO in the hospital to assess valvular function, and she did respond to diuresis in the absence of steroid therapy. Dr. Yane Still will call when she reviews this open lung biopsy and case in detail and the result will be available in care everywhere.       Pablo Rangel MD

## 2023-08-24 NOTE — TELEPHONE ENCOUNTER
Called and spoke with patient regarding a fax that was received from [address] [place] [address]. The fax is requesting a \"need for CMN for [facility]. According to the patient, she already has the [address] and just recently received supplies. I proceeded to call [employer] at [phone] and Delmi Montanez () stated that it was sent in error - to just ignore it.

## 2023-08-24 NOTE — PROGRESS NOTES
Warfarin tablet strength and weekly dosing schedule confirmed today. Patient resumed Warfarin on 8/18/23, day after procedure.

## 2023-08-28 ENCOUNTER — ANTI-COAG VISIT (OUTPATIENT)
Age: 78
End: 2023-08-28
Payer: MEDICARE

## 2023-08-28 DIAGNOSIS — Z79.01 LONG TERM CURRENT USE OF ANTICOAGULANT THERAPY: Primary | Chronic | ICD-10-CM

## 2023-08-28 DIAGNOSIS — Z95.2 S/P AVR (AORTIC VALVE REPLACEMENT): Chronic | ICD-10-CM

## 2023-08-28 DIAGNOSIS — I48.0 PAROXYSMAL ATRIAL FIBRILLATION (HCC): Chronic | ICD-10-CM

## 2023-08-28 DIAGNOSIS — Z95.2 S/P MITRAL VALVE REPLACEMENT: Chronic | ICD-10-CM

## 2023-08-28 LAB
POC INR: 1.9
PROTHROMBIN TIME, POC: ABNORMAL

## 2023-08-28 PROCEDURE — 93793 ANTICOAG MGMT PT WARFARIN: CPT | Performed by: INTERNAL MEDICINE

## 2023-08-28 PROCEDURE — 85610 PROTHROMBIN TIME: CPT | Performed by: INTERNAL MEDICINE

## 2023-08-30 ENCOUNTER — TELEPHONE (OUTPATIENT)
Dept: PULMONOLOGY | Age: 78
End: 2023-08-30

## 2023-08-30 NOTE — TELEPHONE ENCOUNTER
Received call from Dr. Zelalem Diehl to review Open lung biopsy results. Provided clinical history. A full report will be available from [facility]. The predominant findings were: Airway centered peribronchiolar non-inflammatory fibrosis, which can be seen in chronic HP (no granulomas seen, though), CTD-related small airways disease, drug reaction. Small airways mucostasis seen as well. Not a typical pattern for \"bronchiolitis\" entities since noninflammatory. Pulmonary edema, venous congestion and secondary intralobular septal expansion suggestive of volume overload.   Breonna Head MD

## 2023-08-31 ENCOUNTER — TELEPHONE (OUTPATIENT)
Dept: PULMONOLOGY | Age: 78
End: 2023-08-31

## 2023-08-31 NOTE — TELEPHONE ENCOUNTER
Patient is scheduled to have CT scan tomorrow before appointment with Dr. Angeline Rubio. She had CT earlier this month in patient.   Does she still need to have this CT

## 2023-08-31 NOTE — TELEPHONE ENCOUNTER
Spoke with Dr. Omar Arora and he stated that the patient did not need another CT scan.   CT Department has been made aware. // Jaycee Zamora

## 2023-09-01 ENCOUNTER — OFFICE VISIT (OUTPATIENT)
Dept: SURGERY | Age: 78
End: 2023-09-01

## 2023-09-01 ENCOUNTER — OFFICE VISIT (OUTPATIENT)
Dept: PULMONOLOGY | Age: 78
End: 2023-09-01
Payer: MEDICARE

## 2023-09-01 VITALS
RESPIRATION RATE: 20 BRPM | WEIGHT: 142 LBS | HEART RATE: 65 BPM | HEIGHT: 66 IN | SYSTOLIC BLOOD PRESSURE: 140 MMHG | OXYGEN SATURATION: 94 % | BODY MASS INDEX: 22.82 KG/M2 | TEMPERATURE: 98 F | DIASTOLIC BLOOD PRESSURE: 60 MMHG

## 2023-09-01 VITALS — BODY MASS INDEX: 22.82 KG/M2 | WEIGHT: 142 LBS | HEIGHT: 66 IN

## 2023-09-01 DIAGNOSIS — J96.01 ACUTE RESPIRATORY FAILURE WITH HYPOXIA (HCC): ICD-10-CM

## 2023-09-01 DIAGNOSIS — R09.02 HYPOXIA: ICD-10-CM

## 2023-09-01 DIAGNOSIS — R91.8 PULMONARY INFILTRATES: Primary | ICD-10-CM

## 2023-09-01 DIAGNOSIS — R91.8 PULMONARY INFILTRATES: ICD-10-CM

## 2023-09-01 DIAGNOSIS — Z48.89 AFTERCARE FOLLOWING SURGERY: Primary | ICD-10-CM

## 2023-09-01 PROCEDURE — 99024 POSTOP FOLLOW-UP VISIT: CPT | Performed by: SURGERY

## 2023-09-01 PROCEDURE — 99215 OFFICE O/P EST HI 40 MIN: CPT | Performed by: INTERNAL MEDICINE

## 2023-09-01 PROCEDURE — 1123F ACP DISCUSS/DSCN MKR DOCD: CPT | Performed by: INTERNAL MEDICINE

## 2023-09-01 PROCEDURE — 1090F PRES/ABSN URINE INCON ASSESS: CPT | Performed by: INTERNAL MEDICINE

## 2023-09-01 PROCEDURE — 1036F TOBACCO NON-USER: CPT | Performed by: INTERNAL MEDICINE

## 2023-09-01 PROCEDURE — 1111F DSCHRG MED/CURRENT MED MERGE: CPT | Performed by: INTERNAL MEDICINE

## 2023-09-01 PROCEDURE — G8427 DOCREV CUR MEDS BY ELIG CLIN: HCPCS | Performed by: INTERNAL MEDICINE

## 2023-09-01 PROCEDURE — 3078F DIAST BP <80 MM HG: CPT | Performed by: INTERNAL MEDICINE

## 2023-09-01 PROCEDURE — G8420 CALC BMI NORM PARAMETERS: HCPCS | Performed by: INTERNAL MEDICINE

## 2023-09-01 PROCEDURE — 3077F SYST BP >= 140 MM HG: CPT | Performed by: INTERNAL MEDICINE

## 2023-09-01 PROCEDURE — G8399 PT W/DXA RESULTS DOCUMENT: HCPCS | Performed by: INTERNAL MEDICINE

## 2023-09-01 RX ORDER — PREDNISONE 10 MG/1
30 TABLET ORAL 2 TIMES DAILY
Qty: 180 TABLET | Refills: 3 | Status: SHIPPED | OUTPATIENT
Start: 2023-09-01

## 2023-09-01 NOTE — PROGRESS NOTES
Not on file   Social History Narrative    Denies physical or sexual abuse     Social Determinants of Health     Financial Resource Strain: Unknown    Difficulty of Paying Living Expenses: Patient refused   Food Insecurity: Unknown    Worried About Running Out of Food in the Last Year: Patient refused    [address] in the Last Year: Patient refused   Transportation Needs: Unknown    Lack of Transportation (Medical): Not on file    Lack of Transportation (Non-Medical):  No   Physical Activity: Not on file   Stress: Not on file   Social Connections: Not on file   Intimate Partner Violence: Not on file   Housing Stability: Unknown    Unable to Pay for Housing in the Last Year: Not on file    Number of Places Lived in the Last Year: Not on file    Unstable Housing in the Last Year: No         Family History   Problem Relation Age of Onset    Diabetes Mother     Diabetes Sister     Heart Disease Father     Diabetes Sister     Breast Cancer Sister 22    Diabetes Brother     Heart Disease Mother     Heart Disease Sister     Heart Disease Brother     Diabetes Brother     Heart Disease Brother          Allergies   Allergen Reactions    Ibuprofen Rash         Current Outpatient Medications   Medication Sig    predniSONE (DELTASONE) 10 MG tablet Take 3 tablets by mouth 2 times daily    docusate sodium (COLACE, DULCOLAX) 100 MG CAPS Take 100 mg by mouth 2 times daily    dofetilide (TIKOSYN) 500 MCG capsule Take 1 capsule by mouth 2 times daily    OXYGEN Inhale into the lungs 3L/NC    ferrous sulfate (IRON 325) 325 (65 Fe) MG tablet Take 1 tablet by mouth daily (with breakfast) Needs to be taken on empty stomach before breakfast    amLODIPine (NORVASC) 5 MG tablet TAKE 1/2 TABLET IN THE     MORNING AND AT BEDTIME    losartan (COZAAR) 50 MG tablet TAKE 1 TABLET TWICE A DAY    levothyroxine (SYNTHROID) 125 MCG tablet Take 1 tablet by mouth every morning (before breakfast)    atorvastatin (LIPITOR) 20 MG tablet Take 0.5 tablets

## 2023-09-02 NOTE — PROGRESS NOTES
[facility]  [address], [address] [place] [address] Nimco Rudolph, [address] [place] [address]  [phone]      SUBJECTIVE: Elizabeth Palacios is a 69 y.o. female is seen for a routine postop check. She had right VATS lung biopsy. Today, she reports no problems with the wound or other issues. Activity, diet and bowels are normal. No pain. OBJECTIVE: Appears well. Wound is well healed without complications or infection. PATH:\"RIGHT LUNG\":  PULMONARY EDEMA WITH PROMINENCE OF SMALL PULMONARY   VEINS AND CAPILLARIES. AIRWAY-CENTERED REMODELING INCLUSIVE OF   PERIBRONCHIOLAR FIBROSIS AND METAPLASIA ACCOMPANIED BY MUCOSTASIS. SEE   [facility] CONSULTATION REPORT. ASSESSMENT: normal postoperative course, doing well. PLAN: Follow with pulmonary. Return PRN.     Linsey Riggs MD

## 2023-09-05 ENCOUNTER — CARE COORDINATION (OUTPATIENT)
Dept: CARE COORDINATION | Facility: CLINIC | Age: 78
End: 2023-09-05

## 2023-09-05 NOTE — CARE COORDINATION
[facility] Care Transitions Follow Up Call    Patient Current Location:  Home: [address] [place] [address] Dr Crow Chávez [id number]    LPN Care Coordinator contacted the family by telephone to follow up after admission on 23. Verified name and  with family as identifiers. Patient: Valerie Jane  Patient : 1945   MRN: [id number]  Reason for Admission:  elective surgery: THORACOSCOPY RIGHT WITH LUNG BIOPSY  Discharge Date: 23 RARS: Readmission Risk Score: 21.5      Needs to be reviewed by the provider   Additional needs identified to be addressed with provider: No  none             Method of communication with provider: none. [facility] states patient is progressing well. Pulmonology has ordered portable oxygen concentrator for patient. Denies new needs or concerns. Addressed changes since last contact:  none  Discussed follow-up appointments. If no appointment was previously scheduled, appointment scheduling offered: Yes. Is follow up appointment scheduled within 7 days of discharge? Per post op plan. Follow Up  Future Appointments   Date Time Provider [facility] [address]   2023  8:15 AM [facility] PT UCDG GVL AMB   2023  8:30 AM Cyrena Bamberger, RN SFOCPRHB SFO   2023  9:30 AM Dale Garay APRN - NP MLMIM GVL AMB   2023  2:50 PM Juno Loja MD PPS GVL AMB   2024  1:30 PM [facility] ARSENIO ECHO [address] GVL AMB   2024  1:15 PM Alaina Patricia MD [facility] GV AMB     Non-[facility] follow up appointment(s): rigo    LPN Care Coordinator reviewed medical action plan with family and discussed any barriers to care and/or understanding of plan of care after discharge. Discussed appropriate site of care based on symptoms and resources available to patient including: PCP  Specialist. The family agrees to contact the PCP office for questions related to their healthcare. Advance Care Planning:   Decision maker verified .      Patients top risk factors for readmission:

## 2023-09-06 ENCOUNTER — ANTI-COAG VISIT (OUTPATIENT)
Age: 78
End: 2023-09-06

## 2023-09-06 DIAGNOSIS — Z95.2 S/P AVR (AORTIC VALVE REPLACEMENT): Chronic | ICD-10-CM

## 2023-09-06 DIAGNOSIS — Z79.01 LONG TERM CURRENT USE OF ANTICOAGULANT THERAPY: Primary | Chronic | ICD-10-CM

## 2023-09-06 DIAGNOSIS — I48.0 PAROXYSMAL ATRIAL FIBRILLATION (HCC): Chronic | ICD-10-CM

## 2023-09-06 DIAGNOSIS — Z95.2 S/P MITRAL VALVE REPLACEMENT: Chronic | ICD-10-CM

## 2023-09-06 LAB
POC INR: 1.8
PROTHROMBIN TIME, POC: ABNORMAL

## 2023-09-06 NOTE — PROGRESS NOTES
Warfarin tablet strength and weekly dosing schedule confirmed today. Patient knows of no reason for subtherapeutic INR result. Maintenance plan increased by 14.3 %, (see Anticoag Dosing Calendar). INR to be rechecked in two weeks.

## 2023-09-06 NOTE — PATIENT INSTRUCTIONS
Reminder: Please contact the Coumadin Clinic at [phone] when you have medication changes. Examples, new medications, antibiotics, discontinued medications, new supplements, missed doses of warfarin or if you took extra doses of warfarin. This also includes OTC medications. Notifying us helps reduce the possibility of high and low INR's. In addition, if warfarin needs to be held for any procedures, please have surgeon or physician's office contact us before holding anticoagulant. Thanks, [facility] Cardiology Coumadin Clinic.

## 2023-09-07 ENCOUNTER — HOSPITAL ENCOUNTER (OUTPATIENT)
Dept: CARDIAC REHAB | Age: 78
Setting detail: RECURRING SERIES
Discharge: HOME | End: 2023-09-10
Payer: MEDICARE

## 2023-09-07 LAB
A FUMIGATUS1 AB SER QL ID: NEGATIVE
A PULLULANS AB SER QL: NEGATIVE
LACEYELLA SACCHARI AB SER QL: NEGATIVE
PIGEON SERUM AB QL ID: NEGATIVE
S RECTIVIRGULA IGG SER QL ID: NEGATIVE
T VULGARIS AB SER QL ID: NEGATIVE

## 2023-09-07 ASSESSMENT — PATIENT HEALTH QUESTIONNAIRE - PHQ9
1. LITTLE INTEREST OR PLEASURE IN DOING THINGS: 0
5. POOR APPETITE OR OVEREATING: 0
9. THOUGHTS THAT YOU WOULD BE BETTER OFF DEAD, OR OF HURTING YOURSELF: 0
SUM OF ALL RESPONSES TO PHQ9 QUESTIONS 1 & 2: 0
4. FEELING TIRED OR HAVING LITTLE ENERGY: 1
SUM OF ALL RESPONSES TO PHQ QUESTIONS 1-9: 1
6. FEELING BAD ABOUT YOURSELF - OR THAT YOU ARE A FAILURE OR HAVE LET YOURSELF OR YOUR FAMILY DOWN: 0
10. IF YOU CHECKED OFF ANY PROBLEMS, HOW DIFFICULT HAVE THESE PROBLEMS MADE IT FOR YOU TO DO YOUR WORK, TAKE CARE OF THINGS AT HOME, OR GET ALONG WITH OTHER PEOPLE: 0
SUM OF ALL RESPONSES TO PHQ QUESTIONS 1-9: 1
SUM OF ALL RESPONSES TO PHQ QUESTIONS 1-9: 1
3. TROUBLE FALLING OR STAYING ASLEEP: 0
7. TROUBLE CONCENTRATING ON THINGS, SUCH AS READING THE NEWSPAPER OR WATCHING TELEVISION: 0
2. FEELING DOWN, DEPRESSED OR HOPELESS: 0
8. MOVING OR SPEAKING SO SLOWLY THAT OTHER PEOPLE COULD HAVE NOTICED. OR THE OPPOSITE, BEING SO FIGETY OR RESTLESS THAT YOU HAVE BEEN MOVING AROUND A LOT MORE THAN USUAL: 0
SUM OF ALL RESPONSES TO PHQ QUESTIONS 1-9: 1

## 2023-09-07 ASSESSMENT — LIFESTYLE VARIABLES: SMOKELESS_TOBACCO: NO

## 2023-09-07 NOTE — CARDIO/PULMONARY
2023      Dear Dr. Maria A Lovett    Thank you for referring your patient, Ephraim Corado (: 1945), to the Pulmonary Rehabilitation Program at [facility] [employer] [facility]. Ms. Gracie Gallego is a good candidate for the program and should see improvements with regular participation. We will be working to increase your patient's endurance and self care over the next 12 weeks. We will contact you with any issues or concerns that may arise, or you can follow your patient's progress through [facility] at any time. We will send you a final summary report when the program is completed. Again, thank you for your referral. If we can be of further assistance, please feel free to contact the Cardiopulmonary Rehab staff at [phone].     Sincerely,      Lyn Paul RN  Cardiopulmonary Rehab Nurse  [facility] Programs

## 2023-09-08 ENCOUNTER — HOSPITAL ENCOUNTER (OUTPATIENT)
Dept: SLEEP CENTER | Age: 78
Discharge: HOME | End: 2023-09-11

## 2023-09-14 ENCOUNTER — HOSPITAL ENCOUNTER (OUTPATIENT)
Dept: CARDIAC REHAB | Age: 78
Setting detail: RECURRING SERIES
Discharge: HOME | End: 2023-09-17
Payer: MEDICARE

## 2023-09-14 PROCEDURE — G0239 OTH RESP PROC, GROUP: HCPCS

## 2023-09-14 ASSESSMENT — EXERCISE STRESS TEST
PEAK_BP: 128/60
PEAK_METS: 1.9
PEAK_BP: 128/60
PEAK_HR: 84
PEAK_HR: 84
PEAK_BP: 128/60
PEAK_METS: 1.9

## 2023-09-14 ASSESSMENT — LIFESTYLE VARIABLES: SMOKELESS_TOBACCO: NO

## 2023-09-15 ENCOUNTER — HOSPITAL ENCOUNTER (OUTPATIENT)
Dept: CT IMAGING | Age: 78
End: 2023-09-15
Attending: INTERNAL MEDICINE
Payer: MEDICARE

## 2023-09-15 DIAGNOSIS — Z79.01 LONG TERM CURRENT USE OF ANTICOAGULANT THERAPY: Chronic | ICD-10-CM

## 2023-09-15 DIAGNOSIS — D50.9 IRON DEFICIENCY ANEMIA, UNSPECIFIED IRON DEFICIENCY ANEMIA TYPE: ICD-10-CM

## 2023-09-15 DIAGNOSIS — Z79.899 LONG-TERM USE OF HIGH-RISK MEDICATION: Chronic | ICD-10-CM

## 2023-09-15 DIAGNOSIS — R80.9 TYPE 2 DIABETES MELLITUS WITH MICROALBUMINURIA, WITH LONG-TERM CURRENT USE OF INSULIN (HCC): Chronic | ICD-10-CM

## 2023-09-15 DIAGNOSIS — E89.0 HYPOTHYROIDISM, POSTSURGICAL: Chronic | ICD-10-CM

## 2023-09-15 DIAGNOSIS — Z79.4 TYPE 2 DIABETES MELLITUS WITH MICROALBUMINURIA, WITH LONG-TERM CURRENT USE OF INSULIN (HCC): Chronic | ICD-10-CM

## 2023-09-15 DIAGNOSIS — E11.29 TYPE 2 DIABETES MELLITUS WITH MICROALBUMINURIA, WITH LONG-TERM CURRENT USE OF INSULIN (HCC): Chronic | ICD-10-CM

## 2023-09-15 DIAGNOSIS — I10 ESSENTIAL HYPERTENSION: Chronic | ICD-10-CM

## 2023-09-15 DIAGNOSIS — I48.0 PAROXYSMAL ATRIAL FIBRILLATION (HCC): Chronic | ICD-10-CM

## 2023-09-15 DIAGNOSIS — R91.8 PULMONARY INFILTRATES: ICD-10-CM

## 2023-09-15 DIAGNOSIS — E61.1 IRON DEFICIENCY: ICD-10-CM

## 2023-09-15 DIAGNOSIS — J96.01 ACUTE RESPIRATORY FAILURE WITH HYPOXIA (HCC): ICD-10-CM

## 2023-09-15 DIAGNOSIS — E78.2 MIXED HYPERLIPIDEMIA: Chronic | ICD-10-CM

## 2023-09-15 LAB
ALBUMIN SERPL-MCNC: 3.8 G/DL (ref 3.2–4.6)
ALBUMIN/GLOB SERPL: 1 (ref 0.4–1.6)
ALP SERPL-CCNC: 82 U/L (ref 50–136)
ALT SERPL-CCNC: 33 U/L (ref 12–65)
ANION GAP SERPL CALC-SCNC: 6 MMOL/L (ref 2–11)
AST SERPL-CCNC: 21 U/L (ref 15–37)
BASOPHILS # BLD: 0.1 K/UL (ref 0–0.2)
BASOPHILS NFR BLD: 1 % (ref 0–2)
BILIRUB SERPL-MCNC: 0.3 MG/DL (ref 0.2–1.1)
BUN SERPL-MCNC: 31 MG/DL (ref 8–23)
CALCIUM SERPL-MCNC: 8.9 MG/DL (ref 8.3–10.4)
CHLORIDE SERPL-SCNC: 107 MMOL/L (ref 101–110)
CHOLEST SERPL-MCNC: 97 MG/DL
CO2 SERPL-SCNC: 30 MMOL/L (ref 21–32)
CREAT SERPL-MCNC: 0.9 MG/DL (ref 0.6–1)
DIFFERENTIAL METHOD BLD: ABNORMAL
EOSINOPHIL # BLD: 0.2 K/UL (ref 0–0.8)
EOSINOPHIL NFR BLD: 3 % (ref 0.5–7.8)
ERYTHROCYTE [DISTWIDTH] IN BLOOD BY AUTOMATED COUNT: 16.8 % (ref 11.9–14.6)
EST. AVERAGE GLUCOSE BLD GHB EST-MCNC: 126 MG/DL
GLOBULIN SER CALC-MCNC: 4 G/DL (ref 2.8–4.5)
GLUCOSE SERPL-MCNC: 136 MG/DL (ref 65–100)
HBA1C MFR BLD: 6 % (ref 4.8–5.6)
HCT VFR BLD AUTO: 35.4 % (ref 35.8–46.3)
HDLC SERPL-MCNC: 42 MG/DL (ref 40–60)
HDLC SERPL: 2.3
HGB BLD-MCNC: 10.3 G/DL (ref 11.7–15.4)
IMM GRANULOCYTES # BLD AUTO: 0 K/UL (ref 0–0.5)
IMM GRANULOCYTES NFR BLD AUTO: 0 % (ref 0–5)
LDLC SERPL CALC-MCNC: 38.6 MG/DL
LYMPHOCYTES # BLD: 2.4 K/UL (ref 0.5–4.6)
LYMPHOCYTES NFR BLD: 30 % (ref 13–44)
MAGNESIUM SERPL-MCNC: 2.1 MG/DL (ref 1.8–2.4)
MCH RBC QN AUTO: 26.9 PG (ref 26.1–32.9)
MCHC RBC AUTO-ENTMCNC: 29.1 G/DL (ref 31.4–35)
MCV RBC AUTO: 92.4 FL (ref 82–102)
MONOCYTES # BLD: 0.8 K/UL (ref 0.1–1.3)
MONOCYTES NFR BLD: 9 % (ref 4–12)
NEUTS SEG # BLD: 4.6 K/UL (ref 1.7–8.2)
NEUTS SEG NFR BLD: 58 % (ref 43–78)
NRBC # BLD: 0 K/UL (ref 0–0.2)
PLATELET # BLD AUTO: 309 K/UL (ref 150–450)
PMV BLD AUTO: 9.8 FL (ref 9.4–12.3)
POTASSIUM SERPL-SCNC: 4.5 MMOL/L (ref 3.5–5.1)
PROT SERPL-MCNC: 7.8 G/DL (ref 6.3–8.2)
RBC # BLD AUTO: 3.83 M/UL (ref 4.05–5.2)
SODIUM SERPL-SCNC: 143 MMOL/L (ref 133–143)
TRIGL SERPL-MCNC: 82 MG/DL (ref 35–150)
TSH, 3RD GENERATION: 0.5 UIU/ML (ref 0.36–3.74)
URATE SERPL-MCNC: 6 MG/DL (ref 2.6–6)
VLDLC SERPL CALC-MCNC: 16.4 MG/DL (ref 6–23)
WBC # BLD AUTO: 8.1 K/UL (ref 4.3–11.1)

## 2023-09-15 PROCEDURE — 71250 CT THORAX DX C-: CPT

## 2023-09-19 ENCOUNTER — CARE COORDINATION (OUTPATIENT)
Dept: CARE COORDINATION | Facility: CLINIC | Age: 78
End: 2023-09-19

## 2023-09-19 VITALS — BODY MASS INDEX: 21.97 KG/M2 | OXYGEN SATURATION: 95 % | WEIGHT: 140 LBS | HEIGHT: 67 IN

## 2023-09-19 NOTE — CARE COORDINATION
Patient has graduated from the Care Transitions program on 9.19.23. Attempted outreach follow up without success. Per chart review patient has followed plan of care. No new needs are noted on chart review. Patient will be graduated from [facility] program.  Will reopen if call is returned. Patient has LPN Care Coordinator's contact information for any further questions, concerns, or needs.   Patients upcoming visits:    Future Appointments   Date Time Provider [address]   9/20/2023  8:15 AM [facility] PT UCDG GVL AMB   9/26/2023  9:30 AM RICCI Melgoza - NP MLMIM GVL AMB   12/6/2023  2:50 PM Tamera Salamanca MD PPS GVL AMB   1/19/2024  1:30 PM [facility] ECHO 60 UCDE GVL AMB   1/26/2024  1:15 PM Eder Valle MD [facility] GVL AMB

## 2023-09-20 ENCOUNTER — ANTI-COAG VISIT (OUTPATIENT)
Age: 78
End: 2023-09-20

## 2023-09-20 DIAGNOSIS — I48.0 PAROXYSMAL ATRIAL FIBRILLATION (HCC): Chronic | ICD-10-CM

## 2023-09-20 DIAGNOSIS — Z95.2 S/P MITRAL VALVE REPLACEMENT: Chronic | ICD-10-CM

## 2023-09-20 DIAGNOSIS — Z79.01 LONG TERM CURRENT USE OF ANTICOAGULANT THERAPY: Primary | Chronic | ICD-10-CM

## 2023-09-20 DIAGNOSIS — Z95.2 S/P AVR (AORTIC VALVE REPLACEMENT): Chronic | ICD-10-CM

## 2023-09-20 LAB
POC INR: 2.8
PROTHROMBIN TIME, POC: NORMAL

## 2023-09-26 ENCOUNTER — OFFICE VISIT (OUTPATIENT)
Dept: INTERNAL MEDICINE CLINIC | Facility: CLINIC | Age: 78
End: 2023-09-26
Payer: MEDICARE

## 2023-09-26 VITALS
OXYGEN SATURATION: 94 % | HEIGHT: 67 IN | WEIGHT: 141.8 LBS | HEART RATE: 86 BPM | SYSTOLIC BLOOD PRESSURE: 130 MMHG | BODY MASS INDEX: 22.26 KG/M2 | DIASTOLIC BLOOD PRESSURE: 68 MMHG

## 2023-09-26 DIAGNOSIS — D68.69 SECONDARY HYPERCOAGULABLE STATE (HCC): ICD-10-CM

## 2023-09-26 DIAGNOSIS — Z79.899 LONG-TERM USE OF HIGH-RISK MEDICATION: Chronic | ICD-10-CM

## 2023-09-26 DIAGNOSIS — Z79.01 LONG TERM CURRENT USE OF ANTICOAGULANT THERAPY: Chronic | ICD-10-CM

## 2023-09-26 DIAGNOSIS — Z79.4 TYPE 2 DIABETES MELLITUS WITH MICROALBUMINURIA, WITH LONG-TERM CURRENT USE OF INSULIN (HCC): Chronic | ICD-10-CM

## 2023-09-26 DIAGNOSIS — I10 ESSENTIAL HYPERTENSION: Primary | Chronic | ICD-10-CM

## 2023-09-26 DIAGNOSIS — R80.9 TYPE 2 DIABETES MELLITUS WITH MICROALBUMINURIA, WITH LONG-TERM CURRENT USE OF INSULIN (HCC): Chronic | ICD-10-CM

## 2023-09-26 DIAGNOSIS — E11.65 TYPE 2 DIABETES MELLITUS WITH HYPERGLYCEMIA, WITH LONG-TERM CURRENT USE OF INSULIN (HCC): ICD-10-CM

## 2023-09-26 DIAGNOSIS — I48.0 PAROXYSMAL ATRIAL FIBRILLATION (HCC): Chronic | ICD-10-CM

## 2023-09-26 DIAGNOSIS — Z95.2 S/P AVR (AORTIC VALVE REPLACEMENT): Chronic | ICD-10-CM

## 2023-09-26 DIAGNOSIS — K21.9 GASTROESOPHAGEAL REFLUX DISEASE, UNSPECIFIED WHETHER ESOPHAGITIS PRESENT: ICD-10-CM

## 2023-09-26 DIAGNOSIS — Z79.4 TYPE 2 DIABETES MELLITUS WITH HYPERGLYCEMIA, WITH LONG-TERM CURRENT USE OF INSULIN (HCC): ICD-10-CM

## 2023-09-26 DIAGNOSIS — E11.29 TYPE 2 DIABETES MELLITUS WITH MICROALBUMINURIA, WITH LONG-TERM CURRENT USE OF INSULIN (HCC): Chronic | ICD-10-CM

## 2023-09-26 DIAGNOSIS — E61.1 IRON DEFICIENCY: Chronic | ICD-10-CM

## 2023-09-26 DIAGNOSIS — E89.0 HYPOTHYROIDISM, POSTSURGICAL: Chronic | ICD-10-CM

## 2023-09-26 DIAGNOSIS — M81.0 AGE-RELATED OSTEOPOROSIS WITHOUT CURRENT PATHOLOGICAL FRACTURE: Chronic | ICD-10-CM

## 2023-09-26 PROCEDURE — 99214 OFFICE O/P EST MOD 30 MIN: CPT | Performed by: NURSE PRACTITIONER

## 2023-09-26 PROCEDURE — 3044F HG A1C LEVEL LT 7.0%: CPT | Performed by: NURSE PRACTITIONER

## 2023-09-26 PROCEDURE — 3078F DIAST BP <80 MM HG: CPT | Performed by: NURSE PRACTITIONER

## 2023-09-26 PROCEDURE — 1036F TOBACCO NON-USER: CPT | Performed by: NURSE PRACTITIONER

## 2023-09-26 PROCEDURE — 3074F SYST BP LT 130 MM HG: CPT | Performed by: NURSE PRACTITIONER

## 2023-09-26 PROCEDURE — G8399 PT W/DXA RESULTS DOCUMENT: HCPCS | Performed by: NURSE PRACTITIONER

## 2023-09-26 PROCEDURE — G8420 CALC BMI NORM PARAMETERS: HCPCS | Performed by: NURSE PRACTITIONER

## 2023-09-26 PROCEDURE — 1090F PRES/ABSN URINE INCON ASSESS: CPT | Performed by: NURSE PRACTITIONER

## 2023-09-26 PROCEDURE — 1123F ACP DISCUSS/DSCN MKR DOCD: CPT | Performed by: NURSE PRACTITIONER

## 2023-09-26 PROCEDURE — G8427 DOCREV CUR MEDS BY ELIG CLIN: HCPCS | Performed by: NURSE PRACTITIONER

## 2023-09-26 NOTE — PROGRESS NOTES
PROGRESS NOTE    SUBJECTIVE:   Mayco Manuel is a 69 y.o. female seen for a follow up visit for   Chief Complaint   Patient presents with    Follow-up     HPI  Started prednisone 30 mg twice a day    By Dexcom: 3 day summary:  13% very high  28% high  58% in range  <1% low. Reports spiking the blood sugar immediately after the breakfast and supper meals with prednisone but blood glucose drifts back down. CHRONIC MEDICAL PROBLEMS    Hyperlipidemia  Onset: greater than 20 years ago. Exacerbating diseases include diabetes and hypothyroidism. Current antihyperlipidemic treatment includes statins. There are no compliance problems. Risk factors for coronary artery disease include diabetes mellitus, dyslipidemia, family history and post-menopausal.     Hypothyroid (post-surgical)  Onset: approx 2010. Asymptomatic. Denies weight changes. Denies heat/cold intolerances. Compliant to levothyroxine. Diabetes, Type II on long term insulin therapy  Onset: approx 2005. Blood sugars improved with  medications (diet, insulin). Using CGM:  Dexcom G6. (See above regarding recent prednisone). Aortic valve replacement  Aortic valve replacement:  CE bovine bioprosthetic valve with a size of 21 mm. No regurgitation. Mild stenosis of the aortic valve. Mitral valve:  CE-2015 bovine bioprosthetic valve with a size of 25 mm. No transvalvular regurgitation. No paravalvular regurgitation. Mild stenosis noted. Requires lifetime anticoagulation therapy with warfarin. Paroxysmal atrial fibrillation  Onset: Remote. Current treatment Tikosyn 500 mcg twice daily. Compliant to therapy with excellent knowledge of potential drug interactions with Tikosyn. Anticoagulation with warfarin and monitored through [facility] cardiology. Target INR according to her aortic valve replacement 2.5-3.5.      Reviewed and updated this visit by provider:  Tobacco  Allergies  Meds  Problems  Med Hx  Surg Hx  Fam Hx

## 2023-10-04 ENCOUNTER — ANTI-COAG VISIT (OUTPATIENT)
Age: 78
End: 2023-10-04
Payer: MEDICARE

## 2023-10-04 DIAGNOSIS — Z95.2 S/P AVR (AORTIC VALVE REPLACEMENT): Chronic | ICD-10-CM

## 2023-10-04 DIAGNOSIS — I48.0 PAROXYSMAL ATRIAL FIBRILLATION (HCC): Chronic | ICD-10-CM

## 2023-10-04 DIAGNOSIS — Z95.2 S/P MITRAL VALVE REPLACEMENT: Chronic | ICD-10-CM

## 2023-10-04 DIAGNOSIS — Z79.01 LONG TERM CURRENT USE OF ANTICOAGULANT THERAPY: Primary | Chronic | ICD-10-CM

## 2023-10-04 LAB
POC INR: 3.1
PROTHROMBIN TIME, POC: ABNORMAL

## 2023-10-04 PROCEDURE — 93793 ANTICOAG MGMT PT WARFARIN: CPT | Performed by: INTERNAL MEDICINE

## 2023-10-04 PROCEDURE — 85610 PROTHROMBIN TIME: CPT | Performed by: INTERNAL MEDICINE

## 2023-10-04 NOTE — PATIENT INSTRUCTIONS
Reminder: Please contact the Coumadin Clinic at [phone] when you have medication changes. Examples, new medications, antibiotics, discontinued medications, new supplements, missed doses of warfarin or if you took extra doses of warfarin. This also includes OTC medications. Notifying us helps reduce the possibility of high and low INR's. In addition, if warfarin needs to be held for any procedures, please have surgeon or physician's office contact us before holding anticoagulant. Thanks, [facility] Cardiology Coumadin Clinic.

## 2023-10-04 NOTE — PROGRESS NOTES
Continue current maintenance plan (see Anticoag Dosing Calendar). INR to be rechecked in two week(s). Warfarin tablet strength and weekly dosing schedule confirmed today.  Still on prednisone and has taken tylenol for leg pain and took amoxicillin for teeth cleaning yesterday

## 2023-10-11 ASSESSMENT — LIFESTYLE VARIABLES: SMOKELESS_TOBACCO: NO

## 2023-10-16 ENCOUNTER — HOSPITAL ENCOUNTER (OUTPATIENT)
Dept: CARDIAC REHAB | Age: 78
Setting detail: RECURRING SERIES
Discharge: HOME | End: 2023-10-19
Payer: MEDICARE

## 2023-10-16 VITALS — WEIGHT: 143.6 LBS | BODY MASS INDEX: 22.83 KG/M2

## 2023-10-16 PROCEDURE — G0239 OTH RESP PROC, GROUP: HCPCS

## 2023-10-16 ASSESSMENT — EXERCISE STRESS TEST
PEAK_METS: 2.3
PEAK_BP: 130/60
PEAK_HR: 90

## 2023-10-18 ENCOUNTER — ANTI-COAG VISIT (OUTPATIENT)
Age: 78
End: 2023-10-18
Payer: MEDICARE

## 2023-10-18 ENCOUNTER — HOSPITAL ENCOUNTER (OUTPATIENT)
Dept: CARDIAC REHAB | Age: 78
Setting detail: RECURRING SERIES
Discharge: HOME | End: 2023-10-21
Payer: MEDICARE

## 2023-10-18 DIAGNOSIS — I48.0 PAROXYSMAL ATRIAL FIBRILLATION (HCC): Chronic | ICD-10-CM

## 2023-10-18 DIAGNOSIS — Z95.2 S/P MITRAL VALVE REPLACEMENT: Chronic | ICD-10-CM

## 2023-10-18 DIAGNOSIS — Z95.2 S/P AVR (AORTIC VALVE REPLACEMENT): Chronic | ICD-10-CM

## 2023-10-18 DIAGNOSIS — Z79.01 LONG TERM CURRENT USE OF ANTICOAGULANT THERAPY: Primary | Chronic | ICD-10-CM

## 2023-10-18 LAB
POC INR: 3.3
PROTHROMBIN TIME, POC: ABNORMAL

## 2023-10-18 PROCEDURE — 85610 PROTHROMBIN TIME: CPT | Performed by: INTERNAL MEDICINE

## 2023-10-18 PROCEDURE — G0239 OTH RESP PROC, GROUP: HCPCS

## 2023-10-18 ASSESSMENT — EXERCISE STRESS TEST
PEAK_METS: 2.3
PEAK_BP: 124/72
PEAK_HR: 71

## 2023-10-18 ASSESSMENT — ENCOUNTER SYMPTOMS
BACK PAIN: 0
ABDOMINAL PAIN: 0
SHORTNESS OF BREATH: 1
WHEEZING: 0
COUGH: 0
CHEST TIGHTNESS: 0
COLOR CHANGE: 0

## 2023-10-18 NOTE — PROGRESS NOTES
Warfarin tablet strength and weekly dosing schedule confirmed today. Patient knows of no reason for supratherapeutic INR. One time dose of 2.5 mg tonight instead of 5 mg. Then continue current maintenance plan (see Anticoag Dosing Calendar). INR to be rechecked in two week(s).

## 2023-10-20 ENCOUNTER — TELEPHONE (OUTPATIENT)
Dept: INTERNAL MEDICINE CLINIC | Facility: CLINIC | Age: 78
End: 2023-10-20

## 2023-10-20 DIAGNOSIS — M81.0 AGE-RELATED OSTEOPOROSIS WITHOUT CURRENT PATHOLOGICAL FRACTURE: Primary | ICD-10-CM

## 2023-10-20 NOTE — TELEPHONE ENCOUNTER
Called and informed patient of the message from Samaria Clark NP regarding the following:    \"Please call Mrs. Hook. In regards to the bone density, I recommend referring to rheumatology for consideration of Evenity. If she is agreeable recommend referral to Dr. Eleni Pearson rheumatology. Thank you     RICCI Jewell NP\"    Patient stated that she would like to proceed with a referral to Rheumatology to see Dr. Eleni Pearson.

## 2023-10-23 ENCOUNTER — HOSPITAL ENCOUNTER (OUTPATIENT)
Dept: CARDIAC REHAB | Age: 78
Setting detail: RECURRING SERIES
Discharge: HOME | End: 2023-10-26
Payer: MEDICARE

## 2023-10-23 VITALS — WEIGHT: 141.6 LBS | BODY MASS INDEX: 22.51 KG/M2

## 2023-10-23 PROCEDURE — G0239 OTH RESP PROC, GROUP: HCPCS

## 2023-10-23 ASSESSMENT — EXERCISE STRESS TEST
PEAK_BP: 142/66
PEAK_METS: 2.3
PEAK_HR: 80

## 2023-10-25 ENCOUNTER — HOSPITAL ENCOUNTER (OUTPATIENT)
Dept: CARDIAC REHAB | Age: 78
Setting detail: RECURRING SERIES
Discharge: HOME | End: 2023-10-28
Payer: MEDICARE

## 2023-10-25 PROCEDURE — G0239 OTH RESP PROC, GROUP: HCPCS

## 2023-10-25 ASSESSMENT — EXERCISE STRESS TEST
PEAK_BP: 120/60
PEAK_HR: 85
PEAK_METS: 2.3

## 2023-10-30 ENCOUNTER — HOSPITAL ENCOUNTER (OUTPATIENT)
Dept: CARDIAC REHAB | Age: 78
Setting detail: RECURRING SERIES
Discharge: HOME | End: 2023-11-02
Payer: MEDICARE

## 2023-10-30 VITALS — WEIGHT: 143 LBS | BODY MASS INDEX: 22.74 KG/M2

## 2023-10-30 PROCEDURE — G0239 OTH RESP PROC, GROUP: HCPCS

## 2023-10-30 ASSESSMENT — EXERCISE STRESS TEST
PEAK_BP: 144/66
PEAK_HR: 80
PEAK_METS: 2.3

## 2023-11-01 ENCOUNTER — HOSPITAL ENCOUNTER (OUTPATIENT)
Dept: CARDIAC REHAB | Age: 78
Setting detail: RECURRING SERIES
Discharge: HOME | End: 2023-11-04
Payer: MEDICARE

## 2023-11-01 PROCEDURE — G0239 OTH RESP PROC, GROUP: HCPCS

## 2023-11-01 ASSESSMENT — EXERCISE STRESS TEST
PEAK_BP: 130/70
PEAK_HR: 92
PEAK_METS: 2.3

## 2023-11-02 ENCOUNTER — ANTI-COAG VISIT (OUTPATIENT)
Age: 78
End: 2023-11-02

## 2023-11-02 DIAGNOSIS — Z95.2 S/P MITRAL VALVE REPLACEMENT: Chronic | ICD-10-CM

## 2023-11-02 DIAGNOSIS — Z79.01 LONG TERM CURRENT USE OF ANTICOAGULANT THERAPY: Primary | Chronic | ICD-10-CM

## 2023-11-02 DIAGNOSIS — Z95.2 S/P AVR (AORTIC VALVE REPLACEMENT): Chronic | ICD-10-CM

## 2023-11-02 DIAGNOSIS — I48.0 PAROXYSMAL ATRIAL FIBRILLATION (HCC): Chronic | ICD-10-CM

## 2023-11-02 LAB
POC INR: 2.3
PROTHROMBIN TIME, POC: NORMAL

## 2023-11-02 NOTE — PATIENT INSTRUCTIONS
Reminder: Please contact the Coumadin Clinic at [phone] when you have medication changes. Examples, new medications, antibiotics, discontinued medications, new supplements, missed doses of warfarin or if you took extra doses of warfarin. This also includes OTC medications. Notifying us helps reduce the possibility of high and low INR's. In addition, if warfarin needs to be held for any procedures, please have surgeon or physician's office contact us before holding anticoagulant. Thanks, [facility] Cardiology Coumadin Clinic.

## 2023-11-06 ENCOUNTER — HOSPITAL ENCOUNTER (OUTPATIENT)
Dept: CARDIAC REHAB | Age: 78
Setting detail: RECURRING SERIES
Discharge: HOME | End: 2023-11-09
Payer: MEDICARE

## 2023-11-06 VITALS — WEIGHT: 143.4 LBS | BODY MASS INDEX: 22.8 KG/M2

## 2023-11-06 PROCEDURE — G0239 OTH RESP PROC, GROUP: HCPCS

## 2023-11-06 RX ORDER — WARFARIN SODIUM 5 MG/1
TABLET ORAL
Qty: 90 TABLET | Refills: 3 | Status: SHIPPED | OUTPATIENT
Start: 2023-11-06

## 2023-11-06 ASSESSMENT — EXERCISE STRESS TEST
PEAK_METS: 2.3
PEAK_HR: 85
PEAK_BP: 150/78

## 2023-11-06 NOTE — TELEPHONE ENCOUNTER
Requested Prescriptions     Signed Prescriptions Disp Refills    warfarin (JANTOVEN) 5 MG tablet 90 tablet 3     Sig: TAKE 1 AND 1/2 TABLETS ON  MONDAY/WEDNESDAY/FRIDAY AND1 TABLET ALL OTHER DAYS     Authorizing Provider: Rayleen Ormond

## 2023-11-07 ENCOUNTER — TRANSCRIBE ORDERS (OUTPATIENT)
Dept: SCHEDULING | Age: 78
End: 2023-11-07

## 2023-11-07 DIAGNOSIS — Z12.31 ENCOUNTER FOR SCREENING MAMMOGRAM FOR BREAST CANCER: Primary | ICD-10-CM

## 2023-11-08 ENCOUNTER — HOSPITAL ENCOUNTER (OUTPATIENT)
Dept: CARDIAC REHAB | Age: 78
Setting detail: RECURRING SERIES
Discharge: HOME | End: 2023-11-11
Payer: MEDICARE

## 2023-11-08 PROCEDURE — G0239 OTH RESP PROC, GROUP: HCPCS

## 2023-11-08 ASSESSMENT — EXERCISE STRESS TEST
PEAK_METS: 2.4
PEAK_BP: 114/58
PEAK_HR: 84

## 2023-11-13 ENCOUNTER — HOSPITAL ENCOUNTER (OUTPATIENT)
Dept: CARDIAC REHAB | Age: 78
Setting detail: RECURRING SERIES
Discharge: HOME | End: 2023-11-16
Payer: MEDICARE

## 2023-11-13 VITALS — WEIGHT: 145 LBS | BODY MASS INDEX: 23.05 KG/M2

## 2023-11-13 PROCEDURE — G0239 OTH RESP PROC, GROUP: HCPCS

## 2023-11-13 ASSESSMENT — EXERCISE STRESS TEST
PEAK_HR: 80
PEAK_BP: 120/60
PEAK_METS: 2.5

## 2023-11-15 ENCOUNTER — HOSPITAL ENCOUNTER (OUTPATIENT)
Dept: CARDIAC REHAB | Age: 78
Setting detail: RECURRING SERIES
Discharge: HOME | End: 2023-11-18
Payer: MEDICARE

## 2023-11-15 PROCEDURE — G0239 OTH RESP PROC, GROUP: HCPCS

## 2023-11-15 ASSESSMENT — EXERCISE STRESS TEST
PEAK_HR: 90
PEAK_BP: 116/60
PEAK_METS: 2

## 2023-11-17 ASSESSMENT — LIFESTYLE VARIABLES: SMOKELESS_TOBACCO: NO

## 2023-11-17 ASSESSMENT — EXERCISE STRESS TEST: PEAK_BP: 116/60

## 2023-11-20 ENCOUNTER — HOSPITAL ENCOUNTER (OUTPATIENT)
Dept: CARDIAC REHAB | Age: 78
Setting detail: RECURRING SERIES
Discharge: HOME | End: 2023-11-23
Payer: MEDICARE

## 2023-11-20 VITALS — BODY MASS INDEX: 23.05 KG/M2 | WEIGHT: 145 LBS

## 2023-11-20 PROCEDURE — G0239 OTH RESP PROC, GROUP: HCPCS

## 2023-11-20 ASSESSMENT — EXERCISE STRESS TEST
PEAK_BP: 118/60
PEAK_METS: 2.4
PEAK_HR: 85

## 2023-11-22 ENCOUNTER — APPOINTMENT (OUTPATIENT)
Dept: CARDIAC REHAB | Age: 78
End: 2023-11-22
Payer: MEDICARE

## 2023-11-27 ENCOUNTER — HOSPITAL ENCOUNTER (OUTPATIENT)
Dept: CARDIAC REHAB | Age: 78
Setting detail: RECURRING SERIES
Discharge: HOME | End: 2023-11-30
Payer: MEDICARE

## 2023-11-27 PROCEDURE — G0239 OTH RESP PROC, GROUP: HCPCS

## 2023-11-28 VITALS — WEIGHT: 144 LBS | BODY MASS INDEX: 22.89 KG/M2

## 2023-11-28 ASSESSMENT — EXERCISE STRESS TEST
PEAK_BP: 140/62
PEAK_METS: 2.5
PEAK_HR: 98

## 2023-11-29 ENCOUNTER — HOSPITAL ENCOUNTER (OUTPATIENT)
Dept: CARDIAC REHAB | Age: 78
Setting detail: RECURRING SERIES
Discharge: HOME | End: 2023-12-02
Payer: MEDICARE

## 2023-11-29 ENCOUNTER — ANTI-COAG VISIT (OUTPATIENT)
Age: 78
End: 2023-11-29
Payer: MEDICARE

## 2023-11-29 DIAGNOSIS — Z79.01 LONG TERM CURRENT USE OF ANTICOAGULANT THERAPY: Primary | Chronic | ICD-10-CM

## 2023-11-29 DIAGNOSIS — Z95.2 S/P AVR (AORTIC VALVE REPLACEMENT): Chronic | ICD-10-CM

## 2023-11-29 DIAGNOSIS — Z95.2 S/P MITRAL VALVE REPLACEMENT: Chronic | ICD-10-CM

## 2023-11-29 DIAGNOSIS — I48.0 PAROXYSMAL ATRIAL FIBRILLATION (HCC): Chronic | ICD-10-CM

## 2023-11-29 LAB
POC INR: 4.2
PROTHROMBIN TIME, POC: ABNORMAL

## 2023-11-29 PROCEDURE — G0239 OTH RESP PROC, GROUP: HCPCS

## 2023-11-29 PROCEDURE — 85610 PROTHROMBIN TIME: CPT | Performed by: INTERNAL MEDICINE

## 2023-11-29 PROCEDURE — 93793 ANTICOAG MGMT PT WARFARIN: CPT | Performed by: INTERNAL MEDICINE

## 2023-11-29 ASSESSMENT — EXERCISE STRESS TEST
PEAK_HR: 99
PEAK_BP: 126/60
PEAK_METS: 2.5

## 2023-11-29 NOTE — PATIENT INSTRUCTIONS
Reminder: Please contact the Coumadin Clinic at [phone] when you have medication changes. Examples, new medications, antibiotics, discontinued medications, new supplements, missed doses of warfarin or if you took extra doses of warfarin. This also includes OTC medications. Notifying us helps reduce the possibility of high and low INR's. In addition, if warfarin needs to be held for any procedures, please have surgeon or physician's office contact us before holding anticoagulant. Thanks, [facility] Cardiology Coumadin Clinic.

## 2023-12-05 NOTE — PROGRESS NOTES
Raji    ORTHOPEDIC SURGERY      L hip fracture/repair    OTHER SURGICAL HISTORY      ablation 10/6/2015, Dr Carlos Quintana  2021    colon surg    THORACOSCOPY Right 8/17/2023    THORACOSCOPY RIGHT WITH LUNG BIOPSY performed by Kiley Winters MD at [facility] MAIN OR    THYROIDECTOMY      TUBAL LIGATION           Social History     Socioeconomic History    Marital status:      Spouse name: Not on file    Number of children: Not on file    Years of education: Not on file    Highest education level: Not on file   Occupational History    Not on file   Tobacco Use    Smoking status: Never    Smokeless tobacco: Never   Vaping Use    Vaping Use: Never used   Substance and Sexual Activity    Alcohol use: No    Drug use: No    Sexual activity: Not on file   Other Topics Concern    Not on file   Social History Narrative    Denies physical or sexual abuse     Social Determinants of Health     Financial Resource Strain: Unknown (6/15/2023)    Overall Financial Resource Strain (CARDIA)     Difficulty of Paying Living Expenses: Patient refused   Food Insecurity: Not on file (6/15/2023)   Transportation Needs: Unknown (6/15/2023)    PRAPARE - Transportation     Lack of Transportation (Medical): Not on file     Lack of Transportation (Non-Medical):  No   Physical Activity: Sufficiently Active (7/26/2022)    Exercise Vital Sign     Days of Exercise per Week: 5 days     Minutes of Exercise per Session: 50 min   Stress: Not on file   Social Connections: Not on file   Intimate Partner Violence: Not on file   Housing Stability: Unknown (6/15/2023)    Housing Stability Vital Sign     Unable to Pay for Housing in the Last Year: Not on file     Number of Places Lived in the Last Year: Not on file     Unstable Housing in the Last Year: No         Family History   Problem Relation Age of Onset    Diabetes Mother     Diabetes Sister     Heart Disease Father     Diabetes Sister     Breast Cancer

## 2023-12-06 ENCOUNTER — OFFICE VISIT (OUTPATIENT)
Dept: PULMONOLOGY | Age: 78
End: 2023-12-06
Payer: MEDICARE

## 2023-12-06 VITALS
HEIGHT: 66 IN | RESPIRATION RATE: 16 BRPM | TEMPERATURE: 97.2 F | WEIGHT: 148 LBS | BODY MASS INDEX: 23.78 KG/M2 | OXYGEN SATURATION: 96 % | DIASTOLIC BLOOD PRESSURE: 72 MMHG | HEART RATE: 92 BPM | SYSTOLIC BLOOD PRESSURE: 124 MMHG

## 2023-12-06 DIAGNOSIS — R91.8 PULMONARY INFILTRATES: Primary | ICD-10-CM

## 2023-12-06 DIAGNOSIS — J96.01 ACUTE RESPIRATORY FAILURE WITH HYPOXIA (HCC): ICD-10-CM

## 2023-12-06 DIAGNOSIS — G47.34 NOCTURNAL HYPOXEMIA: ICD-10-CM

## 2023-12-06 DIAGNOSIS — J18.9 PNEUMONIA OF BOTH LUNGS DUE TO INFECTIOUS ORGANISM, UNSPECIFIED PART OF LUNG: ICD-10-CM

## 2023-12-06 DIAGNOSIS — R09.02 HYPOXIA: ICD-10-CM

## 2023-12-06 PROCEDURE — 3074F SYST BP LT 130 MM HG: CPT | Performed by: INTERNAL MEDICINE

## 2023-12-06 PROCEDURE — G8420 CALC BMI NORM PARAMETERS: HCPCS | Performed by: INTERNAL MEDICINE

## 2023-12-06 PROCEDURE — 1123F ACP DISCUSS/DSCN MKR DOCD: CPT | Performed by: INTERNAL MEDICINE

## 2023-12-06 PROCEDURE — G8484 FLU IMMUNIZE NO ADMIN: HCPCS | Performed by: INTERNAL MEDICINE

## 2023-12-06 PROCEDURE — 99215 OFFICE O/P EST HI 40 MIN: CPT | Performed by: INTERNAL MEDICINE

## 2023-12-06 PROCEDURE — 1036F TOBACCO NON-USER: CPT | Performed by: INTERNAL MEDICINE

## 2023-12-06 PROCEDURE — G8427 DOCREV CUR MEDS BY ELIG CLIN: HCPCS | Performed by: INTERNAL MEDICINE

## 2023-12-06 PROCEDURE — 3078F DIAST BP <80 MM HG: CPT | Performed by: INTERNAL MEDICINE

## 2023-12-06 PROCEDURE — 1090F PRES/ABSN URINE INCON ASSESS: CPT | Performed by: INTERNAL MEDICINE

## 2023-12-06 PROCEDURE — G8399 PT W/DXA RESULTS DOCUMENT: HCPCS | Performed by: INTERNAL MEDICINE

## 2023-12-06 RX ORDER — PREDNISONE 5 MG/1
5 TABLET ORAL 3 TIMES DAILY
Qty: 180 TABLET | Refills: 0 | Status: SHIPPED | OUTPATIENT
Start: 2023-12-06

## 2023-12-12 ENCOUNTER — HOSPITAL ENCOUNTER (OUTPATIENT)
Dept: CT IMAGING | Age: 78
Discharge: HOME | End: 2023-12-15
Attending: INTERNAL MEDICINE
Payer: MEDICARE

## 2023-12-12 DIAGNOSIS — R09.02 HYPOXIA: ICD-10-CM

## 2023-12-12 DIAGNOSIS — R91.8 PULMONARY INFILTRATES: ICD-10-CM

## 2023-12-12 PROCEDURE — 71250 CT THORAX DX C-: CPT

## 2023-12-13 ENCOUNTER — ANTI-COAG VISIT (OUTPATIENT)
Age: 78
End: 2023-12-13
Payer: MEDICARE

## 2023-12-13 DIAGNOSIS — Z79.01 LONG TERM CURRENT USE OF ANTICOAGULANT THERAPY: Primary | Chronic | ICD-10-CM

## 2023-12-13 DIAGNOSIS — Z95.2 S/P AVR (AORTIC VALVE REPLACEMENT): Chronic | ICD-10-CM

## 2023-12-13 DIAGNOSIS — I48.0 PAROXYSMAL ATRIAL FIBRILLATION (HCC): Chronic | ICD-10-CM

## 2023-12-13 DIAGNOSIS — Z95.2 S/P MITRAL VALVE REPLACEMENT: Chronic | ICD-10-CM

## 2023-12-13 LAB
POC INR: 3.9
PROTHROMBIN TIME, POC: ABNORMAL

## 2023-12-13 PROCEDURE — 85610 PROTHROMBIN TIME: CPT | Performed by: INTERNAL MEDICINE

## 2023-12-13 PROCEDURE — 93793 ANTICOAG MGMT PT WARFARIN: CPT | Performed by: INTERNAL MEDICINE

## 2023-12-13 NOTE — PATIENT INSTRUCTIONS
Reminder: Please contact the Coumadin Clinic at [phone] when you have medication changes. Examples, new medications, antibiotics, discontinued medications, new supplements, missed doses of warfarin or if you took extra doses of warfarin. This also includes OTC medications. Notifying us helps reduce the possibility of high and low INR's. In addition, if warfarin needs to be held for any procedures, please have surgeon or physician's office contact us before holding anticoagulant. Thanks, [facility].

## 2023-12-13 NOTE — PROGRESS NOTES
Warfarin tablet strength and weekly dosing schedule confirmed today. One time dose of 2.5 mg tonight instead of 5 mg. Then continue current maintenance plan (see Anticoag Dosing Calendar). INR to be rechecked in two week(s).

## 2023-12-14 ASSESSMENT — LIFESTYLE VARIABLES: SMOKELESS_TOBACCO: NO

## 2023-12-14 ASSESSMENT — EXERCISE STRESS TEST: PEAK_BP: 116/60

## 2023-12-26 ENCOUNTER — HOSPITAL ENCOUNTER (EMERGENCY)
Age: 78
Discharge: HOME | End: 2023-12-26
Payer: MEDICARE

## 2023-12-26 ENCOUNTER — APPOINTMENT (OUTPATIENT)
Dept: GENERAL RADIOLOGY | Age: 78
End: 2023-12-26
Payer: MEDICARE

## 2023-12-26 VITALS
BODY MASS INDEX: 23.78 KG/M2 | RESPIRATION RATE: 18 BRPM | HEIGHT: 66 IN | DIASTOLIC BLOOD PRESSURE: 59 MMHG | SYSTOLIC BLOOD PRESSURE: 138 MMHG | TEMPERATURE: 97.7 F | OXYGEN SATURATION: 97 % | HEART RATE: 77 BPM | WEIGHT: 148 LBS

## 2023-12-26 DIAGNOSIS — S39.012A BACK STRAIN, INITIAL ENCOUNTER: ICD-10-CM

## 2023-12-26 DIAGNOSIS — S20.211A CONTUSION OF RIB ON RIGHT SIDE, INITIAL ENCOUNTER: Primary | ICD-10-CM

## 2023-12-26 LAB
ALBUMIN SERPL-MCNC: 3.5 G/DL (ref 3.2–4.6)
ALBUMIN/GLOB SERPL: 0.9 (ref 0.4–1.6)
ALP SERPL-CCNC: 153 U/L (ref 50–136)
ALT SERPL-CCNC: 65 U/L (ref 12–65)
ANION GAP SERPL CALC-SCNC: 1 MMOL/L (ref 2–11)
AST SERPL-CCNC: 17 U/L (ref 15–37)
BASOPHILS # BLD: 0 K/UL (ref 0–0.2)
BASOPHILS NFR BLD: 0 % (ref 0–2)
BILIRUB SERPL-MCNC: 0.6 MG/DL (ref 0.2–1.1)
BUN SERPL-MCNC: 34 MG/DL (ref 8–23)
CALCIUM SERPL-MCNC: 9.1 MG/DL (ref 8.3–10.4)
CHLORIDE SERPL-SCNC: 104 MMOL/L (ref 103–113)
CO2 SERPL-SCNC: 33 MMOL/L (ref 21–32)
CREAT SERPL-MCNC: 1.1 MG/DL (ref 0.6–1)
DIFFERENTIAL METHOD BLD: ABNORMAL
EOSINOPHIL # BLD: 0 K/UL (ref 0–0.8)
EOSINOPHIL NFR BLD: 0 % (ref 0.5–7.8)
ERYTHROCYTE [DISTWIDTH] IN BLOOD BY AUTOMATED COUNT: 17.8 % (ref 11.9–14.6)
GLOBULIN SER CALC-MCNC: 3.7 G/DL (ref 2.8–4.5)
GLUCOSE SERPL-MCNC: 267 MG/DL (ref 65–100)
HCT VFR BLD AUTO: 38.1 % (ref 35.8–46.3)
HGB BLD-MCNC: 11 G/DL (ref 11.7–15.4)
IMM GRANULOCYTES # BLD AUTO: 0.1 K/UL (ref 0–0.5)
IMM GRANULOCYTES NFR BLD AUTO: 1 % (ref 0–5)
INR PPP: 3.1
LYMPHOCYTES # BLD: 0.6 K/UL (ref 0.5–4.6)
LYMPHOCYTES NFR BLD: 5 % (ref 13–44)
MCH RBC QN AUTO: 28.4 PG (ref 26.1–32.9)
MCHC RBC AUTO-ENTMCNC: 28.9 G/DL (ref 31.4–35)
MCV RBC AUTO: 98.2 FL (ref 82–102)
MONOCYTES # BLD: 0.5 K/UL (ref 0.1–1.3)
MONOCYTES NFR BLD: 4 % (ref 4–12)
NEUTS SEG # BLD: 11.4 K/UL (ref 1.7–8.2)
NEUTS SEG NFR BLD: 90 % (ref 43–78)
NRBC # BLD: 0 K/UL (ref 0–0.2)
PLATELET # BLD AUTO: 260 K/UL (ref 150–450)
PMV BLD AUTO: 9.3 FL (ref 9.4–12.3)
POTASSIUM SERPL-SCNC: 5.1 MMOL/L (ref 3.5–5.1)
PROT SERPL-MCNC: 7.2 G/DL (ref 6.3–8.2)
PROTHROMBIN TIME: 33.5 SEC (ref 11.3–14.9)
RBC # BLD AUTO: 3.88 M/UL (ref 4.05–5.2)
SODIUM SERPL-SCNC: 138 MMOL/L (ref 136–146)
WBC # BLD AUTO: 12.6 K/UL (ref 4.3–11.1)

## 2023-12-26 PROCEDURE — 71101 X-RAY EXAM UNILAT RIBS/CHEST: CPT

## 2023-12-26 PROCEDURE — 80053 COMPREHEN METABOLIC PANEL: CPT

## 2023-12-26 PROCEDURE — 72100 X-RAY EXAM L-S SPINE 2/3 VWS: CPT

## 2023-12-26 PROCEDURE — 99284 EMERGENCY DEPT VISIT MOD MDM: CPT

## 2023-12-26 PROCEDURE — 85610 PROTHROMBIN TIME: CPT

## 2023-12-26 PROCEDURE — 6370000000 HC RX 637 (ALT 250 FOR IP): Performed by: PHYSICIAN ASSISTANT

## 2023-12-26 PROCEDURE — 85025 COMPLETE CBC W/AUTO DIFF WBC: CPT

## 2023-12-26 RX ORDER — LIDOCAINE 50 MG/G
1 PATCH TOPICAL DAILY
Qty: 10 PATCH | Refills: 0 | Status: SHIPPED | OUTPATIENT
Start: 2023-12-26 | End: 2024-01-05

## 2023-12-26 RX ORDER — METHOCARBAMOL 500 MG/1
500 TABLET, FILM COATED ORAL 3 TIMES DAILY
Qty: 21 TABLET | Refills: 0 | Status: SHIPPED | OUTPATIENT
Start: 2023-12-26 | End: 2024-01-02

## 2023-12-26 RX ORDER — METHOCARBAMOL 500 MG/1
500 TABLET, FILM COATED ORAL
Status: COMPLETED | OUTPATIENT
Start: 2023-12-26 | End: 2023-12-26

## 2023-12-26 RX ORDER — LIDOCAINE 4 G/G
1 PATCH TOPICAL
Status: DISCONTINUED | OUTPATIENT
Start: 2023-12-26 | End: 2023-12-26 | Stop reason: HOSPADM

## 2023-12-26 RX ADMIN — METHOCARBAMOL TABLETS 500 MG: 500 TABLET, COATED ORAL at 12:40

## 2023-12-26 NOTE — DISCHARGE INSTRUCTIONS
Your chest xray did not show any new rib fractures. The xrays of your lower back do show any old appearing compression fracture of L1. We are sending you home with lidocaine patches that you can apply to your back daily and robaxin for muscle spasms can be taken every 8 hrs as needed. Please follow up closely with your doctor in 1 week. Return to the ER with any severely worsening symptoms.

## 2023-12-26 NOTE — ED PROVIDER NOTES
Emergency Department Provider Note       PCP: RICCI Hopkins NP   Age: 69 y.o. Sex: female     DISPOSITION Decision To Discharge 12/26/2023 03:10:17 PM       ICD-10-CM    1. Contusion of rib on right side, initial encounter  S20.211A       2. Back strain, initial encounter  S39.012A           Medical Decision Making     Complexity of Problems Addressed:  Acute injury    Data Reviewed and Analyzed:   I independently ordered and reviewed each unique test.  I reviewed external records: provider visit note from outside specialist.  I reviewed external records: previous imaging study including radiologist interpretation. I interpreted the X-rays x-ray right ribs and chest without any obvious fracture. X-ray lumbar spine shows old compression deformity of L1. Peres Corrente Discussion of management or test interpretation. Patient is a 71-year-old female presenting with right lower back pain and right-sided rib pain after fall. She notes that for the last 2 weeks she has had persistent pain in her right lower back that is worse with any sort of position change. Does improve with being still. She has been taking half an oxycodone for any severe pain but feels like her muscles are tightening up on her and was hoping to get something for a muscle relaxer. Yesterday she fell getting out of bed hitting the right side of her rib cage on the bed itself. No head injury. She is on Coumadin. She has bruising along the right lower ribs however denies any shortness of breath or pain with breathing. She is afebrile, nontoxic in appearance, vital signs within appropriate limits. She wears 3 L of O2 at all times due to lung disease. She does have slight bruising along the right anterior lower ribs however no significant tenderness to palpation no pain with breathing. She also has tenderness to palpation of the right lower back but no midline tenderness. Will check CBC, CMP and PT/INR.   Will obtain right rib

## 2023-12-26 NOTE — ED TRIAGE NOTES
Patient arrives to ED pov from home. Patient reports increased back pain. Patient reports she slipped and fell off her bed 2 days ago and hurt her right rib cage. Denies hitting head. Patient is on Coumadin. Patient reports bruising to right rib. Patient was 88% on her 3L concentrator when arriving to ED. Patient placed on oxygen tank here at 3L with improvement to 97%.

## 2024-01-02 ENCOUNTER — HOSPITAL ENCOUNTER (EMERGENCY)
Age: 79
Discharge: HOME | End: 2024-01-02
Attending: EMERGENCY MEDICINE
Payer: MEDICARE

## 2024-01-02 ENCOUNTER — APPOINTMENT (OUTPATIENT)
Dept: GENERAL RADIOLOGY | Age: 79
End: 2024-01-02
Payer: MEDICARE

## 2024-01-02 ENCOUNTER — ANTI-COAG VISIT (OUTPATIENT)
Age: 79
End: 2024-01-02

## 2024-01-02 VITALS
RESPIRATION RATE: 17 BRPM | DIASTOLIC BLOOD PRESSURE: 73 MMHG | HEIGHT: 66 IN | SYSTOLIC BLOOD PRESSURE: 116 MMHG | WEIGHT: 146 LBS | HEART RATE: 91 BPM | BODY MASS INDEX: 23.46 KG/M2 | TEMPERATURE: 97.8 F | OXYGEN SATURATION: 94 %

## 2024-01-02 DIAGNOSIS — R79.1 SUPRATHERAPEUTIC INR: ICD-10-CM

## 2024-01-02 DIAGNOSIS — I48.91 ATRIAL FIBRILLATION WITH RAPID VENTRICULAR RESPONSE (HCC): Primary | ICD-10-CM

## 2024-01-02 DIAGNOSIS — Z95.2 S/P MITRAL VALVE REPLACEMENT: Chronic | ICD-10-CM

## 2024-01-02 DIAGNOSIS — Z79.01 LONG TERM CURRENT USE OF ANTICOAGULANT THERAPY: Primary | Chronic | ICD-10-CM

## 2024-01-02 DIAGNOSIS — I48.0 PAROXYSMAL ATRIAL FIBRILLATION (HCC): Chronic | ICD-10-CM

## 2024-01-02 DIAGNOSIS — Z95.2 S/P AVR (AORTIC VALVE REPLACEMENT): Chronic | ICD-10-CM

## 2024-01-02 LAB
ANION GAP SERPL CALC-SCNC: 7 MMOL/L (ref 2–11)
BASOPHILS # BLD: 0 K/UL (ref 0–0.2)
BASOPHILS NFR BLD: 0 % (ref 0–2)
BUN SERPL-MCNC: 38 MG/DL (ref 8–23)
CALCIUM SERPL-MCNC: 8 MG/DL (ref 8.3–10.4)
CHLORIDE SERPL-SCNC: 111 MMOL/L (ref 103–113)
CO2 SERPL-SCNC: 23 MMOL/L (ref 21–32)
CREAT SERPL-MCNC: 0.6 MG/DL (ref 0.6–1)
DIFFERENTIAL METHOD BLD: ABNORMAL
EKG ATRIAL RATE: 122 BPM
EKG DIAGNOSIS: NORMAL
EKG Q-T INTERVAL: 346 MS
EKG QRS DURATION: 82 MS
EKG QTC CALCULATION (BAZETT): 433 MS
EKG R AXIS: 20 DEGREES
EKG T AXIS: 75 DEGREES
EKG VENTRICULAR RATE: 94 BPM
EOSINOPHIL # BLD: 0 K/UL (ref 0–0.8)
EOSINOPHIL NFR BLD: 0 % (ref 0.5–7.8)
ERYTHROCYTE [DISTWIDTH] IN BLOOD BY AUTOMATED COUNT: 17.8 % (ref 11.9–14.6)
GLUCOSE SERPL-MCNC: 104 MG/DL (ref 65–100)
HCT VFR BLD AUTO: 31.9 % (ref 35.8–46.3)
HGB BLD-MCNC: 9.4 G/DL (ref 11.7–15.4)
IMM GRANULOCYTES # BLD AUTO: 0.2 K/UL (ref 0–0.5)
IMM GRANULOCYTES NFR BLD AUTO: 2 % (ref 0–5)
INR PPP: 4.6
LYMPHOCYTES # BLD: 1.4 K/UL (ref 0.5–4.6)
LYMPHOCYTES NFR BLD: 12 % (ref 13–44)
MAGNESIUM SERPL-MCNC: 1.8 MG/DL (ref 1.8–2.4)
MCH RBC QN AUTO: 28.7 PG (ref 26.1–32.9)
MCHC RBC AUTO-ENTMCNC: 29.5 G/DL (ref 31.4–35)
MCV RBC AUTO: 97.6 FL (ref 82–102)
MONOCYTES # BLD: 0.8 K/UL (ref 0.1–1.3)
MONOCYTES NFR BLD: 8 % (ref 4–12)
NEUTS SEG # BLD: 8.4 K/UL (ref 1.7–8.2)
NEUTS SEG NFR BLD: 78 % (ref 43–78)
NRBC # BLD: 0.06 K/UL (ref 0–0.2)
PLATELET # BLD AUTO: 240 K/UL (ref 150–450)
PMV BLD AUTO: 9.5 FL (ref 9.4–12.3)
POTASSIUM SERPL-SCNC: 4 MMOL/L (ref 3.5–5.1)
PROTHROMBIN TIME: 45.7 SEC (ref 11.3–14.9)
RBC # BLD AUTO: 3.27 M/UL (ref 4.05–5.2)
SODIUM SERPL-SCNC: 141 MMOL/L (ref 136–146)
T4 FREE SERPL-MCNC: 1.2 NG/DL (ref 0.78–1.46)
TROPONIN I SERPL HS-MCNC: 63.5 PG/ML (ref 0–14)
TROPONIN I SERPL HS-MCNC: 71.2 PG/ML (ref 0–14)
TSH W FREE THYROID IF ABNORMAL: 0.04 UIU/ML (ref 0.36–3.74)
WBC # BLD AUTO: 10.9 K/UL (ref 4.3–11.1)

## 2024-01-02 PROCEDURE — 83735 ASSAY OF MAGNESIUM: CPT

## 2024-01-02 PROCEDURE — 84484 ASSAY OF TROPONIN QUANT: CPT

## 2024-01-02 PROCEDURE — 84439 ASSAY OF FREE THYROXINE: CPT

## 2024-01-02 PROCEDURE — 6360000002 HC RX W HCPCS: Performed by: EMERGENCY MEDICINE

## 2024-01-02 PROCEDURE — 99285 EMERGENCY DEPT VISIT HI MDM: CPT

## 2024-01-02 PROCEDURE — 93005 ELECTROCARDIOGRAM TRACING: CPT | Performed by: EMERGENCY MEDICINE

## 2024-01-02 PROCEDURE — 85610 PROTHROMBIN TIME: CPT

## 2024-01-02 PROCEDURE — 93010 ELECTROCARDIOGRAM REPORT: CPT | Performed by: INTERNAL MEDICINE

## 2024-01-02 PROCEDURE — 96365 THER/PROPH/DIAG IV INF INIT: CPT

## 2024-01-02 PROCEDURE — 71045 X-RAY EXAM CHEST 1 VIEW: CPT

## 2024-01-02 PROCEDURE — 85025 COMPLETE CBC W/AUTO DIFF WBC: CPT

## 2024-01-02 PROCEDURE — 84443 ASSAY THYROID STIM HORMONE: CPT

## 2024-01-02 PROCEDURE — 80048 BASIC METABOLIC PNL TOTAL CA: CPT

## 2024-01-02 PROCEDURE — 94762 N-INVAS EAR/PLS OXIMTRY CONT: CPT

## 2024-01-02 RX ORDER — MAGNESIUM SULFATE IN WATER 40 MG/ML
2000 INJECTION, SOLUTION INTRAVENOUS ONCE
Status: COMPLETED | OUTPATIENT
Start: 2024-01-02 | End: 2024-01-02

## 2024-01-02 RX ADMIN — MAGNESIUM SULFATE HEPTAHYDRATE 2000 MG: 40 INJECTION, SOLUTION INTRAVENOUS at 02:43

## 2024-01-02 ASSESSMENT — ENCOUNTER SYMPTOMS
ABDOMINAL PAIN: 0
BACK PAIN: 1
SHORTNESS OF BREATH: 1
VOMITING: 0
FACIAL SWELLING: 0
DIARRHEA: 0

## 2024-01-02 ASSESSMENT — LIFESTYLE VARIABLES
HOW OFTEN DO YOU HAVE A DRINK CONTAINING ALCOHOL: NEVER
HOW MANY STANDARD DRINKS CONTAINING ALCOHOL DO YOU HAVE ON A TYPICAL DAY: PATIENT DOES NOT DRINK

## 2024-01-02 ASSESSMENT — PAIN - FUNCTIONAL ASSESSMENT: PAIN_FUNCTIONAL_ASSESSMENT: NONE - DENIES PAIN

## 2024-01-02 NOTE — ED TRIAGE NOTES
Patient arrives via EMS from home. Patient initially called out for increasing weakness x2 days. Also reports feeling when she is in A fib.   EMS found A fib RVR with HR in 170s.  EMS placed L wrist 20 gauge IV and gave 20 mg cardizem IV push. HR decreased to 90, A fib.     /palp, RR 26, , CHARLEE temp  92% on home 3L nasal cannula, increased to 5L and increased to 96%.   A+Ox4.

## 2024-01-02 NOTE — TELEPHONE ENCOUNTER
Pt  called and said he wanted to inform us that pt went to the hospital last night due to an afib episode. Got her stabilized and sent her home around 5:30 this morning. Pt  has some questions about her INR due to at the hospital it was high and unsure if medications need to be changed and if she can be seen sooner to get her INR checked. Also has some questions about getting her valve replaced asap. Was brought up at last ov. Has been on constant O2 and very weak. Please call and advise.

## 2024-01-02 NOTE — TELEPHONE ENCOUNTER
Stay on Tikosyn and warfarin.  Call her and see what her heart rate is today.  If still elevated greater than 100 bpm, add Toprol-XL 25 mg daily.  As far as moving up her appointment for the procedure with Dr. Mata, that is completely up to him.  Send him a nursing note and see what he says.

## 2024-01-02 NOTE — DISCHARGE INSTRUCTIONS
Call your cardiologist to discuss possible changes in your arrhythmia medicines.  Hold your Coumadin today.  Recheck INR levels within 1 week.  Return for worsening or concerning symptoms.

## 2024-01-02 NOTE — ED NOTES
I have reviewed discharge instructions with the patient.  The patient verbalized understanding.    Patient left ED via Discharge Method: ambulatory to Home with self and spouse.    Opportunity for questions and clarification provided.       Patient given 0 scripts.         To continue your aftercare when you leave the hospital, you may receive an automated call from our care team to check in on how you are doing.  This is a free service and part of our promise to provide the best care and service to meet your aftercare needs.” If you have questions, or wish to unsubscribe from this service please call [phone].  Thank you for Choosing our [facility] Emergency Department.        Laurel Gimenez RN  01/02/24 [id number]

## 2024-01-02 NOTE — ED PROVIDER NOTES
Result Value Ref Range    Magnesium 1.8 1.8 - 2.4 mg/dL   Protime-INR   Result Value Ref Range    Protime 45.7 (H) 11.3 - 14.9 sec    INR 4.6     CBC with Auto Differential   Result Value Ref Range    WBC 10.9 4.3 - 11.1 K/uL    RBC 3.27 (L) 4.05 - 5.2 M/uL    Hemoglobin 9.4 (L) 11.7 - 15.4 g/dL    Hematocrit 31.9 (L) 35.8 - 46.3 %    MCV 97.6 82 - 102 FL    MCH 28.7 26.1 - 32.9 PG    MCHC 29.5 (L) 31.4 - 35.0 g/dL    RDW 17.8 (H) 11.9 - 14.6 %    Platelets 240 150 - 450 K/uL    MPV 9.5 9.4 - 12.3 FL    nRBC 0.06 0.0 - 0.2 K/uL    Differential Type AUTOMATED      Neutrophils % 78 43 - 78 %    Lymphocytes % 12 (L) 13 - 44 %    Monocytes % 8 4.0 - 12.0 %    Eosinophils % 0 (L) 0.5 - 7.8 %    Basophils % 0 0.0 - 2.0 %    Immature Granulocytes 2 0.0 - 5.0 %    Neutrophils Absolute 8.4 (H) 1.7 - 8.2 K/UL    Lymphocytes Absolute 1.4 0.5 - 4.6 K/UL    Monocytes Absolute 0.8 0.1 - 1.3 K/UL    Eosinophils Absolute 0.0 0.0 - 0.8 K/UL    Basophils Absolute 0.0 0.0 - 0.2 K/UL    Absolute Immature Granulocyte 0.2 0.0 - 0.5 K/UL   TSH with Reflex   Result Value Ref Range    TSH w Free Thyroid if Abnormal 0.04 (L) 0.358 - 3.740 UIU/ML   T4, Free   Result Value Ref Range    T4 Free 1.2 0.78 - 1.46 NG/DL        XR CHEST PORTABLE   Final Result   Overall findings are suggestive of acute cardiopulmonary congestion   without overt failure.                           Voice dictation software was used during the making of this note.  This software is not perfect and grammatical and other typographical errors may be present.  This note has not been completely proofread for errors.     Floridalma Corona MD  01/02/24 [id number]

## 2024-01-02 NOTE — PROGRESS NOTES
Warfarin tablet strength and weekly dosing schedule confirmed today. Patient's  knows of no reason for supratherapeutic INR. Emergency department instructed the patient to hold tonight's dose, then resume maintenance plan. INR recheck scheduled for 01/09/2023

## 2024-01-02 NOTE — TELEPHONE ENCOUNTER
Spoke to pt's . Reviewed both Dr. Swenson and Dr. Mata's responses. States pt's HR is much better controlled today. At time of call, pt's HR in the 60s. Encouraged to keep appt for echo on 1/9. Will need asap f/u with Messi after echo. Please assist with scheduling.

## 2024-01-03 RX ORDER — METOPROLOL SUCCINATE 25 MG/1
25 TABLET, EXTENDED RELEASE ORAL DAILY
Qty: 90 TABLET | Refills: 3 | Status: SHIPPED | OUTPATIENT
Start: 2024-01-03

## 2024-01-03 NOTE — TELEPHONE ENCOUNTER
Spoke to pt's  with Dr. Swenson' response.   Requested Prescriptions     Pending Prescriptions Disp Refills    metoprolol succinate (TOPROL XL) 25 MG extended release tablet 90 tablet 3     Sig: Take 1 tablet by mouth daily

## 2024-01-05 ENCOUNTER — TELEPHONE (OUTPATIENT)
Age: 79
End: 2024-01-05

## 2024-01-05 DIAGNOSIS — I48.0 PAROXYSMAL ATRIAL FIBRILLATION (HCC): Primary | Chronic | ICD-10-CM

## 2024-01-05 NOTE — TELEPHONE ENCOUNTER
Pt  Claus states that last night pt went into AFIB her HR got up to 160 and called the on call doctor and was told to take a extra of her RX Metoprolol and pt did got back in rhythm last night. Pt is fine this morning HR is normal an is not having any symptoms. Claus would appreciate a call back.

## 2024-01-05 NOTE — TELEPHONE ENCOUNTER
Okay to take an extra Toprol XL as needed if A-fib is persistent and bothering her  Cancel appointment with Dr. Mata, he does not need to see her  However, get her an appointment with EP to discuss worsening A-fib burden despite beta-blockers and Tikosyn.

## 2024-01-05 NOTE — TELEPHONE ENCOUNTER
[facility] ED 1/2/24 A Fib rvr.   Toprol 25mg added 1/2/24   A Fib last night. HR up to 160 bpm  On Call Provider advised extra Toprol 25mg  Converted back to NSR. HR 57 bpm    1/9/24 Echo  1/15/24 f/u Dr. Swenson.   Is it ok for pt to use extra Toprol 25mg prn for fast A Fib?    Also noted Hgb drop x1 week. On Warfarin.            12/26/23 Hgb 11.0           01/02/24 Hgb  9.4 //cgh

## 2024-01-05 NOTE — TELEPHONE ENCOUNTER
Informed pt's , Claus of Dr. Swenson' response. Claus voiced understanding. States pt has been on Prednisone 35mg daily since Sept.   Dose will titrate down soon and Claus is hopeful A Fib will be better controlled.  Confirms 1/9/24 PT appt and Echo appt EA and 1/15/24 f/u Dr. Swenson.   Claus will return call, prn.cgh  Orders Placed This Encounter   Procedures    [facility] Cardiology (Electrophysiology),  [place]     Referral Priority:   Routine     Referral Type:   Eval and Treat     Referral Reason:   Specialty Services Required     Requested Specialty:   Cardiology     Number of Visits Requested:   1

## 2024-01-09 ENCOUNTER — ANTI-COAG VISIT (OUTPATIENT)
Age: 79
End: 2024-01-09
Payer: MEDICARE

## 2024-01-09 DIAGNOSIS — Z95.2 S/P AVR (AORTIC VALVE REPLACEMENT): Chronic | ICD-10-CM

## 2024-01-09 DIAGNOSIS — Z95.2 S/P MITRAL VALVE REPLACEMENT: Chronic | ICD-10-CM

## 2024-01-09 DIAGNOSIS — Z79.01 LONG TERM CURRENT USE OF ANTICOAGULANT THERAPY: Primary | Chronic | ICD-10-CM

## 2024-01-09 DIAGNOSIS — I48.0 PAROXYSMAL ATRIAL FIBRILLATION (HCC): Chronic | ICD-10-CM

## 2024-01-09 LAB
POC INR: 4
PROTHROMBIN TIME, POC: NORMAL

## 2024-01-09 PROCEDURE — 93793 ANTICOAG MGMT PT WARFARIN: CPT | Performed by: INTERNAL MEDICINE

## 2024-01-09 PROCEDURE — 85610 PROTHROMBIN TIME: CPT | Performed by: INTERNAL MEDICINE

## 2024-01-09 NOTE — PROGRESS NOTES
Pt is above range, this is likely due to pt taking prednisone, and tylenol. Will recheck in 2 weeks//KM

## 2024-01-10 ENCOUNTER — TELEPHONE (OUTPATIENT)
Dept: CASE MANAGEMENT | Age: 79
End: 2024-01-10

## 2024-01-10 DIAGNOSIS — I35.0 AORTIC VALVE STENOSIS, ETIOLOGY OF CARDIAC VALVE DISEASE UNSPECIFIED: Primary | ICD-10-CM

## 2024-01-10 NOTE — TELEPHONE ENCOUNTER
Spoke with pt spouse regarding plans for upcoming appts scheduled for 1/19 with arrival time @1015 am. Instructed pt to enter the hospital on the outpatient side ([address]), go to 2nd floor, and check in at radiology and register for both tests. Nothing to eat or drink after 8:30 am except sips of water with medications.     TAVR protocol CT: 10:45 am    Pt verbalized understanding and contact information ([phone]) provided for any further questions.    Etelvina, Structural Heart Navigator

## 2024-01-12 DIAGNOSIS — I10 ESSENTIAL HYPERTENSION: Chronic | ICD-10-CM

## 2024-01-12 DIAGNOSIS — R80.9 TYPE 2 DIABETES MELLITUS WITH MICROALBUMINURIA, WITH LONG-TERM CURRENT USE OF INSULIN (HCC): Chronic | ICD-10-CM

## 2024-01-12 DIAGNOSIS — I48.0 PAROXYSMAL ATRIAL FIBRILLATION (HCC): Chronic | ICD-10-CM

## 2024-01-12 DIAGNOSIS — D68.69 SECONDARY HYPERCOAGULABLE STATE (HCC): ICD-10-CM

## 2024-01-12 DIAGNOSIS — E11.65 TYPE 2 DIABETES MELLITUS WITH HYPERGLYCEMIA, WITH LONG-TERM CURRENT USE OF INSULIN (HCC): ICD-10-CM

## 2024-01-12 DIAGNOSIS — E61.1 IRON DEFICIENCY: Chronic | ICD-10-CM

## 2024-01-12 DIAGNOSIS — Z79.4 TYPE 2 DIABETES MELLITUS WITH HYPERGLYCEMIA, WITH LONG-TERM CURRENT USE OF INSULIN (HCC): ICD-10-CM

## 2024-01-12 DIAGNOSIS — E11.29 TYPE 2 DIABETES MELLITUS WITH MICROALBUMINURIA, WITH LONG-TERM CURRENT USE OF INSULIN (HCC): Chronic | ICD-10-CM

## 2024-01-12 DIAGNOSIS — Z79.4 TYPE 2 DIABETES MELLITUS WITH MICROALBUMINURIA, WITH LONG-TERM CURRENT USE OF INSULIN (HCC): Chronic | ICD-10-CM

## 2024-01-12 DIAGNOSIS — E89.0 HYPOTHYROIDISM, POSTSURGICAL: Chronic | ICD-10-CM

## 2024-01-12 LAB
ALBUMIN SERPL-MCNC: 2.9 G/DL (ref 3.2–4.6)
ALBUMIN/GLOB SERPL: 0.9 (ref 0.4–1.6)
ALP SERPL-CCNC: 161 U/L (ref 50–136)
ALT SERPL-CCNC: 42 U/L (ref 12–65)
ANION GAP SERPL CALC-SCNC: 6 MMOL/L (ref 2–11)
AST SERPL-CCNC: 13 U/L (ref 15–37)
BASOPHILS # BLD: 0 K/UL (ref 0–0.2)
BASOPHILS NFR BLD: 0 % (ref 0–2)
BILIRUB SERPL-MCNC: 0.6 MG/DL (ref 0.2–1.1)
BUN SERPL-MCNC: 30 MG/DL (ref 8–23)
CALCIUM SERPL-MCNC: 8.5 MG/DL (ref 8.3–10.4)
CHLORIDE SERPL-SCNC: 110 MMOL/L (ref 103–113)
CHOLEST SERPL-MCNC: 113 MG/DL
CO2 SERPL-SCNC: 27 MMOL/L (ref 21–32)
CREAT SERPL-MCNC: 0.8 MG/DL (ref 0.6–1)
DIFFERENTIAL METHOD BLD: ABNORMAL
EOSINOPHIL # BLD: 0 K/UL (ref 0–0.8)
EOSINOPHIL NFR BLD: 0 % (ref 0.5–7.8)
ERYTHROCYTE [DISTWIDTH] IN BLOOD BY AUTOMATED COUNT: 18.6 % (ref 11.9–14.6)
EST. AVERAGE GLUCOSE BLD GHB EST-MCNC: 143 MG/DL
GLOBULIN SER CALC-MCNC: 3.1 G/DL (ref 2.8–4.5)
GLUCOSE SERPL-MCNC: 192 MG/DL (ref 65–100)
HBA1C MFR BLD: 6.6 % (ref 4.8–5.6)
HCT VFR BLD AUTO: 30.5 % (ref 35.8–46.3)
HDLC SERPL-MCNC: 51 MG/DL (ref 40–60)
HDLC SERPL: 2.2
HGB BLD-MCNC: 8.5 G/DL (ref 11.7–15.4)
IMM GRANULOCYTES # BLD AUTO: 0.1 K/UL (ref 0–0.5)
IMM GRANULOCYTES NFR BLD AUTO: 1 % (ref 0–5)
IRON SATN MFR SERPL: 10 %
IRON SERPL-MCNC: 32 UG/DL (ref 35–150)
LDLC SERPL CALC-MCNC: 46 MG/DL
LYMPHOCYTES # BLD: 1.3 K/UL (ref 0.5–4.6)
LYMPHOCYTES NFR BLD: 13 % (ref 13–44)
MCH RBC QN AUTO: 28.8 PG (ref 26.1–32.9)
MCHC RBC AUTO-ENTMCNC: 27.9 G/DL (ref 31.4–35)
MCV RBC AUTO: 103.4 FL (ref 82–102)
MONOCYTES # BLD: 0.5 K/UL (ref 0.1–1.3)
MONOCYTES NFR BLD: 5 % (ref 4–12)
NEUTS SEG # BLD: 8.2 K/UL (ref 1.7–8.2)
NEUTS SEG NFR BLD: 81 % (ref 43–78)
NRBC # BLD: 0.04 K/UL (ref 0–0.2)
PLATELET # BLD AUTO: 233 K/UL (ref 150–450)
PMV BLD AUTO: 9.6 FL (ref 9.4–12.3)
POTASSIUM SERPL-SCNC: 4.9 MMOL/L (ref 3.5–5.1)
PROT SERPL-MCNC: 6 G/DL (ref 6.3–8.2)
RBC # BLD AUTO: 2.95 M/UL (ref 4.05–5.2)
SODIUM SERPL-SCNC: 143 MMOL/L (ref 136–146)
TIBC SERPL-MCNC: 321 UG/DL (ref 250–450)
TRIGL SERPL-MCNC: 80 MG/DL (ref 35–150)
TSH, 3RD GENERATION: 0.04 UIU/ML (ref 0.36–3.74)
VLDLC SERPL CALC-MCNC: 16 MG/DL (ref 6–23)
WBC # BLD AUTO: 10.2 K/UL (ref 4.3–11.1)

## 2024-01-14 ENCOUNTER — APPOINTMENT (OUTPATIENT)
Dept: GENERAL RADIOLOGY | Age: 79
DRG: 266 | End: 2024-01-14
Payer: MEDICARE

## 2024-01-14 ENCOUNTER — HOSPITAL ENCOUNTER (INPATIENT)
Age: 79
LOS: 16 days | Discharge: HOME HEALTH | DRG: 266 | End: 2024-01-30
Attending: EMERGENCY MEDICINE | Admitting: FAMILY MEDICINE
Payer: MEDICARE

## 2024-01-14 DIAGNOSIS — J96.21 ACUTE ON CHRONIC RESPIRATORY FAILURE WITH HYPOXIA (HCC): ICD-10-CM

## 2024-01-14 DIAGNOSIS — I05.0 MITRAL VALVE STENOSIS, SEVERE: ICD-10-CM

## 2024-01-14 DIAGNOSIS — Z95.2 S/P TRANSCATHETER MITRAL VALVE REPLACEMENT (TMVR): ICD-10-CM

## 2024-01-14 DIAGNOSIS — R79.1 SUPRATHERAPEUTIC INR: ICD-10-CM

## 2024-01-14 DIAGNOSIS — R74.01 TRANSAMINITIS: ICD-10-CM

## 2024-01-14 DIAGNOSIS — I48.91 A-FIB (HCC): ICD-10-CM

## 2024-01-14 DIAGNOSIS — E87.70 HYPERVOLEMIA, UNSPECIFIED HYPERVOLEMIA TYPE: ICD-10-CM

## 2024-01-14 DIAGNOSIS — Z95.2 S/P MITRAL VALVE REPLACEMENT: ICD-10-CM

## 2024-01-14 DIAGNOSIS — Z95.2 S/P TAVR (TRANSCATHETER AORTIC VALVE REPLACEMENT): Primary | ICD-10-CM

## 2024-01-14 DIAGNOSIS — Z95.2 S/P AVR (AORTIC VALVE REPLACEMENT): Chronic | ICD-10-CM

## 2024-01-14 DIAGNOSIS — I49.5 SSS (SICK SINUS SYNDROME) (HCC): ICD-10-CM

## 2024-01-14 DIAGNOSIS — I50.9 ACUTE ON CHRONIC CONGESTIVE HEART FAILURE, UNSPECIFIED HEART FAILURE TYPE (HCC): ICD-10-CM

## 2024-01-14 DIAGNOSIS — I05.0 MITRAL STENOSIS: ICD-10-CM

## 2024-01-14 DIAGNOSIS — I48.91 ATRIAL FIBRILLATION WITH RVR (HCC): ICD-10-CM

## 2024-01-14 PROBLEM — J96.20 ACUTE ON CHRONIC RESPIRATORY FAILURE (HCC): Status: ACTIVE | Noted: 2024-01-14

## 2024-01-14 LAB
ALBUMIN SERPL-MCNC: 2.7 G/DL (ref 3.2–4.6)
ALBUMIN/GLOB SERPL: 0.8 (ref 0.4–1.6)
ALP SERPL-CCNC: 153 U/L (ref 50–136)
ALT SERPL-CCNC: 73 U/L (ref 12–65)
ANION GAP SERPL CALC-SCNC: 5 MMOL/L (ref 2–11)
AST SERPL-CCNC: 72 U/L (ref 15–37)
BASOPHILS # BLD: 0 K/UL (ref 0–0.2)
BASOPHILS NFR BLD: 0 % (ref 0–2)
BILIRUB SERPL-MCNC: 0.7 MG/DL (ref 0.2–1.1)
BUN SERPL-MCNC: 36 MG/DL (ref 8–23)
CALCIUM SERPL-MCNC: 8.5 MG/DL (ref 8.3–10.4)
CHLORIDE SERPL-SCNC: 111 MMOL/L (ref 103–113)
CO2 SERPL-SCNC: 25 MMOL/L (ref 21–32)
CREAT SERPL-MCNC: 1 MG/DL (ref 0.6–1)
DIFFERENTIAL METHOD BLD: ABNORMAL
EOSINOPHIL # BLD: 0 K/UL (ref 0–0.8)
EOSINOPHIL NFR BLD: 0 % (ref 0.5–7.8)
ERYTHROCYTE [DISTWIDTH] IN BLOOD BY AUTOMATED COUNT: 18.7 % (ref 11.9–14.6)
FLUAV RNA SPEC QL NAA+PROBE: NOT DETECTED
FLUBV RNA SPEC QL NAA+PROBE: NOT DETECTED
GLOBULIN SER CALC-MCNC: 3.2 G/DL (ref 2.8–4.5)
GLUCOSE SERPL-MCNC: 195 MG/DL (ref 65–100)
HCT VFR BLD AUTO: 28.9 % (ref 35.8–46.3)
HGB BLD-MCNC: 8.2 G/DL (ref 11.7–15.4)
IMM GRANULOCYTES # BLD AUTO: 0.1 K/UL (ref 0–0.5)
IMM GRANULOCYTES NFR BLD AUTO: 1 % (ref 0–5)
INR PPP: 5.2
LACTATE SERPL-SCNC: 3 MMOL/L (ref 0.4–2)
LACTATE SERPL-SCNC: 3.3 MMOL/L (ref 0.4–2)
LIPASE SERPL-CCNC: 102 U/L (ref 73–393)
LYMPHOCYTES # BLD: 1.4 K/UL (ref 0.5–4.6)
LYMPHOCYTES NFR BLD: 10 % (ref 13–44)
MAGNESIUM SERPL-MCNC: 2 MG/DL (ref 1.8–2.4)
MCH RBC QN AUTO: 29 PG (ref 26.1–32.9)
MCHC RBC AUTO-ENTMCNC: 28.4 G/DL (ref 31.4–35)
MCV RBC AUTO: 102.1 FL (ref 82–102)
MONOCYTES # BLD: 0.8 K/UL (ref 0.1–1.3)
MONOCYTES NFR BLD: 6 % (ref 4–12)
NEUTS SEG # BLD: 12.1 K/UL (ref 1.7–8.2)
NEUTS SEG NFR BLD: 84 % (ref 43–78)
NRBC # BLD: 0.11 K/UL (ref 0–0.2)
NT PRO BNP: 5696 PG/ML
PLATELET # BLD AUTO: 206 K/UL (ref 150–450)
PMV BLD AUTO: 9.2 FL (ref 9.4–12.3)
POTASSIUM SERPL-SCNC: 4.5 MMOL/L (ref 3.5–5.1)
PROCALCITONIN SERPL-MCNC: <0.05 NG/ML (ref 0–0.49)
PROT SERPL-MCNC: 5.9 G/DL (ref 6.3–8.2)
PROTHROMBIN TIME: 50.4 SEC (ref 11.3–14.9)
RBC # BLD AUTO: 2.83 M/UL (ref 4.05–5.2)
SARS-COV-2 RDRP RESP QL NAA+PROBE: NOT DETECTED
SODIUM SERPL-SCNC: 141 MMOL/L (ref 136–146)
SOURCE: NORMAL
T4 FREE SERPL-MCNC: 1.3 NG/DL (ref 0.78–1.46)
TSH W FREE THYROID IF ABNORMAL: 0.04 UIU/ML (ref 0.36–3.74)
WBC # BLD AUTO: 14.4 K/UL (ref 4.3–11.1)

## 2024-01-14 PROCEDURE — 83880 ASSAY OF NATRIURETIC PEPTIDE: CPT

## 2024-01-14 PROCEDURE — 86140 C-REACTIVE PROTEIN: CPT

## 2024-01-14 PROCEDURE — 2500000003 HC RX 250 WO HCPCS: Performed by: FAMILY MEDICINE

## 2024-01-14 PROCEDURE — 84145 PROCALCITONIN (PCT): CPT

## 2024-01-14 PROCEDURE — 87635 SARS-COV-2 COVID-19 AMP PRB: CPT

## 2024-01-14 PROCEDURE — 71045 X-RAY EXAM CHEST 1 VIEW: CPT

## 2024-01-14 PROCEDURE — 1100000000 HC RM PRIVATE

## 2024-01-14 PROCEDURE — 85025 COMPLETE CBC W/AUTO DIFF WBC: CPT

## 2024-01-14 PROCEDURE — 87502 INFLUENZA DNA AMP PROBE: CPT

## 2024-01-14 PROCEDURE — 84443 ASSAY THYROID STIM HORMONE: CPT

## 2024-01-14 PROCEDURE — 2500000003 HC RX 250 WO HCPCS

## 2024-01-14 PROCEDURE — 85610 PROTHROMBIN TIME: CPT

## 2024-01-14 PROCEDURE — 96375 TX/PRO/DX INJ NEW DRUG ADDON: CPT

## 2024-01-14 PROCEDURE — 83690 ASSAY OF LIPASE: CPT

## 2024-01-14 PROCEDURE — 83036 HEMOGLOBIN GLYCOSYLATED A1C: CPT

## 2024-01-14 PROCEDURE — 93005 ELECTROCARDIOGRAM TRACING: CPT | Performed by: EMERGENCY MEDICINE

## 2024-01-14 PROCEDURE — 2100000001 HC CVICU R&B

## 2024-01-14 PROCEDURE — 84484 ASSAY OF TROPONIN QUANT: CPT

## 2024-01-14 PROCEDURE — 6370000000 HC RX 637 (ALT 250 FOR IP): Performed by: FAMILY MEDICINE

## 2024-01-14 PROCEDURE — 80053 COMPREHEN METABOLIC PANEL: CPT

## 2024-01-14 PROCEDURE — 83605 ASSAY OF LACTIC ACID: CPT

## 2024-01-14 PROCEDURE — 87040 BLOOD CULTURE FOR BACTERIA: CPT

## 2024-01-14 PROCEDURE — 6360000002 HC RX W HCPCS: Performed by: EMERGENCY MEDICINE

## 2024-01-14 PROCEDURE — 36415 COLL VENOUS BLD VENIPUNCTURE: CPT

## 2024-01-14 PROCEDURE — 83735 ASSAY OF MAGNESIUM: CPT

## 2024-01-14 PROCEDURE — 84439 ASSAY OF FREE THYROXINE: CPT

## 2024-01-14 PROCEDURE — 96374 THER/PROPH/DIAG INJ IV PUSH: CPT

## 2024-01-14 PROCEDURE — 96361 HYDRATE IV INFUSION ADD-ON: CPT

## 2024-01-14 PROCEDURE — 99285 EMERGENCY DEPT VISIT HI MDM: CPT

## 2024-01-14 PROCEDURE — 2580000003 HC RX 258: Performed by: EMERGENCY MEDICINE

## 2024-01-14 RX ORDER — DILTIAZEM HYDROCHLORIDE 5 MG/ML
INJECTION INTRAVENOUS
Status: COMPLETED
Start: 2024-01-14 | End: 2024-01-14

## 2024-01-14 RX ORDER — POTASSIUM CHLORIDE 20 MEQ/1
40 TABLET, EXTENDED RELEASE ORAL PRN
Status: DISCONTINUED | OUTPATIENT
Start: 2024-01-14 | End: 2024-01-30 | Stop reason: HOSPADM

## 2024-01-14 RX ORDER — SODIUM CHLORIDE 0.9 % (FLUSH) 0.9 %
5-40 SYRINGE (ML) INJECTION PRN
Status: DISCONTINUED | OUTPATIENT
Start: 2024-01-14 | End: 2024-01-30 | Stop reason: HOSPADM

## 2024-01-14 RX ORDER — ATORVASTATIN CALCIUM 10 MG/1
10 TABLET, FILM COATED ORAL EVERY EVENING
Status: DISCONTINUED | OUTPATIENT
Start: 2024-01-14 | End: 2024-01-30 | Stop reason: HOSPADM

## 2024-01-14 RX ORDER — FUROSEMIDE 10 MG/ML
40 INJECTION INTRAMUSCULAR; INTRAVENOUS DAILY
Status: DISPENSED | OUTPATIENT
Start: 2024-01-15 | End: 2024-01-17

## 2024-01-14 RX ORDER — IBUPROFEN 600 MG/1
1 TABLET ORAL PRN
Status: DISCONTINUED | OUTPATIENT
Start: 2024-01-14 | End: 2024-01-30 | Stop reason: HOSPADM

## 2024-01-14 RX ORDER — LANOLIN ALCOHOL/MO/W.PET/CERES
3 CREAM (GRAM) TOPICAL NIGHTLY PRN
Status: DISCONTINUED | OUTPATIENT
Start: 2024-01-14 | End: 2024-01-30 | Stop reason: HOSPADM

## 2024-01-14 RX ORDER — ACETAMINOPHEN 325 MG/1
650 TABLET ORAL EVERY 6 HOURS PRN
Status: DISCONTINUED | OUTPATIENT
Start: 2024-01-14 | End: 2024-01-24 | Stop reason: SDUPTHER

## 2024-01-14 RX ORDER — PREDNISONE 5 MG/1
10 TABLET ORAL 3 TIMES DAILY
Status: DISCONTINUED | OUTPATIENT
Start: 2024-01-14 | End: 2024-01-15

## 2024-01-14 RX ORDER — ONDANSETRON 2 MG/ML
4 INJECTION INTRAMUSCULAR; INTRAVENOUS EVERY 6 HOURS PRN
Status: DISCONTINUED | OUTPATIENT
Start: 2024-01-14 | End: 2024-01-30 | Stop reason: HOSPADM

## 2024-01-14 RX ORDER — DOFETILIDE 0.5 MG/1
500 CAPSULE ORAL 2 TIMES DAILY
Status: DISCONTINUED | OUTPATIENT
Start: 2024-01-15 | End: 2024-01-28

## 2024-01-14 RX ORDER — METOPROLOL TARTRATE 1 MG/ML
5 INJECTION, SOLUTION INTRAVENOUS EVERY 6 HOURS PRN
Status: DISCONTINUED | OUTPATIENT
Start: 2024-01-14 | End: 2024-01-14

## 2024-01-14 RX ORDER — 0.9 % SODIUM CHLORIDE 0.9 %
1000 INTRAVENOUS SOLUTION INTRAVENOUS ONCE
Status: COMPLETED | OUTPATIENT
Start: 2024-01-14 | End: 2024-01-14

## 2024-01-14 RX ORDER — ACETAMINOPHEN 650 MG/1
650 SUPPOSITORY RECTAL EVERY 6 HOURS PRN
Status: DISCONTINUED | OUTPATIENT
Start: 2024-01-14 | End: 2024-01-30 | Stop reason: HOSPADM

## 2024-01-14 RX ORDER — METOPROLOL TARTRATE 1 MG/ML
2.5 INJECTION, SOLUTION INTRAVENOUS EVERY 6 HOURS PRN
Status: DISCONTINUED | OUTPATIENT
Start: 2024-01-14 | End: 2024-01-30 | Stop reason: HOSPADM

## 2024-01-14 RX ORDER — DOCUSATE SODIUM 100 MG/1
100 CAPSULE, LIQUID FILLED ORAL 2 TIMES DAILY
Status: DISCONTINUED | OUTPATIENT
Start: 2024-01-14 | End: 2024-01-30 | Stop reason: HOSPADM

## 2024-01-14 RX ORDER — DILTIAZEM HYDROCHLORIDE 5 MG/ML
15 INJECTION INTRAVENOUS
Status: COMPLETED | OUTPATIENT
Start: 2024-01-14 | End: 2024-01-14

## 2024-01-14 RX ORDER — METOPROLOL TARTRATE 1 MG/ML
2.5 INJECTION, SOLUTION INTRAVENOUS
Status: COMPLETED | OUTPATIENT
Start: 2024-01-14 | End: 2024-01-14

## 2024-01-14 RX ORDER — INSULIN LISPRO 100 [IU]/ML
0-4 INJECTION, SOLUTION INTRAVENOUS; SUBCUTANEOUS NIGHTLY
Status: DISCONTINUED | OUTPATIENT
Start: 2024-01-14 | End: 2024-01-30 | Stop reason: HOSPADM

## 2024-01-14 RX ORDER — ONDANSETRON 4 MG/1
4 TABLET, ORALLY DISINTEGRATING ORAL EVERY 8 HOURS PRN
Status: DISCONTINUED | OUTPATIENT
Start: 2024-01-14 | End: 2024-01-30 | Stop reason: HOSPADM

## 2024-01-14 RX ORDER — FUROSEMIDE 10 MG/ML
40 INJECTION INTRAMUSCULAR; INTRAVENOUS ONCE
Status: COMPLETED | OUTPATIENT
Start: 2024-01-14 | End: 2024-01-14

## 2024-01-14 RX ORDER — POTASSIUM CHLORIDE 7.45 MG/ML
10 INJECTION INTRAVENOUS PRN
Status: DISCONTINUED | OUTPATIENT
Start: 2024-01-14 | End: 2024-01-30 | Stop reason: HOSPADM

## 2024-01-14 RX ORDER — SODIUM CHLORIDE 0.9 % (FLUSH) 0.9 %
5-40 SYRINGE (ML) INJECTION EVERY 12 HOURS SCHEDULED
Status: DISCONTINUED | OUTPATIENT
Start: 2024-01-14 | End: 2024-01-30 | Stop reason: HOSPADM

## 2024-01-14 RX ORDER — ASPIRIN 81 MG/1
81 TABLET, CHEWABLE ORAL DAILY
Status: DISCONTINUED | OUTPATIENT
Start: 2024-01-15 | End: 2024-01-23

## 2024-01-14 RX ORDER — SODIUM CHLORIDE 9 MG/ML
INJECTION, SOLUTION INTRAVENOUS PRN
Status: DISCONTINUED | OUTPATIENT
Start: 2024-01-14 | End: 2024-01-26

## 2024-01-14 RX ORDER — DEXTROSE MONOHYDRATE 100 MG/ML
INJECTION, SOLUTION INTRAVENOUS CONTINUOUS PRN
Status: DISCONTINUED | OUTPATIENT
Start: 2024-01-14 | End: 2024-01-30 | Stop reason: HOSPADM

## 2024-01-14 RX ORDER — MAGNESIUM SULFATE IN WATER 40 MG/ML
2000 INJECTION, SOLUTION INTRAVENOUS PRN
Status: DISCONTINUED | OUTPATIENT
Start: 2024-01-14 | End: 2024-01-30 | Stop reason: HOSPADM

## 2024-01-14 RX ORDER — METOPROLOL TARTRATE 1 MG/ML
2.5 INJECTION, SOLUTION INTRAVENOUS EVERY 6 HOURS PRN
Status: DISCONTINUED | OUTPATIENT
Start: 2024-01-14 | End: 2024-01-14

## 2024-01-14 RX ORDER — POLYETHYLENE GLYCOL 3350 17 G/17G
17 POWDER, FOR SOLUTION ORAL DAILY PRN
Status: DISCONTINUED | OUTPATIENT
Start: 2024-01-14 | End: 2024-01-30 | Stop reason: HOSPADM

## 2024-01-14 RX ORDER — INSULIN LISPRO 100 [IU]/ML
0-8 INJECTION, SOLUTION INTRAVENOUS; SUBCUTANEOUS
Status: DISCONTINUED | OUTPATIENT
Start: 2024-01-15 | End: 2024-01-30 | Stop reason: HOSPADM

## 2024-01-14 RX ADMIN — DILTIAZEM HYDROCHLORIDE 15 MG: 5 INJECTION INTRAVENOUS at 21:16

## 2024-01-14 RX ADMIN — ATORVASTATIN CALCIUM 10 MG: 20 TABLET, FILM COATED ORAL at 23:43

## 2024-01-14 RX ADMIN — DOCUSATE SODIUM 100 MG: 100 CAPSULE, LIQUID FILLED ORAL at 23:43

## 2024-01-14 RX ADMIN — FUROSEMIDE 40 MG: 10 INJECTION, SOLUTION INTRAMUSCULAR; INTRAVENOUS at 22:01

## 2024-01-14 RX ADMIN — PREDNISONE 10 MG: 5 TABLET ORAL at 23:43

## 2024-01-14 RX ADMIN — DILTIAZEM HYDROCHLORIDE 15 MG: 5 INJECTION, SOLUTION INTRAVENOUS at 21:16

## 2024-01-14 RX ADMIN — SODIUM CHLORIDE 1000 ML: 9 INJECTION, SOLUTION INTRAVENOUS at 21:05

## 2024-01-14 RX ADMIN — METOPROLOL TARTRATE 2.5 MG: 5 INJECTION INTRAVENOUS at 23:25

## 2024-01-14 RX ADMIN — WATER 1000 MG: 1 INJECTION INTRAMUSCULAR; INTRAVENOUS; SUBCUTANEOUS at 22:50

## 2024-01-14 RX ADMIN — METOPROLOL TARTRATE 2.5 MG: 5 INJECTION INTRAVENOUS at 23:20

## 2024-01-14 ASSESSMENT — PAIN SCALES - GENERAL: PAINLEVEL_OUTOF10: 0

## 2024-01-14 ASSESSMENT — PAIN - FUNCTIONAL ASSESSMENT: PAIN_FUNCTIONAL_ASSESSMENT: 0-10

## 2024-01-15 ENCOUNTER — TELEPHONE (OUTPATIENT)
Dept: INTERNAL MEDICINE CLINIC | Facility: CLINIC | Age: 79
End: 2024-01-15

## 2024-01-15 PROBLEM — I48.91 ATRIAL FIBRILLATION WITH RVR (HCC): Status: ACTIVE | Noted: 2024-01-15

## 2024-01-15 LAB
ALBUMIN SERPL-MCNC: 2.9 G/DL (ref 3.2–4.6)
ALBUMIN/GLOB SERPL: 1 (ref 0.4–1.6)
ALP SERPL-CCNC: 155 U/L (ref 50–136)
ALT SERPL-CCNC: 71 U/L (ref 12–65)
ANION GAP SERPL CALC-SCNC: 2 MMOL/L (ref 2–11)
AST SERPL-CCNC: 42 U/L (ref 15–37)
BASOPHILS # BLD: 0 K/UL (ref 0–0.2)
BASOPHILS NFR BLD: 0 % (ref 0–2)
BILIRUB SERPL-MCNC: 0.5 MG/DL (ref 0.2–1.1)
BUN SERPL-MCNC: 32 MG/DL (ref 8–23)
CALCIUM SERPL-MCNC: 8.2 MG/DL (ref 8.3–10.4)
CHLORIDE SERPL-SCNC: 110 MMOL/L (ref 103–113)
CO2 SERPL-SCNC: 31 MMOL/L (ref 21–32)
CREAT SERPL-MCNC: 0.7 MG/DL (ref 0.6–1)
CRP SERPL-MCNC: 3.3 MG/DL (ref 0–0.9)
DIFFERENTIAL METHOD BLD: ABNORMAL
EKG ATRIAL RATE: 195 BPM
EKG DIAGNOSIS: NORMAL
EKG Q-T INTERVAL: 237 MS
EKG QRS DURATION: 78 MS
EKG QTC CALCULATION (BAZETT): 394 MS
EKG R AXIS: 69 DEGREES
EKG T AXIS: 172 DEGREES
EKG VENTRICULAR RATE: 178 BPM
EOSINOPHIL # BLD: 0 K/UL (ref 0–0.8)
EOSINOPHIL NFR BLD: 0 % (ref 0.5–7.8)
ERYTHROCYTE [DISTWIDTH] IN BLOOD BY AUTOMATED COUNT: 18.7 % (ref 11.9–14.6)
EST. AVERAGE GLUCOSE BLD GHB EST-MCNC: 143 MG/DL
FERRITIN SERPL-MCNC: 100 NG/ML (ref 8–388)
FOLATE SERPL-MCNC: 10.4 NG/ML (ref 3.1–17.5)
GLOBULIN SER CALC-MCNC: 2.9 G/DL (ref 2.8–4.5)
GLUCOSE BLD STRIP.AUTO-MCNC: 100 MG/DL (ref 65–100)
GLUCOSE BLD STRIP.AUTO-MCNC: 254 MG/DL (ref 65–100)
GLUCOSE BLD STRIP.AUTO-MCNC: 256 MG/DL (ref 65–100)
GLUCOSE BLD STRIP.AUTO-MCNC: 353 MG/DL (ref 65–100)
GLUCOSE BLD STRIP.AUTO-MCNC: 83 MG/DL (ref 65–100)
GLUCOSE SERPL-MCNC: 80 MG/DL (ref 65–100)
HBA1C MFR BLD: 6.6 % (ref 4.8–5.6)
HCT VFR BLD AUTO: 27 % (ref 35.8–46.3)
HCT VFR BLD AUTO: 30.6 % (ref 35.8–46.3)
HGB BLD-MCNC: 7.8 G/DL (ref 11.7–15.4)
HGB BLD-MCNC: 9.2 G/DL (ref 11.7–15.4)
HISTORY CHECK: NORMAL
IMM GRANULOCYTES # BLD AUTO: 0.1 K/UL (ref 0–0.5)
IMM GRANULOCYTES NFR BLD AUTO: 1 % (ref 0–5)
INR PPP: 3.3
IRON SATN MFR SERPL: 7 %
IRON SERPL-MCNC: 19 UG/DL (ref 35–150)
LACTATE SERPL-SCNC: 1.3 MMOL/L (ref 0.4–2)
LACTATE SERPL-SCNC: 2.5 MMOL/L (ref 0.4–2)
LYMPHOCYTES # BLD: 1.2 K/UL (ref 0.5–4.6)
LYMPHOCYTES NFR BLD: 12 % (ref 13–44)
MAGNESIUM SERPL-MCNC: 2.1 MG/DL (ref 1.8–2.4)
MAGNESIUM SERPL-MCNC: 2.6 MG/DL (ref 1.8–2.4)
MCH RBC QN AUTO: 29.2 PG (ref 26.1–32.9)
MCHC RBC AUTO-ENTMCNC: 28.9 G/DL (ref 31.4–35)
MCV RBC AUTO: 101.1 FL (ref 82–102)
MONOCYTES # BLD: 0.5 K/UL (ref 0.1–1.3)
MONOCYTES NFR BLD: 5 % (ref 4–12)
NEUTS SEG # BLD: 8.4 K/UL (ref 1.7–8.2)
NEUTS SEG NFR BLD: 82 % (ref 43–78)
NRBC # BLD: 0.14 K/UL (ref 0–0.2)
PLATELET # BLD AUTO: 187 K/UL (ref 150–450)
PMV BLD AUTO: 9.5 FL (ref 9.4–12.3)
POTASSIUM SERPL-SCNC: 4.6 MMOL/L (ref 3.5–5.1)
PROT SERPL-MCNC: 5.8 G/DL (ref 6.3–8.2)
PROTHROMBIN TIME: 35.6 SEC (ref 11.3–14.9)
RBC # BLD AUTO: 2.67 M/UL (ref 4.05–5.2)
SERVICE CMNT-IMP: ABNORMAL
SERVICE CMNT-IMP: NORMAL
SERVICE CMNT-IMP: NORMAL
SODIUM SERPL-SCNC: 143 MMOL/L (ref 136–146)
TIBC SERPL-MCNC: 289 UG/DL (ref 250–450)
TROPONIN I SERPL HS-MCNC: 300.2 PG/ML (ref 0–14)
VIT B12 SERPL-MCNC: 1242 PG/ML (ref 193–986)
WBC # BLD AUTO: 10.2 K/UL (ref 4.3–11.1)

## 2024-01-15 PROCEDURE — 85610 PROTHROMBIN TIME: CPT

## 2024-01-15 PROCEDURE — 83540 ASSAY OF IRON: CPT

## 2024-01-15 PROCEDURE — 97112 NEUROMUSCULAR REEDUCATION: CPT

## 2024-01-15 PROCEDURE — 99223 1ST HOSP IP/OBS HIGH 75: CPT | Performed by: INTERNAL MEDICINE

## 2024-01-15 PROCEDURE — 2580000003 HC RX 258: Performed by: STUDENT IN AN ORGANIZED HEALTH CARE EDUCATION/TRAINING PROGRAM

## 2024-01-15 PROCEDURE — 86850 RBC ANTIBODY SCREEN: CPT

## 2024-01-15 PROCEDURE — 93010 ELECTROCARDIOGRAM REPORT: CPT | Performed by: INTERNAL MEDICINE

## 2024-01-15 PROCEDURE — 82746 ASSAY OF FOLIC ACID SERUM: CPT

## 2024-01-15 PROCEDURE — 36415 COLL VENOUS BLD VENIPUNCTURE: CPT

## 2024-01-15 PROCEDURE — 82607 VITAMIN B-12: CPT

## 2024-01-15 PROCEDURE — 2100000001 HC CVICU R&B

## 2024-01-15 PROCEDURE — 86901 BLOOD TYPING SEROLOGIC RH(D): CPT

## 2024-01-15 PROCEDURE — 97530 THERAPEUTIC ACTIVITIES: CPT

## 2024-01-15 PROCEDURE — 6370000000 HC RX 637 (ALT 250 FOR IP): Performed by: INTERNAL MEDICINE

## 2024-01-15 PROCEDURE — 2700000000 HC OXYGEN THERAPY PER DAY

## 2024-01-15 PROCEDURE — 82728 ASSAY OF FERRITIN: CPT

## 2024-01-15 PROCEDURE — 36430 TRANSFUSION BLD/BLD COMPNT: CPT

## 2024-01-15 PROCEDURE — 83550 IRON BINDING TEST: CPT

## 2024-01-15 PROCEDURE — 85025 COMPLETE CBC W/AUTO DIFF WBC: CPT

## 2024-01-15 PROCEDURE — P9016 RBC LEUKOCYTES REDUCED: HCPCS

## 2024-01-15 PROCEDURE — 85014 HEMATOCRIT: CPT

## 2024-01-15 PROCEDURE — 86923 COMPATIBILITY TEST ELECTRIC: CPT

## 2024-01-15 PROCEDURE — 99152 MOD SED SAME PHYS/QHP 5/>YRS: CPT | Performed by: INTERNAL MEDICINE

## 2024-01-15 PROCEDURE — 80053 COMPREHEN METABOLIC PANEL: CPT

## 2024-01-15 PROCEDURE — 82962 GLUCOSE BLOOD TEST: CPT

## 2024-01-15 PROCEDURE — 97165 OT EVAL LOW COMPLEX 30 MIN: CPT

## 2024-01-15 PROCEDURE — 6360000002 HC RX W HCPCS: Performed by: STUDENT IN AN ORGANIZED HEALTH CARE EDUCATION/TRAINING PROGRAM

## 2024-01-15 PROCEDURE — A4216 STERILE WATER/SALINE, 10 ML: HCPCS | Performed by: FAMILY MEDICINE

## 2024-01-15 PROCEDURE — 97162 PT EVAL MOD COMPLEX 30 MIN: CPT

## 2024-01-15 PROCEDURE — 97535 SELF CARE MNGMENT TRAINING: CPT

## 2024-01-15 PROCEDURE — 83605 ASSAY OF LACTIC ACID: CPT

## 2024-01-15 PROCEDURE — 6370000000 HC RX 637 (ALT 250 FOR IP): Performed by: STUDENT IN AN ORGANIZED HEALTH CARE EDUCATION/TRAINING PROGRAM

## 2024-01-15 PROCEDURE — 2580000003 HC RX 258: Performed by: FAMILY MEDICINE

## 2024-01-15 PROCEDURE — 84480 ASSAY TRIIODOTHYRONINE (T3): CPT

## 2024-01-15 PROCEDURE — 6370000000 HC RX 637 (ALT 250 FOR IP): Performed by: FAMILY MEDICINE

## 2024-01-15 PROCEDURE — 6360000002 HC RX W HCPCS: Performed by: FAMILY MEDICINE

## 2024-01-15 PROCEDURE — 83735 ASSAY OF MAGNESIUM: CPT

## 2024-01-15 PROCEDURE — 86900 BLOOD TYPING SEROLOGIC ABO: CPT

## 2024-01-15 PROCEDURE — 2500000003 HC RX 250 WO HCPCS: Performed by: FAMILY MEDICINE

## 2024-01-15 PROCEDURE — C9113 INJ PANTOPRAZOLE SODIUM, VIA: HCPCS | Performed by: FAMILY MEDICINE

## 2024-01-15 PROCEDURE — 85018 HEMOGLOBIN: CPT

## 2024-01-15 RX ORDER — PREDNISONE 20 MG/1
20 TABLET ORAL DAILY
Status: DISCONTINUED | OUTPATIENT
Start: 2024-01-15 | End: 2024-01-17

## 2024-01-15 RX ORDER — LEVOTHYROXINE SODIUM 0.05 MG/1
100 TABLET ORAL
Status: DISCONTINUED | OUTPATIENT
Start: 2024-01-16 | End: 2024-01-15

## 2024-01-15 RX ORDER — SODIUM CHLORIDE 9 MG/ML
INJECTION, SOLUTION INTRAVENOUS PRN
Status: DISCONTINUED | OUTPATIENT
Start: 2024-01-15 | End: 2024-01-26

## 2024-01-15 RX ORDER — PREDNISONE 20 MG/1
20 TABLET ORAL ONCE
Status: DISCONTINUED | OUTPATIENT
Start: 2024-01-15 | End: 2024-01-15

## 2024-01-15 RX ORDER — FUROSEMIDE 10 MG/ML
40 INJECTION INTRAMUSCULAR; INTRAVENOUS ONCE AS NEEDED
Status: COMPLETED | OUTPATIENT
Start: 2024-01-15 | End: 2024-01-15

## 2024-01-15 RX ORDER — INSULIN GLARGINE 100 [IU]/ML
10 INJECTION, SOLUTION SUBCUTANEOUS NIGHTLY
Status: DISCONTINUED | OUTPATIENT
Start: 2024-01-15 | End: 2024-01-16

## 2024-01-15 RX ORDER — FERROUS SULFATE 325(65) MG
325 TABLET ORAL
Status: DISCONTINUED | OUTPATIENT
Start: 2024-01-16 | End: 2024-01-15

## 2024-01-15 RX ADMIN — ATORVASTATIN CALCIUM 10 MG: 20 TABLET, FILM COATED ORAL at 21:16

## 2024-01-15 RX ADMIN — DOCUSATE SODIUM 100 MG: 100 CAPSULE, LIQUID FILLED ORAL at 21:16

## 2024-01-15 RX ADMIN — PANTOPRAZOLE SODIUM 40 MG: 40 INJECTION, POWDER, FOR SOLUTION INTRAVENOUS at 09:00

## 2024-01-15 RX ADMIN — DOFETILIDE 500 MCG: 0.5 CAPSULE ORAL at 09:00

## 2024-01-15 RX ADMIN — SODIUM CHLORIDE, PRESERVATIVE FREE 10 ML: 5 INJECTION INTRAVENOUS at 21:11

## 2024-01-15 RX ADMIN — SODIUM CHLORIDE 125 MG: 9 INJECTION, SOLUTION INTRAVENOUS at 15:22

## 2024-01-15 RX ADMIN — METOPROLOL TARTRATE 12.5 MG: 25 TABLET, FILM COATED ORAL at 09:00

## 2024-01-15 RX ADMIN — DOFETILIDE 500 MCG: 0.5 CAPSULE ORAL at 21:16

## 2024-01-15 RX ADMIN — FUROSEMIDE 40 MG: 10 INJECTION, SOLUTION INTRAVENOUS at 09:00

## 2024-01-15 RX ADMIN — SODIUM CHLORIDE, PRESERVATIVE FREE 30 ML: 5 INJECTION INTRAVENOUS at 00:27

## 2024-01-15 RX ADMIN — INSULIN LISPRO 4 UNITS: 100 INJECTION, SOLUTION INTRAVENOUS; SUBCUTANEOUS at 12:13

## 2024-01-15 RX ADMIN — SODIUM CHLORIDE, PRESERVATIVE FREE 30 ML: 5 INJECTION INTRAVENOUS at 09:00

## 2024-01-15 RX ADMIN — INSULIN GLARGINE 10 UNITS: 100 INJECTION, SOLUTION SUBCUTANEOUS at 21:31

## 2024-01-15 RX ADMIN — PREDNISONE 10 MG: 5 TABLET ORAL at 08:59

## 2024-01-15 RX ADMIN — METOPROLOL TARTRATE 12.5 MG: 25 TABLET, FILM COATED ORAL at 21:16

## 2024-01-15 RX ADMIN — PREDNISONE 20 MG: 20 TABLET ORAL at 15:27

## 2024-01-15 RX ADMIN — FUROSEMIDE 40 MG: 10 INJECTION, SOLUTION INTRAVENOUS at 22:49

## 2024-01-15 RX ADMIN — METOPROLOL TARTRATE 12.5 MG: 25 TABLET, FILM COATED ORAL at 00:34

## 2024-01-15 RX ADMIN — INSULIN LISPRO 2 UNITS: 100 INJECTION, SOLUTION INTRAVENOUS; SUBCUTANEOUS at 18:12

## 2024-01-15 RX ADMIN — Medication 3 MG: at 00:50

## 2024-01-15 RX ADMIN — DOCUSATE SODIUM 100 MG: 100 CAPSULE, LIQUID FILLED ORAL at 09:00

## 2024-01-15 RX ADMIN — TUBERCULIN PURIFIED PROTEIN DERIVATIVE 5 UNITS: 5 INJECTION, SOLUTION INTRADERMAL at 00:35

## 2024-01-15 RX ADMIN — INSULIN LISPRO 4 UNITS: 100 INJECTION, SOLUTION INTRAVENOUS; SUBCUTANEOUS at 21:31

## 2024-01-15 RX ADMIN — ASPIRIN 81 MG 81 MG: 81 TABLET ORAL at 09:00

## 2024-01-15 ASSESSMENT — PAIN SCALES - GENERAL
PAINLEVEL_OUTOF10: 0

## 2024-01-15 NOTE — H&P
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  Mild to moderate cardiomegaly, heart size increased.  Increased moderate pulmonary vascular congestion and perihilar interstitial edema.  Patchy groundglass opacities in the bilateral proximal lower lobes more prominent on the left, probably representing alveolar edema.  Additional proximal right upper lobe patchy groundglass consolidation due to alveolar edema.  Suboptimal exam due to patient body habitus and slight hypoinflation.  No pneumothorax. Unchanged tenotomy wires.  Osteopenia.  No definite acute bony abnormality.     Cardiomegaly, with moderate pulmonary edema.  Volume overload increased compared to prior.         Signed:  Evans Arizmendi DO    Part of this note may have been written by using a voice dictation software.  The note has been proof read but may still contain some grammatical/other typographical errors.

## 2024-01-15 NOTE — ED TRIAGE NOTES
Patient presents to ER with EMS with c/o of  A-Fib coming from home with increased difficulty breathing and low BP when EMS arrived BP was 70/50 placed IV and pressure bagged IV fluids, patient was in hospital 1/9 for a-fib RVR, pt on 4L oxygen and is now oxygen dependant.

## 2024-01-15 NOTE — ED PROVIDER NOTES
Emergency Department Provider Note       PCP: Hilary Gordon, APRN - NP   Age: 78 y.o.   Sex: female     DISPOSITION Admitted 01/14/2024 11:36:38 PM       ICD-10-CM    1. Atrial fibrillation with RVR (HCC)  I48.91       2. Hypervolemia, unspecified hypervolemia type  E87.70       3. Acute on chronic congestive heart failure, unspecified heart failure type (HCC)  I50.9       4. Supratherapeutic INR  R79.1       5. Transaminitis  R74.01           Medical Decision Making     Complexity of Problems Addressed:  1 or more chronic illnesses with a severe exacerbation or progression.  1 or more acute illnesses that pose a threat to life or bodily function.   1 or more chronic illnesses that pose a threat to life or bodily function.    Data Reviewed and Analyzed:   I independently ordered and reviewed each unique test.  I reviewed external records: previous lab results from outside ED.  I reviewed external records: previous imaging study including radiologist interpretation.   TTE reviewed from 1/9/2024.  Normal left ventricular systolic function with a visually estimated EF of 55 to 60%.  Left ventricular size is normal.  Normal wall thickness.  Normal wall motion.  Indeterminate diastolic function.  Mildly reduced systolic function.  Aortic valve with bioprosthetic valve with a size of 21 mm. AV mean gradient is 45 mmHg. Trace regurgitation. Noted by the apical view. AV peak gradient is 65 mmHg. AV peak velocity is 4.0 m/s. AV AT is 90.0 ms. LVOT:AV VTI Index is 0.23. AV area by continuity VTI is 0.8 Square Centimeter.  Mitral valve with bioprosthetic valve with a size of 25 mm. MV mean gradient is 23 mmHg. Mild regurgitation. MV PHT is 200.0 ms. MV area by PHT is 1.1 Square Centimeter. Tricuspid Valve w/ moderate regurgitation with jet direction toward the septum. Severely elevated RVSP. Est RA pressure is 8 mmHg. The estimated RVSP is 78 mmHg.          I independently ordered and interpreted the ED EKG in the absence

## 2024-01-15 NOTE — INTERDISCIPLINARY ROUNDS
Multi-D Rounds/Checklist (leapfrog):  Lines: can any be removed?: None    External Urinary Catheter (Active)      DVT Prophylaxis: Ordered  Vent: N/A  Nutrition Ordered/appropriate: Ordered  Can antibiotics or other drugs be stopped? Yes/End Date set Yes/No  Inpat Anti-Infectives (From admission, onward)       Start     Ordered Stop    01/15/24 2100  cefTRIAXone (ROCEPHIN) 1,000 mg in sterile water 10 mL IV syringe  1,000 mg,   IntraVENous,   EVERY 24 HOURS         01/14/24 2341 01/20/24 2059                  Consults needed:  pulmonary  A: Is pain control adequate? (has PRNs? Stop drip?) Yes  B: Sedation break and SBT? N/A  C: Is sedation choice appropriate? N/A  D: Delirium/CAM-ICU? No  E: Mobility goals/appropriateness? Yes  F: Family update and plan? spouse is surrogate decision maker and is being updated daily by primary attending and nursing staff.    Talia Mensah, APRN - CNP

## 2024-01-15 NOTE — ED NOTES
TRANSFER - OUT REPORT:    Verbal report given to AJ on Nery Bryant  being transferred to [facility] for routine progression of patient care       Report consisted of patient's Situation, Background, Assessment and   Recommendations(SBAR).     Information from the following report(s) ED Encounter Summary, Adult Overview, and Recent Results was reviewed with the receiving nurse.           Lines:   Peripheral IV 01/14/24 Left Antecubital (Active)       Peripheral IV 01/14/24 Left;Posterior Hand (Active)   Site Assessment Clean, dry & intact 01/14/24 2118   Line Status Blood return noted 01/14/24 2118   Phlebitis Assessment No symptoms 01/14/24 2118   Infiltration Assessment 0 01/14/24 2118   Alcohol Cap Used No 01/14/24 2118   Dressing Status Clean, dry & intact 01/14/24 2118   Dressing Type Transparent 01/14/24 2118   Dressing Intervention New 01/14/24 2118       Peripheral IV 01/14/24 Distal;Right;Anterior Cephalic (Active)        Opportunity for questions and clarification was provided.      Patient transported with:  Monitor, O2 @ airvo lpm, and Registered Nurse           Jhony Rivas, RN  01/15/24 0001

## 2024-01-15 NOTE — TELEPHONE ENCOUNTER
----- Message from Cyndie Aaron sent at 1/15/2024  9:14 AM EST -----  Subject: Message to Provider    QUESTIONS  Information for Provider? patient's  Earnest called to let provider   Evan know that patient was admitted to [facility] on 1/14/24  ---------------------------------------------------------------------------  --------------  CALL BACK INFO  [phone]; OK to leave message on voicemail  ---------------------------------------------------------------------------  --------------  SCRIPT ANSWERS  Relationship to Patient? Spouse/Partner  Representative Name? Earnest  Is the representative on the Communication Release of Information (WM)   form in Epic? Yes

## 2024-01-15 NOTE — ED NOTES
Called phlebotomist for 2nd set of cultures after 3 failed attempts 1 attempt with ultrasound, and was told they were heading to the floor and they did not even know where RR rooms in the ER were and it would be when they got a minute if they could find it, per Melisa in the lab who answered phlebotomist phone.      Jhony Rivas, RN  01/14/24 [id number]

## 2024-01-16 ENCOUNTER — APPOINTMENT (OUTPATIENT)
Dept: CARDIAC CATH/INVASIVE PROCEDURES | Age: 79
DRG: 266 | End: 2024-01-16
Attending: INTERNAL MEDICINE
Payer: MEDICARE

## 2024-01-16 ENCOUNTER — HOSPITAL ENCOUNTER (OUTPATIENT)
Dept: CT IMAGING | Age: 79
Discharge: HOME | End: 2024-01-19
Payer: MEDICARE

## 2024-01-16 LAB
ABO + RH BLD: NORMAL
ALBUMIN SERPL-MCNC: 3.1 G/DL (ref 3.2–4.6)
ALBUMIN/GLOB SERPL: 0.9 (ref 0.4–1.6)
ALP SERPL-CCNC: 161 U/L (ref 50–136)
ALT SERPL-CCNC: 60 U/L (ref 12–65)
ANION GAP SERPL CALC-SCNC: 1 MMOL/L (ref 2–11)
AST SERPL-CCNC: 22 U/L (ref 15–37)
BASOPHILS # BLD: 0 K/UL (ref 0–0.2)
BASOPHILS NFR BLD: 0 % (ref 0–2)
BILIRUB SERPL-MCNC: 0.9 MG/DL (ref 0.2–1.1)
BLD PROD TYP BPU: NORMAL
BLOOD BANK DISPENSE STATUS: NORMAL
BLOOD GROUP ANTIBODIES SERPL: NORMAL
BPU ID: NORMAL
BUN SERPL-MCNC: 30 MG/DL (ref 8–23)
CALCIUM SERPL-MCNC: 8.8 MG/DL (ref 8.3–10.4)
CHLORIDE SERPL-SCNC: 102 MMOL/L (ref 103–113)
CO2 SERPL-SCNC: 37 MMOL/L (ref 21–32)
CREAT SERPL-MCNC: 1.1 MG/DL (ref 0.6–1)
CROSSMATCH RESULT: NORMAL
DIFFERENTIAL METHOD BLD: ABNORMAL
ECHO AV ACCELERATION TIME: 95.62 MS
ECHO AV MEAN GRADIENT: 45 MMHG
ECHO AV MEAN VELOCITY: 3.1 M/S
ECHO AV PEAK GRADIENT: 75 MMHG
ECHO AV PEAK VELOCITY: 4.3 M/S
ECHO AV VELOCITY RATIO: 0.19
ECHO AV VTI: 107.5 CM
ECHO BSA: 1.76 M2
ECHO EST RA PRESSURE: 5 MMHG
ECHO LVOT AV VTI INDEX: 0.19
ECHO LVOT MEAN GRADIENT: 1 MMHG
ECHO LVOT PEAK GRADIENT: 3 MMHG
ECHO LVOT PEAK VELOCITY: 0.8 M/S
ECHO LVOT VTI: 20 CM
ECHO MV AREA PHT: 0.9 CM2
ECHO MV E DECELERATION TIME (DT): 798.8 MS
ECHO MV LVOT VTI INDEX: 6.57
ECHO MV MAX VELOCITY: 3.2 M/S
ECHO MV MEAN GRADIENT: 19 MMHG
ECHO MV MEAN VELOCITY: 2 M/S
ECHO MV PEAK GRADIENT: 41 MMHG
ECHO MV PRESSURE HALF TIME (PHT): 231.7 MS
ECHO MV VTI: 131.3 CM
ECHO RIGHT VENTRICULAR SYSTOLIC PRESSURE (RVSP): 58 MMHG
ECHO TV REGURGITANT MAX VELOCITY: 3.63 M/S
ECHO TV REGURGITANT PEAK GRADIENT: 53 MMHG
EOSINOPHIL # BLD: 0 K/UL (ref 0–0.8)
EOSINOPHIL NFR BLD: 0 % (ref 0.5–7.8)
ERYTHROCYTE [DISTWIDTH] IN BLOOD BY AUTOMATED COUNT: 20 % (ref 11.9–14.6)
GLOBULIN SER CALC-MCNC: 3.6 G/DL (ref 2.8–4.5)
GLUCOSE BLD STRIP.AUTO-MCNC: 131 MG/DL (ref 65–100)
GLUCOSE BLD STRIP.AUTO-MCNC: 153 MG/DL (ref 65–100)
GLUCOSE BLD STRIP.AUTO-MCNC: 390 MG/DL (ref 65–100)
GLUCOSE SERPL-MCNC: 214 MG/DL (ref 65–100)
HCT VFR BLD AUTO: 33.7 % (ref 35.8–46.3)
HGB BLD-MCNC: 9.9 G/DL (ref 11.7–15.4)
IMM GRANULOCYTES # BLD AUTO: 0.1 K/UL (ref 0–0.5)
IMM GRANULOCYTES NFR BLD AUTO: 1 % (ref 0–5)
INR PPP: 2.3
LYMPHOCYTES # BLD: 1.2 K/UL (ref 0.5–4.6)
LYMPHOCYTES NFR BLD: 14 % (ref 13–44)
MAGNESIUM SERPL-MCNC: 2 MG/DL (ref 1.8–2.4)
MCH RBC QN AUTO: 28.3 PG (ref 26.1–32.9)
MCHC RBC AUTO-ENTMCNC: 29.4 G/DL (ref 31.4–35)
MCV RBC AUTO: 96.3 FL (ref 82–102)
MM INDURATION, POC: 0 MM (ref 0–5)
MONOCYTES # BLD: 0.6 K/UL (ref 0.1–1.3)
MONOCYTES NFR BLD: 7 % (ref 4–12)
NEUTS SEG # BLD: 6.8 K/UL (ref 1.7–8.2)
NEUTS SEG NFR BLD: 78 % (ref 43–78)
NRBC # BLD: 0.23 K/UL (ref 0–0.2)
PLATELET # BLD AUTO: 185 K/UL (ref 150–450)
PMV BLD AUTO: 9.7 FL (ref 9.4–12.3)
POTASSIUM SERPL-SCNC: 4.2 MMOL/L (ref 3.5–5.1)
PPD, POC: NEGATIVE
PROT SERPL-MCNC: 6.7 G/DL (ref 6.3–8.2)
PROTHROMBIN TIME: 26.5 SEC (ref 11.3–14.9)
RBC # BLD AUTO: 3.5 M/UL (ref 4.05–5.2)
SERVICE CMNT-IMP: ABNORMAL
SODIUM SERPL-SCNC: 140 MMOL/L (ref 136–146)
SPECIMEN EXP DATE BLD: NORMAL
T3 SERPL-MCNC: 0.47 NG/ML (ref 0.6–1.81)
UNIT DIVISION: 0
WBC # BLD AUTO: 8.7 K/UL (ref 4.3–11.1)

## 2024-01-16 PROCEDURE — 6370000000 HC RX 637 (ALT 250 FOR IP): Performed by: STUDENT IN AN ORGANIZED HEALTH CARE EDUCATION/TRAINING PROGRAM

## 2024-01-16 PROCEDURE — 6360000002 HC RX W HCPCS: Performed by: INTERNAL MEDICINE

## 2024-01-16 PROCEDURE — C9113 INJ PANTOPRAZOLE SODIUM, VIA: HCPCS | Performed by: FAMILY MEDICINE

## 2024-01-16 PROCEDURE — 85025 COMPLETE CBC W/AUTO DIFF WBC: CPT

## 2024-01-16 PROCEDURE — 6370000000 HC RX 637 (ALT 250 FOR IP): Performed by: INTERNAL MEDICINE

## 2024-01-16 PROCEDURE — 6360000002 HC RX W HCPCS: Performed by: STUDENT IN AN ORGANIZED HEALTH CARE EDUCATION/TRAINING PROGRAM

## 2024-01-16 PROCEDURE — 85610 PROTHROMBIN TIME: CPT

## 2024-01-16 PROCEDURE — 71275 CT ANGIOGRAPHY CHEST: CPT | Performed by: RADIOLOGY

## 2024-01-16 PROCEDURE — 2580000003 HC RX 258: Performed by: FAMILY MEDICINE

## 2024-01-16 PROCEDURE — 2100000001 HC CVICU R&B

## 2024-01-16 PROCEDURE — 93312 ECHO TRANSESOPHAGEAL: CPT

## 2024-01-16 PROCEDURE — 99232 SBSQ HOSP IP/OBS MODERATE 35: CPT | Performed by: INTERNAL MEDICINE

## 2024-01-16 PROCEDURE — 2500000003 HC RX 250 WO HCPCS: Performed by: INTERNAL MEDICINE

## 2024-01-16 PROCEDURE — 80053 COMPREHEN METABOLIC PANEL: CPT

## 2024-01-16 PROCEDURE — 75574 CT ANGIO HRT W/3D IMAGE: CPT

## 2024-01-16 PROCEDURE — 76937 US GUIDE VASCULAR ACCESS: CPT

## 2024-01-16 PROCEDURE — 99153 MOD SED SAME PHYS/QHP EA: CPT | Performed by: INTERNAL MEDICINE

## 2024-01-16 PROCEDURE — 99233 SBSQ HOSP IP/OBS HIGH 50: CPT | Performed by: INTERNAL MEDICINE

## 2024-01-16 PROCEDURE — 6360000002 HC RX W HCPCS: Performed by: FAMILY MEDICINE

## 2024-01-16 PROCEDURE — A4216 STERILE WATER/SALINE, 10 ML: HCPCS | Performed by: FAMILY MEDICINE

## 2024-01-16 PROCEDURE — 2580000003 HC RX 258: Performed by: STUDENT IN AN ORGANIZED HEALTH CARE EDUCATION/TRAINING PROGRAM

## 2024-01-16 PROCEDURE — B24BZZ4 ULTRASONOGRAPHY OF HEART WITH AORTA, TRANSESOPHAGEAL: ICD-10-PCS | Performed by: INTERNAL MEDICINE

## 2024-01-16 PROCEDURE — 82962 GLUCOSE BLOOD TEST: CPT

## 2024-01-16 PROCEDURE — 36415 COLL VENOUS BLD VENIPUNCTURE: CPT

## 2024-01-16 PROCEDURE — 99152 MOD SED SAME PHYS/QHP 5/>YRS: CPT | Performed by: INTERNAL MEDICINE

## 2024-01-16 PROCEDURE — 74174 CTA ABD&PLVS W/CONTRAST: CPT | Performed by: RADIOLOGY

## 2024-01-16 PROCEDURE — 74174 CTA ABD&PLVS W/CONTRAST: CPT

## 2024-01-16 PROCEDURE — 6360000004 HC RX CONTRAST MEDICATION: Performed by: INTERNAL MEDICINE

## 2024-01-16 PROCEDURE — 83735 ASSAY OF MAGNESIUM: CPT

## 2024-01-16 PROCEDURE — 6370000000 HC RX 637 (ALT 250 FOR IP): Performed by: FAMILY MEDICINE

## 2024-01-16 RX ORDER — AMLODIPINE BESYLATE 5 MG/1
5 TABLET ORAL DAILY
Status: DISCONTINUED | OUTPATIENT
Start: 2024-01-16 | End: 2024-01-17

## 2024-01-16 RX ORDER — INSULIN GLARGINE 100 [IU]/ML
15 INJECTION, SOLUTION SUBCUTANEOUS NIGHTLY
Status: DISCONTINUED | OUTPATIENT
Start: 2024-01-16 | End: 2024-01-18

## 2024-01-16 RX ORDER — MIDAZOLAM HYDROCHLORIDE 1 MG/ML
INJECTION INTRAMUSCULAR; INTRAVENOUS PRN
Status: COMPLETED | OUTPATIENT
Start: 2024-01-16 | End: 2024-01-16

## 2024-01-16 RX ORDER — METOPROLOL TARTRATE 1 MG/ML
5 INJECTION, SOLUTION INTRAVENOUS ONCE
Status: COMPLETED | OUTPATIENT
Start: 2024-01-16 | End: 2024-01-16

## 2024-01-16 RX ORDER — METOPROLOL TARTRATE 50 MG/1
50 TABLET, FILM COATED ORAL EVERY 6 HOURS
Status: DISCONTINUED | OUTPATIENT
Start: 2024-01-16 | End: 2024-01-21

## 2024-01-16 RX ORDER — FENTANYL CITRATE 50 UG/ML
INJECTION, SOLUTION INTRAMUSCULAR; INTRAVENOUS PRN
Status: COMPLETED | OUTPATIENT
Start: 2024-01-16 | End: 2024-01-16

## 2024-01-16 RX ORDER — LIDOCAINE HYDROCHLORIDE 20 MG/ML
SOLUTION OROPHARYNGEAL PRN
Status: COMPLETED | OUTPATIENT
Start: 2024-01-16 | End: 2024-01-16

## 2024-01-16 RX ADMIN — DOCUSATE SODIUM 100 MG: 100 CAPSULE, LIQUID FILLED ORAL at 20:35

## 2024-01-16 RX ADMIN — SODIUM CHLORIDE, PRESERVATIVE FREE 10 ML: 5 INJECTION INTRAVENOUS at 20:36

## 2024-01-16 RX ADMIN — FENTANYL CITRATE 25 MCG: 50 INJECTION, SOLUTION INTRAMUSCULAR; INTRAVENOUS at 13:41

## 2024-01-16 RX ADMIN — ATORVASTATIN CALCIUM 10 MG: 20 TABLET, FILM COATED ORAL at 17:10

## 2024-01-16 RX ADMIN — LEVOTHYROXINE SODIUM 125 MCG: 0.07 TABLET ORAL at 08:16

## 2024-01-16 RX ADMIN — LIDOCAINE HYDROCHLORIDE 15 ML: 20 SOLUTION ORAL at 13:30

## 2024-01-16 RX ADMIN — INSULIN GLARGINE 15 UNITS: 100 INJECTION, SOLUTION SUBCUTANEOUS at 20:35

## 2024-01-16 RX ADMIN — MIDAZOLAM 2 MG: 1 INJECTION INTRAMUSCULAR; INTRAVENOUS at 13:36

## 2024-01-16 RX ADMIN — ASPIRIN 81 MG 81 MG: 81 TABLET ORAL at 08:14

## 2024-01-16 RX ADMIN — PANTOPRAZOLE SODIUM 40 MG: 40 INJECTION, POWDER, FOR SOLUTION INTRAVENOUS at 08:13

## 2024-01-16 RX ADMIN — AMLODIPINE BESYLATE 5 MG: 5 TABLET ORAL at 11:38

## 2024-01-16 RX ADMIN — SODIUM CHLORIDE, PRESERVATIVE FREE 10 ML: 5 INJECTION INTRAVENOUS at 08:15

## 2024-01-16 RX ADMIN — METOPROLOL TARTRATE 5 MG: 5 INJECTION INTRAVENOUS at 08:18

## 2024-01-16 RX ADMIN — FENTANYL CITRATE 25 MCG: 50 INJECTION, SOLUTION INTRAMUSCULAR; INTRAVENOUS at 13:42

## 2024-01-16 RX ADMIN — FENTANYL CITRATE 25 MCG: 50 INJECTION, SOLUTION INTRAMUSCULAR; INTRAVENOUS at 13:36

## 2024-01-16 RX ADMIN — PREDNISONE 20 MG: 20 TABLET ORAL at 08:14

## 2024-01-16 RX ADMIN — IOPAMIDOL 100 ML: 755 INJECTION, SOLUTION INTRAVENOUS at 10:51

## 2024-01-16 RX ADMIN — MIDAZOLAM 1 MG: 1 INJECTION INTRAMUSCULAR; INTRAVENOUS at 13:41

## 2024-01-16 RX ADMIN — DOCUSATE SODIUM 100 MG: 100 CAPSULE, LIQUID FILLED ORAL at 08:14

## 2024-01-16 RX ADMIN — INSULIN LISPRO 4 UNITS: 100 INJECTION, SOLUTION INTRAVENOUS; SUBCUTANEOUS at 20:35

## 2024-01-16 RX ADMIN — METOPROLOL TARTRATE 50 MG: 50 TABLET ORAL at 20:35

## 2024-01-16 RX ADMIN — METOPROLOL TARTRATE 50 MG: 50 TABLET ORAL at 08:14

## 2024-01-16 RX ADMIN — DOFETILIDE 500 MCG: 0.5 CAPSULE ORAL at 11:38

## 2024-01-16 RX ADMIN — DOFETILIDE 500 MCG: 0.5 CAPSULE ORAL at 20:35

## 2024-01-16 RX ADMIN — SODIUM CHLORIDE 125 MG: 9 INJECTION, SOLUTION INTRAVENOUS at 17:12

## 2024-01-16 RX ADMIN — MIDAZOLAM 1 MG: 1 INJECTION INTRAMUSCULAR; INTRAVENOUS at 13:43

## 2024-01-16 ASSESSMENT — KANSAS CITY CARDIOMYOPATHY QUESTIONNAIRE (KCCQ12)
1B. OVER THE PAST 2 WEEKS, HOW MUCH WERE YOU LIMITED BY HEART FAILURE SYMPTOMS (SHORTNESS OF BREATH OR FATIGUE) WHEN WALKING 1 BLOCK ON LEVEL GROUND: 1
8A. OVER THE PAST 2 WEEKS, ON AVERAGE, HOW HAS HEART FAILURE LIMITED YOU ABILITY TO DO HOBBIES OR RECREATIONAL ACTIVITIES: 1
6. OVER THE PAST 2 WEEKS, HOW MUCH HAS YOUR HEART FAILURE LIMITED YOUR ENJOYMENT OF LIFE: 1
8C. OVER THE PAST 2 WEEKS, ON AVERAGE, HOW HAS HEART FAILURE LIMITED YOU ABILITY TO VISIT FAMILY AND FRIENDS OUR OF YOUR HOME: 1
1C. OVER THE PAST 2 WEEKS, HOW MUCH WERE YOU LIMITED BY HEART FAILURE SYMPTOMS (SHORTNESS OF BREATH OR FATIGUE) WHEN HURRYING OR JOGGING (AS IF TO CATCH A BUS): 1
3. OVER THE PAST 2 WEEKS, ON AVERAGE, HOW MANY TIMES HAS FATIGUE LIMITED YOUR ABILITY TO DO WHAT YOU WANTED: 1
4. OVER THE PAST 2 WEEKS, ON AVERAGE, HOW MANY TIMES HAS SHORTNESS OF BREATH LIMITED YOUR ABILITY TO DO WHAT YOU WANTED: 1
1A. OVER THE PAST 2 WEEKS, HOW MUCH WERE YOU LIMITED BY HEART FAILURE SYMPTOMS (SHORTNESS OF BREATH OR FATIGUE) WHEN SHOWERING OR BATHING: 2
8B. OVER THE PAST 2 WEEKS, ON AVERAGE, HOW HAS HEART FAILURE LIMITED YOU ABILITY TO WORK OR DO HOUSEHOLD CHORES: 1
7. IF YOU HAD TO SPEND THE REST OF YOUR LIFE WITH YOUR HEART FAILURE THE WAY IT IS RIGHT NOW, HOW WOULD YOU FEEL ABOUT THIS?: 1
5. OVER THE PAST 2 WEEKS, ON AVERAGE, HOW MANY TIMES HAVE YOU BEEN FORCED TO SLEEP SITTING UP IN A CHAIR OR WITH AT LEAST 3 PILLOWS TO PROP YOU UP BECAUSE OF SHORTNESS OF BREATH?: 1
2. OVER THE PAST 2 WEEKS, HOW MANY TIMES DID YOU HAVE SWELLING IN YOUR FEET, ANKLES OR LEGS WHEN YOU WOKE UP IN THE MORNING: 2

## 2024-01-16 ASSESSMENT — PAIN SCALES - GENERAL
PAINLEVEL_OUTOF10: 0
PAINLEVEL_OUTOF10: 0

## 2024-01-16 NOTE — INTERDISCIPLINARY ROUNDS
Multi-D Rounds/Checklist (leapfrog):  Lines: can any be removed?: None    External Urinary Catheter (Active)      DVT Prophylaxis: INR 2.3  Vent: N/A  Nutrition Ordered/appropriate: Ordered  Can antibiotics or other drugs be stopped? NA End Date set Yes  Inpat Anti-Infectives (From admission, onward)         Consults needed:  pulmonary  A: Is pain control adequate? (has PRNs? Stop drip?) NA  B: Sedation break and SBT? N/A  C: Is sedation choice appropriate? N/A  D: Delirium/CAM-ICU? No  E: Mobility goals/appropriateness? Yes  F: Family update and plan? spouse is surrogate decision maker and is being updated daily by primary attending and nursing staff.    Josephine Saenz, APRN - CNP

## 2024-01-17 ENCOUNTER — APPOINTMENT (OUTPATIENT)
Dept: GENERAL RADIOLOGY | Age: 79
DRG: 266 | End: 2024-01-17
Payer: MEDICARE

## 2024-01-17 LAB
ALBUMIN SERPL-MCNC: 2.8 G/DL (ref 3.2–4.6)
ALBUMIN/GLOB SERPL: 0.9 (ref 0.4–1.6)
ALP SERPL-CCNC: 138 U/L (ref 50–136)
ALT SERPL-CCNC: 44 U/L (ref 12–65)
ANION GAP SERPL CALC-SCNC: 2 MMOL/L (ref 2–11)
AST SERPL-CCNC: 6 U/L (ref 15–37)
B PERT DNA SPEC QL NAA+PROBE: NOT DETECTED
BASOPHILS # BLD: 0 K/UL (ref 0–0.2)
BASOPHILS NFR BLD: 0 % (ref 0–2)
BILIRUB SERPL-MCNC: 0.6 MG/DL (ref 0.2–1.1)
BORDETELLA PARAPERTUSSIS BY PCR: NOT DETECTED
BUN SERPL-MCNC: 25 MG/DL (ref 8–23)
C PNEUM DNA SPEC QL NAA+PROBE: NOT DETECTED
CALCIUM SERPL-MCNC: 8.5 MG/DL (ref 8.3–10.4)
CHLORIDE SERPL-SCNC: 104 MMOL/L (ref 103–113)
CO2 SERPL-SCNC: 34 MMOL/L (ref 21–32)
CREAT SERPL-MCNC: 0.7 MG/DL (ref 0.6–1)
DIFFERENTIAL METHOD BLD: ABNORMAL
ECHO BSA: 1.76 M2
EOSINOPHIL # BLD: 0.1 K/UL (ref 0–0.8)
EOSINOPHIL NFR BLD: 2 % (ref 0.5–7.8)
ERYTHROCYTE [DISTWIDTH] IN BLOOD BY AUTOMATED COUNT: 19.1 % (ref 11.9–14.6)
FLUAV SUBTYP SPEC NAA+PROBE: NOT DETECTED
FLUBV RNA SPEC QL NAA+PROBE: NOT DETECTED
GLOBULIN SER CALC-MCNC: 3.2 G/DL (ref 2.8–4.5)
GLUCOSE BLD STRIP.AUTO-MCNC: 125 MG/DL (ref 65–100)
GLUCOSE BLD STRIP.AUTO-MCNC: 277 MG/DL (ref 65–100)
GLUCOSE BLD STRIP.AUTO-MCNC: 54 MG/DL (ref 65–100)
GLUCOSE BLD STRIP.AUTO-MCNC: 89 MG/DL (ref 65–100)
GLUCOSE SERPL-MCNC: 136 MG/DL (ref 65–100)
HADV DNA SPEC QL NAA+PROBE: NOT DETECTED
HCOV 229E RNA SPEC QL NAA+PROBE: NOT DETECTED
HCOV HKU1 RNA SPEC QL NAA+PROBE: NOT DETECTED
HCOV NL63 RNA SPEC QL NAA+PROBE: NOT DETECTED
HCOV OC43 RNA SPEC QL NAA+PROBE: NOT DETECTED
HCT VFR BLD AUTO: 31 % (ref 35.8–46.3)
HGB BLD-MCNC: 9.1 G/DL (ref 11.7–15.4)
HMPV RNA SPEC QL NAA+PROBE: NOT DETECTED
HPIV1 RNA SPEC QL NAA+PROBE: NOT DETECTED
HPIV2 RNA SPEC QL NAA+PROBE: NOT DETECTED
HPIV3 RNA SPEC QL NAA+PROBE: NOT DETECTED
HPIV4 RNA SPEC QL NAA+PROBE: NOT DETECTED
IMM GRANULOCYTES # BLD AUTO: 0 K/UL (ref 0–0.5)
IMM GRANULOCYTES NFR BLD AUTO: 0 % (ref 0–5)
INR PPP: 1.8
LYMPHOCYTES # BLD: 1.2 K/UL (ref 0.5–4.6)
LYMPHOCYTES NFR BLD: 18 % (ref 13–44)
M PNEUMO DNA SPEC QL NAA+PROBE: NOT DETECTED
MAGNESIUM SERPL-MCNC: 2.5 MG/DL (ref 1.8–2.4)
MCH RBC QN AUTO: 28.7 PG (ref 26.1–32.9)
MCHC RBC AUTO-ENTMCNC: 29.4 G/DL (ref 31.4–35)
MCV RBC AUTO: 97.8 FL (ref 82–102)
MM INDURATION, POC: 0 MM (ref 0–5)
MM INDURATION, POC: 0 MM (ref 0–5)
MONOCYTES # BLD: 0.5 K/UL (ref 0.1–1.3)
MONOCYTES NFR BLD: 8 % (ref 4–12)
NEUTS SEG # BLD: 4.8 K/UL (ref 1.7–8.2)
NEUTS SEG NFR BLD: 72 % (ref 43–78)
NRBC # BLD: 0.14 K/UL (ref 0–0.2)
PLATELET # BLD AUTO: 188 K/UL (ref 150–450)
PMV BLD AUTO: 10 FL (ref 9.4–12.3)
POTASSIUM SERPL-SCNC: 3.7 MMOL/L (ref 3.5–5.1)
PPD, POC: NEGATIVE
PPD, POC: NEGATIVE
PROT SERPL-MCNC: 6 G/DL (ref 6.3–8.2)
PROTHROMBIN TIME: 21.7 SEC (ref 11.3–14.9)
RBC # BLD AUTO: 3.17 M/UL (ref 4.05–5.2)
RSV RNA SPEC QL NAA+PROBE: NOT DETECTED
RV+EV RNA SPEC QL NAA+PROBE: NOT DETECTED
SARS-COV-2 RNA RESP QL NAA+PROBE: NOT DETECTED
SERVICE CMNT-IMP: ABNORMAL
SERVICE CMNT-IMP: NORMAL
SODIUM SERPL-SCNC: 140 MMOL/L (ref 136–146)
WBC # BLD AUTO: 6.7 K/UL (ref 4.3–11.1)

## 2024-01-17 PROCEDURE — 6360000002 HC RX W HCPCS: Performed by: INTERNAL MEDICINE

## 2024-01-17 PROCEDURE — C1769 GUIDE WIRE: HCPCS | Performed by: INTERNAL MEDICINE

## 2024-01-17 PROCEDURE — A4216 STERILE WATER/SALINE, 10 ML: HCPCS | Performed by: FAMILY MEDICINE

## 2024-01-17 PROCEDURE — 93454 CORONARY ARTERY ANGIO S&I: CPT | Performed by: INTERNAL MEDICINE

## 2024-01-17 PROCEDURE — 83735 ASSAY OF MAGNESIUM: CPT

## 2024-01-17 PROCEDURE — B2111ZZ FLUOROSCOPY OF MULTIPLE CORONARY ARTERIES USING LOW OSMOLAR CONTRAST: ICD-10-PCS | Performed by: INTERNAL MEDICINE

## 2024-01-17 PROCEDURE — 94761 N-INVAS EAR/PLS OXIMETRY MLT: CPT

## 2024-01-17 PROCEDURE — 71045 X-RAY EXAM CHEST 1 VIEW: CPT

## 2024-01-17 PROCEDURE — 85025 COMPLETE CBC W/AUTO DIFF WBC: CPT

## 2024-01-17 PROCEDURE — 2500000003 HC RX 250 WO HCPCS: Performed by: INTERNAL MEDICINE

## 2024-01-17 PROCEDURE — 80053 COMPREHEN METABOLIC PANEL: CPT

## 2024-01-17 PROCEDURE — 36415 COLL VENOUS BLD VENIPUNCTURE: CPT

## 2024-01-17 PROCEDURE — 6360000004 HC RX CONTRAST MEDICATION: Performed by: INTERNAL MEDICINE

## 2024-01-17 PROCEDURE — 6370000000 HC RX 637 (ALT 250 FOR IP): Performed by: FAMILY MEDICINE

## 2024-01-17 PROCEDURE — 94640 AIRWAY INHALATION TREATMENT: CPT

## 2024-01-17 PROCEDURE — 2100000001 HC CVICU R&B

## 2024-01-17 PROCEDURE — 0202U NFCT DS 22 TRGT SARS-COV-2: CPT

## 2024-01-17 PROCEDURE — 6360000002 HC RX W HCPCS: Performed by: FAMILY MEDICINE

## 2024-01-17 PROCEDURE — 82962 GLUCOSE BLOOD TEST: CPT

## 2024-01-17 PROCEDURE — 6370000000 HC RX 637 (ALT 250 FOR IP): Performed by: STUDENT IN AN ORGANIZED HEALTH CARE EDUCATION/TRAINING PROGRAM

## 2024-01-17 PROCEDURE — 99233 SBSQ HOSP IP/OBS HIGH 50: CPT | Performed by: INTERNAL MEDICINE

## 2024-01-17 PROCEDURE — 6360000002 HC RX W HCPCS: Performed by: HOSPITALIST

## 2024-01-17 PROCEDURE — 2580000003 HC RX 258: Performed by: FAMILY MEDICINE

## 2024-01-17 PROCEDURE — 6360000002 HC RX W HCPCS: Performed by: STUDENT IN AN ORGANIZED HEALTH CARE EDUCATION/TRAINING PROGRAM

## 2024-01-17 PROCEDURE — C1894 INTRO/SHEATH, NON-LASER: HCPCS | Performed by: INTERNAL MEDICINE

## 2024-01-17 PROCEDURE — 2700000000 HC OXYGEN THERAPY PER DAY

## 2024-01-17 PROCEDURE — 99232 SBSQ HOSP IP/OBS MODERATE 35: CPT | Performed by: INTERNAL MEDICINE

## 2024-01-17 PROCEDURE — 2580000003 HC RX 258: Performed by: STUDENT IN AN ORGANIZED HEALTH CARE EDUCATION/TRAINING PROGRAM

## 2024-01-17 PROCEDURE — 6370000000 HC RX 637 (ALT 250 FOR IP): Performed by: INTERNAL MEDICINE

## 2024-01-17 PROCEDURE — 85610 PROTHROMBIN TIME: CPT

## 2024-01-17 PROCEDURE — C9113 INJ PANTOPRAZOLE SODIUM, VIA: HCPCS | Performed by: FAMILY MEDICINE

## 2024-01-17 PROCEDURE — 2709999900 HC NON-CHARGEABLE SUPPLY: Performed by: INTERNAL MEDICINE

## 2024-01-17 RX ORDER — LIDOCAINE HYDROCHLORIDE 10 MG/ML
INJECTION, SOLUTION INFILTRATION; PERINEURAL PRN
Status: DISCONTINUED | OUTPATIENT
Start: 2024-01-17 | End: 2024-01-17 | Stop reason: HOSPADM

## 2024-01-17 RX ORDER — PREDNISONE 5 MG/1
10 TABLET ORAL DAILY
Status: DISCONTINUED | OUTPATIENT
Start: 2024-01-18 | End: 2024-01-21

## 2024-01-17 RX ORDER — POTASSIUM CHLORIDE 20 MEQ/1
40 TABLET, EXTENDED RELEASE ORAL DAILY
Status: DISCONTINUED | OUTPATIENT
Start: 2024-01-17 | End: 2024-01-22

## 2024-01-17 RX ORDER — MIDAZOLAM HYDROCHLORIDE 1 MG/ML
INJECTION INTRAMUSCULAR; INTRAVENOUS PRN
Status: DISCONTINUED | OUTPATIENT
Start: 2024-01-17 | End: 2024-01-17 | Stop reason: HOSPADM

## 2024-01-17 RX ORDER — LEVALBUTEROL INHALATION SOLUTION 0.63 MG/3ML
0.63 SOLUTION RESPIRATORY (INHALATION)
Status: DISCONTINUED | OUTPATIENT
Start: 2024-01-17 | End: 2024-01-24

## 2024-01-17 RX ORDER — INSULIN LISPRO 100 [IU]/ML
3 INJECTION, SOLUTION INTRAVENOUS; SUBCUTANEOUS
Status: DISCONTINUED | OUTPATIENT
Start: 2024-01-17 | End: 2024-01-18

## 2024-01-17 RX ORDER — HEPARIN SODIUM 200 [USP'U]/100ML
INJECTION, SOLUTION INTRAVENOUS CONTINUOUS PRN
Status: COMPLETED | OUTPATIENT
Start: 2024-01-17 | End: 2024-01-17

## 2024-01-17 RX ORDER — PHENYLEPHRINE HYDROCHLORIDE 10 MG/ML
INJECTION INTRAVENOUS PRN
Status: DISCONTINUED | OUTPATIENT
Start: 2024-01-17 | End: 2024-01-17 | Stop reason: HOSPADM

## 2024-01-17 RX ORDER — FUROSEMIDE 10 MG/ML
40 INJECTION INTRAMUSCULAR; INTRAVENOUS DAILY
Status: COMPLETED | OUTPATIENT
Start: 2024-01-17 | End: 2024-01-18

## 2024-01-17 RX ADMIN — POTASSIUM CHLORIDE 20 MEQ: 1500 TABLET, EXTENDED RELEASE ORAL at 10:04

## 2024-01-17 RX ADMIN — PANTOPRAZOLE SODIUM 40 MG: 40 INJECTION, POWDER, FOR SOLUTION INTRAVENOUS at 10:05

## 2024-01-17 RX ADMIN — SODIUM CHLORIDE, PRESERVATIVE FREE 10 ML: 5 INJECTION INTRAVENOUS at 10:06

## 2024-01-17 RX ADMIN — LEVOTHYROXINE SODIUM 125 MCG: 0.07 TABLET ORAL at 07:22

## 2024-01-17 RX ADMIN — LEVALBUTEROL HYDROCHLORIDE 0.63 MG: 0.63 SOLUTION RESPIRATORY (INHALATION) at 15:10

## 2024-01-17 RX ADMIN — ATORVASTATIN CALCIUM 10 MG: 20 TABLET, FILM COATED ORAL at 16:48

## 2024-01-17 RX ADMIN — DEXTROSE MONOHYDRATE 125 ML: 100 INJECTION, SOLUTION INTRAVENOUS at 10:03

## 2024-01-17 RX ADMIN — FUROSEMIDE 40 MG: 10 INJECTION, SOLUTION INTRAVENOUS at 10:04

## 2024-01-17 RX ADMIN — LEVALBUTEROL HYDROCHLORIDE 0.63 MG: 0.63 SOLUTION RESPIRATORY (INHALATION) at 19:54

## 2024-01-17 RX ADMIN — INSULIN GLARGINE 15 UNITS: 100 INJECTION, SOLUTION SUBCUTANEOUS at 21:07

## 2024-01-17 RX ADMIN — METOPROLOL TARTRATE 50 MG: 50 TABLET ORAL at 10:07

## 2024-01-17 RX ADMIN — SODIUM CHLORIDE 125 MG: 9 INJECTION, SOLUTION INTRAVENOUS at 16:42

## 2024-01-17 RX ADMIN — DOFETILIDE 500 MCG: 0.5 CAPSULE ORAL at 10:04

## 2024-01-17 RX ADMIN — DOFETILIDE 500 MCG: 0.5 CAPSULE ORAL at 21:07

## 2024-01-17 RX ADMIN — DOCUSATE SODIUM 100 MG: 100 CAPSULE, LIQUID FILLED ORAL at 21:07

## 2024-01-17 RX ADMIN — AMLODIPINE BESYLATE 5 MG: 5 TABLET ORAL at 10:09

## 2024-01-17 RX ADMIN — ASPIRIN 81 MG 81 MG: 81 TABLET ORAL at 10:04

## 2024-01-17 RX ADMIN — SODIUM CHLORIDE, PRESERVATIVE FREE 10 ML: 5 INJECTION INTRAVENOUS at 21:08

## 2024-01-17 RX ADMIN — PREDNISONE 20 MG: 20 TABLET ORAL at 10:04

## 2024-01-17 RX ADMIN — DOCUSATE SODIUM 100 MG: 100 CAPSULE, LIQUID FILLED ORAL at 10:04

## 2024-01-17 RX ADMIN — POTASSIUM BICARBONATE 20 MEQ: 782 TABLET, EFFERVESCENT ORAL at 10:44

## 2024-01-17 RX ADMIN — METOPROLOL TARTRATE 50 MG: 50 TABLET ORAL at 21:07

## 2024-01-17 ASSESSMENT — PAIN SCALES - GENERAL
PAINLEVEL_OUTOF10: 0

## 2024-01-17 NOTE — INTERDISCIPLINARY ROUNDS
Multi-D Rounds/Checklist (leapfrog):  Lines: can any be removed?: None    External Urinary Catheter (Active)      DVT Prophylaxis: INR 2.3  Vent: N/A  Nutrition Ordered/appropriate: Ordered  Can antibiotics or other drugs be stopped? NA End Date set Yes  Inpat Anti-Infectives (From admission, onward)         Consults needed:  pulmonary  A: Is pain control adequate? (has PRNs? Stop drip?) NA  B: Sedation break and SBT? N/A  C: Is sedation choice appropriate? N/A  D: Delirium/CAM-ICU? No  E: Mobility goals/appropriateness? Yes  F: Family update and plan? spouse is surrogate decision maker and is being updated daily by primary attending and nursing staff.    Talia Mensah, APRN - CNP

## 2024-01-18 DIAGNOSIS — I05.0 MITRAL VALVE STENOSIS, SEVERE: ICD-10-CM

## 2024-01-18 PROBLEM — E87.70 HYPERVOLEMIA: Status: ACTIVE | Noted: 2024-01-18

## 2024-01-18 LAB
ANION GAP SERPL CALC-SCNC: 3 MMOL/L (ref 2–11)
BASOPHILS # BLD: 0 K/UL (ref 0–0.2)
BASOPHILS NFR BLD: 0 % (ref 0–2)
BUN SERPL-MCNC: 25 MG/DL (ref 8–23)
CALCIUM SERPL-MCNC: 8.7 MG/DL (ref 8.3–10.4)
CHLORIDE SERPL-SCNC: 104 MMOL/L (ref 103–113)
CO2 SERPL-SCNC: 33 MMOL/L (ref 21–32)
CREAT SERPL-MCNC: 1 MG/DL (ref 0.6–1)
DIFFERENTIAL METHOD BLD: ABNORMAL
EOSINOPHIL # BLD: 0.1 K/UL (ref 0–0.8)
EOSINOPHIL NFR BLD: 1 % (ref 0.5–7.8)
ERYTHROCYTE [DISTWIDTH] IN BLOOD BY AUTOMATED COUNT: 18.6 % (ref 11.9–14.6)
GLUCOSE BLD STRIP.AUTO-MCNC: 180 MG/DL (ref 65–100)
GLUCOSE BLD STRIP.AUTO-MCNC: 182 MG/DL (ref 65–100)
GLUCOSE BLD STRIP.AUTO-MCNC: 190 MG/DL (ref 65–100)
GLUCOSE BLD STRIP.AUTO-MCNC: 385 MG/DL (ref 65–100)
GLUCOSE SERPL-MCNC: 53 MG/DL (ref 65–100)
HCT VFR BLD AUTO: 32.5 % (ref 35.8–46.3)
HGB BLD-MCNC: 9.6 G/DL (ref 11.7–15.4)
IMM GRANULOCYTES # BLD AUTO: 0 K/UL (ref 0–0.5)
IMM GRANULOCYTES NFR BLD AUTO: 1 % (ref 0–5)
INR PPP: 1.2
IRON SERPL-MCNC: 82 UG/DL (ref 35–150)
LYMPHOCYTES # BLD: 1.2 K/UL (ref 0.5–4.6)
LYMPHOCYTES NFR BLD: 18 % (ref 13–44)
MCH RBC QN AUTO: 28.9 PG (ref 26.1–32.9)
MCHC RBC AUTO-ENTMCNC: 29.5 G/DL (ref 31.4–35)
MCV RBC AUTO: 97.9 FL (ref 82–102)
MONOCYTES # BLD: 0.4 K/UL (ref 0.1–1.3)
MONOCYTES NFR BLD: 7 % (ref 4–12)
NEUTS SEG # BLD: 5 K/UL (ref 1.7–8.2)
NEUTS SEG NFR BLD: 73 % (ref 43–78)
NRBC # BLD: 0.14 K/UL (ref 0–0.2)
PLATELET # BLD AUTO: 186 K/UL (ref 150–450)
PMV BLD AUTO: 9.6 FL (ref 9.4–12.3)
POTASSIUM SERPL-SCNC: 3.9 MMOL/L (ref 3.5–5.1)
PROTHROMBIN TIME: 16.1 SEC (ref 11.3–14.9)
RBC # BLD AUTO: 3.32 M/UL (ref 4.05–5.2)
SERVICE CMNT-IMP: ABNORMAL
SODIUM SERPL-SCNC: 140 MMOL/L (ref 136–146)
WBC # BLD AUTO: 6.8 K/UL (ref 4.3–11.1)

## 2024-01-18 PROCEDURE — C9113 INJ PANTOPRAZOLE SODIUM, VIA: HCPCS | Performed by: FAMILY MEDICINE

## 2024-01-18 PROCEDURE — 82962 GLUCOSE BLOOD TEST: CPT

## 2024-01-18 PROCEDURE — 6370000000 HC RX 637 (ALT 250 FOR IP): Performed by: FAMILY MEDICINE

## 2024-01-18 PROCEDURE — 6360000002 HC RX W HCPCS: Performed by: INTERNAL MEDICINE

## 2024-01-18 PROCEDURE — 94640 AIRWAY INHALATION TREATMENT: CPT

## 2024-01-18 PROCEDURE — 2100000001 HC CVICU R&B

## 2024-01-18 PROCEDURE — 85610 PROTHROMBIN TIME: CPT

## 2024-01-18 PROCEDURE — 2580000003 HC RX 258: Performed by: STUDENT IN AN ORGANIZED HEALTH CARE EDUCATION/TRAINING PROGRAM

## 2024-01-18 PROCEDURE — 6370000000 HC RX 637 (ALT 250 FOR IP): Performed by: STUDENT IN AN ORGANIZED HEALTH CARE EDUCATION/TRAINING PROGRAM

## 2024-01-18 PROCEDURE — 80048 BASIC METABOLIC PNL TOTAL CA: CPT

## 2024-01-18 PROCEDURE — 6360000002 HC RX W HCPCS: Performed by: HOSPITALIST

## 2024-01-18 PROCEDURE — 6360000002 HC RX W HCPCS: Performed by: STUDENT IN AN ORGANIZED HEALTH CARE EDUCATION/TRAINING PROGRAM

## 2024-01-18 PROCEDURE — 2700000000 HC OXYGEN THERAPY PER DAY

## 2024-01-18 PROCEDURE — 36415 COLL VENOUS BLD VENIPUNCTURE: CPT

## 2024-01-18 PROCEDURE — 83540 ASSAY OF IRON: CPT

## 2024-01-18 PROCEDURE — 94761 N-INVAS EAR/PLS OXIMETRY MLT: CPT

## 2024-01-18 PROCEDURE — 6370000000 HC RX 637 (ALT 250 FOR IP): Performed by: INTERNAL MEDICINE

## 2024-01-18 PROCEDURE — 97530 THERAPEUTIC ACTIVITIES: CPT

## 2024-01-18 PROCEDURE — A4216 STERILE WATER/SALINE, 10 ML: HCPCS | Performed by: FAMILY MEDICINE

## 2024-01-18 PROCEDURE — 6360000002 HC RX W HCPCS: Performed by: FAMILY MEDICINE

## 2024-01-18 PROCEDURE — 2580000003 HC RX 258: Performed by: FAMILY MEDICINE

## 2024-01-18 PROCEDURE — 97535 SELF CARE MNGMENT TRAINING: CPT

## 2024-01-18 PROCEDURE — 85025 COMPLETE CBC W/AUTO DIFF WBC: CPT

## 2024-01-18 PROCEDURE — 99233 SBSQ HOSP IP/OBS HIGH 50: CPT | Performed by: INTERNAL MEDICINE

## 2024-01-18 PROCEDURE — 6370000000 HC RX 637 (ALT 250 FOR IP): Performed by: HOSPITALIST

## 2024-01-18 RX ORDER — INSULIN GLARGINE 100 [IU]/ML
8 INJECTION, SOLUTION SUBCUTANEOUS NIGHTLY
Status: DISCONTINUED | OUTPATIENT
Start: 2024-01-18 | End: 2024-01-19

## 2024-01-18 RX ORDER — INSULIN LISPRO 100 [IU]/ML
2 INJECTION, SOLUTION INTRAVENOUS; SUBCUTANEOUS
Status: DISCONTINUED | OUTPATIENT
Start: 2024-01-18 | End: 2024-01-20

## 2024-01-18 RX ORDER — ENOXAPARIN SODIUM 100 MG/ML
1 INJECTION SUBCUTANEOUS 2 TIMES DAILY
Status: DISCONTINUED | OUTPATIENT
Start: 2024-01-18 | End: 2024-01-22

## 2024-01-18 RX ADMIN — INSULIN LISPRO 2 UNITS: 100 INJECTION, SOLUTION INTRAVENOUS; SUBCUTANEOUS at 13:50

## 2024-01-18 RX ADMIN — LEVOTHYROXINE SODIUM 125 MCG: 0.07 TABLET ORAL at 06:26

## 2024-01-18 RX ADMIN — PREDNISONE 10 MG: 5 TABLET ORAL at 09:05

## 2024-01-18 RX ADMIN — ATORVASTATIN CALCIUM 10 MG: 20 TABLET, FILM COATED ORAL at 20:48

## 2024-01-18 RX ADMIN — SODIUM CHLORIDE, PRESERVATIVE FREE 10 ML: 5 INJECTION INTRAVENOUS at 21:00

## 2024-01-18 RX ADMIN — SODIUM CHLORIDE 125 MG: 9 INJECTION, SOLUTION INTRAVENOUS at 14:50

## 2024-01-18 RX ADMIN — LEVALBUTEROL HYDROCHLORIDE 0.63 MG: 0.63 SOLUTION RESPIRATORY (INHALATION) at 21:05

## 2024-01-18 RX ADMIN — METOPROLOL TARTRATE 25 MG: 50 TABLET ORAL at 20:49

## 2024-01-18 RX ADMIN — DOCUSATE SODIUM 100 MG: 100 CAPSULE, LIQUID FILLED ORAL at 20:48

## 2024-01-18 RX ADMIN — INSULIN LISPRO 8 UNITS: 100 INJECTION, SOLUTION INTRAVENOUS; SUBCUTANEOUS at 16:40

## 2024-01-18 RX ADMIN — INSULIN LISPRO 2 UNITS: 100 INJECTION, SOLUTION INTRAVENOUS; SUBCUTANEOUS at 16:40

## 2024-01-18 RX ADMIN — DOCUSATE SODIUM 100 MG: 100 CAPSULE, LIQUID FILLED ORAL at 09:00

## 2024-01-18 RX ADMIN — PANTOPRAZOLE SODIUM 40 MG: 40 INJECTION, POWDER, FOR SOLUTION INTRAVENOUS at 08:59

## 2024-01-18 RX ADMIN — DOFETILIDE 500 MCG: 0.5 CAPSULE ORAL at 20:48

## 2024-01-18 RX ADMIN — FUROSEMIDE 40 MG: 10 INJECTION, SOLUTION INTRAVENOUS at 09:00

## 2024-01-18 RX ADMIN — INSULIN LISPRO 3 UNITS: 100 INJECTION, SOLUTION INTRAVENOUS; SUBCUTANEOUS at 08:59

## 2024-01-18 RX ADMIN — METOPROLOL TARTRATE 25 MG: 50 TABLET ORAL at 09:00

## 2024-01-18 RX ADMIN — METOPROLOL TARTRATE 25 MG: 50 TABLET ORAL at 14:47

## 2024-01-18 RX ADMIN — DOFETILIDE 500 MCG: 0.5 CAPSULE ORAL at 09:05

## 2024-01-18 RX ADMIN — POTASSIUM CHLORIDE 40 MEQ: 1500 TABLET, EXTENDED RELEASE ORAL at 09:00

## 2024-01-18 RX ADMIN — ENOXAPARIN SODIUM 70 MG: 100 INJECTION SUBCUTANEOUS at 20:42

## 2024-01-18 RX ADMIN — ASPIRIN 81 MG 81 MG: 81 TABLET ORAL at 09:00

## 2024-01-18 RX ADMIN — SODIUM CHLORIDE, PRESERVATIVE FREE 10 ML: 5 INJECTION INTRAVENOUS at 09:01

## 2024-01-18 RX ADMIN — LEVALBUTEROL HYDROCHLORIDE 0.63 MG: 0.63 SOLUTION RESPIRATORY (INHALATION) at 13:04

## 2024-01-18 RX ADMIN — ENOXAPARIN SODIUM 70 MG: 100 INJECTION SUBCUTANEOUS at 13:49

## 2024-01-18 RX ADMIN — INSULIN GLARGINE 8 UNITS: 100 INJECTION, SOLUTION SUBCUTANEOUS at 20:42

## 2024-01-18 RX ADMIN — LEVALBUTEROL HYDROCHLORIDE 0.63 MG: 0.63 SOLUTION RESPIRATORY (INHALATION) at 09:07

## 2024-01-18 ASSESSMENT — PAIN SCALES - GENERAL
PAINLEVEL_OUTOF10: 0

## 2024-01-18 NOTE — INTERDISCIPLINARY ROUNDS
Multi-D Rounds/Checklist (leapfrog):  Lines: can any be removed?: None          DVT Prophylaxis: INR 1.2  Vent: N/A  Nutrition Ordered/appropriate: Ordered  Can antibiotics or other drugs be stopped? NA End Date set Yes  Inpat Anti-Infectives (From admission, onward)         Consults needed:  none  A: Is pain control adequate? (has PRNs? Stop drip?) NA  B: Sedation break and SBT? N/A  C: Is sedation choice appropriate? N/A  D: Delirium/CAM-ICU? No  E: Mobility goals/appropriateness? Yes  F: Family update and plan? spouse is surrogate decision maker and is being updated daily by primary attending and nursing staff.    Talia Mensah, APRN - CNP

## 2024-01-19 LAB
ANION GAP SERPL CALC-SCNC: 4 MMOL/L (ref 2–11)
BACTERIA SPEC CULT: NORMAL
BASOPHILS # BLD: 0 K/UL (ref 0–0.2)
BASOPHILS NFR BLD: 0 % (ref 0–2)
BUN SERPL-MCNC: 25 MG/DL (ref 8–23)
CALCIUM SERPL-MCNC: 8.4 MG/DL (ref 8.3–10.4)
CHLORIDE SERPL-SCNC: 105 MMOL/L (ref 103–113)
CO2 SERPL-SCNC: 32 MMOL/L (ref 21–32)
CREAT SERPL-MCNC: 0.9 MG/DL (ref 0.6–1)
DIFFERENTIAL METHOD BLD: ABNORMAL
EKG DIAGNOSIS: NORMAL
EKG Q-T INTERVAL: 450 MS
EKG QRS DURATION: 86 MS
EKG QTC CALCULATION (BAZETT): 475 MS
EKG R AXIS: 28 DEGREES
EKG T AXIS: 72 DEGREES
EKG VENTRICULAR RATE: 67 BPM
EOSINOPHIL # BLD: 0.1 K/UL (ref 0–0.8)
EOSINOPHIL NFR BLD: 2 % (ref 0.5–7.8)
ERYTHROCYTE [DISTWIDTH] IN BLOOD BY AUTOMATED COUNT: 19 % (ref 11.9–14.6)
GLUCOSE BLD STRIP.AUTO-MCNC: 215 MG/DL (ref 65–100)
GLUCOSE BLD STRIP.AUTO-MCNC: 283 MG/DL (ref 65–100)
GLUCOSE BLD STRIP.AUTO-MCNC: 319 MG/DL (ref 65–100)
GLUCOSE SERPL-MCNC: 98 MG/DL (ref 65–100)
HCT VFR BLD AUTO: 32.1 % (ref 35.8–46.3)
HGB BLD-MCNC: 9.3 G/DL (ref 11.7–15.4)
IMM GRANULOCYTES # BLD AUTO: 0.1 K/UL (ref 0–0.5)
IMM GRANULOCYTES NFR BLD AUTO: 1 % (ref 0–5)
INR PPP: 1.1
LYMPHOCYTES # BLD: 1.5 K/UL (ref 0.5–4.6)
LYMPHOCYTES NFR BLD: 21 % (ref 13–44)
MAGNESIUM SERPL-MCNC: 2.1 MG/DL (ref 1.8–2.4)
MCH RBC QN AUTO: 29.2 PG (ref 26.1–32.9)
MCHC RBC AUTO-ENTMCNC: 29 G/DL (ref 31.4–35)
MCV RBC AUTO: 100.6 FL (ref 82–102)
MONOCYTES # BLD: 0.4 K/UL (ref 0.1–1.3)
MONOCYTES NFR BLD: 6 % (ref 4–12)
NEUTS SEG # BLD: 5 K/UL (ref 1.7–8.2)
NEUTS SEG NFR BLD: 71 % (ref 43–78)
NRBC # BLD: 0.1 K/UL (ref 0–0.2)
PLATELET # BLD AUTO: 204 K/UL (ref 150–450)
PMV BLD AUTO: 10.2 FL (ref 9.4–12.3)
POTASSIUM SERPL-SCNC: 3.8 MMOL/L (ref 3.5–5.1)
PROTHROMBIN TIME: 14.8 SEC (ref 11.3–14.9)
RBC # BLD AUTO: 3.19 M/UL (ref 4.05–5.2)
SERVICE CMNT-IMP: ABNORMAL
SERVICE CMNT-IMP: NORMAL
SODIUM SERPL-SCNC: 141 MMOL/L (ref 136–146)
WBC # BLD AUTO: 7.1 K/UL (ref 4.3–11.1)

## 2024-01-19 PROCEDURE — 36415 COLL VENOUS BLD VENIPUNCTURE: CPT

## 2024-01-19 PROCEDURE — 6370000000 HC RX 637 (ALT 250 FOR IP): Performed by: INTERNAL MEDICINE

## 2024-01-19 PROCEDURE — 6360000002 HC RX W HCPCS: Performed by: HOSPITALIST

## 2024-01-19 PROCEDURE — 93005 ELECTROCARDIOGRAM TRACING: CPT | Performed by: INTERNAL MEDICINE

## 2024-01-19 PROCEDURE — 99232 SBSQ HOSP IP/OBS MODERATE 35: CPT | Performed by: INTERNAL MEDICINE

## 2024-01-19 PROCEDURE — 83735 ASSAY OF MAGNESIUM: CPT

## 2024-01-19 PROCEDURE — 94640 AIRWAY INHALATION TREATMENT: CPT

## 2024-01-19 PROCEDURE — 6370000000 HC RX 637 (ALT 250 FOR IP): Performed by: HOSPITALIST

## 2024-01-19 PROCEDURE — 82962 GLUCOSE BLOOD TEST: CPT

## 2024-01-19 PROCEDURE — 6370000000 HC RX 637 (ALT 250 FOR IP): Performed by: FAMILY MEDICINE

## 2024-01-19 PROCEDURE — 6360000002 HC RX W HCPCS: Performed by: FAMILY MEDICINE

## 2024-01-19 PROCEDURE — C9113 INJ PANTOPRAZOLE SODIUM, VIA: HCPCS | Performed by: FAMILY MEDICINE

## 2024-01-19 PROCEDURE — 2700000000 HC OXYGEN THERAPY PER DAY

## 2024-01-19 PROCEDURE — 2580000003 HC RX 258: Performed by: FAMILY MEDICINE

## 2024-01-19 PROCEDURE — 2100000001 HC CVICU R&B

## 2024-01-19 PROCEDURE — 93010 ELECTROCARDIOGRAM REPORT: CPT | Performed by: INTERNAL MEDICINE

## 2024-01-19 PROCEDURE — 80048 BASIC METABOLIC PNL TOTAL CA: CPT

## 2024-01-19 PROCEDURE — A4216 STERILE WATER/SALINE, 10 ML: HCPCS | Performed by: FAMILY MEDICINE

## 2024-01-19 PROCEDURE — 85610 PROTHROMBIN TIME: CPT

## 2024-01-19 PROCEDURE — 85025 COMPLETE CBC W/AUTO DIFF WBC: CPT

## 2024-01-19 PROCEDURE — 6370000000 HC RX 637 (ALT 250 FOR IP): Performed by: STUDENT IN AN ORGANIZED HEALTH CARE EDUCATION/TRAINING PROGRAM

## 2024-01-19 RX ORDER — FUROSEMIDE 40 MG/1
40 TABLET ORAL DAILY
Status: DISCONTINUED | OUTPATIENT
Start: 2024-01-19 | End: 2024-01-23

## 2024-01-19 RX ORDER — INSULIN GLARGINE 100 [IU]/ML
9 INJECTION, SOLUTION SUBCUTANEOUS NIGHTLY
Status: DISCONTINUED | OUTPATIENT
Start: 2024-01-19 | End: 2024-01-20

## 2024-01-19 RX ADMIN — LEVALBUTEROL HYDROCHLORIDE 0.63 MG: 0.63 SOLUTION RESPIRATORY (INHALATION) at 07:25

## 2024-01-19 RX ADMIN — METOPROLOL TARTRATE 25 MG: 50 TABLET ORAL at 09:19

## 2024-01-19 RX ADMIN — DOFETILIDE 500 MCG: 0.5 CAPSULE ORAL at 21:42

## 2024-01-19 RX ADMIN — SODIUM CHLORIDE, PRESERVATIVE FREE 10 ML: 5 INJECTION INTRAVENOUS at 21:42

## 2024-01-19 RX ADMIN — LEVALBUTEROL HYDROCHLORIDE 0.63 MG: 0.63 SOLUTION RESPIRATORY (INHALATION) at 13:35

## 2024-01-19 RX ADMIN — INSULIN LISPRO 6 UNITS: 100 INJECTION, SOLUTION INTRAVENOUS; SUBCUTANEOUS at 18:06

## 2024-01-19 RX ADMIN — INSULIN GLARGINE 9 UNITS: 100 INJECTION, SOLUTION SUBCUTANEOUS at 21:42

## 2024-01-19 RX ADMIN — INSULIN LISPRO 2 UNITS: 100 INJECTION, SOLUTION INTRAVENOUS; SUBCUTANEOUS at 09:23

## 2024-01-19 RX ADMIN — ENOXAPARIN SODIUM 70 MG: 100 INJECTION SUBCUTANEOUS at 21:42

## 2024-01-19 RX ADMIN — DOCUSATE SODIUM 100 MG: 100 CAPSULE, LIQUID FILLED ORAL at 09:21

## 2024-01-19 RX ADMIN — INSULIN LISPRO 2 UNITS: 100 INJECTION, SOLUTION INTRAVENOUS; SUBCUTANEOUS at 18:06

## 2024-01-19 RX ADMIN — INSULIN LISPRO 2 UNITS: 100 INJECTION, SOLUTION INTRAVENOUS; SUBCUTANEOUS at 12:47

## 2024-01-19 RX ADMIN — DOFETILIDE 500 MCG: 0.5 CAPSULE ORAL at 09:19

## 2024-01-19 RX ADMIN — METOPROLOL TARTRATE 25 MG: 50 TABLET ORAL at 02:20

## 2024-01-19 RX ADMIN — FUROSEMIDE 40 MG: 40 TABLET ORAL at 09:19

## 2024-01-19 RX ADMIN — SODIUM CHLORIDE, PRESERVATIVE FREE 10 ML: 5 INJECTION INTRAVENOUS at 09:22

## 2024-01-19 RX ADMIN — LEVOTHYROXINE SODIUM 125 MCG: 0.07 TABLET ORAL at 09:21

## 2024-01-19 RX ADMIN — ATORVASTATIN CALCIUM 10 MG: 20 TABLET, FILM COATED ORAL at 18:07

## 2024-01-19 RX ADMIN — ACETAMINOPHEN 650 MG: 325 TABLET ORAL at 03:45

## 2024-01-19 RX ADMIN — PANTOPRAZOLE SODIUM 40 MG: 40 INJECTION, POWDER, FOR SOLUTION INTRAVENOUS at 09:22

## 2024-01-19 RX ADMIN — PREDNISONE 10 MG: 5 TABLET ORAL at 09:19

## 2024-01-19 RX ADMIN — METOPROLOL TARTRATE 50 MG: 50 TABLET ORAL at 19:36

## 2024-01-19 RX ADMIN — ENOXAPARIN SODIUM 70 MG: 100 INJECTION SUBCUTANEOUS at 09:17

## 2024-01-19 RX ADMIN — ASPIRIN 81 MG 81 MG: 81 TABLET ORAL at 09:19

## 2024-01-19 RX ADMIN — LEVALBUTEROL HYDROCHLORIDE 0.63 MG: 0.63 SOLUTION RESPIRATORY (INHALATION) at 21:15

## 2024-01-19 RX ADMIN — POTASSIUM CHLORIDE 40 MEQ: 1500 TABLET, EXTENDED RELEASE ORAL at 09:19

## 2024-01-19 RX ADMIN — DOCUSATE SODIUM 100 MG: 100 CAPSULE, LIQUID FILLED ORAL at 21:42

## 2024-01-19 ASSESSMENT — PAIN DESCRIPTION - ORIENTATION: ORIENTATION: RIGHT;LEFT

## 2024-01-19 ASSESSMENT — PAIN SCALES - GENERAL
PAINLEVEL_OUTOF10: 5
PAINLEVEL_OUTOF10: 1

## 2024-01-19 ASSESSMENT — PAIN DESCRIPTION - LOCATION: LOCATION: ANKLE

## 2024-01-19 NOTE — INTERDISCIPLINARY ROUNDS
Multi-D Rounds/Checklist (leapfrog):  Lines: can any be removed?: None          DVT Prophylaxis: Yes  Vent: N/A  Nutrition Ordered/appropriate: Ordered  Can antibiotics or other drugs be stopped? NA End Date set Yes  Inpat Anti-Infectives (From admission, onward)      None               Consults needed:  none  A: Is pain control adequate? (has PRNs? Stop drip?) NA  B: Sedation break and SBT? N/A  C: Is sedation choice appropriate? N/A  D: Delirium/CAM-ICU? No  E: Mobility goals/appropriateness? Yes  F: Family update and plan? spouse is surrogate decision maker and is being updated daily by primary attending and nursing staff.    Talia Mensah, APRN - CNP

## 2024-01-19 NOTE — WOUND CARE
Pt seen for sacral wound, per primary RN pt states she has had it prior to admission and it has gotten worse with her sitting more due to not feeling well. Pt able to stand from chair and noted blanchable erythema over sacrum, no open area at this time. Recommend to apply foam dressing change every other day along with frequent turning and repositioning. Wound team will loosely follow while in acute care setting.

## 2024-01-20 LAB
ANION GAP SERPL CALC-SCNC: 3 MMOL/L (ref 2–11)
BACTERIA SPEC CULT: NORMAL
BASOPHILS # BLD: 0 K/UL (ref 0–0.2)
BASOPHILS NFR BLD: 0 % (ref 0–2)
BUN SERPL-MCNC: 27 MG/DL (ref 8–23)
CALCIUM SERPL-MCNC: 8.3 MG/DL (ref 8.3–10.4)
CHLORIDE SERPL-SCNC: 105 MMOL/L (ref 103–113)
CO2 SERPL-SCNC: 33 MMOL/L (ref 21–32)
CREAT SERPL-MCNC: 0.8 MG/DL (ref 0.6–1)
DIFFERENTIAL METHOD BLD: ABNORMAL
EOSINOPHIL # BLD: 0.1 K/UL (ref 0–0.8)
EOSINOPHIL NFR BLD: 2 % (ref 0.5–7.8)
ERYTHROCYTE [DISTWIDTH] IN BLOOD BY AUTOMATED COUNT: 19.2 % (ref 11.9–14.6)
GLUCOSE BLD STRIP.AUTO-MCNC: 182 MG/DL (ref 65–100)
GLUCOSE BLD STRIP.AUTO-MCNC: 231 MG/DL (ref 65–100)
GLUCOSE BLD STRIP.AUTO-MCNC: 244 MG/DL (ref 65–100)
GLUCOSE BLD STRIP.AUTO-MCNC: 409 MG/DL (ref 65–100)
GLUCOSE SERPL-MCNC: 213 MG/DL (ref 65–100)
HCT VFR BLD AUTO: 30.9 % (ref 35.8–46.3)
HGB BLD-MCNC: 9 G/DL (ref 11.7–15.4)
IMM GRANULOCYTES # BLD AUTO: 0.1 K/UL (ref 0–0.5)
IMM GRANULOCYTES NFR BLD AUTO: 2 % (ref 0–5)
INR PPP: 1.1
LYMPHOCYTES # BLD: 1.1 K/UL (ref 0.5–4.6)
LYMPHOCYTES NFR BLD: 21 % (ref 13–44)
MAGNESIUM SERPL-MCNC: 1.9 MG/DL (ref 1.8–2.4)
MCH RBC QN AUTO: 29.2 PG (ref 26.1–32.9)
MCHC RBC AUTO-ENTMCNC: 29.1 G/DL (ref 31.4–35)
MCV RBC AUTO: 100.3 FL (ref 82–102)
MONOCYTES # BLD: 0.4 K/UL (ref 0.1–1.3)
MONOCYTES NFR BLD: 7 % (ref 4–12)
NEUTS SEG # BLD: 3.7 K/UL (ref 1.7–8.2)
NEUTS SEG NFR BLD: 68 % (ref 43–78)
NRBC # BLD: 0.03 K/UL (ref 0–0.2)
PLATELET # BLD AUTO: 170 K/UL (ref 150–450)
PMV BLD AUTO: 9.8 FL (ref 9.4–12.3)
POTASSIUM SERPL-SCNC: 3.9 MMOL/L (ref 3.5–5.1)
PROTHROMBIN TIME: 14.4 SEC (ref 11.3–14.9)
RBC # BLD AUTO: 3.08 M/UL (ref 4.05–5.2)
SERVICE CMNT-IMP: ABNORMAL
SERVICE CMNT-IMP: NORMAL
SODIUM SERPL-SCNC: 141 MMOL/L (ref 136–146)
WBC # BLD AUTO: 5.4 K/UL (ref 4.3–11.1)

## 2024-01-20 PROCEDURE — 6370000000 HC RX 637 (ALT 250 FOR IP): Performed by: FAMILY MEDICINE

## 2024-01-20 PROCEDURE — C9113 INJ PANTOPRAZOLE SODIUM, VIA: HCPCS | Performed by: FAMILY MEDICINE

## 2024-01-20 PROCEDURE — 6370000000 HC RX 637 (ALT 250 FOR IP): Performed by: INTERNAL MEDICINE

## 2024-01-20 PROCEDURE — 6360000002 HC RX W HCPCS: Performed by: FAMILY MEDICINE

## 2024-01-20 PROCEDURE — 80048 BASIC METABOLIC PNL TOTAL CA: CPT

## 2024-01-20 PROCEDURE — 82962 GLUCOSE BLOOD TEST: CPT

## 2024-01-20 PROCEDURE — 6370000000 HC RX 637 (ALT 250 FOR IP): Performed by: HOSPITALIST

## 2024-01-20 PROCEDURE — 94640 AIRWAY INHALATION TREATMENT: CPT

## 2024-01-20 PROCEDURE — 83735 ASSAY OF MAGNESIUM: CPT

## 2024-01-20 PROCEDURE — 6360000002 HC RX W HCPCS: Performed by: HOSPITALIST

## 2024-01-20 PROCEDURE — 6360000002 HC RX W HCPCS: Performed by: INTERNAL MEDICINE

## 2024-01-20 PROCEDURE — 99232 SBSQ HOSP IP/OBS MODERATE 35: CPT | Performed by: INTERNAL MEDICINE

## 2024-01-20 PROCEDURE — 2580000003 HC RX 258: Performed by: FAMILY MEDICINE

## 2024-01-20 PROCEDURE — A4216 STERILE WATER/SALINE, 10 ML: HCPCS | Performed by: FAMILY MEDICINE

## 2024-01-20 PROCEDURE — 85025 COMPLETE CBC W/AUTO DIFF WBC: CPT

## 2024-01-20 PROCEDURE — 85610 PROTHROMBIN TIME: CPT

## 2024-01-20 PROCEDURE — 36415 COLL VENOUS BLD VENIPUNCTURE: CPT

## 2024-01-20 PROCEDURE — 2100000001 HC CVICU R&B

## 2024-01-20 PROCEDURE — 6370000000 HC RX 637 (ALT 250 FOR IP): Performed by: STUDENT IN AN ORGANIZED HEALTH CARE EDUCATION/TRAINING PROGRAM

## 2024-01-20 PROCEDURE — 2700000000 HC OXYGEN THERAPY PER DAY

## 2024-01-20 RX ORDER — INSULIN LISPRO 100 [IU]/ML
4 INJECTION, SOLUTION INTRAVENOUS; SUBCUTANEOUS
Status: DISCONTINUED | OUTPATIENT
Start: 2024-01-20 | End: 2024-01-21

## 2024-01-20 RX ORDER — INSULIN LISPRO 100 [IU]/ML
4 INJECTION, SOLUTION INTRAVENOUS; SUBCUTANEOUS ONCE
Status: COMPLETED | OUTPATIENT
Start: 2024-01-20 | End: 2024-01-20

## 2024-01-20 RX ORDER — FUROSEMIDE 10 MG/ML
40 INJECTION INTRAMUSCULAR; INTRAVENOUS ONCE
Status: COMPLETED | OUTPATIENT
Start: 2024-01-20 | End: 2024-01-20

## 2024-01-20 RX ORDER — INSULIN GLARGINE 100 [IU]/ML
8 INJECTION, SOLUTION SUBCUTANEOUS NIGHTLY
Status: DISCONTINUED | OUTPATIENT
Start: 2024-01-20 | End: 2024-01-21

## 2024-01-20 RX ORDER — INSULIN LISPRO 100 [IU]/ML
3 INJECTION, SOLUTION INTRAVENOUS; SUBCUTANEOUS
Status: DISCONTINUED | OUTPATIENT
Start: 2024-01-20 | End: 2024-01-20

## 2024-01-20 RX ADMIN — LEVALBUTEROL HYDROCHLORIDE 0.63 MG: 0.63 SOLUTION RESPIRATORY (INHALATION) at 07:13

## 2024-01-20 RX ADMIN — PANTOPRAZOLE SODIUM 40 MG: 40 INJECTION, POWDER, FOR SOLUTION INTRAVENOUS at 09:19

## 2024-01-20 RX ADMIN — INSULIN LISPRO 4 UNITS: 100 INJECTION, SOLUTION INTRAVENOUS; SUBCUTANEOUS at 16:39

## 2024-01-20 RX ADMIN — DOFETILIDE 500 MCG: 0.5 CAPSULE ORAL at 09:30

## 2024-01-20 RX ADMIN — ASPIRIN 81 MG 81 MG: 81 TABLET ORAL at 09:14

## 2024-01-20 RX ADMIN — FUROSEMIDE 40 MG: 10 INJECTION, SOLUTION INTRAVENOUS at 13:54

## 2024-01-20 RX ADMIN — INSULIN LISPRO 3 UNITS: 100 INJECTION, SOLUTION INTRAVENOUS; SUBCUTANEOUS at 09:22

## 2024-01-20 RX ADMIN — INSULIN LISPRO 8 UNITS: 100 INJECTION, SOLUTION INTRAVENOUS; SUBCUTANEOUS at 11:21

## 2024-01-20 RX ADMIN — POTASSIUM CHLORIDE 40 MEQ: 1500 TABLET, EXTENDED RELEASE ORAL at 09:14

## 2024-01-20 RX ADMIN — LEVALBUTEROL HYDROCHLORIDE 0.63 MG: 0.63 SOLUTION RESPIRATORY (INHALATION) at 15:07

## 2024-01-20 RX ADMIN — ATORVASTATIN CALCIUM 10 MG: 20 TABLET, FILM COATED ORAL at 17:59

## 2024-01-20 RX ADMIN — INSULIN LISPRO 4 UNITS: 100 INJECTION, SOLUTION INTRAVENOUS; SUBCUTANEOUS at 11:22

## 2024-01-20 RX ADMIN — FUROSEMIDE 40 MG: 40 TABLET ORAL at 09:14

## 2024-01-20 RX ADMIN — DOCUSATE SODIUM 100 MG: 100 CAPSULE, LIQUID FILLED ORAL at 09:14

## 2024-01-20 RX ADMIN — DOFETILIDE 500 MCG: 0.5 CAPSULE ORAL at 21:08

## 2024-01-20 RX ADMIN — PREDNISONE 10 MG: 5 TABLET ORAL at 09:13

## 2024-01-20 RX ADMIN — METOPROLOL TARTRATE 50 MG: 50 TABLET ORAL at 09:14

## 2024-01-20 RX ADMIN — METOPROLOL TARTRATE 50 MG: 50 TABLET ORAL at 21:09

## 2024-01-20 RX ADMIN — ENOXAPARIN SODIUM 70 MG: 100 INJECTION SUBCUTANEOUS at 21:09

## 2024-01-20 RX ADMIN — SODIUM CHLORIDE, PRESERVATIVE FREE 10 ML: 5 INJECTION INTRAVENOUS at 21:12

## 2024-01-20 RX ADMIN — INSULIN LISPRO 2 UNITS: 100 INJECTION, SOLUTION INTRAVENOUS; SUBCUTANEOUS at 16:39

## 2024-01-20 RX ADMIN — LEVALBUTEROL HYDROCHLORIDE 0.63 MG: 0.63 SOLUTION RESPIRATORY (INHALATION) at 19:31

## 2024-01-20 RX ADMIN — LEVOTHYROXINE SODIUM 125 MCG: 0.07 TABLET ORAL at 06:21

## 2024-01-20 RX ADMIN — DOCUSATE SODIUM 100 MG: 100 CAPSULE, LIQUID FILLED ORAL at 21:08

## 2024-01-20 RX ADMIN — ENOXAPARIN SODIUM 70 MG: 100 INJECTION SUBCUTANEOUS at 09:23

## 2024-01-20 RX ADMIN — INSULIN LISPRO 3 UNITS: 100 INJECTION, SOLUTION INTRAVENOUS; SUBCUTANEOUS at 11:21

## 2024-01-20 RX ADMIN — INSULIN GLARGINE 8 UNITS: 100 INJECTION, SOLUTION SUBCUTANEOUS at 21:08

## 2024-01-20 ASSESSMENT — PAIN SCALES - GENERAL: PAINLEVEL_OUTOF10: 0

## 2024-01-20 NOTE — PLAN OF CARE
Problem: Discharge Planning  Goal: Discharge to home or other facility with appropriate resources  Outcome: Progressing  Flowsheets  Taken 1/20/2024 0701 by Vy Browen RN  Discharge to home or other facility with appropriate resources: Identify barriers to discharge with patient and caregiver  Taken 1/19/2024 1915 by Holger Bhat RN  Discharge to home or other facility with appropriate resources:   Identify barriers to discharge with patient and caregiver   Arrange for needed discharge resources and transportation as appropriate   Identify discharge learning needs (meds, wound care, etc)     Problem: Pain  Goal: Verbalizes/displays adequate comfort level or baseline comfort level  Outcome: Progressing  Flowsheets (Taken 1/20/2024 0701)  Verbalizes/displays adequate comfort level or baseline comfort level: Encourage patient to monitor pain and request assistance     Problem: Safety - Adult  Goal: Free from fall injury  Outcome: Progressing  Flowsheets (Taken 1/20/2024 0701)  Free From Fall Injury: Instruct family/caregiver on patient safety     Problem: Chronic Conditions and Co-morbidities  Goal: Patient's chronic conditions and co-morbidity symptoms are monitored and maintained or improved  Outcome: Progressing  Flowsheets  Taken 1/20/2024 0701 by Vy Browne RN  Care Plan - Patient's Chronic Conditions and Co-Morbidity Symptoms are Monitored and Maintained or Improved: Monitor and assess patient's chronic conditions and comorbid symptoms for stability, deterioration, or improvement  Taken 1/19/2024 1915 by Holger Bhat RN  Care Plan - Patient's Chronic Conditions and Co-Morbidity Symptoms are Monitored and Maintained or Improved:   Monitor and assess patient's chronic conditions and comorbid symptoms for stability, deterioration, or improvement   Collaborate with multidisciplinary team to address chronic and comorbid conditions and prevent exacerbation or deterioration     Problem: ABCDS

## 2024-01-21 LAB
GLUCOSE BLD STRIP.AUTO-MCNC: 201 MG/DL (ref 65–100)
GLUCOSE BLD STRIP.AUTO-MCNC: 241 MG/DL (ref 65–100)
GLUCOSE BLD STRIP.AUTO-MCNC: 261 MG/DL (ref 65–100)
GLUCOSE BLD STRIP.AUTO-MCNC: 312 MG/DL (ref 65–100)
INR PPP: 1
PROTHROMBIN TIME: 14 SEC (ref 11.3–14.9)
SERVICE CMNT-IMP: ABNORMAL

## 2024-01-21 PROCEDURE — 85610 PROTHROMBIN TIME: CPT

## 2024-01-21 PROCEDURE — 6370000000 HC RX 637 (ALT 250 FOR IP): Performed by: FAMILY MEDICINE

## 2024-01-21 PROCEDURE — 6370000000 HC RX 637 (ALT 250 FOR IP): Performed by: STUDENT IN AN ORGANIZED HEALTH CARE EDUCATION/TRAINING PROGRAM

## 2024-01-21 PROCEDURE — A4216 STERILE WATER/SALINE, 10 ML: HCPCS | Performed by: FAMILY MEDICINE

## 2024-01-21 PROCEDURE — 6360000002 HC RX W HCPCS: Performed by: HOSPITALIST

## 2024-01-21 PROCEDURE — 2700000000 HC OXYGEN THERAPY PER DAY

## 2024-01-21 PROCEDURE — 2580000003 HC RX 258: Performed by: FAMILY MEDICINE

## 2024-01-21 PROCEDURE — 82962 GLUCOSE BLOOD TEST: CPT

## 2024-01-21 PROCEDURE — 6370000000 HC RX 637 (ALT 250 FOR IP): Performed by: INTERNAL MEDICINE

## 2024-01-21 PROCEDURE — 94640 AIRWAY INHALATION TREATMENT: CPT

## 2024-01-21 PROCEDURE — 6360000002 HC RX W HCPCS: Performed by: FAMILY MEDICINE

## 2024-01-21 PROCEDURE — 99232 SBSQ HOSP IP/OBS MODERATE 35: CPT | Performed by: INTERNAL MEDICINE

## 2024-01-21 PROCEDURE — 2100000001 HC CVICU R&B

## 2024-01-21 PROCEDURE — 2500000003 HC RX 250 WO HCPCS: Performed by: FAMILY MEDICINE

## 2024-01-21 PROCEDURE — 36415 COLL VENOUS BLD VENIPUNCTURE: CPT

## 2024-01-21 PROCEDURE — 76937 US GUIDE VASCULAR ACCESS: CPT

## 2024-01-21 PROCEDURE — 2500000003 HC RX 250 WO HCPCS: Performed by: NURSE PRACTITIONER

## 2024-01-21 PROCEDURE — 93005 ELECTROCARDIOGRAM TRACING: CPT | Performed by: INTERNAL MEDICINE

## 2024-01-21 PROCEDURE — 6370000000 HC RX 637 (ALT 250 FOR IP): Performed by: HOSPITALIST

## 2024-01-21 PROCEDURE — C9113 INJ PANTOPRAZOLE SODIUM, VIA: HCPCS | Performed by: FAMILY MEDICINE

## 2024-01-21 PROCEDURE — 2580000003 HC RX 258: Performed by: NURSE PRACTITIONER

## 2024-01-21 PROCEDURE — 6360000002 HC RX W HCPCS: Performed by: INTERNAL MEDICINE

## 2024-01-21 RX ORDER — METOPROLOL TARTRATE 1 MG/ML
2.5 INJECTION, SOLUTION INTRAVENOUS ONCE
Status: COMPLETED | OUTPATIENT
Start: 2024-01-21 | End: 2024-01-21

## 2024-01-21 RX ORDER — INSULIN GLARGINE 100 [IU]/ML
9 INJECTION, SOLUTION SUBCUTANEOUS NIGHTLY
Status: DISCONTINUED | OUTPATIENT
Start: 2024-01-21 | End: 2024-01-27

## 2024-01-21 RX ORDER — FUROSEMIDE 10 MG/ML
40 INJECTION INTRAMUSCULAR; INTRAVENOUS ONCE
Status: COMPLETED | OUTPATIENT
Start: 2024-01-21 | End: 2024-01-21

## 2024-01-21 RX ORDER — PREDNISONE 5 MG/1
5 TABLET ORAL DAILY
Status: DISCONTINUED | OUTPATIENT
Start: 2024-01-21 | End: 2024-01-30 | Stop reason: HOSPADM

## 2024-01-21 RX ORDER — DILTIAZEM HYDROCHLORIDE 5 MG/ML
10 INJECTION INTRAVENOUS ONCE
Status: COMPLETED | OUTPATIENT
Start: 2024-01-21 | End: 2024-01-21

## 2024-01-21 RX ORDER — INSULIN LISPRO 100 [IU]/ML
5 INJECTION, SOLUTION INTRAVENOUS; SUBCUTANEOUS
Status: DISCONTINUED | OUTPATIENT
Start: 2024-01-21 | End: 2024-01-28

## 2024-01-21 RX ADMIN — INSULIN LISPRO 4 UNITS: 100 INJECTION, SOLUTION INTRAVENOUS; SUBCUTANEOUS at 21:25

## 2024-01-21 RX ADMIN — LEVOTHYROXINE SODIUM 125 MCG: 0.07 TABLET ORAL at 06:30

## 2024-01-21 RX ADMIN — DOFETILIDE 500 MCG: 0.5 CAPSULE ORAL at 08:29

## 2024-01-21 RX ADMIN — ENOXAPARIN SODIUM 70 MG: 100 INJECTION SUBCUTANEOUS at 08:31

## 2024-01-21 RX ADMIN — DOCUSATE SODIUM 100 MG: 100 CAPSULE, LIQUID FILLED ORAL at 21:26

## 2024-01-21 RX ADMIN — ASPIRIN 81 MG 81 MG: 81 TABLET ORAL at 08:26

## 2024-01-21 RX ADMIN — DILTIAZEM HYDROCHLORIDE 10 MG: 5 INJECTION, SOLUTION INTRAVENOUS at 16:22

## 2024-01-21 RX ADMIN — DOCUSATE SODIUM 100 MG: 100 CAPSULE, LIQUID FILLED ORAL at 08:26

## 2024-01-21 RX ADMIN — ATORVASTATIN CALCIUM 10 MG: 20 TABLET, FILM COATED ORAL at 17:11

## 2024-01-21 RX ADMIN — LEVALBUTEROL HYDROCHLORIDE 0.63 MG: 0.63 SOLUTION RESPIRATORY (INHALATION) at 07:29

## 2024-01-21 RX ADMIN — LEVALBUTEROL HYDROCHLORIDE 0.63 MG: 0.63 SOLUTION RESPIRATORY (INHALATION) at 12:55

## 2024-01-21 RX ADMIN — INSULIN GLARGINE 9 UNITS: 100 INJECTION, SOLUTION SUBCUTANEOUS at 21:25

## 2024-01-21 RX ADMIN — INSULIN LISPRO 2 UNITS: 100 INJECTION, SOLUTION INTRAVENOUS; SUBCUTANEOUS at 08:31

## 2024-01-21 RX ADMIN — ENOXAPARIN SODIUM 70 MG: 100 INJECTION SUBCUTANEOUS at 21:26

## 2024-01-21 RX ADMIN — INSULIN LISPRO 5 UNITS: 100 INJECTION, SOLUTION INTRAVENOUS; SUBCUTANEOUS at 11:47

## 2024-01-21 RX ADMIN — PANTOPRAZOLE SODIUM 40 MG: 40 INJECTION, POWDER, FOR SOLUTION INTRAVENOUS at 08:30

## 2024-01-21 RX ADMIN — INSULIN LISPRO 4 UNITS: 100 INJECTION, SOLUTION INTRAVENOUS; SUBCUTANEOUS at 11:47

## 2024-01-21 RX ADMIN — DOFETILIDE 500 MCG: 0.5 CAPSULE ORAL at 21:26

## 2024-01-21 RX ADMIN — INSULIN LISPRO 2 UNITS: 100 INJECTION, SOLUTION INTRAVENOUS; SUBCUTANEOUS at 17:11

## 2024-01-21 RX ADMIN — SODIUM CHLORIDE 5 MG/HR: 900 INJECTION, SOLUTION INTRAVENOUS at 16:24

## 2024-01-21 RX ADMIN — INSULIN LISPRO 5 UNITS: 100 INJECTION, SOLUTION INTRAVENOUS; SUBCUTANEOUS at 17:11

## 2024-01-21 RX ADMIN — METOPROLOL TARTRATE 50 MG: 50 TABLET ORAL at 08:26

## 2024-01-21 RX ADMIN — METOPROLOL TARTRATE 2.5 MG: 5 INJECTION INTRAVENOUS at 16:09

## 2024-01-21 RX ADMIN — METOPROLOL TARTRATE 25 MG: 25 TABLET, FILM COATED ORAL at 21:26

## 2024-01-21 RX ADMIN — INSULIN LISPRO 5 UNITS: 100 INJECTION, SOLUTION INTRAVENOUS; SUBCUTANEOUS at 08:31

## 2024-01-21 RX ADMIN — FUROSEMIDE 40 MG: 10 INJECTION, SOLUTION INTRAMUSCULAR; INTRAVENOUS at 14:04

## 2024-01-21 RX ADMIN — POTASSIUM CHLORIDE 40 MEQ: 1500 TABLET, EXTENDED RELEASE ORAL at 08:25

## 2024-01-21 RX ADMIN — SODIUM CHLORIDE, PRESERVATIVE FREE 10 ML: 5 INJECTION INTRAVENOUS at 21:26

## 2024-01-21 RX ADMIN — PREDNISONE 5 MG: 5 TABLET ORAL at 08:26

## 2024-01-21 RX ADMIN — METOPROLOL TARTRATE 2.5 MG: 5 INJECTION INTRAVENOUS at 15:43

## 2024-01-21 RX ADMIN — FUROSEMIDE 40 MG: 40 TABLET ORAL at 08:26

## 2024-01-21 ASSESSMENT — PAIN SCALES - GENERAL: PAINLEVEL_OUTOF10: 0

## 2024-01-21 NOTE — PLAN OF CARE
Problem: Discharge Planning  Goal: Discharge to home or other facility with appropriate resources  Outcome: Progressing  Flowsheets (Taken 1/21/2024 0701)  Discharge to home or other facility with appropriate resources: Identify barriers to discharge with patient and caregiver     Problem: Pain  Goal: Verbalizes/displays adequate comfort level or baseline comfort level  Outcome: Progressing  Flowsheets (Taken 1/21/2024 0701)  Verbalizes/displays adequate comfort level or baseline comfort level: Encourage patient to monitor pain and request assistance     Problem: Safety - Adult  Goal: Free from fall injury  Outcome: Progressing  Flowsheets (Taken 1/21/2024 0701)  Free From Fall Injury: Instruct family/caregiver on patient safety     Problem: Chronic Conditions and Co-morbidities  Goal: Patient's chronic conditions and co-morbidity symptoms are monitored and maintained or improved  Outcome: Progressing  Flowsheets (Taken 1/21/2024 0701)  Care Plan - Patient's Chronic Conditions and Co-Morbidity Symptoms are Monitored and Maintained or Improved: Monitor and assess patient's chronic conditions and comorbid symptoms for stability, deterioration, or improvement     Problem: ABCDS Injury Assessment  Goal: Absence of physical injury  Outcome: Progressing  Flowsheets (Taken 1/21/2024 0701)  Absence of Physical Injury: Implement safety measures based on patient assessment

## 2024-01-22 ENCOUNTER — ANESTHESIA EVENT (OUTPATIENT)
Dept: SURGERY | Age: 79
End: 2024-01-22
Payer: MEDICARE

## 2024-01-22 LAB
ANION GAP SERPL CALC-SCNC: 3 MMOL/L (ref 2–11)
APPEARANCE UR: CLEAR
BACTERIA URNS QL MICRO: NEGATIVE /HPF
BASOPHILS # BLD: 0 K/UL (ref 0–0.2)
BASOPHILS NFR BLD: 1 % (ref 0–2)
BILIRUB UR QL: NEGATIVE
BUN SERPL-MCNC: 28 MG/DL (ref 8–23)
CALCIUM SERPL-MCNC: 8.6 MG/DL (ref 8.3–10.4)
CASTS URNS QL MICRO: ABNORMAL /LPF
CHLORIDE SERPL-SCNC: 104 MMOL/L (ref 103–113)
CO2 SERPL-SCNC: 34 MMOL/L (ref 21–32)
COLOR UR: ABNORMAL
CREAT SERPL-MCNC: 0.8 MG/DL (ref 0.6–1)
DIFFERENTIAL METHOD BLD: ABNORMAL
EKG DIAGNOSIS: NORMAL
EKG Q-T INTERVAL: 154 MS
EKG QRS DURATION: 82 MS
EKG QTC CALCULATION (BAZETT): 235 MS
EKG R AXIS: 0 DEGREES
EKG T AXIS: 211 DEGREES
EKG VENTRICULAR RATE: 141 BPM
EOSINOPHIL # BLD: 0.1 K/UL (ref 0–0.8)
EOSINOPHIL NFR BLD: 2 % (ref 0.5–7.8)
EPI CELLS #/AREA URNS HPF: ABNORMAL /HPF
ERYTHROCYTE [DISTWIDTH] IN BLOOD BY AUTOMATED COUNT: 19.8 % (ref 11.9–14.6)
GLUCOSE BLD STRIP.AUTO-MCNC: 189 MG/DL (ref 65–100)
GLUCOSE BLD STRIP.AUTO-MCNC: 249 MG/DL (ref 65–100)
GLUCOSE BLD STRIP.AUTO-MCNC: 337 MG/DL (ref 65–100)
GLUCOSE BLD STRIP.AUTO-MCNC: 369 MG/DL (ref 65–100)
GLUCOSE SERPL-MCNC: 154 MG/DL (ref 65–100)
GLUCOSE UR STRIP.AUTO-MCNC: NEGATIVE MG/DL
HCT VFR BLD AUTO: 34.5 % (ref 35.8–46.3)
HGB BLD-MCNC: 10.2 G/DL (ref 11.7–15.4)
HGB UR QL STRIP: NEGATIVE
HISTORY CHECK: NORMAL
IMM GRANULOCYTES # BLD AUTO: 0.1 K/UL (ref 0–0.5)
IMM GRANULOCYTES NFR BLD AUTO: 1 % (ref 0–5)
INR PPP: 1
KETONES UR QL STRIP.AUTO: NEGATIVE MG/DL
LEUKOCYTE ESTERASE UR QL STRIP.AUTO: ABNORMAL
LYMPHOCYTES # BLD: 2.4 K/UL (ref 0.5–4.6)
LYMPHOCYTES NFR BLD: 30 % (ref 13–44)
MAGNESIUM SERPL-MCNC: 1.8 MG/DL (ref 1.8–2.4)
MCH RBC QN AUTO: 29.6 PG (ref 26.1–32.9)
MCHC RBC AUTO-ENTMCNC: 29.6 G/DL (ref 31.4–35)
MCV RBC AUTO: 100 FL (ref 82–102)
MONOCYTES # BLD: 0.6 K/UL (ref 0.1–1.3)
MONOCYTES NFR BLD: 7 % (ref 4–12)
NEUTS SEG # BLD: 4.6 K/UL (ref 1.7–8.2)
NEUTS SEG NFR BLD: 59 % (ref 43–78)
NITRITE UR QL STRIP.AUTO: NEGATIVE
NRBC # BLD: 0.02 K/UL (ref 0–0.2)
PH UR STRIP: 5.5 (ref 5–9)
PLATELET # BLD AUTO: 229 K/UL (ref 150–450)
PMV BLD AUTO: 9.7 FL (ref 9.4–12.3)
POTASSIUM SERPL-SCNC: 3.4 MMOL/L (ref 3.5–5.1)
PROT UR STRIP-MCNC: NEGATIVE MG/DL
PROTHROMBIN TIME: 13.7 SEC (ref 11.3–14.9)
RBC # BLD AUTO: 3.45 M/UL (ref 4.05–5.2)
RBC #/AREA URNS HPF: ABNORMAL /HPF
SERVICE CMNT-IMP: ABNORMAL
SODIUM SERPL-SCNC: 141 MMOL/L (ref 136–146)
SP GR UR REFRACTOMETRY: 1.01 (ref 1–1.02)
UROBILINOGEN UR QL STRIP.AUTO: 0.2 EU/DL (ref 0.2–1)
WBC # BLD AUTO: 7.8 K/UL (ref 4.3–11.1)
WBC URNS QL MICRO: ABNORMAL /HPF

## 2024-01-22 PROCEDURE — 6370000000 HC RX 637 (ALT 250 FOR IP): Performed by: STUDENT IN AN ORGANIZED HEALTH CARE EDUCATION/TRAINING PROGRAM

## 2024-01-22 PROCEDURE — A4216 STERILE WATER/SALINE, 10 ML: HCPCS | Performed by: FAMILY MEDICINE

## 2024-01-22 PROCEDURE — 6370000000 HC RX 637 (ALT 250 FOR IP): Performed by: INTERNAL MEDICINE

## 2024-01-22 PROCEDURE — 2580000003 HC RX 258: Performed by: FAMILY MEDICINE

## 2024-01-22 PROCEDURE — 87641 MR-STAPH DNA AMP PROBE: CPT

## 2024-01-22 PROCEDURE — 36415 COLL VENOUS BLD VENIPUNCTURE: CPT

## 2024-01-22 PROCEDURE — 2700000000 HC OXYGEN THERAPY PER DAY

## 2024-01-22 PROCEDURE — 83735 ASSAY OF MAGNESIUM: CPT

## 2024-01-22 PROCEDURE — 93010 ELECTROCARDIOGRAM REPORT: CPT | Performed by: INTERNAL MEDICINE

## 2024-01-22 PROCEDURE — 86923 COMPATIBILITY TEST ELECTRIC: CPT

## 2024-01-22 PROCEDURE — 86900 BLOOD TYPING SEROLOGIC ABO: CPT

## 2024-01-22 PROCEDURE — 6360000002 HC RX W HCPCS: Performed by: HOSPITALIST

## 2024-01-22 PROCEDURE — 2500000003 HC RX 250 WO HCPCS: Performed by: FAMILY MEDICINE

## 2024-01-22 PROCEDURE — 85610 PROTHROMBIN TIME: CPT

## 2024-01-22 PROCEDURE — 99232 SBSQ HOSP IP/OBS MODERATE 35: CPT | Performed by: INTERNAL MEDICINE

## 2024-01-22 PROCEDURE — 86850 RBC ANTIBODY SCREEN: CPT

## 2024-01-22 PROCEDURE — 2100000001 HC CVICU R&B

## 2024-01-22 PROCEDURE — 6360000002 HC RX W HCPCS: Performed by: FAMILY MEDICINE

## 2024-01-22 PROCEDURE — 6370000000 HC RX 637 (ALT 250 FOR IP): Performed by: FAMILY MEDICINE

## 2024-01-22 PROCEDURE — 94664 DEMO&/EVAL PT USE INHALER: CPT

## 2024-01-22 PROCEDURE — 6370000000 HC RX 637 (ALT 250 FOR IP): Performed by: HOSPITALIST

## 2024-01-22 PROCEDURE — C9113 INJ PANTOPRAZOLE SODIUM, VIA: HCPCS | Performed by: FAMILY MEDICINE

## 2024-01-22 PROCEDURE — 81001 URINALYSIS AUTO W/SCOPE: CPT

## 2024-01-22 PROCEDURE — 80048 BASIC METABOLIC PNL TOTAL CA: CPT

## 2024-01-22 PROCEDURE — 94640 AIRWAY INHALATION TREATMENT: CPT

## 2024-01-22 PROCEDURE — 82962 GLUCOSE BLOOD TEST: CPT

## 2024-01-22 PROCEDURE — 86901 BLOOD TYPING SEROLOGIC RH(D): CPT

## 2024-01-22 PROCEDURE — 85025 COMPLETE CBC W/AUTO DIFF WBC: CPT

## 2024-01-22 RX ORDER — OXYCODONE HYDROCHLORIDE 5 MG/1
5 TABLET ORAL
Status: CANCELLED | OUTPATIENT
Start: 2024-01-22 | End: 2024-01-23

## 2024-01-22 RX ORDER — ACETAMINOPHEN 500 MG
1000 TABLET ORAL ONCE
Status: CANCELLED | OUTPATIENT
Start: 2024-01-22 | End: 2024-01-22

## 2024-01-22 RX ORDER — FENTANYL CITRATE 50 UG/ML
100 INJECTION, SOLUTION INTRAMUSCULAR; INTRAVENOUS
Status: CANCELLED | OUTPATIENT
Start: 2024-01-22 | End: 2024-01-23

## 2024-01-22 RX ORDER — POTASSIUM CHLORIDE 20 MEQ/1
40 TABLET, EXTENDED RELEASE ORAL 2 TIMES DAILY
Status: DISPENSED | OUTPATIENT
Start: 2024-01-22 | End: 2024-01-25

## 2024-01-22 RX ORDER — SODIUM CHLORIDE 9 MG/ML
INJECTION, SOLUTION INTRAVENOUS PRN
Status: CANCELLED | OUTPATIENT
Start: 2024-01-22

## 2024-01-22 RX ORDER — SODIUM CHLORIDE 0.9 % (FLUSH) 0.9 %
5-40 SYRINGE (ML) INJECTION PRN
Status: CANCELLED | OUTPATIENT
Start: 2024-01-22

## 2024-01-22 RX ORDER — MAGNESIUM SULFATE IN WATER 40 MG/ML
2000 INJECTION, SOLUTION INTRAVENOUS ONCE
Status: COMPLETED | OUTPATIENT
Start: 2024-01-22 | End: 2024-01-22

## 2024-01-22 RX ORDER — PROCHLORPERAZINE EDISYLATE 5 MG/ML
5 INJECTION INTRAMUSCULAR; INTRAVENOUS
Status: CANCELLED | OUTPATIENT
Start: 2024-01-22 | End: 2024-01-23

## 2024-01-22 RX ORDER — MIDAZOLAM HYDROCHLORIDE 2 MG/2ML
2 INJECTION, SOLUTION INTRAMUSCULAR; INTRAVENOUS
Status: CANCELLED | OUTPATIENT
Start: 2024-01-22 | End: 2024-01-23

## 2024-01-22 RX ORDER — LIDOCAINE HYDROCHLORIDE 10 MG/ML
1 INJECTION, SOLUTION INFILTRATION; PERINEURAL
Status: CANCELLED | OUTPATIENT
Start: 2024-01-22 | End: 2024-01-23

## 2024-01-22 RX ORDER — SODIUM CHLORIDE, SODIUM LACTATE, POTASSIUM CHLORIDE, CALCIUM CHLORIDE 600; 310; 30; 20 MG/100ML; MG/100ML; MG/100ML; MG/100ML
INJECTION, SOLUTION INTRAVENOUS CONTINUOUS
Status: CANCELLED | OUTPATIENT
Start: 2024-01-22

## 2024-01-22 RX ORDER — HALOPERIDOL 5 MG/ML
1 INJECTION INTRAMUSCULAR
Status: CANCELLED | OUTPATIENT
Start: 2024-01-22 | End: 2024-01-23

## 2024-01-22 RX ORDER — HYDROMORPHONE HYDROCHLORIDE 2 MG/ML
0.5 INJECTION, SOLUTION INTRAMUSCULAR; INTRAVENOUS; SUBCUTANEOUS EVERY 5 MIN PRN
Status: CANCELLED | OUTPATIENT
Start: 2024-01-22

## 2024-01-22 RX ORDER — SODIUM CHLORIDE 0.9 % (FLUSH) 0.9 %
5-40 SYRINGE (ML) INJECTION EVERY 12 HOURS SCHEDULED
Status: CANCELLED | OUTPATIENT
Start: 2024-01-22

## 2024-01-22 RX ADMIN — LEVALBUTEROL HYDROCHLORIDE 0.63 MG: 0.63 SOLUTION RESPIRATORY (INHALATION) at 08:43

## 2024-01-22 RX ADMIN — DOCUSATE SODIUM 100 MG: 100 CAPSULE, LIQUID FILLED ORAL at 20:22

## 2024-01-22 RX ADMIN — METOPROLOL TARTRATE 25 MG: 25 TABLET, FILM COATED ORAL at 08:18

## 2024-01-22 RX ADMIN — LEVOTHYROXINE SODIUM 125 MCG: 0.07 TABLET ORAL at 06:30

## 2024-01-22 RX ADMIN — ASPIRIN 81 MG 81 MG: 81 TABLET ORAL at 08:18

## 2024-01-22 RX ADMIN — SODIUM CHLORIDE, PRESERVATIVE FREE 10 ML: 5 INJECTION INTRAVENOUS at 20:21

## 2024-01-22 RX ADMIN — METOPROLOL TARTRATE 25 MG: 25 TABLET, FILM COATED ORAL at 19:38

## 2024-01-22 RX ADMIN — METOPROLOL TARTRATE 2.5 MG: 5 INJECTION INTRAVENOUS at 19:40

## 2024-01-22 RX ADMIN — POTASSIUM CHLORIDE 40 MEQ: 1500 TABLET, EXTENDED RELEASE ORAL at 20:21

## 2024-01-22 RX ADMIN — PANTOPRAZOLE SODIUM 40 MG: 40 INJECTION, POWDER, FOR SOLUTION INTRAVENOUS at 08:18

## 2024-01-22 RX ADMIN — METOPROLOL TARTRATE 2.5 MG: 5 INJECTION INTRAVENOUS at 02:53

## 2024-01-22 RX ADMIN — ATORVASTATIN CALCIUM 10 MG: 20 TABLET, FILM COATED ORAL at 17:12

## 2024-01-22 RX ADMIN — INSULIN GLARGINE 9 UNITS: 100 INJECTION, SOLUTION SUBCUTANEOUS at 20:21

## 2024-01-22 RX ADMIN — POTASSIUM CHLORIDE 40 MEQ: 1500 TABLET, EXTENDED RELEASE ORAL at 08:18

## 2024-01-22 RX ADMIN — MAGNESIUM SULFATE HEPTAHYDRATE 2000 MG: 40 INJECTION, SOLUTION INTRAVENOUS at 04:41

## 2024-01-22 RX ADMIN — INSULIN LISPRO 6 UNITS: 100 INJECTION, SOLUTION INTRAVENOUS; SUBCUTANEOUS at 17:13

## 2024-01-22 RX ADMIN — LEVALBUTEROL HYDROCHLORIDE 0.63 MG: 0.63 SOLUTION RESPIRATORY (INHALATION) at 13:39

## 2024-01-22 RX ADMIN — INSULIN LISPRO 4 UNITS: 100 INJECTION, SOLUTION INTRAVENOUS; SUBCUTANEOUS at 20:20

## 2024-01-22 RX ADMIN — DOCUSATE SODIUM 100 MG: 100 CAPSULE, LIQUID FILLED ORAL at 08:18

## 2024-01-22 RX ADMIN — HYDROCORTISONE SODIUM SUCCINATE 100 MG: 100 INJECTION, POWDER, FOR SOLUTION INTRAMUSCULAR; INTRAVENOUS at 20:20

## 2024-01-22 RX ADMIN — DOFETILIDE 500 MCG: 0.5 CAPSULE ORAL at 22:01

## 2024-01-22 RX ADMIN — INSULIN LISPRO 5 UNITS: 100 INJECTION, SOLUTION INTRAVENOUS; SUBCUTANEOUS at 11:42

## 2024-01-22 RX ADMIN — INSULIN LISPRO 5 UNITS: 100 INJECTION, SOLUTION INTRAVENOUS; SUBCUTANEOUS at 17:12

## 2024-01-22 RX ADMIN — POTASSIUM BICARBONATE 40 MEQ: 782 TABLET, EFFERVESCENT ORAL at 04:20

## 2024-01-22 RX ADMIN — PREDNISONE 5 MG: 5 TABLET ORAL at 08:18

## 2024-01-22 RX ADMIN — FUROSEMIDE 40 MG: 40 TABLET ORAL at 08:18

## 2024-01-22 RX ADMIN — Medication 3 MG: at 22:01

## 2024-01-22 RX ADMIN — Medication 3 AMPULE: at 17:13

## 2024-01-22 RX ADMIN — DOFETILIDE 500 MCG: 0.5 CAPSULE ORAL at 08:19

## 2024-01-22 RX ADMIN — INSULIN LISPRO 2 UNITS: 100 INJECTION, SOLUTION INTRAVENOUS; SUBCUTANEOUS at 11:42

## 2024-01-22 RX ADMIN — SODIUM CHLORIDE, PRESERVATIVE FREE 10 ML: 5 INJECTION INTRAVENOUS at 08:19

## 2024-01-22 RX ADMIN — MAGNESIUM SULFATE HEPTAHYDRATE 2000 MG: 40 INJECTION, SOLUTION INTRAVENOUS at 07:00

## 2024-01-22 RX ADMIN — INSULIN LISPRO 5 UNITS: 100 INJECTION, SOLUTION INTRAVENOUS; SUBCUTANEOUS at 08:18

## 2024-01-22 RX ADMIN — ENOXAPARIN SODIUM 70 MG: 100 INJECTION SUBCUTANEOUS at 08:20

## 2024-01-22 ASSESSMENT — PAIN SCALES - GENERAL: PAINLEVEL_OUTOF10: 0

## 2024-01-22 ASSESSMENT — ENCOUNTER SYMPTOMS: SHORTNESS OF BREATH: 1

## 2024-01-22 NOTE — ANESTHESIA PRE PROCEDURE
Department of Anesthesiology  Preprocedure Note       Name:  Nery Bryant   Age:  78 y.o.  :  1945                                          MRN:  [id number]         Date:  2024      Surgeon: Surgeon(s):  Rodolfo Lazaro MD Williams, Timothy H, MD    Procedure: Procedure(s):  Transcatheter mitral valve replacement (tmvr)  Transcatheter mitral valve replacement (tmvr)    Medications prior to admission:   Prior to Admission medications    Medication Sig Start Date End Date Taking? Authorizing Provider   metoprolol succinate (TOPROL XL) 25 MG extended release tablet Take 1 tablet by mouth daily  Patient taking differently: Take 1 tablet by mouth daily May take an extra tab once daily, prn for rapid A fib 1/3/24   Eddi Swenson MD   predniSONE (DELTASONE) 5 MG tablet Take 1 tablet by mouth 3 times daily 3tablets X 30 days, 2 tablet X 30 days then 1 tablet X 30 days then stop 23   Juno Newsome MD   warfarin (JANTOVEN) 5 MG tablet TAKE 1 AND 1/2 TABLETS ON  MONDAY/WEDNESDAY/FRIDAY AND1 TABLET ALL OTHER DAYS 23   Eddi Swenson MD   docusate sodium (COLACE, DULCOLAX) 100 MG CAPS Take 100 mg by mouth 2 times daily 23   Frommel, Kimberly, APRN - CNP   dofetilide (TIKOSYN) 500 MCG capsule Take 1 capsule by mouth 2 times daily 8/15/23   Eddi Swenson MD   OXYGEN Inhale into the lungs 3L/NC. Patient is now on 1L of oxygen.    Adalgisa Prescott MD   ferrous sulfate (IRON 325) 325 (65 Fe) MG tablet Take 1 tablet by mouth daily (with breakfast) Needs to be taken on empty stomach before breakfast    Adalgisa Prescott MD   amLODIPine (NORVASC) 5 MG tablet TAKE 1/2 TABLET IN THE     MORNING AND AT BEDTIME 23   Eddi Swenson MD   losartan (COZAAR) 50 MG tablet TAKE 1 TABLET TWICE A DAY 7/3/23   Eddi Swenson MD   levothyroxine (SYNTHROID) 125 MCG tablet Take 1 tablet by mouth every morning (before breakfast) 3/3/23   Evan

## 2024-01-22 NOTE — INTERDISCIPLINARY ROUNDS
Multi-D Rounds/Checklist (leapfrog):  Lines: can any be removed?: None      DVT Prophylaxis: Ordered  Vent: N/A  Nutrition Ordered/appropriate: Ordered  Can antibiotics or other drugs be stopped? N/A Yes/No  Inpat Anti-Infectives (From admission, onward)      None          Consults needed: None  A: Is pain control adequate? (has PRNs? Stop drip?) Yes  B: Sedation break and SBT? N/A  C: Is sedation choice appropriate? N/A  D: Delirium/CAM-ICU? No  E: Mobility goals/appropriateness? Yes  F: Family update and plan? spouse is surrogate decision maker and is being updated daily by primary attending and nursing staff.    Rossy Pradhan, APRN - CNP

## 2024-01-23 ENCOUNTER — APPOINTMENT (OUTPATIENT)
Dept: CARDIAC CATH/INVASIVE PROCEDURES | Age: 79
DRG: 266 | End: 2024-01-23
Attending: INTERNAL MEDICINE
Payer: MEDICARE

## 2024-01-23 ENCOUNTER — ANESTHESIA (OUTPATIENT)
Dept: SURGERY | Age: 79
End: 2024-01-23
Payer: MEDICARE

## 2024-01-23 PROBLEM — Z95.2 S/P TAVR (TRANSCATHETER AORTIC VALVE REPLACEMENT): Status: ACTIVE | Noted: 2024-01-23

## 2024-01-23 PROBLEM — Z95.2 S/P TRANSCATHETER MITRAL VALVE REPLACEMENT (TMVR): Status: ACTIVE | Noted: 2024-01-23

## 2024-01-23 LAB
ANION GAP BLD CALC-SCNC: ABNORMAL MMOL/L
ANION GAP BLD CALC-SCNC: ABNORMAL MMOL/L
ANION GAP SERPL CALC-SCNC: 3 MMOL/L (ref 2–11)
APTT PPP: 29.3 SEC (ref 23.3–37.4)
BASE EXCESS BLD CALC-SCNC: 5.3 MMOL/L
BASE EXCESS BLD CALC-SCNC: 8.2 MMOL/L
BILIRUB SERPL-MCNC: 0.4 MG/DL (ref 0.2–1.1)
BUN SERPL-MCNC: 39 MG/DL (ref 8–23)
CA-I BLD-MCNC: 1.07 MMOL/L (ref 1.12–1.32)
CA-I BLD-MCNC: 1.09 MMOL/L (ref 1.12–1.32)
CALCIUM SERPL-MCNC: 8.5 MG/DL (ref 8.3–10.4)
CHLORIDE SERPL-SCNC: 102 MMOL/L (ref 103–113)
CO2 BLD-SCNC: 29 MMOL/L (ref 13–23)
CO2 BLD-SCNC: 31 MMOL/L (ref 13–23)
CO2 SERPL-SCNC: 31 MMOL/L (ref 21–32)
CREAT SERPL-MCNC: 1 MG/DL (ref 0.6–1)
EKG DIAGNOSIS: NORMAL
EKG Q-T INTERVAL: 452 MS
EKG QRS DURATION: 80 MS
EKG QTC CALCULATION (BAZETT): 470 MS
EKG R AXIS: 32 DEGREES
EKG T AXIS: 80 DEGREES
EKG VENTRICULAR RATE: 65 BPM
ERYTHROCYTE [DISTWIDTH] IN BLOOD BY AUTOMATED COUNT: 19.2 % (ref 11.9–14.6)
EST. AVERAGE GLUCOSE BLD GHB EST-MCNC: 126 MG/DL
GLUCOSE BLD STRIP.AUTO-MCNC: 145 MG/DL (ref 65–100)
GLUCOSE BLD STRIP.AUTO-MCNC: 176 MG/DL (ref 65–100)
GLUCOSE BLD STRIP.AUTO-MCNC: 209 MG/DL (ref 65–100)
GLUCOSE BLD STRIP.AUTO-MCNC: 245 MG/DL (ref 65–100)
GLUCOSE BLD STRIP.AUTO-MCNC: 253 MG/DL (ref 65–100)
GLUCOSE BLD STRIP.AUTO-MCNC: 367 MG/DL (ref 65–100)
GLUCOSE SERPL-MCNC: 412 MG/DL (ref 65–100)
HBA1C MFR BLD: 6 % (ref 4.8–5.6)
HCO3 BLD-SCNC: 29.6 MMOL/L (ref 22–26)
HCO3 BLD-SCNC: 31.9 MMOL/L (ref 22–26)
HCT VFR BLD AUTO: 30 % (ref 35.8–46.3)
HGB BLD-MCNC: 8.8 G/DL (ref 11.7–15.4)
HISTORY CHECK: NORMAL
INR PPP: 1.1
MAGNESIUM SERPL-MCNC: 2.2 MG/DL (ref 1.8–2.4)
MCH RBC QN AUTO: 29.7 PG (ref 26.1–32.9)
MCHC RBC AUTO-ENTMCNC: 29.3 G/DL (ref 31.4–35)
MCV RBC AUTO: 101.4 FL (ref 82–102)
MRSA DNA SPEC QL NAA+PROBE: NOT DETECTED
NRBC # BLD: 0.02 K/UL (ref 0–0.2)
PCO2 BLD: 39.9 MMHG (ref 35–45)
PCO2 BLD: 41.8 MMHG (ref 35–45)
PH BLD: 7.46 (ref 7.35–7.45)
PH BLD: 7.51 (ref 7.35–7.45)
PLATELET # BLD AUTO: 187 K/UL (ref 150–450)
PMV BLD AUTO: 10.3 FL (ref 9.4–12.3)
PO2 BLD: 214 MMHG (ref 75–100)
PO2 BLD: 478 MMHG (ref 75–100)
POTASSIUM BLD-SCNC: 3.4 MMOL/L (ref 3.5–5.1)
POTASSIUM BLD-SCNC: 4 MMOL/L (ref 3.5–5.1)
POTASSIUM SERPL-SCNC: 5.1 MMOL/L (ref 3.5–5.1)
PROTHROMBIN TIME: 14.4 SEC (ref 11.3–14.9)
RBC # BLD AUTO: 2.96 M/UL (ref 4.05–5.2)
S AUREUS CPE NOSE QL NAA+PROBE: DETECTED
SAO2 % BLD: 100 %
SAO2 % BLD: 100 %
SERVICE CMNT-IMP: ABNORMAL
SODIUM BLD-SCNC: 142 MMOL/L (ref 136–145)
SODIUM BLD-SCNC: 142 MMOL/L (ref 136–145)
SODIUM SERPL-SCNC: 136 MMOL/L (ref 136–146)
SPECIMEN SITE: ABNORMAL
SPECIMEN SITE: ABNORMAL
WBC # BLD AUTO: 5.6 K/UL (ref 4.3–11.1)

## 2024-01-23 PROCEDURE — 6360000004 HC RX CONTRAST MEDICATION: Performed by: INTERNAL MEDICINE

## 2024-01-23 PROCEDURE — P9047 ALBUMIN (HUMAN), 25%, 50ML: HCPCS

## 2024-01-23 PROCEDURE — 2580000003 HC RX 258: Performed by: NURSE ANESTHETIST, CERTIFIED REGISTERED

## 2024-01-23 PROCEDURE — 93005 ELECTROCARDIOGRAM TRACING: CPT | Performed by: INTERNAL MEDICINE

## 2024-01-23 PROCEDURE — 02RG38Z REPLACEMENT OF MITRAL VALVE WITH ZOOPLASTIC TISSUE, PERCUTANEOUS APPROACH: ICD-10-PCS | Performed by: INTERNAL MEDICINE

## 2024-01-23 PROCEDURE — 36430 TRANSFUSION BLD/BLD COMPNT: CPT

## 2024-01-23 PROCEDURE — C1887 CATHETER, GUIDING: HCPCS | Performed by: INTERNAL MEDICINE

## 2024-01-23 PROCEDURE — C1889 IMPLANT/INSERT DEVICE, NOC: HCPCS | Performed by: INTERNAL MEDICINE

## 2024-01-23 PROCEDURE — 03HY32Z INSERTION OF MONITORING DEVICE INTO UPPER ARTERY, PERCUTANEOUS APPROACH: ICD-10-PCS | Performed by: NURSE ANESTHETIST, CERTIFIED REGISTERED

## 2024-01-23 PROCEDURE — 2580000003 HC RX 258: Performed by: INTERNAL MEDICINE

## 2024-01-23 PROCEDURE — 93312 ECHO TRANSESOPHAGEAL: CPT

## 2024-01-23 PROCEDURE — 2580000003 HC RX 258: Performed by: FAMILY MEDICINE

## 2024-01-23 PROCEDURE — 82803 BLOOD GASES ANY COMBINATION: CPT

## 2024-01-23 PROCEDURE — P9016 RBC LEUKOCYTES REDUCED: HCPCS

## 2024-01-23 PROCEDURE — 6370000000 HC RX 637 (ALT 250 FOR IP): Performed by: HOSPITALIST

## 2024-01-23 PROCEDURE — 2500000003 HC RX 250 WO HCPCS: Performed by: NURSE ANESTHETIST, CERTIFIED REGISTERED

## 2024-01-23 PROCEDURE — C1760 CLOSURE DEV, VASC: HCPCS | Performed by: INTERNAL MEDICINE

## 2024-01-23 PROCEDURE — 99233 SBSQ HOSP IP/OBS HIGH 50: CPT | Performed by: INTERNAL MEDICINE

## 2024-01-23 PROCEDURE — 3700000000 HC ANESTHESIA ATTENDED CARE: Performed by: INTERNAL MEDICINE

## 2024-01-23 PROCEDURE — 82330 ASSAY OF CALCIUM: CPT

## 2024-01-23 PROCEDURE — 6360000002 HC RX W HCPCS: Performed by: INTERNAL MEDICINE

## 2024-01-23 PROCEDURE — 02RF38Z REPLACEMENT OF AORTIC VALVE WITH ZOOPLASTIC TISSUE, PERCUTANEOUS APPROACH: ICD-10-PCS | Performed by: INTERNAL MEDICINE

## 2024-01-23 PROCEDURE — 3600000017 HC SURGERY HYBRID ADDL 15MIN: Performed by: INTERNAL MEDICINE

## 2024-01-23 PROCEDURE — 99232 SBSQ HOSP IP/OBS MODERATE 35: CPT | Performed by: INTERNAL MEDICINE

## 2024-01-23 PROCEDURE — 93010 ELECTROCARDIOGRAM REPORT: CPT | Performed by: INTERNAL MEDICINE

## 2024-01-23 PROCEDURE — C1884 EMBOLIZATION PROTECT SYST: HCPCS | Performed by: INTERNAL MEDICINE

## 2024-01-23 PROCEDURE — 83735 ASSAY OF MAGNESIUM: CPT

## 2024-01-23 PROCEDURE — 85027 COMPLETE CBC AUTOMATED: CPT

## 2024-01-23 PROCEDURE — 6370000000 HC RX 637 (ALT 250 FOR IP): Performed by: FAMILY MEDICINE

## 2024-01-23 PROCEDURE — 2500000003 HC RX 250 WO HCPCS: Performed by: INTERNAL MEDICINE

## 2024-01-23 PROCEDURE — 80048 BASIC METABOLIC PNL TOTAL CA: CPT

## 2024-01-23 PROCEDURE — 2720000010 HC SURG SUPPLY STERILE: Performed by: INTERNAL MEDICINE

## 2024-01-23 PROCEDURE — 3600000007 HC SURGERY HYBRID BASE: Performed by: INTERNAL MEDICINE

## 2024-01-23 PROCEDURE — 84132 ASSAY OF SERUM POTASSIUM: CPT

## 2024-01-23 PROCEDURE — 2709999900 HC NON-CHARGEABLE SUPPLY: Performed by: INTERNAL MEDICINE

## 2024-01-23 PROCEDURE — 2500000003 HC RX 250 WO HCPCS

## 2024-01-23 PROCEDURE — 05HM33Z INSERTION OF INFUSION DEVICE INTO RIGHT INTERNAL JUGULAR VEIN, PERCUTANEOUS APPROACH: ICD-10-PCS | Performed by: INTERNAL MEDICINE

## 2024-01-23 PROCEDURE — 85610 PROTHROMBIN TIME: CPT

## 2024-01-23 PROCEDURE — A4216 STERILE WATER/SALINE, 10 ML: HCPCS | Performed by: FAMILY MEDICINE

## 2024-01-23 PROCEDURE — P9047 ALBUMIN (HUMAN), 25%, 50ML: HCPCS | Performed by: NURSE ANESTHETIST, CERTIFIED REGISTERED

## 2024-01-23 PROCEDURE — 83036 HEMOGLOBIN GLYCOSYLATED A1C: CPT

## 2024-01-23 PROCEDURE — 6360000002 HC RX W HCPCS: Performed by: FAMILY MEDICINE

## 2024-01-23 PROCEDURE — 94640 AIRWAY INHALATION TREATMENT: CPT

## 2024-01-23 PROCEDURE — 94761 N-INVAS EAR/PLS OXIMETRY MLT: CPT

## 2024-01-23 PROCEDURE — 6360000002 HC RX W HCPCS

## 2024-01-23 PROCEDURE — C1769 GUIDE WIRE: HCPCS | Performed by: INTERNAL MEDICINE

## 2024-01-23 PROCEDURE — 3700000001 HC ADD 15 MINUTES (ANESTHESIA): Performed by: INTERNAL MEDICINE

## 2024-01-23 PROCEDURE — 2700000000 HC OXYGEN THERAPY PER DAY

## 2024-01-23 PROCEDURE — 6360000002 HC RX W HCPCS: Performed by: HOSPITALIST

## 2024-01-23 PROCEDURE — C1894 INTRO/SHEATH, NON-LASER: HCPCS | Performed by: INTERNAL MEDICINE

## 2024-01-23 PROCEDURE — 6370000000 HC RX 637 (ALT 250 FOR IP): Performed by: INTERNAL MEDICINE

## 2024-01-23 PROCEDURE — 36620 INSERTION CATHETER ARTERY: CPT

## 2024-01-23 PROCEDURE — 85730 THROMBOPLASTIN TIME PARTIAL: CPT

## 2024-01-23 PROCEDURE — 82962 GLUCOSE BLOOD TEST: CPT

## 2024-01-23 PROCEDURE — B24BZZ4 ULTRASONOGRAPHY OF HEART WITH AORTA, TRANSESOPHAGEAL: ICD-10-PCS | Performed by: INTERNAL MEDICINE

## 2024-01-23 PROCEDURE — C1725 CATH, TRANSLUMIN NON-LASER: HCPCS | Performed by: INTERNAL MEDICINE

## 2024-01-23 PROCEDURE — 2100000001 HC CVICU R&B

## 2024-01-23 PROCEDURE — C9113 INJ PANTOPRAZOLE SODIUM, VIA: HCPCS | Performed by: FAMILY MEDICINE

## 2024-01-23 PROCEDURE — 93355 ECHO TRANSESOPHAGEAL (TEE): CPT | Performed by: INTERNAL MEDICINE

## 2024-01-23 PROCEDURE — 82947 ASSAY GLUCOSE BLOOD QUANT: CPT

## 2024-01-23 PROCEDURE — 84295 ASSAY OF SERUM SODIUM: CPT

## 2024-01-23 PROCEDURE — 6370000000 HC RX 637 (ALT 250 FOR IP): Performed by: STUDENT IN AN ORGANIZED HEALTH CARE EDUCATION/TRAINING PROGRAM

## 2024-01-23 PROCEDURE — C1893 INTRO/SHEATH, FIXED,NON-PEEL: HCPCS | Performed by: INTERNAL MEDICINE

## 2024-01-23 PROCEDURE — 6360000002 HC RX W HCPCS: Performed by: NURSE ANESTHETIST, CERTIFIED REGISTERED

## 2024-01-23 PROCEDURE — 82247 BILIRUBIN TOTAL: CPT

## 2024-01-23 DEVICE — VALVE AORT 23 MM TRANSCATHETER HRT VLV SYS SAPIEN 3 ULTRA: Type: IMPLANTABLE DEVICE | Site: AORTIC VALVE | Status: FUNCTIONAL

## 2024-01-23 DEVICE — VALVE AORT 26 MM TRANSCATHETER HRT VLV SYS SAPIEN 3 ULTRA: Type: IMPLANTABLE DEVICE | Site: MITRAL VALVE | Status: FUNCTIONAL

## 2024-01-23 RX ORDER — HYDRALAZINE HYDROCHLORIDE 20 MG/ML
20 INJECTION INTRAMUSCULAR; INTRAVENOUS ONCE
Status: COMPLETED | OUTPATIENT
Start: 2024-01-23 | End: 2024-01-23

## 2024-01-23 RX ORDER — NOREPINEPHRINE BITARTRATE 1 MG/ML
INJECTION, SOLUTION INTRAVENOUS PRN
Status: DISCONTINUED | OUTPATIENT
Start: 2024-01-23 | End: 2024-01-23 | Stop reason: SDUPTHER

## 2024-01-23 RX ORDER — SODIUM CHLORIDE, SODIUM LACTATE, POTASSIUM CHLORIDE, CALCIUM CHLORIDE 600; 310; 30; 20 MG/100ML; MG/100ML; MG/100ML; MG/100ML
INJECTION, SOLUTION INTRAVENOUS CONTINUOUS PRN
Status: DISCONTINUED | OUTPATIENT
Start: 2024-01-23 | End: 2024-01-23 | Stop reason: SDUPTHER

## 2024-01-23 RX ORDER — ALBUMIN (HUMAN) 12.5 G/50ML
SOLUTION INTRAVENOUS PRN
Status: DISCONTINUED | OUTPATIENT
Start: 2024-01-23 | End: 2024-01-23 | Stop reason: SDUPTHER

## 2024-01-23 RX ORDER — SODIUM CHLORIDE 9 MG/ML
INJECTION, SOLUTION INTRAVENOUS PRN
Status: DISCONTINUED | OUTPATIENT
Start: 2024-01-23 | End: 2024-01-26

## 2024-01-23 RX ORDER — HYDRALAZINE HYDROCHLORIDE 20 MG/ML
10 INJECTION INTRAMUSCULAR; INTRAVENOUS EVERY 4 HOURS PRN
Status: DISCONTINUED | OUTPATIENT
Start: 2024-01-23 | End: 2024-01-30 | Stop reason: HOSPADM

## 2024-01-23 RX ORDER — LIDOCAINE HYDROCHLORIDE 20 MG/ML
INJECTION, SOLUTION INTRAVENOUS PRN
Status: DISCONTINUED | OUTPATIENT
Start: 2024-01-23 | End: 2024-01-23 | Stop reason: SDUPTHER

## 2024-01-23 RX ORDER — ASPIRIN 81 MG/1
81 TABLET ORAL DAILY
Status: DISCONTINUED | OUTPATIENT
Start: 2024-01-24 | End: 2024-01-30 | Stop reason: HOSPADM

## 2024-01-23 RX ORDER — INSULIN GLARGINE 100 [IU]/ML
8 INJECTION, SOLUTION SUBCUTANEOUS ONCE
Status: COMPLETED | OUTPATIENT
Start: 2024-01-23 | End: 2024-01-23

## 2024-01-23 RX ORDER — HYDROCODONE BITARTRATE AND ACETAMINOPHEN 5; 325 MG/1; MG/1
2 TABLET ORAL EVERY 4 HOURS PRN
Status: DISCONTINUED | OUTPATIENT
Start: 2024-01-23 | End: 2024-01-30 | Stop reason: HOSPADM

## 2024-01-23 RX ORDER — MORPHINE SULFATE 2 MG/ML
2 INJECTION, SOLUTION INTRAMUSCULAR; INTRAVENOUS
Status: DISCONTINUED | OUTPATIENT
Start: 2024-01-23 | End: 2024-01-30 | Stop reason: HOSPADM

## 2024-01-23 RX ORDER — FENTANYL CITRATE 50 UG/ML
INJECTION, SOLUTION INTRAMUSCULAR; INTRAVENOUS PRN
Status: DISCONTINUED | OUTPATIENT
Start: 2024-01-23 | End: 2024-01-23 | Stop reason: SDUPTHER

## 2024-01-23 RX ORDER — MIDAZOLAM HYDROCHLORIDE 1 MG/ML
INJECTION INTRAMUSCULAR; INTRAVENOUS PRN
Status: DISCONTINUED | OUTPATIENT
Start: 2024-01-23 | End: 2024-01-23 | Stop reason: SDUPTHER

## 2024-01-23 RX ORDER — ONDANSETRON 2 MG/ML
4 INJECTION INTRAMUSCULAR; INTRAVENOUS EVERY 6 HOURS PRN
Status: DISCONTINUED | OUTPATIENT
Start: 2024-01-23 | End: 2024-01-30 | Stop reason: HOSPADM

## 2024-01-23 RX ORDER — SODIUM CHLORIDE 0.9 % (FLUSH) 0.9 %
5-40 SYRINGE (ML) INJECTION EVERY 12 HOURS SCHEDULED
Status: DISCONTINUED | OUTPATIENT
Start: 2024-01-23 | End: 2024-01-30 | Stop reason: HOSPADM

## 2024-01-23 RX ORDER — ROCURONIUM BROMIDE 10 MG/ML
INJECTION, SOLUTION INTRAVENOUS PRN
Status: DISCONTINUED | OUTPATIENT
Start: 2024-01-23 | End: 2024-01-23 | Stop reason: SDUPTHER

## 2024-01-23 RX ORDER — HEPARIN SODIUM 200 [USP'U]/100ML
INJECTION, SOLUTION INTRAVENOUS CONTINUOUS PRN
Status: COMPLETED | OUTPATIENT
Start: 2024-01-23 | End: 2024-01-23

## 2024-01-23 RX ORDER — SODIUM CHLORIDE 0.9 % (FLUSH) 0.9 %
5-40 SYRINGE (ML) INJECTION PRN
Status: DISCONTINUED | OUTPATIENT
Start: 2024-01-23 | End: 2024-01-30 | Stop reason: HOSPADM

## 2024-01-23 RX ORDER — LIDOCAINE HYDROCHLORIDE 10 MG/ML
INJECTION, SOLUTION INFILTRATION; PERINEURAL PRN
Status: DISCONTINUED | OUTPATIENT
Start: 2024-01-23 | End: 2024-01-23 | Stop reason: ALTCHOICE

## 2024-01-23 RX ORDER — MORPHINE SULFATE 4 MG/ML
4 INJECTION, SOLUTION INTRAMUSCULAR; INTRAVENOUS
Status: DISCONTINUED | OUTPATIENT
Start: 2024-01-23 | End: 2024-01-30 | Stop reason: HOSPADM

## 2024-01-23 RX ORDER — VECURONIUM BROMIDE 1 MG/ML
INJECTION, POWDER, LYOPHILIZED, FOR SOLUTION INTRAVENOUS PRN
Status: DISCONTINUED | OUTPATIENT
Start: 2024-01-23 | End: 2024-01-23 | Stop reason: SDUPTHER

## 2024-01-23 RX ORDER — HYDROCODONE BITARTRATE AND ACETAMINOPHEN 5; 325 MG/1; MG/1
1 TABLET ORAL EVERY 4 HOURS PRN
Status: DISCONTINUED | OUTPATIENT
Start: 2024-01-23 | End: 2024-01-30 | Stop reason: HOSPADM

## 2024-01-23 RX ORDER — ETOMIDATE 2 MG/ML
INJECTION INTRAVENOUS PRN
Status: DISCONTINUED | OUTPATIENT
Start: 2024-01-23 | End: 2024-01-23 | Stop reason: SDUPTHER

## 2024-01-23 RX ORDER — PROTAMINE SULFATE 10 MG/ML
INJECTION, SOLUTION INTRAVENOUS PRN
Status: DISCONTINUED | OUTPATIENT
Start: 2024-01-23 | End: 2024-01-23 | Stop reason: SDUPTHER

## 2024-01-23 RX ORDER — SODIUM CHLORIDE 9 MG/ML
INJECTION, SOLUTION INTRAVENOUS PRN
Status: ACTIVE | OUTPATIENT
Start: 2024-01-23 | End: 2024-01-24

## 2024-01-23 RX ORDER — HEPARIN SODIUM 1000 [USP'U]/ML
INJECTION, SOLUTION INTRAVENOUS; SUBCUTANEOUS PRN
Status: DISCONTINUED | OUTPATIENT
Start: 2024-01-23 | End: 2024-01-23 | Stop reason: SDUPTHER

## 2024-01-23 RX ORDER — APREPITANT 40 MG/1
40 CAPSULE ORAL ONCE
Status: CANCELLED | OUTPATIENT
Start: 2024-01-23

## 2024-01-23 RX ORDER — ACETAMINOPHEN 325 MG/1
650 TABLET ORAL EVERY 4 HOURS PRN
Status: DISCONTINUED | OUTPATIENT
Start: 2024-01-23 | End: 2024-01-30 | Stop reason: HOSPADM

## 2024-01-23 RX ADMIN — HYDROCORTISONE SODIUM SUCCINATE 100 MG: 100 INJECTION, POWDER, FOR SOLUTION INTRAMUSCULAR; INTRAVENOUS at 18:00

## 2024-01-23 RX ADMIN — HYDRALAZINE HYDROCHLORIDE 20 MG: 20 INJECTION, SOLUTION INTRAMUSCULAR; INTRAVENOUS at 19:44

## 2024-01-23 RX ADMIN — ALBUMIN (HUMAN) 250 ML: 0.25 INJECTION, SOLUTION INTRAVENOUS at 15:20

## 2024-01-23 RX ADMIN — PREDNISONE 5 MG: 5 TABLET ORAL at 09:00

## 2024-01-23 RX ADMIN — SUGAMMADEX 200 MG: 100 INJECTION, SOLUTION INTRAVENOUS at 18:52

## 2024-01-23 RX ADMIN — INSULIN LISPRO 8 UNITS: 100 INJECTION, SOLUTION INTRAVENOUS; SUBCUTANEOUS at 07:08

## 2024-01-23 RX ADMIN — ETOMIDATE 15 MG: 2 INJECTION, SOLUTION INTRAVENOUS at 15:21

## 2024-01-23 RX ADMIN — VECURONIUM BROMIDE 2 MG: 1 INJECTION, POWDER, LYOPHILIZED, FOR SOLUTION INTRAVENOUS at 16:58

## 2024-01-23 RX ADMIN — FUROSEMIDE 40 MG: 40 TABLET ORAL at 09:00

## 2024-01-23 RX ADMIN — SODIUM CHLORIDE, SODIUM LACTATE, POTASSIUM CHLORIDE, AND CALCIUM CHLORIDE: 600; 310; 30; 20 INJECTION, SOLUTION INTRAVENOUS at 15:11

## 2024-01-23 RX ADMIN — HEPARIN SODIUM 10000 UNITS: 1000 INJECTION, SOLUTION INTRAVENOUS; SUBCUTANEOUS at 17:09

## 2024-01-23 RX ADMIN — DOCUSATE SODIUM 100 MG: 100 CAPSULE, LIQUID FILLED ORAL at 09:00

## 2024-01-23 RX ADMIN — FENTANYL CITRATE 100 MCG: 50 INJECTION, SOLUTION INTRAMUSCULAR; INTRAVENOUS at 16:08

## 2024-01-23 RX ADMIN — NOREPINEPHRINE BITARTRATE 4 MCG: 1 SOLUTION INTRAVENOUS at 17:55

## 2024-01-23 RX ADMIN — DOFETILIDE 500 MCG: 0.5 CAPSULE ORAL at 23:12

## 2024-01-23 RX ADMIN — NOREPINEPHRINE BITARTRATE 4 MCG: 1 SOLUTION INTRAVENOUS at 18:01

## 2024-01-23 RX ADMIN — INSULIN LISPRO 4 UNITS: 100 INJECTION, SOLUTION INTRAVENOUS; SUBCUTANEOUS at 10:56

## 2024-01-23 RX ADMIN — ONDANSETRON 4 MG: 2 INJECTION INTRAMUSCULAR; INTRAVENOUS at 20:14

## 2024-01-23 RX ADMIN — ROCURONIUM BROMIDE 40 MG: 10 INJECTION, SOLUTION INTRAVENOUS at 15:22

## 2024-01-23 RX ADMIN — SODIUM CHLORIDE, PRESERVATIVE FREE 10 ML: 5 INJECTION INTRAVENOUS at 21:00

## 2024-01-23 RX ADMIN — SODIUM CHLORIDE, PRESERVATIVE FREE 10 ML: 5 INJECTION INTRAVENOUS at 09:04

## 2024-01-23 RX ADMIN — Medication 2000 MG: at 15:59

## 2024-01-23 RX ADMIN — ALBUMIN (HUMAN) 250 ML: 0.25 INJECTION, SOLUTION INTRAVENOUS at 15:39

## 2024-01-23 RX ADMIN — VECURONIUM BROMIDE 2 MG: 1 INJECTION, POWDER, LYOPHILIZED, FOR SOLUTION INTRAVENOUS at 17:09

## 2024-01-23 RX ADMIN — INSULIN LISPRO 5 UNITS: 100 INJECTION, SOLUTION INTRAVENOUS; SUBCUTANEOUS at 07:07

## 2024-01-23 RX ADMIN — LEVALBUTEROL HYDROCHLORIDE 0.63 MG: 0.63 SOLUTION RESPIRATORY (INHALATION) at 13:25

## 2024-01-23 RX ADMIN — LEVOTHYROXINE SODIUM 125 MCG: 0.07 TABLET ORAL at 07:07

## 2024-01-23 RX ADMIN — LEVALBUTEROL HYDROCHLORIDE 0.63 MG: 0.63 SOLUTION RESPIRATORY (INHALATION) at 07:21

## 2024-01-23 RX ADMIN — PANTOPRAZOLE SODIUM 40 MG: 40 INJECTION, POWDER, FOR SOLUTION INTRAVENOUS at 09:01

## 2024-01-23 RX ADMIN — VECURONIUM BROMIDE 4 MG: 1 INJECTION, POWDER, LYOPHILIZED, FOR SOLUTION INTRAVENOUS at 16:27

## 2024-01-23 RX ADMIN — LIDOCAINE HYDROCHLORIDE 60 MG: 20 INJECTION, SOLUTION INTRAVENOUS at 15:20

## 2024-01-23 RX ADMIN — NOREPINEPHRINE BITARTRATE 4 MCG: 1 SOLUTION INTRAVENOUS at 18:09

## 2024-01-23 RX ADMIN — VECURONIUM BROMIDE 5 MG: 1 INJECTION, POWDER, LYOPHILIZED, FOR SOLUTION INTRAVENOUS at 17:51

## 2024-01-23 RX ADMIN — INSULIN GLARGINE 8 UNITS: 100 INJECTION, SOLUTION SUBCUTANEOUS at 09:01

## 2024-01-23 RX ADMIN — HEPARIN SODIUM 4000 UNITS: 1000 INJECTION, SOLUTION INTRAVENOUS; SUBCUTANEOUS at 16:50

## 2024-01-23 RX ADMIN — PROTAMINE SULFATE 200 MG: 10 INJECTION, SOLUTION INTRAVENOUS at 18:28

## 2024-01-23 RX ADMIN — DOFETILIDE 500 MCG: 0.5 CAPSULE ORAL at 09:01

## 2024-01-23 RX ADMIN — VECURONIUM BROMIDE 4 MG: 1 INJECTION, POWDER, LYOPHILIZED, FOR SOLUTION INTRAVENOUS at 15:51

## 2024-01-23 RX ADMIN — HEPARIN SODIUM 11000 UNITS: 1000 INJECTION, SOLUTION INTRAVENOUS; SUBCUTANEOUS at 17:25

## 2024-01-23 RX ADMIN — CEFAZOLIN SODIUM 2000 MG: 100 INJECTION, POWDER, LYOPHILIZED, FOR SOLUTION INTRAVENOUS at 23:12

## 2024-01-23 RX ADMIN — NOREPINEPHRINE BITARTRATE 4 MCG: 1 SOLUTION INTRAVENOUS at 17:45

## 2024-01-23 RX ADMIN — MIDAZOLAM 2 MG: 1 INJECTION INTRAMUSCULAR; INTRAVENOUS at 15:20

## 2024-01-23 RX ADMIN — INSULIN GLARGINE 9 UNITS: 100 INJECTION, SOLUTION SUBCUTANEOUS at 20:27

## 2024-01-23 RX ADMIN — ASPIRIN 81 MG 81 MG: 81 TABLET ORAL at 09:00

## 2024-01-23 ASSESSMENT — PAIN SCALES - GENERAL
PAINLEVEL_OUTOF10: 0

## 2024-01-23 NOTE — ANESTHESIA PROCEDURE NOTES
Airway  Date/Time: 1/23/2024 3:25 PM  Urgency: elective    Airway not difficult    General Information and Staff    Patient location during procedure: OR  Resident/CRNA: Brad Myers APRN - CRNA  Performed: resident/CRNA   Performed by: Brad Myers APRN - CRNA  Authorized by: Ian Lugo MD      Indications and Patient Condition  Indications for airway management: anesthesia  Spontaneous Ventilation: absent  Sedation level: deep  Preoxygenated: yes  Patient position: sniffing  MILS not maintained throughout  Mask difficulty assessment: vent by bag mask    Final Airway Details  Final airway type: endotracheal airway      Successful airway: ETT  Cuffed: yes   Successful intubation technique: direct laryngoscopy  Facilitating devices/methods: intubating stylet  Endotracheal tube insertion site: oral  Blade: Reynoso  Blade size: #3  ETT size (mm): 7.0  Cormack-Lehane Classification: grade I - full view of glottis  Placement verified by: chest auscultation and capnometry   Inital cuff pressure (cm H2O): 22  Measured from: lips  Number of attempts at approach: 1  Ventilation between attempts: bag mask  Number of other approaches attempted: 0    no

## 2024-01-23 NOTE — ANESTHESIA PROCEDURE NOTES
Arterial Line:    An arterial line was placed using ultrasound guidance, in the OR for the following indication(s): continuous blood pressure monitoring and blood sampling needed.    A 20 gauge (size) (length), Arrow (type) catheter was placed, Seldinger technique used, into the left radial artery, secured by Tegaderm and tape.  Anesthesia type: Local  Local infiltration: Injection    Events:  patient tolerated procedure well with no complications and EBL < 5mL.    Additional notes:  Left arm prepped with ChloraPrep, 0.8ml of 1% lidocaine infiltrated at skin, ultrasound guided Seldinger technique, good blood return, good waveform.      Potential access sites were examined with ultrasound and acceptable patent access site selected as noted above.  Needle path and artery access visualized in real time using ultrasound, an image of wire in vessel recorded for permanent record.    1/23/2024 2:35 PM1/23/2024 2:38 PM  Resident/CRNA: Brad Myers APRN - CRNA  Performed: Resident/CRNA   Preanesthetic Checklist  Completed: patient identified, IV checked, site marked, risks and benefits discussed, surgical/procedural consents, equipment checked, pre-op evaluation, timeout performed, anesthesia consent given, oxygen available, monitors applied/VS acknowledged, fire risk safety assessment completed and verbalized and blood product R/B/A discussed and consented

## 2024-01-24 ENCOUNTER — APPOINTMENT (OUTPATIENT)
Dept: NON INVASIVE DIAGNOSTICS | Age: 79
DRG: 266 | End: 2024-01-24
Attending: INTERNAL MEDICINE
Payer: MEDICARE

## 2024-01-24 LAB
ANION GAP SERPL CALC-SCNC: 5 MMOL/L (ref 2–11)
BUN SERPL-MCNC: 32 MG/DL (ref 8–23)
CALCIUM SERPL-MCNC: 8.6 MG/DL (ref 8.3–10.4)
CHLORIDE SERPL-SCNC: 103 MMOL/L (ref 103–113)
CO2 SERPL-SCNC: 33 MMOL/L (ref 21–32)
CREAT SERPL-MCNC: 1 MG/DL (ref 0.6–1)
ECHO AO ASC DIAM: 3.3 CM
ECHO AO ASCENDING AORTA INDEX: 1.89 CM/M2
ECHO AV ACCELERATION TIME: 61 MS
ECHO AV AREA PEAK VELOCITY: 1.1 CM2
ECHO AV AREA VTI: 1.2 CM2
ECHO AV AREA/BSA PEAK VELOCITY: 0.6 CM2/M2
ECHO AV AREA/BSA VTI: 0.7 CM2/M2
ECHO AV MEAN GRADIENT: 14 MMHG
ECHO AV MEAN VELOCITY: 1.8 M/S
ECHO AV PEAK GRADIENT: 27 MMHG
ECHO AV PEAK VELOCITY: 2.8 M/S
ECHO AV VELOCITY RATIO: 0.43
ECHO AV VTI: 50.6 CM
ECHO BSA: 1.76 M2
ECHO EST RA PRESSURE: 3 MMHG
ECHO IVC PROX: 1.9 CM
ECHO LA AREA 2C: 20.1 CM2
ECHO LA MINOR AXIS: 5.4 CM
ECHO LA VOL MOD A2C: 62 ML (ref 22–52)
ECHO LA VOLUME INDEX MOD A2C: 35 ML/M2 (ref 16–34)
ECHO LV FRACTIONAL SHORTENING: 36 % (ref 28–44)
ECHO LV INTERNAL DIMENSION DIASTOLE INDEX: 2.23 CM/M2
ECHO LV INTERNAL DIMENSION DIASTOLIC: 3.9 CM (ref 3.9–5.3)
ECHO LV INTERNAL DIMENSION SYSTOLIC INDEX: 1.43 CM/M2
ECHO LV INTERNAL DIMENSION SYSTOLIC: 2.5 CM
ECHO LV IVSD: 1 CM (ref 0.6–0.9)
ECHO LV MASS 2D: 122.1 G (ref 67–162)
ECHO LV MASS INDEX 2D: 69.8 G/M2 (ref 43–95)
ECHO LV POSTERIOR WALL DIASTOLIC: 1 CM (ref 0.6–0.9)
ECHO LV RELATIVE WALL THICKNESS RATIO: 0.51
ECHO LVOT AREA: 2.5 CM2
ECHO LVOT AV VTI INDEX: 0.46
ECHO LVOT DIAM: 1.8 CM
ECHO LVOT MEAN GRADIENT: 3 MMHG
ECHO LVOT PEAK GRADIENT: 5 MMHG
ECHO LVOT PEAK VELOCITY: 1.2 M/S
ECHO LVOT STROKE VOLUME INDEX: 33.7 ML/M2
ECHO LVOT SV: 59 ML
ECHO LVOT VTI: 23.2 CM
ECHO MV A VELOCITY: 0.43 M/S
ECHO MV AREA VTI: 1.1 CM2
ECHO MV E DECELERATION TIME (DT): 288 MS
ECHO MV E VELOCITY: 2.06 M/S
ECHO MV E/A RATIO: 4.79
ECHO MV LVOT VTI INDEX: 2.39
ECHO MV MAX VELOCITY: 2.1 M/S
ECHO MV MEAN GRADIENT: 5 MMHG
ECHO MV MEAN VELOCITY: 1 M/S
ECHO MV PEAK GRADIENT: 17 MMHG
ECHO MV VTI: 55.4 CM
ECHO PV ACCELERATION TIME (AT): 48 MS
ECHO PV MAX VELOCITY: 1.2 M/S
ECHO PV PEAK GRADIENT: 6 MMHG
ECHO RIGHT VENTRICULAR SYSTOLIC PRESSURE (RVSP): 64 MMHG
ECHO RV BASAL DIMENSION: 4.6 CM
ECHO RV INTERNAL DIMENSION: 3.2 CM
ECHO RV LONGITUDINAL DIMENSION: 7.4 CM
ECHO RV MID DIMENSION: 4.2 CM
ECHO RV TAPSE: 1.8 CM (ref 1.7–?)
ECHO TV REGURGITANT MAX VELOCITY: 3.91 M/S
ECHO TV REGURGITANT PEAK GRADIENT: 61 MMHG
EKG ATRIAL RATE: 63 BPM
EKG DIAGNOSIS: NORMAL
EKG DIAGNOSIS: NORMAL
EKG P-R INTERVAL: 134 MS
EKG Q-T INTERVAL: 442 MS
EKG Q-T INTERVAL: 470 MS
EKG QRS DURATION: 88 MS
EKG QRS DURATION: 90 MS
EKG QTC CALCULATION (BAZETT): 480 MS
EKG QTC CALCULATION (BAZETT): 497 MS
EKG R AXIS: -25 DEGREES
EKG R AXIS: 13 DEGREES
EKG T AXIS: 55 DEGREES
EKG T AXIS: 77 DEGREES
EKG VENTRICULAR RATE: 63 BPM
EKG VENTRICULAR RATE: 76 BPM
ERYTHROCYTE [DISTWIDTH] IN BLOOD BY AUTOMATED COUNT: 20.9 % (ref 11.9–14.6)
GLUCOSE BLD STRIP.AUTO-MCNC: 237 MG/DL (ref 65–100)
GLUCOSE BLD STRIP.AUTO-MCNC: 249 MG/DL (ref 65–100)
GLUCOSE BLD STRIP.AUTO-MCNC: 256 MG/DL (ref 65–100)
GLUCOSE BLD STRIP.AUTO-MCNC: 263 MG/DL (ref 65–100)
GLUCOSE SERPL-MCNC: 241 MG/DL (ref 65–100)
HCT VFR BLD AUTO: 28.3 % (ref 35.8–46.3)
HGB BLD-MCNC: 8.5 G/DL (ref 11.7–15.4)
INR PPP: 1.1
MCH RBC QN AUTO: 29.3 PG (ref 26.1–32.9)
MCHC RBC AUTO-ENTMCNC: 30 G/DL (ref 31.4–35)
MCV RBC AUTO: 97.6 FL (ref 82–102)
NRBC # BLD: 0.02 K/UL (ref 0–0.2)
PLATELET # BLD AUTO: 159 K/UL (ref 150–450)
PMV BLD AUTO: 10.2 FL (ref 9.4–12.3)
POTASSIUM SERPL-SCNC: 4.4 MMOL/L (ref 3.5–5.1)
PROTHROMBIN TIME: 14.9 SEC (ref 11.3–14.9)
RBC # BLD AUTO: 2.9 M/UL (ref 4.05–5.2)
SERVICE CMNT-IMP: ABNORMAL
SODIUM SERPL-SCNC: 141 MMOL/L (ref 136–146)
WBC # BLD AUTO: 8.4 K/UL (ref 4.3–11.1)

## 2024-01-24 PROCEDURE — 93005 ELECTROCARDIOGRAM TRACING: CPT | Performed by: INTERNAL MEDICINE

## 2024-01-24 PROCEDURE — 99223 1ST HOSP IP/OBS HIGH 75: CPT | Performed by: INTERNAL MEDICINE

## 2024-01-24 PROCEDURE — 2500000003 HC RX 250 WO HCPCS: Performed by: FAMILY MEDICINE

## 2024-01-24 PROCEDURE — 2580000003 HC RX 258: Performed by: INTERNAL MEDICINE

## 2024-01-24 PROCEDURE — 2500000003 HC RX 250 WO HCPCS

## 2024-01-24 PROCEDURE — 94761 N-INVAS EAR/PLS OXIMETRY MLT: CPT

## 2024-01-24 PROCEDURE — 37799 UNLISTED PX VASCULAR SURGERY: CPT

## 2024-01-24 PROCEDURE — 6370000000 HC RX 637 (ALT 250 FOR IP): Performed by: STUDENT IN AN ORGANIZED HEALTH CARE EDUCATION/TRAINING PROGRAM

## 2024-01-24 PROCEDURE — 0HQAXZZ REPAIR INGUINAL SKIN, EXTERNAL APPROACH: ICD-10-PCS

## 2024-01-24 PROCEDURE — 6360000002 HC RX W HCPCS: Performed by: INTERNAL MEDICINE

## 2024-01-24 PROCEDURE — 6370000000 HC RX 637 (ALT 250 FOR IP): Performed by: INTERNAL MEDICINE

## 2024-01-24 PROCEDURE — 80048 BASIC METABOLIC PNL TOTAL CA: CPT

## 2024-01-24 PROCEDURE — 6370000000 HC RX 637 (ALT 250 FOR IP): Performed by: HOSPITALIST

## 2024-01-24 PROCEDURE — 85610 PROTHROMBIN TIME: CPT

## 2024-01-24 PROCEDURE — 6370000000 HC RX 637 (ALT 250 FOR IP): Performed by: FAMILY MEDICINE

## 2024-01-24 PROCEDURE — 93306 TTE W/DOPPLER COMPLETE: CPT

## 2024-01-24 PROCEDURE — 93010 ELECTROCARDIOGRAM REPORT: CPT | Performed by: INTERNAL MEDICINE

## 2024-01-24 PROCEDURE — 99232 SBSQ HOSP IP/OBS MODERATE 35: CPT | Performed by: INTERNAL MEDICINE

## 2024-01-24 PROCEDURE — 2100000001 HC CVICU R&B

## 2024-01-24 PROCEDURE — 85027 COMPLETE CBC AUTOMATED: CPT

## 2024-01-24 PROCEDURE — 82962 GLUCOSE BLOOD TEST: CPT

## 2024-01-24 PROCEDURE — 94640 AIRWAY INHALATION TREATMENT: CPT

## 2024-01-24 PROCEDURE — 2700000000 HC OXYGEN THERAPY PER DAY

## 2024-01-24 PROCEDURE — 6360000002 HC RX W HCPCS: Performed by: HOSPITALIST

## 2024-01-24 RX ORDER — ASPIRIN 81 MG/1
81 TABLET ORAL DAILY
Status: CANCELLED | OUTPATIENT
Start: 2024-01-25

## 2024-01-24 RX ORDER — PANTOPRAZOLE SODIUM 40 MG/1
40 TABLET, DELAYED RELEASE ORAL
Status: DISCONTINUED | OUTPATIENT
Start: 2024-01-24 | End: 2024-01-30 | Stop reason: HOSPADM

## 2024-01-24 RX ORDER — FUROSEMIDE 10 MG/ML
40 INJECTION INTRAMUSCULAR; INTRAVENOUS DAILY
Status: DISCONTINUED | OUTPATIENT
Start: 2024-01-24 | End: 2024-01-26

## 2024-01-24 RX ORDER — DIMETHICONE, CAMPHOR (SYNTHETIC), MENTHOL, AND PHENOL 1.1; .5; .625; .5 G/100G; G/100G; G/100G; G/100G
OINTMENT TOPICAL PRN
Status: DISCONTINUED | OUTPATIENT
Start: 2024-01-24 | End: 2024-01-30 | Stop reason: HOSPADM

## 2024-01-24 RX ORDER — LEVALBUTEROL INHALATION SOLUTION 0.63 MG/3ML
0.63 SOLUTION RESPIRATORY (INHALATION) EVERY 6 HOURS PRN
Status: DISCONTINUED | OUTPATIENT
Start: 2024-01-24 | End: 2024-01-30 | Stop reason: HOSPADM

## 2024-01-24 RX ADMIN — METOPROLOL TARTRATE 2.5 MG: 5 INJECTION INTRAVENOUS at 18:51

## 2024-01-24 RX ADMIN — ACETAMINOPHEN 650 MG: 325 TABLET ORAL at 15:48

## 2024-01-24 RX ADMIN — INSULIN LISPRO 4 UNITS: 100 INJECTION, SOLUTION INTRAVENOUS; SUBCUTANEOUS at 07:59

## 2024-01-24 RX ADMIN — LEVOTHYROXINE SODIUM 125 MCG: 0.07 TABLET ORAL at 06:18

## 2024-01-24 RX ADMIN — DOCUSATE SODIUM 100 MG: 100 CAPSULE, LIQUID FILLED ORAL at 07:59

## 2024-01-24 RX ADMIN — POTASSIUM CHLORIDE 40 MEQ: 1500 TABLET, EXTENDED RELEASE ORAL at 07:59

## 2024-01-24 RX ADMIN — LEVALBUTEROL HYDROCHLORIDE 0.63 MG: 0.63 SOLUTION RESPIRATORY (INHALATION) at 14:31

## 2024-01-24 RX ADMIN — ASPIRIN 81 MG: 81 TABLET, COATED ORAL at 07:59

## 2024-01-24 RX ADMIN — ACETAMINOPHEN 650 MG: 325 TABLET ORAL at 20:23

## 2024-01-24 RX ADMIN — METOPROLOL TARTRATE 25 MG: 25 TABLET, FILM COATED ORAL at 07:59

## 2024-01-24 RX ADMIN — FUROSEMIDE 40 MG: 10 INJECTION, SOLUTION INTRAVENOUS at 08:03

## 2024-01-24 RX ADMIN — CEFAZOLIN SODIUM 2000 MG: 100 INJECTION, POWDER, LYOPHILIZED, FOR SOLUTION INTRAVENOUS at 14:09

## 2024-01-24 RX ADMIN — ATORVASTATIN CALCIUM 10 MG: 20 TABLET, FILM COATED ORAL at 20:28

## 2024-01-24 RX ADMIN — Medication: at 15:49

## 2024-01-24 RX ADMIN — CEFAZOLIN SODIUM 2000 MG: 100 INJECTION, POWDER, LYOPHILIZED, FOR SOLUTION INTRAVENOUS at 08:03

## 2024-01-24 RX ADMIN — LIDOCAINE HYDROCHLORIDE 2 ML: 10; .005 INJECTION, SOLUTION EPIDURAL; INFILTRATION; INTRACAUDAL; PERINEURAL at 00:22

## 2024-01-24 RX ADMIN — DOFETILIDE 500 MCG: 0.5 CAPSULE ORAL at 20:28

## 2024-01-24 RX ADMIN — INSULIN LISPRO 2 UNITS: 100 INJECTION, SOLUTION INTRAVENOUS; SUBCUTANEOUS at 11:35

## 2024-01-24 RX ADMIN — DOCUSATE SODIUM 100 MG: 100 CAPSULE, LIQUID FILLED ORAL at 20:25

## 2024-01-24 RX ADMIN — INSULIN LISPRO 2 UNITS: 100 INJECTION, SOLUTION INTRAVENOUS; SUBCUTANEOUS at 16:22

## 2024-01-24 RX ADMIN — INSULIN LISPRO 5 UNITS: 100 INJECTION, SOLUTION INTRAVENOUS; SUBCUTANEOUS at 16:22

## 2024-01-24 RX ADMIN — METOPROLOL TARTRATE 25 MG: 25 TABLET, FILM COATED ORAL at 19:30

## 2024-01-24 RX ADMIN — INSULIN LISPRO 5 UNITS: 100 INJECTION, SOLUTION INTRAVENOUS; SUBCUTANEOUS at 10:51

## 2024-01-24 RX ADMIN — INSULIN LISPRO 5 UNITS: 100 INJECTION, SOLUTION INTRAVENOUS; SUBCUTANEOUS at 08:00

## 2024-01-24 RX ADMIN — DOFETILIDE 500 MCG: 0.5 CAPSULE ORAL at 08:03

## 2024-01-24 RX ADMIN — Medication 3 MG: at 20:26

## 2024-01-24 RX ADMIN — PHENOL 1 SPRAY: 1.4 SPRAY ORAL at 14:48

## 2024-01-24 RX ADMIN — METOPROLOL TARTRATE 2.5 MG: 5 INJECTION INTRAVENOUS at 11:20

## 2024-01-24 RX ADMIN — LEVALBUTEROL HYDROCHLORIDE 0.63 MG: 0.63 SOLUTION RESPIRATORY (INHALATION) at 08:35

## 2024-01-24 RX ADMIN — INSULIN GLARGINE 9 UNITS: 100 INJECTION, SOLUTION SUBCUTANEOUS at 20:23

## 2024-01-24 RX ADMIN — PANTOPRAZOLE SODIUM 40 MG: 40 TABLET, DELAYED RELEASE ORAL at 08:03

## 2024-01-24 RX ADMIN — SODIUM CHLORIDE, PRESERVATIVE FREE 10 ML: 5 INJECTION INTRAVENOUS at 20:25

## 2024-01-24 RX ADMIN — POTASSIUM CHLORIDE 40 MEQ: 1500 TABLET, EXTENDED RELEASE ORAL at 20:24

## 2024-01-24 RX ADMIN — PREDNISONE 5 MG: 5 TABLET ORAL at 07:59

## 2024-01-24 RX ADMIN — ACETAMINOPHEN 650 MG: 325 TABLET ORAL at 00:37

## 2024-01-24 RX ADMIN — METOPROLOL TARTRATE 2.5 MG: 5 INJECTION INTRAVENOUS at 05:35

## 2024-01-24 RX ADMIN — SODIUM CHLORIDE, PRESERVATIVE FREE 10 ML: 5 INJECTION INTRAVENOUS at 08:30

## 2024-01-24 ASSESSMENT — PAIN - FUNCTIONAL ASSESSMENT
PAIN_FUNCTIONAL_ASSESSMENT: ACTIVITIES ARE NOT PREVENTED

## 2024-01-24 ASSESSMENT — PAIN SCALES - GENERAL
PAINLEVEL_OUTOF10: 0
PAINLEVEL_OUTOF10: 2
PAINLEVEL_OUTOF10: 0
PAINLEVEL_OUTOF10: 3
PAINLEVEL_OUTOF10: 0
PAINLEVEL_OUTOF10: 6
PAINLEVEL_OUTOF10: 0
PAINLEVEL_OUTOF10: 0
PAINLEVEL_OUTOF10: 3
PAINLEVEL_OUTOF10: 2
PAINLEVEL_OUTOF10: 0

## 2024-01-24 ASSESSMENT — PAIN DESCRIPTION - LOCATION
LOCATION: KNEE
LOCATION: LEG
LOCATION: FOOT

## 2024-01-24 ASSESSMENT — PAIN DESCRIPTION - DESCRIPTORS
DESCRIPTORS: ACHING
DESCRIPTORS: ACHING

## 2024-01-24 ASSESSMENT — PAIN DESCRIPTION - ORIENTATION
ORIENTATION: RIGHT;LEFT
ORIENTATION: RIGHT
ORIENTATION: RIGHT
ORIENTATION: LEFT;RIGHT
ORIENTATION: RIGHT;LEFT

## 2024-01-24 ASSESSMENT — PAIN DESCRIPTION - FREQUENCY: FREQUENCY: CONTINUOUS

## 2024-01-24 ASSESSMENT — PAIN DESCRIPTION - PAIN TYPE: TYPE: ACUTE PAIN

## 2024-01-24 ASSESSMENT — PAIN DESCRIPTION - ONSET: ONSET: GRADUAL

## 2024-01-24 NOTE — ADDENDUM NOTE
Addendum  created 01/23/24 2022 by Ian Lugo MD    Child order released for a procedure order, Clinical Note Signed, Intraprocedure Blocks edited, LDA created via procedure documentation, Order Canceled from Note, SmartForm saved

## 2024-01-24 NOTE — ANESTHESIA POSTPROCEDURE EVALUATION
Department of Anesthesiology  Postprocedure Note    Patient: Nery Bryant  MRN: [id number]  YOB: 1945  Date of evaluation: 1/23/2024    Procedure Summary     Date: 01/23/24 Room / Location: [facility] MAIN OR  / [facility] MAIN OR    Anesthesia Start: 1505 Anesthesia Stop: 1913    Procedures:       Transcatheter mitral valve replacement (tmvr) (Chest)      Transcatheter aortic valve replacement (Chest) Diagnosis:       Mitral valve stenosis, severe      (Mitral valve stenosis, severe [I05.0])    Providers: Rodolfo Lazaro MD Responsible Provider: Ian Lugo MD    Anesthesia Type: general ASA Status: 4          Anesthesia Type: No value filed.    Lana Phase I: Lana Score: 10    Lana Phase II:      Anesthesia Post Evaluation    Patient location during evaluation: ICU  Patient participation: complete - patient participated  Level of consciousness: sleepy but conscious  Pain score: 2  Airway patency: patent  Nausea & Vomiting: no nausea  Cardiovascular status: blood pressure returned to baseline and hemodynamically stable  Respiratory status: acceptable  Hydration status: euvolemic  Multimodal analgesia pain management approach  Pain management: adequate and satisfactory to patient        There were no known notable events for this encounter.

## 2024-01-24 NOTE — INTERDISCIPLINARY ROUNDS
Multi-D Rounds/Checklist (leapfrog):  Lines: can any be removed?: Central line possibly if HR better controlled.     Urinary Catheter 01/23/24 (Active)     Introducer 01/23/24 (Active)       CVC Single Lumen 01/23/24 Internal jugular (Active)     DVT Prophylaxis: Ordered  Vent: N/A  Nutrition Ordered/appropriate: Ordered  Can antibiotics or other drugs be stopped? N/A Yes/No  Inpat Anti-Infectives (From admission, onward)      None          Consults needed: None  A: Is pain control adequate? (has PRNs? Stop drip?) Yes  B: Sedation break and SBT? N/A  C: Is sedation choice appropriate? N/A  D: Delirium/CAM-ICU? No  E: Mobility goals/appropriateness? Yes  F: Family update and plan? spouse is surrogate decision maker and is being updated daily by primary attending and nursing staff.    Ashley Pinon MD

## 2024-01-24 NOTE — CONSULTS
MD Chirag Houston MD           CONSULT NOTE    Nery Lorraine Bryant         1/14/2024 1945    REFERRING PHYSICIAN:  Dr. Lazaro      HISTORY OF PRESENT ILLNESS:  The patient is a 78 y.o. female who is known to us after prior bioprosthetic AVR with a 21 mm Oliveira bovine  pericardial valve and MVR with a 25 mm Oliveira bovine  pericardial valve as well as Maze and closure of the left atrial appendage on 5/4/2015. She has noted worsening MCCURDY for several months. She presented to the ER with severe dyspnea, weakness and fatigue. She was hypoxic on arrival with O2 sat of 77%. She was in atrial fibrillation with RVR. She was placed on Airvo and admitted to the CVICU. She has been on home O2 since 8/23. She was diuresed. Echocardiogram showed severe mitral stenosis, severe aortic stenosis and severe pulmonary hypertension. She underwent cardiac catheterization on 1/17 that showed mild non-obstructive CAD in the OM1. She otherwise had normal coronaries.   She is feeling better after diuresis but is still requiring Airvo.       Past Medical History:   Diagnosis Date    Anesthesia complication     slow to wake up    Aortic valve insufficiency     CAD (coronary artery disease)     Followed by [facility]    Colon cancer ([facility]) 09/01/2021    COVID-19 05/15/2023    not hospitalized, coughing, runny nose    Current use of long term anticoagulation     Warfarin and Aspirin    Diabetes mellitus, insulin dependent (IDDM), controlled 05/11/2016    insulin reliant/avg fbs 100-130, pt not sure of BS reading when experiencing s.s of hypoglycemia/A1c 6.1,    Fracture, femur (HCC) 09/21/2009    GERD (gastroesophageal reflux disease)     states rarely PRN medication    Heart failure ([facility])     EF 55-60 % 9/9/20    History of cardioversion     History of colon cancer     History of petit-mal seizures     none in 15 years    History of seizure     no seizure activity since 1980's    Hyperlipidemia     
In this split/shared evaluation I performed reviewed the patients's H&P, available images, labs, cultures., discussed case in detail with ACP, performed a medically appropriate history and exam, counseled and educated the patient and/or family member, ordered and/or reviewed medications, tests or procedures, documented information in EMR, independently interpreted images and coordinated care.     Personal Time: 60 minutes -this equates to greater than 50% of total time in patient consultation/care.        Patient seen and examined by me.  Agree with above note by physician extender.  Key findings are: 78-year-old female with history of aortic and mitral valve replacement in 2015.  Patient had Maze procedure at that time.  She reportedly had done well until July of this year when she began experiencing similar symptoms as to when she had needed valvular surgery in 2015.  Echocardiogram confirmed both degenerative changes of the aortic and mitral valve with significant progression of mitral valve stenosis as well as aortic stenosis with development of pulmonary hypertension and O2 dependent respiratory failure.  Patient is been struggling with worsening control of atrial fibrillation presents to the hospital with acute respiratory failure volume overload severe lower extremity edema and A-fib with RVR.  Patient has responded well to diuresis.  She has converted back to sinus rhythm.  Review of patient's echocardiogram demonstrates severe mitral stenosis with severe pulmonary hypertension and severe aortic stenosis.  I suspect most of patient's symptoms currently are driven by progressive mitral stenosis and development of severe pulmonary hypertension and right ventricular heart failure.    Vitals:    01/15/24 1230 01/15/24 1300 01/15/24 1330 01/15/24 1400   BP: 133/60 (!) 115/59 132/60 (!) 132/56   Pulse: 67 66 66 67   Resp: (!) 46 (!) 44 (!) 37 (!) 40   Temp:       TempSrc:       SpO2: 93% 95% 96% 96%   Weight:     
bioprosthetic valve with a size of 21 mm. AV mean gradient is 45 mmHg. Trace regurgitation. Noted by the apical view. AV peak gradient is 65 mmHg. AV peak velocity is 4.0 m/s. AV AT is 90.0 ms. LVOT:AV VTI Index is 0.23. AV area by continuity VTI is 0.8 Square Centimeter.    Mitral Valve: Doreen Oliveira bioprosthetic valve with a size of 25 mm. MV mean gradient is 23 mmHg. Mild regurgitation. MV PHT is 200.0 ms. MV area by PHT is 1.1 Square Centimeter.    Tricuspid Valve: Moderate regurgitation with jet direction toward the septum. Severely elevated RVSP. Est RA pressure is 8 mmHg. The estimated RVSP is 78 mmHg.    Left Atrium: Left atrium is moderately dilated. LA Vol Index is  43 ml/m2.    Right Atrium: Right atrium is mildly dilated.    Image quality is adequate. Technically difficult study.    Signed by: Eddi Swenson MD on 1/9/2024  4:50 PM    MICRO:   Recent Labs     01/14/24 2155   CULTURE NO GROWTH AFTER 12 HOURS     Assessment and Plan:  (Medical Decision Making)   Impression :  Patient is 78 years old white female who presented with worsening shortness of breath and hypoxia.  She has been followed by us for the last 6 months and treated with high doses of prednisone since September for inflammatory changes on her open lung biopsy and suspicious for chronic hypersensitivity pneumonitis.  However she has not improved she is currently on 30 mg of prednisone and has been on it for a very long time.   Her BNP is almost 6000 and her chest x-ray is showing increased pulmonary edema she also presented with A-fib RVR and worsening aortic stenosis I suspect her presentation today was mostly related to her heart problems.  However her lung biopsies showed pulmonary edema with remodeling and peribronchiolar fibrosis.  She is currently on 30 mg of prednisone and it appears that it did nothing for her and she needs to come off of it however it needs to be for slow taper as she has been on it since 
(Final)    Interpretation Summary    Left Ventricle: Normal left ventricular systolic function with a visually estimated EF of 55 - 60%. Left ventricle size is normal.    Aortic Valve: There is a #21 mm Doreen Oliveira bioprosthetic valve which appears adequately seated.  The leaflets appear thickened and restricted consistent with leaflet degeneration.  There is evidence of severe prosthetic valve stenosis [restricted motion of 2 leaflets with best preserved motion of the right coronary cusp; DI 0.19; AT ~114msec; AV mean gradient is 45 mmHg].    Mitral Valve: There is a #25mm Doreen Oliveira bioprosthetic valve which appears adequately seated.  Leaflets appear thickened and calcified consistent with leaflet degeneration.  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    Tricuspid Valve: The estimated RVSP is severely elevated at around 58 mmHg.    Left Atrium: Left atrium is severely dilated.    Image quality is adequate.    Signed by: Mayank Riojas MD on 1/16/2024  7:31 PM    Cath: 01/14/24    CARDIAC PROCEDURE 01/23/2024  7:23 PM (Final)    Conclusion    Left femoral arterial and venous access obtained under surgical cutdown for femorofemoral bypass support in the event patient developed hemodynamic collapse.  Patient tolerated procedure well.  Cannulas were removed by Dr. Steven Reynoso.  Femorofemoral bypass support was not necessary during procedure.    Successful TAVR and placement of 23 mm sapient ultra valve for severe bioprosthetic valve stenosis.  Patient underwent successful post deployment surgical valve fracture.  Excellent hemodynamics post procedure.    Successful TMVR and placement of 26 mm PATRIC ultra valve for severe bioprosthetic valve stenosis.    Signed by: Rodolfo Lazaro MD on 1/23/2024  7:23 PM    EP: No results found for this or any previous visit.      Labs:

## 2024-01-24 NOTE — ANESTHESIA PROCEDURE NOTES
Central Venous Line:    A central venous line was placed using ultrasound guidance, in the OR for the following indication(s): central venous access, CVP monitoring and vasoactive infusions.1/23/2024 3:30 PM1/23/2024 3:38 PM    Sterility preparation included the following: hand hygiene performed prior to procedure, maximum sterile barriers used and sterile technique used to drape from head to toe.    The patient was placed in Trendelenburg position.The right internal jugular vein was prepped.    The site was prepped with Chloraprep.  A 6 Fr (size), 8 (length), introducer single lumen was placed.    During the procedure, the following specific steps were taken: target vein identified, needle advanced into vein and blood aspirated and guidewire advanced into vein.    Intravenous verification was obtained by ultrasound, venous blood return and manometry.    Post insertion care included: all ports aspirated, all ports flushed easily, guidewire removed intact, line sutured in place and dressing applied.    During the procedure the patient experienced: EBL < 5mL.      Outcomes: uncomplicated and patient tolerated procedure well  Real-time US image taken/store: yes  Anesthesia type: general    Additional notes:  Vein accessed on first stick with real time ultrasound guidance.  Guidewire visualized in vein on ultrasound.  Easy catheter insertion.  Chest x-ray to be performed in ICU.  Seven step protocol followed.    Potential access sites were examined with ultrasound and the acceptable patent access site was selected.  The needle path and vessel access were visualized in real time using ultrasonography, an image of the wire in the vessel was recorded for the permanent record.    Risks/benefits/alternatives discussed including damage to muscle, nerve, or a vascular structure as well as bleeding and infection.      Staffing  Performed: Anesthesiologist   Anesthesiologist: Ian Lugo MD  Performed by: Ian Lugo,

## 2024-01-25 ENCOUNTER — APPOINTMENT (OUTPATIENT)
Dept: GENERAL RADIOLOGY | Age: 79
DRG: 266 | End: 2024-01-25
Payer: MEDICARE

## 2024-01-25 ENCOUNTER — ANESTHESIA (OUTPATIENT)
Dept: CARDIAC CATH/INVASIVE PROCEDURES | Age: 79
End: 2024-01-25
Payer: MEDICARE

## 2024-01-25 ENCOUNTER — ANESTHESIA EVENT (OUTPATIENT)
Dept: CARDIAC CATH/INVASIVE PROCEDURES | Age: 79
End: 2024-01-25
Payer: MEDICARE

## 2024-01-25 PROBLEM — D69.6 THROMBOCYTOPENIA (HCC): Status: ACTIVE | Noted: 2024-01-25

## 2024-01-25 PROBLEM — D50.9 IRON DEFICIENCY ANEMIA: Status: ACTIVE | Noted: 2022-01-23

## 2024-01-25 PROBLEM — I48.91 ATRIAL FIBRILLATION WITH RVR (HCC): Status: RESOLVED | Noted: 2024-01-15 | Resolved: 2024-01-25

## 2024-01-25 PROBLEM — E87.70 HYPERVOLEMIA: Status: RESOLVED | Noted: 2024-01-18 | Resolved: 2024-01-25

## 2024-01-25 PROBLEM — I49.5 SSS (SICK SINUS SYNDROME) (HCC): Status: ACTIVE | Noted: 2024-01-25

## 2024-01-25 PROBLEM — J96.20 ACUTE ON CHRONIC RESPIRATORY FAILURE (HCC): Status: RESOLVED | Noted: 2024-01-14 | Resolved: 2024-01-25

## 2024-01-25 PROBLEM — J90 PLEURAL EFFUSION ON LEFT: Status: ACTIVE | Noted: 2024-01-25

## 2024-01-25 LAB
ANION GAP SERPL CALC-SCNC: 2 MMOL/L (ref 2–11)
BASOPHILS # BLD: 0 K/UL (ref 0–0.2)
BASOPHILS NFR BLD: 0 % (ref 0–2)
BUN SERPL-MCNC: 32 MG/DL (ref 8–23)
CALCIUM SERPL-MCNC: 8.7 MG/DL (ref 8.3–10.4)
CHLORIDE SERPL-SCNC: 107 MMOL/L (ref 103–113)
CO2 SERPL-SCNC: 32 MMOL/L (ref 21–32)
CREAT SERPL-MCNC: 0.9 MG/DL (ref 0.6–1)
DIFFERENTIAL METHOD BLD: ABNORMAL
ECHO AV ACCELERATION TIME: 57.09 MS
ECHO AV MEAN GRADIENT: 5 MMHG
ECHO AV MEAN VELOCITY: 1 M/S
ECHO AV PEAK GRADIENT: 11 MMHG
ECHO AV PEAK VELOCITY: 1.7 M/S
ECHO AV VELOCITY RATIO: 0.76
ECHO AV VTI: 28.8 CM
ECHO BSA: 1.76 M2
ECHO BSA: 1.76 M2
ECHO LVOT AV VTI INDEX: 0.88
ECHO LVOT MEAN GRADIENT: 3 MMHG
ECHO LVOT PEAK GRADIENT: 7 MMHG
ECHO LVOT PEAK VELOCITY: 1.3 M/S
ECHO LVOT VTI: 25.2 CM
ECHO MV AREA PHT: 4.3 CM2
ECHO MV E DECELERATION TIME (DT): 174.6 MS
ECHO MV LVOT VTI INDEX: 0.83
ECHO MV MAX VELOCITY: 1.1 M/S
ECHO MV MEAN GRADIENT: 2 MMHG
ECHO MV MEAN VELOCITY: 0.6 M/S
ECHO MV PEAK GRADIENT: 5 MMHG
ECHO MV PRESSURE HALF TIME (PHT): 50.6 MS
ECHO MV VTI: 21 CM
ECHO TV REGURGITANT MAX VELOCITY: 2.98 M/S
ECHO TV REGURGITANT PEAK GRADIENT: 36 MMHG
EKG ATRIAL RATE: 66 BPM
EKG DIAGNOSIS: NORMAL
EKG P-R INTERVAL: 104 MS
EKG Q-T INTERVAL: 454 MS
EKG QRS DURATION: 80 MS
EKG QTC CALCULATION (BAZETT): 475 MS
EKG R AXIS: 19 DEGREES
EKG T AXIS: 72 DEGREES
EKG VENTRICULAR RATE: 66 BPM
EOSINOPHIL # BLD: 0.1 K/UL (ref 0–0.8)
EOSINOPHIL NFR BLD: 1 % (ref 0.5–7.8)
ERYTHROCYTE [DISTWIDTH] IN BLOOD BY AUTOMATED COUNT: 20.1 % (ref 11.9–14.6)
GLUCOSE BLD STRIP.AUTO-MCNC: 145 MG/DL (ref 65–100)
GLUCOSE BLD STRIP.AUTO-MCNC: 183 MG/DL (ref 65–100)
GLUCOSE BLD STRIP.AUTO-MCNC: 224 MG/DL (ref 65–100)
GLUCOSE SERPL-MCNC: 168 MG/DL (ref 65–100)
HCT VFR BLD AUTO: 28.1 % (ref 35.8–46.3)
HCT VFR BLD AUTO: 28.6 % (ref 35.8–46.3)
HGB BLD-MCNC: 8.1 G/DL (ref 11.7–15.4)
HGB BLD-MCNC: 8.3 G/DL (ref 11.7–15.4)
IMM GRANULOCYTES # BLD AUTO: 0 K/UL (ref 0–0.5)
IMM GRANULOCYTES NFR BLD AUTO: 1 % (ref 0–5)
INR PPP: 1.1
LYMPHOCYTES # BLD: 1.1 K/UL (ref 0.5–4.6)
LYMPHOCYTES NFR BLD: 21 % (ref 13–44)
MAGNESIUM SERPL-MCNC: 2.1 MG/DL (ref 1.8–2.4)
MCH RBC QN AUTO: 28.7 PG (ref 26.1–32.9)
MCHC RBC AUTO-ENTMCNC: 28.8 G/DL (ref 31.4–35)
MCV RBC AUTO: 99.6 FL (ref 82–102)
MONOCYTES # BLD: 0.5 K/UL (ref 0.1–1.3)
MONOCYTES NFR BLD: 10 % (ref 4–12)
NEUTS SEG # BLD: 3.4 K/UL (ref 1.7–8.2)
NEUTS SEG NFR BLD: 66 % (ref 43–78)
NRBC # BLD: 0.03 K/UL (ref 0–0.2)
PLATELET # BLD AUTO: 129 K/UL (ref 150–450)
PMV BLD AUTO: 10.1 FL (ref 9.4–12.3)
POTASSIUM SERPL-SCNC: 4.6 MMOL/L (ref 3.5–5.1)
PROTHROMBIN TIME: 14.7 SEC (ref 11.3–14.9)
RBC # BLD AUTO: 2.82 M/UL (ref 4.05–5.2)
SERVICE CMNT-IMP: ABNORMAL
SODIUM SERPL-SCNC: 141 MMOL/L (ref 136–146)
WBC # BLD AUTO: 5.2 K/UL (ref 4.3–11.1)

## 2024-01-25 PROCEDURE — 33208 INSRT HEART PM ATRIAL & VENT: CPT | Performed by: INTERNAL MEDICINE

## 2024-01-25 PROCEDURE — 2580000003 HC RX 258: Performed by: INTERNAL MEDICINE

## 2024-01-25 PROCEDURE — 2700000000 HC OXYGEN THERAPY PER DAY

## 2024-01-25 PROCEDURE — 2720000010 HC SURG SUPPLY STERILE: Performed by: INTERNAL MEDICINE

## 2024-01-25 PROCEDURE — C2621 PMKR, OTHER THAN SING/DUAL: HCPCS | Performed by: INTERNAL MEDICINE

## 2024-01-25 PROCEDURE — 99232 SBSQ HOSP IP/OBS MODERATE 35: CPT | Performed by: INTERNAL MEDICINE

## 2024-01-25 PROCEDURE — 6360000002 HC RX W HCPCS: Performed by: NURSE ANESTHETIST, CERTIFIED REGISTERED

## 2024-01-25 PROCEDURE — 71045 X-RAY EXAM CHEST 1 VIEW: CPT

## 2024-01-25 PROCEDURE — C1892 INTRO/SHEATH,FIXED,PEEL-AWAY: HCPCS | Performed by: INTERNAL MEDICINE

## 2024-01-25 PROCEDURE — 6370000000 HC RX 637 (ALT 250 FOR IP): Performed by: FAMILY MEDICINE

## 2024-01-25 PROCEDURE — 85610 PROTHROMBIN TIME: CPT

## 2024-01-25 PROCEDURE — 3700000001 HC ADD 15 MINUTES (ANESTHESIA): Performed by: INTERNAL MEDICINE

## 2024-01-25 PROCEDURE — C1769 GUIDE WIRE: HCPCS | Performed by: INTERNAL MEDICINE

## 2024-01-25 PROCEDURE — 33225 L VENTRIC PACING LEAD ADD-ON: CPT | Performed by: INTERNAL MEDICINE

## 2024-01-25 PROCEDURE — 83735 ASSAY OF MAGNESIUM: CPT

## 2024-01-25 PROCEDURE — 02H63JZ INSERTION OF PACEMAKER LEAD INTO RIGHT ATRIUM, PERCUTANEOUS APPROACH: ICD-10-PCS | Performed by: INTERNAL MEDICINE

## 2024-01-25 PROCEDURE — 85025 COMPLETE CBC W/AUTO DIFF WBC: CPT

## 2024-01-25 PROCEDURE — 2580000003 HC RX 258: Performed by: NURSE ANESTHETIST, CERTIFIED REGISTERED

## 2024-01-25 PROCEDURE — 2500000003 HC RX 250 WO HCPCS: Performed by: INTERNAL MEDICINE

## 2024-01-25 PROCEDURE — 85018 HEMOGLOBIN: CPT

## 2024-01-25 PROCEDURE — 2709999900 HC NON-CHARGEABLE SUPPLY: Performed by: INTERNAL MEDICINE

## 2024-01-25 PROCEDURE — C1894 INTRO/SHEATH, NON-LASER: HCPCS | Performed by: INTERNAL MEDICINE

## 2024-01-25 PROCEDURE — C1732 CATH, EP, DIAG/ABL, 3D/VECT: HCPCS | Performed by: INTERNAL MEDICINE

## 2024-01-25 PROCEDURE — 6370000000 HC RX 637 (ALT 250 FOR IP): Performed by: INTERNAL MEDICINE

## 2024-01-25 PROCEDURE — 82962 GLUCOSE BLOOD TEST: CPT

## 2024-01-25 PROCEDURE — C1900 LEAD, CORONARY VENOUS: HCPCS | Performed by: INTERNAL MEDICINE

## 2024-01-25 PROCEDURE — 6360000002 HC RX W HCPCS: Performed by: INTERNAL MEDICINE

## 2024-01-25 PROCEDURE — 93005 ELECTROCARDIOGRAM TRACING: CPT | Performed by: INTERNAL MEDICINE

## 2024-01-25 PROCEDURE — 80048 BASIC METABOLIC PNL TOTAL CA: CPT

## 2024-01-25 PROCEDURE — 2500000003 HC RX 250 WO HCPCS: Performed by: FAMILY MEDICINE

## 2024-01-25 PROCEDURE — 02H43JZ INSERTION OF PACEMAKER LEAD INTO CORONARY VEIN, PERCUTANEOUS APPROACH: ICD-10-PCS | Performed by: INTERNAL MEDICINE

## 2024-01-25 PROCEDURE — 2580000003 HC RX 258: Performed by: FAMILY MEDICINE

## 2024-01-25 PROCEDURE — 93650 ICAR CATH ABLTJ AV NODE FUNC: CPT | Performed by: INTERNAL MEDICINE

## 2024-01-25 PROCEDURE — 6370000000 HC RX 637 (ALT 250 FOR IP): Performed by: STUDENT IN AN ORGANIZED HEALTH CARE EDUCATION/TRAINING PROGRAM

## 2024-01-25 PROCEDURE — 02583ZZ DESTRUCTION OF CONDUCTION MECHANISM, PERCUTANEOUS APPROACH: ICD-10-PCS | Performed by: INTERNAL MEDICINE

## 2024-01-25 PROCEDURE — 93600 BUNDLE OF HIS RECORDING: CPT | Performed by: INTERNAL MEDICINE

## 2024-01-25 PROCEDURE — 0JH607Z INSERTION OF CARDIAC RESYNCHRONIZATION PACEMAKER PULSE GENERATOR INTO CHEST SUBCUTANEOUS TISSUE AND FASCIA, OPEN APPROACH: ICD-10-PCS | Performed by: INTERNAL MEDICINE

## 2024-01-25 PROCEDURE — C1898 LEAD, PMKR, OTHER THAN TRANS: HCPCS | Performed by: INTERNAL MEDICINE

## 2024-01-25 PROCEDURE — 3700000000 HC ANESTHESIA ATTENDED CARE: Performed by: INTERNAL MEDICINE

## 2024-01-25 PROCEDURE — 2500000003 HC RX 250 WO HCPCS: Performed by: NURSE ANESTHETIST, CERTIFIED REGISTERED

## 2024-01-25 PROCEDURE — 6360000004 HC RX CONTRAST MEDICATION: Performed by: INTERNAL MEDICINE

## 2024-01-25 PROCEDURE — 02K83ZZ MAP CONDUCTION MECHANISM, PERCUTANEOUS APPROACH: ICD-10-PCS | Performed by: INTERNAL MEDICINE

## 2024-01-25 PROCEDURE — 93010 ELECTROCARDIOGRAM REPORT: CPT | Performed by: INTERNAL MEDICINE

## 2024-01-25 PROCEDURE — 02HK3JZ INSERTION OF PACEMAKER LEAD INTO RIGHT VENTRICLE, PERCUTANEOUS APPROACH: ICD-10-PCS | Performed by: INTERNAL MEDICINE

## 2024-01-25 PROCEDURE — 6370000000 HC RX 637 (ALT 250 FOR IP): Performed by: HOSPITALIST

## 2024-01-25 PROCEDURE — A4217 STERILE WATER/SALINE, 500 ML: HCPCS | Performed by: INTERNAL MEDICINE

## 2024-01-25 PROCEDURE — 85014 HEMATOCRIT: CPT

## 2024-01-25 PROCEDURE — C1760 CLOSURE DEV, VASC: HCPCS | Performed by: INTERNAL MEDICINE

## 2024-01-25 PROCEDURE — 2100000001 HC CVICU R&B

## 2024-01-25 DEVICE — CARDIAC RESYNCHRONIZATION THERAPY PACEMAKER
Type: IMPLANTABLE DEVICE | Status: FUNCTIONAL
Brand: VISIONIST™ X4 CRT-P

## 2024-01-25 DEVICE — PACE/SENSE LEAD
Type: IMPLANTABLE DEVICE | Status: FUNCTIONAL
Brand: INGEVITY™+

## 2024-01-25 DEVICE — PACE/SENSE LEAD
Type: IMPLANTABLE DEVICE | Status: FUNCTIONAL
Brand: ACUITY™ X4 STRAIGHT

## 2024-01-25 RX ORDER — ONDANSETRON 2 MG/ML
4 INJECTION INTRAMUSCULAR; INTRAVENOUS
Status: CANCELLED | OUTPATIENT
Start: 2024-01-25 | End: 2024-01-26

## 2024-01-25 RX ORDER — TRAMADOL HYDROCHLORIDE 50 MG/1
100 TABLET ORAL EVERY 6 HOURS PRN
Status: DISCONTINUED | OUTPATIENT
Start: 2024-01-25 | End: 2024-01-30 | Stop reason: HOSPADM

## 2024-01-25 RX ORDER — CEFAZOLIN SODIUM 1 G/3ML
INJECTION, POWDER, FOR SOLUTION INTRAMUSCULAR; INTRAVENOUS PRN
Status: DISCONTINUED | OUTPATIENT
Start: 2024-01-25 | End: 2024-01-25 | Stop reason: SDUPTHER

## 2024-01-25 RX ORDER — SODIUM CHLORIDE 0.9 % (FLUSH) 0.9 %
5-40 SYRINGE (ML) INJECTION PRN
Status: CANCELLED | OUTPATIENT
Start: 2024-01-25

## 2024-01-25 RX ORDER — PROPOFOL 10 MG/ML
INJECTION, EMULSION INTRAVENOUS PRN
Status: DISCONTINUED | OUTPATIENT
Start: 2024-01-25 | End: 2024-01-25 | Stop reason: SDUPTHER

## 2024-01-25 RX ORDER — LIDOCAINE HYDROCHLORIDE 10 MG/ML
1 INJECTION, SOLUTION INFILTRATION; PERINEURAL
Status: CANCELLED | OUTPATIENT
Start: 2024-01-25 | End: 2024-01-26

## 2024-01-25 RX ORDER — MAGNESIUM HYDROXIDE/ALUMINUM HYDROXICE/SIMETHICONE 120; 1200; 1200 MG/30ML; MG/30ML; MG/30ML
15 SUSPENSION ORAL EVERY 6 HOURS PRN
Status: DISCONTINUED | OUTPATIENT
Start: 2024-01-25 | End: 2024-01-30 | Stop reason: HOSPADM

## 2024-01-25 RX ORDER — SODIUM CHLORIDE, SODIUM LACTATE, POTASSIUM CHLORIDE, CALCIUM CHLORIDE 600; 310; 30; 20 MG/100ML; MG/100ML; MG/100ML; MG/100ML
INJECTION, SOLUTION INTRAVENOUS CONTINUOUS PRN
Status: DISCONTINUED | OUTPATIENT
Start: 2024-01-25 | End: 2024-01-25 | Stop reason: SDUPTHER

## 2024-01-25 RX ORDER — HYDROMORPHONE HYDROCHLORIDE 2 MG/ML
0.25 INJECTION, SOLUTION INTRAMUSCULAR; INTRAVENOUS; SUBCUTANEOUS EVERY 5 MIN PRN
Status: CANCELLED | OUTPATIENT
Start: 2024-01-25

## 2024-01-25 RX ORDER — SODIUM CHLORIDE 0.9 % (FLUSH) 0.9 %
5-40 SYRINGE (ML) INJECTION EVERY 12 HOURS SCHEDULED
Status: DISCONTINUED | OUTPATIENT
Start: 2024-01-25 | End: 2024-01-30 | Stop reason: HOSPADM

## 2024-01-25 RX ORDER — ACETAMINOPHEN 325 MG/1
650 TABLET ORAL EVERY 4 HOURS PRN
Status: DISCONTINUED | OUTPATIENT
Start: 2024-01-25 | End: 2024-01-25

## 2024-01-25 RX ORDER — SODIUM CHLORIDE 0.9 % (FLUSH) 0.9 %
5-40 SYRINGE (ML) INJECTION PRN
Status: DISCONTINUED | OUTPATIENT
Start: 2024-01-25 | End: 2024-01-30 | Stop reason: HOSPADM

## 2024-01-25 RX ORDER — PHENYLEPHRINE HYDROCHLORIDE 10 MG/ML
INJECTION INTRAVENOUS PRN
Status: DISCONTINUED | OUTPATIENT
Start: 2024-01-25 | End: 2024-01-25 | Stop reason: SDUPTHER

## 2024-01-25 RX ORDER — SODIUM CHLORIDE 0.9 % (FLUSH) 0.9 %
5-40 SYRINGE (ML) INJECTION EVERY 12 HOURS SCHEDULED
Status: CANCELLED | OUTPATIENT
Start: 2024-01-25

## 2024-01-25 RX ORDER — WARFARIN SODIUM 5 MG/1
5 TABLET ORAL DAILY
Status: DISCONTINUED | OUTPATIENT
Start: 2024-01-25 | End: 2024-01-26

## 2024-01-25 RX ORDER — HALOPERIDOL 5 MG/ML
1 INJECTION INTRAMUSCULAR
Status: CANCELLED | OUTPATIENT
Start: 2024-01-25 | End: 2024-01-26

## 2024-01-25 RX ORDER — SODIUM CHLORIDE 9 MG/ML
INJECTION, SOLUTION INTRAVENOUS PRN
Status: CANCELLED | OUTPATIENT
Start: 2024-01-25

## 2024-01-25 RX ORDER — SODIUM CHLORIDE, SODIUM LACTATE, POTASSIUM CHLORIDE, CALCIUM CHLORIDE 600; 310; 30; 20 MG/100ML; MG/100ML; MG/100ML; MG/100ML
INJECTION, SOLUTION INTRAVENOUS CONTINUOUS
Status: CANCELLED | OUTPATIENT
Start: 2024-01-25

## 2024-01-25 RX ORDER — OXYCODONE HYDROCHLORIDE 5 MG/1
5 TABLET ORAL
Status: CANCELLED | OUTPATIENT
Start: 2024-01-25 | End: 2024-01-26

## 2024-01-25 RX ORDER — POLYETHYLENE GLYCOL 3350 17 G/17G
17 POWDER, FOR SOLUTION ORAL DAILY PRN
Status: DISCONTINUED | OUTPATIENT
Start: 2024-01-25 | End: 2024-01-25

## 2024-01-25 RX ORDER — DEXTROSE MONOHYDRATE 100 MG/ML
INJECTION, SOLUTION INTRAVENOUS CONTINUOUS PRN
Status: CANCELLED | OUTPATIENT
Start: 2024-01-25

## 2024-01-25 RX ORDER — SODIUM CHLORIDE 9 MG/ML
INJECTION, SOLUTION INTRAVENOUS PRN
Status: DISCONTINUED | OUTPATIENT
Start: 2024-01-25 | End: 2024-01-26

## 2024-01-25 RX ORDER — EPHEDRINE SULFATE/0.9% NACL/PF 50 MG/5 ML
SYRINGE (ML) INTRAVENOUS PRN
Status: DISCONTINUED | OUTPATIENT
Start: 2024-01-25 | End: 2024-01-25 | Stop reason: SDUPTHER

## 2024-01-25 RX ORDER — TRAMADOL HYDROCHLORIDE 50 MG/1
50 TABLET ORAL EVERY 6 HOURS PRN
Status: DISCONTINUED | OUTPATIENT
Start: 2024-01-25 | End: 2024-01-30 | Stop reason: HOSPADM

## 2024-01-25 RX ORDER — ONDANSETRON 2 MG/ML
4 INJECTION INTRAMUSCULAR; INTRAVENOUS EVERY 6 HOURS PRN
Status: DISCONTINUED | OUTPATIENT
Start: 2024-01-25 | End: 2024-01-25

## 2024-01-25 RX ADMIN — PROPOFOL 50 MG: 10 INJECTION, EMULSION INTRAVENOUS at 17:16

## 2024-01-25 RX ADMIN — TRAMADOL HYDROCHLORIDE 50 MG: 50 TABLET ORAL at 20:56

## 2024-01-25 RX ADMIN — SODIUM CHLORIDE, PRESERVATIVE FREE 20 ML: 5 INJECTION INTRAVENOUS at 12:43

## 2024-01-25 RX ADMIN — PHENYLEPHRINE HYDROCHLORIDE 100 MCG: 10 INJECTION INTRAVENOUS at 18:10

## 2024-01-25 RX ADMIN — INSULIN LISPRO 2 UNITS: 100 INJECTION, SOLUTION INTRAVENOUS; SUBCUTANEOUS at 12:34

## 2024-01-25 RX ADMIN — PHENYLEPHRINE HYDROCHLORIDE 100 MCG: 10 INJECTION INTRAVENOUS at 18:18

## 2024-01-25 RX ADMIN — DOCUSATE SODIUM 100 MG: 100 CAPSULE, LIQUID FILLED ORAL at 08:36

## 2024-01-25 RX ADMIN — PHENYLEPHRINE HYDROCHLORIDE 100 MCG: 10 INJECTION INTRAVENOUS at 18:05

## 2024-01-25 RX ADMIN — WARFARIN SODIUM 5 MG: 5 TABLET ORAL at 21:34

## 2024-01-25 RX ADMIN — PANTOPRAZOLE SODIUM 40 MG: 40 TABLET, DELAYED RELEASE ORAL at 08:36

## 2024-01-25 RX ADMIN — Medication 3 MG: at 22:18

## 2024-01-25 RX ADMIN — PHENYLEPHRINE HYDROCHLORIDE 100 MCG: 10 INJECTION INTRAVENOUS at 18:36

## 2024-01-25 RX ADMIN — PROPOFOL 120 MCG/KG/MIN: 10 INJECTION, EMULSION INTRAVENOUS at 17:17

## 2024-01-25 RX ADMIN — METOPROLOL TARTRATE 2.5 MG: 5 INJECTION INTRAVENOUS at 14:13

## 2024-01-25 RX ADMIN — SODIUM CHLORIDE, SODIUM LACTATE, POTASSIUM CHLORIDE, AND CALCIUM CHLORIDE: 600; 310; 30; 20 INJECTION, SOLUTION INTRAVENOUS at 17:01

## 2024-01-25 RX ADMIN — Medication 10 MG: at 18:45

## 2024-01-25 RX ADMIN — Medication 10 MG: at 18:18

## 2024-01-25 RX ADMIN — PREDNISONE 5 MG: 5 TABLET ORAL at 08:36

## 2024-01-25 RX ADMIN — CEFAZOLIN SODIUM 2 G: 1 INJECTION, POWDER, FOR SOLUTION INTRAMUSCULAR; INTRAVENOUS at 17:30

## 2024-01-25 RX ADMIN — Medication 5 MG: at 18:36

## 2024-01-25 RX ADMIN — SODIUM CHLORIDE, PRESERVATIVE FREE 10 ML: 5 INJECTION INTRAVENOUS at 08:30

## 2024-01-25 RX ADMIN — DOFETILIDE 500 MCG: 0.5 CAPSULE ORAL at 08:37

## 2024-01-25 RX ADMIN — PHENYLEPHRINE HYDROCHLORIDE 100 MCG: 10 INJECTION INTRAVENOUS at 18:29

## 2024-01-25 RX ADMIN — DOCUSATE SODIUM 100 MG: 100 CAPSULE, LIQUID FILLED ORAL at 22:19

## 2024-01-25 RX ADMIN — METOPROLOL TARTRATE 25 MG: 25 TABLET, FILM COATED ORAL at 21:13

## 2024-01-25 RX ADMIN — FUROSEMIDE 40 MG: 10 INJECTION, SOLUTION INTRAVENOUS at 08:36

## 2024-01-25 RX ADMIN — LEVOTHYROXINE SODIUM 125 MCG: 0.07 TABLET ORAL at 08:36

## 2024-01-25 RX ADMIN — ASPIRIN 81 MG: 81 TABLET, COATED ORAL at 08:36

## 2024-01-25 ASSESSMENT — PAIN DESCRIPTION - ORIENTATION
ORIENTATION: LEFT
ORIENTATION: LEFT

## 2024-01-25 ASSESSMENT — PAIN SCALES - GENERAL
PAINLEVEL_OUTOF10: 0
PAINLEVEL_OUTOF10: 0
PAINLEVEL_OUTOF10: 3
PAINLEVEL_OUTOF10: 3
PAINLEVEL_OUTOF10: 0
PAINLEVEL_OUTOF10: 0
PAINLEVEL_OUTOF10: 4
PAINLEVEL_OUTOF10: 0
PAINLEVEL_OUTOF10: 0
PAINLEVEL_OUTOF10: 3
PAINLEVEL_OUTOF10: 0
PAINLEVEL_OUTOF10: 0

## 2024-01-25 ASSESSMENT — PAIN DESCRIPTION - LOCATION
LOCATION: CHEST
LOCATION: CHEST

## 2024-01-25 NOTE — DISCHARGE INSTRUCTIONS
HOME INSTRUCTION FOLLOWING TRANSCATHETER AORTIC VALVE REPLACEMENT (TAVR) and Transcatheter Mitral Valve replacement (TMVR)      What is my main problem?    You have just had your diseased aortic and mitral valve replaced with an artificial valve. Below is what you need to do when you go home.      What do I need to do?    ACTIVITY:  · Weigh yourself every day in the morning after using the bathroom.   · Get up and get dressed every day. Do not stay in bed.  · Set a daily routine.  Have someone stay with you for the first 5 days, do not be alone for long periods of time  · NO DRIVING For 5 days or until you are no longer sore. You may ride in a car.  · Please let your doctor know if you need to leave town before your first follow-up visit.  · Do NOT lift more than ten pounds, No Straining, Stooping, or Squatting for one week.  · You may walk up and down a few steps but don't do a lot of stairs  · Walk as much as you can.  When you are tired, rest.  · Cough and deep breath and use your incentive spirometer 10 times each hour when you are awake.  We encourage Cardiac rehab, this usually STARTS a couple weeks after your TAVR      DIET:  · We recommend the Cardiac diet. Your nurse can provide a printed handout to you on this diet.    INCISION CARE:  · Shower every day. You may shower after you go home  No tub baths for the first week you are at home.  · Cleanse wounds with mild soap and water, pat dry. Keep wounds dry. If you need to add a Band-Aid you can but no ointments, powders, neosporin, or lotions.  · A small amount of bloody or clear drainage is normal.  · Watch for signs of infection: redness, incision hot to the touch, fever greater than 101 degrees, swelling at the groin incision site, or discolored drainage from your incision.    MEDICATIONS:  · DO NOT STOP TAKING YOUR MEDICINES WITHOUT SPEAKING WITH YOUR CARDIOLOGIST!  · Take your pills as ordered at the time of discharge.  · Do  not stop taking any medicine

## 2024-01-25 NOTE — ANESTHESIA PRE PROCEDURE
reviewed    Cleared by cardiology           ROS comment: CLAUDIO 1/16/24: Left Ventricle: Normal left ventricular systolic function with a visually estimated EF of 55 - 60%. Left ventricle size is normal.  ·  Aortic Valve: There is a #21 mm Doreen Oliveira bioprosthetic valve which appears adequately seated.  The leaflets appear thickened and restricted consistent with leaflet degeneration.  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  ·  Mitral Valve: There is a #25mm Doreen Oliveira bioprosthetic valve which appears adequately seated.  Leaflets appear thickened and calcified consistent with leaflet degeneration.  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  ·  Tricuspid Valve: The estimated RVSP is severely elevated at around 58 mmHg.  ·  Left Atrium: Left atrium is severely dilated.       Neuro/Psych:   Negative Neuro/Psych ROS              GI/Hepatic/Renal:   (+) GERD: well controlled,           Endo/Other:    (+) DiabetesType II DM, well controlled, , hypothyroidism, blood dyscrasia (Hb 8.7): anemia:., .                 Abdominal:             Vascular: negative vascular ROS.         Other Findings:             Anesthesia Plan      TIVA     ASA 3       Induction: intravenous.      Anesthetic plan and risks discussed with patient, spouse and child/children.    Use of blood products discussed with patient whom consented to blood products.                     STEPH BAUER IV, MD   1/25/2024

## 2024-01-25 NOTE — PROCEDURES
Procedure Note - Laceration Repair:  performed by: Walker Riddle MD   Complexity: Simple  0.5 cm straight laceration/percutaneous catheter insertion site to right groin    Skin prepped with chlorhexidine which was allowed to dry.  Sterile field established.    Anesthesia achieved with 1 mLs of 1% lidocaine with epinephrine using superficial infiltration.      Wound was closed with 3.0 silk (x 2) simple interrupted suture.  Sterile dressing applied.    Estimated blood loss: 5 mL  The procedure took 5 minutes, and pt tolerated well.    Continue to monitor for bleeding or hematoma.  Silk sutures need to be removed within 1 week.         
 0.4 0.4 626 3.5 0.4   RV 24.0 0.5 0.4 753 3.5 0.4   LV 3.9 2.3 0.8 472 3.5 0.8   LV configuration of LV Ring 4 -> Can    Bradycardia Settings  Micha Mode LRL URL Pace AVD (ms) Sense AVD (ms) Rate Response Mode Switching Mode SW Rate   DDDR 80 120 180 120 On On 150     IMPRESSION: Successful implantation of [employer] biventricular permanent pacemaker (MRI conditional). Successful AV node ablation.     PLAN:  -Overnight observation.  -Routine CIED instructions.  -Device clinic follow up in 1-2 weeks.  -Routine cardiac care per Dr. Swenson.    Abdon Buckner MD, MS  Clinical Cardiac Electrophysiology  [facility]

## 2024-01-25 NOTE — MANAGEMENT PLAN
78 year old female with persistent AF with severe valvular disease s/p TAVR and TMVR with recurrent AF with RVR. We discussed options and she has opted for the pace/ablate procedure with a CRT-P/AVJ ablation which we will plan to perform today.    -Hold OAC for now. Would advise against use of Lovenox or heparin after procedure and would not bridge warfarin as risk for wound hematoma rises significantly.   -NPO  -Plan for pace/ablate as above.     PM/CRT & AVN Ablation  I discussed in detail the potential benefits of PM/CRT therapy with AV damien ablation, including definitive rate control with possible improved mortality, prevention of SCD,  decreased hospitalization, beneficial cardiac remodeling, and prevention of disease progression. Additionally, I discussed with the patient the potential risks of PM/CRT implantation and subsequent AVN ablation, including the risk of bleeding, infection, venous occlusion, DVT/PE, pneumothorax, cardiac tamponade, perforation, need for urgent open heart surgery, device/lead failure or lead dislodgement with pacemaker dependence, inability to place an LV lead via the coronary sinus, inappropriate shock(s), heart attack, stroke, arrhythmia, radiation skin injury, kidney damage/failure, oversedation, respiratory arrest, and even death.  The patient understands these risks in the context of the potential benefits of the device implantation and subsequent ablation, and agrees to proceed.      DEVICE on OAC  The patient has persistent AF and is on oral anticoagulation therapy (OAC), and we reviewed the small increased risk of CVA with DFT testing, especially if OAC is discontinued. The patient is aware of the small increased  of significant hematoma and bleeding, but we reviewed the recent data suggesting that device implantation on OAC is safe and reasonable in these cases.    Abdon Buckner MD, MS  Clinical Cardiac Electrophysiology  [facility]

## 2024-01-25 NOTE — INTERDISCIPLINARY ROUNDS
Multi-D Rounds/Checklist (leapfrog):  Lines: can any be removed?: None- no central line in place, love to be removed after procedure   Urinary Catheter 01/23/24 (Active)     Introducer 01/23/24 (Active)       CVC Single Lumen 01/23/24 Internal jugular (Active)     DVT Prophylaxis: Ordered  Vent: N/A  Nutrition Ordered/appropriate: Ordered  Can antibiotics or other drugs be stopped? Yes/End Date set Yes/No  Inpat Anti-Infectives (From admission, onward)      None          Consults needed: None  A: Is pain control adequate? (has PRNs? Stop drip?) Yes  B: Sedation break and SBT? N/A  C: Is sedation choice appropriate? N/A  D: Delirium/CAM-ICU? No  E: Mobility goals/appropriateness? Yes  F: Family update and plan? spouse is surrogate decision maker and is being updated daily by primary attending and nursing staff.    Rossy Pradhan, APRN - CNP

## 2024-01-26 LAB
ABO + RH BLD: NORMAL
ANION GAP SERPL CALC-SCNC: 4 MMOL/L (ref 2–11)
BASOPHILS # BLD: 0 K/UL (ref 0–0.2)
BASOPHILS NFR BLD: 0 % (ref 0–2)
BLD PROD TYP BPU: NORMAL
BLOOD BANK DISPENSE STATUS: NORMAL
BLOOD GROUP ANTIBODIES SERPL: NORMAL
BPU ID: NORMAL
BUN SERPL-MCNC: 25 MG/DL (ref 8–23)
CALCIUM SERPL-MCNC: 8.6 MG/DL (ref 8.3–10.4)
CHLORIDE SERPL-SCNC: 104 MMOL/L (ref 103–113)
CO2 SERPL-SCNC: 32 MMOL/L (ref 21–32)
CREAT SERPL-MCNC: 0.8 MG/DL (ref 0.6–1)
CROSSMATCH RESULT: NORMAL
DIFFERENTIAL METHOD BLD: ABNORMAL
EKG ATRIAL RATE: 68 BPM
EKG ATRIAL RATE: 70 BPM
EKG DIAGNOSIS: NORMAL
EKG DIAGNOSIS: NORMAL
EKG Q-T INTERVAL: 468 MS
EKG Q-T INTERVAL: 486 MS
EKG QRS DURATION: 140 MS
EKG QRS DURATION: 142 MS
EKG QTC CALCULATION (BAZETT): 522 MS
EKG QTC CALCULATION (BAZETT): 524 MS
EKG R AXIS: -40 DEGREES
EKG R AXIS: -44 DEGREES
EKG T AXIS: 79 DEGREES
EKG T AXIS: 88 DEGREES
EKG VENTRICULAR RATE: 70 BPM
EKG VENTRICULAR RATE: 75 BPM
EOSINOPHIL # BLD: 0.1 K/UL (ref 0–0.8)
EOSINOPHIL NFR BLD: 1 % (ref 0.5–7.8)
ERYTHROCYTE [DISTWIDTH] IN BLOOD BY AUTOMATED COUNT: 20.1 % (ref 11.9–14.6)
GLUCOSE BLD STRIP.AUTO-MCNC: 149 MG/DL (ref 65–100)
GLUCOSE BLD STRIP.AUTO-MCNC: 204 MG/DL (ref 65–100)
GLUCOSE BLD STRIP.AUTO-MCNC: 228 MG/DL (ref 65–100)
GLUCOSE BLD STRIP.AUTO-MCNC: 275 MG/DL (ref 65–100)
GLUCOSE BLD STRIP.AUTO-MCNC: 309 MG/DL (ref 65–100)
GLUCOSE SERPL-MCNC: 143 MG/DL (ref 65–100)
HCT VFR BLD AUTO: 28.7 % (ref 35.8–46.3)
HGB BLD-MCNC: 8.3 G/DL (ref 11.7–15.4)
IMM GRANULOCYTES # BLD AUTO: 0.1 K/UL (ref 0–0.5)
IMM GRANULOCYTES NFR BLD AUTO: 1 % (ref 0–5)
INR PPP: 1.1
LYMPHOCYTES # BLD: 1.4 K/UL (ref 0.5–4.6)
LYMPHOCYTES NFR BLD: 19 % (ref 13–44)
MAGNESIUM SERPL-MCNC: 2 MG/DL (ref 1.8–2.4)
MCH RBC QN AUTO: 29 PG (ref 26.1–32.9)
MCHC RBC AUTO-ENTMCNC: 28.9 G/DL (ref 31.4–35)
MCV RBC AUTO: 100.3 FL (ref 82–102)
MONOCYTES # BLD: 0.7 K/UL (ref 0.1–1.3)
MONOCYTES NFR BLD: 9 % (ref 4–12)
NEUTS SEG # BLD: 5.1 K/UL (ref 1.7–8.2)
NEUTS SEG NFR BLD: 69 % (ref 43–78)
NRBC # BLD: 0.04 K/UL (ref 0–0.2)
PLATELET # BLD AUTO: 142 K/UL (ref 150–450)
PMV BLD AUTO: 10.4 FL (ref 9.4–12.3)
POTASSIUM SERPL-SCNC: 4.4 MMOL/L (ref 3.5–5.1)
PROTHROMBIN TIME: 14.9 SEC (ref 11.3–14.9)
RBC # BLD AUTO: 2.86 M/UL (ref 4.05–5.2)
SERVICE CMNT-IMP: ABNORMAL
SODIUM SERPL-SCNC: 140 MMOL/L (ref 136–146)
SPECIMEN EXP DATE BLD: NORMAL
UNIT DIVISION: 0
WBC # BLD AUTO: 7.4 K/UL (ref 4.3–11.1)

## 2024-01-26 PROCEDURE — 97530 THERAPEUTIC ACTIVITIES: CPT

## 2024-01-26 PROCEDURE — 82962 GLUCOSE BLOOD TEST: CPT

## 2024-01-26 PROCEDURE — 6370000000 HC RX 637 (ALT 250 FOR IP): Performed by: FAMILY MEDICINE

## 2024-01-26 PROCEDURE — 85025 COMPLETE CBC W/AUTO DIFF WBC: CPT

## 2024-01-26 PROCEDURE — 6370000000 HC RX 637 (ALT 250 FOR IP): Performed by: HOSPITALIST

## 2024-01-26 PROCEDURE — 2580000003 HC RX 258: Performed by: INTERNAL MEDICINE

## 2024-01-26 PROCEDURE — 93005 ELECTROCARDIOGRAM TRACING: CPT | Performed by: INTERNAL MEDICINE

## 2024-01-26 PROCEDURE — 6370000000 HC RX 637 (ALT 250 FOR IP): Performed by: STUDENT IN AN ORGANIZED HEALTH CARE EDUCATION/TRAINING PROGRAM

## 2024-01-26 PROCEDURE — 80048 BASIC METABOLIC PNL TOTAL CA: CPT

## 2024-01-26 PROCEDURE — 6370000000 HC RX 637 (ALT 250 FOR IP): Performed by: INTERNAL MEDICINE

## 2024-01-26 PROCEDURE — 83735 ASSAY OF MAGNESIUM: CPT

## 2024-01-26 PROCEDURE — 97164 PT RE-EVAL EST PLAN CARE: CPT

## 2024-01-26 PROCEDURE — 85610 PROTHROMBIN TIME: CPT

## 2024-01-26 PROCEDURE — 93010 ELECTROCARDIOGRAM REPORT: CPT | Performed by: INTERNAL MEDICINE

## 2024-01-26 PROCEDURE — 2140000001 HC CVICU INTERMEDIATE R&B

## 2024-01-26 RX ORDER — FUROSEMIDE 40 MG/1
40 TABLET ORAL 2 TIMES DAILY
Status: DISCONTINUED | OUTPATIENT
Start: 2024-01-26 | End: 2024-01-28

## 2024-01-26 RX ORDER — METOPROLOL SUCCINATE 25 MG/1
25 TABLET, EXTENDED RELEASE ORAL DAILY
Status: DISCONTINUED | OUTPATIENT
Start: 2024-01-26 | End: 2024-01-30 | Stop reason: HOSPADM

## 2024-01-26 RX ORDER — FERROUS SULFATE 325(65) MG
325 TABLET ORAL 2 TIMES DAILY WITH MEALS
Status: DISCONTINUED | OUTPATIENT
Start: 2024-01-26 | End: 2024-01-30 | Stop reason: HOSPADM

## 2024-01-26 RX ADMIN — PANTOPRAZOLE SODIUM 40 MG: 40 TABLET, DELAYED RELEASE ORAL at 08:37

## 2024-01-26 RX ADMIN — INSULIN LISPRO 6 UNITS: 100 INJECTION, SOLUTION INTRAVENOUS; SUBCUTANEOUS at 11:14

## 2024-01-26 RX ADMIN — HYDROCODONE BITARTRATE AND ACETAMINOPHEN 1 TABLET: 5; 325 TABLET ORAL at 05:10

## 2024-01-26 RX ADMIN — HYDROCODONE BITARTRATE AND ACETAMINOPHEN 2 TABLET: 5; 325 TABLET ORAL at 22:46

## 2024-01-26 RX ADMIN — SODIUM CHLORIDE, PRESERVATIVE FREE 10 ML: 5 INJECTION INTRAVENOUS at 20:35

## 2024-01-26 RX ADMIN — PREDNISONE 5 MG: 5 TABLET ORAL at 08:34

## 2024-01-26 RX ADMIN — APIXABAN 5 MG: 5 TABLET, FILM COATED ORAL at 20:28

## 2024-01-26 RX ADMIN — DOFETILIDE 500 MCG: 0.5 CAPSULE ORAL at 20:28

## 2024-01-26 RX ADMIN — FERROUS SULFATE TAB 325 MG (65 MG ELEMENTAL FE) 325 MG: 325 (65 FE) TAB at 16:47

## 2024-01-26 RX ADMIN — INSULIN LISPRO 2 UNITS: 100 INJECTION, SOLUTION INTRAVENOUS; SUBCUTANEOUS at 08:32

## 2024-01-26 RX ADMIN — LEVOTHYROXINE SODIUM 125 MCG: 0.07 TABLET ORAL at 08:37

## 2024-01-26 RX ADMIN — DOCUSATE SODIUM 100 MG: 100 CAPSULE, LIQUID FILLED ORAL at 08:34

## 2024-01-26 RX ADMIN — ASPIRIN 81 MG: 81 TABLET, COATED ORAL at 08:33

## 2024-01-26 RX ADMIN — APIXABAN 5 MG: 5 TABLET, FILM COATED ORAL at 08:34

## 2024-01-26 RX ADMIN — DOFETILIDE 500 MCG: 0.5 CAPSULE ORAL at 10:33

## 2024-01-26 RX ADMIN — METOPROLOL SUCCINATE 12.5 MG: 25 TABLET, EXTENDED RELEASE ORAL at 10:22

## 2024-01-26 RX ADMIN — ATORVASTATIN CALCIUM 10 MG: 20 TABLET, FILM COATED ORAL at 16:47

## 2024-01-26 RX ADMIN — FUROSEMIDE 40 MG: 40 TABLET ORAL at 16:47

## 2024-01-26 RX ADMIN — DOCUSATE SODIUM 100 MG: 100 CAPSULE, LIQUID FILLED ORAL at 20:28

## 2024-01-26 RX ADMIN — INSULIN LISPRO 5 UNITS: 100 INJECTION, SOLUTION INTRAVENOUS; SUBCUTANEOUS at 11:14

## 2024-01-26 RX ADMIN — FERROUS SULFATE TAB 325 MG (65 MG ELEMENTAL FE) 325 MG: 325 (65 FE) TAB at 08:34

## 2024-01-26 RX ADMIN — FUROSEMIDE 40 MG: 40 TABLET ORAL at 08:34

## 2024-01-26 RX ADMIN — SODIUM CHLORIDE, PRESERVATIVE FREE 10 ML: 5 INJECTION INTRAVENOUS at 20:27

## 2024-01-26 RX ADMIN — INSULIN LISPRO 4 UNITS: 100 INJECTION, SOLUTION INTRAVENOUS; SUBCUTANEOUS at 16:52

## 2024-01-26 RX ADMIN — HYDROCODONE BITARTRATE AND ACETAMINOPHEN 1 TABLET: 5; 325 TABLET ORAL at 00:31

## 2024-01-26 RX ADMIN — INSULIN LISPRO 5 UNITS: 100 INJECTION, SOLUTION INTRAVENOUS; SUBCUTANEOUS at 16:51

## 2024-01-26 RX ADMIN — INSULIN LISPRO 5 UNITS: 100 INJECTION, SOLUTION INTRAVENOUS; SUBCUTANEOUS at 08:33

## 2024-01-26 RX ADMIN — INSULIN GLARGINE 9 UNITS: 100 INJECTION, SOLUTION SUBCUTANEOUS at 20:27

## 2024-01-26 RX ADMIN — SODIUM CHLORIDE, PRESERVATIVE FREE 10 ML: 5 INJECTION INTRAVENOUS at 09:00

## 2024-01-26 ASSESSMENT — PAIN DESCRIPTION - LOCATION
LOCATION: CHEST
LOCATION: SHOULDER;INCISION

## 2024-01-26 ASSESSMENT — PAIN SCALES - GENERAL
PAINLEVEL_OUTOF10: 6
PAINLEVEL_OUTOF10: 2
PAINLEVEL_OUTOF10: 8
PAINLEVEL_OUTOF10: 4
PAINLEVEL_OUTOF10: 2

## 2024-01-26 ASSESSMENT — PAIN DESCRIPTION - ORIENTATION: ORIENTATION: LEFT

## 2024-01-26 ASSESSMENT — PAIN DESCRIPTION - DESCRIPTORS: DESCRIPTORS: ACHING;SORE

## 2024-01-26 ASSESSMENT — PAIN DESCRIPTION - PAIN TYPE: TYPE: SURGICAL PAIN;ACUTE PAIN

## 2024-01-26 ASSESSMENT — PAIN - FUNCTIONAL ASSESSMENT: PAIN_FUNCTIONAL_ASSESSMENT: PREVENTS OR INTERFERES SOME ACTIVE ACTIVITIES AND ADLS

## 2024-01-26 NOTE — ANESTHESIA POSTPROCEDURE EVALUATION
Department of Anesthesiology  Postprocedure Note    Patient: Nery Bryant  MRN: [id number]  YOB: 1945  Date of evaluation: 1/25/2024    Procedure Summary       Date: 01/25/24 Room / Location: [place] [address] ALL EVENTS / [facility] CATH [facility]    Anesthesia Start: 1701 Anesthesia Stop: 1907    Procedures:       Insert PPM biv multi      Ablation AV node Diagnosis:       Persistent atrial fibrillation (HCC)      (A-fib (HCC) [I48.91])    Providers: Abdon Buckner MD Responsible Provider: Brandon Daly IV, MD    Anesthesia Type: TIVA ASA Status: 3            Anesthesia Type: TIVA    Lana Phase I: Lana Score: 10    Lana Phase II:      Anesthesia Post Evaluation    Patient location during evaluation: PACU  Patient participation: complete - patient participated  Level of consciousness: awake and alert  Airway patency: patent  Nausea & Vomiting: no nausea and no vomiting  Cardiovascular status: hemodynamically stable  Respiratory status: acceptable, nonlabored ventilation and spontaneous ventilation  Hydration status: euvolemic  Comments: BP (!) 119/57   Pulse 80   Temp 97.6 °F (36.4 °C) (Oral)   Resp 24   Ht 1.676 m (5' 6\")   Wt 61.1 kg (134 lb 11.2 oz)   SpO2 98%   BMI 21.74 kg/m²     Multimodal analgesia pain management approach  Pain management: adequate and satisfactory to patient        There were no known notable events for this encounter.

## 2024-01-27 LAB
ANION GAP SERPL CALC-SCNC: 5 MMOL/L (ref 2–11)
BASOPHILS # BLD: 0 K/UL (ref 0–0.2)
BASOPHILS NFR BLD: 1 % (ref 0–2)
BUN SERPL-MCNC: 30 MG/DL (ref 8–23)
CALCIUM SERPL-MCNC: 8.5 MG/DL (ref 8.3–10.4)
CHLORIDE SERPL-SCNC: 100 MMOL/L (ref 103–113)
CO2 SERPL-SCNC: 32 MMOL/L (ref 21–32)
CREAT SERPL-MCNC: 1.1 MG/DL (ref 0.6–1)
DIFFERENTIAL METHOD BLD: ABNORMAL
EKG ATRIAL RATE: 64 BPM
EKG DIAGNOSIS: NORMAL
EKG Q-T INTERVAL: 518 MS
EKG QRS DURATION: 196 MS
EKG QTC CALCULATION (BAZETT): 567 MS
EKG R AXIS: -36 DEGREES
EKG T AXIS: 93 DEGREES
EKG VENTRICULAR RATE: 72 BPM
EOSINOPHIL # BLD: 0.1 K/UL (ref 0–0.8)
EOSINOPHIL NFR BLD: 1 % (ref 0.5–7.8)
ERYTHROCYTE [DISTWIDTH] IN BLOOD BY AUTOMATED COUNT: 20.6 % (ref 11.9–14.6)
GLUCOSE BLD STRIP.AUTO-MCNC: 209 MG/DL (ref 65–100)
GLUCOSE BLD STRIP.AUTO-MCNC: 227 MG/DL (ref 65–100)
GLUCOSE BLD STRIP.AUTO-MCNC: 308 MG/DL (ref 65–100)
GLUCOSE BLD STRIP.AUTO-MCNC: 313 MG/DL (ref 65–100)
GLUCOSE SERPL-MCNC: 187 MG/DL (ref 65–100)
HCT VFR BLD AUTO: 30.5 % (ref 35.8–46.3)
HGB BLD-MCNC: 8.9 G/DL (ref 11.7–15.4)
IMM GRANULOCYTES # BLD AUTO: 0.1 K/UL (ref 0–0.5)
IMM GRANULOCYTES NFR BLD AUTO: 1 % (ref 0–5)
INR PPP: 1.5
LYMPHOCYTES # BLD: 2.4 K/UL (ref 0.5–4.6)
LYMPHOCYTES NFR BLD: 29 % (ref 13–44)
MAGNESIUM SERPL-MCNC: 1.8 MG/DL (ref 1.8–2.4)
MCH RBC QN AUTO: 29.3 PG (ref 26.1–32.9)
MCHC RBC AUTO-ENTMCNC: 29.2 G/DL (ref 31.4–35)
MCV RBC AUTO: 100.3 FL (ref 82–102)
MONOCYTES # BLD: 0.8 K/UL (ref 0.1–1.3)
MONOCYTES NFR BLD: 9 % (ref 4–12)
NEUTS SEG # BLD: 4.9 K/UL (ref 1.7–8.2)
NEUTS SEG NFR BLD: 59 % (ref 43–78)
NRBC # BLD: 0.08 K/UL (ref 0–0.2)
PLATELET # BLD AUTO: 129 K/UL (ref 150–450)
PMV BLD AUTO: 10.8 FL (ref 9.4–12.3)
POTASSIUM SERPL-SCNC: 4 MMOL/L (ref 3.5–5.1)
PROTHROMBIN TIME: 19.1 SEC (ref 11.3–14.9)
RBC # BLD AUTO: 3.04 M/UL (ref 4.05–5.2)
SERVICE CMNT-IMP: ABNORMAL
SODIUM SERPL-SCNC: 137 MMOL/L (ref 136–146)
WBC # BLD AUTO: 8.2 K/UL (ref 4.3–11.1)

## 2024-01-27 PROCEDURE — 83735 ASSAY OF MAGNESIUM: CPT

## 2024-01-27 PROCEDURE — 99232 SBSQ HOSP IP/OBS MODERATE 35: CPT | Performed by: INTERNAL MEDICINE

## 2024-01-27 PROCEDURE — 93010 ELECTROCARDIOGRAM REPORT: CPT | Performed by: INTERNAL MEDICINE

## 2024-01-27 PROCEDURE — 93005 ELECTROCARDIOGRAM TRACING: CPT | Performed by: INTERNAL MEDICINE

## 2024-01-27 PROCEDURE — 6370000000 HC RX 637 (ALT 250 FOR IP): Performed by: INTERNAL MEDICINE

## 2024-01-27 PROCEDURE — 2580000003 HC RX 258: Performed by: INTERNAL MEDICINE

## 2024-01-27 PROCEDURE — 2580000003 HC RX 258: Performed by: FAMILY MEDICINE

## 2024-01-27 PROCEDURE — 80048 BASIC METABOLIC PNL TOTAL CA: CPT

## 2024-01-27 PROCEDURE — 2140000001 HC CVICU INTERMEDIATE R&B

## 2024-01-27 PROCEDURE — 6370000000 HC RX 637 (ALT 250 FOR IP): Performed by: FAMILY MEDICINE

## 2024-01-27 PROCEDURE — 85610 PROTHROMBIN TIME: CPT

## 2024-01-27 PROCEDURE — 82962 GLUCOSE BLOOD TEST: CPT

## 2024-01-27 PROCEDURE — 36415 COLL VENOUS BLD VENIPUNCTURE: CPT

## 2024-01-27 PROCEDURE — 85025 COMPLETE CBC W/AUTO DIFF WBC: CPT

## 2024-01-27 PROCEDURE — 6370000000 HC RX 637 (ALT 250 FOR IP): Performed by: STUDENT IN AN ORGANIZED HEALTH CARE EDUCATION/TRAINING PROGRAM

## 2024-01-27 PROCEDURE — 6370000000 HC RX 637 (ALT 250 FOR IP): Performed by: HOSPITALIST

## 2024-01-27 RX ORDER — INSULIN GLARGINE 100 [IU]/ML
12 INJECTION, SOLUTION SUBCUTANEOUS NIGHTLY
Status: DISCONTINUED | OUTPATIENT
Start: 2024-01-27 | End: 2024-01-28

## 2024-01-27 RX ADMIN — DOFETILIDE 500 MCG: 0.5 CAPSULE ORAL at 20:35

## 2024-01-27 RX ADMIN — HYDROCODONE BITARTRATE AND ACETAMINOPHEN 1 TABLET: 5; 325 TABLET ORAL at 15:03

## 2024-01-27 RX ADMIN — ATORVASTATIN CALCIUM 10 MG: 20 TABLET, FILM COATED ORAL at 17:01

## 2024-01-27 RX ADMIN — SODIUM CHLORIDE, PRESERVATIVE FREE 10 ML: 5 INJECTION INTRAVENOUS at 09:10

## 2024-01-27 RX ADMIN — INSULIN LISPRO 2 UNITS: 100 INJECTION, SOLUTION INTRAVENOUS; SUBCUTANEOUS at 17:11

## 2024-01-27 RX ADMIN — HYDROCODONE BITARTRATE AND ACETAMINOPHEN 1 TABLET: 5; 325 TABLET ORAL at 04:49

## 2024-01-27 RX ADMIN — FERROUS SULFATE TAB 325 MG (65 MG ELEMENTAL FE) 325 MG: 325 (65 FE) TAB at 09:43

## 2024-01-27 RX ADMIN — FUROSEMIDE 40 MG: 40 TABLET ORAL at 09:43

## 2024-01-27 RX ADMIN — SODIUM CHLORIDE, PRESERVATIVE FREE 10 ML: 5 INJECTION INTRAVENOUS at 20:31

## 2024-01-27 RX ADMIN — LEVOTHYROXINE SODIUM 125 MCG: 0.07 TABLET ORAL at 06:28

## 2024-01-27 RX ADMIN — DOCUSATE SODIUM 100 MG: 100 CAPSULE, LIQUID FILLED ORAL at 09:43

## 2024-01-27 RX ADMIN — PANTOPRAZOLE SODIUM 40 MG: 40 TABLET, DELAYED RELEASE ORAL at 06:27

## 2024-01-27 RX ADMIN — ASPIRIN 81 MG: 81 TABLET, COATED ORAL at 09:43

## 2024-01-27 RX ADMIN — FUROSEMIDE 40 MG: 40 TABLET ORAL at 17:01

## 2024-01-27 RX ADMIN — INSULIN LISPRO 4 UNITS: 100 INJECTION, SOLUTION INTRAVENOUS; SUBCUTANEOUS at 20:31

## 2024-01-27 RX ADMIN — INSULIN LISPRO 2 UNITS: 100 INJECTION, SOLUTION INTRAVENOUS; SUBCUTANEOUS at 12:39

## 2024-01-27 RX ADMIN — APIXABAN 5 MG: 5 TABLET, FILM COATED ORAL at 20:31

## 2024-01-27 RX ADMIN — DOCUSATE SODIUM 100 MG: 100 CAPSULE, LIQUID FILLED ORAL at 20:31

## 2024-01-27 RX ADMIN — HYDROCODONE BITARTRATE AND ACETAMINOPHEN 2 TABLET: 5; 325 TABLET ORAL at 23:11

## 2024-01-27 RX ADMIN — METOPROLOL SUCCINATE 25 MG: 25 TABLET, EXTENDED RELEASE ORAL at 09:43

## 2024-01-27 RX ADMIN — DOFETILIDE 500 MCG: 0.5 CAPSULE ORAL at 09:43

## 2024-01-27 RX ADMIN — FERROUS SULFATE TAB 325 MG (65 MG ELEMENTAL FE) 325 MG: 325 (65 FE) TAB at 17:01

## 2024-01-27 RX ADMIN — INSULIN LISPRO 5 UNITS: 100 INJECTION, SOLUTION INTRAVENOUS; SUBCUTANEOUS at 17:04

## 2024-01-27 RX ADMIN — APIXABAN 5 MG: 5 TABLET, FILM COATED ORAL at 09:43

## 2024-01-27 RX ADMIN — INSULIN LISPRO 5 UNITS: 100 INJECTION, SOLUTION INTRAVENOUS; SUBCUTANEOUS at 09:00

## 2024-01-27 RX ADMIN — INSULIN LISPRO 2 UNITS: 100 INJECTION, SOLUTION INTRAVENOUS; SUBCUTANEOUS at 09:00

## 2024-01-27 RX ADMIN — INSULIN GLARGINE 12 UNITS: 100 INJECTION, SOLUTION SUBCUTANEOUS at 20:31

## 2024-01-27 RX ADMIN — INSULIN LISPRO 5 UNITS: 100 INJECTION, SOLUTION INTRAVENOUS; SUBCUTANEOUS at 12:39

## 2024-01-27 RX ADMIN — PREDNISONE 5 MG: 5 TABLET ORAL at 09:43

## 2024-01-27 ASSESSMENT — PAIN SCALES - GENERAL
PAINLEVEL_OUTOF10: 0
PAINLEVEL_OUTOF10: 6
PAINLEVEL_OUTOF10: 7
PAINLEVEL_OUTOF10: 7
PAINLEVEL_OUTOF10: 2

## 2024-01-27 ASSESSMENT — PAIN DESCRIPTION - ORIENTATION
ORIENTATION: LEFT
ORIENTATION: LEFT

## 2024-01-27 ASSESSMENT — PAIN - FUNCTIONAL ASSESSMENT: PAIN_FUNCTIONAL_ASSESSMENT: ACTIVITIES ARE NOT PREVENTED

## 2024-01-27 ASSESSMENT — PAIN DESCRIPTION - LOCATION
LOCATION: ARM
LOCATION: ARM;INCISION

## 2024-01-27 ASSESSMENT — PAIN DESCRIPTION - DESCRIPTORS
DESCRIPTORS: ACHING
DESCRIPTORS: SORE

## 2024-01-27 NOTE — PLAN OF CARE
Problem: Discharge Planning  Goal: Discharge to home or other facility with appropriate resources  Outcome: Progressing  Flowsheets  Taken 1/26/2024 1915 by Hilary Bullock RN  Discharge to home or other facility with appropriate resources: Identify barriers to discharge with patient and caregiver  Taken 1/26/2024 1153 by Adelaide Fernandez RN  Discharge to home or other facility with appropriate resources: Identify barriers to discharge with patient and caregiver     Problem: Pain  Goal: Verbalizes/displays adequate comfort level or baseline comfort level  Outcome: Progressing  Flowsheets  Taken 1/26/2024 1915 by Hilary Bullock RN  Verbalizes/displays adequate comfort level or baseline comfort level:   Encourage patient to monitor pain and request assistance   Administer analgesics based on type and severity of pain and evaluate response   Implement non-pharmacological measures as appropriate and evaluate response  Taken 1/26/2024 1153 by Adelaide Fernandez RN  Verbalizes/displays adequate comfort level or baseline comfort level:   Encourage patient to monitor pain and request assistance   Assess pain using appropriate pain scale     Problem: Safety - Adult  Goal: Free from fall injury  Outcome: Progressing     Problem: Chronic Conditions and Co-morbidities  Goal: Patient's chronic conditions and co-morbidity symptoms are monitored and maintained or improved  Outcome: Progressing  Flowsheets  Taken 1/26/2024 1915 by Hilary Bullock RN  Care Plan - Patient's Chronic Conditions and Co-Morbidity Symptoms are Monitored and Maintained or Improved: Monitor and assess patient's chronic conditions and comorbid symptoms for stability, deterioration, or improvement  Taken 1/26/2024 1153 by Adelaide Fernandez RN  Care Plan - Patient's Chronic Conditions and Co-Morbidity Symptoms are Monitored and Maintained or Improved: Monitor and assess patient's chronic conditions and comorbid symptoms for stability, deterioration, or

## 2024-01-28 LAB
ANION GAP SERPL CALC-SCNC: 5 MMOL/L (ref 2–11)
BASOPHILS # BLD: 0 K/UL (ref 0–0.2)
BASOPHILS NFR BLD: 1 % (ref 0–2)
BUN SERPL-MCNC: 37 MG/DL (ref 8–23)
CALCIUM SERPL-MCNC: 8 MG/DL (ref 8.3–10.4)
CHLORIDE SERPL-SCNC: 102 MMOL/L (ref 103–113)
CO2 SERPL-SCNC: 31 MMOL/L (ref 21–32)
CREAT SERPL-MCNC: 1 MG/DL (ref 0.6–1)
DIFFERENTIAL METHOD BLD: ABNORMAL
EKG ATRIAL RATE: 68 BPM
EKG DIAGNOSIS: NORMAL
EKG Q-T INTERVAL: 540 MS
EKG QRS DURATION: 204 MS
EKG QTC CALCULATION (BAZETT): 583 MS
EKG R AXIS: -26 DEGREES
EKG T AXIS: 92 DEGREES
EKG VENTRICULAR RATE: 70 BPM
EOSINOPHIL # BLD: 0.1 K/UL (ref 0–0.8)
EOSINOPHIL NFR BLD: 1 % (ref 0.5–7.8)
ERYTHROCYTE [DISTWIDTH] IN BLOOD BY AUTOMATED COUNT: 20.7 % (ref 11.9–14.6)
GLUCOSE BLD STRIP.AUTO-MCNC: 176 MG/DL (ref 65–100)
GLUCOSE BLD STRIP.AUTO-MCNC: 196 MG/DL (ref 65–100)
GLUCOSE BLD STRIP.AUTO-MCNC: 270 MG/DL (ref 65–100)
GLUCOSE BLD STRIP.AUTO-MCNC: 334 MG/DL (ref 65–100)
GLUCOSE BLD STRIP.AUTO-MCNC: 493 MG/DL (ref 65–100)
GLUCOSE BLD STRIP.AUTO-MCNC: 516 MG/DL (ref 65–100)
GLUCOSE SERPL-MCNC: 284 MG/DL (ref 65–100)
HCT VFR BLD AUTO: 26.6 % (ref 35.8–46.3)
HGB BLD-MCNC: 7.9 G/DL (ref 11.7–15.4)
IMM GRANULOCYTES # BLD AUTO: 0.1 K/UL (ref 0–0.5)
IMM GRANULOCYTES NFR BLD AUTO: 2 % (ref 0–5)
INR PPP: 1.4
LYMPHOCYTES # BLD: 1.2 K/UL (ref 0.5–4.6)
LYMPHOCYTES NFR BLD: 22 % (ref 13–44)
MAGNESIUM SERPL-MCNC: 1.9 MG/DL (ref 1.8–2.4)
MCH RBC QN AUTO: 29.9 PG (ref 26.1–32.9)
MCHC RBC AUTO-ENTMCNC: 29.7 G/DL (ref 31.4–35)
MCV RBC AUTO: 100.8 FL (ref 82–102)
MONOCYTES # BLD: 0.6 K/UL (ref 0.1–1.3)
MONOCYTES NFR BLD: 11 % (ref 4–12)
NEUTS SEG # BLD: 3.5 K/UL (ref 1.7–8.2)
NEUTS SEG NFR BLD: 64 % (ref 43–78)
NRBC # BLD: 0.1 K/UL (ref 0–0.2)
PLATELET # BLD AUTO: 133 K/UL (ref 150–450)
PMV BLD AUTO: 10.8 FL (ref 9.4–12.3)
POTASSIUM SERPL-SCNC: 3.4 MMOL/L (ref 3.5–5.1)
PROTHROMBIN TIME: 17.8 SEC (ref 11.3–14.9)
RBC # BLD AUTO: 2.64 M/UL (ref 4.05–5.2)
SERVICE CMNT-IMP: ABNORMAL
SODIUM SERPL-SCNC: 138 MMOL/L (ref 136–146)
WBC # BLD AUTO: 5.5 K/UL (ref 4.3–11.1)

## 2024-01-28 PROCEDURE — 82962 GLUCOSE BLOOD TEST: CPT

## 2024-01-28 PROCEDURE — 85610 PROTHROMBIN TIME: CPT

## 2024-01-28 PROCEDURE — 6370000000 HC RX 637 (ALT 250 FOR IP): Performed by: STUDENT IN AN ORGANIZED HEALTH CARE EDUCATION/TRAINING PROGRAM

## 2024-01-28 PROCEDURE — 85025 COMPLETE CBC W/AUTO DIFF WBC: CPT

## 2024-01-28 PROCEDURE — 2580000003 HC RX 258: Performed by: INTERNAL MEDICINE

## 2024-01-28 PROCEDURE — 36415 COLL VENOUS BLD VENIPUNCTURE: CPT

## 2024-01-28 PROCEDURE — 6370000000 HC RX 637 (ALT 250 FOR IP): Performed by: INTERNAL MEDICINE

## 2024-01-28 PROCEDURE — 2580000003 HC RX 258: Performed by: FAMILY MEDICINE

## 2024-01-28 PROCEDURE — 99232 SBSQ HOSP IP/OBS MODERATE 35: CPT | Performed by: INTERNAL MEDICINE

## 2024-01-28 PROCEDURE — 80048 BASIC METABOLIC PNL TOTAL CA: CPT

## 2024-01-28 PROCEDURE — 6370000000 HC RX 637 (ALT 250 FOR IP): Performed by: FAMILY MEDICINE

## 2024-01-28 PROCEDURE — 93010 ELECTROCARDIOGRAM REPORT: CPT | Performed by: INTERNAL MEDICINE

## 2024-01-28 PROCEDURE — 2140000001 HC CVICU INTERMEDIATE R&B

## 2024-01-28 PROCEDURE — 6370000000 HC RX 637 (ALT 250 FOR IP): Performed by: HOSPITALIST

## 2024-01-28 PROCEDURE — 6370000000 HC RX 637 (ALT 250 FOR IP)

## 2024-01-28 PROCEDURE — 83735 ASSAY OF MAGNESIUM: CPT

## 2024-01-28 PROCEDURE — 93005 ELECTROCARDIOGRAM TRACING: CPT | Performed by: INTERNAL MEDICINE

## 2024-01-28 RX ORDER — INSULIN LISPRO 100 [IU]/ML
8 INJECTION, SOLUTION INTRAVENOUS; SUBCUTANEOUS
Status: DISCONTINUED | OUTPATIENT
Start: 2024-01-28 | End: 2024-01-29

## 2024-01-28 RX ORDER — INSULIN GLARGINE 100 [IU]/ML
16 INJECTION, SOLUTION SUBCUTANEOUS NIGHTLY
Status: DISCONTINUED | OUTPATIENT
Start: 2024-01-28 | End: 2024-01-29

## 2024-01-28 RX ORDER — POTASSIUM CHLORIDE 20 MEQ/1
40 TABLET, EXTENDED RELEASE ORAL ONCE
Status: COMPLETED | OUTPATIENT
Start: 2024-01-28 | End: 2024-01-28

## 2024-01-28 RX ORDER — FUROSEMIDE 40 MG/1
40 TABLET ORAL DAILY
Status: DISCONTINUED | OUTPATIENT
Start: 2024-01-29 | End: 2024-01-29

## 2024-01-28 RX ORDER — FUROSEMIDE 40 MG/1
TABLET ORAL
Status: COMPLETED
Start: 2024-01-28 | End: 2024-01-28

## 2024-01-28 RX ADMIN — INSULIN LISPRO 8 UNITS: 100 INJECTION, SOLUTION INTRAVENOUS; SUBCUTANEOUS at 17:10

## 2024-01-28 RX ADMIN — SODIUM CHLORIDE, PRESERVATIVE FREE 10 ML: 5 INJECTION INTRAVENOUS at 10:10

## 2024-01-28 RX ADMIN — FUROSEMIDE 40 MG: 40 TABLET ORAL at 10:00

## 2024-01-28 RX ADMIN — INSULIN LISPRO 8 UNITS: 100 INJECTION, SOLUTION INTRAVENOUS; SUBCUTANEOUS at 11:36

## 2024-01-28 RX ADMIN — POTASSIUM CHLORIDE 40 MEQ: 1500 TABLET, EXTENDED RELEASE ORAL at 10:01

## 2024-01-28 RX ADMIN — INSULIN LISPRO 4 UNITS: 100 INJECTION, SOLUTION INTRAVENOUS; SUBCUTANEOUS at 09:00

## 2024-01-28 RX ADMIN — POTASSIUM CHLORIDE 40 MEQ: 1500 TABLET, EXTENDED RELEASE ORAL at 17:09

## 2024-01-28 RX ADMIN — DOCUSATE SODIUM 100 MG: 100 CAPSULE, LIQUID FILLED ORAL at 09:47

## 2024-01-28 RX ADMIN — ATORVASTATIN CALCIUM 10 MG: 20 TABLET, FILM COATED ORAL at 17:09

## 2024-01-28 RX ADMIN — SODIUM CHLORIDE, PRESERVATIVE FREE 10 ML: 5 INJECTION INTRAVENOUS at 21:32

## 2024-01-28 RX ADMIN — FERROUS SULFATE TAB 325 MG (65 MG ELEMENTAL FE) 325 MG: 325 (65 FE) TAB at 09:47

## 2024-01-28 RX ADMIN — HYDROCODONE BITARTRATE AND ACETAMINOPHEN 1 TABLET: 5; 325 TABLET ORAL at 21:25

## 2024-01-28 RX ADMIN — DOCUSATE SODIUM 100 MG: 100 CAPSULE, LIQUID FILLED ORAL at 21:29

## 2024-01-28 RX ADMIN — APIXABAN 5 MG: 5 TABLET, FILM COATED ORAL at 09:47

## 2024-01-28 RX ADMIN — APIXABAN 5 MG: 5 TABLET, FILM COATED ORAL at 21:26

## 2024-01-28 RX ADMIN — INSULIN GLARGINE 16 UNITS: 100 INJECTION, SOLUTION SUBCUTANEOUS at 21:27

## 2024-01-28 RX ADMIN — SODIUM CHLORIDE, PRESERVATIVE FREE 10 ML: 5 INJECTION INTRAVENOUS at 21:33

## 2024-01-28 RX ADMIN — LEVOTHYROXINE SODIUM 125 MCG: 0.07 TABLET ORAL at 06:16

## 2024-01-28 RX ADMIN — PREDNISONE 5 MG: 5 TABLET ORAL at 09:47

## 2024-01-28 RX ADMIN — FERROUS SULFATE TAB 325 MG (65 MG ELEMENTAL FE) 325 MG: 325 (65 FE) TAB at 17:09

## 2024-01-28 RX ADMIN — ASPIRIN 81 MG: 81 TABLET, COATED ORAL at 09:47

## 2024-01-28 RX ADMIN — PANTOPRAZOLE SODIUM 40 MG: 40 TABLET, DELAYED RELEASE ORAL at 06:16

## 2024-01-28 RX ADMIN — METOPROLOL SUCCINATE 25 MG: 25 TABLET, EXTENDED RELEASE ORAL at 09:47

## 2024-01-28 RX ADMIN — INSULIN LISPRO 8 UNITS: 100 INJECTION, SOLUTION INTRAVENOUS; SUBCUTANEOUS at 09:00

## 2024-01-28 ASSESSMENT — PAIN DESCRIPTION - ORIENTATION: ORIENTATION: LEFT

## 2024-01-28 ASSESSMENT — PAIN DESCRIPTION - DESCRIPTORS
DESCRIPTORS: DISCOMFORT;SORE
DESCRIPTORS: DISCOMFORT;SORE

## 2024-01-28 ASSESSMENT — PAIN SCALES - GENERAL
PAINLEVEL_OUTOF10: 0
PAINLEVEL_OUTOF10: 4
PAINLEVEL_OUTOF10: 4
PAINLEVEL_OUTOF10: 2

## 2024-01-28 NOTE — PLAN OF CARE
Problem: Discharge Planning  Goal: Discharge to home or other facility with appropriate resources  Outcome: Progressing  Flowsheets (Taken 1/27/2024 1904)  Discharge to home or other facility with appropriate resources: Identify barriers to discharge with patient and caregiver     Problem: Pain  Goal: Verbalizes/displays adequate comfort level or baseline comfort level  Outcome: Progressing     Problem: Safety - Adult  Goal: Free from fall injury  Outcome: Progressing     Problem: Chronic Conditions and Co-morbidities  Goal: Patient's chronic conditions and co-morbidity symptoms are monitored and maintained or improved  Outcome: Progressing  Flowsheets (Taken 1/27/2024 1904)  Care Plan - Patient's Chronic Conditions and Co-Morbidity Symptoms are Monitored and Maintained or Improved: Monitor and assess patient's chronic conditions and comorbid symptoms for stability, deterioration, or improvement     Problem: ABCDS Injury Assessment  Goal: Absence of physical injury  Outcome: Progressing     Problem: Skin/Tissue Integrity  Goal: Absence of new skin breakdown  Description: 1.  Monitor for areas of redness and/or skin breakdown  2.  Assess vascular access sites hourly  3.  Every 4-6 hours minimum:  Change oxygen saturation probe site  4.  Every 4-6 hours:  If on nasal continuous positive airway pressure, respiratory therapy assess nares and determine need for appliance change or resting period.  Outcome: Progressing

## 2024-01-29 LAB
ANION GAP SERPL CALC-SCNC: 4 MMOL/L (ref 2–11)
BASOPHILS # BLD: 0 K/UL (ref 0–0.2)
BASOPHILS NFR BLD: 1 % (ref 0–2)
BUN SERPL-MCNC: 32 MG/DL (ref 8–23)
CALCIUM SERPL-MCNC: 8.2 MG/DL (ref 8.3–10.4)
CHLORIDE SERPL-SCNC: 108 MMOL/L (ref 103–113)
CO2 SERPL-SCNC: 30 MMOL/L (ref 21–32)
CREAT SERPL-MCNC: 0.8 MG/DL (ref 0.6–1)
DIFFERENTIAL METHOD BLD: ABNORMAL
EKG ATRIAL RATE: 69 BPM
EKG DIAGNOSIS: NORMAL
EKG Q-T INTERVAL: 492 MS
EKG QRS DURATION: 208 MS
EKG QTC CALCULATION (BAZETT): 542 MS
EKG R AXIS: -36 DEGREES
EKG T AXIS: 88 DEGREES
EKG VENTRICULAR RATE: 73 BPM
EOSINOPHIL # BLD: 0.1 K/UL (ref 0–0.8)
EOSINOPHIL NFR BLD: 1 % (ref 0.5–7.8)
ERYTHROCYTE [DISTWIDTH] IN BLOOD BY AUTOMATED COUNT: 21.1 % (ref 11.9–14.6)
GLUCOSE BLD STRIP.AUTO-MCNC: 146 MG/DL (ref 65–100)
GLUCOSE BLD STRIP.AUTO-MCNC: 221 MG/DL (ref 65–100)
GLUCOSE BLD STRIP.AUTO-MCNC: 317 MG/DL (ref 65–100)
GLUCOSE BLD STRIP.AUTO-MCNC: 405 MG/DL (ref 65–100)
GLUCOSE SERPL-MCNC: 138 MG/DL (ref 65–100)
GLUCOSE SERPL-MCNC: 335 MG/DL (ref 65–100)
HCT VFR BLD AUTO: 26.5 % (ref 35.8–46.3)
HCT VFR BLD AUTO: 32.8 % (ref 35.8–46.3)
HGB BLD-MCNC: 7.9 G/DL (ref 11.7–15.4)
HGB BLD-MCNC: 9.8 G/DL (ref 11.7–15.4)
HISTORY CHECK: NORMAL
IMM GRANULOCYTES # BLD AUTO: 0.1 K/UL (ref 0–0.5)
IMM GRANULOCYTES NFR BLD AUTO: 2 % (ref 0–5)
INR PPP: 1.5
LYMPHOCYTES # BLD: 1.3 K/UL (ref 0.5–4.6)
LYMPHOCYTES NFR BLD: 24 % (ref 13–44)
MAGNESIUM SERPL-MCNC: 1.9 MG/DL (ref 1.8–2.4)
MCH RBC QN AUTO: 30.4 PG (ref 26.1–32.9)
MCHC RBC AUTO-ENTMCNC: 29.8 G/DL (ref 31.4–35)
MCV RBC AUTO: 101.9 FL (ref 82–102)
MONOCYTES # BLD: 0.6 K/UL (ref 0.1–1.3)
MONOCYTES NFR BLD: 10 % (ref 4–12)
NEUTS SEG # BLD: 3.4 K/UL (ref 1.7–8.2)
NEUTS SEG NFR BLD: 62 % (ref 43–78)
NRBC # BLD: 0.1 K/UL (ref 0–0.2)
PLATELET # BLD AUTO: 143 K/UL (ref 150–450)
PMV BLD AUTO: 10.9 FL (ref 9.4–12.3)
POTASSIUM SERPL-SCNC: 4.1 MMOL/L (ref 3.5–5.1)
PROTHROMBIN TIME: 18.7 SEC (ref 11.3–14.9)
RBC # BLD AUTO: 2.6 M/UL (ref 4.05–5.2)
SERVICE CMNT-IMP: ABNORMAL
SODIUM SERPL-SCNC: 142 MMOL/L (ref 136–146)
WBC # BLD AUTO: 5.5 K/UL (ref 4.3–11.1)

## 2024-01-29 PROCEDURE — 86850 RBC ANTIBODY SCREEN: CPT

## 2024-01-29 PROCEDURE — 83735 ASSAY OF MAGNESIUM: CPT

## 2024-01-29 PROCEDURE — 6370000000 HC RX 637 (ALT 250 FOR IP): Performed by: FAMILY MEDICINE

## 2024-01-29 PROCEDURE — 6370000000 HC RX 637 (ALT 250 FOR IP): Performed by: INTERNAL MEDICINE

## 2024-01-29 PROCEDURE — 82962 GLUCOSE BLOOD TEST: CPT

## 2024-01-29 PROCEDURE — P9016 RBC LEUKOCYTES REDUCED: HCPCS

## 2024-01-29 PROCEDURE — 93005 ELECTROCARDIOGRAM TRACING: CPT | Performed by: INTERNAL MEDICINE

## 2024-01-29 PROCEDURE — 85025 COMPLETE CBC W/AUTO DIFF WBC: CPT

## 2024-01-29 PROCEDURE — 86923 COMPATIBILITY TEST ELECTRIC: CPT

## 2024-01-29 PROCEDURE — 93010 ELECTROCARDIOGRAM REPORT: CPT | Performed by: INTERNAL MEDICINE

## 2024-01-29 PROCEDURE — 2580000003 HC RX 258: Performed by: INTERNAL MEDICINE

## 2024-01-29 PROCEDURE — 86900 BLOOD TYPING SEROLOGIC ABO: CPT

## 2024-01-29 PROCEDURE — 36415 COLL VENOUS BLD VENIPUNCTURE: CPT

## 2024-01-29 PROCEDURE — 82947 ASSAY GLUCOSE BLOOD QUANT: CPT

## 2024-01-29 PROCEDURE — 36430 TRANSFUSION BLD/BLD COMPNT: CPT

## 2024-01-29 PROCEDURE — 80048 BASIC METABOLIC PNL TOTAL CA: CPT

## 2024-01-29 PROCEDURE — 2580000003 HC RX 258: Performed by: FAMILY MEDICINE

## 2024-01-29 PROCEDURE — 6370000000 HC RX 637 (ALT 250 FOR IP): Performed by: STUDENT IN AN ORGANIZED HEALTH CARE EDUCATION/TRAINING PROGRAM

## 2024-01-29 PROCEDURE — 85018 HEMOGLOBIN: CPT

## 2024-01-29 PROCEDURE — 2140000001 HC CVICU INTERMEDIATE R&B

## 2024-01-29 PROCEDURE — 85014 HEMATOCRIT: CPT

## 2024-01-29 PROCEDURE — 85610 PROTHROMBIN TIME: CPT

## 2024-01-29 PROCEDURE — 86901 BLOOD TYPING SEROLOGIC RH(D): CPT

## 2024-01-29 RX ORDER — SODIUM CHLORIDE 9 MG/ML
INJECTION, SOLUTION INTRAVENOUS PRN
Status: DISCONTINUED | OUTPATIENT
Start: 2024-01-29 | End: 2024-01-30 | Stop reason: HOSPADM

## 2024-01-29 RX ORDER — FUROSEMIDE 40 MG/1
40 TABLET ORAL 2 TIMES DAILY
Status: DISCONTINUED | OUTPATIENT
Start: 2024-01-29 | End: 2024-01-30

## 2024-01-29 RX ORDER — INSULIN GLARGINE 100 [IU]/ML
10 INJECTION, SOLUTION SUBCUTANEOUS NIGHTLY
Status: DISCONTINUED | OUTPATIENT
Start: 2024-01-29 | End: 2024-01-30

## 2024-01-29 RX ORDER — INSULIN LISPRO 100 [IU]/ML
4 INJECTION, SOLUTION INTRAVENOUS; SUBCUTANEOUS
Status: DISCONTINUED | OUTPATIENT
Start: 2024-01-29 | End: 2024-01-30

## 2024-01-29 RX ADMIN — METOPROLOL SUCCINATE 25 MG: 25 TABLET, EXTENDED RELEASE ORAL at 08:40

## 2024-01-29 RX ADMIN — SODIUM CHLORIDE, PRESERVATIVE FREE 10 ML: 5 INJECTION INTRAVENOUS at 08:53

## 2024-01-29 RX ADMIN — INSULIN LISPRO 4 UNITS: 100 INJECTION, SOLUTION INTRAVENOUS; SUBCUTANEOUS at 16:25

## 2024-01-29 RX ADMIN — INSULIN GLARGINE 10 UNITS: 100 INJECTION, SOLUTION SUBCUTANEOUS at 20:55

## 2024-01-29 RX ADMIN — LEVOTHYROXINE SODIUM 125 MCG: 0.07 TABLET ORAL at 05:54

## 2024-01-29 RX ADMIN — INSULIN LISPRO 6 UNITS: 100 INJECTION, SOLUTION INTRAVENOUS; SUBCUTANEOUS at 16:24

## 2024-01-29 RX ADMIN — HYDROCODONE BITARTRATE AND ACETAMINOPHEN 1 TABLET: 5; 325 TABLET ORAL at 20:54

## 2024-01-29 RX ADMIN — PANTOPRAZOLE SODIUM 40 MG: 40 TABLET, DELAYED RELEASE ORAL at 05:54

## 2024-01-29 RX ADMIN — SODIUM CHLORIDE, PRESERVATIVE FREE 10 ML: 5 INJECTION INTRAVENOUS at 20:55

## 2024-01-29 RX ADMIN — INSULIN LISPRO 8 UNITS: 100 INJECTION, SOLUTION INTRAVENOUS; SUBCUTANEOUS at 11:18

## 2024-01-29 RX ADMIN — FUROSEMIDE 40 MG: 40 TABLET ORAL at 17:01

## 2024-01-29 RX ADMIN — DOCUSATE SODIUM 100 MG: 100 CAPSULE, LIQUID FILLED ORAL at 08:38

## 2024-01-29 RX ADMIN — SODIUM CHLORIDE, PRESERVATIVE FREE 10 ML: 5 INJECTION INTRAVENOUS at 08:54

## 2024-01-29 RX ADMIN — DOCUSATE SODIUM 100 MG: 100 CAPSULE, LIQUID FILLED ORAL at 20:55

## 2024-01-29 RX ADMIN — APIXABAN 5 MG: 5 TABLET, FILM COATED ORAL at 08:38

## 2024-01-29 RX ADMIN — FERROUS SULFATE TAB 325 MG (65 MG ELEMENTAL FE) 325 MG: 325 (65 FE) TAB at 08:38

## 2024-01-29 RX ADMIN — SODIUM CHLORIDE, PRESERVATIVE FREE 10 ML: 5 INJECTION INTRAVENOUS at 20:56

## 2024-01-29 RX ADMIN — FERROUS SULFATE TAB 325 MG (65 MG ELEMENTAL FE) 325 MG: 325 (65 FE) TAB at 17:01

## 2024-01-29 RX ADMIN — Medication 3 MG: at 22:01

## 2024-01-29 RX ADMIN — ASPIRIN 81 MG: 81 TABLET, COATED ORAL at 08:38

## 2024-01-29 RX ADMIN — ATORVASTATIN CALCIUM 10 MG: 20 TABLET, FILM COATED ORAL at 17:01

## 2024-01-29 RX ADMIN — APIXABAN 5 MG: 5 TABLET, FILM COATED ORAL at 20:55

## 2024-01-29 RX ADMIN — HYDROCODONE BITARTRATE AND ACETAMINOPHEN 1 TABLET: 5; 325 TABLET ORAL at 01:18

## 2024-01-29 ASSESSMENT — PAIN DESCRIPTION - DESCRIPTORS
DESCRIPTORS: DISCOMFORT;ACHING
DESCRIPTORS: ACHING;BURNING

## 2024-01-29 ASSESSMENT — PAIN SCALES - GENERAL
PAINLEVEL_OUTOF10: 4
PAINLEVEL_OUTOF10: 0
PAINLEVEL_OUTOF10: 0
PAINLEVEL_OUTOF10: 2
PAINLEVEL_OUTOF10: 0
PAINLEVEL_OUTOF10: 5
PAINLEVEL_OUTOF10: 0

## 2024-01-29 ASSESSMENT — PAIN - FUNCTIONAL ASSESSMENT: PAIN_FUNCTIONAL_ASSESSMENT: ACTIVITIES ARE NOT PREVENTED

## 2024-01-29 ASSESSMENT — PAIN DESCRIPTION - ORIENTATION
ORIENTATION: LEFT
ORIENTATION: LEFT;ANTERIOR

## 2024-01-29 ASSESSMENT — PAIN DESCRIPTION - LOCATION
LOCATION: SHOULDER
LOCATION: CHEST;INCISION

## 2024-01-30 VITALS
HEART RATE: 70 BPM | TEMPERATURE: 97.5 F | DIASTOLIC BLOOD PRESSURE: 55 MMHG | HEIGHT: 66 IN | OXYGEN SATURATION: 96 % | WEIGHT: 138.67 LBS | BODY MASS INDEX: 22.29 KG/M2 | RESPIRATION RATE: 18 BRPM | SYSTOLIC BLOOD PRESSURE: 123 MMHG

## 2024-01-30 PROBLEM — J96.20 ACUTE ON CHRONIC RESPIRATORY FAILURE (HCC): Status: RESOLVED | Noted: 2024-01-14 | Resolved: 2024-01-30

## 2024-01-30 LAB
ABO + RH BLD: NORMAL
ANION GAP SERPL CALC-SCNC: 6 MMOL/L (ref 2–11)
BASOPHILS # BLD: 0.1 K/UL (ref 0–0.2)
BASOPHILS NFR BLD: 1 % (ref 0–2)
BLD PROD TYP BPU: NORMAL
BLOOD BANK DISPENSE STATUS: NORMAL
BLOOD GROUP ANTIBODIES SERPL: NORMAL
BPU ID: NORMAL
BUN SERPL-MCNC: 35 MG/DL (ref 8–23)
CALCIUM SERPL-MCNC: 8.5 MG/DL (ref 8.3–10.4)
CHLORIDE SERPL-SCNC: 102 MMOL/L (ref 103–113)
CO2 SERPL-SCNC: 30 MMOL/L (ref 21–32)
CREAT SERPL-MCNC: 1.3 MG/DL (ref 0.6–1)
CROSSMATCH RESULT: NORMAL
DIFFERENTIAL METHOD BLD: ABNORMAL
EKG ATRIAL RATE: 86 BPM
EKG DIAGNOSIS: NORMAL
EKG Q-T INTERVAL: 486 MS
EKG QRS DURATION: 194 MS
EKG QTC CALCULATION (BAZETT): 524 MS
EKG R AXIS: -39 DEGREES
EKG T AXIS: 102 DEGREES
EKG VENTRICULAR RATE: 70 BPM
EOSINOPHIL # BLD: 0.1 K/UL (ref 0–0.8)
EOSINOPHIL NFR BLD: 1 % (ref 0.5–7.8)
ERYTHROCYTE [DISTWIDTH] IN BLOOD BY AUTOMATED COUNT: 21.8 % (ref 11.9–14.6)
GLUCOSE BLD STRIP.AUTO-MCNC: 246 MG/DL (ref 65–100)
GLUCOSE SERPL-MCNC: 271 MG/DL (ref 65–100)
HCT VFR BLD AUTO: 33.6 % (ref 35.8–46.3)
HGB BLD-MCNC: 10.4 G/DL (ref 11.7–15.4)
IMM GRANULOCYTES # BLD AUTO: 0.1 K/UL (ref 0–0.5)
IMM GRANULOCYTES NFR BLD AUTO: 2 % (ref 0–5)
INR PPP: 1.4
LYMPHOCYTES # BLD: 2.3 K/UL (ref 0.5–4.6)
LYMPHOCYTES NFR BLD: 28 % (ref 13–44)
MCH RBC QN AUTO: 30.9 PG (ref 26.1–32.9)
MCHC RBC AUTO-ENTMCNC: 31 G/DL (ref 31.4–35)
MCV RBC AUTO: 99.7 FL (ref 82–102)
MONOCYTES # BLD: 0.8 K/UL (ref 0.1–1.3)
MONOCYTES NFR BLD: 10 % (ref 4–12)
NEUTS SEG # BLD: 4.8 K/UL (ref 1.7–8.2)
NEUTS SEG NFR BLD: 58 % (ref 43–78)
NRBC # BLD: 0.15 K/UL (ref 0–0.2)
PLATELET # BLD AUTO: 167 K/UL (ref 150–450)
PMV BLD AUTO: 11.1 FL (ref 9.4–12.3)
POTASSIUM SERPL-SCNC: 4.3 MMOL/L (ref 3.5–5.1)
PROTHROMBIN TIME: 17.6 SEC (ref 11.3–14.9)
RBC # BLD AUTO: 3.37 M/UL (ref 4.05–5.2)
SERVICE CMNT-IMP: ABNORMAL
SODIUM SERPL-SCNC: 138 MMOL/L (ref 136–146)
SPECIMEN EXP DATE BLD: NORMAL
UNIT DIVISION: 0
WBC # BLD AUTO: 8.2 K/UL (ref 4.3–11.1)

## 2024-01-30 PROCEDURE — 6370000000 HC RX 637 (ALT 250 FOR IP): Performed by: INTERNAL MEDICINE

## 2024-01-30 PROCEDURE — 93010 ELECTROCARDIOGRAM REPORT: CPT | Performed by: INTERNAL MEDICINE

## 2024-01-30 PROCEDURE — 85610 PROTHROMBIN TIME: CPT

## 2024-01-30 PROCEDURE — 93005 ELECTROCARDIOGRAM TRACING: CPT | Performed by: INTERNAL MEDICINE

## 2024-01-30 PROCEDURE — 97530 THERAPEUTIC ACTIVITIES: CPT

## 2024-01-30 PROCEDURE — 85025 COMPLETE CBC W/AUTO DIFF WBC: CPT

## 2024-01-30 PROCEDURE — 82962 GLUCOSE BLOOD TEST: CPT

## 2024-01-30 PROCEDURE — 80048 BASIC METABOLIC PNL TOTAL CA: CPT

## 2024-01-30 PROCEDURE — 6370000000 HC RX 637 (ALT 250 FOR IP): Performed by: FAMILY MEDICINE

## 2024-01-30 PROCEDURE — 6370000000 HC RX 637 (ALT 250 FOR IP): Performed by: STUDENT IN AN ORGANIZED HEALTH CARE EDUCATION/TRAINING PROGRAM

## 2024-01-30 RX ORDER — FUROSEMIDE 40 MG/1
20 TABLET ORAL DAILY
Status: DISCONTINUED | OUTPATIENT
Start: 2024-01-31 | End: 2024-01-30 | Stop reason: HOSPADM

## 2024-01-30 RX ORDER — HYDROCODONE BITARTRATE AND ACETAMINOPHEN 5; 325 MG/1; MG/1
1 TABLET ORAL EVERY 6 HOURS PRN
Qty: 5 TABLET | Refills: 0 | Status: SHIPPED | OUTPATIENT
Start: 2024-01-30 | End: 2024-02-02

## 2024-01-30 RX ORDER — PREDNISONE 5 MG/1
5 TABLET ORAL DAILY
Qty: 10 TABLET | Refills: 0 | Status: SHIPPED | OUTPATIENT
Start: 2024-01-30 | End: 2024-02-09

## 2024-01-30 RX ORDER — FUROSEMIDE 20 MG/1
20 TABLET ORAL DAILY
Qty: 60 TABLET | Refills: 3 | Status: SHIPPED | OUTPATIENT
Start: 2024-01-31

## 2024-01-30 RX ORDER — INSULIN GLARGINE 100 [IU]/ML
18 INJECTION, SOLUTION SUBCUTANEOUS NIGHTLY
Status: DISCONTINUED | OUTPATIENT
Start: 2024-01-30 | End: 2024-01-30 | Stop reason: HOSPADM

## 2024-01-30 RX ORDER — INSULIN LISPRO 100 [IU]/ML
7 INJECTION, SOLUTION INTRAVENOUS; SUBCUTANEOUS
Status: DISCONTINUED | OUTPATIENT
Start: 2024-01-30 | End: 2024-01-30 | Stop reason: HOSPADM

## 2024-01-30 RX ORDER — FUROSEMIDE 40 MG/1
40 TABLET ORAL DAILY
Status: DISCONTINUED | OUTPATIENT
Start: 2024-01-30 | End: 2024-01-30

## 2024-01-30 RX ORDER — PANTOPRAZOLE SODIUM 40 MG/1
40 TABLET, DELAYED RELEASE ORAL
Qty: 30 TABLET | Refills: 0 | Status: SHIPPED | OUTPATIENT
Start: 2024-01-31

## 2024-01-30 RX ADMIN — INSULIN LISPRO 2 UNITS: 100 INJECTION, SOLUTION INTRAVENOUS; SUBCUTANEOUS at 07:55

## 2024-01-30 RX ADMIN — DOCUSATE SODIUM 100 MG: 100 CAPSULE, LIQUID FILLED ORAL at 08:55

## 2024-01-30 RX ADMIN — INSULIN LISPRO 4 UNITS: 100 INJECTION, SOLUTION INTRAVENOUS; SUBCUTANEOUS at 07:56

## 2024-01-30 RX ADMIN — LEVOTHYROXINE SODIUM 125 MCG: 0.07 TABLET ORAL at 06:07

## 2024-01-30 RX ADMIN — FERROUS SULFATE TAB 325 MG (65 MG ELEMENTAL FE) 325 MG: 325 (65 FE) TAB at 08:55

## 2024-01-30 RX ADMIN — FUROSEMIDE 20 MG: 40 TABLET ORAL at 08:54

## 2024-01-30 RX ADMIN — ACETAMINOPHEN 650 MG: 325 TABLET ORAL at 08:54

## 2024-01-30 RX ADMIN — ASPIRIN 81 MG: 81 TABLET, COATED ORAL at 08:53

## 2024-01-30 RX ADMIN — METOPROLOL SUCCINATE 25 MG: 25 TABLET, EXTENDED RELEASE ORAL at 08:53

## 2024-01-30 RX ADMIN — PANTOPRAZOLE SODIUM 40 MG: 40 TABLET, DELAYED RELEASE ORAL at 06:06

## 2024-01-30 RX ADMIN — APIXABAN 5 MG: 5 TABLET, FILM COATED ORAL at 08:54

## 2024-01-30 ASSESSMENT — PAIN SCALES - GENERAL
PAINLEVEL_OUTOF10: 1
PAINLEVEL_OUTOF10: 0
PAINLEVEL_OUTOF10: 4
PAINLEVEL_OUTOF10: 0

## 2024-01-30 ASSESSMENT — PAIN DESCRIPTION - LOCATION: LOCATION: CHEST

## 2024-01-30 ASSESSMENT — PAIN DESCRIPTION - DESCRIPTORS: DESCRIPTORS: ACHING

## 2024-01-30 ASSESSMENT — PAIN DESCRIPTION - ORIENTATION: ORIENTATION: LEFT

## 2024-01-30 NOTE — CARE COORDINATION
CM reviewed chart.     Patient will need portable oxygen for discharge. CM will see if it is appropriate for patient to go home with home health. (PT was held for patient today).     CM to follow up.   
CM spoke to patient and patient's spouse at bedside. Demographic information was confirmed. Patient lives with her spouse in a 1 story house and has 3 steps to enter home with handrails. Patient is independent with ADLS, uses shower chair and grab bars, and drives. Patient has no home care services. Patient's PCP information was confirmed and last PCP visit was September, 2023. Patient is agreeable to return home once medically stable for discharge and spouse will transport her home at appropriate time.    No CM needs voiced or noted. CM will continue to follow patient for any discharge planning needs.        01/26/24 0005   Service Assessment   Patient Orientation Alert and Oriented   Cognition Alert   History Provided By Patient   Primary Caregiver Spouse   Accompanied By/Relationship spouse at bedside at time of assessment   Support Systems Spouse/Significant Other   Patient's Healthcare Decision Maker is: Legal Next of Kin   PCP Verified by CM Yes  (confirmed PCP is Dr. Hilary Gordon)   Last Visit to PCP Within last 6 months  (last PCP visit was September, 2023)   Prior Functional Level Independent in ADLs/IADLs   Current Functional Level Other (see comment)  (TBD by clinicals)   Can patient return to prior living arrangement Yes   Ability to make needs known: Good   Family able to assist with home care needs: Yes   Would you like for me to discuss the discharge plan with any other family members/significant others, and if so, who? Yes   Financial Resources Medicare;Other (Comment)  (Medicare Part A and B and [employer])   Community Resources None   CM/SW Referral Other (see comment)  (discharge planning)   Social/Functional History   Lives With Spouse   Type of Home House   Home Layout One level   Home Access Stairs to enter with rails   Entrance Stairs - Number of Steps 3   Entrance Stairs - Rails Both   Bathroom Shower/Tub Walk-in shower   Bathroom Toilet Standard   Bathroom Equipment Shower 
Responsibilities Yes   Meal Prep Responsibility     Laundry Responsibility     Cleaning Responsibility     Bill Paying/Finance Responsibility     Shopping Responsibility     Dependent Care Responsibility     Health Care Management     Other (Comment)     Ambulation Assistance Independent   Transfer Assistance Independent   Active  Yes   Patient's  Info     Mode of Transportation SUV   Education     Occupation Retired   Type of Occupation       Discharge Planning   Type of Residence House   Living Arrangements Spouse/Significant Other   Support Systems Spouse/Significant Other   Current Services Prior To Admission Durable Medical Equipment   Potential Assistance Needed Other (Comment) (TBD by clinicals)   DME Shower Chair   DME     DME Ordered? No   Potential Assistance Purchasing Medications No (No barriers reported)   Meds-to-Beds: Does the patient want to have any new prescriptions delivered to bedside prior to discharge?     Type of Home Care Services None   Patient expects to be discharged to: House   Follow Up Appointment: Best Day/Time Monday AM   One/Two Story Residence: One story   # of Interior Steps     Height of Each Step (in)     Interior Rails     Lift Chair Available     History of Falls? No     Services At/After Discharge  Transition of Care Consult (CM Consult): Discharge Planning   Internal Home Health     Internal Hospice     Reason Outside Agency Chosen     Partner SNF     Reason Why Partner SNF Not Chosen     Internal Comfort Care     Reason Outside Comfort Care Chosen     Services At/After Discharge     [place] Resource Information Provided? No   Mode of Transport at Discharge Other (see comment) (spouse)   Hospital Transport Time of Discharge     Confirm Follow Up Transport Family     Condition of Participation: Discharge Planning  The plan for Transition of Care is related to the following treatment goals: anticipates returning home   The Patient and/or Patient Representative was

## 2024-01-30 NOTE — PROGRESS NOTES
[facility] CARDIOLOGY PROGRESS NOTE           1/16/2024 7:37 AM    Admit Date: 1/14/2024      Subjective:   Patient is converted back to atrial fibrillation with rapid ventricular response.  She is resting comfortably in bed.  Oxygen saturations are stable on 4 L.    ROS:  Cardiovascular:  As noted above    Objective:      Vitals:    01/16/24 0430 01/16/24 0500 01/16/24 0530 01/16/24 0600   BP: (!) 160/71 (!) 146/107 (!) 154/68 (!) 156/76   Pulse: 75 80 90 81   Resp: (!) 46 (!) 39 (!) 35 (!) 33   Temp:       TempSrc:       SpO2: 96% 97% 97% 97%   Weight:    66.7 kg (147 lb 0.8 oz)   Height:           Physical Exam:  General-No Acute Distress  Neck- supple, no JVD  CV-irregular tachycardic with prominent systolic murmur at right upper sternal border and diastolic murmur at left lower sternal border and apex  Lung-bibasilar crackles  Abd- soft, nontender, nondistended  Ext- no edema bilaterally.  Skin- warm and dry    Data Review:   Recent Labs     01/15/24  0556 01/15/24  2205 01/16/24  0310     --  140   K 4.6  --  4.2   MG 2.6*  --  2.0   BUN 32*  --  30*   WBC 10.2  --  8.7   HGB 7.8* 9.2* 9.9*   HCT 27.0* 30.6* 33.7*     --  185   INR 3.3  --  2.3       Assessment/Plan:     Principal Problem:    Acute on chronic respiratory failure (HCC)  Plan: Patient presents with acute respiratory failure. She reports development of oxygen dependent respiratory failure in August 2023. At that time she was diagnosed with both aortic and mitral stenosis or previous implanted surgical valves. She has had progressive worsening symptoms over the last 5 months. She presents now with what appears to be prominent right ventricular heart failure related to progressive mitral stenosis and the development of severe pulmonary hypertension. Review of echocardiogram confirms mitral stenosis as well as aortic stenosis. Patient will proceed with TMVR/TAVR CT. If patient is clinically stable will have 
                         [facility] CARDIOLOGY PROGRESS NOTE           1/28/2024 8:50 AM    Admit Date: 1/14/2024      Subjective:   -Weight is 61.6 kg - 136 pounds-10 pounds lower than admission, intake versus output-matched  -Telemetry overnight shows atrial fibrillation-BiV paced  -Says that her lower extremity edema continues to improve.  No chest pain, dyspnea continues to improve,  -Using incentive spirometry-minimal cough with this      ROS:  Cardiovascular:  As noted above    Objective:      Vitals:    01/27/24 2311 01/28/24 0259 01/28/24 0600 01/28/24 0707   BP:  (!) 100/39  (!) 125/55   Pulse:  70  71   Resp:  20  20   Temp:  97.7 °F (36.5 °C)  97.7 °F (36.5 °C)   TempSrc:  Oral  Oral   SpO2: 92% 91%  93%   Weight:   61.9 kg (136 lb 7.4 oz)    Height:           Physical Exam:  General-No Acute Distress,   Neck- supple, no significant JVD  CV- regular rate and rhythm no R/G, 1/6 ejection systolic murmur heard at the right upper sternal border  Lung- clear bilaterally  Abd- soft, nontender, nondistended  Ext- no edema bilaterally.  Skin- warm and dry    Data Review:     Lab Results   Component Value Date/Time     01/28/2024 03:49 AM    K 3.4 01/28/2024 03:49 AM     01/28/2024 03:49 AM    CO2 31 01/28/2024 03:49 AM    BUN 37 01/28/2024 03:49 AM    CREATININE 1.00 01/28/2024 03:49 AM    GLUCOSE 284 01/28/2024 03:49 AM    CALCIUM 8.0 01/28/2024 03:49 AM         Lab Results   Component Value Date    WBC 5.5 01/28/2024    HGB 7.9 (L) 01/28/2024    HCT 26.6 (L) 01/28/2024    .8 01/28/2024     (L) 01/28/2024 01/14/24    ECHO (TTE) COMPLETE (PRN CONTRAST/BUBBLE/STRAIN/3D) 01/24/2024  2:45 PM (Final)    Interpretation Summary    Left Ventricle: Normal left ventricular systolic function with a visually estimated EF of 60 - 65%. Left ventricle size is normal. Mildly increased wall thickness. Normal wall motion. Abnormal diastolic function.    Right Ventricle: Right ventricle is mildly 
                         [facility] CARDIOLOGY PROGRESS NOTE           1/17/2024 7:28 AM    Admit Date: 1/14/2024      Subjective:   Patient with history of surgical AVR and MVR.  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  She continues with frequent paroxysms of atrial fibrillation with rapid ventricular response.  She developed acute hypoxia this morning.  She is otherwise resting comfortably in bed.    ROS:  Cardiovascular:  As noted above    Objective:      Vitals:    01/17/24 0600 01/17/24 0615 01/17/24 0630 01/17/24 0700   BP: (!) 136/56  (!) 134/58 (!) 121/57   Pulse: 57 55 65 61   Resp:  20  17   Temp:       TempSrc:       SpO2: 98% 98% 97% (!) 87%   Weight: 65.6 kg (144 lb 10 oz)      Height:           Physical Exam:  General-No Acute Distress  Neck- supple, no JVD  CV-irregular tachycardic with prominent systolic murmur at right upper sternal border and diastolic murmur at left lower sternal border and apex  Lung-bibasilar crackles  Abd- soft, nontender, nondistended  Ext- no edema bilaterally.  Skin- warm and dry    Data Review:   Recent Labs     01/16/24  0310 01/17/24  0418    140   K 4.2 3.7   MG 2.0 2.5*   BUN 30* 25*   WBC 8.7 6.7   HGB 9.9* 9.1*   HCT 33.7* 31.0*    188   INR 2.3 1.8         Assessment/Plan:     Principal Problem:    Acute on chronic respiratory failure (HCC)  Plan: Patient presents with acute respiratory failure. She reports development of oxygen dependent respiratory failure in August 2023. At that time she was diagnosed with both aortic and mitral stenosis or previous implanted surgical valves. She has had progressive worsening symptoms over the last 5 months. She presents now with what appears to be prominent right ventricular heart failure related to progressive mitral stenosis and the development of severe pulmonary hypertension. CLAUDIO confirms critical stenosis in the mitral valve and severe stenosis of the aortic 
           Pulmonary CV progress Note: 1/22/2024        Nery Bryant                                                     Admission Date: 1/14/2024     The patient's chart is reviewed and the patient is discussed with the staff.    Background:   Patient is a 78 y.o.  female seen and evaluated at the request of Dr. Vallecillo for the evaluation of respiratory failure.   She has a history of colon CA s/p partial resection, a fib RVR on coumadin, HTN, DM2, a fib, CHF, covid May 2023 (fatigue and sinus symptoms, no SOB at that time), s/p AVR presented to ER with increased shortness of breath..  She is known to our service and last been seen by Dr. Niraj MATTHEWS in December of this year.  She initially was admitted in July 2023 with acute respiratory failure and hypoxia and had no prior history of oxygen needs before.  She was found to have patchy groundglass opacities had bronchoscopy done and eventually was discharged home on oxygen on 2 L with ambulation.  At that time empiric steroids were considered however were not done.  Patient presented to emergency room again in August 2023 again with hypoxia and increased shortness of breath.  During this hospital stay she was recommended for open lung biopsy.  When she follow-up Dr. Niraj MATTHEWS in September the biopsy results showed inflammatory changes and it was felt maybe she has hypersensitivity pneumonitis however also there was concern there was some pulmonary vascular congestion as she clinically really improved on Lasix.  However she still was placed on prednisone per patient she was started on 60 mg of prednisone and she has been on prednisone since.  She is currently on 30 mg of prednisone which just was just recently cut back from 35.  She reports she is no better prednisone makes her really weak she does not feel it helped her at all and she would like to come off of it.  She is also being evaluated by cardiology for her A-fib and aortic stenosis.  Apparently her AV 
           Pulmonary CV progress Note: 1/23/2024        Nery Bryant                                                     Admission Date: 1/14/2024     The patient's chart is reviewed and the patient is discussed with the staff.    Background:   Patient is a 78 y.o.  female seen and evaluated at the request of Dr. Vallecillo for the evaluation of respiratory failure.   She has a history of colon CA s/p partial resection, a fib RVR on coumadin, HTN, DM2, a fib, CHF, covid May 2023 (fatigue and sinus symptoms, no SOB at that time), s/p AVR presented to ER with increased shortness of breath..  She is known to our service and last been seen by Dr. Niraj MATTHEWS in December of this year.  She initially was admitted in July 2023 with acute respiratory failure and hypoxia and had no prior history of oxygen needs before.  She was found to have patchy groundglass opacities had bronchoscopy done and eventually was discharged home on oxygen on 2 L with ambulation.  At that time empiric steroids were considered however were not done.  Patient presented to emergency room again in August 2023 again with hypoxia and increased shortness of breath.  During this hospital stay she was recommended for open lung biopsy.  When she follow-up Dr. Niraj MATTHEWS in September the biopsy results showed inflammatory changes and it was felt maybe she has hypersensitivity pneumonitis however also there was concern there was some pulmonary vascular congestion as she clinically really improved on Lasix.  However she still was placed on prednisone per patient she was started on 60 mg of prednisone and she has been on prednisone since.  She is currently on 30 mg of prednisone which just was just recently cut back from 35.  She reports she is no better prednisone makes her really weak she does not feel it helped her at all and she would like to come off of it.  She is also being evaluated by cardiology for her A-fib and aortic stenosis.  Apparently her AV 
           Pulmonary CV progress Note: 1/24/2024        Nery Peters Manny                                                     Admission Date: 1/14/2024     The patient's chart is reviewed and the patient is discussed with the staff.    Background:   Patient is a 78 y.o.  female seen and evaluated at the request of Dr. Vallecillo for the evaluation of respiratory failure.   She has a history of colon CA s/p partial resection, a fib RVR on coumadin, HTN, DM2, a fib, CHF, covid May 2023 (fatigue and sinus symptoms, no SOB at that time), s/p AVR presented to ER with increased shortness of breath..  She is known to our service and last been seen by Dr. Niraj MATTHEWS in December of this year.  She initially was admitted in July 2023 with acute respiratory failure and hypoxia and had no prior history of oxygen needs before.  She was found to have patchy groundglass opacities had bronchoscopy done and eventually was discharged home on oxygen on 2 L with ambulation.  At that time empiric steroids were considered however were not done.  Patient presented to emergency room again in August 2023 again with hypoxia and increased shortness of breath.  During this hospital stay she was recommended for open lung biopsy.  When she follow-up Dr. Niraj MATTHEWS in September the biopsy results showed inflammatory changes and it was felt maybe she has hypersensitivity pneumonitis however also there was concern there was some pulmonary vascular congestion as she clinically really improved on Lasix.  However she still was placed on prednisone per patient she was started on 60 mg of prednisone and she has been on prednisone since.  She is currently on 30 mg of prednisone which just was just recently cut back from 35.  She reports she is no better prednisone makes her really weak she does not feel it helped her at all and she would like to come off of it.  She is also being evaluated by cardiology for her A-fib and aortic stenosis.  Apparently her AV 
           Pulmonary CV progress Note: 1/25/2024        Nery Bryant                                                     Admission Date: 1/14/2024     The patient's chart is reviewed and the patient is discussed with the staff.    Background:   Patient is a 78 y.o.  female seen and evaluated at the request of Dr. Vallecillo for the evaluation of respiratory failure.   She has a history of colon CA s/p partial resection, a fib RVR on coumadin, HTN, DM2, a fib, CHF, covid May 2023 (fatigue and sinus symptoms, no SOB at that time), s/p AVR presented to ER with increased shortness of breath..  She is known to our service and last been seen by Dr. Niraj MATTHEWS in December of this year.  She initially was admitted in July 2023 with acute respiratory failure and hypoxia and had no prior history of oxygen needs before.  She was found to have patchy groundglass opacities had bronchoscopy done and eventually was discharged home on oxygen on 2 L with ambulation.  At that time empiric steroids were considered however were not done.  Patient presented to emergency room again in August 2023 again with hypoxia and increased shortness of breath.  During this hospital stay she was recommended for open lung biopsy.  When she follow-up Dr. Niraj MATTHESW in September the biopsy results showed inflammatory changes and it was felt maybe she has hypersensitivity pneumonitis however also there was concern there was some pulmonary vascular congestion as she clinically really improved on Lasix.  However she still was placed on prednisone per patient she was started on 60 mg of prednisone and she has been on prednisone since.  She is currently on 30 mg of prednisone which just was just recently cut back from 35.  She reports she is no better prednisone makes her really weak she does not feel it helped her at all and she would like to come off of it.  She is also being evaluated by cardiology for her A-fib and aortic stenosis.  Apparently her AV 
       Hospitalist Progress Note   Admit Date:  2024  8:51 PM   Name:  Nery Bryant   Age:  78 y.o.  Sex:  female  :  1945   MRN:  [id number]   Room:  /    Presenting/Chief Complaint: Shortness of Breath (A-Fib RVR)     Reason(s) for Admission: Transaminitis [R74.01]  Atrial fibrillation with RVR (HCC) [I48.91]  Supratherapeutic INR [R79.1]  Acute on chronic respiratory failure (HCC) [J96.20]  Hypervolemia, unspecified hypervolemia type [E87.70]  Acute on chronic congestive heart failure, unspecified heart failure type (HCC) [I50.9]     Hospital Course:   Nery Bryant is a 78 y.o. female with medical history of A-fib, mitral valve replacement, aortic deficiency anemia, diabetes, hypothyroidism, CAD, and colon cancer status postresection, who presents with worsening shortness of breath.  Has been getting managed outpatient by pulmonology and cardiology.  She typically takes Tikosyn and Coumadin for A-fib.  Cardiology has added Lopressor in the last week due to RVR.  Shortness of breath issues began around 2023. Pulmonology has been working her up with biopsy done around 2023 showing inflammatory changes.  She has been placed on 60 mg prednisone starting September and tapered by 10 mg every month since.  On arrival to the ER she was hypoxic on home 4 L and required Airvo, tachypneic, and tachycardic.   INR 5.2, WBCs 14.4, hemoglobin 8.2, BNP 5.6 K (baseline 330), TSH 0.04, and LA of 3.3.  CXR with cardiomegaly and moderate pulmonary edema.  Started on diuretic, bipap, and pulmonology and cardiology consulted.  CTA coronary and CTA chest/abd/pelvis as well as CLAUDIO done on .       Subjective & 24hr Events:   She continues to feel improved today.  Heart rate much better improved with pacemaker placement.  She denies shortness of breath. Terry chest pain.  Pacemaker site with minimal pain.     Assessment & Plan:     Principal Problem:    Acute on chronic respiratory 
       Hospitalist Progress Note   Admit Date:  2024  8:51 PM   Name:  Nery Bryant   Age:  78 y.o.  Sex:  female  :  1945   MRN:  [id number]   Room:      Presenting/Chief Complaint: Shortness of Breath (A-Fib RVR)     Reason(s) for Admission: Transaminitis [R74.01]  Atrial fibrillation with RVR (HCC) [I48.91]  Supratherapeutic INR [R79.1]  Acute on chronic respiratory failure (HCC) [J96.20]  Hypervolemia, unspecified hypervolemia type [E87.70]  Acute on chronic congestive heart failure, unspecified heart failure type (HCC) [I50.9]     Hospital Course:   Nery Bryant is a 78 y.o. female with medical history of A-fib, mitral valve replacement, aortic deficiency anemia, diabetes, hypothyroidism, CAD, and colon cancer status postresection, who presents with worsening shortness of breath.  Has been getting managed outpatient by pulmonology and cardiology.  She typically takes Tikosyn and Coumadin for A-fib.  Cardiology has added Lopressor in the last week due to RVR.  Shortness of breath issues began around 2023. Pulmonology has been working her up with biopsy done around 2023 showing inflammatory changes.  She has been placed on 60 mg prednisone starting September and tapered by 10 mg every month since.  On arrival to the ER she was hypoxic on home 4 L and required Airvo, tachypneic, and tachycardic.   INR 5.2, WBCs 14.4, hemoglobin 8.2, BNP 5.6 K (baseline 330), TSH 0.04, and LA of 3.3.  CXR with cardiomegaly and moderate pulmonary edema.  Started on diuretic, bipap, and pulmonology and cardiology consulted.  CTA coronary and CTA chest/abd/pelvis as well as CLAUDIO done on . LHC on  showed mildly obstructive CAD, no intervention was done.  Continue intermittent IV Lasix (cautious with diuresis in setting of aortic stenosis)      Subjective & 24hr Events:   She was feeling much better today.  She is saturating well on 1 LNC.  She was sitting comfortably on the chair. 
       Hospitalist Progress Note   Admit Date:  2024  8:51 PM   Name:  Nery Bryant   Age:  78 y.o.  Sex:  female  :  1945   MRN:  [id number]   Room:  104/    Presenting/Chief Complaint: Shortness of Breath (A-Fib RVR)     Reason(s) for Admission: Transaminitis [R74.01]  Atrial fibrillation with RVR (HCC) [I48.91]  Supratherapeutic INR [R79.1]  Acute on chronic respiratory failure (HCC) [J96.20]  Hypervolemia, unspecified hypervolemia type [E87.70]  Acute on chronic congestive heart failure, unspecified heart failure type (HCC) [I50.9]     Hospital Course:   Nery Bryant is a 78 y.o. female with medical history of A-fib, mitral valve replacement, aortic deficiency anemia, diabetes, hypothyroidism, CAD, and colon cancer status postresection, who presents with worsening shortness of breath.  Has been getting managed outpatient by pulmonology and cardiology.  She typically takes Tikosyn and Coumadin for A-fib.  Cardiology has added Lopressor in the last week due to RVR.  Shortness of breath issues began around 2023. Pulmonology has been working her up with biopsy done around 2023 showing inflammatory changes.  She has been placed on 60 mg prednisone starting September and tapered by 10 mg every month since.  On arrival to the ER she was hypoxic on home 4 L and required Airvo, tachypneic, and tachycardic.   INR 5.2, WBCs 14.4, hemoglobin 8.2, BNP 5.6 K (baseline 330), TSH 0.04, and LA of 3.3.  CXR with cardiomegaly and moderate pulmonary edema.  Started on diuretic, bipap, and pulmonology and cardiology consulted.  CTA coronary and CTA chest/abd/pelvis as well as CLAUDIO done on .       Subjective & 24hr Events:   Patient seen at bedside, resting in bed comfortably.  She is alert and awake, AOx3, on Airvo 40 L 40%.  Patient is n.p.o. this morning for left heart cath scheduled for today.   Patient denies any chest pain, nausea, vomiting, headache, lightheadedness or 
       Hospitalist Progress Note   Admit Date:  2024  8:51 PM   Name:  Nery Bryant   Age:  78 y.o.  Sex:  female  :  1945   MRN:  [id number]   Room:  /    Presenting/Chief Complaint: Shortness of Breath (A-Fib RVR)     Reason(s) for Admission: Transaminitis [R74.01]  Atrial fibrillation with RVR (HCC) [I48.91]  Supratherapeutic INR [R79.1]  Acute on chronic respiratory failure (HCC) [J96.20]  Hypervolemia, unspecified hypervolemia type [E87.70]  Acute on chronic congestive heart failure, unspecified heart failure type (HCC) [I50.9]     Hospital Course:   Nery Bryant is a 78 y.o. female with medical history of A-fib, mitral valve replacement, aortic deficiency anemia, diabetes, hypothyroidism, CAD, and colon cancer status postresection, who presents with worsening shortness of breath.  Has been getting managed outpatient by pulmonology and cardiology.  She typically takes Tikosyn and Coumadin for A-fib.  Cardiology has added Lopressor in the last week due to RVR.  Shortness of breath issues began around 2023. Pulmonology has been working her up with biopsy done around 2023 showing inflammatory changes.  She has been placed on 60 mg prednisone starting September and tapered by 10 mg every month since.  On arrival to the ER she was hypoxic on home 4 L and required Airvo, tachypneic, and tachycardic.   INR 5.2, WBCs 14.4, hemoglobin 8.2, BNP 5.6 K (baseline 330), TSH 0.04, and LA of 3.3.  CXR with cardiomegaly and moderate pulmonary edema.  Started on diuretic, bipap, and pulmonology and cardiology consulted.  CTA coronary and CTA chest/abd/pelvis as well as CLAUDIO done on .       Subjective & 24hr Events:   She feels improved today. Continues to have issues with heart rate going up and down. She denies shortness of breath. Terry chest pain. Had some palpitations last evening.       Assessment & Plan:     Principal Problem:    Acute on chronic respiratory failure 
       Hospitalist Progress Note   Admit Date:  2024  8:51 PM   Name:  Nery Bryant   Age:  78 y.o.  Sex:  female  :  1945   MRN:  [id number]   Room:  /    Presenting/Chief Complaint: Shortness of Breath (A-Fib RVR)     Reason(s) for Admission: Transaminitis [R74.01]  Atrial fibrillation with RVR (HCC) [I48.91]  Supratherapeutic INR [R79.1]  Acute on chronic respiratory failure (HCC) [J96.20]  Hypervolemia, unspecified hypervolemia type [E87.70]  Acute on chronic congestive heart failure, unspecified heart failure type (HCC) [I50.9]     Hospital Course:   Nery Bryant is a 78 y.o. female with medical history of A-fib, mitral valve replacement, aortic deficiency anemia, diabetes, hypothyroidism, CAD, and colon cancer status postresection, who presents with worsening shortness of breath.  Has been getting managed outpatient by pulmonology and cardiology.  She typically takes Tikosyn and Coumadin for A-fib.  Cardiology has added Lopressor in the last week due to RVR.  Shortness of breath issues began around 2023. Pulmonology has been working her up with biopsy done around 2023 showing inflammatory changes.  She has been placed on 60 mg prednisone starting September and tapered by 10 mg every month since.  On arrival to the ER she was hypoxic on home 4 L and required Airvo, tachypneic, and tachycardic.   INR 5.2, WBCs 14.4, hemoglobin 8.2, BNP 5.6 K (baseline 330), TSH 0.04, and LA of 3.3.  CXR with cardiomegaly and moderate pulmonary edema.  Started on diuretic, bipap, and pulmonology and cardiology consulted.  CTA coronary and CTA chest/abd/pelvis as well as CLAUDIO done on .       Subjective & 24hr Events:   Patient seen at bedside, resting in bed comfortably.  She is alert and awake, AOx3, on Airvo 40 L 38%, saturating 99%.  Patient denies any chest pain, nausea, vomiting, fever chills or night sweats or  Patient is n.p.o. since midnight for TAVR/TMVR scheduled for this 
       Hospitalist Progress Note   Admit Date:  2024  8:51 PM   Name:  Nery Bryant   Age:  78 y.o.  Sex:  female  :  1945   MRN:  [id number]   Room:      Presenting/Chief Complaint: Shortness of Breath (A-Fib RVR)     Reason(s) for Admission: Transaminitis [R74.01]  Atrial fibrillation with RVR (HCC) [I48.91]  Supratherapeutic INR [R79.1]  Acute on chronic respiratory failure (HCC) [J96.20]  Hypervolemia, unspecified hypervolemia type [E87.70]  Acute on chronic congestive heart failure, unspecified heart failure type (HCC) [I50.9]     Hospital Course:   Nery Bryant is a 78 y.o. female with medical history of A-fib, mitral valve replacement, aortic deficiency anemia, diabetes, hypothyroidism, CAD, and colon cancer status postresection, who presents with worsening shortness of breath.  Has been getting managed outpatient by pulmonology and cardiology.  She typically takes Tikosyn and Coumadin for A-fib.  Cardiology has added Lopressor in the last week due to RVR.  Shortness of breath issues began around 2023. Pulmonology has been working her up with biopsy done around 2023 showing inflammatory changes.  She has been placed on 60 mg prednisone starting September and tapered by 10 mg every month since.  On arrival to the ER she was hypoxic on home 4 L and required Airvo, tachypneic, and tachycardic.   INR 5.2, WBCs 14.4, hemoglobin 8.2, BNP 5.6 K (baseline 330), TSH 0.04, and LA of 3.3.  CXR with cardiomegaly and moderate pulmonary edema.  Started on diuretic, bipap, and pulmonology and cardiology consulted.  CTA coronary and CTA chest/abd/pelvis as well as CLAUDIO done on . LHC on  showed mildly obstructive CAD, no intervention was done.  Continue intermittent IV Lasix (cautious with diuresis in setting of aortic stenosis)      Subjective & 24hr Events:   She was feeling much better today.  She is saturating well on 1 LNC.  She was sitting comfortably on the chair. 
       Hospitalist Progress Note   Admit Date:  2024  8:51 PM   Name:  Nery Bryant   Age:  78 y.o.  Sex:  female  :  1945   MRN:  [id number]   Room:  /    Presenting/Chief Complaint: Shortness of Breath (A-Fib RVR)     Reason(s) for Admission: Transaminitis [R74.01]  Atrial fibrillation with RVR (HCC) [I48.91]  Supratherapeutic INR [R79.1]  Acute on chronic respiratory failure (HCC) [J96.20]  Hypervolemia, unspecified hypervolemia type [E87.70]  Acute on chronic congestive heart failure, unspecified heart failure type (HCC) [I50.9]     Hospital Course:   Nery Bryant is a 78 y.o. female with medical history of A-fib, mitral valve replacement, aortic deficiency anemia, diabetes, hypothyroidism, CAD, and colon cancer status postresection, who presents with worsening shortness of breath.  Has been getting managed outpatient by pulmonology and cardiology.  She typically takes Tikosyn and Coumadin for A-fib.  Cardiology has added Lopressor in the last week due to RVR.  Shortness of breath issues began around 2023. Pulmonology has been working her up with biopsy done around 2023 showing inflammatory changes.  She has been placed on 60 mg prednisone starting September and tapered by 10 mg every month since.  On arrival to the ER she was hypoxic on home 4 L and required Airvo, tachypneic, and tachycardic.   INR 5.2, WBCs 14.4, hemoglobin 8.2, BNP 5.6 K (baseline 330), TSH 0.04, and LA of 3.3.  CXR with cardiomegaly and moderate pulmonary edema.  Started on diuretic, bipap, and pulmonology and cardiology consulted.  CTA coronary and CTA chest/abd/pelvis as well as CLAUDIO done on .       Subjective & 24hr Events:   Patient seen at bedside, resting in bedside recliner comfortably.  Patient continues to be on Airvo 40 L 40%.  Patient reports feeling okay overall, denies any acute chest pain, nausea, vomiting, fever chills or night sweats.  No overnight events reported by nursing 
       Hospitalist Progress Note   Admit Date:  2024  8:51 PM   Name:  Nery Bryant   Age:  78 y.o.  Sex:  female  :  1945   MRN:  [id number]   Room:      Presenting/Chief Complaint: Shortness of Breath (A-Fib RVR)     Reason(s) for Admission: Transaminitis [R74.01]  Atrial fibrillation with RVR (HCC) [I48.91]  Supratherapeutic INR [R79.1]  Acute on chronic respiratory failure (HCC) [J96.20]  Hypervolemia, unspecified hypervolemia type [E87.70]  Acute on chronic congestive heart failure, unspecified heart failure type (HCC) [I50.9]     Hospital Course:   Nery Bryant is a 78 y.o. female with medical history of A-fib, mitral valve replacement, aortic deficiency anemia, diabetes, hypothyroidism, CAD, and colon cancer status postresection, who presents with worsening shortness of breath.  Has been getting managed outpatient by pulmonology and cardiology.  She typically takes Tikosyn and Coumadin for A-fib.  Cardiology has added Lopressor in the last week due to RVR.  Shortness of breath issues began around 2023. Pulmonology has been working her up with biopsy done around 2023 showing inflammatory changes.  She has been placed on 60 mg prednisone starting September and tapered by 10 mg every month since.  On arrival to the ER she was hypoxic on home 4 L and required Airvo, tachypneic, and tachycardic.   INR 5.2, WBCs 14.4, hemoglobin 8.2, BNP 5.6 K (baseline 330), TSH 0.04, and LA of 3.3.  CXR with cardiomegaly and moderate pulmonary edema.  Started on diuretic, bipap, and pulmonology and cardiology consulted.  CTA coronary and CTA chest/abd/pelvis as well as CLAUDIO done on . LHC on  showed mildly obstructive CAD, no intervention was done.  Continue intermittent IV Lasix (cautious with diuresis in setting of aortic stenosis)      Subjective & 24hr Events:   She was feeling much better today.  She is saturating well on 1 LNC.  She was sitting comfortably on the chair. 
       Hospitalist Progress Note   Admit Date:  2024  8:51 PM   Name:  Nery Bryant   Age:  78 y.o.  Sex:  female  :  1945   MRN:  [id number]   Room:  101/    Presenting/Chief Complaint: Shortness of Breath (A-Fib RVR)     Reason(s) for Admission: Transaminitis [R74.01]  Atrial fibrillation with RVR (HCC) [I48.91]  Supratherapeutic INR [R79.1]  Acute on chronic respiratory failure (HCC) [J96.20]  Hypervolemia, unspecified hypervolemia type [E87.70]  Acute on chronic congestive heart failure, unspecified heart failure type (HCC) [I50.9]     Hospital Course:   Nery Bryant is a 78 y.o. female with medical history of A-fib, mitral valve replacement, aortic deficiency anemia, diabetes, hypothyroidism, CAD, and colon cancer status postresection, who presents with worsening shortness of breath.  Has been getting managed outpatient by pulmonology and cardiology.  She typically takes Tikosyn and Coumadin for A-fib.  Cardiology has added Lopressor in the last week due to RVR.  Shortness of breath issues began around 2023. Pulmonology has been working her up with biopsy done around 2023 showing inflammatory changes.  She has been placed on 60 mg prednisone starting September and tapered by 10 mg every month since.  On arrival to the ER she was hypoxic on home 4 L and required Airvo, tachypneic, and tachycardic.   INR 5.2, WBCs 14.4, hemoglobin 8.2, BNP 5.6 K (baseline 330), TSH 0.04, and LA of 3.3.  CXR with cardiomegaly and moderate pulmonary edema.  Started on diuretic, bipap, and pulmonology and cardiology consulted.  CTA coronary and CTA chest/abd/pelvis as well as CLAUDIO done on .       Subjective & 24hr Events:   Patient seen at bedside, resting in bedside recliner comfortably.  She is alert and awake, AOx3, continues to be on Airvo 40 L 40%.  Patient was given one-time dose of diltiazem yesterday as patient was noted to be in A-fib likely triggered by hypoxia.  Patient 
       Hospitalist Progress Note   Admit Date:  2024  8:51 PM   Name:  Nery Bryant   Age:  78 y.o.  Sex:  female  :  1945   MRN:  [id number]   Room:      Presenting/Chief Complaint: Shortness of Breath (A-Fib RVR)     Reason(s) for Admission: Transaminitis [R74.01]  Atrial fibrillation with RVR (HCC) [I48.91]  Supratherapeutic INR [R79.1]  Acute on chronic respiratory failure (HCC) [J96.20]  Hypervolemia, unspecified hypervolemia type [E87.70]  Acute on chronic congestive heart failure, unspecified heart failure type (HCC) [I50.9]     Hospital Course:   Nery Bryant is a 78 y.o. female with medical history of A-fib, mitral valve replacement, aortic deficiency anemia, diabetes, hypothyroidism, CAD, and colon cancer status postresection, who presents with worsening shortness of breath.  Has been getting managed outpatient by pulmonology and cardiology.  She typically takes Tikosyn and Coumadin for A-fib.  Cardiology has added Lopressor in the last week due to RVR.  Shortness of breath issues began around 2023. Pulmonology has been working her up with biopsy done around 2023 showing inflammatory changes.  She has been placed on 60 mg prednisone starting September and tapered by 10 mg every month since.  On arrival to the ER she was hypoxic on home 4 L and required Airvo, tachypneic, and tachycardic.   INR 5.2, WBCs 14.4, hemoglobin 8.2, BNP 5.6 K (baseline 330), TSH 0.04, and LA of 3.3.  CXR with cardiomegaly and moderate pulmonary edema.  Started on diuretic, bipap, and pulmonology and cardiology consulted.  CTA coronary and CTA chest/abd/pelvis as well as CLAUDIO done on .       Subjective & 24hr Events:   Patient seen at bedside, resting in bed comfortably.  She is alert and awake, AOx3, on 3 L nasal cannula.  Patient was given 1 unit PRBC overnight.  Patient denies any chest pain, nausea, vomiting, fever chills or night sweats.  Patient is status post TAVR/TMVR, POD 
       Hospitalist Progress Note   Admit Date:  2024  8:51 PM   Name:  Nery Bryant   Age:  78 y.o.  Sex:  female  :  1945   MRN:  [id number]   Room:  /    Presenting/Chief Complaint: Shortness of Breath (A-Fib RVR)     Reason(s) for Admission: Transaminitis [R74.01]  Atrial fibrillation with RVR (HCC) [I48.91]  Supratherapeutic INR [R79.1]  Acute on chronic respiratory failure (HCC) [J96.20]  Hypervolemia, unspecified hypervolemia type [E87.70]  Acute on chronic congestive heart failure, unspecified heart failure type (HCC) [I50.9]     Hospital Course:   Nery Bryant is a 78 y.o. female with medical history of A-fib, mitral valve replacement, aortic deficiency anemia, diabetes, hypothyroidism, CAD, and colon cancer status postresection, who presents with worsening shortness of breath.  Has been getting managed outpatient by pulmonology and cardiology.  She typically takes Tikosyn and Coumadin for A-fib.  Cardiology has added Lopressor in the last week due to RVR.  Shortness of breath issues began around 2023. Pulmonology has been working her up with biopsy done around 2023 showing inflammatory changes.  She has been placed on 60 mg prednisone starting September and tapered by 10 mg every month since.  On arrival to the ER she was hypoxic on home 4 L and required Airvo, tachypneic, and tachycardic.   INR 5.2, WBCs 14.4, hemoglobin 8.2, BNP 5.6 K (baseline 330), TSH 0.04, and LA of 3.3.  CXR with cardiomegaly and moderate pulmonary edema.  Started on diuretic, bipap, and pulmonology and cardiology consulted.  CTA coronary and CTA chest/abd/pelvis as well as CLAUDIO done on .       Subjective & 24hr Events:   Patient seen at bedside, resting in bed comfortably.  She is alert and awake, AOx3, continues to be on Airvo at 40 L 40%.  Patient reports feeling better, denies any chest pain, abdominal pain, nausea vomiting or diarrhea.  No overnight events  Patient was started on 
   01/29/24 1558   Resting (Room Air)   SpO2 86   Resting (On O2)   SpO2 91   O2 Device Nasal cannula   O2 Flow Rate (l/min) 1 l/min   During Walk (On O2)   SpO2 90   O2 Device Nasal cannula   O2 Flow Rate (l/min) 1 l/min   Walk/Assistance Device Ambulation   Symptoms Dizziness   Comments weakness   After Walk   SpO2 91   O2 Device Nasal cannula   O2 Flow Rate (l/min) 1 l/min   Rate of Dyspnea 1   Symptoms Dizziness   Comments weakness   Does the Patient Qualify for Home O2 Yes   Liter Flow at Rest 1   Liter Flow on Exertion 1   Does the Patient Need Portable Oxygen Tanks Yes       
   01/29/24 1558   Resting (Room Air)   SpO2 86   Resting (On O2)   SpO2 91   O2 Device Nasal cannula   O2 Flow Rate (l/min) 1 l/min   During Walk (On O2)   SpO2 90   O2 Device Nasal cannula   O2 Flow Rate (l/min) 1 l/min   Walk/Assistance Device Ambulation   Symptoms Dizziness   Comments weakness   After Walk   SpO2 91   O2 Device Nasal cannula   O2 Flow Rate (l/min) 1 l/min   Rate of Dyspnea 1   Symptoms Dizziness   Comments weakness   Does the Patient Qualify for Home O2 Yes   Liter Flow at Rest 1   Liter Flow on Exertion 1   Does the Patient Need Portable Oxygen Tanks Yes       
   [facility], [place] CARDIOLOGY PROGRESS NOTE    1/20/2024 10:08 AM    Admit Date: 1/14/2024        Subjective:   Patient with worsening hypoxia this morning.  Requiring Optiflow.  Receiving oral Lasix.  Had a dose earlier today.  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  Renal function stable.  Hemodynamics stable    Objective:      Vitals:    01/20/24 0900 01/20/24 0915 01/20/24 0930 01/20/24 0945   BP: 135/63      Pulse: 76 74 78 71   Resp: 22 22 17 23   Temp:       TempSrc:       SpO2: (!) 86% 98% 97% 100%   Weight:       Height:           Physical Exam:  Neck- supple, no JVD  CV-regular, 2/6 systolic ejection murmur at right upper sternal border and diastolic murmur at left lower sternal border and apex   Lung- bibasilar rales.   Abd- soft, nontender, nondistended  Ext- trivial edema distally  Skin- warm and dry    Data Review:       Recent Labs     01/19/24  0637 01/20/24  0329    141   K 3.8 3.9   MG 2.1 1.9   BUN 25* 27*   WBC 7.1 5.4   HGB 9.3* 9.0*   HCT 32.1* 30.9*    170   INR 1.1 1.1       Left heart catheterization 1/17/2024:   Normal coronary arteries with the exception of mild nonobstructive disease involving the ostium of the first obtuse marginal.      CLAUDIO 1/16/2024:    Left Ventricle: Normal left ventricular systolic function with a visually estimated EF of 55 - 60%. Left ventricle size is normal.    Aortic Valve: There is a #21 mm Doreen Oliveira bioprosthetic valve which appears adequately seated.  The leaflets appear thickened and restricted consistent with leaflet degeneration.  There is evidence of severe prosthetic valve stenosis [restricted motion of 2 leaflets with best preserved motion of the right coronary cusp; DI 0.19; AT ~114msec; AV mean gradient is 45 mmHg].    Mitral Valve: There is a #25mm Doreen Oliveira bioprosthetic valve which appears adequately seated.  Leaflets appear thickened and calcified consistent with leaflet degeneration.  There is severe 
   [facility] CARDIOLOGY PROGRESS NOTE    1/30/2024 8:49 AM    Admit Date: 1/14/2024        Subjective:   Stable overnight without angina, CHF, or palpitations and breathing much better after TAVR/TMVR.  Volume status stable.  BP stable.  S/P AV node ablation and BiV PM.      Objective:      Vitals:    01/30/24 0600 01/30/24 0615 01/30/24 0630 01/30/24 0702   BP:    (!) 123/55   Pulse: 70 74 70 70   Resp:    18   Temp:    97.5 °F (36.4 °C)   TempSrc:    Oral   SpO2:    96%   Weight: 62.9 kg (138 lb 10.7 oz)      Height:           Physical Exam:  Neck- supple  CV- regular rate and rhythm, 1/6 systolic ejection murmur right upper sternal border, no S3  Lung- clear bilaterally entry apically, decreased bibasilar but no crackles or wheezing  Abd- soft, nontender, nondistended  Ext- no distal edema, right and left groin access sites clean and dry, no ecchymosis, no hematoma  Skin- warm and dry    Data Review:   Pertinent lab and noninvasive imaging over the past 24 hours reviewed and evaluated.    Recent Labs     01/28/24  0349 01/29/24  0346 01/29/24  1718 01/30/24  0318    142  --  138   K 3.4* 4.1  --  4.3   MG 1.9 1.9  --   --    BUN 37* 32*  --  35*   WBC 5.5 5.5  --  8.2   HGB 7.9* 7.9* 9.8* 10.4*   HCT 26.6* 26.5* 32.8* 33.6*   * 143*  --  167   INR 1.4 1.5  --  1.4       Echo 1/24/2024:  Left Ventricle Normal left ventricular systolic function with a visually estimated EF of 60 - 65%. Left ventricle size is normal. Mildly increased wall thickness. Normal wall motion. Abnormal diastolic function.   Left Atrium Left atrium is moderately dilated.   Right Ventricle Right ventricle is mildly dilated. Mildly reduced systolic function.   Right Atrium Right atrium is moderately dilated.   Aortic Valve Oliveira Ultra Trish bioprosthetic valve with a size of 23 mm.  Normal function. No regurgitation. No stenosis. AV mean gradient is 14 mmHg. AV peak gradient is 27 mmHg.   Mitral Valve Oliveira Ultra Trish 
   [facility] CARDIOLOGY PROGRESS NOTE    1/26/2024 7:35 AM    Admit Date: 1/14/2024        Subjective:   Stable overnight without angina, CHF, or palpitations and breathing much better after TAVR/TMVR.  Volume status stable.  BP stable.  S/P AV node ablation and BiV PM.      Objective:      Vitals:    01/26/24 0400 01/26/24 0510 01/26/24 0530 01/26/24 0600   BP: (!) 115/58  125/60    Pulse: 80  80 80   Resp: 18 19 30 (!) 40   Temp:       TempSrc:       SpO2: 96%  95% 96%   Weight:       Height:           Physical Exam:  Neck- supple  CV- regular rate and rhythm, 1/6 systolic ejection murmur right upper sternal border, no S3  Lung- clear bilaterally entry apically, decreased bibasilar but no crackles or wheezing  Abd- soft, nontender, nondistended  Ext- no distal edema, right and left groin access sites clean and dry, no ecchymosis, no hematoma  Skin- warm and dry    Data Review:   Pertinent lab and noninvasive imaging over the past 24 hours reviewed and evaluated.    Recent Labs     01/25/24  0445 01/25/24  1239 01/26/24  0423     --  140   K 4.6  --  4.4   MG 2.1  --  2.0   BUN 32*  --  25*   WBC 5.2  --  7.4   HGB 8.1* 8.3* 8.3*   HCT 28.1* 28.6* 28.7*   *  --  142*   INR 1.1  --  1.1       Echo 1/24/2024:  Left Ventricle Normal left ventricular systolic function with a visually estimated EF of 60 - 65%. Left ventricle size is normal. Mildly increased wall thickness. Normal wall motion. Abnormal diastolic function.   Left Atrium Left atrium is moderately dilated.   Right Ventricle Right ventricle is mildly dilated. Mildly reduced systolic function.   Right Atrium Right atrium is moderately dilated.   Aortic Valve Oliveira Ultra Trish bioprosthetic valve with a size of 23 mm.  Normal function. No regurgitation. No stenosis. AV mean gradient is 14 mmHg. AV peak gradient is 27 mmHg.   Mitral Valve Oliveira Ultra Trish bioprosthetic valve with a size of 26 mm. Normal function.  MV mean gradient is 5 mmHg. 
   [facility] CARDIOLOGY PROGRESS NOTE    1/23/2024 7:19 AM    Admit Date: 1/14/2024        Subjective:   Patient in rate controlled atrial fibrillation this morning.  Hgb stable. Still requiring optiflow.     Objective:      Vitals:    01/23/24 0313 01/23/24 0520 01/23/24 0532 01/23/24 0602   BP:  132/63 (!) 113/57 (!) 129/55   Pulse: 56 58 60 57   Resp:       Temp:       TempSrc:       SpO2: 99% 100% 98% 100%   Weight:       Height:           Physical Exam:  Neck- supple, no JVD  CV-irregular, 2/6 systolic ejection murmur at right upper sternal border and diastolic murmur at left lower sternal border and apex   Lung- bibasilar rales.   Abd- soft, nontender, nondistended  Ext- trivial edema distally  Skin- warm and dry    Data Review:       Recent Labs     01/22/24  0329 01/23/24  0318    136   K 3.4* 5.1   MG 1.8 2.2   BUN 28* 39*   WBC 7.8 5.6   HGB 10.2* 8.8*   HCT 34.5* 30.0*    187   INR 1.0 1.1       Left heart catheterization 1/17/2024:   Normal coronary arteries with the exception of mild nonobstructive disease involving the ostium of the first obtuse marginal.      CLAUDIO 1/16/2024:    Left Ventricle: Normal left ventricular systolic function with a visually estimated EF of 55 - 60%. Left ventricle size is normal.    Aortic Valve: There is a #21 mm Doreen Oliveira bioprosthetic valve which appears adequately seated.  The leaflets appear thickened and restricted consistent with leaflet degeneration.  There is evidence of severe prosthetic valve stenosis [restricted motion of 2 leaflets with best preserved motion of the right coronary cusp; DI 0.19; AT ~114msec; AV mean gradient is 45 mmHg].    Mitral Valve: There is a #25mm Doreen Oliveira bioprosthetic valve which appears adequately seated.  Leaflets appear thickened and calcified consistent with leaflet degeneration.  There is severe prosthetic stenosis [2 leaflets fixed with restricted motion of the third septal leaflet; p1/2 time >200msecs; 
   [facility] CARDIOLOGY PROGRESS NOTE    1/21/2024 9:39 AM    Admit Date: 1/14/2024        Subjective:   Patient in rate controlled atrial fibrillation this morning.  Hgb stable. Still requiring optiflow.     Objective:      Vitals:    01/21/24 0729 01/21/24 0815 01/21/24 0830 01/21/24 0845   BP:       Pulse: 65      Resp: 24 24 24 22   Temp:       TempSrc:       SpO2: 99%      Weight:       Height:           Physical Exam:  Neck- supple, no JVD  CV-regular, 2/6 systolic ejection murmur at right upper sternal border and diastolic murmur at left lower sternal border and apex   Lung- bibasilar rales.   Abd- soft, nontender, nondistended  Ext- trivial edema distally  Skin- warm and dry    Data Review:       Recent Labs     01/19/24  0637 01/20/24  0329 01/21/24  0348    141  --    K 3.8 3.9  --    MG 2.1 1.9  --    BUN 25* 27*  --    WBC 7.1 5.4  --    HGB 9.3* 9.0*  --    HCT 32.1* 30.9*  --     170  --    INR 1.1 1.1 1.0       Left heart catheterization 1/17/2024:   Normal coronary arteries with the exception of mild nonobstructive disease involving the ostium of the first obtuse marginal.      CLAUDIO 1/16/2024:    Left Ventricle: Normal left ventricular systolic function with a visually estimated EF of 55 - 60%. Left ventricle size is normal.    Aortic Valve: There is a #21 mm Doreen Oliveira bioprosthetic valve which appears adequately seated.  The leaflets appear thickened and restricted consistent with leaflet degeneration.  There is evidence of severe prosthetic valve stenosis [restricted motion of 2 leaflets with best preserved motion of the right coronary cusp; DI 0.19; AT ~114msec; AV mean gradient is 45 mmHg].    Mitral Valve: There is a #25mm Doreen Oliveira bioprosthetic valve which appears adequately seated.  Leaflets appear thickened and calcified consistent with leaflet degeneration.  There is severe prosthetic stenosis [2 leaflets fixed with restricted motion of the third septal leaflet; 
   [facility] CARDIOLOGY PROGRESS NOTE    1/22/2024 11:29 AM    Admit Date: 1/14/2024        Subjective:   Patient in rate controlled atrial fibrillation this morning.  Hgb stable. Still requiring optiflow. In afib.     Objective:      Vitals:    01/22/24 1015 01/22/24 1030 01/22/24 1100 01/22/24 1115   BP: 128/60 (!) 134/56 136/63 (!) 124/58   Pulse: 66 66 68 76   Resp: (!) 31 30 (!) 61 (!) 34   Temp:       TempSrc:       SpO2: 100% 99% 98% (!) 89%   Weight:       Height:           Physical Exam:  Neck- supple, no JVD  CV-regular, 2/6 systolic ejection murmur at right upper sternal border and diastolic murmur at left lower sternal border and apex   Lung- bibasilar rales.   Abd- soft, nontender, nondistended  Ext- trivial edema distally  Skin- warm and dry    Data Review:       Recent Labs     01/20/24  0329 01/21/24  0348 01/22/24  0329     --  141   K 3.9  --  3.4*   MG 1.9  --  1.8   BUN 27*  --  28*   WBC 5.4  --  7.8   HGB 9.0*  --  10.2*   HCT 30.9*  --  34.5*     --  229   INR 1.1 1.0 1.0       Left heart catheterization 1/17/2024:   Normal coronary arteries with the exception of mild nonobstructive disease involving the ostium of the first obtuse marginal.      CLAUDIO 1/16/2024:    Left Ventricle: Normal left ventricular systolic function with a visually estimated EF of 55 - 60%. Left ventricle size is normal.    Aortic Valve: There is a #21 mm Doreen Oliveira bioprosthetic valve which appears adequately seated.  The leaflets appear thickened and restricted consistent with leaflet degeneration.  There is evidence of severe prosthetic valve stenosis [restricted motion of 2 leaflets with best preserved motion of the right coronary cusp; DI 0.19; AT ~114msec; AV mean gradient is 45 mmHg].    Mitral Valve: There is a #25mm Doreen Oliveira bioprosthetic valve which appears adequately seated.  Leaflets appear thickened and calcified consistent with leaflet degeneration.  There is severe prosthetic 
   [facility] CARDIOLOGY PROGRESS NOTE    1/29/2024 7:33 AM    Admit Date: 1/14/2024        Subjective:   Stable overnight without angina, CHF, or palpitations and breathing much better after TAVR/TMVR.  Volume status stable.  BP stable.  S/P AV node ablation and BiV PM.      Objective:      Vitals:    01/28/24 2155 01/29/24 0000 01/29/24 0148 01/29/24 0556   BP:  (!) 141/62  (!) 117/57   Pulse:  70  73   Resp: 18  18 16   Temp:    98.1 °F (36.7 °C)   TempSrc:    Oral   SpO2:       Weight:       Height:           Physical Exam:  Neck- supple  CV- regular rate and rhythm, 1/6 systolic ejection murmur right upper sternal border, no S3  Lung- clear bilaterally entry apically, decreased bibasilar but no crackles or wheezing  Abd- soft, nontender, nondistended  Ext- no distal edema, right and left groin access sites clean and dry, no ecchymosis, no hematoma  Skin- warm and dry    Data Review:   Pertinent lab and noninvasive imaging over the past 24 hours reviewed and evaluated.    Recent Labs     01/28/24  0349 01/29/24  0346    142   K 3.4* 4.1   MG 1.9 1.9   BUN 37* 32*   WBC 5.5 5.5   HGB 7.9* 7.9*   HCT 26.6* 26.5*   * 143*   INR 1.4 1.5       Echo 1/24/2024:  Left Ventricle Normal left ventricular systolic function with a visually estimated EF of 60 - 65%. Left ventricle size is normal. Mildly increased wall thickness. Normal wall motion. Abnormal diastolic function.   Left Atrium Left atrium is moderately dilated.   Right Ventricle Right ventricle is mildly dilated. Mildly reduced systolic function.   Right Atrium Right atrium is moderately dilated.   Aortic Valve Oliveira Ultra Trish bioprosthetic valve with a size of 23 mm.  Normal function. No regurgitation. No stenosis. AV mean gradient is 14 mmHg. AV peak gradient is 27 mmHg.   Mitral Valve Oliveira Ultra Trish bioprosthetic valve with a size of 26 mm. Normal function.  MV mean gradient is 5 mmHg. No regurgitation. No stenosis noted.   Tricuspid 
   [facility] CARDIOLOGY PROGRESS NOTE    1/18/2024 6:29 AM    Admit Date: 1/14/2024        Subjective:   Stable overnight without angina, CHF, or palpitations. Vitals stable and controlled. No other complaints overnight. Tolerating meds well. 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 She continues with frequent paroxysms of atrial fibrillation with rapid ventricular response but is back in sinus rhythm this morning.  Breathing has been stable overnight, comfortable on Optiflow lying flat this morning.      Objective:      Vitals:    01/18/24 0530 01/18/24 0545 01/18/24 0600 01/18/24 0615   BP: (!) 123/58   (!) 147/68   Pulse: 58 58 71 67   Resp: 16 21 27 24   Temp:       TempSrc:       SpO2: 99% 100% 96% 98%   Weight:       Height:           Physical Exam:  Neck- supple, no JVD  CV-regular, 2/6 systolic ejection murmur at right upper sternal border and diastolic murmur at left lower sternal border and apex   Lung- clear bilaterally entry apically, left basilar crackles, right base fairly clear  Abd- soft, nontender, nondistended  Ext- no edema distally  Skin- warm and dry    Data Review:   Pertinent lab and noninvasive imaging over the past 24 hours reviewed and evaluated.    Recent Labs     01/16/24  0310 01/17/24  0418 01/18/24  0307    140  --    K 4.2 3.7  --    MG 2.0 2.5*  --    BUN 30* 25*  --    WBC 8.7 6.7  --    HGB 9.9* 9.1*  --    HCT 33.7* 31.0*  --     188  --    INR 2.3 1.8 1.2     Left heart catheterization 1/17/2024:   Normal coronary arteries with the exception of mild nonobstructive disease involving the ostium of the first obtuse marginal.     CLAUDIO 1/16/2024:    Left Ventricle: Normal left ventricular systolic function with a visually estimated EF of 55 - 60%. Left ventricle size is normal.    Aortic Valve: There is a #21 mm Doreen Oliveira bioprosthetic valve which appears adequately seated.  The 
   [facility] CARDIOLOGY PROGRESS NOTE    1/25/2024 5:58 AM    Admit Date: 1/14/2024        Subjective:   Stable overnight without angina, CHF, or palpitations and breathing much better after TAVR/TMVR.  Volume status stable.  BP stable.  Sinus rhythm during my evaluation but overnight had sinus bradycardia and recurrent runs of A-fib/flutter with RVR.  No other complaints overnight. Tolerating meds well.       Objective:      Vitals:    01/25/24 0200 01/25/24 0300 01/25/24 0400 01/25/24 0500   BP: (!) 120/53 (!) 111/56  (!) 117/56   Pulse: 51 61 61 67   Resp: 23 20 21 22   Temp:       TempSrc:       SpO2: 100% 97% 97%    Weight:    61.1 kg (134 lb 11.2 oz)   Height:           Physical Exam:  Neck- supple, 7 8 cm JVD at 45 degrees  CV- regular rate and rhythm, 1/6 systolic ejection murmur right upper sternal border, no S3  Lung- clear bilaterally entry apically, decreased bibasilar but no crackles or wheezing  Abd- soft, nontender, nondistended  Ext- no distal edema, right and left groin access sites clean and dry, no ecchymosis, no hematoma  Skin- warm and dry    Data Review:   Pertinent lab and noninvasive imaging over the past 24 hours reviewed and evaluated.    Recent Labs     01/23/24  0318 01/24/24  0311 01/24/24  0354 01/25/24  0445    141  --  141   K 5.1 4.4  --  4.6   MG 2.2  --   --  2.1   BUN 39* 32*  --  32*   WBC 5.6 8.4  --  5.2   HGB 8.8* 8.5*  --  8.1*   HCT 30.0* 28.3*  --  28.1*    159  --  129*   INR 1.1  --  1.1 1.1     Echo 1/24/2024:  Left Ventricle Normal left ventricular systolic function with a visually estimated EF of 60 - 65%. Left ventricle size is normal. Mildly increased wall thickness. Normal wall motion. Abnormal diastolic function.   Left Atrium Left atrium is moderately dilated.   Right Ventricle Right ventricle is mildly dilated. Mildly reduced systolic function.   Right Atrium Right atrium is moderately dilated.   Aortic Valve Oliveira Ultra Trish bioprosthetic valve 
   [facility] CARDIOLOGY PROGRESS NOTE    1/19/2024 6:51 AM    Admit Date: 1/14/2024        Subjective:   Stable overnight without angina, CHF, or palpitations. Vitals stable and controlled, sinus rhythm at present.  Intermittent short burst of A-fib overnight but clinically asymptomatic.  No other complaints overnight. Tolerating meds well.     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 She continues with frequent paroxysms of atrial fibrillation with rapid ventricular response but is back in sinus rhythm this morning.  Breathing has been stable overnight, comfortable on Optiflow lying flat this morning.       Objective:      Vitals:    01/19/24 0230 01/19/24 0245 01/19/24 0300 01/19/24 0500   BP: 137/63  (!) 127/54    Pulse: 66 59 57 58   Resp: (!) 45 (!) 40 27 25   Temp:   97.7 °F (36.5 °C)    TempSrc:   Axillary    SpO2: 99% 97% 98% 98%   Weight:       Height:           Physical Exam:  Neck- supple, no JVD  CV-regular, 2/6 systolic ejection murmur at right upper sternal border and diastolic murmur at left lower sternal border and apex   Lung- clear bilaterally entry apically, left basilar crackles, right base fairly clear  Abd- soft, nontender, nondistended  Ext- no edema distally  Skin- warm and dry    Data Review:   Pertinent lab and noninvasive imaging over the past 24 hours reviewed and evaluated.    Recent Labs     01/17/24  0418 01/18/24  0307 01/18/24  0639     --  140   K 3.7  --  3.9   MG 2.5*  --   --    BUN 25*  --  25*   WBC 6.7  --  6.8   HGB 9.1*  --  9.6*   HCT 31.0*  --  32.5*     --  186   INR 1.8 1.2  --      Left heart catheterization 1/17/2024:   Normal coronary arteries with the exception of mild nonobstructive disease involving the ostium of the first obtuse marginal.      CLAUDIO 1/16/2024:    Left Ventricle: Normal left ventricular systolic function with a visually estimated EF of 55 - 60%. Left ventricle size is normal.    Aortic 
ACUTE OCCUPATIONAL THERAPY GOALS:   (Developed with and agreed upon by patient and/or caregiver.)  1. Patient will complete lower body bathing and dressing with SUPERVISION and adaptive equipment as needed.   2. Patient will complete toileting with SUPERVISION.   3. Patient will tolerate 30 minutes of OT treatment with 2-3 rest breaks to increase activity tolerance for ADLs.   4. Patient will complete functional transfers with SUPERVISION and adaptive equipment as needed.   5. Patient will complete functional mobility for household distances with SUPERVISION and adaptive equipment as needed.  6. Patient will complete self-grooming while standing edge of sink with SUPERVISION and adaptive equipment as needed.  7. Patient will verbalize at least 2 energy conservation strategies to increase activity tolerance for BADL performance.      Timeframe: 7 visits         OCCUPATIONAL THERAPY Daily Note and AM     OT Visit Days: 2   Time  OT Charge Capture  Rehab Caseload Tracker  OT Orders    Nery Bryant is a 78 y.o. female   PRIMARY DIAGNOSIS: Acute on chronic respiratory failure (HCC)  Transaminitis [R74.01]  Atrial fibrillation with RVR (HCC) [I48.91]  Supratherapeutic INR [R79.1]  Acute on chronic respiratory failure (HCC) [J96.20]  Hypervolemia, unspecified hypervolemia type [E87.70]  Acute on chronic congestive heart failure, unspecified heart failure type (HCC) [I50.9]  Procedure(s) (LRB):  Left heart cath / coronary angiography (N/A)  1 Day Post-Op  Inpatient: Payor: MEDICARE / Plan: MEDICARE PART A AND B / Product Type: *No Product type* /     ASSESSMENT:     REHAB RECOMMENDATIONS: CURRENT LEVEL OF FUNCTION:  (Most Recently Demonstrated)   Recommendation to date pending progress:  Setting:  Inpatient Rehab Facility    Equipment:    To Be Determined Bathing:  Not Tested  Dressing:  Contact Guard Assist  Feeding/Grooming:  Not Tested  Toileting:  Contact Guard Assist  Functional Mobility:  Contact Guard 
ACUTE OCCUPATIONAL THERAPY GOALS:   (Developed with and agreed upon by patient and/or caregiver.)  1. Patient will complete lower body bathing and dressing with SUPERVISION and adaptive equipment as needed.   2. Patient will complete toileting with SUPERVISION.   3. Patient will tolerate 30 minutes of OT treatment with 2-3 rest breaks to increase activity tolerance for ADLs.   4. Patient will complete functional transfers with SUPERVISION and adaptive equipment as needed.   5. Patient will complete functional mobility for household distances with SUPERVISION and adaptive equipment as needed.  6. Patient will complete self-grooming while standing edge of sink with SUPERVISION and adaptive equipment as needed.  7. Patient will verbalize at least 2 energy conservation strategies to increase activity tolerance for BADL performance.     Timeframe: 7 visits       OCCUPATIONAL THERAPY Initial Assessment, Daily Note, and AM       OT Visit Days: 1   Acknowledge Orders  Time  OT Charge Capture  Rehab Caseload Tracker      Nery Bryant is a 78 y.o. female   PRIMARY DIAGNOSIS: Acute on chronic respiratory failure (HCC)  Transaminitis [R74.01]  Atrial fibrillation with RVR (HCC) [I48.91]  Supratherapeutic INR [R79.1]  Acute on chronic respiratory failure (HCC) [J96.20]  Hypervolemia, unspecified hypervolemia type [E87.70]  Acute on chronic congestive heart failure, unspecified heart failure type (HCC) [I50.9]       Reason for Referral: Generalized Muscle Weakness (M62.81)  Difficulty in walking, Not elsewhere classified (R26.2)  Inpatient: Payor: MEDICARE / Plan: MEDICARE PART A AND B / Product Type: *No Product type* /     ASSESSMENT:     REHAB RECOMMENDATIONS:   Recommendation to date pending progress:  Setting:  Inpatient Rehab Facility     Equipment:    To Be Determined     ASSESSMENT:  Ms. Bryant presents with deficits in overall strength, activity tolerance, activities of daily living performance, and 
ACUTE PHYSICAL THERAPY GOALS:   (Developed with and agreed upon by patient and/or caregiver.)    LTG:  (1.)Ms. Bryant will move from supine to sit and sit to supine , scoot up and down and roll side to side in flat bed without siderails with  STAND BY ASSIST within 7 day(s).    (2.)Ms. Bryant will perform all functional transfers with  STAND BY ASSIST using the least restrictive/no device within 7 day(s).    (3.)Ms. Bryant will ambulate with  STAND BY ASSIST for 250+ feet with normal vital sign response with the least restrictive/no device within 7 day(s).   (4.)Ms. Bryant will ambulate up/down 3 steps with bilateral railing with  STAND BY ASSIST with no device within  7  day(s)    PHYSICAL THERAPY: Daily Note AM   (Link to Caseload Tracking: PT Visit Days : 2  Time In/Out PT Charge Capture  Rehab Caseload Tracker  Orders    Nery Bryant is a 78 y.o. female   PRIMARY DIAGNOSIS: Acute on chronic respiratory failure (HCC)  Transaminitis [R74.01]  Atrial fibrillation with RVR (HCC) [I48.91]  Supratherapeutic INR [R79.1]  Acute on chronic respiratory failure (HCC) [J96.20]  Hypervolemia, unspecified hypervolemia type [E87.70]  Acute on chronic congestive heart failure, unspecified heart failure type (HCC) [I50.9]  Procedure(s) (LRB):  Left heart cath / coronary angiography (N/A)  1 Day Post-Op  Inpatient: Payor: MEDICARE / Plan: MEDICARE PART A AND B / Product Type: *No Product type* /     ASSESSMENT:     REHAB RECOMMENDATIONS:   Recommendation to date pending progress:  Setting:  Inpatient Rehab Facility    Equipment:    To Be Determined     ASSESSMENT:  Ms. Bryant is sitting in the recliner and agreeable to therapy.  She would like to go to the bathroom.  RN okayed treatment and even assisted.  He mobility is good however her heart rate does increase with activity (200's).  Sit to stand with min assist to the cardiac walker and ambulates tot he bathroom and back to the recliner  (80 feet).  Due to 
Brief conversion into Afib with RVR, rate 170-190, with repositioning. Spontaneous conversion into NSR. Intermittent accelerated junctional noted. Pt noted palpitations and shortness of breath with event.  
Cardiac Rehab: Spoke with patient regarding referral to cardiac rehab. Patient meets admission criteria based on TAVR, TMVR(1/23/24).  Written information about Cardiac Rehab given and reviewed with patient. Discussed lifestyle modifications to promote cardiac wellness. Patient indicated that she wants to participate in the cardiac rehab program and her orientation appt has been scheduled for the [facility] Cardiac Rehab program. Her Cardiologist is Dr. Lazaro.      Thank you,  DAVID VillaN, RN  Cardiopulmonary Rehabilitation Nurse Liaison  [facility]    
Cardizem infusing at 5mg/hr in afib with hr 70-80.  Pt with brief episodes of bradycardia with HR dropped to 30's.  BP stable and pt complaint free.  Cardizem gtt stopped and pt converted to sinus jo ann with HR 57 with frequent PACs.   
Comprehensive Nutrition Assessment    Type and Reason for Visit: Initial, RD Nutrition Re-Screen/LOS  Length of Stay    Nutrition Recommendations/Plan:   Meals and Snacks:  Diet: Add CHO restriction per pt request     Malnutrition Assessment:  Malnutrition Status: No malnutrition     Nutrition Assessment:  Nutrition History: Pt denies any changes in intake pta. She denies any changes in wt.      Do You Have Any Cultural, Mu-ism, or Ethnic Food Preferences?: No   Weight History: Per EMR wt hx review 2/6 148lb, 7/24 147lb, 9/26 142lb, 12/6 148lb.   Nutrition Background:       PMH significant for afib, mitral valve replacement, aortic deficiency anemia, DM, CHF, HTN, and hypothyroidism. Pt admitted with acute on chronic respiratory failure. S/p TAVR 1/23.   Nutrition Interval:  Pt seen sitting on the edge of the bed with daughter present. Fred Hudson RN entered the room during visit. Pt report she is eating well. She stated she is eating more than half of the meals. Pt reports she is eating bg of the items that have a lot of carbs in them due to being a diabetic. Pt stated she follows a diabetic diet at home and she would like to continue that here. RD explained CHO restriction, pt is agreeable.      Current Nutrition Therapies:  ADULT DIET; Regular; Low Fat/Low Chol/High Fiber/2 gm Na    Current Intake:   Average Meal Intake: 51-75% Average Supplements Intake: None Ordered      Anthropometric Measures:  Height: 167.6 cm (5' 6\")  Current Body Wt: 66.4 kg (146 lb 6.2 oz) (1/24), Weight source: Bed Scale  BMI: 23.6, Normal Weight (BMI 22.0 to 24.9) age over 65  Admission Body Weight: 66.2 kg (146 lb) (1/14- stated)  Ideal Body Weight (Kg) (Calculated): 59 kg (130 lbs),    BMI Category Normal Weight (BMI 22.0 to 24.9) age over 65  Estimated Daily Nutrient Needs:  Energy (kcal/day): 6760-9213 (25-30 kcal/kg) (Kcal/kg Weight used: 66.4 kg Current  Protein (g/day): 66-80 (1-1.2 g/kg) Weight Used: (Current) 66.4 kg  Fluid 
Discharge instructions, follow up appointments and prescriptions reviewed with patient and family. Both verbalize understanding. All personal belongings taken with patient. Family member will drive patient home. Patient escorted to discharge area via wheelchair. Patient is stable at discharge. PIV and monitor removed.    
Hold PT until tomorrow per RN due to continued increased HR. Planned pacer today.  TAYLER QUINONES, PT    
Initial visit made to patient and a prayer was provided. The patient was resting. Her  was at the bedside.    Bjorn Elaine MDIV, PATY Dockery   
[facility] RR with Dr Lazaro.  No intervention.    Heparin 5000u  Versed 2mg  Fentanyl 50mcg  Philippe Rinse 200mcg  TR @ 12    Report to receiving RN.  Pt transferred back to CVICU 104.  
Metoprolol per emar for -170 BPM. Patient awake, alert, SpO2 96% on 2 LPM/ NC. Family at bedside.  
Ms. Bryant is on hold today.  Going to the cath lab later today.  Will check on her tomorrow.  TAYLER QUINONES, PT    
Noted procedure scheduled for tomorrow.    Prayers for healing 
Notified Dr. Ramirez regarding pt urine output being 55ml for 3 hours. New orders received. Continue to monitor output and draw a BMP in AM   
Occupational Therapy Note:    Attempted to see patient this AM for occupational therapy treatment  session. Patient currently a HOLD; going to cath lab. Will follow and re-attempt as schedule permits/patient available. Thank you,    Felipa Whitt, OT    Rehab Caseload Tracker        
Occupational Therapy Note:    Will discharge Mrs. Bryant from therapy at this time per MD.  Per Dr. Swenson - patient not to be ambulating in halls due to severe valvular disease and medical fragility.             Patient is scheduled to have surgery on Tuesday , please re-consult as appropriate.     Thank you,  DEEPAK ALICEA, OT       
Only gave 12.5mg Toprol XL this am with SBP as low as 110 and 109 1 hr after dose  
PT note:  Will discharge Mrs. Bryant from therapy at this time per MD  Per Dr. Swenson - patient not to be ambulating in halls due to severe valvular disease and medical fragility.             Patient is scheduled to have surgery on Tuesday , please re-consult as appropriate.   Virginia Reeves-Manny, PTA       
PT was sitting in chair. Spouse and Daughter were at bedside. CH introduced self. PT and Family shared about PT's health and upcoming surgery. PT and Family expressed concerns and hopes. PT and Family expressed importance of and comfort in [place] and Prayer. PT and Family expressed appreciation for [facility]. PT is Adventism. CH offered prayer. CH checked for unmet needs and offered support.     . Tammy Doe M.Div.    
Patient FSBS 409. MD notified and 4 additional units of insulin administered.  
Per Dr. Swenson - patient not to be ambulating in halls due to severe valvular disease and medical fragility.   
Physical Therapy Note:    Attempted to see patient this AM for physical therapy treatment  session. Patient with staff at bedside placing line, going for testing this am; possible heart cath tomorrow. Will follow and re-attempt as schedule permits/patient available. Thank you,    Jessie Vazquez, PT     Rehab Caseload Tracker   
Physical Therapy Note:    Attempted to see patient this PM for physical therapy treatment  session. Patient held this PM as RN reported she was getting blood. Will follow and re-attempt as schedule permits/patient available. Thank you,    Darby Nguyen, PT     Rehab Caseload Tracker    
Pt's left radial art line removed at this time. Manual pressure held until hemostasis achieved. No bleeding or hematoma at site. Pressure dressing to site. Pt tolerated well. VSS. Care ongoing.  
R AC IV site now appears to be infiltrated after dose of sedation for CLAUDIO.  IV site use jfor procedure discontinued.  Primary RN Angie notified and site assessed.   
Spiritual Care Visit, follow up visit.    Visited with patient's , daughter, and other family members in surgery waiting room while patient was in surgery.     Conversed with them about how surgery was progressing, and expressed hope for success.     Visit by Prosper Fuentes, Staff . Meliton, Gabrielle, B.A.  
Spiritual Care Visit, follow up visit; pre surgery consult    Visited with patient at bedside.    Prayed for patient's healing and health.    Visit by Prosper Fuentes, Staff . Meliton, Adan.B., B.A.  
Spiritual Care Visit, initial visit.    Visited with patient and family at bedside.    Prayed for patient's healing and health.    Visit by Prosper Fuentes, Staff . Meliton, Adan.BYRON., B.A.  
Spiritual Care Visit, initial visit.    Visited with patient at bedside.    Noted that patient has had previous cardiac issues and surgeries. She is anticipating to have both a TMVR and a TAVR very soon     She and her  are very cheerful and pleasant in spite of unpleasant circumstances.    Prayed for patient's healing and health.    Visit by Prosper Fuentes, Staff . Meliton, Adan.BYRON., B.A.  
Spiritual Care Visit, initial visit.    Visited with patient at bedside.    Patient is progressing; anticipating discharge soon.    Prayed for patient's healing and health.    Visit by Prosper Fuentes, Staff . Meliton, .B., B.A.  
Spiritual Care Visit, initial visit.    Visited with patient at bedside.    Prayed for patient's healing and health.    Visit by Prosper Fuentes, Staff . Heron., Adan.BYRON., B.A.  
Spiritual Care Visit, initial visit.    Visited with patient/spouse at bedside.    Patient was sleeping; Spoke briefly with her spouse.    Prayed for patient's healing and health.    Visit by Prosper Fuentes, Staff . Meliton, Adan.BYRON., B.A.  
TRANSFER - IN REPORT:    Verbal report received from DANIELLA Booker on Nery Bryant  being received from ED for routine progression of patient care      Report consisted of patient's Situation, Background, Assessment and   Recommendations(SBAR).     Information from the following report(s) Nurse Handoff Report, ED Encounter Summary, Adult Overview, MAR, Recent Results, and Cardiac Rhythm afib  was reviewed with the receiving nurse.    Opportunity for questions and clarification was provided.      Assessment completed upon patient's arrival to unit and care assumed.     
TRANSFER - IN REPORT:    Verbal report received from Stef (name) on Nery Bryant  being received from [facility] (unit) for routine post-op      Report consisted of patient’s Situation, Background, Assessment and   Recommendations(SBAR).     Information from the following report(s) Adult Overview, MAR, and Recent Results was reviewed with the receiving nurse.    Opportunity for questions and clarification was provided.      Assessment completed upon patient’s arrival to unit and care assume        
TRANSFER - OUT REPORT:    Verbal report given to Angie BREWER(name) on Nery Bryant being transferred to [facility](unit) for routine progression of patient care       Report consisted of patient's Situation, Background, Assessment and   Recommendations(SBAR).     Information from the following report(s) MAR was reviewed with the receiving nurse.    Opportunity for questions and clarification was provided.      Patient transported with:   O2 @ 4 liters    
TRANSFER - OUT REPORT:    Verbal report given to Dustin BERG on Nery Bryant  being transferred to OR for ordered procedure       Report consisted of patient's Situation, Background, Assessment and   Recommendations(SBAR).     Information from the following report(s) Nurse Handoff Report, Intake/Output, MAR, and Cardiac Rhythm SR  was reviewed with the receiving nurse.           Lines:   Peripheral IV 01/16/24 Left;Upper Basilic (Active)   Site Assessment Clean, dry & intact 01/23/24 1443   Line Status Infusing 01/23/24 1443   Line Care Connections checked and tightened 01/23/24 1443   Phlebitis Assessment No symptoms 01/23/24 1443   Infiltration Assessment 0 01/23/24 1443   Alcohol Cap Used Yes 01/23/24 1443   Dressing Status Clean, dry & intact 01/23/24 1443   Dressing Type Transparent w/CHG gel 01/23/24 1443   Dressing Intervention New;Dressing changed 01/18/24 1615       Arterial Line 01/23/24 Left Radial (Active)   $ Arterial line insertion $ Yes 01/23/24 1443   Site Assessment Clean, dry & intact 01/23/24 1443   Line Status Arterial fluids per protocol;Intact and in place;Blood return noted 01/23/24 1443   Art Line Interventions Zeroed and calibrated;Leveled;Connections checked and tightened;Tubing changed;Flushed per protocol 01/23/24 1443   Color/Movement/Sensation Capillary refill less than 3 sec 01/23/24 1443   Dressing Type Transparent 01/23/24 1443   Dressing Status New dressing applied 01/23/24 1443   Dressing Intervention New 01/23/24 1443        Opportunity for questions and clarification was provided.      Patient transported with:  Monitor, O2 @ NRB 20 lpm, and Registered Nurse        
TVT registry information:  6 minute walk performed as pt walked to and from the bathroom this am with primary RN and back to the chair. Pt ambulated 60 ft, needing to sit down and extremely SOB with bursts of Afib during ambulation, also on O2.     5MWT- trial 1 30.5 sec            -trial 2 32.4 sec              -trial 3 40.0 sec    STS score  for TMVR:  Procedure Type: Isolated MVR   Perioperative Outcome Estimate %   Operative Mortality 17.8%   Morbidity & Mortality 46.9%   Stroke 7.12%   Renal Failure 17.6%   Reoperation 6%   Prolonged Ventilation 49.8%   Deep Sternal Wound Infection 0.131%   Long Hospital Stay (>14 days) 39.2%   Short Hospital Stay (<6 days)* 5.06%     STS score for TAVR:    Procedure Type: Isolated AVR   Perioperative Outcome Estimate %   Operative Mortality 9.3%   Morbidity & Mortality 30.6%   Stroke 6.92%   Renal Failure 10.8%   Reoperation 5.03%   Prolonged Ventilation 27.3%   Deep Sternal Wound Infection 0.202%   Long Hospital Stay (>14 days) 27.4%   Short Hospital Stay (<6 days)* 8.62%             
This RN attempted manual pressure, surgicel, sandbag, and ice with manual pressure on R femoral sites due to oozing and saturation of dressing. Attempts to stop oozing were not successful. Marty Aggarwal, Dr. Riddle, and Violeta Avery at bedside. Dr. Riddle placed a stitch in R arterial incision and R venous incision. Surgicel and sandbag placed over incisions. Pt tolerated well. VSS. Care ongoing.  
US Guided PIV access-    Ultrasound was used to find the vein which was compressible and without any ultrasound features of an artery or nerve bundle. Skin was cleaned and disinfected prior to IV puncture.  Under real-time ultrasound guidance peripheral access was obtained in the left basilic using 20 G 2.5\" B Pritchard Deep Access after 1 attempt(s). Blood return was present and IV flushed without difficulty with no clinical signs of infiltration. IV dressing applied and no immediate complications noted. Patient tolerated the procedure well.      
US Guided PIV access-    Ultrasound was used to find the vein which was compressible and without any ultrasound features of an artery or nerve bundle. Skin was cleaned and disinfected prior to IV puncture.  Under real-time ultrasound guidance peripheral access was obtained in the right AC using 20 G 1.00\" Peripheral IV catheter Diffusics for CTA after 1 attempt(s). Blood return was present and IV flushed without difficulty with no clinical signs of infiltration. IV dressing applied and no immediate complications noted. Patient tolerated the procedure well.      
US Guided PIV access-    Ultrasound was used to find the vein which was compressible and without any ultrasound features of an artery or nerve bundle. Skin was cleaned and disinfected prior to IV puncture.  Under real-time ultrasound guidance peripheral access was obtained in the right forearm using 22 G 1.75\" Peripheral IV catheter after 1 attempt(s). Blood return was present and IV flushed without difficulty with no clinical signs of infiltration. IV dressing applied and no immediate complications noted. Patient tolerated the procedure well.    
Warfarin dosing per pharmacist    Nery Bryant is a 78 y.o. female.    Height: 167.6 cm (5' 6\")  Weight - Scale: 61.1 kg (134 lb 11.2 oz)    Indication:  TAVR and TMVR, Afib    Goal INR:  2-3     Home dose:  5 mg daily    Risk factors or significant drug interactions:  none    Other anticoagulants:  none    Lab Results   Component Value Date/Time    HGB 8.3 01/26/2024 04:23 AM    HGB 8.3 01/25/2024 12:39 PM    HGB 8.1 01/25/2024 04:45 AM       Daily Monitoring  Date  INR     Warfarin dose Notes  1/25  1.1  5 mg  Not given per RN  1/26  1.1  5 mg      Pharmacy consulted to assist with the re-initiation of warfarin dosing for Mrs. Peters.     Of note, patient recently supratherapeutic on 7.5 mg on Mondays and Fridays and 5 mg all other days. Recently reduced to 5 mg daily per recent anticoag clinic visit.    During this visit, patient has had TAVR, TMVR, and ablation and pacemaker implantation.    INR subtherapeutic today at 1.1. Dose last night was not administered per RN documentation. Will continue home dose of 5 mg this evening.    Daily INR to continue. Pharmacy will continue to monitor and make dosing adjustments as clinically indicated. Please call with any questions.         Thank you,  Linda Chino [facility]  
Warfarin dosing per pharmacist    Nery Bryant is a 78 y.o. female.    Height: 167.6 cm (5' 6\")  Weight - Scale: 61.1 kg (134 lb 11.2 oz)    Indication: TAVR and TMVR, AFib    Goal INR:  2.0 - 3.0 per anticoag clinic notes    Home dose:  5 mg PO daily    Risk factors or significant drug interactions:  NA    Other anticoagulants:  none    Lab Results   Component Value Date/Time    HGB 8.3 01/25/2024 12:39 PM    HGB 8.1 01/25/2024 04:45 AM    HGB 8.5 01/24/2024 03:11 AM     Lab Results   Component Value Date/Time    INR 1.1 01/25/2024 04:45 AM    INR 1.1 01/24/2024 03:54 AM    INR 1.1 01/23/2024 03:18 AM    INR 2.4 05/18/2022 09:34 AM    INR 1.9 04/21/2022 09:23 AM    INR 2.1 03/21/2022 10:21 AM      Daily Monitoring  Date  INR     Warfarin dose Notes  1/25  1.1  5 mg  Restart       Pharmacy consulted to assist with the re-initiation of warfarin dosing for Mrs. Peters.     Of note patient recently supratherapeutic on 7.5 mg M&F and 5 mg all other days. Recently reduced to 5 mg daily per recent anticoag clinic visit.    During this visit, patient has had TAVR, TMVR, and ablation and pacemaker implantation.    At this time, INR is subtherapeutic at 1.1. Will cautiously reinitiate warfarin at home dose of 5 mg.    Daily INR.    Thank you,  Brad Teresa, PharmD      
Warfarin dosing per pharmacist    Nery Bryant is a 78 y.o. female.    Height: 167.6 cm (5' 6\")  Weight - Scale: 66.2 kg (146 lb)    Indication:  s/p AVR and MVR, paroxysmal atrial fibrillation    Goal INR:  2.0 - 3.0 per anticoag clinic notes    Home dose:  currently 5 mg daily per patient.  Last dose taken on 1/13/24.    Risk factors or significant drug interactions:  NA    Other anticoagulants:  none    Lab Results   Component Value Date/Time    HGB 8.2 01/14/2024 09:01 PM    HGB 8.5 01/12/2024 07:09 AM    HGB 9.4 01/02/2024 02:24 AM     Lab Results   Component Value Date/Time    INR 5.2 01/14/2024 09:16 PM    INR 4.0 01/09/2024 01:15 PM    INR 4.6 01/02/2024 02:59 AM        Daily Monitoring  Date  INR     Warfarin dose Notes  1/14  5.2  Held         Pharmacy consulted to assist with warfarin dosing.  INR supratherapeutic on admission.    Holding doses for now.  Will follow INRs and adjust dosing as needed.     Thank you,  Gem Reynoso, PharmD, BCPS      
Warfarin dosing per pharmacist    Nery Bryant is a 78 y.o. female.    Height: 167.6 cm (5' 6\")  Weight - Scale: 66.2 kg (146 lb)    Indication: history of bioprosthetic AVR and MVR, Afib    Goal INR: 2-3    Home dose: 5 mg once daily    Risk factors or significant drug interactions: ceftriaxone (1/14-1/19), prednisone (PTA med)    Other anticoagulants: none    Lab Results   Component Value Date/Time    HGB 7.8 01/15/2024 05:56 AM    HGB 8.2 01/14/2024 09:01 PM    HGB 8.5 01/12/2024 07:09 AM       Daily Monitoring  Date  INR     Warfarin dose Notes  1/14  5.2  Held     1/15  3.3  Hold         Pharmacy is consulted to dose warfarin for Ms. Bryant while admitted. Her goal INR and home dosing regimen was confirmed with recent outpatient anticoagulation clinic documentation.     Ms. Bryant presented with complaints of worsening shortness of breath. Upon admission she was found to be in Afib with RVR. Her INR at admission was supratherapeutic at 5.2.    INR remains supratherapeutic today at 3.3. Will continue to hold warfarin.     Daily INR to continue. Pharmacy will continue to monitor and make dosing adjustments as clinically indicated. Please call with any questions.    Thank you,  Linda Chino [facility]  
Valve: There is a #25mm Doreen Oliveira bioprosthetic valve which appears adequately seated.  Leaflets appear thickened and calcified consistent with leaflet degeneration.  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    Tricuspid Valve: The estimated RVSP is severely elevated at around 58 mmHg.    Left Atrium: Left atrium is severely dilated.     Assessment and Plan:      Principal Problem:    Acute on chronic respiratory failure (HCC)  Plan: Patient is postoperative day #1 after combined TAVR/TMVR.  Appears to be doing well overall at the present time.  Will remove the stitch placed last night this afternoon.  Hold anticoagulation for additional 24 hours.  Tomorrow start Lovenox and Coumadin if no additional bleeding noted. Lasix 40 mg IV.  Echo today       Aortic stenosis  Plan: Status post TAVR       Iron deficiency  Plan: Recheck CBC daily, keep hemoglobin greater than 8 at least.       Essential hypertension  Plan: Blood pressure stable.          Paroxysmal atrial fibrillation (HCC)  Sinus on EKG.  Difficult to see P waves on telemetry.  Continue Tikosyn and lopressor.           Atrial fibrillation with RVR (HCC)  Plan: Continue Lopressor and Tikosyn. See above.       Addendum: Recurrent atrial arrhythmias.  On Tikosyn.  Will ask EP for evaluation.      Alejandro Mata MD     
gradient increased to 65 on most recent echo.     Subjective:     On 4L NC  No new complains     Objective:   Blood pressure 126/66, pulse (!) 122, temperature 97.9 °F (36.6 °C), temperature source Oral, resp. rate 22, height 1.676 m (5' 6\"), weight 66.7 kg (147 lb 0.8 oz), SpO2 98 %.   Intake/Output Summary (Last 24 hours) at 1/16/2024 0922  Last data filed at 1/16/2024 0600  Gross per 24 hour   Intake 410.7 ml   Output 3250 ml   Net -2839.3 ml     Physical Exam:          Constitutional:  Awake, calm  EENMT:  Sclera clear, pupils equal  Respiratory: crackles  Cardiovascular:  RRR with no M,G,R;  Gastrointestinal:  soft; positive  bowel sounds  Musculoskeletal:  warm with no cyanosis, no lower extremity edema.   SKIN:  no jaundice or ecchymosis   Neurologic:  moving all extremities, fluent speech  Psychiatric:  calm    CXR:                 Recent Labs     01/14/24 2101 01/14/24  2116 01/15/24  0556 01/15/24  2205 01/16/24  0310   WBC 14.4*  --  10.2  --  8.7   HGB 8.2*  --  7.8* 9.2* 9.9*   HCT 28.9*  --  27.0* 30.6* 33.7*     --  187  --  185   INR  --  5.2* 3.3  --  2.3     Recent Labs     01/14/24 2101 01/14/24  2331 01/15/24  0556 01/16/24  0310     --  143 140   K 4.5  --  4.6 4.2     --  110 102*   CO2 25  --  31 37*   BUN 36*  --  32* 30*   CREATININE 1.00  --  0.70 1.10*   MG 2.0 2.1 2.6* 2.0   TROPHS  --  300.2*  --   --    NTPROBNP 5,696*  --   --   --      Recent Labs     01/14/24  2100   PROCAL <0.05     01/09/24    ECHO (TTE) COMPLETE (PRN CONTRAST/BUBBLE/STRAIN/3D) 01/09/2024  4:50 PM (Final)    Interpretation Summary    Left Ventricle: Normal left ventricular systolic function with a visually estimated EF of 55 - 60%. Left ventricle size is normal. Normal wall thickness. Normal wall motion. Indeterminate diastolic function.    Right Ventricle: Mildly reduced systolic function. TAPSE is 1.4 cm.    Aortic Valve: Doreen Oliveira bioprosthetic valve with a size of 21 mm. AV mean 
gradient increased to 65 on most recent echo.     Subjective:     Was more hypoxic this Am ,placed on 40 % airvo   She says did not feel more SOB  Going for heart cath today   Viral panel ordered     Objective:   Blood pressure (!) 121/57, pulse 66, temperature 97.5 °F (36.4 °C), temperature source Axillary, resp. rate 22, height 1.676 m (5' 6\"), weight 65.6 kg (144 lb 10 oz), SpO2 97 %.   Intake/Output Summary (Last 24 hours) at 1/17/2024 0854  Last data filed at 1/17/2024 0615  Gross per 24 hour   Intake 410 ml   Output 400 ml   Net 10 ml     Physical Exam:          Constitutional:  Awake, calm  EENMT:  Sclera clear, pupils equal  Respiratory: crackles  Cardiovascular:  RRR with no M,G,R;  Gastrointestinal:  soft; positive  bowel sounds  Musculoskeletal:  warm with no cyanosis, no lower extremity edema.   SKIN:  no jaundice or ecchymosis   Neurologic:  moving all extremities, fluent speech  Psychiatric:  calm    CXR:                 Recent Labs     01/14/24 2101 01/14/24  2116 01/15/24  0556 01/15/24  2205 01/16/24 0310 01/17/24  0418   WBC 14.4*  --  10.2  --  8.7 6.7   HGB 8.2*  --  7.8* 9.2* 9.9* 9.1*   HCT 28.9*  --  27.0* 30.6* 33.7* 31.0*     --  187  --  185 188   INR  --    < > 3.3  --  2.3 1.8    < > = values in this interval not displayed.     Recent Labs     01/14/24 2101 01/14/24  2331 01/15/24  0556 01/16/24  0310 01/17/24  0418     --  143 140 140   K 4.5  --  4.6 4.2 3.7     --  110 102* 104   CO2 25  --  31 37* 34*   BUN 36*  --  32* 30* 25*   CREATININE 1.00  --  0.70 1.10* 0.70   MG 2.0 2.1 2.6* 2.0 2.5*   TROPHS  --  300.2*  --   --   --    NTPROBNP 5,696*  --   --   --   --      Recent Labs     01/14/24  2100   PROCAL <0.05     01/09/24    ECHO (TTE) COMPLETE (PRN CONTRAST/BUBBLE/STRAIN/3D) 01/09/2024  4:50 PM (Final)    Interpretation Summary    Left Ventricle: Normal left ventricular systolic function with a visually estimated EF of 55 - 60%. Left ventricle size is 
Noted by the apical view. AV peak gradient is 65 mmHg. AV peak velocity is 4.0 m/s. AV AT is 90.0 ms. LVOT:AV VTI Index is 0.23. AV area by continuity VTI is 0.8 Square Centimeter.    Mitral Valve: Doreen Oliveira bioprosthetic valve with a size of 25 mm. MV mean gradient is 23 mmHg. Mild regurgitation. MV PHT is 200.0 ms. MV area by PHT is 1.1 Square Centimeter.    Tricuspid Valve: Moderate regurgitation with jet direction toward the septum. Severely elevated RVSP. Est RA pressure is 8 mmHg. The estimated RVSP is 78 mmHg.    Left Atrium: Left atrium is moderately dilated. LA Vol Index is  43 ml/m2.    Right Atrium: Right atrium is mildly dilated.    Image quality is adequate. Technically difficult study.    Signed by: Eddi Swenson MD on 1/9/2024  4:50 PM    Assessment and Plan :  (Medical Decision Making)   Impression :  Patient is 78 years old white female who presented with worsening shortness of breath and hypoxia.  She has been followed by us for the last 6 months and treated with high doses of prednisone since September for inflammatory changes on her open lung biopsy and suspicious for chronic hypersensitivity pneumonitis.  However she has not improved she is currently on 30 mg of prednisone and has been on it for a very long time.   Her BNP is almost 6000 and her chest x-ray is showing increased pulmonary edema she also presented with A-fib RVR and worsening aortic stenosis I suspect her presentation today was mostly related to her heart problems.  However her lung biopsies showed pulmonary edema with remodeling and peribronchiolar fibrosis.  She is currently on 30 mg of prednisone and it appears that it did nothing for her and she needs to come off of it however it needs to be for slow taper as she has been on it since September.  I can only cut her down to 20 mg now and the rest will have to be done as outpatient.  If she has any surgical procedure while she is here she may actually need 
Patient limited by fatigue    []      COGNITION/  PERCEPTION: Intact Impaired (Comments):   Orientation [x]     Vision [x]     Hearing [x]     Cognition  [x]       MOBILITY: I Mod I S SBA CGA Min Mod Max Total  NT x2 Comments:   Bed Mobility    Rolling [] [] [] [] [] [] [] [] [] [] []    Supine to Sit [] [] [] [] [x] [] [] [] [] [] []    Scooting [] [] [] [] [x] [] [] [] [] [] []    Sit to Supine [] [] [] [] [] [] [x] [] [] [] []    Transfers    Sit to Stand [] [] [] [] [x] [] [] [] [] [] []    Bed to Chair [] [] [] [] [] [x] [] [] [] [] []    Stand to Sit [] [] [] [] [x] [] [] [] [] [] []     [] [] [] [] [] [] [] [] [] [] []    I=Independent, Mod I=Modified Independent, S=Supervision, SBA=Standby Assistance, CGA=Contact Guard Assistance,   Min=Minimal Assistance, Mod=Moderate Assistance, Max=Maximal Assistance, Total=Total Assistance, NT=Not Tested    GAIT: I Mod I S SBA CGA Min Mod Max Total  NT x2 Comments:   Level of Assistance [] [] [] [] [x] [x] [] [] [] [] []    Distance 25  feet    DME Holding onto furniture    Gait Quality Altered arm swing, Decreased nivia , Decreased step clearance, and Decreased step length    Weightbearing Status      Stairs      I=Independent, Mod I=Modified Independent, S=Supervision, SBA=Standby Assistance, CGA=Contact Guard Assistance,   Min=Minimal Assistance, Mod=Moderate Assistance, Max=Maximal Assistance, Total=Total Assistance, NT=Not Tested    PLAN:   FREQUENCY AND DURATION: 3 times/week for duration of hospital stay or until stated goals are met, whichever comes first.    THERAPY PROGNOSIS: Excellent    PROBLEM LIST:   (Skilled intervention is medically necessary to address:)  Decreased ADL/Functional Activities  Decreased Activity Tolerance  Decreased Balance  Decreased Gait Ability  Decreased Transfer Abilities INTERVENTIONS PLANNED:   (Benefits and precautions of physical therapy have been discussed with the patient.)  Self Care Training  Therapeutic 
SBA=Standby Assistance, CGA=Contact Guard Assistance,   Min=Minimal Assistance, Mod=Moderate Assistance, Max=Maximal Assistance, Total=Total Assistance, NT=Not Tested    BALANCE: Good Fair+ Fair Fair- Poor NT Comments   Sitting Static [x] [] [] [] [] []    Sitting Dynamic [x] [] [] [] [] []              Standing Static [x] [] [] [] [] []    Standing Dynamic [] [x] [] [] [] []      GAIT: I Mod I S SBA CGA Min Mod Max Total  NT x2 Comments:   Level of Assistance [] [] [] [x] [] [] [] [] [] [] []    Distance 150  feet    DME Rolling Walker    Gait Quality Decreased nivia  and Decreased step length    Weightbearing Status      Stairs      I=Independent, Mod I=Modified Independent, S=Supervision, SBA=Standby Assistance, CGA=Contact Guard Assistance,   Min=Minimal Assistance, Mod=Moderate Assistance, Max=Maximal Assistance, Total=Total Assistance, NT=Not Tested    PLAN:   FREQUENCY AND DURATION: 3 times/week for duration of hospital stay or until stated goals are met, whichever comes first.    TREATMENT:   TREATMENT:   Therapeutic Activity (10 Minutes): Therapeutic activity included Scooting, Transfer Training, Ambulation on level ground, Sitting balance , and Standing balance to improve functional Activity tolerance, Balance, Coordination, Mobility, and Strength.    TREATMENT GRID:  N/A    AFTER TREATMENT PRECAUTIONS: Bed/Chair Locked, Call light within reach, Chair, Needs within reach, RN notified, and Visitors at bedside    INTERDISCIPLINARY COLLABORATION:  RN/ PCT and PT/ PTA    EDUCATION:  review POC and importance of mobility in the recovery process and precautions for the LUE    TIME IN/OUT:  Time In: 0930  Time Out: 0940  Minutes: 10    Virginia Phillips PTA    
gradient is 65 mmHg. AV peak velocity is 4.0 m/s. AV AT is 90.0 ms. LVOT:AV VTI Index is 0.23. AV area by continuity VTI is 0.8 Square Centimeter.    Mitral Valve: Doreen Oliveira bioprosthetic valve with a size of 25 mm. MV mean gradient is 23 mmHg. Mild regurgitation. MV PHT is 200.0 ms. MV area by PHT is 1.1 Square Centimeter.    Tricuspid Valve: Moderate regurgitation with jet direction toward the septum. Severely elevated RVSP. Est RA pressure is 8 mmHg. The estimated RVSP is 78 mmHg.    Left Atrium: Left atrium is moderately dilated. LA Vol Index is  43 ml/m2.    Right Atrium: Right atrium is mildly dilated.    Image quality is adequate. Technically difficult study.    Signed by: Eddi Swenson MD on 1/9/2024  4:50 PM    Assessment and Plan :  (Medical Decision Making)   Impression :  Patient is 78 years old white female who presented with worsening shortness of breath and hypoxia.  She has been followed by us for the last 6 months and treated with high doses of prednisone since September for inflammatory changes on her open lung biopsy and suspicious for chronic hypersensitivity pneumonitis.  However she has not improved she is currently on 30 mg of prednisone and has been on it for a very long time.   Her BNP is almost 6000 and her chest x-ray is showing increased pulmonary edema she also presented with A-fib RVR and worsening aortic stenosis I suspect her presentation today was mostly related to her heart problems.  However her lung biopsies showed pulmonary edema with remodeling and peribronchiolar fibrosis.  She is currently on 30 mg of prednisone and it appears that it did nothing for her and she needs to come off of it however it needs to be for slow taper as she has been on it since September.  I can only cut her down to 20 mg now and the rest will have to be done as outpatient.  If she has any surgical procedure while she is here she may actually need stress dose steroids.    -continue 20 
keep hemoglobin greater than 8 at least.  Continue iron 325mg BID       Essential hypertension  Plan: Blood pressure stable. Changed to metoprolol succinate 25mg daily       Paroxysmal atrial fibrillation/sick sinus syndrome (HCC)  S/P AV node ablation and BiV PM.  Continue Eliquis 5mgh BID --also on Tikosyn 500 mcg twice daily    Patient is being seen today within a 90-day global period, but is being seen for a separately identifiable E/M service and is not related to the post-operative care of the procedure.    -With regards to her volume overload, it is clearly improved.  Strict intake and output and daily weights, continue Eliquis for thromboembolic prophylaxis.  Appreciate internal medicine adjusting insulin dosing.  Needs to continue to work with physical therapy and ambulate as tolerated.  -Still somewhat frail and not in a position to be discharged home yet.  -If she remains in A-fib persistently overnight, especially now since she is post AV admien ablation and permanent pacemaker placement, we will discontinue her Tikosyn.    Aden Patricio MD  1/27/2024 6:25 AM   
(pacer precautions)    Stairs      I=Independent, Mod I=Modified Independent, S=Supervision, SBA=Standby Assistance, CGA=Contact Guard Assistance,   Min=Minimal Assistance, Mod=Moderate Assistance, Max=Maximal Assistance, Total=Total Assistance, NT=Not Tested    PLAN:   FREQUENCY AND DURATION: 3 times/week for duration of hospital stay or until stated goals are met, whichever comes first.    THERAPY PROGNOSIS: Excellent    PROBLEM LIST:   (Skilled intervention is medically necessary to address:)  Decreased ADL/Functional Activities  Decreased Activity Tolerance  Decreased Balance  Decreased Gait Ability  Decreased Transfer Abilities INTERVENTIONS PLANNED:   (Benefits and precautions of physical therapy have been discussed with the patient.)  Self Care Training  Therapeutic Activity  Therapeutic Exercise/HEP  Gait Training  Education       TREATMENT:   RE-EVALUATION: (Untimed Charge)    TREATMENT:   Therapeutic Activity (9 Minutes): Therapeutic activity included Scooting, Transfer Training, Ambulation on level ground, Sitting balance , and Standing balance to improve functional Activity tolerance, Balance, Coordination, Mobility, and Strength.    TREATMENT GRID:     DATE: 1/15/23       Ambulation        Hip Flexion X10 AB       Long Arc Quads X20 AB       Hip ab/ad        Heel Raises X20 AB       Toe Raises X20 AB                                Key:  A=active, AA=active assisted, P=passive, B=bilaterally, R=right, L=left   DF=dorsiflexion, PF=plantarflexion      AFTER TREATMENT PRECAUTIONS: Bed/Chair Locked, Call light within reach, Chair, Needs within reach, RN notified, and Visitors at bedside    INTERDISCIPLINARY COLLABORATION:  RN/ PCT and PT/ PTA    EDUCATION: Education Given To: Patient  Education Provided: Role of Therapy    TIME IN/OUT:  Time In: 0847  Time Out: 0859  Minutes: 12    Darby Nguyen, PT    
12:29 AM   Result Value Ref Range    POC Glucose 100 65 - 100 mg/dL    Performed by: Jennifer    POCT Glucose    Collection Time: 01/15/24  2:48 AM   Result Value Ref Range    POC Glucose 83 65 - 100 mg/dL    Performed by: Vic    Lactic Acid    Collection Time: 01/15/24  5:55 AM   Result Value Ref Range    Lactic Acid, Plasma 1.3 0.4 - 2.0 MMOL/L   Protime-INR    Collection Time: 01/15/24  5:56 AM   Result Value Ref Range    Protime 35.6 (H) 11.3 - 14.9 sec    INR 3.3     Magnesium    Collection Time: 01/15/24  5:56 AM   Result Value Ref Range    Magnesium 2.6 (H) 1.8 - 2.4 mg/dL   Comprehensive Metabolic Panel    Collection Time: 01/15/24  5:56 AM   Result Value Ref Range    Sodium 143 136 - 146 mmol/L    Potassium 4.6 3.5 - 5.1 mmol/L    Chloride 110 103 - 113 mmol/L    CO2 31 21 - 32 mmol/L    Anion Gap 2 2 - 11 mmol/L    Glucose 80 65 - 100 mg/dL    BUN 32 (H) 8 - 23 MG/DL    Creatinine 0.70 0.6 - 1.0 MG/DL    Est, Glom Filt Rate >60 >60 ml/min/1.73m2    Calcium 8.2 (L) 8.3 - 10.4 MG/DL    Total Bilirubin 0.5 0.2 - 1.1 MG/DL    ALT 71 (H) 12 - 65 U/L    AST 42 (H) 15 - 37 U/L    Alk Phosphatase 155 (H) 50 - 136 U/L    Total Protein 5.8 (L) 6.3 - 8.2 g/dL    Albumin 2.9 (L) 3.2 - 4.6 g/dL    Globulin 2.9 2.8 - 4.5 g/dL    Albumin/Globulin Ratio 1.0 0.4 - 1.6     CBC with Auto Differential    Collection Time: 01/15/24  5:56 AM   Result Value Ref Range    WBC 10.2 4.3 - 11.1 K/uL    RBC 2.67 (L) 4.05 - 5.2 M/uL    Hemoglobin 7.8 (L) 11.7 - 15.4 g/dL    Hematocrit 27.0 (L) 35.8 - 46.3 %    .1 82 - 102 FL    MCH 29.2 26.1 - 32.9 PG    MCHC 28.9 (L) 31.4 - 35.0 g/dL    RDW 18.7 (H) 11.9 - 14.6 %    Platelets 187 150 - 450 K/uL    MPV 9.5 9.4 - 12.3 FL    nRBC 0.14 0.0 - 0.2 K/uL    Differential Type AUTOMATED      Neutrophils % 82 (H) 43 - 78 %    Lymphocytes % 12 (L) 13 - 44 %    Monocytes % 5 4.0 - 12.0 %    Eosinophils % 0 (L) 0.5 - 7.8 %    Basophils % 0 0.0 - 2.0 %    Immature Granulocytes 
Neutrophils Absolute 3.4 1.7 - 8.2 K/UL    Lymphocytes Absolute 1.1 0.5 - 4.6 K/UL    Monocytes Absolute 0.5 0.1 - 1.3 K/UL    Eosinophils Absolute 0.1 0.0 - 0.8 K/UL    Basophils Absolute 0.0 0.0 - 0.2 K/UL    Absolute Immature Granulocyte 0.0 0.0 - 0.5 K/UL   Magnesium    Collection Time: 01/25/24  4:45 AM   Result Value Ref Range    Magnesium 2.1 1.8 - 2.4 mg/dL   POCT Glucose    Collection Time: 01/25/24  6:27 AM   Result Value Ref Range    POC Glucose 183 (H) 65 - 100 mg/dL    Performed by: Melanie    EKG 12 Lead    Collection Time: 01/25/24  6:56 AM   Result Value Ref Range    Ventricular Rate 66 BPM    Atrial Rate 66 BPM    P-R Interval 104 ms    QRS Duration 80 ms    Q-T Interval 454 ms    QTc Calculation (Bazett) 475 ms    R Axis 19 degrees    T Axis 72 degrees    Diagnosis       Sinus rhythm with short WV  Nonspecific ST and T wave abnormality  When compared with ECG of 24-JAN-2024 07:25,  No significant change was found  Confirmed by Aden Kearns MD ([id number]) on 1/25/2024 7:19:28 AM     POCT Glucose    Collection Time: 01/25/24 12:30 PM   Result Value Ref Range    POC Glucose 224 (H) 65 - 100 mg/dL    Performed by: Socorro    Hemoglobin and Hematocrit    Collection Time: 01/25/24 12:39 PM   Result Value Ref Range    Hemoglobin 8.3 (L) 11.7 - 15.4 g/dL    Hematocrit 28.6 (L) 35.8 - 46.3 %   Electrophysiology procedure    Collection Time: 01/25/24  6:56 PM   Result Value Ref Range    Body Surface Area 1.76 m2   POCT Glucose    Collection Time: 01/25/24  8:52 PM   Result Value Ref Range    POC Glucose 145 (H) 65 - 100 mg/dL    Performed by: Melanie    EKG 12 lead    Collection Time: 01/25/24  9:37 PM   Result Value Ref Range    Ventricular Rate 70 BPM    Atrial Rate 68 BPM    QRS Duration 140 ms    Q-T Interval 486 ms    QTc Calculation (Bazett) 524 ms    R Axis -44 degrees    T Axis 88 degrees    Diagnosis       Ventricular-paced rhythm  Abnormal ECG    Confirmed by JAYDEN MÁRQUEZ 
bicarb-citric acid (EFFER-K) effervescent tablet 40 mEq  40 mEq Oral PRN    Or    potassium chloride 10 mEq/100 mL IVPB (Peripheral Line)  10 mEq IntraVENous PRN    magnesium sulfate 2000 mg in 50 mL IVPB premix  2,000 mg IntraVENous PRN    ondansetron (ZOFRAN-ODT) disintegrating tablet 4 mg  4 mg Oral Q8H PRN    Or    ondansetron (ZOFRAN) injection 4 mg  4 mg IntraVENous Q6H PRN    polyethylene glycol (GLYCOLAX) packet 17 g  17 g Oral Daily PRN    acetaminophen (TYLENOL) tablet 650 mg  650 mg Oral Q6H PRN    Or    acetaminophen (TYLENOL) suppository 650 mg  650 mg Rectal Q6H PRN    melatonin tablet 3 mg  3 mg Oral Nightly PRN    metoprolol (LOPRESSOR) injection 2.5 mg  2.5 mg IntraVENous Q6H PRN       Signed:  Zach Alegria MD    Part of this note may have been written by using a voice dictation software.  The note has been proof read but may still contain some grammatical/other typographical errors.    
potassium bicarb-citric acid (EFFER-K) effervescent tablet 40 mEq  40 mEq Oral PRN    Or    potassium chloride 10 mEq/100 mL IVPB (Peripheral Line)  10 mEq IntraVENous PRN    magnesium sulfate 2000 mg in 50 mL IVPB premix  2,000 mg IntraVENous PRN    ondansetron (ZOFRAN-ODT) disintegrating tablet 4 mg  4 mg Oral Q8H PRN    Or    ondansetron (ZOFRAN) injection 4 mg  4 mg IntraVENous Q6H PRN    polyethylene glycol (GLYCOLAX) packet 17 g  17 g Oral Daily PRN    acetaminophen (TYLENOL) tablet 650 mg  650 mg Oral Q6H PRN    Or    acetaminophen (TYLENOL) suppository 650 mg  650 mg Rectal Q6H PRN    melatonin tablet 3 mg  3 mg Oral Nightly PRN    metoprolol (LOPRESSOR) injection 2.5 mg  2.5 mg IntraVENous Q6H PRN       Signed:  Zach Alegria MD    Part of this note may have been written by using a voice dictation software.  The note has been proof read but may still contain some grammatical/other typographical errors.    
typographical errors.

## 2024-01-30 NOTE — DISCHARGE SUMMARY
respiratory hypoxic failure by hospital medicine.     She was felt to have R heart failure with progressive mitral stenosis and the development of severe pulmonary hypertension. TMVR/TAVR CT arranged and she was given PRBCs in addition to continuation of IV Lasix. Rate controlling meds for Afib were adjusted. CLAUDIO performed 1/16/2024 with results as below:    Left Ventricle: Normal left ventricular systolic function with a visually estimated EF of 55 - 60%. Left ventricle size is normal.    Aortic Valve: There is a #21 mm Doreen Oliveira bioprosthetic valve which appears adequately seated.  The leaflets appear thickened and restricted consistent with leaflet degeneration.  There is evidence of severe prosthetic valve stenosis [restricted motion of 2 leaflets with best preserved motion of the right coronary cusp; DI 0.19; AT ~114msec; AV mean gradient is 45 mmHg].    Mitral Valve: There is a #25mm Doreen Oliveira bioprosthetic valve which appears adequately seated.  Leaflets appear thickened and calcified consistent with leaflet degeneration.  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    Tricuspid Valve: The estimated RVSP is severely elevated at around 58 mmHg.    Left Atrium: Left atrium is severely dilated.    Image quality is adequate.    Pulmonary saw the patient with suspicion of chronic hypersensitivity pneumonitis, significant pulmonary HTN, and the patient was placed on IV steroids as well.  The patient O2 demands significantly decreased with diuresis initially but battled waxing and waning hypoxia this admission.  CT surgery consulted for work up of advanced valvular heart disease and felt patient would be high risk for redo AVR/MVR given age, atrial fibrillation, DM, acute on chronic respiratory failure and severe pulmonary hypertension. Preferred TMVR/TAVR if possible.  Despite

## 2024-01-31 ENCOUNTER — TELEPHONE (OUTPATIENT)
Age: 79
End: 2024-01-31

## 2024-01-31 ENCOUNTER — CARE COORDINATION (OUTPATIENT)
Dept: CARE COORDINATION | Facility: CLINIC | Age: 79
End: 2024-01-31

## 2024-01-31 NOTE — TELEPHONE ENCOUNTER
Care Transitions Initial Follow Up Call    Call within 2 business days of discharge: Yes     Patient: Neyr Bryant Patient : 1945 MRN: [id number]    [unfilled]    RARS: Readmission Risk Score: 23.8       Spoke with: Nery/ Sanjayhiral Bryant (pt/)    Discharge department/facility: [facility]    Non-face-to-face services provided:    Spoke with pt educated her on Heart healthy diet, Low salt diet. Activities as tolerated. Advised against stooping staining,squatting for 3-5 days and lifting anything over heavy 10lb. Advised against soaking for 7-10 days. Site care and signs of infection. Educated when to call/when to go to ER. Proper use of NTG. Confirmed listed of medications.      Follow Up  Future Appointments   Date Time Provider Department Center   2024  1:15 PM Rodolfo Lazaro [place] [facility] [place]   2024  3:00 PM [facility] DEVICE 39 DG [facility] AMB   2024 12:30 PM Marisabel Adam RN [facility]   3/1/2024  8:30 AM [facility] ECHO 36 [facility]   3/1/2024 10:00 AM Rodolfo Lazaro MD [facility]   2024  1:00 PM [facility] CT1 REVOLUTION 256 SLICE [facility]   4/15/2024  1:30 PM Juno Newsome MD [facility] AMB   6/3/2024  4:15 PM Eddi Swenson MD [facility] [place]       CLINT STONE LPN

## 2024-01-31 NOTE — TELEPHONE ENCOUNTER
Yamilex is requesting verbal orders for nurse visits 1 x weekly for patient. She said please LVM if no answer.

## 2024-01-31 NOTE — CARE COORDINATION
Care Transitions Initial Follow Up Call    Call within 2 business days of discharge: Yes    This ACM will defer initial call until a later time this week.  Cardiology has already made a DELILAH call this morning.    Discharge from hospital - 2024   - home with [facility]   - lives with spouse   - Cardiology has education re: precautions s/p AVR surgery    Patient is listed on High Utilizer List for [facility].  However, many of ER visits have been followed up by various providers, including cardiology.    Medical History includes   - Paroxysmal Afib on Tikosyn/coumadin   - aortic stenosis > AVR   - DM-2   - acute on chronic respiratory failure (O2 @ 3L)   - Mitral Valve stenosis > TMVR   - Essential hypertension    Patient: Nery Bryant Patient : 1945   MRN: [id number]  Reason for Admission: Mitral Valve Replacement  Discharge Date: 24 RARS: Readmission Risk Score: 23.8      Last Discharge Facility       Date Complaint Diagnosis Description Type Department Provider    24 Shortness of Breath S/P TAVR (transcatheter aortic valve replacement) ... ED to Hosp-Admission (Discharged) (ADMITTED) [id number] Rodolfo Lazaro MD; Kasandra, ...            Was this an external facility discharge? No Discharge Facility: [facility]    Challenges to be reviewed by the provider   Additional needs identified to be addressed with provider: No  none               Method of communication with provider: chart routing.    Care Transitions 24 Hour Call    Do you have all of your prescriptions and are they filled?: Yes  Do you have support at home?: Partner/Spouse/SO  Are you an active caregiver in your home?: No  Care Transitions Interventions       Is follow up appointment scheduled within 7 days of discharge? Yes.    Follow Up  Future Appointments   Date Time Provider Department Center   2024  1:15 PM Rodolfo Lazaro MD [facility] AMB   2024  3:00 PM [facility] DEVICE 39 BOL[facility] MOSHER AMB

## 2024-02-02 ENCOUNTER — OFFICE VISIT (OUTPATIENT)
Age: 79
End: 2024-02-02

## 2024-02-02 ENCOUNTER — TELEPHONE (OUTPATIENT)
Age: 79
End: 2024-02-02

## 2024-02-02 VITALS
SYSTOLIC BLOOD PRESSURE: 136 MMHG | HEIGHT: 66 IN | BODY MASS INDEX: 22.38 KG/M2 | DIASTOLIC BLOOD PRESSURE: 68 MMHG | HEART RATE: 60 BPM

## 2024-02-02 DIAGNOSIS — E78.2 MIXED HYPERLIPIDEMIA: Chronic | ICD-10-CM

## 2024-02-02 DIAGNOSIS — Z95.2 S/P TRANSCATHETER MITRAL VALVE REPLACEMENT (TMVR): Primary | ICD-10-CM

## 2024-02-02 DIAGNOSIS — I25.10 CORONARY ARTERY DISEASE INVOLVING NATIVE CORONARY ARTERY OF NATIVE HEART WITHOUT ANGINA PECTORIS: ICD-10-CM

## 2024-02-02 DIAGNOSIS — Z95.0 S/P BIVENTRICULAR CARDIAC PACEMAKER PROCEDURE: Chronic | ICD-10-CM

## 2024-02-02 DIAGNOSIS — I48.0 PAROXYSMAL ATRIAL FIBRILLATION (HCC): Chronic | ICD-10-CM

## 2024-02-02 DIAGNOSIS — J96.21 ACUTE ON CHRONIC RESPIRATORY FAILURE WITH HYPOXIA (HCC): ICD-10-CM

## 2024-02-02 DIAGNOSIS — Z95.2 S/P TAVR (TRANSCATHETER AORTIC VALVE REPLACEMENT): ICD-10-CM

## 2024-02-02 DIAGNOSIS — I10 ESSENTIAL HYPERTENSION: Chronic | ICD-10-CM

## 2024-02-02 ASSESSMENT — ENCOUNTER SYMPTOMS
SHORTNESS OF BREATH: 1
COUGH: 0
BACK PAIN: 0
ABDOMINAL PAIN: 0

## 2024-02-02 NOTE — PROGRESS NOTES
[facility] CARDIOLOGY  [address] [place] [address], [address]  [place] [address]      24      NAME:  Nery Bryant  : 1945  MRN: [id number]      SUBJECTIVE:   Nery Bryant is a 78 y.o. female seen for a follow up visit regarding the following:     Chief Complaint   Patient presents with    Coronary Artery Disease    Irregular Heart Beat       HPI:   78 y.o. female with longstanding history of aortic and mitral valve disease status post valve surgery in .  Patient began developing worsening symptoms this past summer.  She has developed progressive mitral and aortic surgical valve stenosis.  She was admitted with severe heart failure and volume overload with acute hypoxic respiratory failure.  She is now status post valve in valve TMVR and valve in valve TAVR simultaneously 2024.  Postoperatively she struggled with atrial fibrillation with rapid ventricular response and intermittent bradycardia now status post biventricular pacemaker implantation.  She presents today for TC 7 follow-up.  She remained stable on 2 L nasal cannula.  She progresses nicely at home with home PT OT and home health.  Please see office nursing documentation for TC 7 information.          Past Medical History, Past Surgical History, Family history, Social History, and Medications were all reviewed with the patient today and updated as necessary.     Current Outpatient Medications   Medication Sig Dispense Refill    HYDROcodone-acetaminophen (NORCO) 5-325 MG per tablet Take 1 tablet by mouth every 6 hours as needed for Pain for up to 3 days. Max Daily Amount: 4 tablets 5 tablet 0    apixaban (ELIQUIS) 5 MG TABS tablet Take 1 tablet by mouth 2 times daily 60 tablet 5    pantoprazole (PROTONIX) 40 MG tablet Take 1 tablet by mouth every morning (before breakfast) 30 tablet 0    furosemide (LASIX) 20 MG tablet Take 1 tablet by mouth daily 60 tablet 3    predniSONE (DELTASONE) 5 MG tablet Take 1 tablet by mouth daily

## 2024-02-05 LAB
ANION GAP SERPL CALC-SCNC: 3 MMOL/L (ref 2–11)
BUN SERPL-MCNC: 21 MG/DL (ref 8–23)
CALCIUM SERPL-MCNC: 8.8 MG/DL (ref 8.3–10.4)
CHLORIDE SERPL-SCNC: 109 MMOL/L (ref 103–113)
CO2 SERPL-SCNC: 29 MMOL/L (ref 21–32)
CREAT SERPL-MCNC: 0.9 MG/DL (ref 0.6–1)
ERYTHROCYTE [DISTWIDTH] IN BLOOD BY AUTOMATED COUNT: 21.9 % (ref 11.9–14.6)
GLUCOSE SERPL-MCNC: 217 MG/DL (ref 65–100)
HCT VFR BLD AUTO: 32.7 % (ref 35.8–46.3)
HGB BLD-MCNC: 9.7 G/DL (ref 11.7–15.4)
MAGNESIUM SERPL-MCNC: 1.8 MG/DL (ref 1.8–2.4)
MCH RBC QN AUTO: 31.2 PG (ref 26.1–32.9)
MCHC RBC AUTO-ENTMCNC: 29.7 G/DL (ref 31.4–35)
MCV RBC AUTO: 105.1 FL (ref 82–102)
NRBC # BLD: 0 K/UL (ref 0–0.2)
NRBC BLD-RTO: 0 PER 100 WBC (ref 0–2)
PLATELET # BLD AUTO: 179 K/UL (ref 150–450)
PMV BLD AUTO: 11 FL (ref 9.4–12.3)
POTASSIUM SERPL-SCNC: 4.7 MMOL/L (ref 3.5–5.1)
RBC # BLD AUTO: 3.11 M/UL (ref 4.05–5.2)
SODIUM SERPL-SCNC: 141 MMOL/L (ref 136–146)
WBC # BLD AUTO: 8.8 K/UL (ref 4.3–11.1)

## 2024-02-07 DIAGNOSIS — Z95.2 S/P TAVR (TRANSCATHETER AORTIC VALVE REPLACEMENT): Primary | ICD-10-CM

## 2024-02-08 ENCOUNTER — NURSE ONLY (OUTPATIENT)
Age: 79
End: 2024-02-08

## 2024-02-08 DIAGNOSIS — I49.5 SSS (SICK SINUS SYNDROME) (HCC): Primary | ICD-10-CM

## 2024-02-09 ENCOUNTER — OFFICE VISIT (OUTPATIENT)
Dept: INTERNAL MEDICINE CLINIC | Facility: CLINIC | Age: 79
End: 2024-02-09
Payer: MEDICARE

## 2024-02-09 VITALS
BODY MASS INDEX: 23.98 KG/M2 | WEIGHT: 149.2 LBS | HEART RATE: 61 BPM | DIASTOLIC BLOOD PRESSURE: 58 MMHG | SYSTOLIC BLOOD PRESSURE: 130 MMHG | OXYGEN SATURATION: 94 % | HEIGHT: 66 IN

## 2024-02-09 DIAGNOSIS — K11.8 PAROTID NODULE: Primary | ICD-10-CM

## 2024-02-09 DIAGNOSIS — E89.0 HYPOTHYROIDISM, POSTSURGICAL: ICD-10-CM

## 2024-02-09 PROCEDURE — G8427 DOCREV CUR MEDS BY ELIG CLIN: HCPCS | Performed by: NURSE PRACTITIONER

## 2024-02-09 PROCEDURE — 3075F SYST BP GE 130 - 139MM HG: CPT | Performed by: NURSE PRACTITIONER

## 2024-02-09 PROCEDURE — 3078F DIAST BP <80 MM HG: CPT | Performed by: NURSE PRACTITIONER

## 2024-02-09 PROCEDURE — G8420 CALC BMI NORM PARAMETERS: HCPCS | Performed by: NURSE PRACTITIONER

## 2024-02-09 PROCEDURE — G8484 FLU IMMUNIZE NO ADMIN: HCPCS | Performed by: NURSE PRACTITIONER

## 2024-02-09 PROCEDURE — G8399 PT W/DXA RESULTS DOCUMENT: HCPCS | Performed by: NURSE PRACTITIONER

## 2024-02-09 PROCEDURE — 1090F PRES/ABSN URINE INCON ASSESS: CPT | Performed by: NURSE PRACTITIONER

## 2024-02-09 PROCEDURE — 99213 OFFICE O/P EST LOW 20 MIN: CPT | Performed by: NURSE PRACTITIONER

## 2024-02-09 PROCEDURE — 1111F DSCHRG MED/CURRENT MED MERGE: CPT | Performed by: NURSE PRACTITIONER

## 2024-02-09 PROCEDURE — 1123F ACP DISCUSS/DSCN MKR DOCD: CPT | Performed by: NURSE PRACTITIONER

## 2024-02-09 PROCEDURE — 1036F TOBACCO NON-USER: CPT | Performed by: NURSE PRACTITIONER

## 2024-02-09 RX ORDER — AMOXICILLIN 500 MG/1
500 CAPSULE ORAL 3 TIMES DAILY
Qty: 30 CAPSULE | Refills: 0 | Status: SHIPPED | OUTPATIENT
Start: 2024-02-09 | End: 2024-02-19

## 2024-02-09 RX ORDER — ASCORBIC ACID 500 MG
500 TABLET ORAL DAILY
COMMUNITY
End: 2024-03-01 | Stop reason: ALTCHOICE

## 2024-02-09 NOTE — PROGRESS NOTES
PROGRESS NOTE    SUBJECTIVE:   Nery Bryant is a 78 y.o. female seen for:   Chief Complaint   Patient presents with    Other     Patient reports a nodule on her RIGHT cheek that is hard to the touch that has been there for the past week.    Back Pain     Patient reports back pain since the heart pacemaker placement on 01/25/24.     HPI  She complains about upper thoracic back pain.  Describes as moderate pain as tightness between shoulder blades.  Some tightness in lower back also.  Not movement restrictive.  Pain level fluctuates.    Also has nodule right lower jaw line.  She also has a dry mouth.  Nodule has been present about 2 weeks.      Past Medical History:   Diagnosis Date    Anesthesia complication     slow to wake up    Aortic valve insufficiency     CAD (coronary artery disease)     Followed by [facility]    Colon cancer (HCC) 09/01/2021    COVID-19 05/15/2023    not hospitalized, coughing, runny nose    Current use of long term anticoagulation     Warfarin and Aspirin    Diabetes mellitus, insulin dependent (IDDM), controlled 05/11/2016    insulin reliant/avg fbs 100-130, pt not sure of BS reading when experiencing s.s of hypoglycemia/A1c 6.1,    Fracture, femur (HCC) 09/21/2009    GERD (gastroesophageal reflux disease)     states rarely PRN medication    Heart failure ([facility])     EF 55-60 % 9/9/20    History of cardioversion     History of colon cancer     History of petit-mal seizures     none in 15 years    History of seizure     no seizure activity since 1980's    Hyperlipidemia     Hypertension     Hypothyroidism     Mitral stenosis with insufficiency     Other unknown and unspecified cause of morbidity or mortality     HLD    Paroxysmal atrial fibrillation (HCC)     PONV (postoperative nausea and vomiting)     post op nausea. pt does well with antiemetic    Thyroid nodule 06/29/2011    Unspecified essential hypertension     Visit for monitoring Tikosyn therapy     pt takes Tikosyn BID

## 2024-02-11 DIAGNOSIS — E89.0 HYPOTHYROIDISM, POSTSURGICAL: ICD-10-CM

## 2024-02-12 ENCOUNTER — TELEPHONE (OUTPATIENT)
Age: 79
End: 2024-02-12

## 2024-02-12 RX ORDER — LEVOTHYROXINE SODIUM 0.12 MG/1
125 TABLET ORAL
Qty: 90 TABLET | Refills: 3 | Status: SHIPPED | OUTPATIENT
Start: 2024-02-12

## 2024-02-12 NOTE — TELEPHONE ENCOUNTER
Pt. reports that she has BLE edema.She has gained 3 pounds in the past week.She is taking Lasix 20mg qday alternating w/40mg every other day./65 HR=61 . ask if she could do 40mg qday for a little while?

## 2024-02-12 NOTE — TELEPHONE ENCOUNTER
Have patient take furosemide 40 mg twice daily x 3 days then 40 mg daily.  Have patient call back on Friday with response.

## 2024-02-12 NOTE — TELEPHONE ENCOUNTER
Pt's  called on behalf of the pt. States the pt has swelling up her legs and doubling her lasix every other day isn't helping. Pt would appreciate a call back with advice.

## 2024-02-14 ENCOUNTER — OFFICE VISIT (OUTPATIENT)
Dept: ENT CLINIC | Age: 79
End: 2024-02-14
Payer: MEDICARE

## 2024-02-14 VITALS
DIASTOLIC BLOOD PRESSURE: 62 MMHG | SYSTOLIC BLOOD PRESSURE: 126 MMHG | HEIGHT: 66 IN | BODY MASS INDEX: 23.95 KG/M2 | WEIGHT: 149 LBS

## 2024-02-14 DIAGNOSIS — K11.8 MASS OF RIGHT PAROTID GLAND: Primary | ICD-10-CM

## 2024-02-14 DIAGNOSIS — K11.5: ICD-10-CM

## 2024-02-14 PROCEDURE — G8420 CALC BMI NORM PARAMETERS: HCPCS | Performed by: PHYSICIAN ASSISTANT

## 2024-02-14 PROCEDURE — 1111F DSCHRG MED/CURRENT MED MERGE: CPT | Performed by: PHYSICIAN ASSISTANT

## 2024-02-14 PROCEDURE — G8399 PT W/DXA RESULTS DOCUMENT: HCPCS | Performed by: PHYSICIAN ASSISTANT

## 2024-02-14 PROCEDURE — 1090F PRES/ABSN URINE INCON ASSESS: CPT | Performed by: PHYSICIAN ASSISTANT

## 2024-02-14 PROCEDURE — 3074F SYST BP LT 130 MM HG: CPT | Performed by: PHYSICIAN ASSISTANT

## 2024-02-14 PROCEDURE — 1123F ACP DISCUSS/DSCN MKR DOCD: CPT | Performed by: PHYSICIAN ASSISTANT

## 2024-02-14 PROCEDURE — 3078F DIAST BP <80 MM HG: CPT | Performed by: PHYSICIAN ASSISTANT

## 2024-02-14 PROCEDURE — 99203 OFFICE O/P NEW LOW 30 MIN: CPT | Performed by: PHYSICIAN ASSISTANT

## 2024-02-14 PROCEDURE — G8427 DOCREV CUR MEDS BY ELIG CLIN: HCPCS | Performed by: PHYSICIAN ASSISTANT

## 2024-02-14 PROCEDURE — G8484 FLU IMMUNIZE NO ADMIN: HCPCS | Performed by: PHYSICIAN ASSISTANT

## 2024-02-14 PROCEDURE — 1036F TOBACCO NON-USER: CPT | Performed by: PHYSICIAN ASSISTANT

## 2024-02-14 NOTE — PROGRESS NOTES
Nery Bryant is a 78 y.o. female presents today with c/o right cheek mass. A firm hard knot began after Cardiac surgery.     Jan 24 double valve surgery then pacemaker on 25th and hard knot started the next day on the right cheek. Amoxicillin currently covered by pcp for 10 days. It's not tender, no fever. Chills. No hx of stones. No affect on eating or drinking. It got bigger initially size of a pea and now it's a grape size. No drainage. Doesn't notice it at all when eating. It's not fluctuant.     Imaging, none since this started.     Chief Complaint   Patient presents with    New Patient    Other     Parotid nodule on right cheek.  Patient states after last procedure she had done is when she noticed it (1/25).  Started out as a soft spot, and now it is hardened into a quarter-sized nodule.  Patient denies redness and heat from spot.  Patient is on amoxicillin currently       Patient Active Problem List   Diagnosis    Carotid stenosis    Iron deficiency anemia    Long-term use of high-risk medication    Osteoporosis    Colonic mass    Blood loss anemia    Type 2 diabetes mellitus with microalbuminuria, with long-term current use of insulin (HCC)    Type 2 diabetes mellitus with hyperglycemia, with long-term current use of insulin (HCC)    Essential hypertension    Bradycardia    Long term current use of anticoagulant therapy    GERD (gastroesophageal reflux disease)    S/P AVR (aortic valve replacement)    Hypothyroidism, postsurgical    Mitral valve insufficiency    Rectal cancer (HCC)    Paroxysmal atrial fibrillation (HCC)    Hypoxia    S/P mitral valve replacement    CAD (coronary artery disease)    Mixed hyperlipidemia    Acute respiratory failure with hypoxia (HCC)    Abnormal CT of the chest    Acute on chronic respiratory failure with hypoxemia (HCC)    Acute pneumonitis    Aortic stenosis    Lung nodules    Secondary hypercoagulable state (HCC)    Mitral valve stenosis    Mitral valve stenosis,

## 2024-02-15 ENCOUNTER — TELEPHONE (OUTPATIENT)
Age: 79
End: 2024-02-15

## 2024-02-15 NOTE — TELEPHONE ENCOUNTER
Patient  called and wanted to get clearance for Dr. Avalos to remove a salay stone on Monday next week. Please call Ekta

## 2024-02-15 NOTE — TELEPHONE ENCOUNTER
How is patient's respiratory status?  How is her fluid status?.  If she is near baseline patient would be stable to proceed with procedure as requested.  She could hold Eliquis 3 days prior.

## 2024-02-15 NOTE — TELEPHONE ENCOUNTER
Has a salivary stone and needs to be excised.Procedure scheduled  for 2/19/24.S/P Mitral and Aortic valve repair and pacemaker in January.On Amoxicillin through 2/19/24.Is it safe to do this procedure?        Of note swelling better  in legs but feet are still swollen on Lasix.She has lost 2 pounds 40mg bid and now back on 40mg qday.

## 2024-02-15 NOTE — TELEPHONE ENCOUNTER
Per  breathing is Okay,Wt.is back to baseline.Still has some edema in feet but goes down some at night worsens through the day.Left foot started  weeping 2 days ago but not weeping today.Currently back on Lasix 40mg qday.She denies any SOB.With this info.Is it ok to proceed?

## 2024-02-16 ENCOUNTER — HOSPITAL ENCOUNTER (OUTPATIENT)
Dept: CT IMAGING | Age: 79
Discharge: HOME | End: 2024-02-19

## 2024-02-16 DIAGNOSIS — K11.8 MASS OF RIGHT PAROTID GLAND: ICD-10-CM

## 2024-02-19 ENCOUNTER — OFFICE VISIT (OUTPATIENT)
Dept: ENT CLINIC | Age: 79
End: 2024-02-19
Payer: MEDICARE

## 2024-02-19 VITALS — RESPIRATION RATE: 17 BRPM | BODY MASS INDEX: 23.35 KG/M2 | HEIGHT: 66 IN | WEIGHT: 145.3 LBS

## 2024-02-19 DIAGNOSIS — R22.0 CHEEK MASS: Primary | ICD-10-CM

## 2024-02-19 PROCEDURE — G8428 CUR MEDS NOT DOCUMENT: HCPCS | Performed by: OTOLARYNGOLOGY

## 2024-02-19 PROCEDURE — 99213 OFFICE O/P EST LOW 20 MIN: CPT | Performed by: OTOLARYNGOLOGY

## 2024-02-19 PROCEDURE — 1123F ACP DISCUSS/DSCN MKR DOCD: CPT | Performed by: OTOLARYNGOLOGY

## 2024-02-19 PROCEDURE — G8399 PT W/DXA RESULTS DOCUMENT: HCPCS | Performed by: OTOLARYNGOLOGY

## 2024-02-19 PROCEDURE — 1090F PRES/ABSN URINE INCON ASSESS: CPT | Performed by: OTOLARYNGOLOGY

## 2024-02-19 PROCEDURE — 1036F TOBACCO NON-USER: CPT | Performed by: OTOLARYNGOLOGY

## 2024-02-19 PROCEDURE — 1111F DSCHRG MED/CURRENT MED MERGE: CPT | Performed by: OTOLARYNGOLOGY

## 2024-02-19 PROCEDURE — G8484 FLU IMMUNIZE NO ADMIN: HCPCS | Performed by: OTOLARYNGOLOGY

## 2024-02-19 PROCEDURE — G8420 CALC BMI NORM PARAMETERS: HCPCS | Performed by: OTOLARYNGOLOGY

## 2024-02-19 RX ORDER — POTASSIUM CHLORIDE 20 MEQ/1
20 TABLET, EXTENDED RELEASE ORAL DAILY
Qty: 90 TABLET | Refills: 3 | Status: SHIPPED | OUTPATIENT
Start: 2024-02-19

## 2024-02-19 RX ORDER — ATORVASTATIN CALCIUM 20 MG/1
10 TABLET, FILM COATED ORAL EVERY EVENING
Qty: 45 TABLET | Refills: 3 | Status: SHIPPED | OUTPATIENT
Start: 2024-02-19

## 2024-02-19 RX ORDER — PANTOPRAZOLE SODIUM 40 MG/1
40 TABLET, DELAYED RELEASE ORAL
Qty: 90 TABLET | Refills: 3 | Status: SHIPPED | OUTPATIENT
Start: 2024-02-19

## 2024-02-19 RX ORDER — MAGNESIUM OXIDE 400 MG/1
400 TABLET ORAL NIGHTLY
Qty: 90 TABLET | Refills: 3 | Status: SHIPPED | OUTPATIENT
Start: 2024-02-19

## 2024-02-19 RX ORDER — METOPROLOL SUCCINATE 25 MG/1
25 TABLET, EXTENDED RELEASE ORAL DAILY
Qty: 90 TABLET | Refills: 3 | Status: SHIPPED | OUTPATIENT
Start: 2024-02-19

## 2024-02-19 ASSESSMENT — ENCOUNTER SYMPTOMS
ABDOMINAL PAIN: 0
SORE THROAT: 0
SHORTNESS OF BREATH: 0

## 2024-02-19 NOTE — PROGRESS NOTES
Chief Complaint   Patient presents with    Mass     Patient is having swelling in her face on the right side and saw justine last week and had a ct scan done.       HPI:  Nery Bryant is a 78 y.o. female seen in follow-up for a right cheek mass.  She had seen our PA Justine last week for the same problem, and a CT scan was obtained.  She underwent recent transcutaneous double valve (aortic, mitral) replacement back on 1/14/2024.  She then underwent pacemaker implantation the following day.  After these procedures, she started noticing a dry spot along the right side of her mouth.  She wondered if something rubbed up against this area during her 2 procedures.  She first felt a small pea sized growth along the right cheek, which has increased significantly in size since then.  She denies any localized pain or tenderness.  There is no fluctuation in the size of this mass when she eats or drinks.  She has been able to follow a normal diet.  There has been no intraoral bleeding or drainage.  She has no previous oral salivary pathology.  She has been on antibiotics, but reports no significant decrease in the size of the mass.    Past Medical History, Past Surgical History, Family history, Social History, and Medications were all reviewed with the patient today and updated as necessary.     Allergies   Allergen Reactions    Ibuprofen Rash     Patient Active Problem List   Diagnosis    Carotid stenosis    Iron deficiency anemia    Long-term use of high-risk medication    Osteoporosis    Colonic mass    Blood loss anemia    Type 2 diabetes mellitus with microalbuminuria, with long-term current use of insulin (HCC)    Type 2 diabetes mellitus with hyperglycemia, with long-term current use of insulin (HCC)    Essential hypertension    Bradycardia    Long term current use of anticoagulant therapy    GERD (gastroesophageal reflux disease)    S/P AVR (aortic valve replacement)    Hypothyroidism, postsurgical    Mitral valve

## 2024-02-19 NOTE — TELEPHONE ENCOUNTER
Pts  came in requesting refills of Pantoprazole, Metoprolol, and mag, and potassium, and atorvastatin

## 2024-02-22 ENCOUNTER — TELEPHONE (OUTPATIENT)
Dept: ENT CLINIC | Age: 79
End: 2024-02-22

## 2024-02-22 NOTE — TELEPHONE ENCOUNTER
Patient's  called and said they got the pathology report on [employer].  I only see a cytology report on the specimen.  The patient would like a call regarding the results that came through on [employer].      Thank you!

## 2024-02-23 ENCOUNTER — TELEPHONE (OUTPATIENT)
Age: 79
End: 2024-02-23

## 2024-02-23 NOTE — TELEPHONE ENCOUNTER
Home Health Physical Therapist Colby reports pt.still has edema BLE's to mid calf,left worse than right leg.She is on Lasix 40mg qday.She denies SOB.Colby did not know if she has been weighing daily.    I spoke w/pt.she said swelling is mostly in her feet and goes to about 6 inches above ankle.Left worse than rt.leg.She is not SOB,wt.is stable between 145-147 w/no drastic wt.gain.Rt.foot goes down at night but left foot dose not.Has only been taking Lasix 20mg not 40mg as prescribed.I advised 40mg qday as prescribed by .    I called Pet[facility] him pt.not taking Lasix as prescribed she will do so and if this does not help and he feels other needs required call me back.Colby v/u.

## 2024-02-23 NOTE — TELEPHONE ENCOUNTER
[facility]  PT reports pt is still having some LE swelling and would like to know if Dr Lazaro will recommend some type of compression stocking and the pressure gradient for pt to wear. PT also reports that diuretics are helping with the swelling but he feels she needs to add compression as well.

## 2024-02-24 NOTE — TELEPHONE ENCOUNTER
I called and spoke to patient yesterday about pathology results.  The FNA was essentially nondiagnostic.  I am still highly concerned about this mass which has enlarged fairly quickly and has concerning features on CT scan.  She has been doing well since the needle biopsy.  I will speak to her cardiologist about potential risk for surgical excision of this mass.  I will touch base with her next week after I speak to Dr. Lazaro.

## 2024-02-26 ENCOUNTER — TELEPHONE (OUTPATIENT)
Dept: ENT CLINIC | Age: 79
End: 2024-02-26

## 2024-02-26 NOTE — TELEPHONE ENCOUNTER
Called patient after I discussed her case with her cardiologist, Dr. Lazaro-she is at moderate risk for GA and could hold Eliquis for 3 days.  With her nondiagnostic FNA, I do think she would benefit from formal excisional biopsy of this unusual right oral lesion.  I will look for OR time for transoral excision of this R oral mass.  She will continue Eliquis for now until we find an OR date.  She has follow-up with her cardiologist on Friday.

## 2024-02-27 ENCOUNTER — PREP FOR PROCEDURE (OUTPATIENT)
Dept: ENT CLINIC | Age: 79
End: 2024-02-27

## 2024-02-27 DIAGNOSIS — K13.79 ORAL MASS: ICD-10-CM

## 2024-03-01 ENCOUNTER — NURSE ONLY (OUTPATIENT)
Age: 79
End: 2024-03-01

## 2024-03-01 ENCOUNTER — OFFICE VISIT (OUTPATIENT)
Age: 79
End: 2024-03-01
Payer: MEDICARE

## 2024-03-01 VITALS
HEART RATE: 68 BPM | SYSTOLIC BLOOD PRESSURE: 146 MMHG | BODY MASS INDEX: 23.4 KG/M2 | HEIGHT: 66 IN | DIASTOLIC BLOOD PRESSURE: 52 MMHG

## 2024-03-01 DIAGNOSIS — Z95.2 S/P TRANSCATHETER MITRAL VALVE REPLACEMENT (TMVR): ICD-10-CM

## 2024-03-01 DIAGNOSIS — I10 ESSENTIAL HYPERTENSION: ICD-10-CM

## 2024-03-01 DIAGNOSIS — Z95.2 S/P TAVR (TRANSCATHETER AORTIC VALVE REPLACEMENT): ICD-10-CM

## 2024-03-01 DIAGNOSIS — I49.5 SSS (SICK SINUS SYNDROME) (HCC): Primary | ICD-10-CM

## 2024-03-01 DIAGNOSIS — Z95.0 S/P BIVENTRICULAR CARDIAC PACEMAKER PROCEDURE: Chronic | ICD-10-CM

## 2024-03-01 DIAGNOSIS — D50.0 BLOOD LOSS ANEMIA: ICD-10-CM

## 2024-03-01 DIAGNOSIS — E78.2 MIXED HYPERLIPIDEMIA: Chronic | ICD-10-CM

## 2024-03-01 DIAGNOSIS — I25.10 CORONARY ARTERY DISEASE INVOLVING NATIVE CORONARY ARTERY OF NATIVE HEART WITHOUT ANGINA PECTORIS: ICD-10-CM

## 2024-03-01 DIAGNOSIS — Z95.2 S/P TAVR (TRANSCATHETER AORTIC VALVE REPLACEMENT): Primary | ICD-10-CM

## 2024-03-01 DIAGNOSIS — I48.0 PAROXYSMAL ATRIAL FIBRILLATION (HCC): Chronic | ICD-10-CM

## 2024-03-01 PROBLEM — I35.0 AORTIC STENOSIS: Status: RESOLVED | Noted: 2023-08-04 | Resolved: 2024-03-01

## 2024-03-01 PROBLEM — I05.0 MITRAL VALVE STENOSIS, SEVERE: Status: RESOLVED | Noted: 2024-01-18 | Resolved: 2024-03-01

## 2024-03-01 PROBLEM — I05.0 MITRAL VALVE STENOSIS: Status: RESOLVED | Noted: 2024-01-18 | Resolved: 2024-03-01

## 2024-03-01 PROBLEM — R00.1 BRADYCARDIA: Status: RESOLVED | Noted: 2017-01-18 | Resolved: 2024-03-01

## 2024-03-01 LAB
ANION GAP SERPL CALC-SCNC: 6 MMOL/L (ref 2–11)
BUN SERPL-MCNC: 15 MG/DL (ref 8–23)
CALCIUM SERPL-MCNC: 9.4 MG/DL (ref 8.3–10.4)
CHLORIDE SERPL-SCNC: 103 MMOL/L (ref 103–113)
CO2 SERPL-SCNC: 33 MMOL/L (ref 21–32)
CREAT SERPL-MCNC: 0.8 MG/DL (ref 0.6–1)
ERYTHROCYTE [DISTWIDTH] IN BLOOD BY AUTOMATED COUNT: 17 % (ref 11.9–14.6)
GLUCOSE SERPL-MCNC: 192 MG/DL (ref 65–100)
HCT VFR BLD AUTO: 37.4 % (ref 35.8–46.3)
HGB BLD-MCNC: 11.2 G/DL (ref 11.7–15.4)
MAGNESIUM SERPL-MCNC: 1.8 MG/DL (ref 1.8–2.4)
MCH RBC QN AUTO: 30.5 PG (ref 26.1–32.9)
MCHC RBC AUTO-ENTMCNC: 29.9 G/DL (ref 31.4–35)
MCV RBC AUTO: 101.9 FL (ref 82–102)
NRBC # BLD: 0 K/UL (ref 0–0.2)
PLATELET # BLD AUTO: 232 K/UL (ref 150–450)
PMV BLD AUTO: 10.4 FL (ref 9.4–12.3)
POTASSIUM SERPL-SCNC: 4.1 MMOL/L (ref 3.5–5.1)
RBC # BLD AUTO: 3.67 M/UL (ref 4.05–5.2)
SODIUM SERPL-SCNC: 142 MMOL/L (ref 136–146)
WBC # BLD AUTO: 9.5 K/UL (ref 4.3–11.1)

## 2024-03-01 PROCEDURE — 1036F TOBACCO NON-USER: CPT | Performed by: INTERNAL MEDICINE

## 2024-03-01 PROCEDURE — 1123F ACP DISCUSS/DSCN MKR DOCD: CPT | Performed by: INTERNAL MEDICINE

## 2024-03-01 PROCEDURE — 1090F PRES/ABSN URINE INCON ASSESS: CPT | Performed by: INTERNAL MEDICINE

## 2024-03-01 PROCEDURE — 93000 ELECTROCARDIOGRAM COMPLETE: CPT | Performed by: INTERNAL MEDICINE

## 2024-03-01 PROCEDURE — G8484 FLU IMMUNIZE NO ADMIN: HCPCS | Performed by: INTERNAL MEDICINE

## 2024-03-01 PROCEDURE — G8428 CUR MEDS NOT DOCUMENT: HCPCS | Performed by: INTERNAL MEDICINE

## 2024-03-01 PROCEDURE — 3077F SYST BP >= 140 MM HG: CPT | Performed by: INTERNAL MEDICINE

## 2024-03-01 PROCEDURE — 3078F DIAST BP <80 MM HG: CPT | Performed by: INTERNAL MEDICINE

## 2024-03-01 PROCEDURE — G8420 CALC BMI NORM PARAMETERS: HCPCS | Performed by: INTERNAL MEDICINE

## 2024-03-01 PROCEDURE — G8399 PT W/DXA RESULTS DOCUMENT: HCPCS | Performed by: INTERNAL MEDICINE

## 2024-03-01 PROCEDURE — 99214 OFFICE O/P EST MOD 30 MIN: CPT | Performed by: INTERNAL MEDICINE

## 2024-03-01 RX ORDER — FUROSEMIDE 40 MG/1
40 TABLET ORAL DAILY
Qty: 90 TABLET | Refills: 3 | Status: SHIPPED | OUTPATIENT
Start: 2024-03-01

## 2024-03-01 ASSESSMENT — ENCOUNTER SYMPTOMS
ABDOMINAL PAIN: 0
SHORTNESS OF BREATH: 1
BACK PAIN: 0
COUGH: 0

## 2024-03-01 ASSESSMENT — KANSAS CITY CARDIOMYOPATHY QUESTIONNAIRE (KCCQ12)
1B. OVER THE PAST 2 WEEKS, HOW MUCH WERE YOU LIMITED BY HEART FAILURE SYMPTOMS (SHORTNESS OF BREATH OR FATIGUE) WHEN WALKING 1 BLOCK ON LEVEL GROUND: 5
1A. OVER THE PAST 2 WEEKS, HOW MUCH WERE YOU LIMITED BY HEART FAILURE SYMPTOMS (SHORTNESS OF BREATH OR FATIGUE) WHEN SHOWERING OR BATHING: 5
7. IF YOU HAD TO SPEND THE REST OF YOUR LIFE WITH YOUR HEART FAILURE THE WAY IT IS RIGHT NOW, HOW WOULD YOU FEEL ABOUT THIS?: 5
TOTAL SCORE: 45
8B. OVER THE PAST 2 WEEKS, ON AVERAGE, HOW HAS HEART FAILURE LIMITED YOU ABILITY TO WORK OR DO HOUSEHOLD CHORES: 5
6. OVER THE PAST 2 WEEKS, HOW MUCH HAS YOUR HEART FAILURE LIMITED YOUR ENJOYMENT OF LIFE: 5
4. OVER THE PAST 2 WEEKS, ON AVERAGE, HOW MANY TIMES HAS SHORTNESS OF BREATH LIMITED YOUR ABILITY TO DO WHAT YOU WANTED: 5
2. OVER THE PAST 2 WEEKS, HOW MANY TIMES DID YOU HAVE SWELLING IN YOUR FEET, ANKLES OR LEGS WHEN YOU WOKE UP IN THE MORNING: 2
5. OVER THE PAST 2 WEEKS, ON AVERAGE, HOW MANY TIMES HAVE YOU BEEN FORCED TO SLEEP SITTING UP IN A CHAIR OR WITH AT LEAST 3 PILLOWS TO PROP YOU UP BECAUSE OF SHORTNESS OF BREATH?: 5
8C. OVER THE PAST 2 WEEKS, ON AVERAGE, HOW HAS HEART FAILURE LIMITED YOU ABILITY TO VISIT FAMILY AND FRIENDS OUR OF YOUR HOME: 5
8A. OVER THE PAST 2 WEEKS, ON AVERAGE, HOW HAS HEART FAILURE LIMITED YOU ABILITY TO DO HOBBIES OR RECREATIONAL ACTIVITIES: 5
3. OVER THE PAST 2 WEEKS, ON AVERAGE, HOW MANY TIMES HAS FATIGUE LIMITED YOUR ABILITY TO DO WHAT YOU WANTED: 7
1C. OVER THE PAST 2 WEEKS, HOW MUCH WERE YOU LIMITED BY HEART FAILURE SYMPTOMS (SHORTNESS OF BREATH OR FATIGUE) WHEN HURRYING OR JOGGING (AS IF TO CATCH A BUS): 5

## 2024-03-01 NOTE — PROGRESS NOTES
pressure currently well-controlled.  Continue metoprolol succinate 25 mg daily.    Mixed hyperlipidemia -continue atorvastatin 20 mg daily.    Paroxysmal atrial fibrillation (HCC) -likely transition to persistent atrial fibrillation.  Now status post AV node ablation and pacemaker implantation.  Will continue anticoagulation with Eliquis.    S/P biventricular cardiac pacemaker procedure -site is healing well.  Status post AV node ablation.  Remains anticoagulated with Eliquis          Problem List Items Addressed This Visit          Circulatory    Essential hypertension - Primary (Chronic)    Relevant Medications    furosemide (LASIX) 40 MG tablet    Other Relevant Orders    EKG 12 Lead (Completed)    Paroxysmal atrial fibrillation (HCC) (Chronic)    Relevant Medications    furosemide (LASIX) 40 MG tablet    CAD (coronary artery disease)    Relevant Medications    furosemide (LASIX) 40 MG tablet    S/P TAVR (transcatheter aortic valve replacement)       Other    S/P biventricular cardiac pacemaker procedure (Chronic)    Mixed hyperlipidemia (Chronic)    Relevant Medications    furosemide (LASIX) 40 MG tablet    Blood loss anemia    S/P transcatheter mitral valve replacement (TMVR)       Medications Discontinued During This Encounter   Medication Reason    vitamin C (ASCORBIC ACID) 500 MG tablet Therapy completed    furosemide (LASIX) 20 MG tablet REORDER             Patient has been instructed and agrees to call our office with any issues or other concerns related to their cardiac condition(s) and/or complaint(s).      Return for 3 months Dr Swenson.       SARAH DIAZ MD  3/1/2024

## 2024-03-04 ENCOUNTER — TELEPHONE (OUTPATIENT)
Age: 79
End: 2024-03-04

## 2024-03-04 NOTE — TELEPHONE ENCOUNTER
Update patient as per Dr. Lazaro.  Opportunity for questions and answers provided.  Patient was appreciative of update.

## 2024-03-04 NOTE — TELEPHONE ENCOUNTER
----- Message from Rodolfo Lazaro MD sent at 3/4/2024  3:12 PM EST -----  Please call patient and let her know that her blood work looks excellent.

## 2024-03-04 NOTE — TELEPHONE ENCOUNTER
Called patient as per Dr. Lazaro.  Opportunity for questions and answers provided.      Patient was very appreciative of update.

## 2024-03-12 DIAGNOSIS — G89.18 POST-OP PAIN: Primary | ICD-10-CM

## 2024-03-12 RX ORDER — HYDROCODONE BITARTRATE AND ACETAMINOPHEN 5; 325 MG/1; MG/1
1 TABLET ORAL EVERY 4 HOURS PRN
Qty: 18 TABLET | Refills: 0 | Status: SHIPPED | OUTPATIENT
Start: 2024-03-12 | End: 2024-03-15

## 2024-03-12 RX ORDER — ONDANSETRON 4 MG/1
4 TABLET, ORALLY DISINTEGRATING ORAL 3 TIMES DAILY PRN
Qty: 10 TABLET | Refills: 0 | Status: SHIPPED | OUTPATIENT
Start: 2024-03-12

## 2024-03-12 RX ORDER — CEPHALEXIN 500 MG/1
500 CAPSULE ORAL 4 TIMES DAILY
Qty: 28 CAPSULE | Refills: 0 | Status: SHIPPED | OUTPATIENT
Start: 2024-03-12

## 2024-03-13 ENCOUNTER — ANESTHESIA EVENT (OUTPATIENT)
Dept: SURGERY | Age: 79
End: 2024-03-13
Payer: MEDICARE

## 2024-03-14 ENCOUNTER — ANESTHESIA (OUTPATIENT)
Dept: SURGERY | Age: 79
End: 2024-03-14
Payer: MEDICARE

## 2024-03-14 ENCOUNTER — HOSPITAL ENCOUNTER (OUTPATIENT)
Age: 79
Setting detail: OUTPATIENT SURGERY
Discharge: HOME | End: 2024-03-14
Attending: OTOLARYNGOLOGY | Admitting: OTOLARYNGOLOGY
Payer: MEDICARE

## 2024-03-14 VITALS
HEART RATE: 70 BPM | RESPIRATION RATE: 20 BRPM | OXYGEN SATURATION: 95 % | DIASTOLIC BLOOD PRESSURE: 85 MMHG | HEIGHT: 66 IN | SYSTOLIC BLOOD PRESSURE: 181 MMHG | TEMPERATURE: 97.9 F | BODY MASS INDEX: 22.5 KG/M2 | WEIGHT: 140 LBS

## 2024-03-14 DIAGNOSIS — K13.79 ORAL MASS: ICD-10-CM

## 2024-03-14 LAB
GLUCOSE BLD STRIP.AUTO-MCNC: 134 MG/DL (ref 65–100)
GLUCOSE BLD STRIP.AUTO-MCNC: 148 MG/DL (ref 65–100)
POTASSIUM BLD-SCNC: 4.2 MMOL/L (ref 3.5–5.1)
SERVICE CMNT-IMP: ABNORMAL
SERVICE CMNT-IMP: ABNORMAL

## 2024-03-14 PROCEDURE — 3600000012 HC SURGERY LEVEL 2 ADDTL 15MIN: Performed by: OTOLARYNGOLOGY

## 2024-03-14 PROCEDURE — 7100000011 HC PHASE II RECOVERY - ADDTL 15 MIN: Performed by: OTOLARYNGOLOGY

## 2024-03-14 PROCEDURE — 3600000002 HC SURGERY LEVEL 2 BASE: Performed by: OTOLARYNGOLOGY

## 2024-03-14 PROCEDURE — 3700000001 HC ADD 15 MINUTES (ANESTHESIA): Performed by: OTOLARYNGOLOGY

## 2024-03-14 PROCEDURE — 7100000000 HC PACU RECOVERY - FIRST 15 MIN: Performed by: OTOLARYNGOLOGY

## 2024-03-14 PROCEDURE — 7100000001 HC PACU RECOVERY - ADDTL 15 MIN: Performed by: OTOLARYNGOLOGY

## 2024-03-14 PROCEDURE — 2580000003 HC RX 258: Performed by: ANESTHESIOLOGY

## 2024-03-14 PROCEDURE — 88305 TISSUE EXAM BY PATHOLOGIST: CPT

## 2024-03-14 PROCEDURE — 3700000000 HC ANESTHESIA ATTENDED CARE: Performed by: OTOLARYNGOLOGY

## 2024-03-14 PROCEDURE — 2580000003 HC RX 258: Performed by: REGISTERED NURSE

## 2024-03-14 PROCEDURE — 2709999900 HC NON-CHARGEABLE SUPPLY: Performed by: OTOLARYNGOLOGY

## 2024-03-14 PROCEDURE — 2500000003 HC RX 250 WO HCPCS: Performed by: REGISTERED NURSE

## 2024-03-14 PROCEDURE — 84132 ASSAY OF SERUM POTASSIUM: CPT

## 2024-03-14 PROCEDURE — 88304 TISSUE EXAM BY PATHOLOGIST: CPT

## 2024-03-14 PROCEDURE — 7100000010 HC PHASE II RECOVERY - FIRST 15 MIN: Performed by: OTOLARYNGOLOGY

## 2024-03-14 PROCEDURE — 82962 GLUCOSE BLOOD TEST: CPT

## 2024-03-14 PROCEDURE — 2500000003 HC RX 250 WO HCPCS: Performed by: OTOLARYNGOLOGY

## 2024-03-14 PROCEDURE — 6360000002 HC RX W HCPCS: Performed by: OTOLARYNGOLOGY

## 2024-03-14 PROCEDURE — 6360000002 HC RX W HCPCS: Performed by: REGISTERED NURSE

## 2024-03-14 RX ORDER — PROPOFOL 10 MG/ML
INJECTION, EMULSION INTRAVENOUS PRN
Status: DISCONTINUED | OUTPATIENT
Start: 2024-03-14 | End: 2024-03-14 | Stop reason: SDUPTHER

## 2024-03-14 RX ORDER — ONDANSETRON 2 MG/ML
INJECTION INTRAMUSCULAR; INTRAVENOUS PRN
Status: DISCONTINUED | OUTPATIENT
Start: 2024-03-14 | End: 2024-03-14 | Stop reason: SDUPTHER

## 2024-03-14 RX ORDER — SODIUM CHLORIDE 9 MG/ML
INJECTION, SOLUTION INTRAVENOUS PRN
Status: DISCONTINUED | OUTPATIENT
Start: 2024-03-14 | End: 2024-03-14 | Stop reason: HOSPADM

## 2024-03-14 RX ORDER — HYDROMORPHONE HYDROCHLORIDE 2 MG/ML
0.5 INJECTION, SOLUTION INTRAMUSCULAR; INTRAVENOUS; SUBCUTANEOUS EVERY 5 MIN PRN
Status: DISCONTINUED | OUTPATIENT
Start: 2024-03-14 | End: 2024-03-14 | Stop reason: HOSPADM

## 2024-03-14 RX ORDER — SODIUM CHLORIDE, SODIUM LACTATE, POTASSIUM CHLORIDE, CALCIUM CHLORIDE 600; 310; 30; 20 MG/100ML; MG/100ML; MG/100ML; MG/100ML
INJECTION, SOLUTION INTRAVENOUS CONTINUOUS
Status: DISCONTINUED | OUTPATIENT
Start: 2024-03-14 | End: 2024-03-14 | Stop reason: HOSPADM

## 2024-03-14 RX ORDER — OXYCODONE HYDROCHLORIDE 5 MG/1
5 TABLET ORAL
Status: DISCONTINUED | OUTPATIENT
Start: 2024-03-14 | End: 2024-03-14 | Stop reason: HOSPADM

## 2024-03-14 RX ORDER — SODIUM CHLORIDE 0.9 % (FLUSH) 0.9 %
5-40 SYRINGE (ML) INJECTION EVERY 12 HOURS SCHEDULED
Status: DISCONTINUED | OUTPATIENT
Start: 2024-03-14 | End: 2024-03-14 | Stop reason: HOSPADM

## 2024-03-14 RX ORDER — CLINDAMYCIN PHOSPHATE 900 MG/50ML
900 INJECTION, SOLUTION INTRAVENOUS
Status: COMPLETED | OUTPATIENT
Start: 2024-03-14 | End: 2024-03-14

## 2024-03-14 RX ORDER — MIDAZOLAM HYDROCHLORIDE 2 MG/2ML
2 INJECTION, SOLUTION INTRAMUSCULAR; INTRAVENOUS
Status: DISCONTINUED | OUTPATIENT
Start: 2024-03-14 | End: 2024-03-14 | Stop reason: HOSPADM

## 2024-03-14 RX ORDER — LIDOCAINE HYDROCHLORIDE 20 MG/ML
INJECTION, SOLUTION EPIDURAL; INFILTRATION; INTRACAUDAL; PERINEURAL PRN
Status: DISCONTINUED | OUTPATIENT
Start: 2024-03-14 | End: 2024-03-14 | Stop reason: SDUPTHER

## 2024-03-14 RX ORDER — LIDOCAINE HYDROCHLORIDE AND EPINEPHRINE 10; 10 MG/ML; UG/ML
INJECTION, SOLUTION INFILTRATION; PERINEURAL PRN
Status: DISCONTINUED | OUTPATIENT
Start: 2024-03-14 | End: 2024-03-14 | Stop reason: ALTCHOICE

## 2024-03-14 RX ORDER — FENTANYL CITRATE 50 UG/ML
100 INJECTION, SOLUTION INTRAMUSCULAR; INTRAVENOUS
Status: DISCONTINUED | OUTPATIENT
Start: 2024-03-14 | End: 2024-03-14 | Stop reason: HOSPADM

## 2024-03-14 RX ORDER — SODIUM CHLORIDE 0.9 % (FLUSH) 0.9 %
5-40 SYRINGE (ML) INJECTION PRN
Status: DISCONTINUED | OUTPATIENT
Start: 2024-03-14 | End: 2024-03-14 | Stop reason: HOSPADM

## 2024-03-14 RX ORDER — LIDOCAINE HYDROCHLORIDE 10 MG/ML
1 INJECTION, SOLUTION INFILTRATION; PERINEURAL
Status: DISCONTINUED | OUTPATIENT
Start: 2024-03-14 | End: 2024-03-14 | Stop reason: HOSPADM

## 2024-03-14 RX ORDER — DEXAMETHASONE SODIUM PHOSPHATE 4 MG/ML
INJECTION, SOLUTION INTRA-ARTICULAR; INTRALESIONAL; INTRAMUSCULAR; INTRAVENOUS; SOFT TISSUE PRN
Status: DISCONTINUED | OUTPATIENT
Start: 2024-03-14 | End: 2024-03-14 | Stop reason: SDUPTHER

## 2024-03-14 RX ORDER — NALOXONE HYDROCHLORIDE 0.4 MG/ML
INJECTION, SOLUTION INTRAMUSCULAR; INTRAVENOUS; SUBCUTANEOUS PRN
Status: DISCONTINUED | OUTPATIENT
Start: 2024-03-14 | End: 2024-03-14 | Stop reason: HOSPADM

## 2024-03-14 RX ORDER — PROCHLORPERAZINE EDISYLATE 5 MG/ML
5 INJECTION INTRAMUSCULAR; INTRAVENOUS
Status: DISCONTINUED | OUTPATIENT
Start: 2024-03-14 | End: 2024-03-14 | Stop reason: HOSPADM

## 2024-03-14 RX ORDER — HALOPERIDOL 5 MG/ML
1 INJECTION INTRAMUSCULAR
Status: DISCONTINUED | OUTPATIENT
Start: 2024-03-14 | End: 2024-03-14 | Stop reason: HOSPADM

## 2024-03-14 RX ORDER — EPHEDRINE SULFATE 5 MG/ML
INJECTION INTRAVENOUS PRN
Status: DISCONTINUED | OUTPATIENT
Start: 2024-03-14 | End: 2024-03-14 | Stop reason: SDUPTHER

## 2024-03-14 RX ADMIN — SODIUM CHLORIDE, POTASSIUM CHLORIDE, SODIUM LACTATE AND CALCIUM CHLORIDE: 600; 310; 30; 20 INJECTION, SOLUTION INTRAVENOUS at 08:32

## 2024-03-14 RX ADMIN — CLINDAMYCIN PHOSPHATE 900 MG: 900 INJECTION, SOLUTION INTRAVENOUS at 09:55

## 2024-03-14 RX ADMIN — LIDOCAINE HYDROCHLORIDE 80 MG: 20 INJECTION, SOLUTION EPIDURAL; INFILTRATION; INTRACAUDAL; PERINEURAL at 09:34

## 2024-03-14 RX ADMIN — PHENYLEPHRINE HYDROCHLORIDE 100 MCG: 10 INJECTION INTRAVENOUS at 09:48

## 2024-03-14 RX ADMIN — DEXAMETHASONE SODIUM PHOSPHATE 4 MG: 4 INJECTION, SOLUTION INTRAMUSCULAR; INTRAVENOUS at 09:51

## 2024-03-14 RX ADMIN — ONDANSETRON 4 MG: 2 INJECTION INTRAMUSCULAR; INTRAVENOUS at 09:51

## 2024-03-14 RX ADMIN — PROPOFOL 50 MG: 10 INJECTION, EMULSION INTRAVENOUS at 09:36

## 2024-03-14 RX ADMIN — PROPOFOL 100 MG: 10 INJECTION, EMULSION INTRAVENOUS at 09:34

## 2024-03-14 RX ADMIN — EPHEDRINE SULFATE 5 MG: 5 INJECTION INTRAVENOUS at 09:56

## 2024-03-14 ASSESSMENT — PAIN - FUNCTIONAL ASSESSMENT: PAIN_FUNCTIONAL_ASSESSMENT: 0-10

## 2024-03-14 NOTE — BRIEF OP NOTE
Brief Postoperative Note      Patient: Nery Bryant  YOB: 1945  MRN: [id number]    Date of Procedure: 3/14/2024    Pre-Op Dx: R buccal space mass    Post-Op Diagnosis: Same       Procedure(s):  TRANSORAL EXCISION OF RIGHT ORAL MASS    Surgeon(s):  Alonzo Romo MD    Assistant:  * No surgical staff found *    Anesthesia: General    Estimated Blood Loss (mL): Minimal    IVF: 400 cc    Complications: None    Specimens:   ID Type Source Tests Collected by Time Destination   A : RIGHT BUCCAL MASS Tissue Mouth SURGICAL PATHOLOGY Alonzo Romo MD 3/14/2024 0947        Implants:  * No implants in log *      Drains:   [REMOVED] Urinary Catheter 01/23/24 (Removed)       [REMOVED] External Urinary Catheter (Removed)       Findings: 1.5 cm firm mass within inferior R buccal space  This procedure was not performed to treat primary cutaneous melanoma through wide local excision      Electronically signed by Alonzo Romo MD on 3/14/2024 at 10:32 AM

## 2024-03-14 NOTE — H&P
77 yo female seen in the office recently for a right cheek mass.  She first noticed this area of swelling after she underwent transcutaneous heart valve surgery back in mid January.  I had performed FNA of this mass in the office which was nondiagnostic.  The mass has remained in place, and there has been no significant change in size.  She denies any localized pain or tenderness.  She has no previous oral pathology.    Past Medical History:   Diagnosis Date    Anesthesia complication     slow to wake up    Aortic valve insufficiency     CAD (coronary artery disease)     Followed by [facility]-    Colon cancer ([facility]) 09/01/2021    COVID-19 05/15/2023    not hospitalized, coughing, runny nose    Current use of long term anticoagulation     Eliquis and Aspirin    Diabetes mellitus, insulin dependent (IDDM), controlled 05/11/2016    insulin reliant/avg fbs 100-120,  s.s of hypoglycemia low 70's /A1c 6.0    Fracture, femur ([facility]) 09/21/2009    GERD (gastroesophageal reflux disease)     states rarely PRN medication    Heart failure ([facility])     EF 55-60 % 9/9/20    History of anemia     History of cardioversion     History of colon cancer     History of petit-mal seizures     none in 15 years    History of seizure     no seizure activity since 1980's    History of thyroidectomy     History of transcatheter aortic valve replacement (TAVR)     TAVR and TMVR    Hyperlipidemia     Hypertension     states no longer on medication    Hypothyroidism     Mitral stenosis with insufficiency     On home O2     2L    Other unknown and unspecified cause of morbidity or mortality     HLD    Pacemaker     [employer]- followed by [facility]- last device check 3/1/24- not pacemaker dep    Paroxysmal atrial fibrillation ([facility])     PONV (postoperative nausea and vomiting)     post op nausea. pt does well with antiemetic    SSS (sick sinus syndrome) ([facility])     Thyroid nodule 06/29/2011    Unspecified essential hypertension

## 2024-03-14 NOTE — ANESTHESIA PRE PROCEDURE
kg/m².    CBC:   Lab Results   Component Value Date/Time    WBC 9.5 03/01/2024 10:25 AM    RBC 3.67 03/01/2024 10:25 AM    HGB 11.2 03/01/2024 10:25 AM    HCT 37.4 03/01/2024 10:25 AM    .9 03/01/2024 10:25 AM    RDW 17.0 03/01/2024 10:25 AM     03/01/2024 10:25 AM       CMP:   Lab Results   Component Value Date/Time     03/01/2024 10:25 AM    K 4.1 03/01/2024 10:25 AM     03/01/2024 10:25 AM    CO2 33 03/01/2024 10:25 AM    BUN 15 03/01/2024 10:25 AM    CREATININE 0.80 03/01/2024 10:25 AM    GFRAA >60 08/17/2022 08:33 AM    AGRATIO 0.9 01/17/2024 04:18 AM    AGRATIO 1.2 04/13/2022 11:33 AM    LABGLOM >60 03/01/2024 10:25 AM    LABGLOM 90 04/13/2022 11:33 AM    GLUCOSE 192 03/01/2024 10:25 AM    PROT 6.0 01/17/2024 04:18 AM    CALCIUM 9.4 03/01/2024 10:25 AM    BILITOT 0.4 01/23/2024 03:18 AM    ALKPHOS 138 01/17/2024 04:18 AM    ALKPHOS 103 04/13/2022 11:33 AM    AST 6 01/17/2024 04:18 AM    ALT 44 01/17/2024 04:18 AM       POC Tests: No results for input(s): \"POCGLU\", \"POCNA\", \"POCK\", \"POCCL\", \"POCBUN\", \"POCHEMO\", \"POCHCT\" in the last 72 hours.    Coags:   Lab Results   Component Value Date/Time    PROTIME 17.6 01/30/2024 03:18 AM    INR 1.4 01/30/2024 03:18 AM    INR 2.4 05/18/2022 09:34 AM    APTT 29.3 01/23/2024 03:18 AM       HCG (If Applicable): No results found for: \"PREGTESTUR\", \"PREGSERUM\", \"HCG\", \"HCGQUANT\"     ABGs: No results found for: \"PHART\", \"PO2ART\", \"EGK7NZK\", \"EDR7DFC\", \"BEART\", \"F4FFUSEB\"     Type & Screen (If Applicable):  No results found for: \"LABABO\", \"LABRH\"    Drug/Infectious Status (If Applicable):  No results found for: \"HIV\", \"HEPCAB\"    COVID-19 Screening (If Applicable):   Lab Results   Component Value Date/Time    COVID19 NOT DETECTED 01/17/2024 03:30 PM           Anesthesia Evaluation  Patient summary reviewed and Nursing notes reviewed   history of anesthetic complications: PONV.  Airway: Mallampati: II  TM distance: <3 FB   Neck ROM: full  Mouth opening:

## 2024-03-14 NOTE — PERIOP NOTE
Preop department called to notify patient of arrival time for scheduled procedure. Instructions given to   - Arrive at [facility] Entrance [address].  - Remain NPO after midnight, unless otherwise indicated, including gum, mints, and ice chips.   - Have a responsible adult to drive patient to the hospital, stay during surgery, and patient will need supervision 24 hours after anesthesia.   - Use antibacterial soap in shower the night before surgery and on the morning of surgery.       Was patient contacted: Yes  Voicemail left:   Numbers contacted: [phone]   Arrival time: 0845      
Pt's  upon arrival to PACU. No new orders from Brittney MARQUEZ.   
Spoke with pt about coming in early. Patient and  state they think they can be here around 0800.   
moisturizing soap in shower the night before surgery and on the morning of surgery  > All piercings must be removed prior to arrival.    > Leave all valuables (money and jewelry) at home but bring insurance card and ID on DOS.   > You may be required to pay a deductible or co-pay on the day of your procedure. You can pre-pay by calling [phone] if your surgery is at the [facility] or [phone] if your surgery is at the [facility].  > Do not wear make-up, nail polish, lotions, cologne, perfumes, powders, or oil on skin. Artificial nails are not permitted.

## 2024-03-14 NOTE — PROGRESS NOTES
attempted to visit patient in pre-op as requested.  However, patient was taken to surgery earlier than scheduled.   provided prayer and is available to follow up face to face as needed.  Peace be with you,  Signed by  JULIUS KincaidDiv.   [phone]  
Dr. Page reviewed chart, states rep NOT needed DOS. No order received. Ok to proceed.    
Spiritual Care visit. Initial Visit, Post Surgery Consult.    Visit and prayer after surgery was over.    This patient survived a lengthy stay following Cardiac surgery with other issues. Today she had surgery for an oral mass, which, when the doctor had seen, he  said (according to patient's ) did \"not look good\". Family was disturbed by this development. The requested  to pray with them in the waiting room.    Visit by Logan Fuentes M.Ed., Th.B. ,Staff    
done

## 2024-03-14 NOTE — DISCHARGE INSTRUCTIONS
-Please start with cool liquids and advance to soft diet.  -Please call if there is any oral bleeding  -No strenuous activity or heavy lifting for 1 wk  -You may restart Eliquis on Saturday    MEDICATION INTERACTION:  During your procedure you potentially received a medication or medications which may reduce the effectiveness of oral contraceptives. Please consider other forms of contraception for 1 month following your procedure if you are currently using oral contraceptives as your primary form of birth control. In addition to this, we recommend continuing your oral contraceptive as prescribed, unless otherwise instructed by your physician, during this time    After general anesthesia or intravenous sedation, for 24 hours or while taking prescription Narcotics:  Limit your activities  Some people will feel drowsy or dizzy for up to a few hours after waking up.  A responsible adult needs to be with you for the next 24 hours  Do not drive and operate hazardous machinery  Do not make important personal or business decisions  Do not drink alcoholic beverages  If you have not urinated within 8 hours after discharge, and you are experiencing discomfort from urinary retention, please go to the nearest ED.  If you have sleep apnea and have a CPAP machine, please use it for all naps and sleeping.  Please use caution when taking narcotics and any of your home medications that may cause drowsiness.  *  Please give a list of your current medications to your Primary Care Provider.  *  Please update this list whenever your medications are discontinued, doses are      changed, or new medications (including over-the-counter products) are added.  *  Please carry medication information at all times in case of emergency situations.    These are general instructions for a healthy lifestyle:  No smoking/ No tobacco products/ Avoid exposure to second hand smoke  Surgeon General's Warning:  Quitting smoking now greatly reduces serious

## 2024-03-14 NOTE — ANESTHESIA POSTPROCEDURE EVALUATION
Department of Anesthesiology  Postprocedure Note    Patient: Nery Bryant  MRN: [id number]  YOB: 1945  Date of evaluation: 3/14/2024    Procedure Summary       Date: 03/14/24 Room / Location: [facility] OP OR 04 / [facility]    Anesthesia Start: 0926 Anesthesia Stop: 1039    Procedure: TRANSORAL EXCISION OF RIGHT ORAL MASS (Right: Mouth) Diagnosis:       Oral mass      (Oral mass [K13.79])    Surgeons: Alonzo Romo MD Responsible Provider: Ian Lguo MD    Anesthesia Type: General ASA Status: 3            Anesthesia Type: General    Lana Phase I: Lana Score: 8    Lana Phase II:      Anesthesia Post Evaluation    Patient location during evaluation: PACU  Patient participation: complete - patient participated  Level of consciousness: awake and alert  Pain score: 1  Airway patency: patent  Nausea & Vomiting: no nausea  Cardiovascular status: blood pressure returned to baseline and hemodynamically stable  Respiratory status: acceptable  Hydration status: euvolemic  Comments: Denies pain or CV related issues. Pacemaker appears to be functioning normally. HR 60 bpm.   Multimodal analgesia pain management approach  Pain management: adequate and satisfactory to patient    No notable events documented.

## 2024-03-14 NOTE — OP NOTE
encapsulated and appeared relatively infiltrative into the surrounding tissue.  I used careful blunt dissection and Bovie electrocautery to dissect this mass free from the underlying fibrofatty tissue.  The distal portion of the facial artery and vein were just inferior to the mass and they were ligated and divided with silk sutures.  The mass was removed en bloc and passed off for permanent pathology.  I irrigated out the wound bed and ensured adequate hemostasis using Bovie electrocautery.  The incision was then closed in layers using 3-0 undyed Vicryl deep suture to reapproximate the deep layers followed by 3-0 undyed Vicryl horizontal mattress sutures for the mucosal layer.    This concluded the surgical portion of the procedure.  The patient was then awakened from anesthesia, extubated, and taken the PACU in stable condition afterwards and dictation    Electronically signed by Alonzo Romo MD on 3/14/2024 at 10:47 AM

## 2024-03-18 ENCOUNTER — ANESTHESIA EVENT (OUTPATIENT)
Dept: SURGERY | Age: 79
End: 2024-03-18
Payer: MEDICARE

## 2024-03-18 ENCOUNTER — OFFICE VISIT (OUTPATIENT)
Dept: ENT CLINIC | Age: 79
End: 2024-03-18

## 2024-03-18 ENCOUNTER — HOSPITAL ENCOUNTER (OUTPATIENT)
Age: 79
Setting detail: OUTPATIENT SURGERY
Discharge: HOME | End: 2024-03-18
Attending: OTOLARYNGOLOGY | Admitting: OTOLARYNGOLOGY
Payer: MEDICARE

## 2024-03-18 ENCOUNTER — ANESTHESIA (OUTPATIENT)
Dept: SURGERY | Age: 79
End: 2024-03-18
Payer: MEDICARE

## 2024-03-18 ENCOUNTER — TELEPHONE (OUTPATIENT)
Dept: ENT CLINIC | Age: 79
End: 2024-03-18

## 2024-03-18 VITALS
BODY MASS INDEX: 22.5 KG/M2 | WEIGHT: 140 LBS | HEIGHT: 66 IN | DIASTOLIC BLOOD PRESSURE: 71 MMHG | SYSTOLIC BLOOD PRESSURE: 148 MMHG | TEMPERATURE: 97.8 F | OXYGEN SATURATION: 93 % | RESPIRATION RATE: 18 BRPM | HEART RATE: 60 BPM

## 2024-03-18 DIAGNOSIS — D49.0: Primary | ICD-10-CM

## 2024-03-18 DIAGNOSIS — S00.532A HEMATOMA OF ORAL CAVITY: ICD-10-CM

## 2024-03-18 DIAGNOSIS — K13.79 ORAL MASS: ICD-10-CM

## 2024-03-18 LAB
GLUCOSE BLD STRIP.AUTO-MCNC: 125 MG/DL (ref 65–100)
SERVICE CMNT-IMP: ABNORMAL

## 2024-03-18 PROCEDURE — 87070 CULTURE OTHR SPECIMN AEROBIC: CPT

## 2024-03-18 PROCEDURE — 6360000002 HC RX W HCPCS: Performed by: OTOLARYNGOLOGY

## 2024-03-18 PROCEDURE — 7100000001 HC PACU RECOVERY - ADDTL 15 MIN: Performed by: OTOLARYNGOLOGY

## 2024-03-18 PROCEDURE — 7100000000 HC PACU RECOVERY - FIRST 15 MIN: Performed by: OTOLARYNGOLOGY

## 2024-03-18 PROCEDURE — 6360000002 HC RX W HCPCS

## 2024-03-18 PROCEDURE — 99024 POSTOP FOLLOW-UP VISIT: CPT | Performed by: OTOLARYNGOLOGY

## 2024-03-18 PROCEDURE — 2500000003 HC RX 250 WO HCPCS: Performed by: OTOLARYNGOLOGY

## 2024-03-18 PROCEDURE — 2580000003 HC RX 258: Performed by: ANESTHESIOLOGY

## 2024-03-18 PROCEDURE — 3700000001 HC ADD 15 MINUTES (ANESTHESIA): Performed by: OTOLARYNGOLOGY

## 2024-03-18 PROCEDURE — 2580000003 HC RX 258

## 2024-03-18 PROCEDURE — 3700000000 HC ANESTHESIA ATTENDED CARE: Performed by: OTOLARYNGOLOGY

## 2024-03-18 PROCEDURE — 2500000003 HC RX 250 WO HCPCS

## 2024-03-18 PROCEDURE — 2500000003 HC RX 250 WO HCPCS: Performed by: REGISTERED NURSE

## 2024-03-18 PROCEDURE — 3600000012 HC SURGERY LEVEL 2 ADDTL 15MIN: Performed by: OTOLARYNGOLOGY

## 2024-03-18 PROCEDURE — 87102 FUNGUS ISOLATION CULTURE: CPT

## 2024-03-18 PROCEDURE — 2720000010 HC SURG SUPPLY STERILE: Performed by: OTOLARYNGOLOGY

## 2024-03-18 PROCEDURE — 2709999900 HC NON-CHARGEABLE SUPPLY: Performed by: OTOLARYNGOLOGY

## 2024-03-18 PROCEDURE — 82962 GLUCOSE BLOOD TEST: CPT

## 2024-03-18 PROCEDURE — 7100000011 HC PHASE II RECOVERY - ADDTL 15 MIN: Performed by: OTOLARYNGOLOGY

## 2024-03-18 PROCEDURE — 41009 DRAINAGE OF MOUTH LESION: CPT | Performed by: OTOLARYNGOLOGY

## 2024-03-18 PROCEDURE — 6360000002 HC RX W HCPCS: Performed by: REGISTERED NURSE

## 2024-03-18 PROCEDURE — 3600000002 HC SURGERY LEVEL 2 BASE: Performed by: OTOLARYNGOLOGY

## 2024-03-18 PROCEDURE — 87205 SMEAR GRAM STAIN: CPT

## 2024-03-18 PROCEDURE — 87206 SMEAR FLUORESCENT/ACID STAI: CPT

## 2024-03-18 PROCEDURE — 7100000010 HC PHASE II RECOVERY - FIRST 15 MIN: Performed by: OTOLARYNGOLOGY

## 2024-03-18 RX ORDER — SODIUM CHLORIDE 0.9 % (FLUSH) 0.9 %
5-40 SYRINGE (ML) INJECTION PRN
Status: DISCONTINUED | OUTPATIENT
Start: 2024-03-18 | End: 2024-03-18 | Stop reason: HOSPADM

## 2024-03-18 RX ORDER — DEXTROSE MONOHYDRATE 100 MG/ML
INJECTION, SOLUTION INTRAVENOUS CONTINUOUS PRN
Status: DISCONTINUED | OUTPATIENT
Start: 2024-03-18 | End: 2024-03-18 | Stop reason: HOSPADM

## 2024-03-18 RX ORDER — LIDOCAINE HYDROCHLORIDE 10 MG/ML
1 INJECTION, SOLUTION INFILTRATION; PERINEURAL
Status: DISCONTINUED | OUTPATIENT
Start: 2024-03-18 | End: 2024-03-18 | Stop reason: HOSPADM

## 2024-03-18 RX ORDER — OXYCODONE HYDROCHLORIDE 5 MG/1
5 TABLET ORAL
Status: DISCONTINUED | OUTPATIENT
Start: 2024-03-18 | End: 2024-03-18 | Stop reason: HOSPADM

## 2024-03-18 RX ORDER — ROCURONIUM BROMIDE 10 MG/ML
INJECTION, SOLUTION INTRAVENOUS PRN
Status: DISCONTINUED | OUTPATIENT
Start: 2024-03-18 | End: 2024-03-18 | Stop reason: SDUPTHER

## 2024-03-18 RX ORDER — HYDROMORPHONE HYDROCHLORIDE 2 MG/ML
0.25 INJECTION, SOLUTION INTRAMUSCULAR; INTRAVENOUS; SUBCUTANEOUS EVERY 5 MIN PRN
Status: DISCONTINUED | OUTPATIENT
Start: 2024-03-18 | End: 2024-03-18 | Stop reason: HOSPADM

## 2024-03-18 RX ORDER — ONDANSETRON 2 MG/ML
INJECTION INTRAMUSCULAR; INTRAVENOUS PRN
Status: DISCONTINUED | OUTPATIENT
Start: 2024-03-18 | End: 2024-03-18 | Stop reason: SDUPTHER

## 2024-03-18 RX ORDER — SODIUM CHLORIDE 0.9 % (FLUSH) 0.9 %
5-40 SYRINGE (ML) INJECTION EVERY 12 HOURS SCHEDULED
Status: DISCONTINUED | OUTPATIENT
Start: 2024-03-18 | End: 2024-03-18 | Stop reason: HOSPADM

## 2024-03-18 RX ORDER — FENTANYL CITRATE 50 UG/ML
INJECTION, SOLUTION INTRAMUSCULAR; INTRAVENOUS PRN
Status: DISCONTINUED | OUTPATIENT
Start: 2024-03-18 | End: 2024-03-18 | Stop reason: SDUPTHER

## 2024-03-18 RX ORDER — ONDANSETRON 2 MG/ML
4 INJECTION INTRAMUSCULAR; INTRAVENOUS
Status: DISCONTINUED | OUTPATIENT
Start: 2024-03-18 | End: 2024-03-18 | Stop reason: HOSPADM

## 2024-03-18 RX ORDER — APREPITANT 40 MG/1
40 CAPSULE ORAL ONCE
Status: DISCONTINUED | OUTPATIENT
Start: 2024-03-18 | End: 2024-03-18 | Stop reason: HOSPADM

## 2024-03-18 RX ORDER — CLINDAMYCIN PHOSPHATE 900 MG/50ML
900 INJECTION, SOLUTION INTRAVENOUS
Status: COMPLETED | OUTPATIENT
Start: 2024-03-18 | End: 2024-03-18

## 2024-03-18 RX ORDER — SODIUM CHLORIDE 9 MG/ML
INJECTION, SOLUTION INTRAVENOUS PRN
Status: DISCONTINUED | OUTPATIENT
Start: 2024-03-18 | End: 2024-03-18 | Stop reason: HOSPADM

## 2024-03-18 RX ORDER — SODIUM CHLORIDE, SODIUM LACTATE, POTASSIUM CHLORIDE, CALCIUM CHLORIDE 600; 310; 30; 20 MG/100ML; MG/100ML; MG/100ML; MG/100ML
INJECTION, SOLUTION INTRAVENOUS CONTINUOUS
Status: DISCONTINUED | OUTPATIENT
Start: 2024-03-18 | End: 2024-03-18 | Stop reason: HOSPADM

## 2024-03-18 RX ORDER — HALOPERIDOL 5 MG/ML
1 INJECTION INTRAMUSCULAR
Status: DISCONTINUED | OUTPATIENT
Start: 2024-03-18 | End: 2024-03-18 | Stop reason: HOSPADM

## 2024-03-18 RX ORDER — NALOXONE HYDROCHLORIDE 0.4 MG/ML
INJECTION, SOLUTION INTRAMUSCULAR; INTRAVENOUS; SUBCUTANEOUS PRN
Status: DISCONTINUED | OUTPATIENT
Start: 2024-03-18 | End: 2024-03-18 | Stop reason: HOSPADM

## 2024-03-18 RX ORDER — MIDAZOLAM HYDROCHLORIDE 2 MG/2ML
2 INJECTION, SOLUTION INTRAMUSCULAR; INTRAVENOUS
Status: DISCONTINUED | OUTPATIENT
Start: 2024-03-18 | End: 2024-03-18 | Stop reason: HOSPADM

## 2024-03-18 RX ORDER — MIDAZOLAM HYDROCHLORIDE 1 MG/ML
INJECTION INTRAMUSCULAR; INTRAVENOUS PRN
Status: DISCONTINUED | OUTPATIENT
Start: 2024-03-18 | End: 2024-03-18 | Stop reason: SDUPTHER

## 2024-03-18 RX ORDER — DEXMEDETOMIDINE HYDROCHLORIDE 100 UG/ML
INJECTION, SOLUTION INTRAVENOUS PRN
Status: DISCONTINUED | OUTPATIENT
Start: 2024-03-18 | End: 2024-03-18 | Stop reason: SDUPTHER

## 2024-03-18 RX ORDER — IBUPROFEN 600 MG/1
1 TABLET ORAL PRN
Status: DISCONTINUED | OUTPATIENT
Start: 2024-03-18 | End: 2024-03-18 | Stop reason: HOSPADM

## 2024-03-18 RX ORDER — DEXAMETHASONE SODIUM PHOSPHATE 10 MG/ML
INJECTION INTRAMUSCULAR; INTRAVENOUS PRN
Status: DISCONTINUED | OUTPATIENT
Start: 2024-03-18 | End: 2024-03-18 | Stop reason: SDUPTHER

## 2024-03-18 RX ORDER — PROPOFOL 10 MG/ML
INJECTION, EMULSION INTRAVENOUS PRN
Status: DISCONTINUED | OUTPATIENT
Start: 2024-03-18 | End: 2024-03-18 | Stop reason: SDUPTHER

## 2024-03-18 RX ORDER — ACETAMINOPHEN 500 MG
1000 TABLET ORAL ONCE
Status: DISCONTINUED | OUTPATIENT
Start: 2024-03-18 | End: 2024-03-18 | Stop reason: HOSPADM

## 2024-03-18 RX ADMIN — DEXMEDETOMIDINE 8 MCG: 100 INJECTION, SOLUTION, CONCENTRATE INTRAVENOUS at 13:41

## 2024-03-18 RX ADMIN — FENTANYL CITRATE 25 MCG: 50 INJECTION, SOLUTION INTRAMUSCULAR; INTRAVENOUS at 13:56

## 2024-03-18 RX ADMIN — SUGAMMADEX 200 MG: 100 INJECTION, SOLUTION INTRAVENOUS at 14:40

## 2024-03-18 RX ADMIN — PHENYLEPHRINE HYDROCHLORIDE 100 MCG: 10 INJECTION INTRAVENOUS at 14:11

## 2024-03-18 RX ADMIN — SODIUM CHLORIDE, POTASSIUM CHLORIDE, SODIUM LACTATE AND CALCIUM CHLORIDE: 600; 310; 30; 20 INJECTION, SOLUTION INTRAVENOUS at 12:54

## 2024-03-18 RX ADMIN — DEXMEDETOMIDINE 8 MCG: 100 INJECTION, SOLUTION, CONCENTRATE INTRAVENOUS at 13:47

## 2024-03-18 RX ADMIN — CLINDAMYCIN PHOSPHATE 900 MG: 900 INJECTION, SOLUTION INTRAVENOUS at 13:31

## 2024-03-18 RX ADMIN — DEXMEDETOMIDINE 8 MCG: 100 INJECTION, SOLUTION, CONCENTRATE INTRAVENOUS at 13:45

## 2024-03-18 RX ADMIN — DEXMEDETOMIDINE 8 MCG: 100 INJECTION, SOLUTION, CONCENTRATE INTRAVENOUS at 13:35

## 2024-03-18 RX ADMIN — MIDAZOLAM 2 MG: 1 INJECTION INTRAMUSCULAR; INTRAVENOUS at 13:33

## 2024-03-18 RX ADMIN — DEXMEDETOMIDINE 8 MCG: 100 INJECTION, SOLUTION, CONCENTRATE INTRAVENOUS at 13:38

## 2024-03-18 RX ADMIN — ROCURONIUM BROMIDE 20 MG: 50 INJECTION, SOLUTION INTRAVENOUS at 14:03

## 2024-03-18 RX ADMIN — ONDANSETRON 4 MG: 2 INJECTION INTRAMUSCULAR; INTRAVENOUS at 14:40

## 2024-03-18 RX ADMIN — PHENYLEPHRINE HYDROCHLORIDE 100 MCG: 10 INJECTION INTRAVENOUS at 14:28

## 2024-03-18 RX ADMIN — PROPOFOL 100 MG: 10 INJECTION, EMULSION INTRAVENOUS at 13:57

## 2024-03-18 RX ADMIN — DEXAMETHASONE SODIUM PHOSPHATE 10 MG: 10 INJECTION INTRAMUSCULAR; INTRAVENOUS at 14:12

## 2024-03-18 ASSESSMENT — PAIN - FUNCTIONAL ASSESSMENT: PAIN_FUNCTIONAL_ASSESSMENT: 0-10

## 2024-03-18 NOTE — H&P
78-year-old female who is postop day 4 after undergoing trans-oral excision of a right buccal space mass.  She developed worsening swelling over the weekend.  She was seen in the office this morning and diagnosed with a oral hematoma.  She reports increased pain and tenderness over the site, and there is even been intermittent oozing from the incision line intraorally.  She had restarted her Eliquis on Saturday when the swelling began.  She has not taken any Eliquis since then.    Past Medical History:   Diagnosis Date    Anesthesia complication     slow to wake up    Aortic valve insufficiency     CAD (coronary artery disease)     Followed by [facility]-    Colon cancer ([facility]) 09/01/2021    COVID-19 05/15/2023    not hospitalized, coughing, runny nose    Current use of long term anticoagulation     Eliquis and Aspirin    Diabetes mellitus, insulin dependent (IDDM), controlled 05/11/2016    insulin reliant/avg fbs 100-120,  s.s of hypoglycemia low 70's /A1c 6.0    Fracture, femur ([facility]) 09/21/2009    GERD (gastroesophageal reflux disease)     states rarely PRN medication    Heart failure ([facility])     EF 55-60 % 9/9/20    History of anemia     History of cardioversion     History of colon cancer     History of petit-mal seizures     none in 15 years    History of seizure     no seizure activity since 1980's    History of thyroidectomy     History of transcatheter aortic valve replacement (TAVR)     TAVR and TMVR    Hyperlipidemia     Hypertension     states no longer on medication    Hypothyroidism     Mitral stenosis with insufficiency     On home O2     2L    Oral mass     Other unknown and unspecified cause of morbidity or mortality     HLD    Pacemaker     [employer]- [facility]- last device check 3/1/24- not pacemaker dep    Paroxysmal atrial fibrillation ([facility])     PONV (postoperative nausea and vomiting)     post op nausea. pt does well with antiemetic    SSS (sick sinus syndrome) ([facility])

## 2024-03-18 NOTE — TELEPHONE ENCOUNTER
I spoke to patient over the weekend about increased swelling of the right cheek area.  She underwent transoral excision of a right buccal space mass on Thursday.  She noted swelling after she restarted her Eliquis over the weekend.  The swelling worsened overnight, and I spoke to them this morning and they sent me a photograph.  Patient will come in this morning for evaluation and will likely need to go back to the operating room for washout of this buccal space hematoma.

## 2024-03-18 NOTE — ANESTHESIA PROCEDURE NOTES
Airway  Date/Time: 3/18/2024 1:59 PM  Urgency: elective    Airway not difficult    General Information and Staff    Patient location during procedure: OR  Resident/CRNA: Maggie Shahid APRN - CRNA  Performed: resident/CRNA   Performed by: Maggie Shahid APRN - CRNA  Authorized by: Laura Rowe MD      Indications and Patient Condition  Indications for airway management: anesthesia  Spontaneous Ventilation: absent  Sedation level: deep  Preoxygenated: yes  Patient position: sniffing  MILS not maintained throughout  Mask difficulty assessment: vent by bag mask    Final Airway Details  Final airway type: endotracheal airway      Successful airway: ETT  Cuffed: yes   Successful intubation technique: video laryngoscopy (glidescope 3)  Facilitating devices/methods: intubating stylet  Endotracheal tube insertion site: oral  Blade: Bruce  Blade size: #3  ETT size (mm): 7.0  Cormack-Lehane Classification: grade I - full view of glottis  Placement verified by: chest auscultation and capnometry   Cuff volume (mL): 6  Measured from: teeth  ETT to teeth (cm): 20  Number of attempts at approach: 1  Ventilation between attempts: bag mask  Number of other approaches attempted: 0    Additional Comments  Lips and dentition unchanged. Ecchymotic mucosal tissue noted in pharynx.   no

## 2024-03-18 NOTE — PROGRESS NOTES
Nery Bryant is a 78 y.o. female seen today now 4 days postop after undergoing transoral excision of a right buccal space mass.  I had spoken to her  over the weekend as she developed worsening swelling after she restarted her Eliquis on Saturday.  They had been icing this area and having her keep her head elevated, but the swelling has worsened.  I spoke to them this morning via telephone and asked them to come in for evaluation.  She reports increased pain and there was even some blood-tinged drainage from her mouth.  She has been able to follow a normal diet since her surgery.    -Extensive ecchymosis and swelling of right buccal space extending down to the mandible.  Mild periorbital ecchymosis on right side as well.  No trismus.  There is ecchymosis along right buccal space but incision line is well-healed.  Ecchymosis extends to anterior tonsillar pillar, but oropharynx is clear.  No swelling along the base of tongue or floor of mouth.    DIAGNOSIS       A:  \" RIGHT BUCCAL MASS\":         CONNECTIVE TISSUE WITH ACUTE AND CHRONIC INFLAMMATION     A/P:   Diagnosis Orders   1. Oral neoplasm        2. Hematoma of oral cavity          Unfortunately, she has developed a postop hematoma after undergoing transoral excision of a right buccal space neoplasm last week.  Her airway is stable, but I recommend taking her back to the OR later today for washout of this oral hematoma.  I did review her pathology report which was benign with just connective tissue with acute and chronic inflammation.  There was no evidence of malignancy which is excellent news.  I reviewed all the risks of surgery, and she would like to proceed.  She has remained n.p.o. since midnight.    Alonzo Romo MD

## 2024-03-18 NOTE — PERIOP NOTE
PACU DISCHARGE NOTE    Pt tolerated po fluids well. Vital signs stable, pain well controlled, alert and oriented times three or at baseline, follow up per surgeon, no anesthetic complications.

## 2024-03-18 NOTE — BRIEF OP NOTE
Brief Postoperative Note      Patient: Nery Bryant  YOB: 1945  MRN: [id number]    Date of Procedure: 3/18/2024    Pre-op Dx: R buccal space hematoma    Post-Op Diagnosis: Same       Procedure(s):  WASHOUT OF ORAL HEMATOMA USING BIPOLAR CAUTERY    Surgeon(s):  Alonzo Romo MD    Assistant:  * No surgical staff found *    Anesthesia: General    Estimated Blood Loss (mL): 30 cc    IVF: 500 cc    Complications: None    Specimens:   ID Type Source Tests Collected by Time Destination   1 : RIGHT BUCKLE SPACE Swab Mouth CULTURE, FUNGUS, CULTURE, WOUND Alonzo Romo MD 3/18/2024 1415        Implants:  * No implants in log *      Drains:   [REMOVED] Urinary Catheter 01/23/24 (Removed)       [REMOVED] External Urinary Catheter (Removed)       Findings: hematoma in R buccal space  This procedure was not performed to treat primary cutaneous melanoma through wide local excision      Electronically signed by Alonzo Romo MD on 3/18/2024 at 2:43 PM

## 2024-03-18 NOTE — PERIOP NOTE
Pt is to hold Eliquis until seen by Dr. Romo at followup appt. Pt and  instructed to hold   Eliquis until seen by Dr. Romo at followup. Pt and  verbalized understanding.

## 2024-03-18 NOTE — ANESTHESIA PRE PROCEDURE
Department of Anesthesiology  Preprocedure Note       Name:  Nery Bryant   Age:  78 y.o.  :  1945                                          MRN:  [id number]         Date:  3/18/2024      Surgeon: Surgeon(s):  Alonzo Romo MD    Procedure: Procedure(s):  WASHOUT OF ORAL HEMATOMA USING BIPOLAR CAUTERY    Medications prior to admission:   Prior to Admission medications    Medication Sig Start Date End Date Taking? Authorizing Provider   cephALEXin (KEFLEX) 500 MG capsule Take 1 capsule by mouth 4 times daily 3/12/24   Alonzo Romo MD   ondansetron (ZOFRAN-ODT) 4 MG disintegrating tablet Take 1 tablet by mouth 3 times daily as needed for Nausea or Vomiting 3/12/24   Alonzo Romo MD   furosemide (LASIX) 40 MG tablet Take 1 tablet by mouth daily 3/1/24   Rodolfo Lazaro MD   metoprolol succinate (TOPROL XL) 25 MG extended release tablet Take 1 tablet by mouth daily May take an extra tab once daily, prn for rapid A fib 24   Eddi Swenson MD   atorvastatin (LIPITOR) 20 MG tablet Take 0.5 tablets by mouth every evening 24   Eddi Swenson MD   magnesium oxide (MAG-OX) 400 MG tablet Take 1 tablet by mouth at bedtime 24   Eddi Swenson MD   pantoprazole (PROTONIX) 40 MG tablet Take 1 tablet by mouth every morning (before breakfast) 24   Eddi Swenson MD   potassium chloride (KLOR-CON M) 20 MEQ extended release tablet Take 1 tablet by mouth daily 24   Eddi Swenson MD   levothyroxine (SYNTHROID) 125 MCG tablet Take 1 tablet by mouth every morning (before breakfast) 24   Hilary Gordon APRN - NP   apixaban (ELIQUIS) 5 MG TABS tablet Take 1 tablet by mouth 2 times daily 24   Marty Aggarwal PA-C   docusate sodium (COLACE, DULCOLAX) 100 MG CAPS Take 100 mg by mouth 2 times daily 23   Frommel, Kimberly, APRN - CNP   OXYGEN Inhale into the lungs 3L/NC. Patient is now on 2L of oxygen.    Provider,

## 2024-03-18 NOTE — ANESTHESIA POSTPROCEDURE EVALUATION
Department of Anesthesiology  Postprocedure Note    Patient: Nery Bryant  MRN: [id number]  YOB: 1945  Date of evaluation: 3/18/2024    Procedure Summary       Date: 03/18/24 Room / Location: [facility] MAIN OR [address] [place] [address] MAIN OR    Anesthesia Start: 1335 Anesthesia Stop: 1459    Procedure: WASHOUT OF ORAL HEMATOMA USING BIPOLAR CAUTERY (Mouth) Diagnosis:       Hematoma      (Hematoma [T14.8XXA])    Providers: Alonzo Romo MD Responsible Provider: Laura Rowe MD    Anesthesia Type: General ASA Status: 4 - Emergent            Anesthesia Type: General    Lana Phase I: Lana Score: 5    Lana Phase II: Lana Score: 10    Anesthesia Post Evaluation    Patient location during evaluation: PACU  Patient participation: complete - patient participated  Level of consciousness: awake and alert  Airway patency: patent  Nausea: well controlled.  Cardiovascular status: acceptable.  Respiratory status: acceptable  Hydration status: stable  Pain management: adequate    No notable events documented.

## 2024-03-18 NOTE — OP NOTE
Operative Note      Patient: Nery rByant  YOB: 1945  MRN: [id number]    Date of Procedure: 3/18/2024    Pre-op Diagnosis: Right buccal space hematoma    Post-Op Diagnosis: Right buccal space hematoma    Procedure: Incision and drainage with washout of right buccal space hematoma    Surgeon(s):  Alonzo Romo MD    Assistant:   * No surgical staff found *    Anesthesia: General    Anesthesiologist: Laura Rowe MD    IV fluid: 500 cc    Estimated Blood Loss (mL): 30 cc    Complications: None    Specimens:   ID Type Source Tests Collected by Time Destination   1 : RIGHT BUCKLE SPACE Swab Mouth CULTURE, FUNGUS, CULTURE, WOUND Alonzo Romo MD 3/18/2024 1415        Implants:  * No implants in log *      Culture: Right buccal space culture    Findings: There was a large hematoma filling the right buccal space at the site of her previous excision.  The hematoma was evacuated, but no obvious source of bleeding was identified.  The raw wound edges were cauterized using bipolar electrocautery and several pieces of Surgicel were placed in the incision before closure.  A large pressure dressing was applied.    Brief history: Ms. 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  She called yesterday with increased swelling which progressively got worse overnight.  She was seen in the office this morning and noted to have a large buccal space hematoma.  There was no evidence of airway compromise.  She was added onto the OR schedule for incision and drainage with washout of this hematoma.      Detailed Description of Procedure:   The patient was brought back to the operating room and placed on the table in a supine position.  General endotracheal anesthesia was inducted without any complications.  The glide scope was used to help with the intubation due to the amount of oral edema.  Once the patient was adequately sedated, I injected a

## 2024-03-18 NOTE — DISCHARGE INSTRUCTIONS
-Please keep pressure dressing in place until your follow-up appointment on Wednesday morning.  -Please start with liquids and soft foods and advance diet as tolerated  -There may be some mild bleeding from the right side of the oral cavity along the incision line.  -Please continue with your postoperative antibiotics.  -No strenuous activity or heavy lifting for 1 week.  -Do not restart Eliquis until Okayed by Dr. Romo    MEDICATION INTERACTION:  During your procedure you potentially received a medication or medications which may reduce the effectiveness of oral contraceptives. Please consider other forms of contraception for 1 month following your procedure if you are currently using oral contraceptives as your primary form of birth control. In addition to this, we recommend continuing your oral contraceptive as prescribed, unless otherwise instructed by your physician, during this time    After general anesthesia or intravenous sedation, for 24 hours or while taking prescription Narcotics:  Limit your activities  Some people will feel drowsy or dizzy for up to a few hours after waking up.  A responsible adult needs to be with you for the next 24 hours  Do not drive and operate hazardous machinery  Do not make important personal or business decisions  Do not drink alcoholic beverages  If you have not urinated within 8 hours after discharge, and you are experiencing discomfort from urinary retention, please go to the nearest ED.  If you have sleep apnea and have a CPAP machine, please use it for all naps and sleeping.  Please use caution when taking narcotics and any of your home medications that may cause drowsiness.  *  Please give a list of your current medications to your Primary Care Provider.  *  Please update this list whenever your medications are discontinued, doses are      changed, or new medications (including over-the-counter products) are added.  *  Please carry medication information at all times

## 2024-03-19 ENCOUNTER — TELEPHONE (OUTPATIENT)
Dept: ENT CLINIC | Age: 79
End: 2024-03-19

## 2024-03-20 ENCOUNTER — OFFICE VISIT (OUTPATIENT)
Dept: ENT CLINIC | Age: 79
End: 2024-03-20

## 2024-03-20 DIAGNOSIS — K13.79 ORAL MASS: Primary | ICD-10-CM

## 2024-03-20 LAB
BACTERIA SPEC CULT: NORMAL
GRAM STN SPEC: NORMAL
GRAM STN SPEC: NORMAL
SERVICE CMNT-IMP: NORMAL

## 2024-03-20 PROCEDURE — 99024 POSTOP FOLLOW-UP VISIT: CPT | Performed by: OTOLARYNGOLOGY

## 2024-03-20 NOTE — PROGRESS NOTES
Nery Bryant is a 78 y.o. female seen today now 1 week post-op after undergoing transoral excision of a right buccal space mass back on 3/14/2024.  I had to take her back to the operating room on Monday for a postop hematoma.  Doing much better since then with less swelling and pain overall.  She still has significant ecchymosis as expected.  She has not restarted her Eliquis since the hematoma developed over the weekend.    -R oral incision healing well-incision line intact.  Extensive ecchymosis along right buccal mucosa and anterior tonsillar pillar.  No edema or ecchymosis of tongue or floor of mouth.  -Extensive edema along right cheek with ecchymosis then down to upper neck.  No palpable hematoma or fluid collection.    PATH:  DIAGNOSIS       A:  \" RIGHT BUCCAL MASS\":         CONNECTIVE TISSUE WITH ACUTE AND CHRONIC INFLAMMATION     A/P:   Diagnosis Orders   1. Oral mass          Doing much better today after her hematoma washout on Monday.  She has extensive ecchymosis as expected, but there is no further hematoma.  The incision line is healing up well and I see no signs of infection.  She may advance her diet and will use mouthwash 3 times daily to help clear out her oral cavity.  She will restart her Eliquis on Friday.  I will see her on Monday for another wound check.    Alonzo Romo MD

## 2024-03-22 LAB
FUNGAL CULT/SMEAR: NORMAL
FUNGUS SMEAR: NORMAL
SPECIMEN PROCESSING: NORMAL
SPECIMEN SOURCE: NORMAL
SPECIMEN SOURCE: NORMAL

## 2024-03-25 ENCOUNTER — OFFICE VISIT (OUTPATIENT)
Dept: ENT CLINIC | Age: 79
End: 2024-03-25

## 2024-03-25 DIAGNOSIS — K13.79 ORAL MASS: Primary | ICD-10-CM

## 2024-03-25 LAB
FUNGUS SMEAR: NORMAL
SPECIMEN SOURCE: NORMAL

## 2024-03-25 PROCEDURE — 99024 POSTOP FOLLOW-UP VISIT: CPT | Performed by: OTOLARYNGOLOGY

## 2024-03-25 NOTE — PROGRESS NOTES
Nery Bryant is a 78 y.o. female seen today in follow-up.  She underwent transoral excision of a right buccal space mass back on 3/14/2024.  She had to go back to the OR on 3/18/2024 for postop hematoma.  Last seen back on 3/20/2024.  Doing great since then with much less pain and swelling overall.  She did see some mild drainage from her oral cavity over the weekend after she restarted her Eliquis, but there has been no further bleeding since then.    -Significant ecchymosis still of right cheek and upper neck.  Much less swelling overall.  No palpable hematoma.  Intraorally, incision line is intact with Vicryl sutures in place.  No granulation or bleeding noted.  Moderate ecchymosis along buccal mucosa as expected.    A/P:   Diagnosis Orders   1. Oral mass          Doing much better now almost 2 weeks out from transoral excision of a right buccal space mass.  The pathology was all benign.  She has much less edema and ecchymosis now and I see no signs of infection.  She has returned to a normal diet and I will see her back in the next 2 weeks for another checkup.    Alonzo Romo MD

## 2024-04-03 ENCOUNTER — TELEPHONE (OUTPATIENT)
Age: 79
End: 2024-04-03

## 2024-04-03 DIAGNOSIS — I48.0 PAROXYSMAL ATRIAL FIBRILLATION (HCC): Chronic | ICD-10-CM

## 2024-04-03 DIAGNOSIS — Z51.81 ENCOUNTER FOR MEDICATION MONITORING: ICD-10-CM

## 2024-04-03 DIAGNOSIS — I10 ESSENTIAL HYPERTENSION: Primary | Chronic | ICD-10-CM

## 2024-04-03 RX ORDER — LOSARTAN POTASSIUM 50 MG/1
50 TABLET ORAL DAILY
COMMUNITY

## 2024-04-03 RX ORDER — FUROSEMIDE 20 MG/1
10 TABLET ORAL DAILY PRN
COMMUNITY

## 2024-04-03 NOTE — TELEPHONE ENCOUNTER
Pt's BP trending higher. Today 160-172/68    HR 60-70 PPM  Headache.  Losartan 50mg d/c'd s/p TAVR 1/31/24  Pt still taking Lasix 10mg daily.  KCL 20 mEq.    Swelling completely gone.   Should she change Lasix to prn for edema?  Should she resume Losartan, what dose?   Ucd F/U 6/4/24. cgh  Meds  Toprol 25mg AM  Eliquis 5mg BID  Lasix 20mg 0.5 tab daily  KCL 20 mEq//cgh

## 2024-04-03 NOTE — TELEPHONE ENCOUNTER
Patients  called stating his wife is experiencing the following symptoms :    RS=634/73 this AM  Hospitalized recently and taken off of losartan  Taken off of coumadin and placed on Eliquis  BP is trending higher since coming home.

## 2024-04-03 NOTE — TELEPHONE ENCOUNTER
Informed pt's , Claus of Dr. Swenson' response. Claus voiced understanding. Has Losartan 50mg on hand. Will get labs Mon. cgh

## 2024-04-03 NOTE — TELEPHONE ENCOUNTER
Informed Claus of Dr. Swenson' response re STEPHANE Fernandez. Claus voiced understanding with verb recall. Pt's BP came down well, 130/? With Losartan.  He will keep VS log 3 hrs after AM meds and return call, prn.  cgh

## 2024-04-03 NOTE — TELEPHONE ENCOUNTER
Resume losartan 50 mg daily, first dose now.  Minimize sodium but stay hydrated.  Continue other meds that she is currently taking.  Check basic metabolic panel and magnesium on Monday.

## 2024-04-08 ENCOUNTER — TELEPHONE (OUTPATIENT)
Age: 79
End: 2024-04-08

## 2024-04-08 ENCOUNTER — OFFICE VISIT (OUTPATIENT)
Dept: ENT CLINIC | Age: 79
End: 2024-04-08

## 2024-04-08 DIAGNOSIS — K13.79 ORAL MASS: Primary | ICD-10-CM

## 2024-04-08 DIAGNOSIS — I10 ESSENTIAL HYPERTENSION: Chronic | ICD-10-CM

## 2024-04-08 DIAGNOSIS — Z51.81 ENCOUNTER FOR MEDICATION MONITORING: ICD-10-CM

## 2024-04-08 DIAGNOSIS — I48.0 PAROXYSMAL ATRIAL FIBRILLATION (HCC): Chronic | ICD-10-CM

## 2024-04-08 LAB
ANION GAP SERPL CALC-SCNC: 4 MMOL/L (ref 2–11)
BUN SERPL-MCNC: 13 MG/DL (ref 8–23)
CALCIUM SERPL-MCNC: 8.6 MG/DL (ref 8.3–10.4)
CHLORIDE SERPL-SCNC: 110 MMOL/L (ref 103–113)
CO2 SERPL-SCNC: 28 MMOL/L (ref 21–32)
CREAT SERPL-MCNC: 0.6 MG/DL (ref 0.6–1)
GLUCOSE SERPL-MCNC: 158 MG/DL (ref 65–100)
MAGNESIUM SERPL-MCNC: 1.8 MG/DL (ref 1.8–2.4)
POTASSIUM SERPL-SCNC: 4 MMOL/L (ref 3.5–5.1)
SODIUM SERPL-SCNC: 142 MMOL/L (ref 136–146)

## 2024-04-08 NOTE — TELEPHONE ENCOUNTER
----- Message from Eddi Swenson MD sent at 4/8/2024  2:53 PM EDT -----  No major abnormalities.  Continue current meds without change and keep routine followup.

## 2024-04-12 ENCOUNTER — HOSPITAL ENCOUNTER (OUTPATIENT)
Dept: CT IMAGING | Age: 79
End: 2024-04-12
Attending: INTERNAL MEDICINE
Payer: MEDICARE

## 2024-04-12 DIAGNOSIS — R09.02 HYPOXIA: ICD-10-CM

## 2024-04-12 DIAGNOSIS — R91.8 PULMONARY INFILTRATES: ICD-10-CM

## 2024-04-12 PROCEDURE — 71250 CT THORAX DX C-: CPT

## 2024-04-15 ENCOUNTER — OFFICE VISIT (OUTPATIENT)
Dept: PULMONOLOGY | Age: 79
End: 2024-04-15
Payer: MEDICARE

## 2024-04-15 VITALS
BODY MASS INDEX: 21.69 KG/M2 | DIASTOLIC BLOOD PRESSURE: 64 MMHG | HEIGHT: 66 IN | SYSTOLIC BLOOD PRESSURE: 130 MMHG | WEIGHT: 135 LBS | RESPIRATION RATE: 17 BRPM | OXYGEN SATURATION: 92 % | HEART RATE: 59 BPM

## 2024-04-15 DIAGNOSIS — J96.01 ACUTE RESPIRATORY FAILURE WITH HYPOXIA (HCC): ICD-10-CM

## 2024-04-15 DIAGNOSIS — J96.11 CHRONIC RESPIRATORY FAILURE WITH HYPOXIA (HCC): ICD-10-CM

## 2024-04-15 DIAGNOSIS — J67.9 HYPERSENSITIVITY PNEUMONITIS (HCC): ICD-10-CM

## 2024-04-15 DIAGNOSIS — R91.8 PULMONARY INFILTRATES: Primary | ICD-10-CM

## 2024-04-15 PROBLEM — J82.81: Status: ACTIVE | Noted: 2023-08-02

## 2024-04-15 LAB
EXPIRATORY TIME: NORMAL
FEF 25-75% %PRED-PRE: NORMAL
FEF 25-75% PRED: NORMAL
FEF 25-75-PRE: NORMAL
FEV1 %PRED-PRE: 35 %
FEV1 PRED: NORMAL
FEV1/FVC %PRED-PRE: NORMAL
FEV1/FVC PRED: NORMAL
FEV1/FVC: 72 %
FEV1: 0.75 L
FVC %PRED-PRE: 37 %
FVC PRED: NORMAL
FVC: 1.04 L
PEF %PRED-PRE: NORMAL
PEF PRED: NORMAL
PEF-PRE: NORMAL

## 2024-04-15 PROCEDURE — 3075F SYST BP GE 130 - 139MM HG: CPT | Performed by: INTERNAL MEDICINE

## 2024-04-15 PROCEDURE — 3078F DIAST BP <80 MM HG: CPT | Performed by: INTERNAL MEDICINE

## 2024-04-15 PROCEDURE — 1036F TOBACCO NON-USER: CPT | Performed by: INTERNAL MEDICINE

## 2024-04-15 PROCEDURE — G8420 CALC BMI NORM PARAMETERS: HCPCS | Performed by: INTERNAL MEDICINE

## 2024-04-15 PROCEDURE — 1090F PRES/ABSN URINE INCON ASSESS: CPT | Performed by: INTERNAL MEDICINE

## 2024-04-15 PROCEDURE — 1123F ACP DISCUSS/DSCN MKR DOCD: CPT | Performed by: INTERNAL MEDICINE

## 2024-04-15 PROCEDURE — 94010 BREATHING CAPACITY TEST: CPT | Performed by: INTERNAL MEDICINE

## 2024-04-15 PROCEDURE — G8427 DOCREV CUR MEDS BY ELIG CLIN: HCPCS | Performed by: INTERNAL MEDICINE

## 2024-04-15 PROCEDURE — G8399 PT W/DXA RESULTS DOCUMENT: HCPCS | Performed by: INTERNAL MEDICINE

## 2024-04-15 PROCEDURE — 99215 OFFICE O/P EST HI 40 MIN: CPT | Performed by: INTERNAL MEDICINE

## 2024-04-15 ASSESSMENT — PULMONARY FUNCTION TESTS
FEV1_PERCENT_PREDICTED_PRE: 35
FVC: 1.04
FEV1: 0.75
FVC_PERCENT_PREDICTED_PRE: 37
FEV1/FVC: 72

## 2024-04-15 NOTE — PROGRESS NOTES
compression deformities at T11 and L1. Recommend further  evaluation with MRI.    Nuclear Medicine: No results found for this or any previous visit from the past 3650 days.      PFTs:             Latest Ref Rng & Units 4/15/2024    12:00 AM   Office Spirometry Results   FVC L 1.04  P   FEV1 L 0.75  P   FEV1 %Pred-Pre % 35  P   FVC %Pred-Pre % 37  P   FEV1/FVC % 72  P      P Preliminary result   The patient has significant difficulty performing this test correctly and therefore the above values are probably not reflective or for true pulmonary function.      Exercise Oximetry:  Exercise Oximetry:  AMBULATING OXIMETRY: Room air O2 sat HR   Room air at Rest 91% 59 bpm   Room air ambulating 88% 72 bpm   Patient was placed on oxygen at 1.5 L/min 93% 72   Exercise Oximetry:  7/28/23  Resting RA Sat - 93  Lowest ambulating sat on RA - 87%  Lowest on 1L - 83%  Lowest ambualting sat on 2L - 91%    Echo:   TRANSTHORACIC ECHOCARDIOGRAM (TTE) COMPLETE (CONTRAST/BUBBLE/3D PRN) 07/06/2023    Interpretation Summary    Left Ventricle: Normal left ventricular systolic function with a visually estimated EF of 55 - 60%. Left ventricle size is normal. Mildly increased wall thickness. Normal wall motion.    Aortic Valve: Doreen Oliveira bovine bioprosthetic valve with a size of 21 mm. AV mean gradient is 36 mmHg. No paravalvular regurgitation. Noted by the apical view. AV peak gradient is 57 mmHg. AV peak velocity is 3.8 m/s. AV AT is 99.0 ms. LVOT:AV VTI Index is 0.32. AV area by continuity VTI is 1.0 Square Centimeter.  Gradients have increased in comparison to echocardiogram 1 year ago    Mitral Valve: Doreen Oliveira bovine bioprosthetic valve with a size of 25 mm. MV mean gradient is 8 mmHg. Trace paravalvular regurgitation. MV PHT is 142.0 ms. MV area by PHT is 1.5 Square Centimeter. MV area by continuity equation is 1.4 Square Centimeter.    Left Atrium: Left atrium is moderately dilated.    Right Atrium: Right atrium is

## 2024-04-19 LAB
FUNGAL CULT/SMEAR: NORMAL
FUNGUS (MYCOLOGY) CULTURE: NORMAL
FUNGUS SMEAR: NORMAL
REFLEX TO ID: NORMAL
SPECIMEN PROCESSING: NORMAL
SPECIMEN SOURCE: NORMAL
SPECIMEN SOURCE: NORMAL

## 2024-04-29 ENCOUNTER — OFFICE VISIT (OUTPATIENT)
Dept: ENT CLINIC | Age: 79
End: 2024-04-29

## 2024-04-29 DIAGNOSIS — K13.79 ORAL MASS: Primary | ICD-10-CM

## 2024-04-29 PROCEDURE — 99024 POSTOP FOLLOW-UP VISIT: CPT | Performed by: OTOLARYNGOLOGY

## 2024-04-29 NOTE — PROGRESS NOTES
Nery Bryant is a 78 y.o. female seen today now 6 weeks postop after undergoing transoral excision of a right buccal space mass back on 3/14/2024.  She had to go back to the OR on 3/18/2024 for postop hematoma.  Last seen back on 4/8/2024.  Her  called at the end of last week with some concern for increased swelling along the right cheek.  She presented last Wednesday with increased swelling of the right cheek, and there was some blood-tinged drainage that evening.  She reports about 1 teaspoon of drainage total.  There was no purulence at all.  She denies any intraoral drainage.  She feels better today with no further swelling or pain.    -Mild scarring along right mid cheek with underlying scar tissue.  No palpable mass or fluid collection.  Intraorally, suture line is well-healed with no purulence or wound dehiscence.  No facial weakness.    A/P:   Diagnosis Orders   1. Oral mass          She had some underlying scar tissue but no further drainage and I see no signs of infection on exam today.  She will continue to monitor her symptoms closely and use of vitamin E oil to help with scar tissue.  RTC in 2 months for reevaluation.      Alonzo Romo MD

## 2024-05-03 ENCOUNTER — PATIENT MESSAGE (OUTPATIENT)
Dept: INTERNAL MEDICINE CLINIC | Facility: CLINIC | Age: 79
End: 2024-05-03

## 2024-05-03 DIAGNOSIS — E11.29 TYPE 2 DIABETES MELLITUS WITH MICROALBUMINURIA, WITH LONG-TERM CURRENT USE OF INSULIN (HCC): Chronic | ICD-10-CM

## 2024-05-03 DIAGNOSIS — Z79.4 TYPE 2 DIABETES MELLITUS WITH MICROALBUMINURIA, WITH LONG-TERM CURRENT USE OF INSULIN (HCC): Chronic | ICD-10-CM

## 2024-05-03 DIAGNOSIS — Z79.4 TYPE 2 DIABETES MELLITUS WITH HYPERGLYCEMIA, WITH LONG-TERM CURRENT USE OF INSULIN (HCC): ICD-10-CM

## 2024-05-03 DIAGNOSIS — D69.6 THROMBOCYTOPENIA (HCC): ICD-10-CM

## 2024-05-03 DIAGNOSIS — E89.0 HYPOTHYROIDISM, POSTSURGICAL: Chronic | ICD-10-CM

## 2024-05-03 DIAGNOSIS — D50.8 OTHER IRON DEFICIENCY ANEMIA: ICD-10-CM

## 2024-05-03 DIAGNOSIS — I48.0 PAROXYSMAL ATRIAL FIBRILLATION (HCC): Chronic | ICD-10-CM

## 2024-05-03 DIAGNOSIS — R80.9 TYPE 2 DIABETES MELLITUS WITH MICROALBUMINURIA, WITH LONG-TERM CURRENT USE OF INSULIN (HCC): Chronic | ICD-10-CM

## 2024-05-03 DIAGNOSIS — I10 ESSENTIAL HYPERTENSION: Primary | Chronic | ICD-10-CM

## 2024-05-03 DIAGNOSIS — E78.2 MIXED HYPERLIPIDEMIA: Chronic | ICD-10-CM

## 2024-05-03 DIAGNOSIS — E11.65 TYPE 2 DIABETES MELLITUS WITH HYPERGLYCEMIA, WITH LONG-TERM CURRENT USE OF INSULIN (HCC): ICD-10-CM

## 2024-05-08 NOTE — TELEPHONE ENCOUNTER
Lab orders entered.   Patient Consult Note    TIME IN: ***  TIME OUT: ***    Primary care physician: Maksim Canchola M.D.    Reason for consult: {NDKREASONFORVISIT:20515}    HPI:  Chan Bauer is a 54 y.o. old patient who comes in today for evaluation of above stated problem.    Most Recent HbA1c and POCT glucose:   Lab Results   Component Value Date/Time    HBA1C 11.1 (A) 03/10/2023 08:17 AM            Most Recent Scr:  Lab Results   Component Value Date/Time    CREATININE 1.22 03/14/2023 10:17 PM        Current Diabetes Medication Regimen  Metformin: IR/ER ***   GLP-1 Agent: ***   SGLT-2 Inhibitor: ***   Sulfonylurea: ***  Thiazolidinedione: ***  DPP-4 Inhibitor: ***  Megltitinide: ***  Other: ***    Basal Insulin: ***  Prandial Insulin: ***  Mixed Insulin: ***    Previous Diabetes Medications and Reason for Discontinuation  ***    Pt has home glucometer and proper testing technique - ***  Discussed BG Goals: FBG 80 - 130, 2hPP < 180, A1c < 7%    Pt reports blood sugars:   Before Breakfast: ***  Before Lunch: ***  Before Dinner: ***  Before Bedtime: ***  Other times: ***    Hypoglycemia awareness - ***  Nocturnal hypoglycemia- ***  Hypoglycemia:  {NDKHYPOGLYCEMIA:20516}    Pt's treatment of Hypoglycemia - ***  - 15:15 Rule    Current Exercise - ***  Exercise Goal - ***    Dietary - ***  Breakfast - ***  Lunch - ***  Dinner - ***  Snacks - ***  Drinks - ***    Foot Exam:  Monofilament exam - ***  Monofilament testing with a 10 gram force: sensation intact: decreased bilaterally.    Visual Inspection: Feet without maceration, ulcers, fissures.  Feet dry.  Pedal pulses: intact bilaterally    Preventative Management  BP regimen (ACE/ARB) - ***  BP < 140/90 - ***  Statin - ***  LDL <100 - ***  Last Retinal Scan - ***  Last Microalbumin/Cr - ***  Last A1c -   Lab Results   Component Value Date/Time    HBA1C 11.1 (A) 03/10/2023 08:17 AM          ***updated caregaps    Past Medical History:  Patient Active Problem List     Diagnosis Date Noted    Other polyuria 03/10/2023    Transaminitis 03/10/2023    Diabetic ulcer of right heel associated with type 2 diabetes mellitus, limited to breakdown of skin ([facility]) 03/10/2023    Class 3 severe obesity due to excess calories with serious comorbidity and body mass index (BMI) of 40.0 to 44.9 in adult ([facility]) 03/10/2023    Gout 12/06/2022    Dyslipidemia 11/18/2022    Renal mass 07/14/2022    Acute idiopathic gout of right knee 03/04/2022    Secondary hypercoagulable state ([facility]) 02/14/2022    Chronic anticoagulation 03/17/2020    Idiopathic chronic gout of multiple sites without tophus     Thyroid function test abnormal 01/20/2020    Morbid obesity with BMI of 40.0-44.9, adult ([facility]) 01/20/2020    Subclinical hypothyroidism 11/01/2018    History of cardiac radiofrequency ablation (RFA) 07/18/2018    Permanent atrial fibrillation - failed rhythm control     Atrial thrombus without antecedent myocardial infarction 06/12/2017    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 ([facility]) 06/07/2017    SOB (shortness of breath) 04/13/2017    Type 2 diabetes mellitus without complication, without long-term current use of insulin ([facility]) 01/22/2016    Essential hypertension, benign 01/22/2016    Chronic systolic congestive heart failure ([facility]) 01/22/2016    Mitral regurgitation 01/22/2016       Past Surgical History:  Past Surgical History:   Procedure Laterality Date    RECOVERY  3/18/2016    Procedure: CATH LAB SYLVAIN GUIDED CARDIOVERSION WITH ANESTHESIA JEREMIAH ;  Surgeon: Recoveryonly Surgery;  [place] [facility] [place]  Service:     OTHER ORTHOPEDIC SURGERY      right knee surgery       Allergies:  Patient has no known allergies.    Social History:  Social History     Socioeconomic History    Marital status:      Spouse name: Not on file    Number of children: Not on file    Years of education: Not on file    Highest education level: Not on file   Occupational History    Not on file   Tobacco  Use    Smoking status: Never    Smokeless tobacco: Never   Vaping Use    Vaping Use: Never used   Substance and Sexual Activity    Alcohol use: Not Currently    Drug use: Never    Sexual activity: Not on file   Other Topics Concern    Not on file   Social History Narrative    ** Merged History Encounter **          Social Determinants of Health     Financial Resource Strain: Not on file   Food Insecurity: Not on file   Transportation Needs: Not on file   Physical Activity: Not on file   Stress: Not on file   Social Connections: Not on file   Intimate Partner Violence: Not on file   Housing Stability: Not on file       Family History:  Family History   Problem Relation Age of Onset    Diabetes Father     Other Mother          in her early 40s of uncertain cause       Medications:    Current Outpatient Medications:     Empagliflozin 25 MG Tab, Take 1 Tablet by mouth every day., Disp: 30 Tablet, Rfl: 3    Insulin Syringes U-100 0.3 mL, Use one syringe to inject insulin once daily., Disp: 100 Each, Rfl: 0    insulin glargine 100 UNIT/ML SC SOPN injection, Inject 10 Units under the skin every evening., Disp: 3 mL, Rfl: 5    Blood Glucose Meter Kit, Test blood sugar as recommended by provider. Pavon Freedom Lite blood glucose monitoring kit., Disp: 1 Kit, Rfl: 0    Blood Glucose Test Strips, Use one Abbott Freedom Lite strip to test blood sugar once daily ., Disp: 100 Strip, Rfl: 0    Lancets, Use one Abbott Freestyle Lite lancet to test blood sugar once daily ., Disp: 100 Each, Rfl: 0    Alcohol Swabs, Wipe site with prep pad prior to injection., Disp: 100 Each, Rfl: 0    Semaglutide,0.25 or 0.5MG/DOS, 2 MG/1.5ML Solution Pen-injector, Inject 0.5 mg under the skin every 7 days., Disp: 1.5 mL, Rfl: 3    colchicine (COLCRYS) 0.6 MG Tab, Take 1 Tablet by mouth 1 time a day as needed (Gout)., Disp: 30 Tablet, Rfl: 1    benzonatate (TESSALON) 200 MG capsule, Take 1 Capsule by mouth 3 times a day as needed for Cough.,  Disp: 30 Capsule, Rfl: 0    albuterol 108 (90 Base) MCG/ACT Aero Soln inhalation aerosol, Inhale 2 Puffs every 6 hours as needed for Shortness of Breath., Disp: 8.5 g, Rfl: 0    atorvastatin (LIPITOR) 20 MG Tab, Take 1 Tablet by mouth every evening., Disp: 90 Tablet, Rfl: 3    lisinopril (PRINIVIL) 20 MG Tab, Take 1 Tablet by mouth every evening., Disp: 90 Tablet, Rfl: 3    carvedilol (COREG) 25 MG Tab, Take 1 Tablet by mouth 2 times a day with meals., Disp: 180 Tablet, Rfl: 3    digoxin (LANOXIN) 250 MCG Tab, Take 1 Tablet by mouth every evening., Disp: 100 Tablet, Rfl: 3    warfarin (COUMADIN) 5 MG Tab, TAKE 2 to 3 TABLETS BY MOUTH DAILY OR AS DIRECTED BY ANTICOAGULATION CLINIC, Disp: 270 Tablet, Rfl: 3    metformin (GLUCOPHAGE) 1000 MG tablet, TAKE 1 TABLET TWICE A DAY, Disp: 60 Tablet, Rfl: 11    Labs: Reviewed    Physical Examination:  Vital signs: There were no vitals taken for this visit. There is no height or weight on file to calculate BMI.    Assessment and Plan:    1. DM2  Basic physiology of DMII was explained to patient as well as microvascular/macrovascular complications. The importance of increasing physical activity to improve diabetes control was discussed with the patient. Patient was also educated on changing diet and making better choices to help control blood sugar. ***  Discussed Goals: FBG 80 - 130, 2hPP < 180, a1c < 7.0% ***  ***    - Medication changes:  ***     - Lifestyle changes:  ***    Follow Up:  {FOLLOWUP:03448}    Maribel Falk, PharmD    CC:   Maksim Canchola M.D.

## 2024-05-10 DIAGNOSIS — E11.65 TYPE 2 DIABETES MELLITUS WITH HYPERGLYCEMIA, WITH LONG-TERM CURRENT USE OF INSULIN (HCC): ICD-10-CM

## 2024-05-10 DIAGNOSIS — Z79.4 TYPE 2 DIABETES MELLITUS WITH MICROALBUMINURIA, WITH LONG-TERM CURRENT USE OF INSULIN (HCC): Chronic | ICD-10-CM

## 2024-05-10 DIAGNOSIS — E89.0 HYPOTHYROIDISM, POSTSURGICAL: Chronic | ICD-10-CM

## 2024-05-10 DIAGNOSIS — D69.6 THROMBOCYTOPENIA (HCC): ICD-10-CM

## 2024-05-10 DIAGNOSIS — I48.0 PAROXYSMAL ATRIAL FIBRILLATION (HCC): Chronic | ICD-10-CM

## 2024-05-10 DIAGNOSIS — E78.2 MIXED HYPERLIPIDEMIA: Chronic | ICD-10-CM

## 2024-05-10 DIAGNOSIS — I10 ESSENTIAL HYPERTENSION: Chronic | ICD-10-CM

## 2024-05-10 DIAGNOSIS — D50.8 OTHER IRON DEFICIENCY ANEMIA: ICD-10-CM

## 2024-05-10 DIAGNOSIS — Z79.4 TYPE 2 DIABETES MELLITUS WITH HYPERGLYCEMIA, WITH LONG-TERM CURRENT USE OF INSULIN (HCC): ICD-10-CM

## 2024-05-10 DIAGNOSIS — E11.29 TYPE 2 DIABETES MELLITUS WITH MICROALBUMINURIA, WITH LONG-TERM CURRENT USE OF INSULIN (HCC): Chronic | ICD-10-CM

## 2024-05-10 DIAGNOSIS — R80.9 TYPE 2 DIABETES MELLITUS WITH MICROALBUMINURIA, WITH LONG-TERM CURRENT USE OF INSULIN (HCC): Chronic | ICD-10-CM

## 2024-05-10 LAB
ALBUMIN SERPL-MCNC: 3.3 G/DL (ref 3.2–4.6)
ALBUMIN/GLOB SERPL: 0.9 (ref 1–1.9)
ALP SERPL-CCNC: 115 U/L (ref 35–104)
ALT SERPL-CCNC: 28 U/L (ref 12–65)
ANION GAP SERPL CALC-SCNC: 13 MMOL/L (ref 9–18)
AST SERPL-CCNC: 32 U/L (ref 15–37)
BASOPHILS # BLD: 0 K/UL (ref 0–0.2)
BASOPHILS NFR BLD: 1 % (ref 0–2)
BILIRUB SERPL-MCNC: 0.3 MG/DL (ref 0–1.2)
BUN SERPL-MCNC: 20 MG/DL (ref 8–23)
CALCIUM SERPL-MCNC: 9 MG/DL (ref 8.8–10.2)
CHLORIDE SERPL-SCNC: 103 MMOL/L (ref 98–107)
CHOLEST SERPL-MCNC: 80 MG/DL (ref 0–200)
CO2 SERPL-SCNC: 26 MMOL/L (ref 20–28)
CREAT SERPL-MCNC: 0.78 MG/DL (ref 0.6–1.1)
DIFFERENTIAL METHOD BLD: ABNORMAL
EOSINOPHIL # BLD: 0.1 K/UL (ref 0–0.8)
EOSINOPHIL NFR BLD: 2 % (ref 0.5–7.8)
ERYTHROCYTE [DISTWIDTH] IN BLOOD BY AUTOMATED COUNT: 15.3 % (ref 11.9–14.6)
EST. AVERAGE GLUCOSE BLD GHB EST-MCNC: 146 MG/DL
GLOBULIN SER CALC-MCNC: 3.8 G/DL (ref 2.3–3.5)
GLUCOSE SERPL-MCNC: 155 MG/DL (ref 70–99)
HBA1C MFR BLD: 6.7 % (ref 0–5.6)
HCT VFR BLD AUTO: 35.2 % (ref 35.8–46.3)
HDLC SERPL-MCNC: 24 MG/DL (ref 40–60)
HDLC SERPL: 3.3 (ref 0–5)
HGB BLD-MCNC: 10.6 G/DL (ref 11.7–15.4)
IMM GRANULOCYTES # BLD AUTO: 0 K/UL (ref 0–0.5)
IMM GRANULOCYTES NFR BLD AUTO: 0 % (ref 0–5)
IRON SATN MFR SERPL: 15 % (ref 20–50)
IRON SERPL-MCNC: 47 UG/DL (ref 35–100)
LDLC SERPL CALC-MCNC: 34 MG/DL (ref 0–100)
LYMPHOCYTES # BLD: 2.4 K/UL (ref 0.5–4.6)
LYMPHOCYTES NFR BLD: 29 % (ref 13–44)
MCH RBC QN AUTO: 28.7 PG (ref 26.1–32.9)
MCHC RBC AUTO-ENTMCNC: 30.1 G/DL (ref 31.4–35)
MCV RBC AUTO: 95.4 FL (ref 82–102)
MONOCYTES # BLD: 0.7 K/UL (ref 0.1–1.3)
MONOCYTES NFR BLD: 8 % (ref 4–12)
NEUTS SEG # BLD: 5.1 K/UL (ref 1.7–8.2)
NEUTS SEG NFR BLD: 61 % (ref 43–78)
NRBC # BLD: 0 K/UL (ref 0–0.2)
PLATELET # BLD AUTO: 256 K/UL (ref 150–450)
PMV BLD AUTO: 10 FL (ref 9.4–12.3)
POTASSIUM SERPL-SCNC: 4.2 MMOL/L (ref 3.5–5.1)
PROT SERPL-MCNC: 7.1 G/DL (ref 6.3–8.2)
RBC # BLD AUTO: 3.69 M/UL (ref 4.05–5.2)
SODIUM SERPL-SCNC: 141 MMOL/L (ref 136–145)
T4 FREE SERPL-MCNC: 1.1 NG/DL (ref 0.9–1.7)
TIBC SERPL-MCNC: 313 UG/DL (ref 240–450)
TRIGL SERPL-MCNC: 112 MG/DL (ref 0–150)
TSH, 3RD GENERATION: 3.24 UIU/ML (ref 0.27–4.2)
UIBC SERPL-MCNC: 266 UG/DL (ref 112–347)
VLDLC SERPL CALC-MCNC: 22 MG/DL (ref 6–23)
WBC # BLD AUTO: 8.3 K/UL (ref 4.3–11.1)

## 2024-05-20 ENCOUNTER — OFFICE VISIT (OUTPATIENT)
Dept: INTERNAL MEDICINE CLINIC | Facility: CLINIC | Age: 79
End: 2024-05-20
Payer: MEDICARE

## 2024-05-20 VITALS
SYSTOLIC BLOOD PRESSURE: 116 MMHG | HEART RATE: 60 BPM | WEIGHT: 134.8 LBS | DIASTOLIC BLOOD PRESSURE: 72 MMHG | HEIGHT: 66 IN | OXYGEN SATURATION: 97 % | BODY MASS INDEX: 21.66 KG/M2

## 2024-05-20 DIAGNOSIS — Z12.31 ENCOUNTER FOR SCREENING MAMMOGRAM FOR MALIGNANT NEOPLASM OF BREAST: ICD-10-CM

## 2024-05-20 DIAGNOSIS — D50.8 OTHER IRON DEFICIENCY ANEMIA: ICD-10-CM

## 2024-05-20 DIAGNOSIS — E55.9 HYPOVITAMINOSIS D: ICD-10-CM

## 2024-05-20 DIAGNOSIS — E78.2 MIXED HYPERLIPIDEMIA: Chronic | ICD-10-CM

## 2024-05-20 DIAGNOSIS — E11.29 TYPE 2 DIABETES MELLITUS WITH MICROALBUMINURIA, WITH LONG-TERM CURRENT USE OF INSULIN (HCC): Primary | Chronic | ICD-10-CM

## 2024-05-20 DIAGNOSIS — E89.0 HYPOTHYROIDISM, POSTSURGICAL: ICD-10-CM

## 2024-05-20 DIAGNOSIS — G89.29 CHRONIC BILATERAL LOW BACK PAIN WITHOUT SCIATICA: ICD-10-CM

## 2024-05-20 DIAGNOSIS — L65.9 HAIR LOSS: ICD-10-CM

## 2024-05-20 DIAGNOSIS — Z79.4 TYPE 2 DIABETES MELLITUS WITH MICROALBUMINURIA, WITH LONG-TERM CURRENT USE OF INSULIN (HCC): Primary | Chronic | ICD-10-CM

## 2024-05-20 DIAGNOSIS — Z79.01 LONG TERM CURRENT USE OF ANTICOAGULANT THERAPY: Chronic | ICD-10-CM

## 2024-05-20 DIAGNOSIS — R80.9 TYPE 2 DIABETES MELLITUS WITH MICROALBUMINURIA, WITH LONG-TERM CURRENT USE OF INSULIN (HCC): Primary | Chronic | ICD-10-CM

## 2024-05-20 DIAGNOSIS — M54.50 CHRONIC BILATERAL LOW BACK PAIN WITHOUT SCIATICA: ICD-10-CM

## 2024-05-20 LAB — 25(OH)D3 SERPL-MCNC: 31.3 NG/ML (ref 30–100)

## 2024-05-20 PROCEDURE — 3044F HG A1C LEVEL LT 7.0%: CPT | Performed by: NURSE PRACTITIONER

## 2024-05-20 PROCEDURE — 1090F PRES/ABSN URINE INCON ASSESS: CPT | Performed by: NURSE PRACTITIONER

## 2024-05-20 PROCEDURE — G8420 CALC BMI NORM PARAMETERS: HCPCS | Performed by: NURSE PRACTITIONER

## 2024-05-20 PROCEDURE — G8427 DOCREV CUR MEDS BY ELIG CLIN: HCPCS | Performed by: NURSE PRACTITIONER

## 2024-05-20 PROCEDURE — 3074F SYST BP LT 130 MM HG: CPT | Performed by: NURSE PRACTITIONER

## 2024-05-20 PROCEDURE — G8399 PT W/DXA RESULTS DOCUMENT: HCPCS | Performed by: NURSE PRACTITIONER

## 2024-05-20 PROCEDURE — 1123F ACP DISCUSS/DSCN MKR DOCD: CPT | Performed by: NURSE PRACTITIONER

## 2024-05-20 PROCEDURE — 99215 OFFICE O/P EST HI 40 MIN: CPT | Performed by: NURSE PRACTITIONER

## 2024-05-20 PROCEDURE — 1036F TOBACCO NON-USER: CPT | Performed by: NURSE PRACTITIONER

## 2024-05-20 PROCEDURE — 3078F DIAST BP <80 MM HG: CPT | Performed by: NURSE PRACTITIONER

## 2024-05-20 RX ORDER — LEVOTHYROXINE SODIUM 0.12 MG/1
125 TABLET ORAL
Qty: 100 TABLET | Refills: 3 | Status: SHIPPED | OUTPATIENT
Start: 2024-05-20

## 2024-05-20 ASSESSMENT — PATIENT HEALTH QUESTIONNAIRE - PHQ9
SUM OF ALL RESPONSES TO PHQ QUESTIONS 1-9: 0
SUM OF ALL RESPONSES TO PHQ9 QUESTIONS 1 & 2: 0
SUM OF ALL RESPONSES TO PHQ QUESTIONS 1-9: 0
SUM OF ALL RESPONSES TO PHQ QUESTIONS 1-9: 0
1. LITTLE INTEREST OR PLEASURE IN DOING THINGS: NOT AT ALL
SUM OF ALL RESPONSES TO PHQ QUESTIONS 1-9: 0
2. FEELING DOWN, DEPRESSED OR HOPELESS: NOT AT ALL

## 2024-05-20 NOTE — PROGRESS NOTES
PROGRESS NOTE    SUBJECTIVE:   Nery Bryant is a 78 y.o. female seen for a follow up visit for   Chief Complaint   Patient presents with    Diabetes     A1C lab review     HPI  Started prednisone 30 mg twice a day    By Dexcom: 3 day summary:  13% very high  28% high  58% in range  <1% low.      Reports spiking the blood sugar immediately after the breakfast and supper meals with prednisone but blood glucose drifts back down.    CHRONIC MEDICAL PROBLEMS    Hyperlipidemia  Onset: greater than 20 years ago. Exacerbating diseases include diabetes and hypothyroidism. Current antihyperlipidemic treatment includes statins. There are no compliance problems.  Risk factors for coronary artery disease include diabetes mellitus, dyslipidemia, family history and post-menopausal.     Hypothyroid (post-surgical)  Onset: approx 2010.  Asymptomatic.  Denies weight changes.  Denies heat/cold intolerances.  Compliant to levothyroxine.    Diabetes, Type II on long term insulin therapy  Onset: approx 2005. Blood sugars improved with  medications (diet, insulin).  Using CGM:  Dexcom G6.  (See above regarding recent prednisone).    Aortic valve replacement  Aortic valve replacement:  CE bovine bioprosthetic valve with a size of 21 mm.  No regurgitation.  Mild stenosis of the aortic valve.  Mitral valve:  CE-2015 bovine bioprosthetic valve with a size of 25 mm.  No transvalvular regurgitation.  No paravalvular regurgitation.  Mild stenosis noted.  Requires lifetime anticoagulation therapy with warfarin.    Paroxysmal atrial fibrillation  Onset: Remote.  Current treatment Tikosyn 500 mcg twice daily.  Compliant to therapy with excellent knowledge of potential drug interactions with Tikosyn.  Anticoagulation with warfarin and monitored through [facility] cardiology.  Target INR according to her aortic valve replacement 2.5-3.5.      Past Medical History:   Diagnosis Date    Anesthesia complication     slow to wake up    Aortic valve

## 2024-05-23 ENCOUNTER — TELEMEDICINE (OUTPATIENT)
Dept: INTERNAL MEDICINE CLINIC | Facility: CLINIC | Age: 79
End: 2024-05-23
Payer: MEDICARE

## 2024-05-23 DIAGNOSIS — Z00.00 MEDICARE ANNUAL WELLNESS VISIT, SUBSEQUENT: Primary | ICD-10-CM

## 2024-05-23 PROCEDURE — G0439 PPPS, SUBSEQ VISIT: HCPCS | Performed by: NURSE PRACTITIONER

## 2024-05-23 PROCEDURE — 1123F ACP DISCUSS/DSCN MKR DOCD: CPT | Performed by: NURSE PRACTITIONER

## 2024-05-23 ASSESSMENT — PATIENT HEALTH QUESTIONNAIRE - PHQ9
2. FEELING DOWN, DEPRESSED OR HOPELESS: NOT AT ALL
SUM OF ALL RESPONSES TO PHQ QUESTIONS 1-9: 0
1. LITTLE INTEREST OR PLEASURE IN DOING THINGS: NOT AT ALL
SUM OF ALL RESPONSES TO PHQ QUESTIONS 1-9: 0
SUM OF ALL RESPONSES TO PHQ9 QUESTIONS 1 & 2: 0

## 2024-05-23 ASSESSMENT — LIFESTYLE VARIABLES: HOW OFTEN DO YOU HAVE A DRINK CONTAINING ALCOHOL: NEVER

## 2024-05-23 NOTE — PROGRESS NOTES
Medicare Annual Wellness Visit    Nery Bryant is here for Medicare AWV    Assessment & Plan   Medicare annual wellness visit, subsequent    Recommendations for Preventive Services Due: see orders and patient instructions/AVS.  Recommended screening schedule for the next 5-10 years is provided to the patient in written form: see Patient Instructions/AVS.     Return for Follow-Up, regularly scheduled appointment or sooner prn.     Subjective       Patient's complete Health Risk Assessment and screening values have been reviewed and are found in Flowsheets. The following problems were reviewed today and where indicated follow up appointments were made and/or referrals ordered.    Positive Risk Factor Screenings with Interventions:    Fall Risk:  Do you feel unsteady or are you worried about falling? : (!) yes (recent surgery using walker)  2 or more falls in past year?: no  Fall with injury in past year?: no     Interventions:    Reviewed medications, home hazards, visual acuity, and co-morbidities that can increase risk for falls  See AVS for additional education material  Patient states that she has felt a little unsteady since recent surgery and discussed this with Hilary at recent appointment.                                  Objective      Patient-Reported Vitals  No data recorded        Allergies   Allergen Reactions    Ibuprofen Rash     Prior to Visit Medications    Medication Sig Taking? Authorizing Provider   levothyroxine (SYNTHROID) 125 MCG tablet Take 1 tablet by mouth every morning (before breakfast) Yes Hilary Gordon P, APRN - NP   furosemide (LASIX) 20 MG tablet Take 0.5 tablets by mouth daily as needed (edema) Take on mornings she has swelling/use with KCL Yes ProviderAdalgisa MD   losartan (COZAAR) 50 MG tablet Take 1 tablet by mouth daily Yes ProviderAdalgisa MD   ondansetron (ZOFRAN-ODT) 4 MG disintegrating tablet Take 1 tablet by mouth 3 times daily as needed for Nausea or

## 2024-05-30 NOTE — PROGRESS NOTES
Priority: Low    Hypoxia 05/04/2015     Priority: Low    S/P mitral valve replacement 05/04/2015     Priority: Low     Overview Note:     Bioprosthetic May 2015        Carotid stenosis      Priority: Low    GERD (gastroesophageal reflux disease)      Priority: Low    CAD (coronary artery disease)      Priority: Low    Essential hypertension 09/21/2009     Priority: Low        Past Surgical History:   Procedure Laterality Date    ABLATION OF DYSRHYTHMIC FOCUS  2015    AORTIC VALVE REPLACEMENT  2015    APPENDECTOMY      BRONCHOSCOPY N/A 07/18/2023    BRONCHOSCOPY ALVEOLAR LAVAGE performed by Juno Sandoval MD at [facility] ENDOSCOPY    CARDIAC CATHETERIZATION      CARDIAC PROCEDURE N/A 01/17/2024    Left heart cath / coronary angiography performed by Rodolfo Lazaro MD at [facility] CARDIAC CATH LAB    CARDIAC PROCEDURE N/A 01/23/2024    Transcatheter mitral valve replacement (tmvr) performed by Rodolfo Lazaro MD at [facility] MAIN OR    CARDIAC PROCEDURE N/A 01/23/2024    Transcatheter aortic valve replacement performed by Rodolfo Lazaro MD at [facility] MAIN OR    CARDIAC VALVE REPLACEMENT      COLECTOMY  09/01/2021    low anterior colon resection    COLONOSCOPY N/A 7/21/2021    COLONOSCOPY/ BMI 27 performed by Nile Bunn MD at [facility] ENDOSCOPY    COLONOSCOPY  2014    neg    COLONOSCOPY N/A 08/17/2022    COLONOSCOPY POLYPECTOMY SNARE-COLD performed by Nile Bunn MD at [facility] ENDOSCOPY    EP DEVICE PROCEDURE N/A 01/25/2024    Insert PPM biv multi performed by Abdon Buckner MD at [facility] CARDIAC CATH LAB    EP DEVICE PROCEDURE N/A 01/25/2024    Ablation AV node performed by Abdon Buckner MD at [facility] CARDIAC CATH LAB    FLEXIBLE SIGMOIDOSCOPY N/A 8/18/2021    SIGMOIDOSCOPY FLEXIBLE performed by Nile Bunn MD at [facility] ENDOSCOPY    HIP FRACTURE SURGERY Left 2009    pins    MITRAL VALVE REPLACEMENT  5/4/15     and Aortic Valve Replacement, Dr. Alegria    MOUTH SURGERY Right 03/14/2024    trans-oral

## 2024-06-03 ASSESSMENT — ENCOUNTER SYMPTOMS
BACK PAIN: 0
ABDOMINAL PAIN: 0
SHORTNESS OF BREATH: 1
COUGH: 0
WHEEZING: 0
COLOR CHANGE: 0
CHEST TIGHTNESS: 0

## 2024-06-04 ENCOUNTER — NURSE ONLY (OUTPATIENT)
Age: 79
End: 2024-06-04
Payer: MEDICARE

## 2024-06-04 ENCOUNTER — OFFICE VISIT (OUTPATIENT)
Age: 79
End: 2024-06-04
Payer: MEDICARE

## 2024-06-04 VITALS
HEIGHT: 66 IN | SYSTOLIC BLOOD PRESSURE: 140 MMHG | DIASTOLIC BLOOD PRESSURE: 75 MMHG | HEART RATE: 60 BPM | BODY MASS INDEX: 22.02 KG/M2 | WEIGHT: 137 LBS

## 2024-06-04 DIAGNOSIS — Z95.0 S/P BIVENTRICULAR CARDIAC PACEMAKER PROCEDURE: Chronic | ICD-10-CM

## 2024-06-04 DIAGNOSIS — I48.0 PAF (PAROXYSMAL ATRIAL FIBRILLATION) (HCC): ICD-10-CM

## 2024-06-04 DIAGNOSIS — M54.50 ACUTE MIDLINE LOW BACK PAIN WITHOUT SCIATICA: ICD-10-CM

## 2024-06-04 DIAGNOSIS — Z95.2 S/P TRANSCATHETER MITRAL VALVE REPLACEMENT (TMVR): ICD-10-CM

## 2024-06-04 DIAGNOSIS — I10 ESSENTIAL HYPERTENSION: Chronic | ICD-10-CM

## 2024-06-04 DIAGNOSIS — I49.5 SSS (SICK SINUS SYNDROME) (HCC): Primary | ICD-10-CM

## 2024-06-04 DIAGNOSIS — Z95.2 S/P TAVR (TRANSCATHETER AORTIC VALVE REPLACEMENT): Primary | ICD-10-CM

## 2024-06-04 DIAGNOSIS — Z98.890 HX OF ATRIOVENTRICULAR NODE ABLATION: ICD-10-CM

## 2024-06-04 DIAGNOSIS — I65.23 BILATERAL CAROTID ARTERY STENOSIS: ICD-10-CM

## 2024-06-04 DIAGNOSIS — I48.0 PAROXYSMAL ATRIAL FIBRILLATION (HCC): Chronic | ICD-10-CM

## 2024-06-04 PROCEDURE — 3077F SYST BP >= 140 MM HG: CPT | Performed by: INTERNAL MEDICINE

## 2024-06-04 PROCEDURE — 1036F TOBACCO NON-USER: CPT | Performed by: INTERNAL MEDICINE

## 2024-06-04 PROCEDURE — 1123F ACP DISCUSS/DSCN MKR DOCD: CPT | Performed by: INTERNAL MEDICINE

## 2024-06-04 PROCEDURE — 1090F PRES/ABSN URINE INCON ASSESS: CPT | Performed by: INTERNAL MEDICINE

## 2024-06-04 PROCEDURE — G8420 CALC BMI NORM PARAMETERS: HCPCS | Performed by: INTERNAL MEDICINE

## 2024-06-04 PROCEDURE — 93280 PM DEVICE PROGR EVAL DUAL: CPT | Performed by: INTERNAL MEDICINE

## 2024-06-04 PROCEDURE — G8427 DOCREV CUR MEDS BY ELIG CLIN: HCPCS | Performed by: INTERNAL MEDICINE

## 2024-06-04 PROCEDURE — 3078F DIAST BP <80 MM HG: CPT | Performed by: INTERNAL MEDICINE

## 2024-06-04 PROCEDURE — G8399 PT W/DXA RESULTS DOCUMENT: HCPCS | Performed by: INTERNAL MEDICINE

## 2024-06-04 PROCEDURE — 99214 OFFICE O/P EST MOD 30 MIN: CPT | Performed by: INTERNAL MEDICINE

## 2024-06-04 RX ORDER — HYDROCODONE BITARTRATE AND ACETAMINOPHEN 5; 325 MG/1; MG/1
1 TABLET ORAL EVERY 6 HOURS PRN
Qty: 20 TABLET | Refills: 0 | Status: SHIPPED | OUTPATIENT
Start: 2024-06-04 | End: 2024-06-09

## 2024-06-04 ASSESSMENT — ENCOUNTER SYMPTOMS: BACK PAIN: 1

## 2024-06-24 ENCOUNTER — OFFICE VISIT (OUTPATIENT)
Dept: ORTHOPEDIC SURGERY | Age: 79
End: 2024-06-24
Payer: MEDICARE

## 2024-06-24 ENCOUNTER — OFFICE VISIT (OUTPATIENT)
Dept: ENT CLINIC | Age: 79
End: 2024-06-24
Payer: MEDICARE

## 2024-06-24 ENCOUNTER — OFFICE VISIT (OUTPATIENT)
Age: 79
End: 2024-06-24
Payer: MEDICARE

## 2024-06-24 VITALS
HEIGHT: 66 IN | WEIGHT: 135.2 LBS | DIASTOLIC BLOOD PRESSURE: 62 MMHG | SYSTOLIC BLOOD PRESSURE: 148 MMHG | BODY MASS INDEX: 21.73 KG/M2

## 2024-06-24 DIAGNOSIS — M51.36 LUMBAR DEGENERATIVE DISC DISEASE: ICD-10-CM

## 2024-06-24 DIAGNOSIS — K13.70 ORAL LESION: Primary | ICD-10-CM

## 2024-06-24 DIAGNOSIS — M47.816 LUMBAR FACET ARTHROPATHY: Primary | ICD-10-CM

## 2024-06-24 PROCEDURE — 1123F ACP DISCUSS/DSCN MKR DOCD: CPT | Performed by: NURSE PRACTITIONER

## 2024-06-24 PROCEDURE — G8428 CUR MEDS NOT DOCUMENT: HCPCS | Performed by: NURSE PRACTITIONER

## 2024-06-24 PROCEDURE — 1036F TOBACCO NON-USER: CPT | Performed by: NURSE PRACTITIONER

## 2024-06-24 PROCEDURE — G8420 CALC BMI NORM PARAMETERS: HCPCS | Performed by: OTOLARYNGOLOGY

## 2024-06-24 PROCEDURE — 64493 INJ PARAVERT F JNT L/S 1 LEV: CPT | Performed by: PHYSICAL MEDICINE & REHABILITATION

## 2024-06-24 PROCEDURE — 1090F PRES/ABSN URINE INCON ASSESS: CPT | Performed by: OTOLARYNGOLOGY

## 2024-06-24 PROCEDURE — 3077F SYST BP >= 140 MM HG: CPT | Performed by: OTOLARYNGOLOGY

## 2024-06-24 PROCEDURE — G8399 PT W/DXA RESULTS DOCUMENT: HCPCS | Performed by: OTOLARYNGOLOGY

## 2024-06-24 PROCEDURE — 3078F DIAST BP <80 MM HG: CPT | Performed by: OTOLARYNGOLOGY

## 2024-06-24 PROCEDURE — 1123F ACP DISCUSS/DSCN MKR DOCD: CPT | Performed by: OTOLARYNGOLOGY

## 2024-06-24 PROCEDURE — 1036F TOBACCO NON-USER: CPT | Performed by: OTOLARYNGOLOGY

## 2024-06-24 PROCEDURE — 64494 INJ PARAVERT F JNT L/S 2 LEV: CPT | Performed by: PHYSICAL MEDICINE & REHABILITATION

## 2024-06-24 PROCEDURE — 99213 OFFICE O/P EST LOW 20 MIN: CPT | Performed by: OTOLARYNGOLOGY

## 2024-06-24 PROCEDURE — G8427 DOCREV CUR MEDS BY ELIG CLIN: HCPCS | Performed by: OTOLARYNGOLOGY

## 2024-06-24 PROCEDURE — G8420 CALC BMI NORM PARAMETERS: HCPCS | Performed by: NURSE PRACTITIONER

## 2024-06-24 PROCEDURE — G8399 PT W/DXA RESULTS DOCUMENT: HCPCS | Performed by: NURSE PRACTITIONER

## 2024-06-24 PROCEDURE — 99204 OFFICE O/P NEW MOD 45 MIN: CPT | Performed by: NURSE PRACTITIONER

## 2024-06-24 PROCEDURE — 1090F PRES/ABSN URINE INCON ASSESS: CPT | Performed by: NURSE PRACTITIONER

## 2024-06-24 RX ORDER — TRIAMCINOLONE ACETONIDE 40 MG/ML
40 INJECTION, SUSPENSION INTRA-ARTICULAR; INTRAMUSCULAR ONCE
Status: COMPLETED | OUTPATIENT
Start: 2024-06-24 | End: 2024-06-24

## 2024-06-24 RX ADMIN — TRIAMCINOLONE ACETONIDE 40 MG: 40 INJECTION, SUSPENSION INTRA-ARTICULAR; INTRAMUSCULAR at 15:39

## 2024-06-24 ASSESSMENT — ENCOUNTER SYMPTOMS
RESPIRATORY NEGATIVE: 1
GASTROINTESTINAL NEGATIVE: 1
EYES NEGATIVE: 1
ALLERGIC/IMMUNOLOGIC NEGATIVE: 1

## 2024-06-24 NOTE — PROGRESS NOTES
Name: Nery Bryant  YOB: 1945  Gender: female  MRN: [id number]    Procedure: Bilateral  L4-L5 and L5-S1 facet joint injections     Precautions: Nery Bryant deniesprior sensitivity to steroid, local anesthetic, iodine, or shellfish.     The procedure was discussed at length with the patient and informed consent was signed and placed in the chart. The patient was placed in a prone position on the fluoroscopy table and the skin was prepped and draped in a routine sterile fashion. The areas to be injected were each anesthetized with 1% lidocaine.    Next, a 25-gauge 3.5 inch spinal needle was carefully advanced under fluoroscopic guidance to the rightL5 - S1 facet joint. Aspiration was negative. Once proper placement was confirmed, 1 ml of 0.25% Marcaine and  0.5 mL 40 mg/mL kenalog were injected through the spinal needle.     The above procedure was then repeated through the rightL4 - L5, and left L5 - S1, and leftL4 - L5 facet joints.      Fluoroscopic guidance was used intermittently over a 10-minute period to insure proper needle placement and her safety. A hard copy of the fluoroscopic images has been placed in her chart and is saved on the C-arm hard drive. She was monitored for 30 minutes after the procedure and discharged home in a stable fashion with a routine follow up.     Procedural Diagnosis:     ICD-10-CM    1. Lumbar facet arthropathy  M47.816 FL INJ LUMB/SAC FACET SINGLE LEVEL     XR INJ FACET LUMB SACRAL 2ND LVL     triamcinolone acetonide (KENALOG-40) injection 40 mg      2. Lumbar degenerative disc disease  M51.36 FL INJ LUMB/SAC FACET SINGLE LEVEL     XR INJ FACET LUMB SACRAL 2ND LVL     triamcinolone acetonide (KENALOG-40) injection 40 mg           JIMENA ALEJO MD  06/24/24

## 2024-06-24 NOTE — PROGRESS NOTES
without even obtaining an MRI at the current time.  She is in agreement with this and very grateful.  I would recommend referring her to Dr. Faulkner for bilateral L4-S1 facet injections.  She can stay on a blood thinner for these.  I discussed with her the natural history of this condition in that most episodes are typically self limited.  However, the symptoms can last for several months if not even longer. What I currently recommend is that she continues with conservative treatments to help cope with the symptoms and avoid having back surgery at this time. She understands that conservative treatments typically include activity modification, NSAIDs and physical therapy.  Oral and/or epidural steroids could be considered in resistant scenarios. Also, she  may want to explore chiropractic care or acupuncture. I advised to avoid any prolonged bedrest and to try to maintain ADLs as much as possible. The patient was counseled to follow up me should she  develop any neurologic symptoms such as leg pain.      - Injection: The patient will be referred for a lumbar steroid injection to help reduce the symptoms. The patient understands the risks including hyperglycemia, immunosuppression, meningitis, cerebrospinal fluid leak, epidural hematoma, and reaction to medication. The patient may benefit from additional injections depending on the response. The injection should be a bilateral L4-S1 facet injections      No orders of the defined types were placed in this encounter.       Orders Placed This Encounter   Procedures    Injection Authorization - Spine    [facility] (Spine Surgery)        4 This is a chronic illness/condition with exacerbation and progression      Return for refer to [facility] E/T .     RICCI Rivera - CNP  06/24/24      Elements of this note were created using speech recognition software.  As such, errors of speech recognition may be present.

## 2024-06-24 NOTE — PROGRESS NOTES
Chief Complaint   Patient presents with    Follow-up     2 mos recheck oral mass/ check left side of check        HPI:  Nery Bryant is a 78 y.o. female seen in follow-up.  She is now over 3 months postop after undergoing transoral excision of a right buccal space mass back on 3/14/2024.  She had to go back to the OR on 3/18/2024 for postop hematoma- she has been on Eliquis since undergoing a heart procedure earlier this spring.  Last seen back on 4/29/24.  Doing well since then with no further drainage or bleeding from the incision site.  She still reports some scar tissue and tightness along her right cheek, but has been able to eat and drink normally.  She is doing well from a cardiac standpoint, but has been dealing with worsening lower back pain, and actually has an appointment at [facility] later today.      Past Medical History, Past Surgical History, Family history, Social History, and Medications were all reviewed with the patient today and updated as necessary.     Allergies   Allergen Reactions    Ibuprofen Rash     Patient Active Problem List   Diagnosis    Carotid stenosis    Iron deficiency anemia    Long-term use of high-risk medication    Osteoporosis    Colonic mass    Blood loss anemia    Type 2 diabetes mellitus with microalbuminuria, with long-term current use of insulin (HCC)    Type 2 diabetes mellitus with hyperglycemia, with long-term current use of insulin (HCC)    Essential hypertension    Long term current use of anticoagulant therapy    GERD (gastroesophageal reflux disease)    S/P AVR (aortic valve replacement)    Hypothyroidism, postsurgical    Rectal cancer (HCC)    Paroxysmal atrial fibrillation (HCC)    Hypoxia    S/P mitral valve replacement    CAD (coronary artery disease)    Mixed hyperlipidemia    Acute respiratory failure with hypoxia (HCC)    Abnormal CT of the chest    Acute on chronic respiratory failure with hypoxemia (HCC)    Pulmonary infiltration eosinophilia (HCC)

## 2024-07-08 NOTE — PROGRESS NOTES
limits, AST 32 and ALT 28 May 10, 2024.    Studies ordered today: MRI lumbar spine.  This will be coordinated with her cardiologist because she does have a pacemaker, so we will verify this is MRI compatible.  I did review her device card today, but it does not indicate if it is MRI compatible or not.  The pacemaker was placed January 2024.    Clinical Notes:   I/my clinic will serve as the continuing focal point for all needed health care services and/or medical care services that are part of ongoing PAIN care related to patient's single, serious, or complex PAIN condition with the following diagnoses:   Chronic Pain Condition that is not stable = not at the patient's treatment goal     1) lumbar scoliosis  2) multilevel lumbar spondylosis, facet arthropathy, DDD, worst at L4-5-S1  3) L1 wedge deformity.  JIMENA ALEJO MD  7/9/2024,  12:56 PM

## 2024-07-09 ENCOUNTER — OFFICE VISIT (OUTPATIENT)
Dept: ORTHOPEDIC SURGERY | Age: 79
End: 2024-07-09
Payer: MEDICARE

## 2024-07-09 DIAGNOSIS — M47.816 LUMBAR SPONDYLOSIS: Primary | ICD-10-CM

## 2024-07-09 DIAGNOSIS — M51.36 DISC DEGENERATION, LUMBAR: ICD-10-CM

## 2024-07-09 PROCEDURE — 1123F ACP DISCUSS/DSCN MKR DOCD: CPT | Performed by: PHYSICAL MEDICINE & REHABILITATION

## 2024-07-09 PROCEDURE — G2211 COMPLEX E/M VISIT ADD ON: HCPCS | Performed by: PHYSICAL MEDICINE & REHABILITATION

## 2024-07-09 PROCEDURE — G8399 PT W/DXA RESULTS DOCUMENT: HCPCS | Performed by: PHYSICAL MEDICINE & REHABILITATION

## 2024-07-09 PROCEDURE — 1036F TOBACCO NON-USER: CPT | Performed by: PHYSICAL MEDICINE & REHABILITATION

## 2024-07-09 PROCEDURE — G8420 CALC BMI NORM PARAMETERS: HCPCS | Performed by: PHYSICAL MEDICINE & REHABILITATION

## 2024-07-09 PROCEDURE — G8427 DOCREV CUR MEDS BY ELIG CLIN: HCPCS | Performed by: PHYSICAL MEDICINE & REHABILITATION

## 2024-07-09 PROCEDURE — 1090F PRES/ABSN URINE INCON ASSESS: CPT | Performed by: PHYSICAL MEDICINE & REHABILITATION

## 2024-07-09 PROCEDURE — 99214 OFFICE O/P EST MOD 30 MIN: CPT | Performed by: PHYSICAL MEDICINE & REHABILITATION

## 2024-07-09 RX ORDER — TRAMADOL HYDROCHLORIDE 50 MG/1
50 TABLET ORAL 3 TIMES DAILY PRN
Qty: 45 TABLET | Refills: 1 | Status: SHIPPED | OUTPATIENT
Start: 2024-07-09 | End: 2024-08-08

## 2024-07-12 ENCOUNTER — TELEPHONE (OUTPATIENT)
Dept: ORTHOPEDIC SURGERY | Age: 79
End: 2024-07-12

## 2024-07-12 DIAGNOSIS — M51.36 DISC DEGENERATION, LUMBAR: ICD-10-CM

## 2024-07-12 DIAGNOSIS — M51.36 LUMBAR DEGENERATIVE DISC DISEASE: ICD-10-CM

## 2024-07-12 DIAGNOSIS — M47.816 LUMBAR SPONDYLOSIS: Primary | ICD-10-CM

## 2024-07-12 DIAGNOSIS — M47.816 LUMBAR FACET ARTHROPATHY: ICD-10-CM

## 2024-07-12 NOTE — TELEPHONE ENCOUNTER
I spoke with [facility] in regards to the patient's pacemaker being compatible with having a MRI. Per the device department she is okay to have a MRI. I returned call to patient and discussed that I did speak with [facility] and I will place the order for the MRI lumbar in her chart and the hospital will call to get her scheduled.

## 2024-07-15 RX ORDER — FUROSEMIDE 20 MG/1
10 TABLET ORAL DAILY PRN
Qty: 45 TABLET | Refills: 3 | Status: SHIPPED | OUTPATIENT
Start: 2024-07-15

## 2024-07-15 NOTE — TELEPHONE ENCOUNTER
Patients  called in regards to patients furosemide needs  a Rx sent to the pharmacy stated that they had 3 refills left and pharmacy informed them that it was cancelled please call and advise

## 2024-07-25 NOTE — OP NOTES
[facility]  OPERATIVE REPORT    Name:  Stephani Wmoack  MR#:  [id number]  :  1945  ACCOUNT #:  [de-identified]  DATE OF SERVICE:  2021    PREOPERATIVE DIAGNOSIS:  Rectal cancer. POSTOPERATIVE DIAGNOSIS:  Rectal cancer. PROCEDURE PERFORMED:  Low anterior resection, robotic-assisted. SURGEON:  Grisel Yeung MD    ASSISTANT:  Marcela Harden, certified first assist    ANESTHESIA:  general    COMPLICATIONS:  None. SPECIMENS REMOVED:  Proximal rectum     IMPLANTS:  none    ESTIMATED BLOOD LOSS:  20 mL    CONDITION AT COMPLETION:  Stable. INDICATION:  This patient is a 80-year-old white female who underwent a colonoscopy with polypectomy previously. The polypectomy site was found to have evidence of invasive adenocarcinoma. The patient was sent for consideration of partial resection. The risks, benefits and alternatives of this resection were discussed in detail with the patient and family in the office. They understood the risks, wished to proceed, and consent was given. The patient returned to the GI associate who placed a tattoo at the site of polypectomy for identification. PROCEDURE:  The patient was taken to the operating room where she underwent general endotracheal anesthetic without difficulty. She was placed on the table in supine position and lithotomy position with Davis stirrups. The abdomen and perineum were prepped and draped in routine sterile fashion. IV antibiotics were administered at the time of anesthesia. Time-out was performed identifying the patient, procedure and location. The procedure was begun by making a 1-cm incision lateral to the umbilicus on the right abdomen and the peritoneal cavity was entered with the 5-mm OptiView trocar technique. The abdomen was insufflated with CO2 gas through the trocar and the camera was introduced. Evidence of the tattoo was identified deep within the pelvis.   The patient was placed in Trendelenburg Called and spoke with patient her request of cpap/inspire. Patient stated that she is not able to use a cpap mask due to it having magnets in it and patient has a pacemaker. Patient mentioned that she does wear dentures. And wouldn't be able to keep a mask on her face. I let patient know that at this time it doesn't look like her sleep study report has been read by the provider yet. I did see where patient did not stay all night for her sleep study. She had left the lab at 10pm. Patient stated she thinks that she is interested in the Inspire Device. I told patient that I would send the MA a message regarding this information.     Please see note on 07-18-24 from [employer] at the sleep lab.    position for better visualization of the pelvic structures. Working ports were placed in the lateral left abdomen, at the umbilicus, and into the right lateral abdomen. All instruments were inserted and removed at all times under direct vision. Then the [employer] robotic system was brought to the table where the ports were docked. Instruments were carefully inserted. Majority of the dissection was performed with the Harmonic scalpel device and atraumatic grasper. The colon was mobilized along the descending portion of the white line of Toldt in order to gain adequate length for resection and anastomosis. The tattooed site was identified approximately 3 cm proximal to the peritoneal reflection in the pelvis. Once that was completely mobilized upon its mesentery, the point of transection was chosen. A linear stapling device was used to divide the bowel at approximately the rectosigmoid junction. The mesentery was then taken down for a complete mesorectal excision continuing into the pelvis with the Harmonic scalpel. This was carried past the point of the tattoo to approximately the peritoneal reflection where the rectum was again divided with the linear stapling device. Adequate length for anastomosis was assured on the descending segment. The umbilical port site was extended in order to allow removal of the specimen. A wound protector device was placed at the site and the specimen removed. The specimen was opened on the back table and the identification of the polypectomy site was performed. This was approximately 2 cm proximal to the staple line. There was no evidence of gross disease or tumor at this location. The specimen was sent for further review of pathology. The stapled end of the descending colon was then brought up to skin level through the wound protector and prepared for anastomosis.   Pursestring sutures were placed above the staple line and the staple line amputated sharply with scissors. Adequate blood flow to the segment was assured. The segment was dilated and a 29-mm EEA stapler was chosen for the anastomosis. The anvil for the stapler was placed within the segment and secured with the pursestring. This was placed back within the peritoneal cavity. A clamp was placed to close the wound protector in order to achieve pneumoperitoneum once again. The laparoscope was inserted. The anus was then dilated to two fingerbreadths and the segment serially dilated. The handle for the 29-mm stapler was then carefully inserted and advanced to the distal staple line under direct vision. The spike for the stapler was advanced and used to puncture the segment just anterior to the staple line on the mid bowel. This was connected to the anvil and closed with the appropriate tightness. The stapler was fired creating a 29-mm circular anastomosis. The staple line was inspected and it appeared complete and with good hemostasis. The pelvis was filled with saline to submerge the staple line. The proximal segment was occluded with the grasper. Insufflation was provided per rectum with the rigid sigmoidoscope. There was no sign of air leak from the staple line with good insufflation of the segment proximal  and distal to the staple line. Insufflation was released and the scope removed. All fluid was suctioned from the abdomen. Again, inspection was performed to assure complete hemostasis. Ports were all removed under direct vision. The fascia at the port sites was closed with interrupted 0 Vicryl sutures. Skin and subcu were irrigated and closed with skin staples. Sterile dressings applied. The patient tolerated the procedure well with no complication. She was awakened, extubated, and taken to Recovery in stable condition at its completion.       Gomez Perkins MD      [id number]  D:  09/03/2021 10:37  T:  09/03/2021 17:23  JOB #:  [id number]

## 2024-08-02 ENCOUNTER — HOSPITAL ENCOUNTER (OUTPATIENT)
Dept: MRI IMAGING | Age: 79
End: 2024-08-02
Attending: PHYSICAL MEDICINE & REHABILITATION
Payer: MEDICARE

## 2024-08-02 DIAGNOSIS — M51.36 DISC DEGENERATION, LUMBAR: ICD-10-CM

## 2024-08-02 DIAGNOSIS — M51.36 LUMBAR DEGENERATIVE DISC DISEASE: ICD-10-CM

## 2024-08-02 DIAGNOSIS — M47.816 LUMBAR FACET ARTHROPATHY: ICD-10-CM

## 2024-08-02 DIAGNOSIS — M47.816 LUMBAR SPONDYLOSIS: ICD-10-CM

## 2024-08-02 PROCEDURE — 72148 MRI LUMBAR SPINE W/O DYE: CPT

## 2024-08-05 ENCOUNTER — TELEPHONE (OUTPATIENT)
Dept: ORTHOPEDIC SURGERY | Age: 79
End: 2024-08-05

## 2024-08-05 NOTE — TELEPHONE ENCOUNTER
Call was returned to patient and she has been scheduled for a appointment to discuss MRI results.

## 2024-08-09 ENCOUNTER — TELEPHONE (OUTPATIENT)
Age: 79
End: 2024-08-09

## 2024-08-09 ENCOUNTER — TELEMEDICINE (OUTPATIENT)
Dept: ORTHOPEDIC SURGERY | Age: 79
End: 2024-08-09
Payer: MEDICARE

## 2024-08-09 DIAGNOSIS — M47.816 LUMBAR SPONDYLOSIS: ICD-10-CM

## 2024-08-09 DIAGNOSIS — M51.36 DISC DEGENERATION, LUMBAR: ICD-10-CM

## 2024-08-09 DIAGNOSIS — M54.16 LUMBAR RADICULOPATHY: Primary | ICD-10-CM

## 2024-08-09 PROCEDURE — 1090F PRES/ABSN URINE INCON ASSESS: CPT | Performed by: PHYSICAL MEDICINE & REHABILITATION

## 2024-08-09 PROCEDURE — 1123F ACP DISCUSS/DSCN MKR DOCD: CPT | Performed by: PHYSICAL MEDICINE & REHABILITATION

## 2024-08-09 PROCEDURE — G8420 CALC BMI NORM PARAMETERS: HCPCS | Performed by: PHYSICAL MEDICINE & REHABILITATION

## 2024-08-09 PROCEDURE — G8399 PT W/DXA RESULTS DOCUMENT: HCPCS | Performed by: PHYSICAL MEDICINE & REHABILITATION

## 2024-08-09 PROCEDURE — 99214 OFFICE O/P EST MOD 30 MIN: CPT | Performed by: PHYSICAL MEDICINE & REHABILITATION

## 2024-08-09 PROCEDURE — G2211 COMPLEX E/M VISIT ADD ON: HCPCS | Performed by: PHYSICAL MEDICINE & REHABILITATION

## 2024-08-09 PROCEDURE — G8427 DOCREV CUR MEDS BY ELIG CLIN: HCPCS | Performed by: PHYSICAL MEDICINE & REHABILITATION

## 2024-08-09 PROCEDURE — 1036F TOBACCO NON-USER: CPT | Performed by: PHYSICAL MEDICINE & REHABILITATION

## 2024-08-09 NOTE — TELEPHONE ENCOUNTER
This patient belongs to Dr. Swenson.  I performed her procedure earlier this year but Dr. Swenson can address all of her care from this point forward

## 2024-08-09 NOTE — TELEPHONE ENCOUNTER
Cardiac Clearance        Physician or Practice Requesting:DR KIKE Faulkner   : Michelle   Contact Phone Number: [phone]  Fax Number: Put in Epic   Date of Surgery/Procedure: 08/15  Type of Surgery or Procedure: Bi L 5 injection   Type of Anesthesia: local  Type of Clearance Requested: Cardiac Clearance and Medication Hold  Medication to Hold:Eliquis   Days to Hold: 3 days prior

## 2024-08-09 NOTE — PROGRESS NOTES
minimally traumatic left femur fracture, not currently on any treatment for bone density issues.  We discussed that because there is no edema within the fracture, she is not a candidate for kyphoplasty at this time.    Studies ordered today: none    Clinical Notes:   I/my clinic will serve as the continuing focal point for all needed health care services and/or medical care services that are part of ongoing PAIN care related to patient's single, serious, or complex PAIN condition with the following diagnoses:   Chronic Pain Condition that is not stable = not at the patient's treatment goal     1) lumbar spondylosis, facet arthropathy, DDD  2) lumbar radiculopathy  3) retrolisthesis L4-5    Nery Bryant, was evaluated through a synchronous (real-time) audio-video encounter. The patient (or guardian if applicable) is aware that this is a billable service, which includes applicable co-pays. This Virtual Visit was conducted with patient's (and/or legal guardian's) consent. Patient identification was verified, and a caregiver was present when appropriate.   The patient was located at Home: [address] [place], [place] [id number]  Provider was located at Facility (Appt Dept): [address] [place] [address]  Confirm you are appropriately licensed, registered, or certified to deliver care in the state where the patient is located as indicated above. If you are not or unsure, please re-schedule the visit: Yes, I confirm.         JIMENA ALEJO MD  8/9/2024,  10:47 AM

## 2024-08-09 NOTE — TELEPHONE ENCOUNTER
Cardiac Clearance           Physician or Practice Requesting:DR KIKE Faulkner   : Michelle   Contact Phone Number: [phone]  Fax Number: Put in Epic   Date of Surgery/Procedure: 08/15  Type of Surgery or Procedure: Bi L 5 injection   Type of Anesthesia: local  Type of Clearance Requested: Cardiac Clearance and Medication Hold  Medication to Hold:Eliquis   Days to Hold: 3 days prior     S/P TAVR 1/14/24 AND PAF

## 2024-08-10 NOTE — TELEPHONE ENCOUNTER
Low to moderate risk  Okay to hold Eliquis 2 to 3 days prior to the procedure, resume postoperatively ASAP, defer to operating physician.  Continue other meds without change

## 2024-08-15 ENCOUNTER — OFFICE VISIT (OUTPATIENT)
Dept: ORTHOPEDIC SURGERY | Age: 79
End: 2024-08-15

## 2024-08-15 DIAGNOSIS — M54.16 LUMBAR RADICULOPATHY: Primary | ICD-10-CM

## 2024-08-15 DIAGNOSIS — M51.36 LUMBAR DEGENERATIVE DISC DISEASE: ICD-10-CM

## 2024-08-15 DIAGNOSIS — M47.816 LUMBAR FACET ARTHROPATHY: ICD-10-CM

## 2024-08-15 DIAGNOSIS — M47.816 LUMBAR SPONDYLOSIS: ICD-10-CM

## 2024-08-15 DIAGNOSIS — M51.36 DISC DEGENERATION, LUMBAR: ICD-10-CM

## 2024-08-15 RX ORDER — TRIAMCINOLONE ACETONIDE 40 MG/ML
40 INJECTION, SUSPENSION INTRA-ARTICULAR; INTRAMUSCULAR ONCE
Status: COMPLETED | OUTPATIENT
Start: 2024-08-15 | End: 2024-08-15

## 2024-08-15 RX ADMIN — TRIAMCINOLONE ACETONIDE 40 MG: 40 INJECTION, SUSPENSION INTRA-ARTICULAR; INTRAMUSCULAR at 11:03

## 2024-08-15 NOTE — PROGRESS NOTES
Name: Nery Bryant  YOB: 1945  Gender: female  MRN: [id number]        Transforaminal HATTIE Procedure Note    Procedure: Bilateral  L5-S1 transforaminal epidural steroid injections     Precautions: Nery Bryant denies prior sensitivity to steroid, local anesthetic, iodine, or shellfish.       Consent:  Consent was obtained prior to the procedure. The procedure was discussed at length with Nery Bryant. She was given the opportunity to ask questions regarding the procedure and its associated risks.  In addition to the potential risks associated with the procedure itself, the patient was informed both verbally and in writing of potential side effects of the use glucocorticoids.  The patient appeared to comprehend the informed consent and desired to have the procedure performed, and informed consent was signed.     She was placed in a prone position on the fluoroscopy table and the skin was prepped and draped in a routine sterile fashion. The areas to be injected were each anesthetized with 1 ml of 1% Lidocaine. A 22 gauge 3.5 inch spinal needle was carefully advanced under fluoroscopic guidance to the right L5-S1 transforaminal space  0.5 ml of 70% of Omnipaque was injected to confirm proper needle placement and absence of subdural or vascular flow Once proper placement was confirmed, 0.5ml of 0.25 marcaine and 40 mg kenalog were injected through the spinal needle.       The above procedure was then repeated at the Left  L5-S1 transforaminal space.    Fluoroscopic guidance was used intermittently over a 10-minute period to insure proper needle placement and her safety. A hard copy of the fluoroscopic image has been placed in her chart and is saved on the C-arm hard drive. She was monitored for 30 minutes after the procedure and discharged home in a stable fashion with a routine follow up.    Procedural Diagnosis:     ICD-10-CM    1. Lumbar radiculopathy  M54.16 FL NERVE

## 2024-08-22 RX ORDER — LOSARTAN POTASSIUM 50 MG/1
50 TABLET ORAL DAILY
Qty: 90 TABLET | Refills: 3 | Status: SHIPPED | OUTPATIENT
Start: 2024-08-22

## 2024-08-22 NOTE — TELEPHONE ENCOUNTER
Verified rx. Last OV 06/04/24. Erx to pharm on file. Telephone encounter dated 04/03/24.    Requested Prescriptions     Pending Prescriptions Disp Refills    losartan (COZAAR) 50 MG tablet [Pharmacy Med Name: LOSARTAN TABS 50MG] 90 tablet 3     Sig: Take 1 tablet by mouth daily

## 2024-09-03 ENCOUNTER — PATIENT MESSAGE (OUTPATIENT)
Dept: ORTHOPEDIC SURGERY | Age: 79
End: 2024-09-03

## 2024-09-03 DIAGNOSIS — M54.16 LUMBAR RADICULOPATHY: Primary | ICD-10-CM

## 2024-09-03 RX ORDER — TRAMADOL HYDROCHLORIDE 50 MG/1
50 TABLET ORAL 3 TIMES DAILY PRN
Qty: 45 TABLET | Refills: 1 | Status: SHIPPED | OUTPATIENT
Start: 2024-09-03 | End: 2024-09-08

## 2024-09-18 ENCOUNTER — OFFICE VISIT (OUTPATIENT)
Age: 79
End: 2024-09-18
Payer: MEDICARE

## 2024-09-18 VITALS
WEIGHT: 129.4 LBS | SYSTOLIC BLOOD PRESSURE: 136 MMHG | HEART RATE: 60 BPM | HEIGHT: 66 IN | BODY MASS INDEX: 20.79 KG/M2 | DIASTOLIC BLOOD PRESSURE: 66 MMHG

## 2024-09-18 DIAGNOSIS — Z95.2 S/P TRANSCATHETER MITRAL VALVE REPLACEMENT (TMVR): ICD-10-CM

## 2024-09-18 DIAGNOSIS — Z95.0 S/P BIVENTRICULAR CARDIAC PACEMAKER PROCEDURE: Chronic | ICD-10-CM

## 2024-09-18 DIAGNOSIS — I48.0 PAROXYSMAL ATRIAL FIBRILLATION (HCC): Chronic | ICD-10-CM

## 2024-09-18 DIAGNOSIS — I65.23 BILATERAL CAROTID ARTERY STENOSIS: ICD-10-CM

## 2024-09-18 DIAGNOSIS — Z95.2 S/P TAVR (TRANSCATHETER AORTIC VALVE REPLACEMENT): Primary | ICD-10-CM

## 2024-09-18 DIAGNOSIS — I10 ESSENTIAL HYPERTENSION: Chronic | ICD-10-CM

## 2024-09-18 DIAGNOSIS — Z95.2 S/P AVR (AORTIC VALVE REPLACEMENT): Chronic | ICD-10-CM

## 2024-09-18 DIAGNOSIS — I25.10 CORONARY ARTERY DISEASE INVOLVING NATIVE CORONARY ARTERY OF NATIVE HEART WITHOUT ANGINA PECTORIS: ICD-10-CM

## 2024-09-18 PROCEDURE — G8420 CALC BMI NORM PARAMETERS: HCPCS | Performed by: INTERNAL MEDICINE

## 2024-09-18 PROCEDURE — 99214 OFFICE O/P EST MOD 30 MIN: CPT | Performed by: INTERNAL MEDICINE

## 2024-09-18 PROCEDURE — G8399 PT W/DXA RESULTS DOCUMENT: HCPCS | Performed by: INTERNAL MEDICINE

## 2024-09-18 PROCEDURE — 1123F ACP DISCUSS/DSCN MKR DOCD: CPT | Performed by: INTERNAL MEDICINE

## 2024-09-18 PROCEDURE — 1090F PRES/ABSN URINE INCON ASSESS: CPT | Performed by: INTERNAL MEDICINE

## 2024-09-18 PROCEDURE — G8427 DOCREV CUR MEDS BY ELIG CLIN: HCPCS | Performed by: INTERNAL MEDICINE

## 2024-09-18 PROCEDURE — 1036F TOBACCO NON-USER: CPT | Performed by: INTERNAL MEDICINE

## 2024-09-18 PROCEDURE — 93000 ELECTROCARDIOGRAM COMPLETE: CPT | Performed by: INTERNAL MEDICINE

## 2024-09-18 PROCEDURE — 3078F DIAST BP <80 MM HG: CPT | Performed by: INTERNAL MEDICINE

## 2024-09-18 PROCEDURE — 3075F SYST BP GE 130 - 139MM HG: CPT | Performed by: INTERNAL MEDICINE

## 2024-09-18 RX ORDER — MAGNESIUM OXIDE 400 MG/1
400 TABLET ORAL NIGHTLY
Qty: 90 TABLET | Refills: 3 | Status: SHIPPED | OUTPATIENT
Start: 2024-09-18

## 2024-09-18 RX ORDER — ATORVASTATIN CALCIUM 20 MG/1
10 TABLET, FILM COATED ORAL EVERY EVENING
Qty: 45 TABLET | Refills: 3 | Status: SHIPPED | OUTPATIENT
Start: 2024-09-18

## 2024-09-18 RX ORDER — PANTOPRAZOLE SODIUM 40 MG/1
40 TABLET, DELAYED RELEASE ORAL
Qty: 90 TABLET | Refills: 3 | Status: SHIPPED | OUTPATIENT
Start: 2024-09-18

## 2024-09-18 RX ORDER — METOPROLOL SUCCINATE 25 MG/1
25 TABLET, EXTENDED RELEASE ORAL DAILY
Qty: 90 TABLET | Refills: 3 | Status: SHIPPED | OUTPATIENT
Start: 2024-09-18

## 2024-09-18 RX ORDER — LOSARTAN POTASSIUM 50 MG/1
50 TABLET ORAL DAILY
Qty: 90 TABLET | Refills: 3 | Status: SHIPPED | OUTPATIENT
Start: 2024-09-18

## 2024-09-18 RX ORDER — POTASSIUM CHLORIDE 1500 MG/1
20 TABLET, EXTENDED RELEASE ORAL DAILY
Qty: 90 TABLET | Refills: 3 | Status: SHIPPED | OUTPATIENT
Start: 2024-09-18

## 2024-09-30 DIAGNOSIS — L65.9 HAIR LOSS: ICD-10-CM

## 2024-09-30 DIAGNOSIS — E89.0 HYPOTHYROIDISM, POSTSURGICAL: ICD-10-CM

## 2024-09-30 DIAGNOSIS — E55.9 HYPOVITAMINOSIS D: ICD-10-CM

## 2024-09-30 DIAGNOSIS — Z79.01 LONG TERM CURRENT USE OF ANTICOAGULANT THERAPY: Chronic | ICD-10-CM

## 2024-09-30 DIAGNOSIS — E11.29 TYPE 2 DIABETES MELLITUS WITH MICROALBUMINURIA, WITH LONG-TERM CURRENT USE OF INSULIN (HCC): Chronic | ICD-10-CM

## 2024-09-30 DIAGNOSIS — R80.9 TYPE 2 DIABETES MELLITUS WITH MICROALBUMINURIA, WITH LONG-TERM CURRENT USE OF INSULIN (HCC): Chronic | ICD-10-CM

## 2024-09-30 DIAGNOSIS — D50.8 OTHER IRON DEFICIENCY ANEMIA: ICD-10-CM

## 2024-09-30 DIAGNOSIS — E78.2 MIXED HYPERLIPIDEMIA: Chronic | ICD-10-CM

## 2024-09-30 DIAGNOSIS — Z79.4 TYPE 2 DIABETES MELLITUS WITH MICROALBUMINURIA, WITH LONG-TERM CURRENT USE OF INSULIN (HCC): Chronic | ICD-10-CM

## 2024-09-30 LAB
ALBUMIN SERPL-MCNC: 3.5 G/DL (ref 3.2–4.6)
ALBUMIN/GLOB SERPL: 1 (ref 1–1.9)
ALP SERPL-CCNC: 97 U/L (ref 35–104)
ALT SERPL-CCNC: 49 U/L (ref 8–45)
ANION GAP SERPL CALC-SCNC: 13 MMOL/L (ref 9–18)
AST SERPL-CCNC: 42 U/L (ref 15–37)
BASOPHILS # BLD: 0 K/UL (ref 0–0.2)
BASOPHILS NFR BLD: 1 % (ref 0–2)
BILIRUB SERPL-MCNC: 0.3 MG/DL (ref 0–1.2)
BUN SERPL-MCNC: 19 MG/DL (ref 8–23)
CALCIUM SERPL-MCNC: 8.6 MG/DL (ref 8.8–10.2)
CHLORIDE SERPL-SCNC: 107 MMOL/L (ref 98–107)
CHOLEST SERPL-MCNC: 71 MG/DL (ref 0–200)
CO2 SERPL-SCNC: 24 MMOL/L (ref 20–28)
CREAT SERPL-MCNC: 0.8 MG/DL (ref 0.6–1.1)
DIFFERENTIAL METHOD BLD: ABNORMAL
EOSINOPHIL # BLD: 0.1 K/UL (ref 0–0.8)
EOSINOPHIL NFR BLD: 1 % (ref 0.5–7.8)
ERYTHROCYTE [DISTWIDTH] IN BLOOD BY AUTOMATED COUNT: 17.2 % (ref 11.9–14.6)
EST. AVERAGE GLUCOSE BLD GHB EST-MCNC: 152 MG/DL
GLOBULIN SER CALC-MCNC: 3.4 G/DL (ref 2.3–3.5)
GLUCOSE SERPL-MCNC: 73 MG/DL (ref 70–99)
HBA1C MFR BLD: 6.9 % (ref 0–5.6)
HCT VFR BLD AUTO: 36.3 % (ref 35.8–46.3)
HDLC SERPL-MCNC: 30 MG/DL (ref 40–60)
HDLC SERPL: 2.4 (ref 0–5)
HGB BLD-MCNC: 10.3 G/DL (ref 11.7–15.4)
IMM GRANULOCYTES # BLD AUTO: 0 K/UL (ref 0–0.5)
IMM GRANULOCYTES NFR BLD AUTO: 0 % (ref 0–5)
IRON SATN MFR SERPL: 6 % (ref 20–50)
IRON SERPL-MCNC: 24 UG/DL (ref 35–100)
LDLC SERPL CALC-MCNC: 30 MG/DL (ref 0–100)
LYMPHOCYTES # BLD: 2.4 K/UL (ref 0.5–4.6)
LYMPHOCYTES NFR BLD: 29 % (ref 13–44)
MCH RBC QN AUTO: 26.3 PG (ref 26.1–32.9)
MCHC RBC AUTO-ENTMCNC: 28.4 G/DL (ref 31.4–35)
MCV RBC AUTO: 92.6 FL (ref 82–102)
MONOCYTES # BLD: 0.8 K/UL (ref 0.1–1.3)
MONOCYTES NFR BLD: 10 % (ref 4–12)
NEUTS SEG # BLD: 4.8 K/UL (ref 1.7–8.2)
NEUTS SEG NFR BLD: 59 % (ref 43–78)
NRBC # BLD: 0 K/UL (ref 0–0.2)
PLATELET # BLD AUTO: 159 K/UL (ref 150–450)
PMV BLD AUTO: 10.5 FL (ref 9.4–12.3)
POTASSIUM SERPL-SCNC: 4.4 MMOL/L (ref 3.5–5.1)
PROT SERPL-MCNC: 6.9 G/DL (ref 6.3–8.2)
RBC # BLD AUTO: 3.92 M/UL (ref 4.05–5.2)
SODIUM SERPL-SCNC: 144 MMOL/L (ref 136–145)
TIBC SERPL-MCNC: 370 UG/DL (ref 240–450)
TRIGL SERPL-MCNC: 58 MG/DL (ref 0–150)
TSH, 3RD GENERATION: 1.28 UIU/ML (ref 0.27–4.2)
UIBC SERPL-MCNC: 346 UG/DL (ref 112–347)
VLDLC SERPL CALC-MCNC: 12 MG/DL (ref 6–23)
WBC # BLD AUTO: 8.1 K/UL (ref 4.3–11.1)

## 2024-10-01 ENCOUNTER — OFFICE VISIT (OUTPATIENT)
Dept: RHEUMATOLOGY | Age: 79
End: 2024-10-01
Payer: MEDICARE

## 2024-10-01 VITALS
WEIGHT: 128.8 LBS | HEART RATE: 60 BPM | SYSTOLIC BLOOD PRESSURE: 143 MMHG | DIASTOLIC BLOOD PRESSURE: 62 MMHG | BODY MASS INDEX: 20.7 KG/M2 | HEIGHT: 66 IN

## 2024-10-01 DIAGNOSIS — Z95.2 S/P TAVR (TRANSCATHETER AORTIC VALVE REPLACEMENT): ICD-10-CM

## 2024-10-01 DIAGNOSIS — E11.29 TYPE 2 DIABETES MELLITUS WITH MICROALBUMINURIA, WITH LONG-TERM CURRENT USE OF INSULIN (HCC): Chronic | ICD-10-CM

## 2024-10-01 DIAGNOSIS — S72.002S CLOSED FRACTURE OF LEFT HIP REQUIRING OPERATIVE REPAIR, SEQUELA: ICD-10-CM

## 2024-10-01 DIAGNOSIS — M81.0 POSTMENOPAUSAL OSTEOPOROSIS: ICD-10-CM

## 2024-10-01 DIAGNOSIS — S32.010A CLOSED COMPRESSION FRACTURE OF BODY OF L1 VERTEBRA (HCC): ICD-10-CM

## 2024-10-01 DIAGNOSIS — E83.51 HYPOCALCEMIA: ICD-10-CM

## 2024-10-01 DIAGNOSIS — I10 ESSENTIAL HYPERTENSION: Chronic | ICD-10-CM

## 2024-10-01 DIAGNOSIS — Z87.898 HISTORY OF SEIZURE: ICD-10-CM

## 2024-10-01 DIAGNOSIS — Z87.81 HISTORY OF WRIST FRACTURE: ICD-10-CM

## 2024-10-01 DIAGNOSIS — M54.9 CHRONIC BACK PAIN GREATER THAN 3 MONTHS DURATION: ICD-10-CM

## 2024-10-01 DIAGNOSIS — M51.360 DEGENERATION OF INTERVERTEBRAL DISC OF LUMBAR REGION WITH DISCOGENIC BACK PAIN: ICD-10-CM

## 2024-10-01 DIAGNOSIS — I25.10 CORONARY ARTERY DISEASE INVOLVING NATIVE CORONARY ARTERY OF NATIVE HEART WITHOUT ANGINA PECTORIS: ICD-10-CM

## 2024-10-01 DIAGNOSIS — J67.9 HYPERSENSITIVITY PNEUMONITIS (HCC): ICD-10-CM

## 2024-10-01 DIAGNOSIS — M81.0 POSTMENOPAUSAL OSTEOPOROSIS: Primary | ICD-10-CM

## 2024-10-01 DIAGNOSIS — E78.2 MIXED HYPERLIPIDEMIA: Chronic | ICD-10-CM

## 2024-10-01 DIAGNOSIS — Z79.4 TYPE 2 DIABETES MELLITUS WITH MICROALBUMINURIA, WITH LONG-TERM CURRENT USE OF INSULIN (HCC): Chronic | ICD-10-CM

## 2024-10-01 DIAGNOSIS — G89.29 CHRONIC BACK PAIN GREATER THAN 3 MONTHS DURATION: ICD-10-CM

## 2024-10-01 DIAGNOSIS — I48.0 PAROXYSMAL ATRIAL FIBRILLATION (HCC): Chronic | ICD-10-CM

## 2024-10-01 DIAGNOSIS — Z95.2 S/P MITRAL VALVE REPLACEMENT: Chronic | ICD-10-CM

## 2024-10-01 DIAGNOSIS — R80.9 TYPE 2 DIABETES MELLITUS WITH MICROALBUMINURIA, WITH LONG-TERM CURRENT USE OF INSULIN (HCC): Chronic | ICD-10-CM

## 2024-10-01 DIAGNOSIS — E89.0 HYPOTHYROIDISM, POSTSURGICAL: Chronic | ICD-10-CM

## 2024-10-01 PROCEDURE — 1090F PRES/ABSN URINE INCON ASSESS: CPT | Performed by: INTERNAL MEDICINE

## 2024-10-01 PROCEDURE — G8399 PT W/DXA RESULTS DOCUMENT: HCPCS | Performed by: INTERNAL MEDICINE

## 2024-10-01 PROCEDURE — 3078F DIAST BP <80 MM HG: CPT | Performed by: INTERNAL MEDICINE

## 2024-10-01 PROCEDURE — G8420 CALC BMI NORM PARAMETERS: HCPCS | Performed by: INTERNAL MEDICINE

## 2024-10-01 PROCEDURE — G8484 FLU IMMUNIZE NO ADMIN: HCPCS | Performed by: INTERNAL MEDICINE

## 2024-10-01 PROCEDURE — 3077F SYST BP >= 140 MM HG: CPT | Performed by: INTERNAL MEDICINE

## 2024-10-01 PROCEDURE — 1123F ACP DISCUSS/DSCN MKR DOCD: CPT | Performed by: INTERNAL MEDICINE

## 2024-10-01 PROCEDURE — 3044F HG A1C LEVEL LT 7.0%: CPT | Performed by: INTERNAL MEDICINE

## 2024-10-01 PROCEDURE — 99205 OFFICE O/P NEW HI 60 MIN: CPT | Performed by: INTERNAL MEDICINE

## 2024-10-01 PROCEDURE — G8427 DOCREV CUR MEDS BY ELIG CLIN: HCPCS | Performed by: INTERNAL MEDICINE

## 2024-10-01 PROCEDURE — 1036F TOBACCO NON-USER: CPT | Performed by: INTERNAL MEDICINE

## 2024-10-01 NOTE — PROGRESS NOTES
09/30/2024    HGB 10.3 (L) 09/30/2024    HCT 36.3 09/30/2024    MCV 92.6 09/30/2024     09/30/2024     Lab Results   Component Value Date    BUN 19 09/30/2024    CREATININE 0.80 09/30/2024    AST 42 (H) 09/30/2024    ALT 49 (H) 09/30/2024     No results found for: \"PTH\"     ASSESSMENT / PLAN:   Ms. Nery Bryant is a 78 y.o.  with  has a past medical history of Anesthesia complication, Anticoagulant long-term use, Aortic valve insufficiency, Atrial fibrillation (HCC), CAD (coronary artery disease), Colon cancer (HCC), COVID-19, Current use of long term anticoagulation, Diabetes mellitus, insulin dependent (IDDM), controlled, Fracture, femur (HCC), GERD (gastroesophageal reflux disease), Heart failure (HCC), History of anemia, History of cardioversion, History of colon cancer, History of petit-mal seizures, History of seizure, History of thyroidectomy, History of transcatheter aortic valve replacement (TAVR), Hyperlipidemia, Hypertension, Hypothyroidism, Mitral stenosis with insufficiency, On home O2, Oral mass, Other unknown and unspecified cause of morbidity or mortality, Pacemaker, Paroxysmal atrial fibrillation (HCC), PONV (postoperative nausea and vomiting), Seizures (HCC), SSS (sick sinus syndrome) (HCC), Thyroid nodule, Type 2 diabetes mellitus without complication (HCC), Unspecified essential hypertension, and Visit for monitoring Tikosyn therapy. is being seen in consultation  for evaluation of osteoporosis.  Risk factors include menopause at age 46, diabetes, previous steroid use for hypersensitivity pneumonitis, multiple fractures: Left wrist, L1 compression fracture, left femoral neck fracture. Previously used forteo for 2 years. Multiple cardiac issues have recently been addressed in the last year including replacement  aortic and mitral valve earlier this year.  We discussed anabolic therapy due to her high risk nature with Evenity and discussed the black box warning for increased

## 2024-10-02 LAB
ANION GAP SERPL CALC-SCNC: 13 MMOL/L (ref 9–18)
BUN SERPL-MCNC: 15 MG/DL (ref 8–23)
CALCIUM SERPL-MCNC: 8.6 MG/DL (ref 8.8–10.2)
CHLORIDE SERPL-SCNC: 108 MMOL/L (ref 98–107)
CO2 SERPL-SCNC: 26 MMOL/L (ref 20–28)
CREAT SERPL-MCNC: 0.8 MG/DL (ref 0.6–1.1)
GLUCOSE SERPL-MCNC: 148 MG/DL (ref 70–99)
MAGNESIUM SERPL-MCNC: 1.9 MG/DL (ref 1.8–2.4)
PHOSPHATE SERPL-MCNC: 3.4 MG/DL (ref 2.5–4.5)
POTASSIUM SERPL-SCNC: 4.5 MMOL/L (ref 3.5–5.1)
SODIUM SERPL-SCNC: 147 MMOL/L (ref 136–145)

## 2024-10-03 LAB
CALCIUM SERPL-MCNC: 8.6 MG/DL (ref 8.8–10.2)
PTH-INTACT SERPL-MCNC: 74.9 PG/ML (ref 15–65)

## 2024-10-04 ENCOUNTER — OFFICE VISIT (OUTPATIENT)
Dept: INTERNAL MEDICINE CLINIC | Facility: CLINIC | Age: 79
End: 2024-10-04
Payer: MEDICARE

## 2024-10-04 VITALS
HEIGHT: 66 IN | WEIGHT: 129 LBS | HEART RATE: 60 BPM | SYSTOLIC BLOOD PRESSURE: 148 MMHG | BODY MASS INDEX: 20.73 KG/M2 | DIASTOLIC BLOOD PRESSURE: 70 MMHG | OXYGEN SATURATION: 95 %

## 2024-10-04 DIAGNOSIS — Z79.4 TYPE 2 DIABETES MELLITUS WITH MICROALBUMINURIA, WITH LONG-TERM CURRENT USE OF INSULIN (HCC): ICD-10-CM

## 2024-10-04 DIAGNOSIS — E89.0 HYPOTHYROIDISM, POSTSURGICAL: ICD-10-CM

## 2024-10-04 DIAGNOSIS — C20 RECTAL CANCER (HCC): ICD-10-CM

## 2024-10-04 DIAGNOSIS — Z79.01 LONG TERM CURRENT USE OF ANTICOAGULANT THERAPY: Chronic | ICD-10-CM

## 2024-10-04 DIAGNOSIS — Z23 NEED FOR TD VACCINE: ICD-10-CM

## 2024-10-04 DIAGNOSIS — Z95.2 S/P AVR (AORTIC VALVE REPLACEMENT): ICD-10-CM

## 2024-10-04 DIAGNOSIS — R53.83 OTHER FATIGUE: ICD-10-CM

## 2024-10-04 DIAGNOSIS — R80.9 TYPE 2 DIABETES MELLITUS WITH MICROALBUMINURIA, WITH LONG-TERM CURRENT USE OF INSULIN (HCC): ICD-10-CM

## 2024-10-04 DIAGNOSIS — D50.9 IRON DEFICIENCY ANEMIA, UNSPECIFIED IRON DEFICIENCY ANEMIA TYPE: Primary | ICD-10-CM

## 2024-10-04 DIAGNOSIS — E11.29 TYPE 2 DIABETES MELLITUS WITH MICROALBUMINURIA, WITH LONG-TERM CURRENT USE OF INSULIN (HCC): ICD-10-CM

## 2024-10-04 DIAGNOSIS — E78.2 MIXED HYPERLIPIDEMIA: Chronic | ICD-10-CM

## 2024-10-04 DIAGNOSIS — I48.0 PAROXYSMAL ATRIAL FIBRILLATION (HCC): Chronic | ICD-10-CM

## 2024-10-04 DIAGNOSIS — I25.10 CORONARY ARTERY DISEASE INVOLVING NATIVE CORONARY ARTERY OF NATIVE HEART WITHOUT ANGINA PECTORIS: ICD-10-CM

## 2024-10-04 PROCEDURE — 1036F TOBACCO NON-USER: CPT | Performed by: NURSE PRACTITIONER

## 2024-10-04 PROCEDURE — 99215 OFFICE O/P EST HI 40 MIN: CPT | Performed by: NURSE PRACTITIONER

## 2024-10-04 PROCEDURE — 3078F DIAST BP <80 MM HG: CPT | Performed by: NURSE PRACTITIONER

## 2024-10-04 PROCEDURE — 3077F SYST BP >= 140 MM HG: CPT | Performed by: NURSE PRACTITIONER

## 2024-10-04 PROCEDURE — G8484 FLU IMMUNIZE NO ADMIN: HCPCS | Performed by: NURSE PRACTITIONER

## 2024-10-04 PROCEDURE — 1090F PRES/ABSN URINE INCON ASSESS: CPT | Performed by: NURSE PRACTITIONER

## 2024-10-04 PROCEDURE — G8420 CALC BMI NORM PARAMETERS: HCPCS | Performed by: NURSE PRACTITIONER

## 2024-10-04 PROCEDURE — G8427 DOCREV CUR MEDS BY ELIG CLIN: HCPCS | Performed by: NURSE PRACTITIONER

## 2024-10-04 PROCEDURE — 3044F HG A1C LEVEL LT 7.0%: CPT | Performed by: NURSE PRACTITIONER

## 2024-10-04 PROCEDURE — G8399 PT W/DXA RESULTS DOCUMENT: HCPCS | Performed by: NURSE PRACTITIONER

## 2024-10-04 PROCEDURE — 1123F ACP DISCUSS/DSCN MKR DOCD: CPT | Performed by: NURSE PRACTITIONER

## 2024-10-04 RX ORDER — INSULIN ASPART 100 [IU]/ML
INJECTION, SOLUTION INTRAVENOUS; SUBCUTANEOUS
Qty: 5 ADJUSTABLE DOSE PRE-FILLED PEN SYRINGE | Refills: 3 | Status: SHIPPED | OUTPATIENT
Start: 2024-10-04

## 2024-10-04 RX ORDER — LEVOTHYROXINE SODIUM 125 UG/1
125 TABLET ORAL
Qty: 100 TABLET | Refills: 3 | Status: SHIPPED | OUTPATIENT
Start: 2024-10-04

## 2024-10-04 ASSESSMENT — PATIENT HEALTH QUESTIONNAIRE - PHQ9
SUM OF ALL RESPONSES TO PHQ QUESTIONS 1-9: 0
SUM OF ALL RESPONSES TO PHQ9 QUESTIONS 1 & 2: 0
1. LITTLE INTEREST OR PLEASURE IN DOING THINGS: NOT AT ALL
SUM OF ALL RESPONSES TO PHQ QUESTIONS 1-9: 0
2. FEELING DOWN, DEPRESSED OR HOPELESS: NOT AT ALL

## 2024-10-04 NOTE — PROGRESS NOTES
(COLACE, DULCOLAX) 100 MG CAPS Take 100 mg by mouth 2 times daily (Patient taking differently: Take 100 mg by mouth daily as needed)      ferrous sulfate (IRON 325) 325 (65 Fe) MG tablet Take 1 tablet by mouth daily (with breakfast) Needs to be taken on empty stomach before breakfast      Insulin Degludec (TRESIBA SC) Inject 43 Units into the skin at bedtime      aspirin 81 MG chewable tablet Take 1 tablet by mouth daily      vitamin D (CHOLECALCIFEROL) 25 MCG (1000 UT) TABS tablet Take 2 tablets by mouth 2 times daily      OXYGEN Inhale into the lungs 3L/NC. Patient is now on 1L of oxygen. (Patient not taking: Reported on 10/1/2024)       No current facility-administered medications for this visit.         Reviewed and updated this visit by provider:  Tobacco  Allergies  Meds  Problems  Med Hx  Surg Hx  Fam Hx         Review of Systems   Constitutional:  Positive for fatigue.   HENT:  Negative for sneezing.    Respiratory:  Negative for cough, chest tightness, shortness of breath and wheezing.    Cardiovascular:  Negative for chest pain, palpitations and leg swelling.        (Aortic valve replacement mechanical)   Gastrointestinal:  Negative for abdominal pain.   Musculoskeletal:  Negative for arthralgias.   Skin:  Negative for color change.   Allergic/Immunologic: Negative for environmental allergies.   Neurological:  Negative for dizziness and headaches.   Hematological:  Bruises/bleeds easily (Anticoagulation therapy secondary to aortic valve replacement).        OBJECTIVE:    BP (!) 148/70 (Site: Left Upper Arm, Position: Sitting) Comment: recheck by Norma MIRELES  Pulse 60   Ht 1.676 m (5' 6\")   Wt 58.5 kg (129 lb)   SpO2 95%   BMI 20.82 kg/m²      Physical Exam  Vitals and nursing note reviewed.   Constitutional:       Appearance: Normal appearance. She is well-groomed.   HENT:      Head: Normocephalic.      Right Ear: Hearing and external ear normal.      Left Ear: Hearing and external ear normal.

## 2024-10-11 ENCOUNTER — OFFICE VISIT (OUTPATIENT)
Dept: ENDOCRINOLOGY | Age: 79
End: 2024-10-11

## 2024-10-11 VITALS
OXYGEN SATURATION: 96 % | DIASTOLIC BLOOD PRESSURE: 60 MMHG | BODY MASS INDEX: 20.4 KG/M2 | SYSTOLIC BLOOD PRESSURE: 118 MMHG | WEIGHT: 126.4 LBS | HEART RATE: 60 BPM

## 2024-10-11 DIAGNOSIS — M80.00XA AGE-RELATED OSTEOPOROSIS WITH CURRENT PATHOLOGICAL FRACTURE, INITIAL ENCOUNTER: ICD-10-CM

## 2024-10-11 DIAGNOSIS — E83.51 HYPOCALCEMIA: ICD-10-CM

## 2024-10-11 DIAGNOSIS — R79.89 ELEVATED PARATHYROID HORMONE: Primary | ICD-10-CM

## 2024-10-11 DIAGNOSIS — E89.0 HYPOTHYROIDISM, POSTSURGICAL: Chronic | ICD-10-CM

## 2024-10-11 LAB
25(OH)D3 SERPL-MCNC: 43.3 NG/ML (ref 30–100)
ALBUMIN SERPL-MCNC: 3.7 G/DL (ref 3.2–4.6)
ALBUMIN/GLOB SERPL: 1 (ref 1–1.9)
ALP SERPL-CCNC: 105 U/L (ref 35–104)
ALT SERPL-CCNC: 28 U/L (ref 8–45)
ANION GAP SERPL CALC-SCNC: 11 MMOL/L (ref 9–18)
AST SERPL-CCNC: 22 U/L (ref 15–37)
BILIRUB SERPL-MCNC: 0.4 MG/DL (ref 0–1.2)
BUN SERPL-MCNC: 28 MG/DL (ref 8–23)
CALCIUM SERPL-MCNC: 9 MG/DL (ref 8.8–10.2)
CALCIUM SERPL-MCNC: 9 MG/DL (ref 8.8–10.2)
CHLORIDE SERPL-SCNC: 105 MMOL/L (ref 98–107)
CO2 SERPL-SCNC: 25 MMOL/L (ref 20–28)
CREAT SERPL-MCNC: 0.89 MG/DL (ref 0.6–1.1)
GLOBULIN SER CALC-MCNC: 3.6 G/DL (ref 2.3–3.5)
GLUCOSE SERPL-MCNC: 61 MG/DL (ref 70–99)
POTASSIUM SERPL-SCNC: 4.4 MMOL/L (ref 3.5–5.1)
PROT SERPL-MCNC: 7.3 G/DL (ref 6.3–8.2)
PTH-INTACT SERPL-MCNC: 88.4 PG/ML (ref 15–65)
SODIUM SERPL-SCNC: 141 MMOL/L (ref 136–145)

## 2024-10-11 RX ORDER — AMOXICILLIN 500 MG/1
500 TABLET, FILM COATED ORAL
COMMUNITY
Start: 2024-10-07

## 2024-10-11 ASSESSMENT — ENCOUNTER SYMPTOMS
CHEST TIGHTNESS: 0
ABDOMINAL PAIN: 0
SHORTNESS OF BREATH: 0
COUGH: 0
DIARRHEA: 0
VOMITING: 0
BACK PAIN: 1
CONSTIPATION: 0
NAUSEA: 0

## 2024-10-11 NOTE — ASSESSMENT & PLAN NOTE
Plan to discuss with Rheumatology.  Consider resuming bisphosphonate therapy, would need to consider risk of transient hypocalcemia, favor oral over IV.  Next DXA is 10/15/2024.

## 2024-10-11 NOTE — PROGRESS NOTES
Atrial fibrillation (HCC)     CAD (coronary artery disease)     Followed by [facility]-    Colon cancer (HCC) 09/01/2021    COVID-19 05/15/2023    not hospitalized, coughing, runny nose    Current use of long term anticoagulation     Eliquis and Aspirin    Diabetes mellitus, insulin dependent (IDDM), controlled 05/11/2016    insulin reliant/avg fbs 100-120,  s.s of hypoglycemia low 70's /A1c 6.0    Fracture, femur (HCC) 09/21/2009    GERD (gastroesophageal reflux disease)     states rarely PRN medication    Heart failure (HCC)     EF 55-60 % 9/9/20    History of anemia     History of cardioversion     History of colon cancer     History of petit-mal seizures     none in 15 years    History of seizure     no seizure activity since 1980's    History of thyroidectomy     History of transcatheter aortic valve replacement (TAVR)     TAVR and TMVR    Hyperlipidemia     Hypertension     states no longer on medication    Hypothyroidism     Mitral stenosis with insufficiency     On home O2     2L    Oral mass     Other unknown and unspecified cause of morbidity or mortality     HLD    Pacemaker     [employer]- followed by [facility]- last device check 3/1/24- not pacemaker dep    Paroxysmal atrial fibrillation (HCC)     PONV (postoperative nausea and vomiting)     post op nausea. pt does well with antiemetic    Seizures (HCC)     SSS (sick sinus syndrome) (HCC)     Type 2 diabetes mellitus without complication (HCC)     Unspecified essential hypertension     Visit for monitoring Tikosyn therapy     pt takes Tikosyn BID       Past Surgical History:   Procedure Laterality Date    ABLATION OF DYSRHYTHMIC FOCUS  2015    AORTIC VALVE REPLACEMENT  2015    APPENDECTOMY      BRONCHOSCOPY N/A 07/18/2023    BRONCHOSCOPY ALVEOLAR LAVAGE performed by Juno Sandoval MD at [facility] ENDOSCOPY    CARDIAC CATHETERIZATION      CARDIAC PROCEDURE N/A 01/17/2024    Left heart cath / coronary angiography performed by Iveth

## 2024-10-13 LAB — CA-I SERPL ISE-MCNC: 4.9 MG/DL (ref 4.5–5.6)

## 2024-10-14 ASSESSMENT — ENCOUNTER SYMPTOMS
CHEST TIGHTNESS: 0
ABDOMINAL PAIN: 0
COUGH: 0
WHEEZING: 0
COLOR CHANGE: 0
SHORTNESS OF BREATH: 0

## 2024-10-18 DIAGNOSIS — R79.89 ELEVATED PARATHYROID HORMONE: ICD-10-CM

## 2024-10-18 NOTE — PROGRESS NOTES
[facility] Hematology and Oncology: New Patient - Consultation    Chief Complaint   Patient presents with    New Patient     Reason for Referral: Iron deficiency anemia, unspecified iron deficiency anemia type  Referring Provider: RICCI Kennedy - NP  Family History of Cancer/Hematologic Disorders: Family history significant for sister with breast cancer.     History of Present Illness:  Ms. Bryant is a 78 y.o. female who presents today in referral from LUIS M Gordon for consultation regarding anemia.  The past medical history is significant for dx of colon CA s/p partial colon resection- no chemo, a-fib RVR on coumadin and tikosyn, aortic and mitral valve replacement, HTN, DM2, HLD, and Hypothyroidism  Followed by Cardiology, Endocrinology, Rheumatology, Orthopedics, ENT    Today, patient is here for consultation with her .  She reported chronic fatigue for over a year.  Her symptoms started in July 2023 with acute shortness of breath that woke her up from sleep.  She was first worked up for pulmonary disease but was ultimately diagnosed with valve failure.  She is status post valve replacement in 2015/bovine valves.  In January 2024 these valves were replaced after acute changes were noted.  She has been doing much better overall.  She is off supplemental oxygen.  She continues with close follow-up w cardiology.  She is here for evaluation of ELIZABETH.  Plan to proceed with IV iron at this time.  Plan to p/w labs including cbc/smear/nutritional labs and TSH.  Will also check electrolytes in prep for IV iron.  ADDENDUM: case d/w antelmo Robin with IV iron.      Chronological Events:  5/10/24 - Met with PCP ([facility])  Here for f/u for DM  c/o SOB with exertion, on 2L O2 NC  Hair loss reported. ELIZABETH noted.  RTC in 3-4 months  Abnormal labs (5/10/24 - [facility])  Globulin - 3.8              Alk Phos - 115                        Hgb A1C - 6.7  RBC - 3.69                 Hgb - 10.6                              Hct -

## 2024-10-19 LAB
CALCIUM 24H UR-MCNC: 0.8 MG/DL
CALCIUM 24H UR-MRATE: 11 MG/24 HR (ref 0–320)
COLLECT DURATION TIME UR: 24 HR
SPECIMEN VOL ?TM UR: 1350 ML

## 2024-10-20 LAB
CREAT UR-MCNC: 45 MG/DL
CREATININE 24 HOUR UR: 608 MG/24 HR (ref 800–1800)
SPECIMEN VOL ?TM UR: 1350 ML

## 2024-10-21 ENCOUNTER — HOSPITAL ENCOUNTER (OUTPATIENT)
Dept: LAB | Age: 79
Discharge: HOME | End: 2024-10-21
Payer: MEDICARE

## 2024-10-21 ENCOUNTER — OFFICE VISIT (OUTPATIENT)
Dept: ONCOLOGY | Age: 79
End: 2024-10-21
Payer: MEDICARE

## 2024-10-21 VITALS
HEART RATE: 60 BPM | BODY MASS INDEX: 20.73 KG/M2 | SYSTOLIC BLOOD PRESSURE: 160 MMHG | RESPIRATION RATE: 16 BRPM | OXYGEN SATURATION: 95 % | WEIGHT: 129 LBS | TEMPERATURE: 97.7 F | HEIGHT: 66 IN | DIASTOLIC BLOOD PRESSURE: 59 MMHG

## 2024-10-21 DIAGNOSIS — D50.8 OTHER IRON DEFICIENCY ANEMIA: ICD-10-CM

## 2024-10-21 DIAGNOSIS — Z95.2 S/P TAVR (TRANSCATHETER AORTIC VALVE REPLACEMENT): ICD-10-CM

## 2024-10-21 DIAGNOSIS — Z95.4 STATUS POST HEART VALVE REPLACEMENT: ICD-10-CM

## 2024-10-21 DIAGNOSIS — D64.9 ANEMIA, UNSPECIFIED TYPE: ICD-10-CM

## 2024-10-21 DIAGNOSIS — Z85.048 PERSONAL HISTORY OF RECTAL CANCER: ICD-10-CM

## 2024-10-21 DIAGNOSIS — D50.8 OTHER IRON DEFICIENCY ANEMIA: Primary | ICD-10-CM

## 2024-10-21 DIAGNOSIS — Z79.01 ANTICOAGULATED: ICD-10-CM

## 2024-10-21 LAB
ALBUMIN SERPL-MCNC: 3.7 G/DL (ref 3.2–4.6)
ALBUMIN/GLOB SERPL: 0.9 (ref 1–1.9)
ALP SERPL-CCNC: 105 U/L (ref 35–104)
ALT SERPL-CCNC: 31 U/L (ref 8–45)
ANION GAP SERPL CALC-SCNC: 11 MMOL/L (ref 9–18)
AST SERPL-CCNC: 34 U/L (ref 15–37)
BASOPHILS # BLD: 0 K/UL (ref 0–0.2)
BASOPHILS NFR BLD: 1 % (ref 0–2)
BILIRUB SERPL-MCNC: 0.4 MG/DL (ref 0–1.2)
BUN SERPL-MCNC: 20 MG/DL (ref 8–23)
CALCIUM SERPL-MCNC: 9.1 MG/DL (ref 8.8–10.2)
CHLORIDE SERPL-SCNC: 107 MMOL/L (ref 98–107)
CO2 SERPL-SCNC: 23 MMOL/L (ref 20–28)
CREAT SERPL-MCNC: 0.76 MG/DL (ref 0.6–1.1)
DIFFERENTIAL METHOD BLD: ABNORMAL
EOSINOPHIL # BLD: 0.1 K/UL (ref 0–0.8)
EOSINOPHIL NFR BLD: 1 % (ref 0.5–7.8)
ERYTHROCYTE [DISTWIDTH] IN BLOOD BY AUTOMATED COUNT: 17 % (ref 11.9–14.6)
FERRITIN SERPL-MCNC: 22 NG/ML (ref 8–388)
FOLATE SERPL-MCNC: 4.2 NG/ML (ref 3.1–17.5)
GLOBULIN SER CALC-MCNC: 4.1 G/DL (ref 2.3–3.5)
GLUCOSE SERPL-MCNC: 62 MG/DL (ref 70–99)
HCT VFR BLD AUTO: 37 % (ref 35.8–46.3)
HGB BLD-MCNC: 10.8 G/DL (ref 11.7–15.4)
IMM GRANULOCYTES # BLD AUTO: 0 K/UL (ref 0–0.5)
IMM GRANULOCYTES NFR BLD AUTO: 0 % (ref 0–5)
IRON SATN MFR SERPL: 19 % (ref 20–50)
IRON SERPL-MCNC: 71 UG/DL (ref 35–100)
LYMPHOCYTES # BLD: 1.9 K/UL (ref 0.5–4.6)
LYMPHOCYTES NFR BLD: 25 % (ref 13–44)
MCH RBC QN AUTO: 26.7 PG (ref 26.1–32.9)
MCHC RBC AUTO-ENTMCNC: 29.2 G/DL (ref 31.4–35)
MCV RBC AUTO: 91.4 FL (ref 82–102)
MONOCYTES # BLD: 0.5 K/UL (ref 0.1–1.3)
MONOCYTES NFR BLD: 7 % (ref 4–12)
NEUTS SEG # BLD: 5.2 K/UL (ref 1.7–8.2)
NEUTS SEG NFR BLD: 66 % (ref 43–78)
NRBC # BLD: 0 K/UL (ref 0–0.2)
PATH REV BLD -IMP: NORMAL
PHOSPHATE SERPL-MCNC: 3.6 MG/DL (ref 2.5–4.5)
PLATELET # BLD AUTO: 288 K/UL (ref 150–450)
PMV BLD AUTO: 9.4 FL (ref 9.4–12.3)
POTASSIUM SERPL-SCNC: 4.6 MMOL/L (ref 3.5–5.1)
PROT SERPL-MCNC: 7.7 G/DL (ref 6.3–8.2)
RBC # BLD AUTO: 4.05 M/UL (ref 4.05–5.2)
SODIUM SERPL-SCNC: 141 MMOL/L (ref 136–145)
TIBC SERPL-MCNC: 366 UG/DL (ref 240–450)
TSH, 3RD GENERATION: 0.38 UIU/ML (ref 0.27–4.2)
UIBC SERPL-MCNC: 295 UG/DL (ref 112–347)
VIT B12 SERPL-MCNC: 1478 PG/ML (ref 193–986)
WBC # BLD AUTO: 7.8 K/UL (ref 4.3–11.1)

## 2024-10-21 PROCEDURE — 85025 COMPLETE CBC W/AUTO DIFF WBC: CPT

## 2024-10-21 PROCEDURE — G8428 CUR MEDS NOT DOCUMENT: HCPCS | Performed by: INTERNAL MEDICINE

## 2024-10-21 PROCEDURE — G8399 PT W/DXA RESULTS DOCUMENT: HCPCS | Performed by: INTERNAL MEDICINE

## 2024-10-21 PROCEDURE — 36415 COLL VENOUS BLD VENIPUNCTURE: CPT

## 2024-10-21 PROCEDURE — 1036F TOBACCO NON-USER: CPT | Performed by: INTERNAL MEDICINE

## 2024-10-21 PROCEDURE — 3078F DIAST BP <80 MM HG: CPT | Performed by: INTERNAL MEDICINE

## 2024-10-21 PROCEDURE — 84100 ASSAY OF PHOSPHORUS: CPT

## 2024-10-21 PROCEDURE — G8484 FLU IMMUNIZE NO ADMIN: HCPCS | Performed by: INTERNAL MEDICINE

## 2024-10-21 PROCEDURE — 84443 ASSAY THYROID STIM HORMONE: CPT

## 2024-10-21 PROCEDURE — G8420 CALC BMI NORM PARAMETERS: HCPCS | Performed by: INTERNAL MEDICINE

## 2024-10-21 PROCEDURE — 82746 ASSAY OF FOLIC ACID SERUM: CPT

## 2024-10-21 PROCEDURE — 82728 ASSAY OF FERRITIN: CPT

## 2024-10-21 PROCEDURE — 82607 VITAMIN B-12: CPT

## 2024-10-21 PROCEDURE — 83550 IRON BINDING TEST: CPT

## 2024-10-21 PROCEDURE — 80053 COMPREHEN METABOLIC PANEL: CPT

## 2024-10-21 PROCEDURE — 1090F PRES/ABSN URINE INCON ASSESS: CPT | Performed by: INTERNAL MEDICINE

## 2024-10-21 PROCEDURE — 99204 OFFICE O/P NEW MOD 45 MIN: CPT | Performed by: INTERNAL MEDICINE

## 2024-10-21 PROCEDURE — 1123F ACP DISCUSS/DSCN MKR DOCD: CPT | Performed by: INTERNAL MEDICINE

## 2024-10-21 PROCEDURE — 83540 ASSAY OF IRON: CPT

## 2024-10-21 PROCEDURE — 3077F SYST BP >= 140 MM HG: CPT | Performed by: INTERNAL MEDICINE

## 2024-10-21 RX ORDER — IRON FUM,PS CMP/VIT C/NIACIN 125-40-3MG
1 CAPSULE ORAL
Qty: 30 CAPSULE | Refills: 2 | Status: SHIPPED | OUTPATIENT
Start: 2024-10-21

## 2024-10-21 ASSESSMENT — ENCOUNTER SYMPTOMS
BLOOD IN STOOL: 0
VOICE CHANGE: 0
SCLERAL ICTERUS: 0
WHEEZING: 0
SHORTNESS OF BREATH: 0
VOMITING: 0
HEMOPTYSIS: 0
TROUBLE SWALLOWING: 0
NAUSEA: 0
SORE THROAT: 0
DIARRHEA: 0
CHEST TIGHTNESS: 0
CONSTIPATION: 0
ABDOMINAL PAIN: 0
ABDOMINAL DISTENTION: 0

## 2024-10-21 ASSESSMENT — PATIENT HEALTH QUESTIONNAIRE - PHQ9
SUM OF ALL RESPONSES TO PHQ QUESTIONS 1-9: 0
1. LITTLE INTEREST OR PLEASURE IN DOING THINGS: NOT AT ALL
SUM OF ALL RESPONSES TO PHQ9 QUESTIONS 1 & 2: 0
2. FEELING DOWN, DEPRESSED OR HOPELESS: NOT AT ALL
SUM OF ALL RESPONSES TO PHQ QUESTIONS 1-9: 0

## 2024-10-21 NOTE — PATIENT INSTRUCTIONS
Patient Information from Today's Visit    Diagnosis: ELIZABETH    Follow Up Instructions: After workup.    History reviewed.  Symptoms reviewed.  Reviewed how to take oral iron or Integra prescription three times a week on an empty stomach with a source of Vitamin C.  We will do blood work today.    Treatment Summary has been discussed and given to patient: N/A      Current Labs: N/A      Please refer to After Visit Summary or [employer] for upcoming appointment information. If you have any questions regarding your upcoming schedule please reach out to your care team through [employer] or call [phone].    Please notify your assigned Nurse Navigator of any unplanned hospital admissions or Emergency Room visits within 24 hours of discharge.    -------------------------------------------------------------------------------------------------------------------  Please call our office at [phone] if you have any  of the following symptoms:   Fever of 100.5 or greater  Chills  Shortness of breath  Swelling or pain in one leg    After office hours an answering service is available and will contact a provider for emergencies or if you are experiencing any of the above symptoms.        MARIN DIA RN

## 2024-10-24 RX ORDER — FAMOTIDINE 10 MG/ML
20 INJECTION, SOLUTION INTRAVENOUS
OUTPATIENT
Start: 2024-10-24

## 2024-10-24 RX ORDER — HEPARIN SODIUM (PORCINE) LOCK FLUSH IV SOLN 100 UNIT/ML 100 UNIT/ML
500 SOLUTION INTRAVENOUS PRN
OUTPATIENT
Start: 2024-10-24

## 2024-10-24 RX ORDER — DIPHENHYDRAMINE HYDROCHLORIDE 50 MG/ML
50 INJECTION INTRAMUSCULAR; INTRAVENOUS
OUTPATIENT
Start: 2024-10-24

## 2024-10-24 RX ORDER — ALBUTEROL SULFATE 90 UG/1
4 INHALANT RESPIRATORY (INHALATION) PRN
OUTPATIENT
Start: 2024-10-24

## 2024-10-24 RX ORDER — SODIUM CHLORIDE 0.9 % (FLUSH) 0.9 %
5-40 SYRINGE (ML) INJECTION PRN
OUTPATIENT
Start: 2024-10-24

## 2024-10-24 RX ORDER — ACETAMINOPHEN 325 MG/1
650 TABLET ORAL
OUTPATIENT
Start: 2024-10-24

## 2024-10-24 RX ORDER — ONDANSETRON 2 MG/ML
8 INJECTION INTRAMUSCULAR; INTRAVENOUS
OUTPATIENT
Start: 2024-10-24

## 2024-10-24 RX ORDER — SODIUM CHLORIDE 9 MG/ML
5-250 INJECTION, SOLUTION INTRAVENOUS PRN
OUTPATIENT
Start: 2024-10-24

## 2024-10-24 RX ORDER — SODIUM CHLORIDE 9 MG/ML
INJECTION, SOLUTION INTRAVENOUS CONTINUOUS
OUTPATIENT
Start: 2024-10-24

## 2024-10-24 RX ORDER — EPINEPHRINE 1 MG/ML
0.3 INJECTION, SOLUTION, CONCENTRATE INTRAVENOUS PRN
OUTPATIENT
Start: 2024-10-24

## 2024-10-28 ENCOUNTER — HOSPITAL ENCOUNTER (OUTPATIENT)
Dept: INFUSION THERAPY | Age: 79
Setting detail: INFUSION SERIES
Discharge: HOME | End: 2024-10-28
Payer: MEDICARE

## 2024-10-28 VITALS
RESPIRATION RATE: 16 BRPM | HEART RATE: 62 BPM | DIASTOLIC BLOOD PRESSURE: 52 MMHG | TEMPERATURE: 97.9 F | OXYGEN SATURATION: 93 % | SYSTOLIC BLOOD PRESSURE: 142 MMHG

## 2024-10-28 DIAGNOSIS — D50.8 OTHER IRON DEFICIENCY ANEMIA: Primary | ICD-10-CM

## 2024-10-28 PROCEDURE — 96365 THER/PROPH/DIAG IV INF INIT: CPT

## 2024-10-28 PROCEDURE — 6360000002 HC RX W HCPCS: Performed by: INTERNAL MEDICINE

## 2024-10-28 PROCEDURE — 2580000003 HC RX 258: Performed by: INTERNAL MEDICINE

## 2024-10-28 RX ORDER — SODIUM CHLORIDE 9 MG/ML
5-250 INJECTION, SOLUTION INTRAVENOUS PRN
Status: CANCELLED | OUTPATIENT
Start: 2024-11-04

## 2024-10-28 RX ORDER — SODIUM CHLORIDE 0.9 % (FLUSH) 0.9 %
5-40 SYRINGE (ML) INJECTION PRN
Status: DISCONTINUED | OUTPATIENT
Start: 2024-10-28 | End: 2024-10-29 | Stop reason: HOSPADM

## 2024-10-28 RX ORDER — ONDANSETRON 2 MG/ML
8 INJECTION INTRAMUSCULAR; INTRAVENOUS
Status: DISCONTINUED | OUTPATIENT
Start: 2024-10-28 | End: 2024-10-29 | Stop reason: HOSPADM

## 2024-10-28 RX ORDER — DIPHENHYDRAMINE HYDROCHLORIDE 50 MG/ML
50 INJECTION INTRAMUSCULAR; INTRAVENOUS
OUTPATIENT
Start: 2024-11-04

## 2024-10-28 RX ORDER — SODIUM CHLORIDE 0.9 % (FLUSH) 0.9 %
5-40 SYRINGE (ML) INJECTION PRN
Status: CANCELLED | OUTPATIENT
Start: 2024-11-04

## 2024-10-28 RX ORDER — HEPARIN 100 UNIT/ML
500 SYRINGE INTRAVENOUS PRN
Status: CANCELLED | OUTPATIENT
Start: 2024-11-04

## 2024-10-28 RX ORDER — DIPHENHYDRAMINE HYDROCHLORIDE 50 MG/ML
50 INJECTION INTRAMUSCULAR; INTRAVENOUS
Status: DISCONTINUED | OUTPATIENT
Start: 2024-10-28 | End: 2024-10-29 | Stop reason: HOSPADM

## 2024-10-28 RX ORDER — ALBUTEROL SULFATE 90 UG/1
4 INHALANT RESPIRATORY (INHALATION) PRN
Status: DISCONTINUED | OUTPATIENT
Start: 2024-10-28 | End: 2024-10-29 | Stop reason: HOSPADM

## 2024-10-28 RX ORDER — ONDANSETRON 2 MG/ML
8 INJECTION INTRAMUSCULAR; INTRAVENOUS
OUTPATIENT
Start: 2024-11-04

## 2024-10-28 RX ORDER — SODIUM CHLORIDE 9 MG/ML
5-250 INJECTION, SOLUTION INTRAVENOUS PRN
OUTPATIENT
Start: 2024-11-04

## 2024-10-28 RX ORDER — ALBUTEROL SULFATE 90 UG/1
4 INHALANT RESPIRATORY (INHALATION) PRN
OUTPATIENT
Start: 2024-11-04

## 2024-10-28 RX ORDER — ACETAMINOPHEN 325 MG/1
650 TABLET ORAL
Status: DISCONTINUED | OUTPATIENT
Start: 2024-10-28 | End: 2024-10-29 | Stop reason: HOSPADM

## 2024-10-28 RX ORDER — SODIUM CHLORIDE 9 MG/ML
INJECTION, SOLUTION INTRAVENOUS CONTINUOUS
OUTPATIENT
Start: 2024-11-04

## 2024-10-28 RX ORDER — ACETAMINOPHEN 325 MG/1
650 TABLET ORAL
OUTPATIENT
Start: 2024-11-04

## 2024-10-28 RX ORDER — SODIUM CHLORIDE 9 MG/ML
5-250 INJECTION, SOLUTION INTRAVENOUS PRN
Status: DISCONTINUED | OUTPATIENT
Start: 2024-10-28 | End: 2024-10-29 | Stop reason: HOSPADM

## 2024-10-28 RX ORDER — EPINEPHRINE 1 MG/ML
0.3 INJECTION, SOLUTION, CONCENTRATE INTRAVENOUS PRN
OUTPATIENT
Start: 2024-11-04

## 2024-10-28 RX ORDER — EPINEPHRINE 1 MG/ML
0.3 INJECTION, SOLUTION, CONCENTRATE INTRAVENOUS PRN
Status: DISCONTINUED | OUTPATIENT
Start: 2024-10-28 | End: 2024-10-29 | Stop reason: HOSPADM

## 2024-10-28 RX ADMIN — FERRIC CARBOXYMALTOSE INJECTION 750 MG: 50 INJECTION, SOLUTION INTRAVENOUS at 15:34

## 2024-10-28 NOTE — PROGRESS NOTES
Patient arrived to Infusion Center for Injectafer. Assessment completed.  No needs voiced at this time. Patient tolerated infusion well. Report given to DANIELLA Pacheco for observation period.

## 2024-10-30 ENCOUNTER — TELEPHONE (OUTPATIENT)
Dept: ORTHOPEDIC SURGERY | Age: 79
End: 2024-10-30

## 2024-10-30 DIAGNOSIS — M47.816 LUMBAR SPONDYLOSIS: ICD-10-CM

## 2024-10-30 DIAGNOSIS — M54.16 LUMBAR RADICULOPATHY: Primary | ICD-10-CM

## 2024-11-04 ENCOUNTER — HOSPITAL ENCOUNTER (OUTPATIENT)
Dept: INFUSION THERAPY | Age: 79
Setting detail: INFUSION SERIES
Discharge: HOME | End: 2024-11-04
Payer: MEDICARE

## 2024-11-04 VITALS
OXYGEN SATURATION: 98 % | SYSTOLIC BLOOD PRESSURE: 144 MMHG | DIASTOLIC BLOOD PRESSURE: 53 MMHG | TEMPERATURE: 97.3 F | HEART RATE: 67 BPM

## 2024-11-04 DIAGNOSIS — D50.8 OTHER IRON DEFICIENCY ANEMIA: Primary | ICD-10-CM

## 2024-11-04 PROCEDURE — 6360000002 HC RX W HCPCS: Performed by: INTERNAL MEDICINE

## 2024-11-04 PROCEDURE — 96365 THER/PROPH/DIAG IV INF INIT: CPT

## 2024-11-04 PROCEDURE — 2580000003 HC RX 258: Performed by: INTERNAL MEDICINE

## 2024-11-04 RX ORDER — HEPARIN 100 UNIT/ML
500 SYRINGE INTRAVENOUS PRN
OUTPATIENT
Start: 2024-11-04

## 2024-11-04 RX ORDER — SODIUM CHLORIDE 9 MG/ML
5-250 INJECTION, SOLUTION INTRAVENOUS PRN
Status: DISCONTINUED | OUTPATIENT
Start: 2024-11-04 | End: 2024-11-04 | Stop reason: RX

## 2024-11-04 RX ORDER — DIPHENHYDRAMINE HYDROCHLORIDE 50 MG/ML
50 INJECTION INTRAMUSCULAR; INTRAVENOUS
OUTPATIENT
Start: 2024-11-04

## 2024-11-04 RX ORDER — EPINEPHRINE 1 MG/ML
0.3 INJECTION, SOLUTION, CONCENTRATE INTRAVENOUS PRN
OUTPATIENT
Start: 2024-11-04

## 2024-11-04 RX ORDER — SODIUM CHLORIDE 9 MG/ML
5-250 INJECTION, SOLUTION INTRAVENOUS PRN
OUTPATIENT
Start: 2024-11-04

## 2024-11-04 RX ORDER — ALBUTEROL SULFATE 90 UG/1
4 INHALANT RESPIRATORY (INHALATION) PRN
OUTPATIENT
Start: 2024-11-04

## 2024-11-04 RX ORDER — SODIUM CHLORIDE 9 MG/ML
INJECTION, SOLUTION INTRAVENOUS CONTINUOUS
OUTPATIENT
Start: 2024-11-04

## 2024-11-04 RX ORDER — SODIUM CHLORIDE 0.9 % (FLUSH) 0.9 %
5-40 SYRINGE (ML) INJECTION PRN
Status: DISCONTINUED | OUTPATIENT
Start: 2024-11-04 | End: 2024-11-05 | Stop reason: HOSPADM

## 2024-11-04 RX ORDER — ACETAMINOPHEN 325 MG/1
650 TABLET ORAL
OUTPATIENT
Start: 2024-11-04

## 2024-11-04 RX ORDER — SODIUM CHLORIDE 0.9 % (FLUSH) 0.9 %
5-40 SYRINGE (ML) INJECTION PRN
OUTPATIENT
Start: 2024-11-04

## 2024-11-04 RX ORDER — ONDANSETRON 2 MG/ML
8 INJECTION INTRAMUSCULAR; INTRAVENOUS
OUTPATIENT
Start: 2024-11-04

## 2024-11-04 RX ORDER — HEPARIN 100 UNIT/ML
500 SYRINGE INTRAVENOUS PRN
Status: DISCONTINUED | OUTPATIENT
Start: 2024-11-04 | End: 2024-11-05 | Stop reason: HOSPADM

## 2024-11-04 RX ADMIN — SODIUM CHLORIDE, PRESERVATIVE FREE 10 ML: 5 INJECTION INTRAVENOUS at 15:00

## 2024-11-04 RX ADMIN — FERRIC CARBOXYMALTOSE INJECTION 750 MG: 50 INJECTION, SOLUTION INTRAVENOUS at 15:10

## 2024-11-04 NOTE — PROGRESS NOTES
Arrived to the Infusion Center.  Injectafer completed. Patient tolerated well.   Any issues or concerns during appointment: none.    Patient aware of next lab and [facility] office visit on 1/7/25 (date) at 1330 (time).  Patient instructed to call provider with temperature of 100.4 or greater or nausea/vomiting/ diarrhea or pain not controlled by medications  Discharged home.

## 2024-11-05 ENCOUNTER — OFFICE VISIT (OUTPATIENT)
Dept: PULMONOLOGY | Age: 79
End: 2024-11-05
Payer: MEDICARE

## 2024-11-05 VITALS
HEIGHT: 66 IN | OXYGEN SATURATION: 94 % | WEIGHT: 134.4 LBS | RESPIRATION RATE: 14 BRPM | TEMPERATURE: 97.9 F | HEART RATE: 63 BPM | BODY MASS INDEX: 21.6 KG/M2

## 2024-11-05 DIAGNOSIS — J96.11 CHRONIC RESPIRATORY FAILURE WITH HYPOXIA: ICD-10-CM

## 2024-11-05 DIAGNOSIS — J96.01 ACUTE RESPIRATORY FAILURE WITH HYPOXIA: Primary | ICD-10-CM

## 2024-11-05 DIAGNOSIS — R91.8 PULMONARY INFILTRATES: ICD-10-CM

## 2024-11-05 DIAGNOSIS — R09.02 HYPOXIA: ICD-10-CM

## 2024-11-05 LAB
O2 AMOUNT: 3
SPO2: 96 %

## 2024-11-05 PROCEDURE — G8399 PT W/DXA RESULTS DOCUMENT: HCPCS | Performed by: INTERNAL MEDICINE

## 2024-11-05 PROCEDURE — G8484 FLU IMMUNIZE NO ADMIN: HCPCS | Performed by: INTERNAL MEDICINE

## 2024-11-05 PROCEDURE — 1036F TOBACCO NON-USER: CPT | Performed by: INTERNAL MEDICINE

## 2024-11-05 PROCEDURE — 1126F AMNT PAIN NOTED NONE PRSNT: CPT | Performed by: INTERNAL MEDICINE

## 2024-11-05 PROCEDURE — G8427 DOCREV CUR MEDS BY ELIG CLIN: HCPCS | Performed by: INTERNAL MEDICINE

## 2024-11-05 PROCEDURE — 1090F PRES/ABSN URINE INCON ASSESS: CPT | Performed by: INTERNAL MEDICINE

## 2024-11-05 PROCEDURE — 1123F ACP DISCUSS/DSCN MKR DOCD: CPT | Performed by: INTERNAL MEDICINE

## 2024-11-05 PROCEDURE — 99214 OFFICE O/P EST MOD 30 MIN: CPT | Performed by: INTERNAL MEDICINE

## 2024-11-05 PROCEDURE — G8420 CALC BMI NORM PARAMETERS: HCPCS | Performed by: INTERNAL MEDICINE

## 2024-11-05 NOTE — PROGRESS NOTES
Name:  Nery Bryant  YOB: 1945   MRN: [id number]      Office Visit: 11/5/2024        ASSESSMENT AND PLAN:  (Medical Decision Making)    Impression: 78 y.o. female hypoxic respiratory failure and bilateral pulmonary infiltrates  December 6, 2023:  Patient returns for follow-up, on previous visit hypersensitivity panel was sent and seems negative.  Unfortunately it seems that due to a misunderstanding she had a CT scan in September after her visit with me which revealed slight improvement in the changes and consequently she did not have a CT scan currently.  She states she feels better.  She is compliant with oxygen, she had to cut down her prednisone to 20 mg daily as it caused blood sugars of 400 to 500 region and she could not control it.  She states that she is diligent of taking the prednisone daily at 20 mg and she tells me that she feels actually better, her hypoxemia has improved she tells me that at rest her oxygen stays above 90% at all times and at times when she exerts herself her oxygen does not drop below 90.  She has no complaints today but would like to come off the prednisone if possible due to still erratically controlled blood sugars while on it.  PLAN:  Encounter Diagnoses   Name Primary?    Pulmonary infiltrates Yes    Acute respiratory failure with hypoxia (HCC)     Nocturnal hypoxemia     Pneumonia of both lungs due to infectious organism, unspecified part of lung     Hypoxia    The patient most likely has hypersensitivity pneumonitis and at least clinically she is improved on prednisone.  Unfortunately the repeat CT scan which was supposed to be done around the time of this visit to assess improvement radiographically was done unfortunately immediately almost after her previous visit.  I discussed this with her and her  in detail.  I explained that I do not know what the inciting factor is but the fact that she improved clinically with the prednisone is

## 2024-11-07 PROCEDURE — 93294 REM INTERROG EVL PM/LDLS PM: CPT | Performed by: INTERNAL MEDICINE

## 2024-11-07 PROCEDURE — 93296 REM INTERROG EVL PM/IDS: CPT | Performed by: INTERNAL MEDICINE

## 2024-11-15 ENCOUNTER — HOSPITAL ENCOUNTER (OUTPATIENT)
Dept: MAMMOGRAPHY | Age: 79
Discharge: HOME | End: 2024-11-18
Payer: MEDICARE

## 2024-11-15 ENCOUNTER — HOSPITAL ENCOUNTER (OUTPATIENT)
Dept: MAMMOGRAPHY | Age: 79
Discharge: HOME | End: 2024-11-18
Attending: INTERNAL MEDICINE
Payer: MEDICARE

## 2024-11-15 VITALS — HEIGHT: 66 IN | WEIGHT: 135 LBS | BODY MASS INDEX: 21.69 KG/M2

## 2024-11-15 DIAGNOSIS — Z12.31 ENCOUNTER FOR SCREENING MAMMOGRAM FOR MALIGNANT NEOPLASM OF BREAST: ICD-10-CM

## 2024-11-15 DIAGNOSIS — M81.0 POSTMENOPAUSAL OSTEOPOROSIS: ICD-10-CM

## 2024-11-15 PROCEDURE — 77080 DXA BONE DENSITY AXIAL: CPT

## 2024-11-15 PROCEDURE — 77063 BREAST TOMOSYNTHESIS BI: CPT

## 2024-11-19 ENCOUNTER — TELEPHONE (OUTPATIENT)
Dept: RHEUMATOLOGY | Age: 79
End: 2024-11-19

## 2024-11-20 NOTE — TELEPHONE ENCOUNTER
Prolia ordered by Dr. Masters. MCR and [employer] plan F will cover 100%.   DEXA scan 11/16/24.   Labs 10/21/24 and 10/11/24.   Lab Results   Component Value Date     10/21/2024    K 4.6 10/21/2024     10/21/2024    CO2 23 10/21/2024    BUN 20 10/21/2024    CREATININE 0.76 10/21/2024    GLUCOSE 62 (L) 10/21/2024    CALCIUM 9.1 10/21/2024    BILITOT 0.4 10/21/2024    ALKPHOS 105 (H) 10/21/2024    AST 34 10/21/2024    ALT 31 10/21/2024    LABGLOM 81 10/21/2024    GFRAA >60 08/17/2022    AGRATIO 1.2 04/13/2022    GLOB 4.1 (H) 10/21/2024       Vitamin D = 43.3.

## 2024-11-20 NOTE — TELEPHONE ENCOUNTER
----- Message from Tia MAURO sent at 10/10/2024 11:27 AM EDT -----  Regarding: RE: prolia  bif req. 10/10/24   ----- Message -----  From: Niurka Carmona MA  Sent: 10/9/2024   9:36 PM EDT  To: Tia Quiroga; Niurka Carmona MA  Subject: FW: prolia                                       Ev, can you verify benefits for Prolia?   thanks  ----- Message -----  From: Niurka Carmona MA  Sent: 10/9/2024   1:16 PM EDT  To: Niurka Carmona MA  Subject: prolia                                           Kelly Masters MD Lipscomb, Shara Y, MA  Can you explore prolia for pt? She is scheduled for dxa which we should complete first.

## 2024-11-25 DIAGNOSIS — M81.0 POSTMENOPAUSAL OSTEOPOROSIS: ICD-10-CM

## 2024-11-25 DIAGNOSIS — E83.51 HYPOCALCEMIA: Primary | ICD-10-CM

## 2024-11-27 ENCOUNTER — TELEPHONE (OUTPATIENT)
Age: 79
End: 2024-11-27

## 2024-11-27 NOTE — TELEPHONE ENCOUNTER
Moderate risk.  Okay to hold Eliquis 48 hours prior to the procedure, resume postoperatively ASAP, defer to operating physician.

## 2024-11-27 NOTE — TELEPHONE ENCOUNTER
Cardiac Clearance        Physician or Practice Requesting:[facility]  : ?  Contact Phone Number: [phone]  Fax Number: [phone]  Date of Surgery/Procedure: TBD  Type of Surgery or Procedure: COLO  Type of Anesthesia: ?  Type of Clearance Requested: Cardiac Clearance and Medication Hold  Medication to Hold:Eliquis  Days to Hold: 48 hours

## 2024-12-02 DIAGNOSIS — M81.0 POSTMENOPAUSAL OSTEOPOROSIS: ICD-10-CM

## 2024-12-02 DIAGNOSIS — E83.51 HYPOCALCEMIA: ICD-10-CM

## 2024-12-02 LAB
25(OH)D3 SERPL-MCNC: 35.2 NG/ML (ref 30–100)
ALBUMIN SERPL-MCNC: 3.2 G/DL (ref 3.2–4.6)
ALBUMIN/GLOB SERPL: 0.8 (ref 1–1.9)
ALP SERPL-CCNC: 118 U/L (ref 35–104)
ALT SERPL-CCNC: 26 U/L (ref 8–45)
ANION GAP SERPL CALC-SCNC: 11 MMOL/L (ref 7–16)
AST SERPL-CCNC: 23 U/L (ref 15–37)
BILIRUB SERPL-MCNC: 0.5 MG/DL (ref 0–1.2)
BUN SERPL-MCNC: 14 MG/DL (ref 8–23)
CALCIUM SERPL-MCNC: 8.9 MG/DL (ref 8.8–10.2)
CHLORIDE SERPL-SCNC: 102 MMOL/L (ref 98–107)
CO2 SERPL-SCNC: 27 MMOL/L (ref 20–29)
CREAT SERPL-MCNC: 0.7 MG/DL (ref 0.6–1.1)
GLOBULIN SER CALC-MCNC: 4.3 G/DL (ref 2.3–3.5)
GLUCOSE SERPL-MCNC: 143 MG/DL (ref 70–99)
POTASSIUM SERPL-SCNC: 4 MMOL/L (ref 3.5–5.1)
PROT SERPL-MCNC: 7.5 G/DL (ref 6.3–8.2)
SODIUM SERPL-SCNC: 140 MMOL/L (ref 136–145)

## 2024-12-03 ENCOUNTER — OFFICE VISIT (OUTPATIENT)
Dept: ORTHOPEDIC SURGERY | Age: 79
End: 2024-12-03
Payer: MEDICARE

## 2024-12-03 ENCOUNTER — OFFICE VISIT (OUTPATIENT)
Dept: RHEUMATOLOGY | Age: 79
End: 2024-12-03
Payer: MEDICARE

## 2024-12-03 VITALS
BODY MASS INDEX: 21.69 KG/M2 | OXYGEN SATURATION: 96 % | HEIGHT: 66 IN | RESPIRATION RATE: 12 BRPM | DIASTOLIC BLOOD PRESSURE: 60 MMHG | SYSTOLIC BLOOD PRESSURE: 122 MMHG | WEIGHT: 135 LBS | HEART RATE: 60 BPM

## 2024-12-03 DIAGNOSIS — M47.816 LUMBAR SPONDYLOSIS: ICD-10-CM

## 2024-12-03 DIAGNOSIS — M54.16 LUMBAR RADICULOPATHY: Primary | ICD-10-CM

## 2024-12-03 DIAGNOSIS — R79.89 ELEVATED PARATHYROID HORMONE: ICD-10-CM

## 2024-12-03 DIAGNOSIS — M81.0 POSTMENOPAUSAL OSTEOPOROSIS: Primary | ICD-10-CM

## 2024-12-03 DIAGNOSIS — E89.0 HYPOTHYROIDISM, POSTSURGICAL: ICD-10-CM

## 2024-12-03 DIAGNOSIS — E11.29 TYPE 2 DIABETES MELLITUS WITH MICROALBUMINURIA, WITH LONG-TERM CURRENT USE OF INSULIN (HCC): ICD-10-CM

## 2024-12-03 DIAGNOSIS — Z79.4 TYPE 2 DIABETES MELLITUS WITH MICROALBUMINURIA, WITH LONG-TERM CURRENT USE OF INSULIN (HCC): ICD-10-CM

## 2024-12-03 DIAGNOSIS — R80.9 TYPE 2 DIABETES MELLITUS WITH MICROALBUMINURIA, WITH LONG-TERM CURRENT USE OF INSULIN (HCC): ICD-10-CM

## 2024-12-03 PROCEDURE — G8399 PT W/DXA RESULTS DOCUMENT: HCPCS | Performed by: INTERNAL MEDICINE

## 2024-12-03 PROCEDURE — G8420 CALC BMI NORM PARAMETERS: HCPCS | Performed by: INTERNAL MEDICINE

## 2024-12-03 PROCEDURE — 3044F HG A1C LEVEL LT 7.0%: CPT | Performed by: INTERNAL MEDICINE

## 2024-12-03 PROCEDURE — 1123F ACP DISCUSS/DSCN MKR DOCD: CPT | Performed by: INTERNAL MEDICINE

## 2024-12-03 PROCEDURE — 1159F MED LIST DOCD IN RCRD: CPT | Performed by: INTERNAL MEDICINE

## 2024-12-03 PROCEDURE — 64483 NJX AA&/STRD TFRM EPI L/S 1: CPT | Performed by: PHYSICAL MEDICINE & REHABILITATION

## 2024-12-03 PROCEDURE — 1160F RVW MEDS BY RX/DR IN RCRD: CPT | Performed by: INTERNAL MEDICINE

## 2024-12-03 PROCEDURE — 1090F PRES/ABSN URINE INCON ASSESS: CPT | Performed by: INTERNAL MEDICINE

## 2024-12-03 PROCEDURE — 99214 OFFICE O/P EST MOD 30 MIN: CPT | Performed by: INTERNAL MEDICINE

## 2024-12-03 PROCEDURE — G8484 FLU IMMUNIZE NO ADMIN: HCPCS | Performed by: INTERNAL MEDICINE

## 2024-12-03 PROCEDURE — 3074F SYST BP LT 130 MM HG: CPT | Performed by: INTERNAL MEDICINE

## 2024-12-03 PROCEDURE — G2211 COMPLEX E/M VISIT ADD ON: HCPCS | Performed by: INTERNAL MEDICINE

## 2024-12-03 PROCEDURE — 3078F DIAST BP <80 MM HG: CPT | Performed by: INTERNAL MEDICINE

## 2024-12-03 PROCEDURE — 1036F TOBACCO NON-USER: CPT | Performed by: INTERNAL MEDICINE

## 2024-12-03 PROCEDURE — G8427 DOCREV CUR MEDS BY ELIG CLIN: HCPCS | Performed by: INTERNAL MEDICINE

## 2024-12-03 RX ORDER — TRIAMCINOLONE ACETONIDE 40 MG/ML
40 INJECTION, SUSPENSION INTRA-ARTICULAR; INTRAMUSCULAR ONCE
Status: COMPLETED | OUTPATIENT
Start: 2024-12-03 | End: 2024-12-03

## 2024-12-03 RX ADMIN — TRIAMCINOLONE ACETONIDE 40 MG: 40 INJECTION, SUSPENSION INTRA-ARTICULAR; INTRAMUSCULAR at 16:11

## 2024-12-03 ASSESSMENT — ROUTINE ASSESSMENT OF PATIENT INDEX DATA (RAPID3)
ON A SCALE OF ONE TO TEN, HOW MUCH PAIN HAVE YOU HAD BECAUSE OF YOUR CONDITION OVER THE PAST WEEK?: 9
ON A SCALE OF ONE TO TEN, HOW DIFFICULT WAS IT FOR YOU TO COMPLETE THE LISTED DAILY PHYSICAL TASKS OVER THE LAST WEEK: 1.7
ON A SCALE OF ONE TO TEN, HOW MUCH OF A PROBLEM HAS UNUSUAL FATIGUE OR TIREDNESS BEEN FOR YOU OVER THE PAST WEEK?: 8
WHEN YOU AWAKENED IN THE MORNING OVER THE LAST WEEK, PLEASE INDICATE THE AMOUNT OF TIME IT TAKES UNTIL YOU ARE AS LIMBER AS YOU WILL BE FOR THE DAY: > 1 HOUR
ON A SCALE OF ONE TO TEN, CONSIDERING ALL THE WAYS IN WHICH ILLNESS AND HEALTH CONDITIONS MAY AFFECT YOU AT THIS TIME, PLEASE INDICATE BELOW HOW YOU ARE DOING:: 9

## 2024-12-03 NOTE — PROGRESS NOTES
Name: Nery Bryant  YOB: 1945  Gender: female  MRN: [id number]        Transforaminal HATTIE Procedure Note    Procedure: Bilateral  L5-S1 transforaminal epidural steroid injections     Precautions: Nery Bryant denies prior sensitivity to steroid, local anesthetic, iodine, or shellfish.       Consent:  Consent was obtained prior to the procedure. The procedure was discussed at length with Nery Bryant. She was given the opportunity to ask questions regarding the procedure and its associated risks.  In addition to the potential risks associated with the procedure itself, the patient was informed both verbally and in writing of potential side effects of the use glucocorticoids.  The patient appeared to comprehend the informed consent and desired to have the procedure performed, and informed consent was signed.     She was placed in a prone position on the fluoroscopy table and the skin was prepped and draped in a routine sterile fashion. The areas to be injected were each anesthetized with 1 ml of 1% Lidocaine. A 22 gauge 3.5 inch spinal needle was carefully advanced under fluoroscopic guidance to the right L5-S1 transforaminal space  0.5 ml of 70% of Omnipaque was injected to confirm proper needle placement and absence of subdural or vascular flow Once proper placement was confirmed, 0.5ml of 0.25 marcaine and 40 mg kenalog were injected through the spinal needle.       The above procedure was then repeated at the Left  L5-S1 transforaminal space.    Fluoroscopic guidance was used intermittently over a 10-minute period to insure proper needle placement and her safety. A hard copy of the fluoroscopic image has been placed in her chart and is saved on the C-arm hard drive. She was monitored for 30 minutes after the procedure and discharged home in a stable fashion with a routine follow up.    Procedural Diagnosis:     ICD-10-CM    1. Lumbar radiculopathy  M54.16 FL NERVE

## 2024-12-03 NOTE — PROGRESS NOTES
Normal respiratory effort.  GI:  Abdomen soft, non tender, no hepatosplenomegaly  Skin:  No rashes, nodules, or other lesions.    MUSCULOSKELETAL :  All joints including neck, back bilateral shoulders, elbows, wrists, MCP's, PIP's, DIP's, hips, knees, ankles, toes are within normal limits including normal gross examination, no synovitis, no tenderness, n'l ROM EXCEPT:       ASSESSMENT AND PLAN:  Nery Bryant is a 79 y.o. female that is here for evaluation of osteoporosis.  her problems include:    Nery was seen today for follow-up.    Diagnoses and all orders for this visit:    Postmenopausal osteoporosis    Type 2 diabetes mellitus with microalbuminuria, with long-term current use of insulin (HCC)    Hypothyroidism, postsurgical    Elevated parathyroid hormone    Discussed prolia vs PO and IV bisphosphonate with pt. She will pursue therapy with endocrine and discuss at next OV with Dr. Ramos Dec 11, 2024.    Side effects including 1-2% risk of ONJ, atypical fractures, hypocalcemia, esophagitis/heartburn, arthralgias/myalgias discussed with pt.    Side effects including risk of ONJ, atypical fractures, hypocalcemia, injection site reactions, allergic reaction, arthralgias/myalgias, risk of infection discussed with pt.    Reviewed fall precautions    F/u PRN :tentative 6 mo      Kelly Masters MD  [facility] Rheumatology  [address] [address] [phone]

## 2024-12-05 NOTE — PATIENT INSTRUCTIONS
Reminder: Please contact the Coumadin Clinic at [phone] when you have medication changes. Examples, new medications, antibiotics, discontinued medications, new supplements, missed doses of warfarin or if you took extra doses of warfarin. This also includes OTC medications. Notifying us helps reduce the possibility of high and low INR's. In addition, if warfarin needs to be held for any procedures, please have surgeon or physician's office contact us before holding anticoagulant. Thanks, [facility].
Home

## 2024-12-11 ENCOUNTER — OFFICE VISIT (OUTPATIENT)
Dept: ENDOCRINOLOGY | Age: 79
End: 2024-12-11

## 2024-12-11 VITALS
HEIGHT: 66 IN | SYSTOLIC BLOOD PRESSURE: 122 MMHG | HEART RATE: 60 BPM | OXYGEN SATURATION: 96 % | DIASTOLIC BLOOD PRESSURE: 66 MMHG | WEIGHT: 124 LBS | BODY MASS INDEX: 19.93 KG/M2

## 2024-12-11 DIAGNOSIS — E11.29 TYPE 2 DIABETES MELLITUS WITH MICROALBUMINURIA, WITH LONG-TERM CURRENT USE OF INSULIN (HCC): Chronic | ICD-10-CM

## 2024-12-11 DIAGNOSIS — R80.9 TYPE 2 DIABETES MELLITUS WITH MICROALBUMINURIA, WITH LONG-TERM CURRENT USE OF INSULIN (HCC): Chronic | ICD-10-CM

## 2024-12-11 DIAGNOSIS — M81.0 AGE-RELATED OSTEOPOROSIS WITHOUT CURRENT PATHOLOGICAL FRACTURE: Primary | Chronic | ICD-10-CM

## 2024-12-11 DIAGNOSIS — R79.89 ELEVATED PARATHYROID HORMONE: Chronic | ICD-10-CM

## 2024-12-11 DIAGNOSIS — Z79.4 TYPE 2 DIABETES MELLITUS WITH MICROALBUMINURIA, WITH LONG-TERM CURRENT USE OF INSULIN (HCC): Chronic | ICD-10-CM

## 2024-12-11 DIAGNOSIS — E89.0 HYPOTHYROIDISM, POSTSURGICAL: Chronic | ICD-10-CM

## 2024-12-11 RX ORDER — ALBUTEROL SULFATE 90 UG/1
4 INHALANT RESPIRATORY (INHALATION) PRN
OUTPATIENT
Start: 2024-12-12

## 2024-12-11 RX ORDER — ACETAMINOPHEN 325 MG/1
650 TABLET ORAL
OUTPATIENT
Start: 2024-12-12

## 2024-12-11 RX ORDER — HYDROCORTISONE SODIUM SUCCINATE 100 MG/2ML
100 INJECTION INTRAMUSCULAR; INTRAVENOUS
OUTPATIENT
Start: 2024-12-12

## 2024-12-11 RX ORDER — EPINEPHRINE 1 MG/ML
0.3 INJECTION, SOLUTION, CONCENTRATE INTRAVENOUS PRN
OUTPATIENT
Start: 2024-12-12

## 2024-12-11 RX ORDER — DIPHENHYDRAMINE HYDROCHLORIDE 50 MG/ML
50 INJECTION INTRAMUSCULAR; INTRAVENOUS
OUTPATIENT
Start: 2024-12-12

## 2024-12-11 RX ORDER — FAMOTIDINE 10 MG/ML
20 INJECTION, SOLUTION INTRAVENOUS
OUTPATIENT
Start: 2024-12-12

## 2024-12-11 RX ORDER — ACYCLOVIR 400 MG/1
1 TABLET ORAL
COMMUNITY

## 2024-12-11 RX ORDER — ONDANSETRON 2 MG/ML
8 INJECTION INTRAMUSCULAR; INTRAVENOUS
OUTPATIENT
Start: 2024-12-12

## 2024-12-11 RX ORDER — SODIUM CHLORIDE 9 MG/ML
INJECTION, SOLUTION INTRAVENOUS CONTINUOUS
OUTPATIENT
Start: 2024-12-12

## 2024-12-11 ASSESSMENT — ENCOUNTER SYMPTOMS
DIARRHEA: 0
CHEST TIGHTNESS: 0
VOMITING: 0
ABDOMINAL PAIN: 0
SHORTNESS OF BREATH: 0
BACK PAIN: 1
COUGH: 0
CONSTIPATION: 0
NAUSEA: 0

## 2024-12-11 NOTE — ASSESSMENT & PLAN NOTE
Continue current dose of levothyroxine 125 mcg daily.  We will check TSH and FT4 with next labs. (May decrease her dose given her cardiac history)

## 2024-12-11 NOTE — ASSESSMENT & PLAN NOTE
Complicated by: microalbuminuria, hyperglycemia and heart disease    A1c: 6.9% (9/30/2024), GMI today is 7.2%; personalized A1c goal is <8% with fasted BG <140 and 3hr post prandial BG <200  CGM with mildly elevated fasted BG due to concern for overnight lows. TIR is 66% (goal is 70%)  No changes to her insulin regimen at this time. The patient has been on insulin since her diagnosis. We will check labs in order to consider oral options for DM management in the future. Her DM goal is less injections.  .  DM Metrics:  ASCVD: Age >74 yo  Lipid: Patient is on statin and follows closely with cardiology.  Nephropathy: Urine studies DUE  Eye exam: no DR, up to date  Foot exam: No concerns. No neuropathy.  Labs today:C-peptide, CMP, MICHELLE, urine studies.

## 2024-12-11 NOTE — ASSESSMENT & PLAN NOTE
Patient was recently seen by rheumatology but will continue her osteoporosis care with endocrinology since her rheumatologist is moving away.  After further risk-benefit discussion the patient has decided to proceed with Prolia every 6 months.  We discussed risk of hypocalcemia and we will plan to restart calcium supplement of 1000 to 1200 mg daily in order to prevent post Prolia hypocalcemia.  We will also plan to get labs done 1 week after her Prolia injection.  Next DEXA is 10/2026.

## 2024-12-11 NOTE — PROGRESS NOTES
and TMVR    Hyperlipidemia     Hypertension     states no longer on medication    Low back pain 2023    Mitral stenosis with insufficiency     On home O2     2L    Oral mass     Osteoporosis     Other unknown and unspecified cause of morbidity or mortality     HLD    Pacemaker     [employer]- followed by [facility]- last device check 3/1/24- not pacemaker dep    Paroxysmal atrial fibrillation (HCC)     PONV (postoperative nausea and vomiting)     post op nausea. pt does well with antiemetic    Seizures (HCC)     SSS (sick sinus syndrome) (HCC)     Unspecified essential hypertension     Visit for monitoring Tikosyn therapy     pt takes Tikosyn BID       Past Surgical History:   Procedure Laterality Date    ABLATION OF DYSRHYTHMIC FOCUS  2015    AORTIC VALVE REPLACEMENT  2015    APPENDECTOMY      BRONCHOSCOPY N/A 07/18/2023    BRONCHOSCOPY ALVEOLAR LAVAGE performed by Juno Sandoval MD at [facility] ENDOSCOPY    CARDIAC CATHETERIZATION      CARDIAC PROCEDURE N/A 01/17/2024    Left heart cath / coronary angiography performed by Rodolfo Lazaro MD at [facility] CARDIAC CATH LAB    CARDIAC PROCEDURE N/A 01/23/2024    Transcatheter mitral valve replacement (tmvr) performed by Rodolfo Lazaro MD at [facility] MAIN OR    CARDIAC PROCEDURE N/A 01/23/2024    Transcatheter aortic valve replacement performed by Rodolfo Lazaro MD at [facility] MAIN OR    CARDIAC VALVE REPLACEMENT      COLECTOMY  09/01/2021    low anterior colon resection    COLONOSCOPY N/A 7/21/2021    COLONOSCOPY/ BMI 27 performed by Nile Bunn MD at [facility] ENDOSCOPY    COLONOSCOPY  2014    neg    COLONOSCOPY N/A 08/17/2022    COLONOSCOPY POLYPECTOMY SNARE-COLD performed by Nile Bunn MD at [facility] ENDOSCOPY    EP DEVICE PROCEDURE N/A 01/25/2024    Insert PPM biv multi performed by Abdon Buckner MD at [facility] CARDIAC CATH LAB    EP DEVICE PROCEDURE N/A 01/25/2024    Ablation AV node performed by Abdon Buckner MD at [facility] CARDIAC CATH

## 2024-12-12 DIAGNOSIS — Z79.4 TYPE 2 DIABETES MELLITUS WITH MICROALBUMINURIA, WITH LONG-TERM CURRENT USE OF INSULIN (HCC): Chronic | ICD-10-CM

## 2024-12-12 DIAGNOSIS — R74.8 ELEVATED LIVER ENZYMES: Primary | ICD-10-CM

## 2024-12-12 DIAGNOSIS — M81.0 AGE-RELATED OSTEOPOROSIS WITHOUT CURRENT PATHOLOGICAL FRACTURE: Chronic | ICD-10-CM

## 2024-12-12 DIAGNOSIS — R80.9 TYPE 2 DIABETES MELLITUS WITH MICROALBUMINURIA, WITH LONG-TERM CURRENT USE OF INSULIN (HCC): Chronic | ICD-10-CM

## 2024-12-12 DIAGNOSIS — E11.29 TYPE 2 DIABETES MELLITUS WITH MICROALBUMINURIA, WITH LONG-TERM CURRENT USE OF INSULIN (HCC): Chronic | ICD-10-CM

## 2024-12-12 DIAGNOSIS — E89.0 HYPOTHYROIDISM, POSTSURGICAL: Chronic | ICD-10-CM

## 2024-12-12 DIAGNOSIS — M80.00XA AGE-RELATED OSTEOPOROSIS WITH CURRENT PATHOLOGICAL FRACTURE, INITIAL ENCOUNTER: ICD-10-CM

## 2024-12-12 LAB
ALBUMIN SERPL-MCNC: 4 G/DL (ref 3.2–4.6)
ALBUMIN/GLOB SERPL: 0.9 (ref 1–1.9)
ALP SERPL-CCNC: 164 U/L (ref 35–104)
ALT SERPL-CCNC: 76 U/L (ref 8–45)
ANION GAP SERPL CALC-SCNC: 12 MMOL/L (ref 7–16)
AST SERPL-CCNC: 45 U/L (ref 15–37)
BILIRUB SERPL-MCNC: 0.5 MG/DL (ref 0–1.2)
BUN SERPL-MCNC: 25 MG/DL (ref 8–23)
CALCIUM SERPL-MCNC: 9.5 MG/DL (ref 8.8–10.2)
CALCIUM SERPL-MCNC: 9.6 MG/DL (ref 8.8–10.2)
CHLORIDE SERPL-SCNC: 100 MMOL/L (ref 98–107)
CO2 SERPL-SCNC: 28 MMOL/L (ref 20–29)
CREAT SERPL-MCNC: 0.87 MG/DL (ref 0.6–1.1)
CREAT UR-MCNC: 102 MG/DL (ref 28–217)
GLOBULIN SER CALC-MCNC: 4.3 G/DL (ref 2.3–3.5)
GLUCOSE SERPL-MCNC: 125 MG/DL (ref 70–99)
MICROALBUMIN UR-MCNC: 4.19 MG/DL (ref 0–20)
MICROALBUMIN/CREAT UR-RTO: 41 MG/G (ref 0–30)
POTASSIUM SERPL-SCNC: 4.4 MMOL/L (ref 3.5–5.1)
PROT SERPL-MCNC: 8.3 G/DL (ref 6.3–8.2)
PTH-INTACT SERPL-MCNC: 100 PG/ML (ref 15–65)
SODIUM SERPL-SCNC: 140 MMOL/L (ref 136–145)
T4 FREE SERPL-MCNC: 1.2 NG/DL (ref 0.9–1.7)
TSH, 3RD GENERATION: 7 UIU/ML (ref 0.27–4.2)

## 2024-12-13 LAB — C PEPTIDE SERPL-MCNC: 1 NG/ML (ref 1.1–4.4)

## 2024-12-16 ENCOUNTER — OFFICE VISIT (OUTPATIENT)
Dept: ENT CLINIC | Age: 79
End: 2024-12-16
Payer: MEDICARE

## 2024-12-16 VITALS — BODY MASS INDEX: 19.64 KG/M2 | WEIGHT: 122.2 LBS | HEIGHT: 66 IN | RESPIRATION RATE: 14 BRPM

## 2024-12-16 DIAGNOSIS — R22.0 FACIAL MASS: Primary | ICD-10-CM

## 2024-12-16 LAB — GAD65 AB SER-ACNC: <5 U/ML (ref 0–5)

## 2024-12-16 PROCEDURE — 1123F ACP DISCUSS/DSCN MKR DOCD: CPT | Performed by: OTOLARYNGOLOGY

## 2024-12-16 PROCEDURE — 1090F PRES/ABSN URINE INCON ASSESS: CPT | Performed by: OTOLARYNGOLOGY

## 2024-12-16 PROCEDURE — 1036F TOBACCO NON-USER: CPT | Performed by: OTOLARYNGOLOGY

## 2024-12-16 PROCEDURE — G8420 CALC BMI NORM PARAMETERS: HCPCS | Performed by: OTOLARYNGOLOGY

## 2024-12-16 PROCEDURE — 99213 OFFICE O/P EST LOW 20 MIN: CPT | Performed by: OTOLARYNGOLOGY

## 2024-12-16 PROCEDURE — G8399 PT W/DXA RESULTS DOCUMENT: HCPCS | Performed by: OTOLARYNGOLOGY

## 2024-12-16 PROCEDURE — G8484 FLU IMMUNIZE NO ADMIN: HCPCS | Performed by: OTOLARYNGOLOGY

## 2024-12-16 PROCEDURE — G8427 DOCREV CUR MEDS BY ELIG CLIN: HCPCS | Performed by: OTOLARYNGOLOGY

## 2024-12-16 PROCEDURE — 1159F MED LIST DOCD IN RCRD: CPT | Performed by: OTOLARYNGOLOGY

## 2024-12-16 ASSESSMENT — ENCOUNTER SYMPTOMS
ALLERGIC/IMMUNOLOGIC NEGATIVE: 1
RESPIRATORY NEGATIVE: 1
EYES NEGATIVE: 1
GASTROINTESTINAL NEGATIVE: 1

## 2024-12-16 NOTE — PROGRESS NOTES
Chief Complaint   Patient presents with    Follow-up     6 mos mouth        HPI:  Nery Bryant is a 79 y.o. female seen in follow-up for her R facial mass. She underwent transoral excision of a right buccal space mass back on 3/14/2024.  She had to go back to the OR on 3/18/2024 for postop hematoma- she has been on Eliquis since undergoing a heart procedure earlier this spring.  Last seen back on 6/24/24. Doing well since then.  She denies any pain, bleeding, or purulence.  She reports intermittent numbness along the right cheek near the lateral oral commissure, but it does not impact her ability to eat or drink.  She denies any speech problems.  She continues to complain of some lower back pain and will be seeing pain management soon.    Past Medical History, Past Surgical History, Family history, Social History, and Medications were all reviewed with the patient today and updated as necessary.     Allergies   Allergen Reactions    Ibuprofen Rash     Patient Active Problem List   Diagnosis    Carotid stenosis    Iron deficiency anemia    Long-term use of high-risk medication    Osteoporosis    Colonic mass    Blood loss anemia    Type 2 diabetes mellitus with microalbuminuria, with long-term current use of insulin (HCC)    Type 2 diabetes mellitus with hyperglycemia, with long-term current use of insulin (HCC)    Essential hypertension    Long term current use of anticoagulant therapy    GERD (gastroesophageal reflux disease)    S/P AVR (aortic valve replacement)    Hypothyroidism, postsurgical    Rectal cancer (HCC)    Paroxysmal atrial fibrillation (HCC)    Hypoxia    S/P mitral valve replacement    CAD (coronary artery disease)    Mixed hyperlipidemia    Acute respiratory failure with hypoxia    Abnormal CT of the chest    Acute on chronic respiratory failure with hypoxemia    Pulmonary infiltration eosinophilia (HCC)    Lung nodules    Secondary hypercoagulable state (HCC)    S/P transcatheter mitral

## 2024-12-18 NOTE — TELEPHONE ENCOUNTER
[facility]  Esophagogastroduodenoscopy Report      Dzemtana Hodzic Patient Status:  Observation    1971 MRN [id number]   Location [facility] 4B CLINICAL DECISION UNIT Attending Iván Sweeney MD      DATE OF OPERATION: 2024     PREOPERATIVE DIAGNOSIS:   Epigastric pain  Inability to tolerate oral intake    POSTOPERATIVE DIAGNOSIS:   LA grade D esophagitis at the GE junction  Mild hiatal hernia  Surgical changes consistent with sleeve gastrectomy  Gastric erosions and gastritis noted in the gastric antrum  Normal-appearing duodenum mucosa    SURGERY PERFORMED: Esophagogastroduodenoscopy with cold forcep biopsies    SURGEON: Iván Woods DO    ANESTHESIA: MAC anesthesia    HISTORY OF PRESENT ILLNESS:     The patient is a 53 year old woman, history of anxiety, asthma, binge eating disorder, depression, diabetes, GERD, hypertension, morbid obesity, cholecystectomy, and sleeve gastrectomy in early , comes in with worsening epigastric pain and inability to tolerate anything orally.     REPORT OF OPERATION:     The procedure was reviewed with the patient. The patient is aware of the indications. The procedural risks and anesthetic complications have been reviewed. The possibility of missed lesions were reviewed.    After the patient was placed in the left lateral decubitus position, the Olympus video gastroscope was inserted into the esophagus and advanced easily to the second portion of the duodenum.  The scope was withdrawn and the mucosa was observed for abnormalities.    FINDINGS:    The visualized esophageal mucosa noted to have significant inflammation and 2 esophageal ulcers from 34 to 35 cm at the GE junction, consistent with LA grade D esophagitis.  Hill grade 3.  GE junction at 35 cm.  Small hiatal hernia noted.  The gastric mucosa, including retrograde views of the cardia and fundus noted to have surgical changes consistent with sleeve gastrectomy.  Within the gastric antrum,  Patient was seen in office yesterdayy 3/18/24 and is aware that the results of the path report is benign and has an appt to come and be seen tomorrow 3/20/24.   noted to have multiple small gastric erosions and gastritis, with a pattern consistent with NSAID gastropathy, status post cold forcep biopsies to rule out H. pylori.  The pylorus, duodenal bulb and descending duodenum was noted to be normal.    COMPLICATIONS: None    EBL: Minimal, less than 5 cc      IMPRESSION:    LA grade D esophagitis at the GE junction  Mild hiatal hernia  Surgical changes consistent with sleeve gastrectomy  Gastric erosions and gastritis noted in the gastric antrum  Normal-appearing duodenum mucosa    RECOMMENDATIONS:    Await pathology results  Continue pantoprazole 40 mg twice a day IV.  Can transition to oral once able to tolerate.  Resume clear liquid diet and advance as tolerated  Will need repeat EGD in 6-8 weeks to assess for esophageal healing and rule out Kong's esophagus  May need the patient to follow-up with her bariatric surgeon if acid reflux persists.  Stop all NSAIDs      Iván Woods DO  [facility]  12/18/2024 9:09 AM

## 2024-12-23 ENCOUNTER — NURSE ONLY (OUTPATIENT)
Age: 79
End: 2024-12-23
Payer: MEDICARE

## 2024-12-23 VITALS
HEART RATE: 60 BPM | DIASTOLIC BLOOD PRESSURE: 78 MMHG | OXYGEN SATURATION: 93 % | SYSTOLIC BLOOD PRESSURE: 149 MMHG | TEMPERATURE: 97.7 F | RESPIRATION RATE: 16 BRPM

## 2024-12-23 DIAGNOSIS — D50.8 OTHER IRON DEFICIENCY ANEMIA: ICD-10-CM

## 2024-12-23 DIAGNOSIS — D64.9 ANEMIA, UNSPECIFIED TYPE: Primary | ICD-10-CM

## 2024-12-23 DIAGNOSIS — M80.00XA AGE-RELATED OSTEOPOROSIS WITH CURRENT PATHOLOGICAL FRACTURE, INITIAL ENCOUNTER: Primary | ICD-10-CM

## 2024-12-23 PROCEDURE — 96372 THER/PROPH/DIAG INJ SC/IM: CPT | Performed by: PSYCHIATRY & NEUROLOGY

## 2024-12-23 RX ORDER — ONDANSETRON 2 MG/ML
8 INJECTION INTRAMUSCULAR; INTRAVENOUS
OUTPATIENT
Start: 2025-06-12

## 2024-12-23 RX ORDER — ACETAMINOPHEN 325 MG/1
650 TABLET ORAL
OUTPATIENT
Start: 2025-06-12

## 2024-12-23 RX ORDER — DIPHENHYDRAMINE HYDROCHLORIDE 50 MG/ML
50 INJECTION INTRAMUSCULAR; INTRAVENOUS
OUTPATIENT
Start: 2025-06-12

## 2024-12-23 RX ORDER — HYDROCORTISONE SODIUM SUCCINATE 100 MG/2ML
100 INJECTION INTRAMUSCULAR; INTRAVENOUS
OUTPATIENT
Start: 2025-06-12

## 2024-12-23 RX ORDER — SODIUM CHLORIDE 9 MG/ML
INJECTION, SOLUTION INTRAVENOUS CONTINUOUS
OUTPATIENT
Start: 2025-06-12

## 2024-12-23 RX ORDER — EPINEPHRINE 1 MG/ML
0.3 INJECTION, SOLUTION, CONCENTRATE INTRAVENOUS PRN
OUTPATIENT
Start: 2025-06-12

## 2024-12-23 RX ORDER — ALBUTEROL SULFATE 90 UG/1
4 INHALANT RESPIRATORY (INHALATION) PRN
OUTPATIENT
Start: 2025-06-12

## 2024-12-23 NOTE — PROGRESS NOTES
[facility]  [address], [address] [place] [address]  Office : [phone], Fax: [phone]        Osteoporosis pre-infusion/injection questionnaire:     1. In the past month, have you had or are you planning to have any dental surgery or major dental procedures?  No     2. Are you taking a calcium and vitamin D supplement? Yes     3. When was your last osteoporosis treatment? First one     4. In the last year, have you had any fractures? No        Patient arrived ambulatory to infusion suite today for a initial Prolia subcutaneous injection.  Pertinent labs drawn 12/12/2024 . Labs reviewed and are within medication administration parameters.  Prolia 60mg/ml injected SC into abdomen. Patient tolerated injection well.  Pt observed for 30 minutes post-injection without complications.  Advised patient to continue with calcium and vitamin D supplements post injection. Advised patient to call the ordering provider with any problems post injection.       Next Prolia appt scheduled 07/2025 prior to departure from infusion suite.     Pt ambulatory with steady gait out of infusion suite.

## 2024-12-30 DIAGNOSIS — R74.8 ELEVATED LIVER ENZYMES: ICD-10-CM

## 2024-12-30 DIAGNOSIS — E89.0 HYPOTHYROIDISM, POSTSURGICAL: Chronic | ICD-10-CM

## 2024-12-30 LAB
ALBUMIN SERPL-MCNC: 3.4 G/DL (ref 3.2–4.6)
ALBUMIN/GLOB SERPL: 1 (ref 1–1.9)
ALP SERPL-CCNC: 163 U/L (ref 35–104)
ALT SERPL-CCNC: 76 U/L (ref 8–45)
ANION GAP SERPL CALC-SCNC: 12 MMOL/L (ref 7–16)
AST SERPL-CCNC: 47 U/L (ref 15–37)
BILIRUB SERPL-MCNC: 0.2 MG/DL (ref 0–1.2)
BUN SERPL-MCNC: 13 MG/DL (ref 8–23)
CALCIUM SERPL-MCNC: 7.9 MG/DL (ref 8.8–10.2)
CHLORIDE SERPL-SCNC: 107 MMOL/L (ref 98–107)
CO2 SERPL-SCNC: 24 MMOL/L (ref 20–29)
CREAT SERPL-MCNC: 0.68 MG/DL (ref 0.6–1.1)
GLOBULIN SER CALC-MCNC: 3.3 G/DL (ref 2.3–3.5)
GLUCOSE SERPL-MCNC: 161 MG/DL (ref 70–99)
POTASSIUM SERPL-SCNC: 4.6 MMOL/L (ref 3.5–5.1)
PROT SERPL-MCNC: 6.7 G/DL (ref 6.3–8.2)
SODIUM SERPL-SCNC: 144 MMOL/L (ref 136–145)
T4 FREE SERPL-MCNC: 1.3 NG/DL (ref 0.9–1.7)
TSH W FREE THYROID IF ABNORMAL: 6.27 UIU/ML (ref 0.27–4.2)

## 2024-12-30 SDOH — HEALTH STABILITY: PHYSICAL HEALTH: ON AVERAGE, HOW MANY DAYS PER WEEK DO YOU ENGAGE IN MODERATE TO STRENUOUS EXERCISE (LIKE A BRISK WALK)?: 0 DAYS

## 2024-12-31 ENCOUNTER — OFFICE VISIT (OUTPATIENT)
Dept: FAMILY MEDICINE CLINIC | Facility: CLINIC | Age: 79
End: 2024-12-31

## 2024-12-31 VITALS
HEIGHT: 66 IN | TEMPERATURE: 97.8 F | BODY MASS INDEX: 19.73 KG/M2 | HEART RATE: 61 BPM | OXYGEN SATURATION: 97 % | SYSTOLIC BLOOD PRESSURE: 122 MMHG | DIASTOLIC BLOOD PRESSURE: 82 MMHG

## 2024-12-31 DIAGNOSIS — Z95.2 S/P TRANSCATHETER MITRAL VALVE REPLACEMENT (TMVR): ICD-10-CM

## 2024-12-31 DIAGNOSIS — Z95.0 S/P BIVENTRICULAR CARDIAC PACEMAKER PROCEDURE: Chronic | ICD-10-CM

## 2024-12-31 DIAGNOSIS — R80.9 TYPE 2 DIABETES MELLITUS WITH MICROALBUMINURIA, WITH LONG-TERM CURRENT USE OF INSULIN (HCC): Primary | Chronic | ICD-10-CM

## 2024-12-31 DIAGNOSIS — E11.29 TYPE 2 DIABETES MELLITUS WITH MICROALBUMINURIA, WITH LONG-TERM CURRENT USE OF INSULIN (HCC): Primary | Chronic | ICD-10-CM

## 2024-12-31 DIAGNOSIS — I10 ESSENTIAL HYPERTENSION: Chronic | ICD-10-CM

## 2024-12-31 DIAGNOSIS — Z79.4 TYPE 2 DIABETES MELLITUS WITH MICROALBUMINURIA, WITH LONG-TERM CURRENT USE OF INSULIN (HCC): Primary | Chronic | ICD-10-CM

## 2024-12-31 DIAGNOSIS — D50.0 BLOOD LOSS ANEMIA: ICD-10-CM

## 2024-12-31 RX ORDER — INSULIN ASPART 100 [IU]/ML
INJECTION, SOLUTION INTRAVENOUS; SUBCUTANEOUS
Qty: 5 ADJUSTABLE DOSE PRE-FILLED PEN SYRINGE | Refills: 3 | Status: SHIPPED
Start: 2024-12-31

## 2024-12-31 RX ORDER — TRAMADOL HYDROCHLORIDE 50 MG/1
50 TABLET ORAL EVERY 8 HOURS PRN
COMMUNITY

## 2024-12-31 SDOH — ECONOMIC STABILITY: FOOD INSECURITY: WITHIN THE PAST 12 MONTHS, YOU WORRIED THAT YOUR FOOD WOULD RUN OUT BEFORE YOU GOT MONEY TO BUY MORE.: NEVER TRUE

## 2024-12-31 SDOH — ECONOMIC STABILITY: FOOD INSECURITY: WITHIN THE PAST 12 MONTHS, THE FOOD YOU BOUGHT JUST DIDN'T LAST AND YOU DIDN'T HAVE MONEY TO GET MORE.: NEVER TRUE

## 2024-12-31 SDOH — ECONOMIC STABILITY: INCOME INSECURITY: HOW HARD IS IT FOR YOU TO PAY FOR THE VERY BASICS LIKE FOOD, HOUSING, MEDICAL CARE, AND HEATING?: NOT HARD AT ALL

## 2024-12-31 NOTE — PROGRESS NOTES
MD KIKE at [facility] ENDOSCOPY    HIP FRACTURE SURGERY Left 2009    pins    MITRAL VALVE REPLACEMENT  5/4/15     and Aortic Valve Replacement, Dr. 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- Romo    MOUTH SURGERY Right 03/14/2024    TRANSORAL EXCISION OF RIGHT ORAL MASS performed by Alonzo Romo MD at [facility] OPC    MOUTH SURGERY Right 03/18/2024    washout of R oral hematoma- Romo    MOUTH SURGERY N/A 03/18/2024    WASHOUT OF ORAL HEMATOMA USING BIPOLAR CAUTERY performed by Alonzo Romo MD at [facility] MAIN OR    ORTHOPEDIC SURGERY      L hip fracture/repair    OTHER SURGICAL HISTORY      ablation 10/6/2015, Dr Glass    AL UNLISTED PROCEDURE ABDOMEN PERITONEUM & OMENTUM  2021    colon surg    THORACOSCOPY Right 08/17/2023    THORACOSCOPY RIGHT WITH LUNG BIOPSY performed by Marina Ni MD at [facility] MAIN OR    THYROIDECTOMY      TUBAL LIGATION  1970        Family History   Problem Relation Age of Onset    Diabetes Mother     Heart Disease Mother     Diabetes Sister     Heart Disease Sister     Breast Cancer Sister     Heart Disease Father     Heart Attack Father     Diabetes Sister     Breast Cancer Sister 25    Diabetes Brother     Heart Disease Brother     Heart Attack Brother     Heart Disease Brother     Diabetes Brother     High Blood Pressure Brother            Social History     Socioeconomic History    Marital status:      Spouse name: Not on file    Number of children: Not on file    Years of education: Not on file    Highest education level: Not on file   Occupational History    Not on file   Tobacco Use    Smoking status: Never    Smokeless tobacco: Never   Vaping Use    Vaping status: Never Used   Substance and Sexual Activity    Alcohol use: No    Drug use: No    Sexual activity: Not Currently     Partners: Male   Other Topics Concern    Not on file   Social History Narrative    Denies physical or sexual abuse     Social Determinants of Health     Financial

## 2024-12-31 NOTE — PROGRESS NOTES
UNIT/ML injection pen Inject 4 units 3 times a day before meals and use sliding scale coverage.  If blood sugar is: Less than 150 No additional insulin; 151 to 200 give 1 units, 201 to 250 give 2 units, 251 to 300 give 3 units, 301 to 350 give 4 units,  351 to 400 give 5 units, 401 or greater give 6 units 5 Adjustable Dose Pre-filled Pen Syringe 3    Continuous Glucose Sensor (DEXCOM G7 SENSOR) MISC 1 each by Does not apply route      levothyroxine (SYNTHROID) 125 MCG tablet Take 1 tablet by mouth every morning (before breakfast) 100 tablet 3    atorvastatin (LIPITOR) 20 MG tablet Take 0.5 tablets by mouth every evening 45 tablet 3    losartan (COZAAR) 50 MG tablet Take 1 tablet by mouth daily 90 tablet 3    magnesium oxide (MAG-OX) 400 MG tablet Take 1 tablet by mouth at bedtime 90 tablet 3    metoprolol succinate (TOPROL XL) 25 MG extended release tablet Take 1 tablet by mouth daily May take an extra tab once daily, prn for rapid A fib 90 tablet 3    pantoprazole (PROTONIX) 40 MG tablet Take 1 tablet by mouth every morning (before breakfast) (Patient taking differently: Take 1 tablet by mouth daily as needed) 90 tablet 3    potassium chloride (KLOR-CON M) 20 MEQ extended release tablet Take 1 tablet by mouth daily (Patient taking differently: Take 1 tablet by mouth daily PRN) 90 tablet 3    apixaban (ELIQUIS) 5 MG TABS tablet Take 1 tablet by mouth 2 times daily 180 tablet 3    furosemide (LASIX) 20 MG tablet Take 0.5 tablets by mouth daily as needed (edema) Take on mornings she has swelling/use with KCL 45 tablet 3    docusate sodium (COLACE, DULCOLAX) 100 MG CAPS Take 100 mg by mouth 2 times daily (Patient taking differently: Take 100 mg by mouth daily as needed)      Insulin Degludec (TRESIBA SC) Inject 43 Units into the skin at bedtime 35-40, but varies      aspirin 81 MG chewable tablet Take 1 tablet by mouth daily      vitamin D (CHOLECALCIFEROL) 25 MCG (1000 UT) TABS tablet Take 2 tablets by mouth 2 times

## 2025-01-07 ENCOUNTER — HOSPITAL ENCOUNTER (OUTPATIENT)
Dept: LAB | Age: 80
Discharge: HOME | End: 2025-01-07
Payer: MEDICARE

## 2025-01-07 ENCOUNTER — OFFICE VISIT (OUTPATIENT)
Dept: ONCOLOGY | Age: 80
End: 2025-01-07
Payer: MEDICARE

## 2025-01-07 VITALS
SYSTOLIC BLOOD PRESSURE: 140 MMHG | WEIGHT: 122 LBS | HEIGHT: 66 IN | OXYGEN SATURATION: 98 % | RESPIRATION RATE: 16 BRPM | DIASTOLIC BLOOD PRESSURE: 70 MMHG | HEART RATE: 72 BPM | TEMPERATURE: 97.8 F | BODY MASS INDEX: 19.61 KG/M2

## 2025-01-07 DIAGNOSIS — Z79.01 ANTICOAGULATED: ICD-10-CM

## 2025-01-07 DIAGNOSIS — D50.8 OTHER IRON DEFICIENCY ANEMIA: ICD-10-CM

## 2025-01-07 DIAGNOSIS — D64.9 ANEMIA, UNSPECIFIED TYPE: ICD-10-CM

## 2025-01-07 DIAGNOSIS — C20 RECTAL CANCER (HCC): ICD-10-CM

## 2025-01-07 DIAGNOSIS — D50.8 OTHER IRON DEFICIENCY ANEMIA: Primary | ICD-10-CM

## 2025-01-07 LAB
ALBUMIN SERPL-MCNC: 3.7 G/DL (ref 3.2–4.6)
ALBUMIN/GLOB SERPL: 0.9 (ref 1–1.9)
ALP SERPL-CCNC: 151 U/L (ref 35–104)
ALT SERPL-CCNC: 48 U/L (ref 8–45)
ANION GAP SERPL CALC-SCNC: 11 MMOL/L (ref 7–16)
AST SERPL-CCNC: 32 U/L (ref 15–37)
BASOPHILS # BLD: 0.05 K/UL (ref 0–0.2)
BASOPHILS NFR BLD: 0.6 % (ref 0–2)
BILIRUB SERPL-MCNC: 0.4 MG/DL (ref 0–1.2)
BUN SERPL-MCNC: 23 MG/DL (ref 8–23)
CALCIUM SERPL-MCNC: 9 MG/DL (ref 8.8–10.2)
CHLORIDE SERPL-SCNC: 103 MMOL/L (ref 98–107)
CO2 SERPL-SCNC: 27 MMOL/L (ref 20–29)
CREAT SERPL-MCNC: 0.9 MG/DL (ref 0.6–1.1)
DIFFERENTIAL METHOD BLD: ABNORMAL
EOSINOPHIL # BLD: 0.08 K/UL (ref 0–0.8)
EOSINOPHIL NFR BLD: 0.9 % (ref 0.5–7.8)
ERYTHROCYTE [DISTWIDTH] IN BLOOD BY AUTOMATED COUNT: 18.6 % (ref 11.9–14.6)
FERRITIN SERPL-MCNC: 183 NG/ML (ref 8–388)
GLOBULIN SER CALC-MCNC: 3.9 G/DL (ref 2.3–3.5)
GLUCOSE SERPL-MCNC: 158 MG/DL (ref 70–99)
HCT VFR BLD AUTO: 42 % (ref 35.8–46.3)
HGB BLD-MCNC: 12.7 G/DL (ref 11.7–15.4)
IMM GRANULOCYTES # BLD AUTO: 0.03 K/UL (ref 0–0.5)
IMM GRANULOCYTES NFR BLD AUTO: 0.3 % (ref 0–5)
IRON SATN MFR SERPL: 25 % (ref 20–50)
IRON SERPL-MCNC: 60 UG/DL (ref 35–100)
LYMPHOCYTES # BLD: 2.56 K/UL (ref 0.5–4.6)
LYMPHOCYTES NFR BLD: 28.4 % (ref 13–44)
MCH RBC QN AUTO: 31.2 PG (ref 26.1–32.9)
MCHC RBC AUTO-ENTMCNC: 30.2 G/DL (ref 31.4–35)
MCV RBC AUTO: 103.2 FL (ref 82–102)
MONOCYTES # BLD: 0.65 K/UL (ref 0.1–1.3)
MONOCYTES NFR BLD: 7.2 % (ref 4–12)
NEUTS SEG # BLD: 5.65 K/UL (ref 1.7–8.2)
NEUTS SEG NFR BLD: 62.6 % (ref 43–78)
NRBC # BLD: 0 K/UL (ref 0–0.2)
PLATELET # BLD AUTO: 214 K/UL (ref 150–450)
PMV BLD AUTO: 9.1 FL (ref 9.4–12.3)
POTASSIUM SERPL-SCNC: 5.2 MMOL/L (ref 3.5–5.1)
PROT SERPL-MCNC: 7.6 G/DL (ref 6.3–8.2)
RBC # BLD AUTO: 4.07 M/UL (ref 4.05–5.2)
SODIUM SERPL-SCNC: 141 MMOL/L (ref 136–145)
TIBC SERPL-MCNC: 240 UG/DL (ref 240–450)
UIBC SERPL-MCNC: 180 UG/DL (ref 112–347)
WBC # BLD AUTO: 9 K/UL (ref 4.3–11.1)

## 2025-01-07 PROCEDURE — 85025 COMPLETE CBC W/AUTO DIFF WBC: CPT

## 2025-01-07 PROCEDURE — 80053 COMPREHEN METABOLIC PANEL: CPT

## 2025-01-07 PROCEDURE — G8427 DOCREV CUR MEDS BY ELIG CLIN: HCPCS

## 2025-01-07 PROCEDURE — 3077F SYST BP >= 140 MM HG: CPT

## 2025-01-07 PROCEDURE — 3078F DIAST BP <80 MM HG: CPT

## 2025-01-07 PROCEDURE — 82728 ASSAY OF FERRITIN: CPT

## 2025-01-07 PROCEDURE — 83540 ASSAY OF IRON: CPT

## 2025-01-07 PROCEDURE — G8399 PT W/DXA RESULTS DOCUMENT: HCPCS

## 2025-01-07 PROCEDURE — 83550 IRON BINDING TEST: CPT

## 2025-01-07 PROCEDURE — 1123F ACP DISCUSS/DSCN MKR DOCD: CPT

## 2025-01-07 PROCEDURE — 1125F AMNT PAIN NOTED PAIN PRSNT: CPT

## 2025-01-07 PROCEDURE — 99213 OFFICE O/P EST LOW 20 MIN: CPT

## 2025-01-07 PROCEDURE — 1036F TOBACCO NON-USER: CPT

## 2025-01-07 PROCEDURE — 36415 COLL VENOUS BLD VENIPUNCTURE: CPT

## 2025-01-07 PROCEDURE — 1090F PRES/ABSN URINE INCON ASSESS: CPT

## 2025-01-07 PROCEDURE — G8420 CALC BMI NORM PARAMETERS: HCPCS

## 2025-01-07 ASSESSMENT — PATIENT HEALTH QUESTIONNAIRE - PHQ9
SUM OF ALL RESPONSES TO PHQ9 QUESTIONS 1 & 2: 0
SUM OF ALL RESPONSES TO PHQ QUESTIONS 1-9: 0
2. FEELING DOWN, DEPRESSED OR HOPELESS: NOT AT ALL
SUM OF ALL RESPONSES TO PHQ QUESTIONS 1-9: 0
SUM OF ALL RESPONSES TO PHQ QUESTIONS 1-9: 0
1. LITTLE INTEREST OR PLEASURE IN DOING THINGS: NOT AT ALL
SUM OF ALL RESPONSES TO PHQ QUESTIONS 1-9: 0

## 2025-01-10 ENCOUNTER — OFFICE VISIT (OUTPATIENT)
Age: 80
End: 2025-01-10

## 2025-01-10 DIAGNOSIS — Z51.81 ENCOUNTER FOR THERAPEUTIC DRUG MONITORING: ICD-10-CM

## 2025-01-10 DIAGNOSIS — M47.816 FACET ARTHRITIS OF LUMBAR REGION: Primary | ICD-10-CM

## 2025-01-10 RX ORDER — TRAMADOL HYDROCHLORIDE 50 MG/1
50 TABLET ORAL EVERY 8 HOURS PRN
Qty: 90 TABLET | Refills: 1 | Status: SHIPPED | OUTPATIENT
Start: 2025-01-10 | End: 2025-03-11

## 2025-01-15 LAB — DRUGS UR: NORMAL

## 2025-01-16 ASSESSMENT — ENCOUNTER SYMPTOMS
BLOOD IN STOOL: 0
VOMITING: 0
SHORTNESS OF BREATH: 0
CONSTIPATION: 0
SCLERAL ICTERUS: 0
ABDOMINAL DISTENTION: 0
WHEEZING: 0
CHEST TIGHTNESS: 0
DIARRHEA: 0
NAUSEA: 0
HEMOPTYSIS: 0
ABDOMINAL PAIN: 0
SORE THROAT: 0
VOICE CHANGE: 0
TROUBLE SWALLOWING: 0

## 2025-01-16 NOTE — PROGRESS NOTES
[facility]: Follow Up - Established Patient     Chief Complaint   Patient presents with    Follow-up     Reason for Referral: Iron deficiency anemia, unspecified iron deficiency anemia type  Referring Provider: RICCI Kennedy - NP  Family History of Cancer/Hematologic Disorders: Family history significant for sister with breast cancer.     History of Present Illness:  Ms. Bryant is a 79 y.o. female who presents today in referral from NP. No ref. provider found for consultation regarding anemia.  The past medical history is significant for dx of colon CA s/p partial colon resection- no chemo, a-fib RVR on coumadin and tikosyn, aortic and mitral valve replacement, HTN, DM2, HLD, and Hypothyroidism  Followed by Cardiology, Endocrinology, Rheumatology, Orthopedics, ENT  -Patient was here for consultation with her .  She reported chronic fatigue for over a year.  Her symptoms started in July 2023 with acute shortness of breath that woke her up from sleep.  She was first worked up for pulmonary disease but was ultimately diagnosed with valve failure.  She is status post valve replacement in 2015/bovine valves.  In January 2024 these valves were replaced after acute changes were noted.  She has been doing much better overall.  She is off supplemental oxygen.  She continues with close follow-up w cardiology.  She is here for evaluation of ELIZABETH.  Plan to proceed with IV iron at this time.  Plan to p/w labs including cbc/smear/nutritional labs and TSH.  Will also check electrolytes in prep for IV iron.  ADDENDUM: case d/w antelmo Robin with IV iron.      She is here today for FU. She is s/p IV Iron in October and November. She reports that she has more energy and able to be more mobile. She denies any SOB. She is seeing pain management for her back. Her weight is down but she reports she is eating good. No abd pain. No diarrhea. She is now on Eliquis. She has no sx of bleeding or dark

## 2025-01-31 ENCOUNTER — OFFICE VISIT (OUTPATIENT)
Dept: FAMILY MEDICINE CLINIC | Facility: CLINIC | Age: 80
End: 2025-01-31

## 2025-01-31 VITALS
HEART RATE: 60 BPM | WEIGHT: 125.5 LBS | HEIGHT: 66 IN | BODY MASS INDEX: 20.17 KG/M2 | DIASTOLIC BLOOD PRESSURE: 84 MMHG | OXYGEN SATURATION: 95 % | TEMPERATURE: 97.8 F | SYSTOLIC BLOOD PRESSURE: 118 MMHG

## 2025-01-31 DIAGNOSIS — L57.0 ACTINIC KERATOSIS: Primary | ICD-10-CM

## 2025-01-31 DIAGNOSIS — Z79.4 TYPE 2 DIABETES MELLITUS WITH MICROALBUMINURIA, WITH LONG-TERM CURRENT USE OF INSULIN (HCC): Chronic | ICD-10-CM

## 2025-01-31 DIAGNOSIS — I48.0 PAROXYSMAL ATRIAL FIBRILLATION (HCC): ICD-10-CM

## 2025-01-31 DIAGNOSIS — D75.89 MACROCYTOSIS WITHOUT ANEMIA: ICD-10-CM

## 2025-01-31 DIAGNOSIS — E11.29 TYPE 2 DIABETES MELLITUS WITH MICROALBUMINURIA, WITH LONG-TERM CURRENT USE OF INSULIN (HCC): Chronic | ICD-10-CM

## 2025-01-31 DIAGNOSIS — J67.9 HYPERSENSITIVITY PNEUMONITIS (HCC): ICD-10-CM

## 2025-01-31 DIAGNOSIS — R80.9 TYPE 2 DIABETES MELLITUS WITH MICROALBUMINURIA, WITH LONG-TERM CURRENT USE OF INSULIN (HCC): Chronic | ICD-10-CM

## 2025-01-31 DIAGNOSIS — I50.22 CHRONIC SYSTOLIC (CONGESTIVE) HEART FAILURE (HCC): ICD-10-CM

## 2025-01-31 PROBLEM — J96.01 ACUTE RESPIRATORY FAILURE WITH HYPOXIA: Status: RESOLVED | Noted: 2023-07-17 | Resolved: 2025-01-31

## 2025-01-31 PROBLEM — J96.21 ACUTE ON CHRONIC RESPIRATORY FAILURE WITH HYPOXEMIA: Status: RESOLVED | Noted: 2023-08-02 | Resolved: 2025-01-31

## 2025-01-31 LAB
EST. AVERAGE GLUCOSE BLD GHB EST-MCNC: 138 MG/DL
FOLATE SERPL-MCNC: 3.3 NG/ML (ref 3.1–17.5)
HBA1C MFR BLD: 6.4 % (ref 0–5.6)
VIT B12 SERPL-MCNC: 1359 PG/ML (ref 193–986)

## 2025-01-31 SDOH — ECONOMIC STABILITY: INCOME INSECURITY: IN THE LAST 12 MONTHS, WAS THERE A TIME WHEN YOU WERE NOT ABLE TO PAY THE MORTGAGE OR RENT ON TIME?: NO

## 2025-01-31 SDOH — ECONOMIC STABILITY: FOOD INSECURITY: WITHIN THE PAST 12 MONTHS, YOU WORRIED THAT YOUR FOOD WOULD RUN OUT BEFORE YOU GOT MONEY TO BUY MORE.: NEVER TRUE

## 2025-01-31 SDOH — ECONOMIC STABILITY: FOOD INSECURITY: WITHIN THE PAST 12 MONTHS, THE FOOD YOU BOUGHT JUST DIDN'T LAST AND YOU DIDN'T HAVE MONEY TO GET MORE.: NEVER TRUE

## 2025-01-31 SDOH — ECONOMIC STABILITY: TRANSPORTATION INSECURITY
IN THE PAST 12 MONTHS, HAS THE LACK OF TRANSPORTATION KEPT YOU FROM MEDICAL APPOINTMENTS OR FROM GETTING MEDICATIONS?: NO

## 2025-01-31 SDOH — ECONOMIC STABILITY: TRANSPORTATION INSECURITY
IN THE PAST 12 MONTHS, HAS LACK OF TRANSPORTATION KEPT YOU FROM MEETINGS, WORK, OR FROM GETTING THINGS NEEDED FOR DAILY LIVING?: NO

## 2025-01-31 NOTE — PROGRESS NOTES
Tray Cueto MD  [facility]  [address], [place] [id number] [phone]    Assessment/Plan: Nery Bryant was seen today for:    1. Actinic keratosis - on patient's face. will refer to derm for evaluation / removal.  -     AFL - [facility]  2. Macrocytosis without anemia - patient recently treated for iron deficiency anemia and anemia resolved by MCV is very increased, suspect relative deficiency of B12 or folate.  -     Vitamin B12; Future  -     Folate; Future  3. Type 2 diabetes mellitus with microalbuminuria, with long-term current use of insulin (HCC) - previously having hypoglycemic episodes. recheck Hgb A1c to see if patient's lowered insulin dosing is putting her closer to 7.5. based on her CGM readings she is getting more in range and patient reports fewer lows.  -     Hemoglobin A1C; Future  4. Chronic systolic (congestive) heart failure (HCC) - stable. Managed by Dr. Swenson  5. Hypersensitivity pneumonitis (HCC) - stable  6. Paroxysmal atrial fibrillation (HCC) - stable on eliquis. Managed by Dr. Swenson.      Return in about 3 months (around 2025) for recheck.     On 25 I have spent 47 minutes reviewing previous notes, test results and face to face with the patient (and any present family or support) discussing the diagnosis and plan.    HPI: Nery Bryant (: 1945) is a 79 y.o. female here for evaluation of the following chief complaint(s): Follow-up (PT is here for a follow up. Pt has a denisse on her face that she wants to get looked on. )  Avoiding the lows much better. Helps now that she knows she can adjust pre-meal insulin dose to match expected meal size.     Will get next batch of Meds in .   Scheduled to get injections in pain medicine. Will try lidocaine and then proceed with ablation if it is helpful.   Treisiba dose is currently up to 40. And sliding scale is 4-9 based on intake.   Dr. Ramos adjusted thyroid

## 2025-02-06 PROCEDURE — 93294 REM INTERROG EVL PM/LDLS PM: CPT | Performed by: INTERNAL MEDICINE

## 2025-02-06 PROCEDURE — 93296 REM INTERROG EVL PM/IDS: CPT | Performed by: INTERNAL MEDICINE

## 2025-02-10 ENCOUNTER — HOSPITAL ENCOUNTER (OUTPATIENT)
Dept: CT IMAGING | Age: 80
Discharge: HOME | End: 2025-02-13
Attending: INTERNAL MEDICINE
Payer: MEDICARE

## 2025-02-10 DIAGNOSIS — R09.02 HYPOXIA: ICD-10-CM

## 2025-02-10 DIAGNOSIS — R91.8 PULMONARY INFILTRATES: ICD-10-CM

## 2025-02-10 PROCEDURE — 71250 CT THORAX DX C-: CPT

## 2025-02-11 ENCOUNTER — OFFICE VISIT (OUTPATIENT)
Dept: ENDOCRINOLOGY | Age: 80
End: 2025-02-11
Payer: MEDICARE

## 2025-02-11 VITALS
OXYGEN SATURATION: 97 % | BODY MASS INDEX: 20.25 KG/M2 | WEIGHT: 126 LBS | HEIGHT: 66 IN | DIASTOLIC BLOOD PRESSURE: 60 MMHG | HEART RATE: 60 BPM | SYSTOLIC BLOOD PRESSURE: 110 MMHG

## 2025-02-11 DIAGNOSIS — R80.9 TYPE 2 DIABETES MELLITUS WITH MICROALBUMINURIA, WITH LONG-TERM CURRENT USE OF INSULIN (HCC): Chronic | ICD-10-CM

## 2025-02-11 DIAGNOSIS — E89.0 HYPOTHYROIDISM, POSTSURGICAL: Chronic | ICD-10-CM

## 2025-02-11 DIAGNOSIS — M81.0 AGE-RELATED OSTEOPOROSIS WITHOUT CURRENT PATHOLOGICAL FRACTURE: Chronic | ICD-10-CM

## 2025-02-11 DIAGNOSIS — E11.29 TYPE 2 DIABETES MELLITUS WITH MICROALBUMINURIA, WITH LONG-TERM CURRENT USE OF INSULIN (HCC): Chronic | ICD-10-CM

## 2025-02-11 DIAGNOSIS — Z79.4 TYPE 2 DIABETES MELLITUS WITH MICROALBUMINURIA, WITH LONG-TERM CURRENT USE OF INSULIN (HCC): Chronic | ICD-10-CM

## 2025-02-11 DIAGNOSIS — E89.0 HYPOTHYROIDISM, POSTSURGICAL: Primary | Chronic | ICD-10-CM

## 2025-02-11 DIAGNOSIS — R79.89 ELEVATED PARATHYROID HORMONE: Chronic | ICD-10-CM

## 2025-02-11 LAB
T4 FREE SERPL-MCNC: 1.4 NG/DL (ref 0.9–1.7)
TSH, 3RD GENERATION: 0.89 UIU/ML (ref 0.27–4.2)

## 2025-02-11 PROCEDURE — G2211 COMPLEX E/M VISIT ADD ON: HCPCS | Performed by: STUDENT IN AN ORGANIZED HEALTH CARE EDUCATION/TRAINING PROGRAM

## 2025-02-11 PROCEDURE — 1036F TOBACCO NON-USER: CPT | Performed by: STUDENT IN AN ORGANIZED HEALTH CARE EDUCATION/TRAINING PROGRAM

## 2025-02-11 PROCEDURE — 3074F SYST BP LT 130 MM HG: CPT | Performed by: STUDENT IN AN ORGANIZED HEALTH CARE EDUCATION/TRAINING PROGRAM

## 2025-02-11 PROCEDURE — 1123F ACP DISCUSS/DSCN MKR DOCD: CPT | Performed by: STUDENT IN AN ORGANIZED HEALTH CARE EDUCATION/TRAINING PROGRAM

## 2025-02-11 PROCEDURE — G8399 PT W/DXA RESULTS DOCUMENT: HCPCS | Performed by: STUDENT IN AN ORGANIZED HEALTH CARE EDUCATION/TRAINING PROGRAM

## 2025-02-11 PROCEDURE — 95251 CONT GLUC MNTR ANALYSIS I&R: CPT | Performed by: STUDENT IN AN ORGANIZED HEALTH CARE EDUCATION/TRAINING PROGRAM

## 2025-02-11 PROCEDURE — 99215 OFFICE O/P EST HI 40 MIN: CPT | Performed by: STUDENT IN AN ORGANIZED HEALTH CARE EDUCATION/TRAINING PROGRAM

## 2025-02-11 PROCEDURE — 3044F HG A1C LEVEL LT 7.0%: CPT | Performed by: STUDENT IN AN ORGANIZED HEALTH CARE EDUCATION/TRAINING PROGRAM

## 2025-02-11 PROCEDURE — 1159F MED LIST DOCD IN RCRD: CPT | Performed by: STUDENT IN AN ORGANIZED HEALTH CARE EDUCATION/TRAINING PROGRAM

## 2025-02-11 PROCEDURE — 1090F PRES/ABSN URINE INCON ASSESS: CPT | Performed by: STUDENT IN AN ORGANIZED HEALTH CARE EDUCATION/TRAINING PROGRAM

## 2025-02-11 PROCEDURE — G8427 DOCREV CUR MEDS BY ELIG CLIN: HCPCS | Performed by: STUDENT IN AN ORGANIZED HEALTH CARE EDUCATION/TRAINING PROGRAM

## 2025-02-11 PROCEDURE — G8420 CALC BMI NORM PARAMETERS: HCPCS | Performed by: STUDENT IN AN ORGANIZED HEALTH CARE EDUCATION/TRAINING PROGRAM

## 2025-02-11 PROCEDURE — 3078F DIAST BP <80 MM HG: CPT | Performed by: STUDENT IN AN ORGANIZED HEALTH CARE EDUCATION/TRAINING PROGRAM

## 2025-02-11 ASSESSMENT — ENCOUNTER SYMPTOMS
COUGH: 0
CONSTIPATION: 0
VOMITING: 0
SHORTNESS OF BREATH: 0
CHEST TIGHTNESS: 0
NAUSEA: 0
BACK PAIN: 1
ABDOMINAL PAIN: 0
DIARRHEA: 0

## 2025-02-11 NOTE — ASSESSMENT & PLAN NOTE
Calcium and ionized calcium is normal. Managing bone disease with Prolia. No kidney stones.  For patients who are initially followed without surgery, parathyroidectomy is indicated if any of the following occur during follow-up:  ?Consistent elevation in serum calcium >1 mg/dL (0.25 mmol/L) above the upper limit of the reference range  ?Interim low trauma fracture  ?Interim kidney stone(s)  ?Reduction in eGFR (decrement of >=3 mL/min/1.73 m2 per year over one to two years)

## 2025-02-11 NOTE — ASSESSMENT & PLAN NOTE
Complicated by: microalbuminuria, hyperglycemia and heart disease    A1c: 6.4%, GMI today is 7.0%; personalized A1c goal is <8% with fasted BG <140 and 3hr post prandial BG <200  She was having some overnight lows and reduced Tresiba dose to 22 units. Discussed using least amount of insulin for BG  fasted. Novolog PRN.  Appropriately managed by PCP, will defer to Dr. Cueto going forward for DM management.  .  DM Metrics:  ASCVD: Age >76 yo  Lipid: Patient is on statin and follows closely with cardiology.  Nephropathy: Urine studies UTD  Eye exam: no DR, up to date  Foot exam: No concerns. No neuropathy.

## 2025-02-11 NOTE — PROGRESS NOTES
[facility]  [address], [address]  [place] [address]        Nery Bryant is a 79 y.o. female who is referred by Kelly Masters MD for the evaluation and management of Elevated PTH with associated hypocalcemia, elevated PTH and osteoporosis.  Est care 10/11/2024  LOV 12/11/2024    NOTICE FOR THE PATIENT: This clinical note is not designed to be interpreted by patients.  We do not recommend reading it unless you have medical training. These notes may contain candid and (unintentionally) offensive descriptions, which are sometimes required for accurate documentation. If you would like more information about your healthcare, please obtain it directly by myself or my staff/colleagues - never solely from the notes. Thank you for your understanding and cooperation.    ASSESSMENT AND PLAN:    1. Hypothyroidism, postsurgical  Overview:  History of a goiter, s/p thyroidectomy, no cancer  TSH wnl 10/21/2024 (TSH 0.381)  12/2025 advised to increase by half tab.  Assessment & Plan:  Continue current dose of levothyroxine 125 mcg daily with extra half tab on Wednesday  We will check TSH and FT4.  Orders:  -     TSH; Future  -     T4, Free; Future  2. Age-related osteoporosis without current pathological fracture  Overview:  BMD done 9/12/2005-Osteopenia. Treated with Fosamax for 2 years (~8304-7372)  L hip fracture in 1349-6091 during therapy   Fractures: L hip, chronic anterior L1 - unknown etiology, L wrist fracture  Using vitamin D 2000U BID. Stopped calcium due to recent cardiac surgery.   Per notes, bisphosphonate was stopped due to anemia.  Last DXA 11/2024 with lumbar T-score -3.2, femoral neck T-score -2.8, total hip -3.3.  With history of fracture.  This patient meets criteria for anabolic therapy however given her elevated PTH level and history of cardiac disease, then there is no safe anabolic option.  Therefore, plan to proceed with potent antiresorptive.   First

## 2025-02-11 NOTE — ASSESSMENT & PLAN NOTE
Continue current dose of levothyroxine 125 mcg daily with extra half tab on Wednesday  We will check TSH and FT4.

## 2025-02-11 NOTE — ASSESSMENT & PLAN NOTE
Next DEXA is 10/2026.  Reviewed labs, calcium doing well.  Next Prolia 6/2024.  Tolerating Prolia without issue.

## 2025-02-19 ENCOUNTER — OFFICE VISIT (OUTPATIENT)
Dept: PULMONOLOGY | Age: 80
End: 2025-02-19
Payer: MEDICARE

## 2025-02-19 VITALS
WEIGHT: 125.7 LBS | HEIGHT: 66 IN | SYSTOLIC BLOOD PRESSURE: 145 MMHG | OXYGEN SATURATION: 94 % | DIASTOLIC BLOOD PRESSURE: 78 MMHG | TEMPERATURE: 97.6 F | RESPIRATION RATE: 17 BRPM | HEART RATE: 63 BPM | BODY MASS INDEX: 20.2 KG/M2

## 2025-02-19 DIAGNOSIS — J67.9 HYPERSENSITIVITY PNEUMONITIS (HCC): Primary | ICD-10-CM

## 2025-02-19 DIAGNOSIS — J96.11 CHRONIC RESPIRATORY FAILURE WITH HYPOXIA (HCC): ICD-10-CM

## 2025-02-19 PROCEDURE — 1123F ACP DISCUSS/DSCN MKR DOCD: CPT | Performed by: INTERNAL MEDICINE

## 2025-02-19 PROCEDURE — G8427 DOCREV CUR MEDS BY ELIG CLIN: HCPCS | Performed by: INTERNAL MEDICINE

## 2025-02-19 PROCEDURE — 3077F SYST BP >= 140 MM HG: CPT | Performed by: INTERNAL MEDICINE

## 2025-02-19 PROCEDURE — 1036F TOBACCO NON-USER: CPT | Performed by: INTERNAL MEDICINE

## 2025-02-19 PROCEDURE — 1125F AMNT PAIN NOTED PAIN PRSNT: CPT | Performed by: INTERNAL MEDICINE

## 2025-02-19 PROCEDURE — 99214 OFFICE O/P EST MOD 30 MIN: CPT | Performed by: INTERNAL MEDICINE

## 2025-02-19 PROCEDURE — G8420 CALC BMI NORM PARAMETERS: HCPCS | Performed by: INTERNAL MEDICINE

## 2025-02-19 PROCEDURE — 1159F MED LIST DOCD IN RCRD: CPT | Performed by: INTERNAL MEDICINE

## 2025-02-19 PROCEDURE — G8399 PT W/DXA RESULTS DOCUMENT: HCPCS | Performed by: INTERNAL MEDICINE

## 2025-02-19 PROCEDURE — 3078F DIAST BP <80 MM HG: CPT | Performed by: INTERNAL MEDICINE

## 2025-02-19 PROCEDURE — 1090F PRES/ABSN URINE INCON ASSESS: CPT | Performed by: INTERNAL MEDICINE

## 2025-02-19 RX ORDER — AZATHIOPRINE 50 MG/1
50 TABLET ORAL DAILY
Qty: 30 TABLET | Refills: 3 | Status: SHIPPED | OUTPATIENT
Start: 2025-02-19

## 2025-02-19 NOTE — PROGRESS NOTES
Name:  Nery Bryant  YOB: 1945   MRN: [id number]      Office Visit: 2/19/2025      LOV 11/05/2024 with Davida      ASSESSMENT AND PLAN:  (Medical Decision Making)    Impression: 79 y.o. female hypoxic respiratory failure and bilateral pulmonary infiltrates  December 6, 2023:  Patient returns for follow-up, on previous visit hypersensitivity panel was sent and seems negative.  Unfortunately it seems that due to a misunderstanding she had a CT scan in September after her visit with me which revealed slight improvement in the changes and consequently she did not have a CT scan currently.  She states she feels better.  She is compliant with oxygen, she had to cut down her prednisone to 20 mg daily as it caused blood sugars of 400 to 500 region and she could not control it.  She states that she is diligent of taking the prednisone daily at 20 mg and she tells me that she feels actually better, her hypoxemia has improved she tells me that at rest her oxygen stays above 90% at all times and at times when she exerts herself her oxygen does not drop below 90.  She has no complaints today but would like to come off the prednisone if possible due to still erratically controlled blood sugars while on it.  PLAN:  Encounter Diagnoses   Name Primary?    Pulmonary infiltrates Yes    Acute respiratory failure with hypoxia (HCC)     Nocturnal hypoxemia     Pneumonia of both lungs due to infectious organism, unspecified part of lung     Hypoxia    The patient most likely has hypersensitivity pneumonitis and at least clinically she is improved on prednisone.  Unfortunately the repeat CT scan which was supposed to be done around the time of this visit to assess improvement radiographically was done unfortunately immediately almost after her previous visit.  I discussed this with her and her  in detail.  I explained that I do not know what the inciting factor is but the fact that she improved clinically with

## 2025-03-04 ENCOUNTER — OFFICE VISIT (OUTPATIENT)
Age: 80
End: 2025-03-04
Payer: MEDICARE

## 2025-03-04 DIAGNOSIS — M47.816 FACET ARTHRITIS OF LUMBAR REGION: Primary | ICD-10-CM

## 2025-03-04 DIAGNOSIS — D64.9 ANEMIA, UNSPECIFIED TYPE: Primary | ICD-10-CM

## 2025-03-04 PROCEDURE — G8420 CALC BMI NORM PARAMETERS: HCPCS | Performed by: PHYSICIAN ASSISTANT

## 2025-03-04 PROCEDURE — G8399 PT W/DXA RESULTS DOCUMENT: HCPCS | Performed by: PHYSICIAN ASSISTANT

## 2025-03-04 PROCEDURE — 99214 OFFICE O/P EST MOD 30 MIN: CPT | Performed by: PHYSICIAN ASSISTANT

## 2025-03-04 PROCEDURE — 1090F PRES/ABSN URINE INCON ASSESS: CPT | Performed by: PHYSICIAN ASSISTANT

## 2025-03-04 PROCEDURE — 1159F MED LIST DOCD IN RCRD: CPT | Performed by: PHYSICIAN ASSISTANT

## 2025-03-04 PROCEDURE — G8427 DOCREV CUR MEDS BY ELIG CLIN: HCPCS | Performed by: PHYSICIAN ASSISTANT

## 2025-03-04 PROCEDURE — 1036F TOBACCO NON-USER: CPT | Performed by: PHYSICIAN ASSISTANT

## 2025-03-04 PROCEDURE — 1123F ACP DISCUSS/DSCN MKR DOCD: CPT | Performed by: PHYSICIAN ASSISTANT

## 2025-03-04 RX ORDER — TRAMADOL HYDROCHLORIDE 50 MG/1
50 TABLET ORAL EVERY 8 HOURS PRN
Qty: 90 TABLET | Refills: 1 | Status: SHIPPED | OUTPATIENT
Start: 2025-04-03 | End: 2025-06-02

## 2025-03-04 NOTE — PROGRESS NOTES
stenosis with insufficiency, On home O2, Oral mass, Other unknown and unspecified cause of morbidity or mortality, Pacemaker, Paroxysmal atrial fibrillation (HCC), PONV (postoperative nausea and vomiting), Seizures (HCC), SSS (sick sinus syndrome) (HCC), Thyroid nodule, Type 2 diabetes mellitus without complication (HCC), Unspecified essential hypertension, and Visit for monitoring Tikosyn therapy.   Patient is being seen in consultation for evaluation of osteoporosis.  Risk factors include menopause at age 46, diabetes, previous steroid use for hypersensitivity pneumonitis, multiple fractures: Left wrist, L1 compression fracture, left femoral neck fracture. Previously used forteo for 2 years.  Multiple cardiac issues have recently been addressed in the last year including replacement aortic and mitral valve earlier this year.  We discussed anabolic therapy due to her high-risk nature with Evenity and discussed the black box warning for increased cardiovascular events such as heart attack and stroke.   Discussed 3rd year of forteo but agreed to avoid this. She is uncomfortable with anabolic therapy and is open to pursuing Prolia.  Reviewed labs from PCP yesterday: CMP, vitamin D.  Check DEXA; Repeat BMP due to hypocalcemia, check PTH, magnesium, phosphorus.  Side effects including risk of ONJ, atypical fractures, hypocalcemia, injection site reactions, allergic reaction, arthralgias/myalgias, risk of infection discussed.  Continue high dietary sources of calcium.  Consider addition of supplemental calcium based on repeat level.  Continue vitamin D 2000 units twice daily; PT referral placed for strength exercise, fall prevention, water aerobics for back pain.  Reviewed fall precautions, core strengthening exercises     Abnormal labs (9/30/24 - EPIC)  Calcium - 8.6              Hgb A1C - 6.9                        RBC - 3.92  Hgb - 10.3                  MCHC - 28.4                         Iron - 24

## 2025-03-04 NOTE — PROGRESS NOTES
Ms Bryant is a 2 month f/u for a new patient visit for chronic low back pain. We have her set up for L1 and L2 MBBs. She also was started on Tramadol 50mg up to three times daily as needed for her back pain. She filled her Tramadol and has 1 RF remaining.   
performed the HPI, exam, and assessment/plan, verified the documentation and approve it is updated, accurate, and complete. Parts or the entirety of this document were transcribed utilizing voice recognition software. Transcription errors may be present.     Va Oviedo PA-C under supervision of Dr Dg Rivas    Date: March 4, 2025  Patient Name: Nery Bryant  MRN:[id number]  PCP: Tray Cueto

## 2025-03-10 ENCOUNTER — APPOINTMENT (OUTPATIENT)
Dept: URBAN - METROPOLITAN AREA CLINIC 329 | Facility: CLINIC | Age: 80
Setting detail: DERMATOLOGY
End: 2025-03-10

## 2025-03-10 DIAGNOSIS — D18.0 HEMANGIOMA: ICD-10-CM

## 2025-03-10 DIAGNOSIS — L57.0 ACTINIC KERATOSIS: ICD-10-CM

## 2025-03-10 PROBLEM — D18.01 HEMANGIOMA OF SKIN AND SUBCUTANEOUS TISSUE: Status: ACTIVE | Noted: 2025-03-10

## 2025-03-10 PROCEDURE — ? PRESCRIPTION

## 2025-03-10 PROCEDURE — 99203 OFFICE O/P NEW LOW 30 MIN: CPT

## 2025-03-10 PROCEDURE — ? COUNSELING

## 2025-03-10 RX ORDER — IMIQUIMOD 12.5 MG/.25G
CREAM TOPICAL
Qty: 12 | Refills: 3 | Status: ERX | COMMUNITY
Start: 2025-03-10

## 2025-03-10 RX ADMIN — IMIQUIMOD: 12.5 CREAM TOPICAL at 00:00

## 2025-03-10 ASSESSMENT — LOCATION ZONE DERM: LOCATION ZONE: FACE

## 2025-03-10 ASSESSMENT — LOCATION DETAILED DESCRIPTION DERM
LOCATION DETAILED: LEFT LATERAL BUCCAL CHEEK
LOCATION DETAILED: LEFT CENTRAL BUCCAL CHEEK

## 2025-03-10 ASSESSMENT — LOCATION SIMPLE DESCRIPTION DERM: LOCATION SIMPLE: LEFT CHEEK

## 2025-03-10 NOTE — PROGRESS NOTES
Procedure Date: 3/11/25      Location: [facility]       Procedure: LEFT T12-L2 MBB #1       Time Out performed prior to start of the procedure:       Dg Rivas MD performed the following reviews on Nery Lorraine Bryant 1945 prior to the start of the procedure:       patient was identified by name and     agreement on procedure being performed was verified   risks and benefits explained to patient by the provider  procedure site verified as Left  patient was positioned for comfort   consent signed and verified for procedure       Time:  8:40 AM        Procedure performed by:   Dg Rivas MD      Patient assisted by:   July Fagan MA

## 2025-03-11 ENCOUNTER — OFFICE VISIT (OUTPATIENT)
Age: 80
End: 2025-03-11
Payer: MEDICARE

## 2025-03-11 DIAGNOSIS — M47.816 SPONDYLOSIS WITHOUT MYELOPATHY OR RADICULOPATHY, LUMBAR REGION: Primary | ICD-10-CM

## 2025-03-11 PROCEDURE — 64490 INJ PARAVERT F JNT C/T 1 LEV: CPT | Performed by: ANESTHESIOLOGY

## 2025-03-11 PROCEDURE — 64491 INJ PARAVERT F JNT C/T 2 LEV: CPT | Performed by: ANESTHESIOLOGY

## 2025-03-11 NOTE — PROGRESS NOTES
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 The specific risks of bleeding, infection and pneumothorax were discussed. The patient was taken to the fluoroscopy suite. Verbal and visual monitoring were maintained throughout the procedure. The patient was then placed in the prone position. The skin overlying the thoracic spine was then prepped with chlorhexidine gluconate and a sterile drape was placed. A hat and mask were worn, and the hands were cleaned with an alcohol-containing solution. Sterile gloves were then worn. A time out was then performed involving the physician, radiation technologist and the patient.    An anteroposterior view of the left T12 transverse process and rib was then utilized to identify the junction of the junction of the transverse process and superior articulating process. Next, using intermittent fluoroscopic guidance, a 25 G, 3 inch Quincke needle was advanced toward the junction of the transverse process and superior articulating process, favoring the cephalad aspect of this junction. Satisfactory needle position was confirmed on AP and lateral visualizations. 0.5 ml of 0.5% bupivacaine was then injected.    An anteroposterior view of the left L1 transverse process and rib was then utilized to identify the junction of the junction of the transverse process and superior articulating process. Next, using intermittent fluoroscopic guidance, a 25 G, 3 inch Quincke needle was advanced toward the junction of the transverse process and superior

## 2025-03-12 ENCOUNTER — PATIENT MESSAGE (OUTPATIENT)
Age: 80
End: 2025-03-12

## 2025-03-12 ENCOUNTER — HOSPITAL ENCOUNTER (OUTPATIENT)
Dept: LAB | Age: 80
Discharge: HOME | End: 2025-03-12
Payer: MEDICARE

## 2025-03-12 ENCOUNTER — OFFICE VISIT (OUTPATIENT)
Dept: ONCOLOGY | Age: 80
End: 2025-03-12
Payer: MEDICARE

## 2025-03-12 VITALS
HEART RATE: 61 BPM | HEIGHT: 66 IN | RESPIRATION RATE: 16 BRPM | SYSTOLIC BLOOD PRESSURE: 143 MMHG | WEIGHT: 122 LBS | DIASTOLIC BLOOD PRESSURE: 55 MMHG | BODY MASS INDEX: 19.61 KG/M2 | OXYGEN SATURATION: 94 % | TEMPERATURE: 97.9 F

## 2025-03-12 DIAGNOSIS — D64.9 ANEMIA, UNSPECIFIED TYPE: ICD-10-CM

## 2025-03-12 DIAGNOSIS — Z85.048 PERSONAL HISTORY OF RECTAL CANCER: ICD-10-CM

## 2025-03-12 DIAGNOSIS — Z79.01 LONG TERM CURRENT USE OF ANTICOAGULANT THERAPY: Chronic | ICD-10-CM

## 2025-03-12 DIAGNOSIS — D68.69 SECONDARY HYPERCOAGULABLE STATE: ICD-10-CM

## 2025-03-12 DIAGNOSIS — Z95.4 STATUS POST HEART VALVE REPLACEMENT: ICD-10-CM

## 2025-03-12 DIAGNOSIS — D50.8 OTHER IRON DEFICIENCY ANEMIA: Primary | ICD-10-CM

## 2025-03-12 LAB
ALBUMIN SERPL-MCNC: 3.3 G/DL (ref 3.2–4.6)
ALBUMIN/GLOB SERPL: 0.9 (ref 1–1.9)
ALP SERPL-CCNC: 78 U/L (ref 35–104)
ALT SERPL-CCNC: 43 U/L (ref 8–45)
ANION GAP SERPL CALC-SCNC: 13 MMOL/L (ref 7–16)
AST SERPL-CCNC: 38 U/L (ref 15–37)
BASOPHILS # BLD: 0.04 K/UL (ref 0–0.2)
BASOPHILS NFR BLD: 0.5 % (ref 0–2)
BILIRUB SERPL-MCNC: 0.3 MG/DL (ref 0–1.2)
BUN SERPL-MCNC: 16 MG/DL (ref 8–23)
CALCIUM SERPL-MCNC: 8.2 MG/DL (ref 8.8–10.2)
CHLORIDE SERPL-SCNC: 106 MMOL/L (ref 98–107)
CO2 SERPL-SCNC: 24 MMOL/L (ref 20–29)
CREAT SERPL-MCNC: 0.69 MG/DL (ref 0.6–1.1)
DIFFERENTIAL METHOD BLD: ABNORMAL
EOSINOPHIL # BLD: 0.1 K/UL (ref 0–0.8)
EOSINOPHIL NFR BLD: 1.2 % (ref 0.5–7.8)
ERYTHROCYTE [DISTWIDTH] IN BLOOD BY AUTOMATED COUNT: 13.7 % (ref 11.9–14.6)
FERRITIN SERPL-MCNC: 82 NG/ML (ref 8–388)
GLOBULIN SER CALC-MCNC: 3.5 G/DL (ref 2.3–3.5)
GLUCOSE SERPL-MCNC: 159 MG/DL (ref 70–99)
HCT VFR BLD AUTO: 37.1 % (ref 35.8–46.3)
HGB BLD-MCNC: 11.4 G/DL (ref 11.7–15.4)
IMM GRANULOCYTES # BLD AUTO: 0.02 K/UL (ref 0–0.5)
IMM GRANULOCYTES NFR BLD AUTO: 0.2 % (ref 0–5)
IRON SATN MFR SERPL: 17 % (ref 20–50)
IRON SERPL-MCNC: 38 UG/DL (ref 35–100)
LYMPHOCYTES # BLD: 2.12 K/UL (ref 0.5–4.6)
LYMPHOCYTES NFR BLD: 25.3 % (ref 13–44)
MCH RBC QN AUTO: 31.1 PG (ref 26.1–32.9)
MCHC RBC AUTO-ENTMCNC: 30.7 G/DL (ref 31.4–35)
MCV RBC AUTO: 101.4 FL (ref 82–102)
MONOCYTES # BLD: 0.58 K/UL (ref 0.1–1.3)
MONOCYTES NFR BLD: 6.9 % (ref 4–12)
NEUTS SEG # BLD: 5.53 K/UL (ref 1.7–8.2)
NEUTS SEG NFR BLD: 65.9 % (ref 43–78)
NRBC # BLD: 0 K/UL (ref 0–0.2)
PLATELET # BLD AUTO: 204 K/UL (ref 150–450)
PMV BLD AUTO: 9.4 FL (ref 9.4–12.3)
POTASSIUM SERPL-SCNC: 4.1 MMOL/L (ref 3.5–5.1)
PROT SERPL-MCNC: 6.8 G/DL (ref 6.3–8.2)
RBC # BLD AUTO: 3.66 M/UL (ref 4.05–5.2)
SODIUM SERPL-SCNC: 143 MMOL/L (ref 136–145)
TIBC SERPL-MCNC: 218 UG/DL (ref 240–450)
UIBC SERPL-MCNC: 180 UG/DL (ref 112–347)
WBC # BLD AUTO: 8.4 K/UL (ref 4.3–11.1)

## 2025-03-12 PROCEDURE — 3077F SYST BP >= 140 MM HG: CPT | Performed by: INTERNAL MEDICINE

## 2025-03-12 PROCEDURE — G8427 DOCREV CUR MEDS BY ELIG CLIN: HCPCS | Performed by: INTERNAL MEDICINE

## 2025-03-12 PROCEDURE — G8399 PT W/DXA RESULTS DOCUMENT: HCPCS | Performed by: INTERNAL MEDICINE

## 2025-03-12 PROCEDURE — 85025 COMPLETE CBC W/AUTO DIFF WBC: CPT

## 2025-03-12 PROCEDURE — 80053 COMPREHEN METABOLIC PANEL: CPT

## 2025-03-12 PROCEDURE — 36415 COLL VENOUS BLD VENIPUNCTURE: CPT

## 2025-03-12 PROCEDURE — 3078F DIAST BP <80 MM HG: CPT | Performed by: INTERNAL MEDICINE

## 2025-03-12 PROCEDURE — 1090F PRES/ABSN URINE INCON ASSESS: CPT | Performed by: INTERNAL MEDICINE

## 2025-03-12 PROCEDURE — 82728 ASSAY OF FERRITIN: CPT

## 2025-03-12 PROCEDURE — 1125F AMNT PAIN NOTED PAIN PRSNT: CPT | Performed by: INTERNAL MEDICINE

## 2025-03-12 PROCEDURE — 99214 OFFICE O/P EST MOD 30 MIN: CPT | Performed by: INTERNAL MEDICINE

## 2025-03-12 PROCEDURE — G8420 CALC BMI NORM PARAMETERS: HCPCS | Performed by: INTERNAL MEDICINE

## 2025-03-12 PROCEDURE — 1036F TOBACCO NON-USER: CPT | Performed by: INTERNAL MEDICINE

## 2025-03-12 PROCEDURE — 1123F ACP DISCUSS/DSCN MKR DOCD: CPT | Performed by: INTERNAL MEDICINE

## 2025-03-12 PROCEDURE — 83550 IRON BINDING TEST: CPT

## 2025-03-12 PROCEDURE — 83540 ASSAY OF IRON: CPT

## 2025-03-12 ASSESSMENT — PATIENT HEALTH QUESTIONNAIRE - PHQ9
SUM OF ALL RESPONSES TO PHQ QUESTIONS 1-9: 0
2. FEELING DOWN, DEPRESSED OR HOPELESS: NOT AT ALL
1. LITTLE INTEREST OR PLEASURE IN DOING THINGS: NOT AT ALL
SUM OF ALL RESPONSES TO PHQ QUESTIONS 1-9: 0

## 2025-03-12 NOTE — PATIENT INSTRUCTIONS
reach out to your care team through [employer] or call [phone].    Please notify your assigned Nurse Navigator of any unplanned hospital admissions or Emergency Room visits within 24 hours of discharge.    -------------------------------------------------------------------------------------------------------------------  Please call our office at [phone] if you have any  of the following symptoms:   Fever of 100.5 or greater  Chills  Shortness of breath  Swelling or pain in one leg    After office hours an answering service is available and will contact a provider for emergencies or if you are experiencing any of the above symptoms.        MARIN DIA RN

## 2025-03-14 ENCOUNTER — HOSPITAL ENCOUNTER (OUTPATIENT)
Dept: INFUSION THERAPY | Age: 80
Setting detail: INFUSION SERIES
Discharge: HOME | End: 2025-03-14
Payer: MEDICARE

## 2025-03-14 VITALS
TEMPERATURE: 97.4 F | OXYGEN SATURATION: 96 % | SYSTOLIC BLOOD PRESSURE: 171 MMHG | DIASTOLIC BLOOD PRESSURE: 65 MMHG | HEART RATE: 60 BPM | RESPIRATION RATE: 16 BRPM

## 2025-03-14 DIAGNOSIS — D50.8 OTHER IRON DEFICIENCY ANEMIA: Primary | ICD-10-CM

## 2025-03-14 PROCEDURE — 96365 THER/PROPH/DIAG IV INF INIT: CPT

## 2025-03-14 PROCEDURE — 2500000003 HC RX 250 WO HCPCS: Performed by: INTERNAL MEDICINE

## 2025-03-14 PROCEDURE — 2580000003 HC RX 258: Performed by: INTERNAL MEDICINE

## 2025-03-14 PROCEDURE — 6360000002 HC RX W HCPCS: Performed by: INTERNAL MEDICINE

## 2025-03-14 RX ORDER — FAMOTIDINE 10 MG/ML
20 INJECTION, SOLUTION INTRAVENOUS
Status: CANCELLED | OUTPATIENT
Start: 2025-03-19

## 2025-03-14 RX ORDER — SODIUM CHLORIDE 9 MG/ML
INJECTION, SOLUTION INTRAVENOUS CONTINUOUS
OUTPATIENT
Start: 2025-03-19

## 2025-03-14 RX ORDER — SODIUM CHLORIDE 0.9 % (FLUSH) 0.9 %
5-40 SYRINGE (ML) INJECTION PRN
OUTPATIENT
Start: 2025-03-21

## 2025-03-14 RX ORDER — ALBUTEROL SULFATE 90 UG/1
4 INHALANT RESPIRATORY (INHALATION) PRN
OUTPATIENT
Start: 2025-03-21

## 2025-03-14 RX ORDER — ONDANSETRON 2 MG/ML
8 INJECTION INTRAMUSCULAR; INTRAVENOUS
OUTPATIENT
Start: 2025-03-19

## 2025-03-14 RX ORDER — HYDROCORTISONE SODIUM SUCCINATE 100 MG/2ML
100 INJECTION INTRAMUSCULAR; INTRAVENOUS
Status: CANCELLED | OUTPATIENT
Start: 2025-03-19

## 2025-03-14 RX ORDER — ALBUTEROL SULFATE 90 UG/1
4 INHALANT RESPIRATORY (INHALATION) PRN
OUTPATIENT
Start: 2025-03-19

## 2025-03-14 RX ORDER — DIPHENHYDRAMINE HYDROCHLORIDE 50 MG/ML
50 INJECTION, SOLUTION INTRAMUSCULAR; INTRAVENOUS
Status: DISCONTINUED | OUTPATIENT
Start: 2025-03-14 | End: 2025-03-15 | Stop reason: HOSPADM

## 2025-03-14 RX ORDER — EPINEPHRINE 1 MG/ML
0.3 INJECTION, SOLUTION, CONCENTRATE INTRAVENOUS PRN
OUTPATIENT
Start: 2025-03-19

## 2025-03-14 RX ORDER — SODIUM CHLORIDE 0.9 % (FLUSH) 0.9 %
5-40 SYRINGE (ML) INJECTION PRN
Status: CANCELLED | OUTPATIENT
Start: 2025-03-19

## 2025-03-14 RX ORDER — HYDROCORTISONE SODIUM SUCCINATE 100 MG/2ML
100 INJECTION INTRAMUSCULAR; INTRAVENOUS
Status: DISCONTINUED | OUTPATIENT
Start: 2025-03-14 | End: 2025-03-15 | Stop reason: HOSPADM

## 2025-03-14 RX ORDER — ACETAMINOPHEN 325 MG/1
650 TABLET ORAL
OUTPATIENT
Start: 2025-03-21

## 2025-03-14 RX ORDER — DIPHENHYDRAMINE HYDROCHLORIDE 50 MG/ML
50 INJECTION INTRAMUSCULAR; INTRAVENOUS
Status: CANCELLED | OUTPATIENT
Start: 2025-03-19

## 2025-03-14 RX ORDER — HYDROCORTISONE SODIUM SUCCINATE 100 MG/2ML
100 INJECTION INTRAMUSCULAR; INTRAVENOUS
OUTPATIENT
Start: 2025-03-21

## 2025-03-14 RX ORDER — SODIUM CHLORIDE 9 MG/ML
5-250 INJECTION, SOLUTION INTRAVENOUS PRN
OUTPATIENT
Start: 2025-03-21

## 2025-03-14 RX ORDER — EPINEPHRINE 1 MG/ML
0.3 INJECTION, SOLUTION, CONCENTRATE INTRAVENOUS PRN
OUTPATIENT
Start: 2025-03-21

## 2025-03-14 RX ORDER — ACETAMINOPHEN 325 MG/1
650 TABLET ORAL
OUTPATIENT
Start: 2025-03-19

## 2025-03-14 RX ORDER — HEPARIN 100 UNIT/ML
500 SYRINGE INTRAVENOUS PRN
OUTPATIENT
Start: 2025-03-21

## 2025-03-14 RX ORDER — SODIUM CHLORIDE 9 MG/ML
5-250 INJECTION, SOLUTION INTRAVENOUS PRN
OUTPATIENT
Start: 2025-03-19

## 2025-03-14 RX ORDER — DIPHENHYDRAMINE HYDROCHLORIDE 50 MG/ML
50 INJECTION, SOLUTION INTRAMUSCULAR; INTRAVENOUS
OUTPATIENT
Start: 2025-03-21

## 2025-03-14 RX ORDER — SODIUM CHLORIDE 0.9 % (FLUSH) 0.9 %
5-40 SYRINGE (ML) INJECTION PRN
Status: DISCONTINUED | OUTPATIENT
Start: 2025-03-14 | End: 2025-03-15 | Stop reason: HOSPADM

## 2025-03-14 RX ORDER — SODIUM CHLORIDE 9 MG/ML
5-250 INJECTION, SOLUTION INTRAVENOUS PRN
Status: CANCELLED | OUTPATIENT
Start: 2025-03-19

## 2025-03-14 RX ORDER — SODIUM CHLORIDE 9 MG/ML
5-250 INJECTION, SOLUTION INTRAVENOUS PRN
Status: DISCONTINUED | OUTPATIENT
Start: 2025-03-14 | End: 2025-03-15 | Stop reason: HOSPADM

## 2025-03-14 RX ORDER — ONDANSETRON 2 MG/ML
8 INJECTION INTRAMUSCULAR; INTRAVENOUS
OUTPATIENT
Start: 2025-03-21

## 2025-03-14 RX ORDER — SODIUM CHLORIDE 9 MG/ML
INJECTION, SOLUTION INTRAVENOUS CONTINUOUS
OUTPATIENT
Start: 2025-03-21

## 2025-03-14 RX ORDER — HEPARIN SODIUM (PORCINE) LOCK FLUSH IV SOLN 100 UNIT/ML 100 UNIT/ML
500 SOLUTION INTRAVENOUS PRN
OUTPATIENT
Start: 2025-03-19

## 2025-03-14 RX ADMIN — SODIUM CHLORIDE, PRESERVATIVE FREE 20 ML: 5 INJECTION INTRAVENOUS at 13:41

## 2025-03-14 RX ADMIN — FERRIC CARBOXYMALTOSE INJECTION 750 MG: 50 INJECTION, SOLUTION INTRAVENOUS at 13:17

## 2025-03-14 RX ADMIN — SODIUM CHLORIDE, PRESERVATIVE FREE 10 ML: 5 INJECTION INTRAVENOUS at 13:17

## 2025-03-20 ENCOUNTER — OFFICE VISIT (OUTPATIENT)
Age: 80
End: 2025-03-20

## 2025-03-20 DIAGNOSIS — M47.816 SPONDYLOSIS WITHOUT MYELOPATHY OR RADICULOPATHY, LUMBAR REGION: Primary | ICD-10-CM

## 2025-03-20 NOTE — PROGRESS NOTES
Procedure Date: 3/20/25      Location: [facility]       Procedure: left t12/l1, l1/2 mbb #2       Time Out performed prior to start of the procedure:       Dg Rivas MD performed the following reviews on Nery Bryant 1945 prior to the start of the procedure:       patient was identified by name and     agreement on procedure being performed was verified   risks and benefits explained to patient by the provider  procedure site verified as Left  patient was positioned for comfort   consent signed and verified for procedure       Time:  7:33 AM        Procedure performed by:   Dg Rivas MD      Patient assisted by:   July Fagan MA   
of this junction. Satisfactory needle position was confirmed on AP, lateral, and oblique visualizations. 0.5 ml of 0.5% bupivacaine was then injected.    Next, an oblique view of the left L1 transverse process was then utilized to identify the junction of the junction of the transverse process and superior articulating process. The skin overlying this area was anesthetized with  0.2 ml of 1% lidocaine using a 25 G 1.5 inch needle. Next, using intermittent fluoroscopic guidance, the spinal needle needle was advanced toward the junction of the transverse process and superior articulating process, favoring the cephalad aspect of this junction. Satisfactory needle position was confirmed on AP, lateral, and oblique visualizations. 0.5 ml of 0.5% bupivacaine was then injected.    An oblique view of the left L2 transverse process was then utilized to identify the junction of the junction of the transverse process and superior articulating process. The skin overlying this area was anesthetized with 0.2 ml of 1% lidocaine using a 25 G 1.5 inch needle. Next, using intermittent fluoroscopic guidance, the spinal needle needle was advanced toward the junction of the transverse process and superior articulating process, favoring the cephalad aspect of this junction. Satisfactory needle position was confirmed on AP, lateral, and oblique visualizations. 0.5 ml of 0.5% bupivacaine was then injected.    The needles were withdrawn and Band-Aids were applied. The patient was transported to the recovery room and monitored for an appropriate amount of time, and after meeting discharge criteria, was discharged home with a . No complications were noted through the procedure or recovery period.    Plan: The patient will complete a pain journal. They will follow up in clinic. If the patient receives at least 70%  pain reduction from today's procedure we can discuss pursuing a radiofrequency ablation of these nerves.

## 2025-03-24 ENCOUNTER — TELEPHONE (OUTPATIENT)
Age: 80
End: 2025-03-24

## 2025-03-24 NOTE — TELEPHONE ENCOUNTER
Low to moderate risk.  In A-fib permanently now with elevated stroke risk.  Okay to hold Eliquis for 3-4 doses prior to endoscopy.  Resume postoperatively ASAP, defer to operating physician.  Continue other meds without change.

## 2025-03-24 NOTE — TELEPHONE ENCOUNTER
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  Patient has recovered well.  Off oxygen now.  Walking up to 2 miles daily, asymptomatic.  She looks great.  Echo in March with normal LVEF, RV, TAVR, TMVR.  Repeat echo in 1 year.     S/P TAVR (transcatheter aortic valve replacement) - status post valve in valve TAVR 01/14/2024.  Patient has recovered well.  Please see above.  Repeat echo in 1 year.       Coronary artery disease involving native coronary artery of native heart without angina pectoris -mild nonobstructive CAD.     Mixed hyperlipidemia - continue atorvastatin with PCP lipid and liver surveillance.     Paroxysmal atrial fibrillation (HCC) - persistent atrial fibrillation post TAVR/TMVR, prior history of A-fib ablation and maze.  Now status post AV node ablation and pacemaker implantation.  Will continue anticoagulation with Eliquis.     S/P biventricular cardiac pacemaker procedure -site is healed.  Status post AV node ablation.  Remains anticoagulated with Eliquis      Essential hypertension with goal blood pressure less than 130/80 -stable and controlled, continue Toprol-XL and losartan with low-sodium diet.  Use Lasix/potassium as needed for lower extremity edema or volume overload symptoms.      Bilateral carotid artery stenosis - occluded left ICA, mild-mod (~50%) right ICA disease by recent duplex, re-check duplex next year in our office, check CTA carotids with any acute symptoms, continue Eliquis as tolerated      Diabetes mellitus, insulin dependent (IDDM), controlled (HCC)- stable, see above- per CP      Hypothyroidism, postsurgical- stable, continue meds with outpatient surveillance      Mixed hyperlipidemia-stable, outpatient surveillance per PCP.  OK from my standpoint to continue low

## 2025-03-24 NOTE — TELEPHONE ENCOUNTER
Cardiac Clearance        Physician or Practice Requesting: Dr Mona Prince  /  [facility]  : same  Contact Phone Number: [phone]  Fax Number: [phone]  Date of Surgery/Procedure: 3/31/25  Type of Surgery or Procedure: [place]/rectal surgery  Type of Anesthesia: NA  Type of Clearance Requested: Cardiac Clearance and Medication Hold  Medication to Hold: Eliquis  Days to Hold:

## 2025-03-25 NOTE — PROGRESS NOTES
Procedure Date: 3/27/25      Location: [facility]       Procedure: left t12/l1, l1/2 rfa       Time Out performed prior to start of the procedure:       Dg Rivas MD performed the following reviews on Nery Lorraine Bryant 1945 prior to the start of the procedure:       patient was identified by name and     agreement on procedure being performed was verified   risks and benefits explained to patient by the provider  procedure site verified as Left  patient was positioned for comfort   consent signed and verified for procedure       Time:  9:56 AM        Procedure performed by:   Dg Rivas MD      Patient assisted by:   July Fagan MA

## 2025-03-27 ENCOUNTER — OFFICE VISIT (OUTPATIENT)
Age: 80
End: 2025-03-27
Payer: MEDICARE

## 2025-03-27 DIAGNOSIS — M47.816 SPONDYLOSIS WITHOUT MYELOPATHY OR RADICULOPATHY, LUMBAR REGION: Primary | ICD-10-CM

## 2025-03-27 PROCEDURE — 64634 DESTROY C/TH FACET JNT ADDL: CPT | Performed by: ANESTHESIOLOGY

## 2025-03-27 PROCEDURE — 64633 DESTROY CERV/THOR FACET JNT: CPT | Performed by: ANESTHESIOLOGY

## 2025-03-27 NOTE — PROGRESS NOTES
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 The specific risks of bleeding and infection were discussed, as was the risk of neuritis. The patient was taken to the fluoroscopy suite. Standard ASA monitors including a non-invasive blood pressure cuff and pulse oximetry were established, in addition to visual and verbal monitoring. The patient was then placed in the prone position. The skin overlying the thoracic spine was then prepped with chlorhexidine gluconate and a sterile drape was placed. A hat and mask were worn, and the hands were cleaned with an alcohol-containing solution. Sterile gloves were then worn. A time out was then performed involving the physician, radiation technologist and the patient.    Next, the anatomy of the thoracic spine was then identified using fluoroscopic guidance. A 20 G, 10 cm radiofrequency ablation cannula with 5 mm active tip was used at each level.    An anteroposterior view of the left T11 transverse process was then utilized to identify the junction of the junction of the transverse process and superior articulating process. The skin overlying this area was anesthetized with  0.2 ml of 1% lidocaine using a 25 G 1.5 inch needle. Next, using intermittent fluoroscopic guidance, the radiofrequency ablation cannula was advanced toward the junction of the transverse process and superior articulating process, favoring the cephalad aspect of this junction and along the course of the medial branch. Satisfactory needle

## 2025-04-07 ENCOUNTER — TELEPHONE (OUTPATIENT)
Dept: FAMILY MEDICINE CLINIC | Facility: CLINIC | Age: 80
End: 2025-04-07

## 2025-04-07 NOTE — TELEPHONE ENCOUNTER
----- Message from Dr. Tray Cueto MD sent at 4/4/2025  4:53 PM EDT -----  Regarding: schedule hospital follow-up?  Discharged 4/3/25 after colon resection.

## 2025-04-16 ENCOUNTER — TELEPHONE (OUTPATIENT)
Dept: FAMILY MEDICINE CLINIC | Facility: CLINIC | Age: 80
End: 2025-04-16

## 2025-04-16 NOTE — TELEPHONE ENCOUNTER
Called and left patient a message to call the office to reschedule her appointment. I also sent her a link thru my chart to schedule another appointment due to the provider no longer in the office.

## 2025-04-19 NOTE — PROGRESS NOTES
[facility] CARDIOLOGY  [address] [place] [address], [address]  [place] [address]  PHONE: [phone]        NAME:  Nery Bryant  : 1945  MRN: [id number]     PCP:  Tray Cueto MD      SUBJECTIVE:   Nery Bryant is a 79 y.o. female seen for a follow up visit regarding the following:     Chief Complaint   Patient presents with    S/P TAVR (transcatheter aortic valve replacement)       HPI:    Recovered from prolonged hospitalization with cardiogenic shock due to hemodynamically significant mitral and aortic stenosis.  She had successful TAVR and TMVR simultaneously in stepwise fashion 2024 with good result and presents today in follow-up.     Postoperatively she struggled with persistent atrial fibrillation with rapid ventricular response and intermittent bradycardia now status post AV node ablation and biventricular pacemaker implantation.  On Eliquis now.      Carotid duplex 2023:    Mild (<50%) stenosis in the right internal carotid artery.  Mild, calcific and irregular plaque (proximal) in the right internal carotid artery. Mild carotid bulb disease as well.    Occluded left internal carotid artery.  Severe and heterogeneous plaque in the left internal carotid artery.    Normal antegrade flow involving the right vertebral artery.    Normal antegrade flow involving the left vertebral artery.    Echocardiogram 3/1/2024:  Left Ventricle Normal left ventricular systolic function with a visually estimated EF of 55 - 60%. Left ventricle size is normal. Normal wall thickness. Normal wall motion. Indeterminate diastolic function.   Left Atrium Left atrium is moderately dilated.   Right Ventricle RV basal diameter is 4.0 cm. RV mid diameter is 2.4 cm. TAPSE 1.4cm Normal systolic function.   Right Atrium Right atrium is moderately dilated.   Aortic Valve 23 mm PATRIC ultra valve placed valve in valve for failed bioprosthetic surgical valve. No paravalvular regurgitation.

## 2025-04-21 ENCOUNTER — OFFICE VISIT (OUTPATIENT)
Age: 80
End: 2025-04-21
Payer: MEDICARE

## 2025-04-21 VITALS
SYSTOLIC BLOOD PRESSURE: 124 MMHG | HEIGHT: 66 IN | DIASTOLIC BLOOD PRESSURE: 80 MMHG | WEIGHT: 116.5 LBS | BODY MASS INDEX: 18.72 KG/M2 | HEART RATE: 63 BPM

## 2025-04-21 DIAGNOSIS — I48.0 PAROXYSMAL ATRIAL FIBRILLATION (HCC): Primary | Chronic | ICD-10-CM

## 2025-04-21 DIAGNOSIS — I10 ESSENTIAL HYPERTENSION: Chronic | ICD-10-CM

## 2025-04-21 DIAGNOSIS — I50.22 CHRONIC SYSTOLIC (CONGESTIVE) HEART FAILURE (HCC): ICD-10-CM

## 2025-04-21 DIAGNOSIS — I25.10 CORONARY ARTERY DISEASE INVOLVING NATIVE CORONARY ARTERY OF NATIVE HEART WITHOUT ANGINA PECTORIS: ICD-10-CM

## 2025-04-21 DIAGNOSIS — E78.2 MIXED HYPERLIPIDEMIA: Chronic | ICD-10-CM

## 2025-04-21 DIAGNOSIS — I65.23 BILATERAL CAROTID ARTERY STENOSIS: ICD-10-CM

## 2025-04-21 PROCEDURE — G8420 CALC BMI NORM PARAMETERS: HCPCS | Performed by: INTERNAL MEDICINE

## 2025-04-21 PROCEDURE — 1123F ACP DISCUSS/DSCN MKR DOCD: CPT | Performed by: INTERNAL MEDICINE

## 2025-04-21 PROCEDURE — G8427 DOCREV CUR MEDS BY ELIG CLIN: HCPCS | Performed by: INTERNAL MEDICINE

## 2025-04-21 PROCEDURE — 99214 OFFICE O/P EST MOD 30 MIN: CPT | Performed by: INTERNAL MEDICINE

## 2025-04-21 PROCEDURE — G8399 PT W/DXA RESULTS DOCUMENT: HCPCS | Performed by: INTERNAL MEDICINE

## 2025-04-21 PROCEDURE — 1160F RVW MEDS BY RX/DR IN RCRD: CPT | Performed by: INTERNAL MEDICINE

## 2025-04-21 PROCEDURE — 1159F MED LIST DOCD IN RCRD: CPT | Performed by: INTERNAL MEDICINE

## 2025-04-21 PROCEDURE — 3079F DIAST BP 80-89 MM HG: CPT | Performed by: INTERNAL MEDICINE

## 2025-04-21 PROCEDURE — 1090F PRES/ABSN URINE INCON ASSESS: CPT | Performed by: INTERNAL MEDICINE

## 2025-04-21 PROCEDURE — 3074F SYST BP LT 130 MM HG: CPT | Performed by: INTERNAL MEDICINE

## 2025-04-21 PROCEDURE — 93000 ELECTROCARDIOGRAM COMPLETE: CPT | Performed by: INTERNAL MEDICINE

## 2025-04-21 PROCEDURE — 1126F AMNT PAIN NOTED NONE PRSNT: CPT | Performed by: INTERNAL MEDICINE

## 2025-04-21 PROCEDURE — 1036F TOBACCO NON-USER: CPT | Performed by: INTERNAL MEDICINE

## 2025-04-22 DIAGNOSIS — C80.1 ADENOCARCINOMA (HCC): ICD-10-CM

## 2025-04-22 DIAGNOSIS — D50.8 OTHER IRON DEFICIENCY ANEMIA: Primary | ICD-10-CM

## 2025-04-22 DIAGNOSIS — D64.9 ANEMIA, UNSPECIFIED TYPE: ICD-10-CM

## 2025-04-22 NOTE — PROGRESS NOTES
Filt Rate 04/25/2025 88  >60 ml/min/1.73m2 Final    Comment:    Pediatric calculator link: https://www.kidney.org/professionals/kdoqi/gfr_calculatorped     These results are not intended for use in patients <18 years of age.     eGFR results are calculated without a race factor using  the 2021 CKD-EPI equation. Careful clinical correlation is recommended, particularly when comparing to results calculated using previous equations.  The CKD-EPI equation is less accurate in patients with extremes of muscle mass, extra-renal metabolism of creatinine, excessive creatine ingestion, or following therapy that affects renal tubular secretion.      Calcium 04/25/2025 8.9  8.8 - 10.2 MG/DL Final    Total Bilirubin 04/25/2025 0.4  0.0 - 1.2 MG/DL Final    ALT 04/25/2025 34  8 - 45 U/L Final    AST 04/25/2025 35  15 - 37 U/L Final    Alk Phosphatase 04/25/2025 81  35 - 104 U/L Final    Total Protein 04/25/2025 6.9  6.3 - 8.2 g/dL Final    Albumin 04/25/2025 3.4  3.2 - 4.6 g/dL Final    Globulin 04/25/2025 3.4  2.3 - 3.5 g/dL Final    Albumin/Globulin Ratio 04/25/2025 1.0  1.0 - 1.9   Final    Ferritin 04/25/2025 140  8 - 388 NG/ML Final    Folate 04/25/2025 3.9  3.1 - 17.5 ng/mL Final    Iron 04/25/2025 44  35 - 100 ug/dL Final    TIBC 04/25/2025 240  240 - 450 ug/dL Final    Iron % Saturation 04/25/2025 18 (L)  20 - 50 % Final    UIBC 04/25/2025 196.0  112.0 - 347.0 ug/dL Final    Vitamin B-12 04/25/2025 430  193 - 986 pg/mL Final    CEA 04/25/2025 2.8  0.0 - 3.8 ng/mL Final    Comment: Nonsmoker: <3.9 ng/mL  Smoker: <5.6 ng/mL  Roche ECLIA methodology  Patient's results of tumor marker testing may not be comparable to labs using different manufacturers/methods.      Crossmatch expiration date 04/25/2025 04/28/2025,2359   Final    ABO/Rh 04/25/2025 A POSITIVE   Final    Antibody Screen 04/25/2025 NEG   Final     CEA   4/25/25 - 2.8 post op     Imaging: reviewed   PATHOLOGY:     9/2021        Colon, labeled as transverse,

## 2025-04-25 ENCOUNTER — CLINICAL DOCUMENTATION (OUTPATIENT)
Dept: ONCOLOGY | Age: 80
End: 2025-04-25

## 2025-04-25 ENCOUNTER — TELEPHONE (OUTPATIENT)
Dept: ONCOLOGY | Age: 80
End: 2025-04-25

## 2025-04-25 ENCOUNTER — OFFICE VISIT (OUTPATIENT)
Dept: ONCOLOGY | Age: 80
End: 2025-04-25

## 2025-04-25 ENCOUNTER — HOSPITAL ENCOUNTER (OUTPATIENT)
Dept: LAB | Age: 80
Discharge: HOME | End: 2025-04-25
Payer: MEDICARE

## 2025-04-25 VITALS
OXYGEN SATURATION: 95 % | DIASTOLIC BLOOD PRESSURE: 71 MMHG | RESPIRATION RATE: 16 BRPM | SYSTOLIC BLOOD PRESSURE: 173 MMHG | HEIGHT: 66 IN | TEMPERATURE: 98.1 F | HEART RATE: 60 BPM | WEIGHT: 118.6 LBS | BODY MASS INDEX: 19.06 KG/M2

## 2025-04-25 DIAGNOSIS — Z95.2 S/P TRANSCATHETER MITRAL VALVE REPLACEMENT (TMVR): ICD-10-CM

## 2025-04-25 DIAGNOSIS — Z95.4 STATUS POST HEART VALVE REPLACEMENT: ICD-10-CM

## 2025-04-25 DIAGNOSIS — Z95.2 S/P TAVR (TRANSCATHETER AORTIC VALVE REPLACEMENT): ICD-10-CM

## 2025-04-25 DIAGNOSIS — D50.8 OTHER IRON DEFICIENCY ANEMIA: ICD-10-CM

## 2025-04-25 DIAGNOSIS — Z85.048 PERSONAL HISTORY OF RECTAL CANCER: ICD-10-CM

## 2025-04-25 DIAGNOSIS — Z95.0 S/P BIVENTRICULAR CARDIAC PACEMAKER PROCEDURE: Chronic | ICD-10-CM

## 2025-04-25 DIAGNOSIS — Z79.01 LONG TERM CURRENT USE OF ANTICOAGULANT THERAPY: Chronic | ICD-10-CM

## 2025-04-25 DIAGNOSIS — C18.4 MALIGNANT NEOPLASM OF TRANSVERSE COLON (HCC): Primary | ICD-10-CM

## 2025-04-25 DIAGNOSIS — C80.1 ADENOCARCINOMA (HCC): ICD-10-CM

## 2025-04-25 DIAGNOSIS — D64.9 ANEMIA, UNSPECIFIED TYPE: ICD-10-CM

## 2025-04-25 DIAGNOSIS — Z95.2 S/P AVR (AORTIC VALVE REPLACEMENT): Chronic | ICD-10-CM

## 2025-04-25 LAB
ABO + RH BLD: NORMAL
ALBUMIN SERPL-MCNC: 3.4 G/DL (ref 3.2–4.6)
ALBUMIN/GLOB SERPL: 1 (ref 1–1.9)
ALP SERPL-CCNC: 81 U/L (ref 35–104)
ALT SERPL-CCNC: 34 U/L (ref 8–45)
ANION GAP SERPL CALC-SCNC: 13 MMOL/L (ref 7–16)
AST SERPL-CCNC: 35 U/L (ref 15–37)
BASOPHILS # BLD: 0.04 K/UL (ref 0–0.2)
BASOPHILS NFR BLD: 0.7 % (ref 0–2)
BILIRUB SERPL-MCNC: 0.4 MG/DL (ref 0–1.2)
BLOOD GROUP ANTIBODIES SERPL: NORMAL
BUN SERPL-MCNC: 17 MG/DL (ref 8–23)
CALCIUM SERPL-MCNC: 8.9 MG/DL (ref 8.8–10.2)
CEA SERPL-MCNC: 2.8 NG/ML (ref 0–3.8)
CHLORIDE SERPL-SCNC: 103 MMOL/L (ref 98–107)
CO2 SERPL-SCNC: 26 MMOL/L (ref 20–29)
CREAT SERPL-MCNC: 0.7 MG/DL (ref 0.6–1.1)
DIFFERENTIAL METHOD BLD: ABNORMAL
EOSINOPHIL # BLD: 0.11 K/UL (ref 0–0.8)
EOSINOPHIL NFR BLD: 1.9 % (ref 0.5–7.8)
ERYTHROCYTE [DISTWIDTH] IN BLOOD BY AUTOMATED COUNT: 19.1 % (ref 11.9–14.6)
FERRITIN SERPL-MCNC: 140 NG/ML (ref 8–388)
FOLATE SERPL-MCNC: 3.9 NG/ML (ref 3.1–17.5)
GLOBULIN SER CALC-MCNC: 3.4 G/DL (ref 2.3–3.5)
GLUCOSE SERPL-MCNC: 168 MG/DL (ref 70–99)
HCT VFR BLD AUTO: 35.5 % (ref 35.8–46.3)
HGB BLD-MCNC: 10.9 G/DL (ref 11.7–15.4)
IMM GRANULOCYTES # BLD AUTO: 0.02 K/UL (ref 0–0.5)
IMM GRANULOCYTES NFR BLD AUTO: 0.3 % (ref 0–5)
IRON SATN MFR SERPL: 18 % (ref 20–50)
IRON SERPL-MCNC: 44 UG/DL (ref 35–100)
LYMPHOCYTES # BLD: 1.5 K/UL (ref 0.5–4.6)
LYMPHOCYTES NFR BLD: 26 % (ref 13–44)
MCH RBC QN AUTO: 31.3 PG (ref 26.1–32.9)
MCHC RBC AUTO-ENTMCNC: 30.7 G/DL (ref 31.4–35)
MCV RBC AUTO: 102 FL (ref 82–102)
MONOCYTES # BLD: 0.49 K/UL (ref 0.1–1.3)
MONOCYTES NFR BLD: 8.5 % (ref 4–12)
NEUTS SEG # BLD: 3.61 K/UL (ref 1.7–8.2)
NEUTS SEG NFR BLD: 62.6 % (ref 43–78)
NRBC # BLD: 0 K/UL (ref 0–0.2)
PLATELET # BLD AUTO: 170 K/UL (ref 150–450)
PMV BLD AUTO: 9.9 FL (ref 9.4–12.3)
POTASSIUM SERPL-SCNC: 4.2 MMOL/L (ref 3.5–5.1)
PROT SERPL-MCNC: 6.9 G/DL (ref 6.3–8.2)
RBC # BLD AUTO: 3.48 M/UL (ref 4.05–5.2)
SODIUM SERPL-SCNC: 142 MMOL/L (ref 136–145)
SPECIMEN EXP DATE BLD: NORMAL
TIBC SERPL-MCNC: 240 UG/DL (ref 240–450)
UIBC SERPL-MCNC: 196 UG/DL (ref 112–347)
VIT B12 SERPL-MCNC: 430 PG/ML (ref 193–986)
WBC # BLD AUTO: 5.8 K/UL (ref 4.3–11.1)

## 2025-04-25 PROCEDURE — 83550 IRON BINDING TEST: CPT

## 2025-04-25 PROCEDURE — 83540 ASSAY OF IRON: CPT

## 2025-04-25 PROCEDURE — 86850 RBC ANTIBODY SCREEN: CPT

## 2025-04-25 PROCEDURE — 82746 ASSAY OF FOLIC ACID SERUM: CPT

## 2025-04-25 PROCEDURE — 86901 BLOOD TYPING SEROLOGIC RH(D): CPT

## 2025-04-25 PROCEDURE — 36415 COLL VENOUS BLD VENIPUNCTURE: CPT

## 2025-04-25 PROCEDURE — 86900 BLOOD TYPING SEROLOGIC ABO: CPT

## 2025-04-25 PROCEDURE — 80053 COMPREHEN METABOLIC PANEL: CPT

## 2025-04-25 PROCEDURE — 82607 VITAMIN B-12: CPT

## 2025-04-25 PROCEDURE — 82378 CARCINOEMBRYONIC ANTIGEN: CPT

## 2025-04-25 PROCEDURE — 85025 COMPLETE CBC W/AUTO DIFF WBC: CPT

## 2025-04-25 PROCEDURE — 82728 ASSAY OF FERRITIN: CPT

## 2025-04-25 NOTE — TELEPHONE ENCOUNTER
Physician provider: Dr. Gibson  Reason for today's call (Please detail here patients chief complaint): iron infusion  Last office visit:n/a  Patient Callback Number: [phone]  Was callback number verified?: Yes  Preferred pharmacy (If refill request): n/a  Veriified that patient confirmed no refills left at pharmacy? :No  Calls to office within the last 48 hours?:No    Warm Transfer to (For Red Words): none    Patient notified that their information will be routed to the [facility] clinical triage team for review. Patient is advised that they will receive a phone call from the triage department. If symptom related and symptoms worsen before receiving a call back, the patient has been advised to proceed to the nearest ED.          Pt would like to know why she will be having two iron infusions. Pt stated Dr. Gibson told her there would only be one    [phone]

## 2025-04-25 NOTE — TELEPHONE ENCOUNTER
Chart reviewed by Dr. Gibson.  Returned call to patient. Informed   Typical dosage is two iron infusions for Injectafer.  She is still anemic and iron deficient even after a dose over a month ago so Dr. Gibson recommended two.   Verbalized understanding.

## 2025-05-02 ENCOUNTER — HOSPITAL ENCOUNTER (OUTPATIENT)
Dept: INFUSION THERAPY | Age: 80
Setting detail: INFUSION SERIES
Discharge: HOME | End: 2025-05-02
Payer: MEDICARE

## 2025-05-02 VITALS
DIASTOLIC BLOOD PRESSURE: 85 MMHG | SYSTOLIC BLOOD PRESSURE: 155 MMHG | HEART RATE: 65 BPM | OXYGEN SATURATION: 91 % | TEMPERATURE: 97.9 F | RESPIRATION RATE: 16 BRPM

## 2025-05-02 DIAGNOSIS — D50.8 OTHER IRON DEFICIENCY ANEMIA: Primary | ICD-10-CM

## 2025-05-02 PROCEDURE — 6360000002 HC RX W HCPCS: Performed by: INTERNAL MEDICINE

## 2025-05-02 PROCEDURE — 96365 THER/PROPH/DIAG IV INF INIT: CPT

## 2025-05-02 PROCEDURE — 2580000003 HC RX 258: Performed by: INTERNAL MEDICINE

## 2025-05-02 PROCEDURE — 2500000003 HC RX 250 WO HCPCS: Performed by: INTERNAL MEDICINE

## 2025-05-02 RX ORDER — SODIUM CHLORIDE 0.9 % (FLUSH) 0.9 %
5-40 SYRINGE (ML) INJECTION PRN
OUTPATIENT
Start: 2025-05-09

## 2025-05-02 RX ORDER — HEPARIN 100 UNIT/ML
500 SYRINGE INTRAVENOUS PRN
OUTPATIENT
Start: 2025-05-09

## 2025-05-02 RX ORDER — SODIUM CHLORIDE 9 MG/ML
5-250 INJECTION, SOLUTION INTRAVENOUS PRN
Status: DISCONTINUED | OUTPATIENT
Start: 2025-05-02 | End: 2025-05-03 | Stop reason: HOSPADM

## 2025-05-02 RX ORDER — DIPHENHYDRAMINE HYDROCHLORIDE 50 MG/ML
50 INJECTION, SOLUTION INTRAMUSCULAR; INTRAVENOUS
OUTPATIENT
Start: 2025-05-09

## 2025-05-02 RX ORDER — DIPHENHYDRAMINE HYDROCHLORIDE 50 MG/ML
50 INJECTION, SOLUTION INTRAMUSCULAR; INTRAVENOUS
Status: DISCONTINUED | OUTPATIENT
Start: 2025-05-02 | End: 2025-05-03 | Stop reason: HOSPADM

## 2025-05-02 RX ORDER — HYDROCORTISONE SODIUM SUCCINATE 100 MG/2ML
100 INJECTION INTRAMUSCULAR; INTRAVENOUS
OUTPATIENT
Start: 2025-05-09

## 2025-05-02 RX ORDER — HEPARIN 100 UNIT/ML
500 SYRINGE INTRAVENOUS PRN
Status: DISCONTINUED | OUTPATIENT
Start: 2025-05-02 | End: 2025-05-03 | Stop reason: HOSPADM

## 2025-05-02 RX ORDER — HYDROCORTISONE SODIUM SUCCINATE 100 MG/2ML
100 INJECTION INTRAMUSCULAR; INTRAVENOUS
Status: DISCONTINUED | OUTPATIENT
Start: 2025-05-02 | End: 2025-05-03 | Stop reason: HOSPADM

## 2025-05-02 RX ORDER — SODIUM CHLORIDE 9 MG/ML
5-250 INJECTION, SOLUTION INTRAVENOUS PRN
OUTPATIENT
Start: 2025-05-09

## 2025-05-02 RX ORDER — SODIUM CHLORIDE 9 MG/ML
INJECTION, SOLUTION INTRAVENOUS CONTINUOUS
OUTPATIENT
Start: 2025-05-09

## 2025-05-02 RX ORDER — ALBUTEROL SULFATE 90 UG/1
4 INHALANT RESPIRATORY (INHALATION) PRN
OUTPATIENT
Start: 2025-05-09

## 2025-05-02 RX ORDER — SODIUM CHLORIDE 9 MG/ML
5-250 INJECTION, SOLUTION INTRAVENOUS PRN
Status: CANCELLED | OUTPATIENT
Start: 2025-05-09

## 2025-05-02 RX ORDER — SODIUM CHLORIDE 9 MG/ML
INJECTION, SOLUTION INTRAVENOUS CONTINUOUS
Status: DISCONTINUED | OUTPATIENT
Start: 2025-05-02 | End: 2025-05-03 | Stop reason: HOSPADM

## 2025-05-02 RX ORDER — SODIUM CHLORIDE 0.9 % (FLUSH) 0.9 %
5-40 SYRINGE (ML) INJECTION PRN
Status: DISCONTINUED | OUTPATIENT
Start: 2025-05-02 | End: 2025-05-03 | Stop reason: HOSPADM

## 2025-05-02 RX ORDER — ACETAMINOPHEN 325 MG/1
650 TABLET ORAL
Status: DISCONTINUED | OUTPATIENT
Start: 2025-05-02 | End: 2025-05-03 | Stop reason: HOSPADM

## 2025-05-02 RX ORDER — EPINEPHRINE 1 MG/ML
0.3 INJECTION, SOLUTION, CONCENTRATE INTRAVENOUS PRN
OUTPATIENT
Start: 2025-05-09

## 2025-05-02 RX ORDER — ACETAMINOPHEN 325 MG/1
650 TABLET ORAL
OUTPATIENT
Start: 2025-05-09

## 2025-05-02 RX ORDER — ONDANSETRON 2 MG/ML
8 INJECTION INTRAMUSCULAR; INTRAVENOUS
Status: DISCONTINUED | OUTPATIENT
Start: 2025-05-02 | End: 2025-05-03 | Stop reason: HOSPADM

## 2025-05-02 RX ORDER — ALBUTEROL SULFATE 90 UG/1
4 INHALANT RESPIRATORY (INHALATION) PRN
Status: DISCONTINUED | OUTPATIENT
Start: 2025-05-02 | End: 2025-05-03 | Stop reason: HOSPADM

## 2025-05-02 RX ORDER — EPINEPHRINE 1 MG/ML
0.3 INJECTION, SOLUTION, CONCENTRATE INTRAVENOUS PRN
Status: DISCONTINUED | OUTPATIENT
Start: 2025-05-02 | End: 2025-05-03 | Stop reason: HOSPADM

## 2025-05-02 RX ORDER — ONDANSETRON 2 MG/ML
8 INJECTION INTRAMUSCULAR; INTRAVENOUS
OUTPATIENT
Start: 2025-05-09

## 2025-05-02 RX ADMIN — SODIUM CHLORIDE, PRESERVATIVE FREE 10 ML: 5 INJECTION INTRAVENOUS at 09:03

## 2025-05-02 RX ADMIN — SODIUM CHLORIDE, PRESERVATIVE FREE 10 ML: 5 INJECTION INTRAVENOUS at 08:30

## 2025-05-02 RX ADMIN — SODIUM CHLORIDE 750 MG: 9 INJECTION, SOLUTION INTRAVENOUS at 08:53

## 2025-05-02 RX ADMIN — SODIUM CHLORIDE, PRESERVATIVE FREE 10 ML: 5 INJECTION INTRAVENOUS at 09:25

## 2025-05-02 NOTE — PROGRESS NOTES
Arrived to the Infusion Center.  Injectafer completed. Patient tolerated without difficulty.   Any issues or concerns during appointment: IV site infiltrated almost immediately after starting iron.  PIV restarted by Alicia Grimes RN.  Patient aware of next infusion appointment on 05/09 (date) at 0815 (time).  Patient instructed to call provider with temperature of 100.4 or greater or nausea/vomiting/ diarrhea or pain not controlled by medications  Discharged ambulatory.

## 2025-05-02 NOTE — FLOWSHEET NOTE
Spiritual Attempted Visit Note  [facility]      Infusion Department   Name: Nery Bryant           Age: 79 y.o.    Gender: female          MRN: [id number]  Orthodoxy: Orthodoxy       Preferred Language: English      Date: 05/02/25  Visit Time: Begin Time: 0810 End Time : 0815        Visit Summary: Attempted to visit patient to convey care and concern and to explore any spiritual/emotional needs. Patient was unavailable. I am available for follow-up upon request.     Encounter Overview/Reason: Attempted Encounter  Service Provided For: Patient not available     Patient was not available. Patient was working with other staff.  may follow-up at a later time.    [place], Belief, Meaning:   Patient unable to assess at this time  Family/Friends unable to assess at this time  Rituals (if applicable)      Importance and Influence:  Patient unable to assess at this time  Family/Friends unable to assess at this time      Patient Plan of Care:    Spiritual Care is available upon referral.          Electronically signed by VENUS Powers , on 5/2/2025 at 11:13 AM.  Spiritual Health  [facility]

## 2025-05-04 PROBLEM — C20 RECTAL CANCER (HCC): Status: RESOLVED | Noted: 2021-09-01 | Resolved: 2025-05-04

## 2025-05-09 ENCOUNTER — HOSPITAL ENCOUNTER (OUTPATIENT)
Dept: INFUSION THERAPY | Age: 80
Setting detail: INFUSION SERIES
Discharge: HOME | End: 2025-05-09
Payer: MEDICARE

## 2025-05-09 VITALS
RESPIRATION RATE: 16 BRPM | DIASTOLIC BLOOD PRESSURE: 59 MMHG | HEART RATE: 66 BPM | SYSTOLIC BLOOD PRESSURE: 153 MMHG | TEMPERATURE: 97.9 F | OXYGEN SATURATION: 90 %

## 2025-05-09 DIAGNOSIS — D50.8 OTHER IRON DEFICIENCY ANEMIA: Primary | ICD-10-CM

## 2025-05-09 PROCEDURE — 2500000003 HC RX 250 WO HCPCS: Performed by: INTERNAL MEDICINE

## 2025-05-09 PROCEDURE — 6360000002 HC RX W HCPCS: Performed by: INTERNAL MEDICINE

## 2025-05-09 PROCEDURE — 96365 THER/PROPH/DIAG IV INF INIT: CPT

## 2025-05-09 PROCEDURE — 2580000003 HC RX 258: Performed by: INTERNAL MEDICINE

## 2025-05-09 RX ORDER — ACETAMINOPHEN 325 MG/1
650 TABLET ORAL
OUTPATIENT
Start: 2025-05-09

## 2025-05-09 RX ORDER — SODIUM CHLORIDE 0.9 % (FLUSH) 0.9 %
5-40 SYRINGE (ML) INJECTION PRN
Status: DISCONTINUED | OUTPATIENT
Start: 2025-05-09 | End: 2025-05-10 | Stop reason: HOSPADM

## 2025-05-09 RX ORDER — SODIUM CHLORIDE 9 MG/ML
INJECTION, SOLUTION INTRAVENOUS CONTINUOUS
OUTPATIENT
Start: 2025-05-09

## 2025-05-09 RX ORDER — SODIUM CHLORIDE 0.9 % (FLUSH) 0.9 %
5-40 SYRINGE (ML) INJECTION PRN
OUTPATIENT
Start: 2025-05-09

## 2025-05-09 RX ORDER — SODIUM CHLORIDE 9 MG/ML
5-250 INJECTION, SOLUTION INTRAVENOUS PRN
OUTPATIENT
Start: 2025-05-09

## 2025-05-09 RX ORDER — ONDANSETRON 2 MG/ML
8 INJECTION INTRAMUSCULAR; INTRAVENOUS
OUTPATIENT
Start: 2025-05-09

## 2025-05-09 RX ORDER — ALBUTEROL SULFATE 90 UG/1
4 INHALANT RESPIRATORY (INHALATION) PRN
OUTPATIENT
Start: 2025-05-09

## 2025-05-09 RX ORDER — HEPARIN 100 UNIT/ML
500 SYRINGE INTRAVENOUS PRN
OUTPATIENT
Start: 2025-05-09

## 2025-05-09 RX ORDER — DIPHENHYDRAMINE HYDROCHLORIDE 50 MG/ML
50 INJECTION, SOLUTION INTRAMUSCULAR; INTRAVENOUS
OUTPATIENT
Start: 2025-05-09

## 2025-05-09 RX ORDER — EPINEPHRINE 1 MG/ML
0.3 INJECTION, SOLUTION, CONCENTRATE INTRAVENOUS PRN
OUTPATIENT
Start: 2025-05-09

## 2025-05-09 RX ORDER — HYDROCORTISONE SODIUM SUCCINATE 100 MG/2ML
100 INJECTION INTRAMUSCULAR; INTRAVENOUS
OUTPATIENT
Start: 2025-05-09

## 2025-05-09 RX ADMIN — SODIUM CHLORIDE, PRESERVATIVE FREE 10 ML: 5 INJECTION INTRAVENOUS at 08:30

## 2025-05-09 RX ADMIN — FERRIC CARBOXYMALTOSE INJECTION 750 MG: 50 INJECTION, SOLUTION INTRAVENOUS at 08:52

## 2025-05-09 NOTE — PROGRESS NOTES
SPIRITUAL HEALTH   Note for Initial Spiritual Assessment                   Room # Room/bed info not found    Name: Nery Bryant           Age: 79 y.o.    Gender: female          MRN: [id number]  Evangelical: Scientology       Preferred Language: English    Date: 05/09/25  Visit Time: Begin Time: 0845 End Time : 0915        Visit Summary: 's visit with Nery, patient's preferred name, and her spouse Claus. The couple welcomed this visit and Nery remembered me from prior visits at [facility]. Nery has come through a number of medical problems. She and Claus remain positive and seem to be coping well under circumstances. The couple's floridalma is important to them and they are connected to a floridalma community. Also, the couple has a very supportive family. No spiritual/emotional needs were voiced in the visit.  follow-up is available upon referral.      Encounter Overview/Reason: Initial Encounter  Service Provided For: Patient and family together     Patient was available.    [place], Belief, Meaning:   Patient is connected with a floridalma tradition or spiritual practice and has beliefs or practices that help with coping during difficult times  Family/Friends are connected with a floridalma tradition or spiritual practice and have beliefs or practices that help with coping during difficult times  Rituals (if applicable)      Importance and Influence:  Patient has spiritual/personal beliefs that influence decisions regarding their health  Family/Friends has spiritual/personal beliefs that influence decisions regarding the patient's health    Community:  Patient   Support system includes  Spouse, Children, Friends/neighbors, Family members   Family/Friends   Spouse/Partner, Children, [place] Community, Friends, and Extended family    Assessment and Plan of Care:   Emotions Expressed by Patient:   Assessment: Calm, Coping, Hopeful    Interventions by :   Intervention: Active listening, Discussed belief

## 2025-05-12 ENCOUNTER — OFFICE VISIT (OUTPATIENT)
Dept: INTERNAL MEDICINE CLINIC | Facility: CLINIC | Age: 80
End: 2025-05-12
Payer: MEDICARE

## 2025-05-12 VITALS
WEIGHT: 117.4 LBS | SYSTOLIC BLOOD PRESSURE: 138 MMHG | BODY MASS INDEX: 21.6 KG/M2 | HEIGHT: 62 IN | DIASTOLIC BLOOD PRESSURE: 76 MMHG | HEART RATE: 82 BPM | OXYGEN SATURATION: 93 %

## 2025-05-12 DIAGNOSIS — R80.9 TYPE 2 DIABETES MELLITUS WITH MICROALBUMINURIA, WITH LONG-TERM CURRENT USE OF INSULIN (HCC): Chronic | ICD-10-CM

## 2025-05-12 DIAGNOSIS — I10 ESSENTIAL HYPERTENSION: Primary | Chronic | ICD-10-CM

## 2025-05-12 DIAGNOSIS — Z79.4 TYPE 2 DIABETES MELLITUS WITH MICROALBUMINURIA, WITH LONG-TERM CURRENT USE OF INSULIN (HCC): Chronic | ICD-10-CM

## 2025-05-12 DIAGNOSIS — I48.0 PAROXYSMAL ATRIAL FIBRILLATION (HCC): Chronic | ICD-10-CM

## 2025-05-12 DIAGNOSIS — E89.0 HYPOTHYROIDISM, POSTSURGICAL: ICD-10-CM

## 2025-05-12 DIAGNOSIS — J82.81: ICD-10-CM

## 2025-05-12 DIAGNOSIS — E11.29 TYPE 2 DIABETES MELLITUS WITH MICROALBUMINURIA, WITH LONG-TERM CURRENT USE OF INSULIN (HCC): Chronic | ICD-10-CM

## 2025-05-12 DIAGNOSIS — I50.22 CHRONIC SYSTOLIC (CONGESTIVE) HEART FAILURE (HCC): ICD-10-CM

## 2025-05-12 PROCEDURE — 3075F SYST BP GE 130 - 139MM HG: CPT

## 2025-05-12 PROCEDURE — 1123F ACP DISCUSS/DSCN MKR DOCD: CPT

## 2025-05-12 PROCEDURE — 3078F DIAST BP <80 MM HG: CPT

## 2025-05-12 PROCEDURE — 99214 OFFICE O/P EST MOD 30 MIN: CPT

## 2025-05-12 PROCEDURE — 1160F RVW MEDS BY RX/DR IN RCRD: CPT

## 2025-05-12 PROCEDURE — 1090F PRES/ABSN URINE INCON ASSESS: CPT

## 2025-05-12 PROCEDURE — G8427 DOCREV CUR MEDS BY ELIG CLIN: HCPCS

## 2025-05-12 PROCEDURE — 1036F TOBACCO NON-USER: CPT

## 2025-05-12 PROCEDURE — 3044F HG A1C LEVEL LT 7.0%: CPT

## 2025-05-12 PROCEDURE — G8420 CALC BMI NORM PARAMETERS: HCPCS

## 2025-05-12 PROCEDURE — G8399 PT W/DXA RESULTS DOCUMENT: HCPCS

## 2025-05-12 PROCEDURE — 1159F MED LIST DOCD IN RCRD: CPT

## 2025-05-12 RX ORDER — LEVOTHYROXINE SODIUM 125 UG/1
125 TABLET ORAL
Qty: 90 TABLET | Refills: 1 | Status: CANCELLED | OUTPATIENT
Start: 2025-05-12

## 2025-05-12 ASSESSMENT — ENCOUNTER SYMPTOMS
EYES NEGATIVE: 1
CHEST TIGHTNESS: 0
GASTROINTESTINAL NEGATIVE: 1
SHORTNESS OF BREATH: 0
BACK PAIN: 1

## 2025-05-12 ASSESSMENT — VISUAL ACUITY: OU: 1

## 2025-05-12 NOTE — PROGRESS NOTES
pathology. Dr. Prince resected rectal cancer. Didn't need colostomy. Seeing heme/onc. Discussed chemotherapy but prefers not to proceed with treatment. Margins were clear and lymph nodes were negative.   1st patient in [place] to have both valves replaced via femoral artery. Denies CP, swelling, and PND.        Review of Systems   Constitutional:  Negative for chills, fever and unexpected weight change.   HENT: Negative.     Eyes: Negative.    Respiratory:  Negative for chest tightness and shortness of breath.    Cardiovascular:  Negative for chest pain and palpitations.   Gastrointestinal: Negative.    Endocrine: Negative for polydipsia, polyphagia and polyuria.   Genitourinary: Negative.    Musculoskeletal:  Positive for back pain (improving).   Skin:  Negative for rash.   Psychiatric/Behavioral:  Negative for agitation, dysphoric mood, sleep disturbance and suicidal ideas. The patient is not nervous/anxious.       Visit Vitals  /76 (BP Site: Left Upper Arm, Patient Position: Sitting)   Pulse 82   Ht 1.575 m (5' 2\")   Wt 53.3 kg (117 lb 6.4 oz)   SpO2 93%   BMI 21.47 kg/m²     Physical Exam  Vitals and nursing note reviewed.   Constitutional:       Appearance: Normal appearance.   HENT:      Head: Normocephalic.      Right Ear: External ear normal.      Left Ear: External ear normal.   Eyes:      General: Lids are normal. Vision grossly intact.      Extraocular Movements: Extraocular movements intact.      Conjunctiva/sclera: Conjunctivae normal.      Pupils: Pupils are equal, round, and reactive to light.   Neck:      Trachea: Phonation normal.   Cardiovascular:      Rate and Rhythm: Normal rate and regular rhythm.      Heart sounds: Normal heart sounds, S1 normal and S2 normal.   Pulmonary:      Effort: Pulmonary effort is normal.      Breath sounds: Normal breath sounds.   Abdominal:      General: Abdomen is flat.   Musculoskeletal:      Cervical back: Normal range of motion and neck supple.   Skin:

## 2025-05-12 NOTE — PROGRESS NOTES
Patient has requested an audio/video evaluation for the following concern(s):    Pt was evaluated through a synchronous (real-time) audio-video encounter. The patient (or guardian if applicable) is aware that this is a billable service, which includes applicable co-pays. This Virtual Visit was conducted with patient's (and/or legal guardian's) consent. The visit was conducted pursuant to the emergency declaration under the Storm Act and the National Emergencies Act, 1135 waiver authority and the Coronavirus Preparedness and Response Supplemental Appropriations Act.  Patient identification was verified, and a caregiver was present when appropriate.   The patient was located at Other: home  Provider was located at Facility (Appt Dept): [address] [place] [id number].     Total time spent on this encounter: 21 min [chart review, VV and charting]    [facility]: Follow Up - Established Patient     Chief Complaint   Patient presents with    Follow-up     Virtual Visit     Reason for Referral: Iron deficiency anemia, unspecified iron deficiency anemia type  Referring Provider: RICCI Kennedy - NP  Family History of Cancer/Hematologic Disorders: Family history significant for sister with breast cancer.     History of Present Illness:  Ms. Bryant is a 79 y.o. female who presents today for follow up regarding anemia and new dx of colon cancer.  The past medical history is significant for dx of rectal CA s/p resection - no chemo, a-fib RVR on coumadin (now on Eliquis), aortic and mitral valve replacement, HTN, DM2, HLD, and Hypothyroidism  Followed by Cardiology, Endocrinology, Rheumatology, Orthopedics, ENT    - Patient was here for consultation with her .  She reported chronic fatigue for over a year.  Her symptoms started in July 2023 with acute shortness of breath that woke her up from sleep.  She was first worked up for pulmonary disease but was

## 2025-05-13 ENCOUNTER — OFFICE VISIT (OUTPATIENT)
Age: 80
End: 2025-05-13
Payer: MEDICARE

## 2025-05-13 DIAGNOSIS — M47.816 FACET ARTHRITIS OF LUMBAR REGION: Primary | ICD-10-CM

## 2025-05-13 PROCEDURE — 1159F MED LIST DOCD IN RCRD: CPT | Performed by: PHYSICIAN ASSISTANT

## 2025-05-13 PROCEDURE — G8399 PT W/DXA RESULTS DOCUMENT: HCPCS | Performed by: PHYSICIAN ASSISTANT

## 2025-05-13 PROCEDURE — 1123F ACP DISCUSS/DSCN MKR DOCD: CPT | Performed by: PHYSICIAN ASSISTANT

## 2025-05-13 PROCEDURE — G8427 DOCREV CUR MEDS BY ELIG CLIN: HCPCS | Performed by: PHYSICIAN ASSISTANT

## 2025-05-13 PROCEDURE — 99213 OFFICE O/P EST LOW 20 MIN: CPT | Performed by: PHYSICIAN ASSISTANT

## 2025-05-13 PROCEDURE — 1036F TOBACCO NON-USER: CPT | Performed by: PHYSICIAN ASSISTANT

## 2025-05-13 PROCEDURE — G8420 CALC BMI NORM PARAMETERS: HCPCS | Performed by: PHYSICIAN ASSISTANT

## 2025-05-13 PROCEDURE — 1090F PRES/ABSN URINE INCON ASSESS: CPT | Performed by: PHYSICIAN ASSISTANT

## 2025-05-13 NOTE — PROGRESS NOTES
Ms. Bryant is a follow-up for a left T11-L1 thoracic medial branch radiofrequency ablation.  This was performed 6 weeks ago.  Since that time, she had a partial colectomy for stage IIa colon cancer.  Thankfully all 14 lymph nodes that were removed were benign.  
furosemide (LASIX) 20 MG tablet Take 0.5 tablets by mouth daily as needed (edema) Take on mornings she has swelling/use with KCL 45 tablet 3    docusate sodium (COLACE, DULCOLAX) 100 MG CAPS Take 100 mg by mouth 2 times daily      Insulin Degludec (TRESIBA SC) Inject 43 Units into the skin at bedtime 35-40, but varies (Patient taking differently: Inject 43 Units into the skin at bedtime Patient reports 19 units)      aspirin 81 MG chewable tablet Take 1 tablet by mouth daily      vitamin D (CHOLECALCIFEROL) 25 MCG (1000 UT) TABS tablet Take 2 tablets by mouth 2 times daily       No facility-administered encounter medications on file as of 5/13/2025.          Review of Systems        The [place] database for controlled substance prescription monitoring was reviewed.    I personally performed the HPI, exam, and assessment/plan, verified the documentation and approve it is updated, accurate, and complete. Parts or the entirety of this document were transcribed utilizing voice recognition software. Transcription errors may be present.     Va Oviedo PA-C under supervision of Dr Dg Rivas    Date: May 13, 2025  Patient Name: Nery Bryant  MRN:[id number]  PCP: Elise Mayo

## 2025-05-16 ENCOUNTER — TELEMEDICINE (OUTPATIENT)
Dept: ONCOLOGY | Age: 80
End: 2025-05-16
Payer: MEDICARE

## 2025-05-16 DIAGNOSIS — D64.9 ANEMIA, UNSPECIFIED TYPE: ICD-10-CM

## 2025-05-16 DIAGNOSIS — C18.4 MALIGNANT NEOPLASM OF TRANSVERSE COLON (HCC): Primary | ICD-10-CM

## 2025-05-16 DIAGNOSIS — Z79.01 ANTICOAGULATED: ICD-10-CM

## 2025-05-16 DIAGNOSIS — Z95.2 S/P TAVR (TRANSCATHETER AORTIC VALVE REPLACEMENT): ICD-10-CM

## 2025-05-16 PROCEDURE — 1090F PRES/ABSN URINE INCON ASSESS: CPT | Performed by: INTERNAL MEDICINE

## 2025-05-16 PROCEDURE — G8427 DOCREV CUR MEDS BY ELIG CLIN: HCPCS | Performed by: INTERNAL MEDICINE

## 2025-05-16 PROCEDURE — 99213 OFFICE O/P EST LOW 20 MIN: CPT | Performed by: INTERNAL MEDICINE

## 2025-05-16 PROCEDURE — G8399 PT W/DXA RESULTS DOCUMENT: HCPCS | Performed by: INTERNAL MEDICINE

## 2025-05-16 PROCEDURE — 1123F ACP DISCUSS/DSCN MKR DOCD: CPT | Performed by: INTERNAL MEDICINE

## 2025-05-16 ASSESSMENT — PATIENT HEALTH QUESTIONNAIRE - PHQ9
1. LITTLE INTEREST OR PLEASURE IN DOING THINGS: NOT AT ALL
2. FEELING DOWN, DEPRESSED OR HOPELESS: NOT AT ALL
SUM OF ALL RESPONSES TO PHQ QUESTIONS 1-9: 0

## 2025-05-26 PROBLEM — C18.4 MALIGNANT NEOPLASM OF TRANSVERSE COLON (HCC): Status: ACTIVE | Noted: 2025-05-26

## 2025-06-10 ENCOUNTER — OFFICE VISIT (OUTPATIENT)
Dept: INTERNAL MEDICINE CLINIC | Facility: CLINIC | Age: 80
End: 2025-06-10
Payer: MEDICARE

## 2025-06-10 VITALS
OXYGEN SATURATION: 99 % | DIASTOLIC BLOOD PRESSURE: 68 MMHG | WEIGHT: 117 LBS | BODY MASS INDEX: 21.4 KG/M2 | SYSTOLIC BLOOD PRESSURE: 142 MMHG | HEART RATE: 70 BPM

## 2025-06-10 DIAGNOSIS — F32.0 CURRENT MILD EPISODE OF MAJOR DEPRESSIVE DISORDER WITHOUT PRIOR EPISODE: Primary | ICD-10-CM

## 2025-06-10 DIAGNOSIS — J40 BRONCHITIS: ICD-10-CM

## 2025-06-10 PROCEDURE — 1123F ACP DISCUSS/DSCN MKR DOCD: CPT

## 2025-06-10 PROCEDURE — 99214 OFFICE O/P EST MOD 30 MIN: CPT

## 2025-06-10 PROCEDURE — 3077F SYST BP >= 140 MM HG: CPT

## 2025-06-10 PROCEDURE — 1036F TOBACCO NON-USER: CPT

## 2025-06-10 PROCEDURE — 1160F RVW MEDS BY RX/DR IN RCRD: CPT

## 2025-06-10 PROCEDURE — 1090F PRES/ABSN URINE INCON ASSESS: CPT

## 2025-06-10 PROCEDURE — G8420 CALC BMI NORM PARAMETERS: HCPCS

## 2025-06-10 PROCEDURE — 3078F DIAST BP <80 MM HG: CPT

## 2025-06-10 PROCEDURE — G8399 PT W/DXA RESULTS DOCUMENT: HCPCS

## 2025-06-10 PROCEDURE — G8427 DOCREV CUR MEDS BY ELIG CLIN: HCPCS

## 2025-06-10 PROCEDURE — 1159F MED LIST DOCD IN RCRD: CPT

## 2025-06-10 RX ORDER — BROMPHENIRAMINE MALEATE, PSEUDOEPHEDRINE HYDROCHLORIDE, AND DEXTROMETHORPHAN HYDROBROMIDE 2; 30; 10 MG/5ML; MG/5ML; MG/5ML
5 SYRUP ORAL NIGHTLY PRN
Qty: 118 ML | Refills: 0 | Status: SHIPPED | OUTPATIENT
Start: 2025-06-10

## 2025-06-10 RX ORDER — DOXYCYCLINE HYCLATE 100 MG
100 TABLET ORAL 2 TIMES DAILY
Qty: 20 TABLET | Refills: 0 | Status: SHIPPED | OUTPATIENT
Start: 2025-06-10 | End: 2025-06-20

## 2025-06-10 RX ORDER — TRAMADOL HYDROCHLORIDE 50 MG/1
50 TABLET ORAL EVERY 6 HOURS PRN
COMMUNITY

## 2025-06-10 ASSESSMENT — ENCOUNTER SYMPTOMS
CHEST TIGHTNESS: 0
SORE THROAT: 0
SINUS PRESSURE: 0
SINUS PAIN: 0
TROUBLE SWALLOWING: 0
WHEEZING: 0
COUGH: 1
SHORTNESS OF BREATH: 0

## 2025-06-10 ASSESSMENT — VISUAL ACUITY: OU: 1

## 2025-06-10 NOTE — PROGRESS NOTES
Nery Bryant (:  1945) is a 79 y.o. female,Established patient, here for evaluation of the following chief complaint(s):  Cough (Nasal drip, brownish mucus x4 days )    Patient Active Problem List   Diagnosis    Carotid stenosis    Iron deficiency anemia    Long-term use of high-risk medication    Osteoporosis    Colonic mass    Blood loss anemia    Type 2 diabetes mellitus with microalbuminuria, with long-term current use of insulin (HCC)    Type 2 diabetes mellitus with hyperglycemia, with long-term current use of insulin (HCC)    Essential hypertension    Long term current use of anticoagulant therapy    GERD (gastroesophageal reflux disease)    S/P AVR (aortic valve replacement)    Hypothyroidism, postsurgical    Paroxysmal atrial fibrillation (HCC)    Hypoxia    S/P mitral valve replacement    CAD (coronary artery disease)    Mixed hyperlipidemia    Abnormal CT of the chest    Pulmonary infiltration eosinophilia (HCC)    Lung nodules    Secondary hypercoagulable state    S/P transcatheter mitral valve replacement (TMVR)    S/P TAVR (transcatheter aortic valve replacement)    Thrombocytopenia    Pleural effusion on left    S/P biventricular cardiac pacemaker procedure    Oral mass    Hypocalcemia    Elevated parathyroid hormone    Anemia    Status post heart valve replacement    Anticoagulated    Personal history of rectal cancer    Chronic systolic (congestive) heart failure (HCC)    Chronic respiratory failure with hypoxia (J96.11)    Malignant neoplasm of transverse colon (HCC)     Pt presents along with her daughter. She c/o PND and productive cough with brownish mucus x4 days. She denies CP and fever. Her daughter states that several other family members have suffered similar complaints. She reports that her cough is worse at night. She is having trouble attaining adequate rest.  States her mood has been depressed for several months. She wants to things that she used to do but lacks

## 2025-06-16 ENCOUNTER — OFFICE VISIT (OUTPATIENT)
Dept: ENT CLINIC | Age: 80
End: 2025-06-16
Payer: MEDICARE

## 2025-06-16 VITALS — BODY MASS INDEX: 21.54 KG/M2 | HEIGHT: 62 IN | RESPIRATION RATE: 14 BRPM | WEIGHT: 117.06 LBS

## 2025-06-16 DIAGNOSIS — R22.0 FACIAL MASS: Primary | ICD-10-CM

## 2025-06-16 PROCEDURE — 1036F TOBACCO NON-USER: CPT | Performed by: OTOLARYNGOLOGY

## 2025-06-16 PROCEDURE — 1123F ACP DISCUSS/DSCN MKR DOCD: CPT | Performed by: OTOLARYNGOLOGY

## 2025-06-16 PROCEDURE — 1090F PRES/ABSN URINE INCON ASSESS: CPT | Performed by: OTOLARYNGOLOGY

## 2025-06-16 PROCEDURE — G8420 CALC BMI NORM PARAMETERS: HCPCS | Performed by: OTOLARYNGOLOGY

## 2025-06-16 PROCEDURE — G8427 DOCREV CUR MEDS BY ELIG CLIN: HCPCS | Performed by: OTOLARYNGOLOGY

## 2025-06-16 PROCEDURE — 99213 OFFICE O/P EST LOW 20 MIN: CPT | Performed by: OTOLARYNGOLOGY

## 2025-06-16 PROCEDURE — 1159F MED LIST DOCD IN RCRD: CPT | Performed by: OTOLARYNGOLOGY

## 2025-06-16 PROCEDURE — G8399 PT W/DXA RESULTS DOCUMENT: HCPCS | Performed by: OTOLARYNGOLOGY

## 2025-06-16 ASSESSMENT — ENCOUNTER SYMPTOMS
GASTROINTESTINAL NEGATIVE: 1
ALLERGIC/IMMUNOLOGIC NEGATIVE: 1
RESPIRATORY NEGATIVE: 1
EYES NEGATIVE: 1

## 2025-06-16 NOTE — PROGRESS NOTES
Chief Complaint   Patient presents with    Follow-up     6 mos recheck facial mass       HPI:  Nery Bryant is a 79 y.o. female seen in follow-up for her R facial mass. She underwent transoral excision of a right buccal space mass back on 3/14/2024.  She had to go back to the OR on 3/18/2024 for postop hematoma- she has been on Eliquis since undergoing a heart procedure earlier this spring.  Last seen back on 12/16/24.  Doing well from an oral standpoint since then with no pain, swelling, or bleeding.  She still reports mild numbness along the right lateral oral commissure, but has been able to follow a normal diet without difficulty.  She did have a recurrence of her colon cancer and underwent surgery to remove 6 inches of her colon back in March.  She has felt pretty weak and fatigued since then but did not require an ostomy.  She did develop a postop hematoma, similar to her previous surgery with me which required evacuation, but she has healed up better since then.    Past Medical History, Past Surgical History, Family history, Social History, and Medications were all reviewed with the patient today and updated as necessary.     Allergies   Allergen Reactions    Ibuprofen Rash     Patient Active Problem List   Diagnosis    Carotid stenosis    Iron deficiency anemia    Long-term use of high-risk medication    Osteoporosis    Colonic mass    Blood loss anemia    Type 2 diabetes mellitus with microalbuminuria, with long-term current use of insulin (HCC)    Type 2 diabetes mellitus with hyperglycemia, with long-term current use of insulin (HCC)    Essential hypertension    Long term current use of anticoagulant therapy    GERD (gastroesophageal reflux disease)    S/P AVR (aortic valve replacement)    Hypothyroidism, postsurgical    Paroxysmal atrial fibrillation (HCC)    Hypoxia    S/P mitral valve replacement    CAD (coronary artery disease)    Mixed hyperlipidemia    Abnormal CT of the chest    Pulmonary

## 2025-06-19 ENCOUNTER — OFFICE VISIT (OUTPATIENT)
Dept: ENDOCRINOLOGY | Age: 80
End: 2025-06-19

## 2025-06-19 VITALS
WEIGHT: 114.4 LBS | SYSTOLIC BLOOD PRESSURE: 146 MMHG | HEART RATE: 68 BPM | BODY MASS INDEX: 21.05 KG/M2 | OXYGEN SATURATION: 92 % | DIASTOLIC BLOOD PRESSURE: 72 MMHG | HEIGHT: 62 IN

## 2025-06-19 DIAGNOSIS — E89.0 POSTOPERATIVE HYPOTHYROIDISM: Primary | ICD-10-CM

## 2025-06-19 DIAGNOSIS — E55.9 VITAMIN D DEFICIENCY: ICD-10-CM

## 2025-06-19 DIAGNOSIS — E89.0 POSTOPERATIVE HYPOTHYROIDISM: ICD-10-CM

## 2025-06-19 DIAGNOSIS — E11.649 TYPE 2 DIABETES MELLITUS WITH HYPOGLYCEMIA WITHOUT COMA, WITH LONG-TERM CURRENT USE OF INSULIN (HCC): ICD-10-CM

## 2025-06-19 DIAGNOSIS — M80.00XA AGE-RELATED OSTEOPOROSIS WITH CURRENT PATHOLOGICAL FRACTURE, INITIAL ENCOUNTER: ICD-10-CM

## 2025-06-19 DIAGNOSIS — Z79.4 TYPE 2 DIABETES MELLITUS WITH HYPOGLYCEMIA WITHOUT COMA, WITH LONG-TERM CURRENT USE OF INSULIN (HCC): ICD-10-CM

## 2025-06-19 LAB
25(OH)D3 SERPL-MCNC: 30.8 NG/ML (ref 30–100)
ALBUMIN SERPL-MCNC: 2.8 G/DL (ref 3.2–4.6)
ALBUMIN/GLOB SERPL: 0.6 (ref 1–1.9)
ALP SERPL-CCNC: 168 U/L (ref 35–104)
ALT SERPL-CCNC: 44 U/L (ref 8–45)
ANION GAP SERPL CALC-SCNC: 10 MMOL/L (ref 7–16)
AST SERPL-CCNC: 25 U/L (ref 15–37)
BILIRUB SERPL-MCNC: 0.5 MG/DL (ref 0–1.2)
BUN SERPL-MCNC: 13 MG/DL (ref 8–23)
CALCIUM SERPL-MCNC: 9.1 MG/DL (ref 8.8–10.2)
CHLORIDE SERPL-SCNC: 96 MMOL/L (ref 98–107)
CO2 SERPL-SCNC: 36 MMOL/L (ref 20–29)
CREAT SERPL-MCNC: 0.62 MG/DL (ref 0.6–1.1)
GLOBULIN SER CALC-MCNC: 4.7 G/DL (ref 2.3–3.5)
GLUCOSE SERPL-MCNC: 191 MG/DL (ref 70–99)
POTASSIUM SERPL-SCNC: 3.4 MMOL/L (ref 3.5–5.1)
PROT SERPL-MCNC: 7.5 G/DL (ref 6.3–8.2)
SODIUM SERPL-SCNC: 142 MMOL/L (ref 136–145)
T4 FREE SERPL-MCNC: 1.2 NG/DL (ref 0.9–1.7)
TSH, 3RD GENERATION: 10 UIU/ML (ref 0.27–4.2)

## 2025-06-19 NOTE — PROGRESS NOTES
DO [facility]  [address], [address]  [place] [address]        Nery Bryant is a 79 y.o. female presents for follow up management of hypothyroidism s/p thyroidectomy and osteoporosis.  Est care 10/11/2024  LOV 2/11/2025    NOTICE FOR THE PATIENT: This clinical note is not designed to be interpreted by patients.  We do not recommend reading it unless you have medical training. These notes may contain candid and (unintentionally) offensive descriptions, which are sometimes required for accurate documentation. If you would like more information about your healthcare, please obtain it directly by myself or my staff/colleagues - never solely from the notes. Thank you for your understanding and cooperation.    Assessment & Plan  Postsurgical hypothyroidism  Weight loss and GI changes suggest potential overtreatment.  Order thyroid labs (TSH, free T4).  Possible levothyroxine dose adjustment based on labs.  Current regimen is 125 mcg daily with extra half tab on Wednesdays    Osteoporosis, severe  she was previously treated with Forteo for 2 years, no follow up anti-resorptive therapy  Next Prolia dose next Friday. First dose was 12/2024.  Last bone density test 11/2024; next due 11/2026.  Continued Prolia treatment, tolerating well.  Monitor labs for hypocalcemia.  Vit D 35.2 on 12/2024, history of Vitamin D deficiency.    Type 2 diabetes mellitus  Fluctuating blood sugar, occasional highs around 250 mg/dL, lows mainly morning/night.  Using Dexcom, checks blood sugar three times daily.  Tresiba at 16 units, NovoLog 4 units with meals.  Reduce Tresiba by 2 units if overnight low; NovoLog only with meals containing carbs, avoid if blood sugar =150 mg/dL unless large meal.  Management deferred to PCP.    Follow-up in 3 months.    Follow-up and Dispositions    Return in about 3 months (around 9/19/2025) for Hypothyroidism, OP.       Orders Placed This Encounter    GLUCOSE

## 2025-06-20 ENCOUNTER — RESULTS FOLLOW-UP (OUTPATIENT)
Dept: ENDOCRINOLOGY | Age: 80
End: 2025-06-20

## 2025-06-20 DIAGNOSIS — E03.9 ACQUIRED HYPOTHYROIDISM: Primary | ICD-10-CM

## 2025-06-24 ENCOUNTER — TELEPHONE (OUTPATIENT)
Dept: FAMILY MEDICINE CLINIC | Facility: CLINIC | Age: 80
End: 2025-06-24

## 2025-06-24 NOTE — TELEPHONE ENCOUNTER
Cherry, nurse with [facility] [phone].  Stated that patient was discharged today with home health orders .  Cherry would like to know if you will follow?

## 2025-06-27 ENCOUNTER — CLINICAL SUPPORT (OUTPATIENT)
Age: 80
End: 2025-06-27

## 2025-06-27 VITALS
RESPIRATION RATE: 20 BRPM | TEMPERATURE: 97.9 F | SYSTOLIC BLOOD PRESSURE: 131 MMHG | DIASTOLIC BLOOD PRESSURE: 74 MMHG | OXYGEN SATURATION: 92 % | HEART RATE: 62 BPM

## 2025-06-27 DIAGNOSIS — M80.00XA AGE-RELATED OSTEOPOROSIS WITH CURRENT PATHOLOGICAL FRACTURE, INITIAL ENCOUNTER: Primary | ICD-10-CM

## 2025-06-27 RX ORDER — HYDROCORTISONE SODIUM SUCCINATE 100 MG/2ML
100 INJECTION INTRAMUSCULAR; INTRAVENOUS
OUTPATIENT
Start: 2025-12-18

## 2025-06-27 RX ORDER — ONDANSETRON 2 MG/ML
8 INJECTION INTRAMUSCULAR; INTRAVENOUS
OUTPATIENT
Start: 2025-12-18

## 2025-06-27 RX ORDER — DIPHENHYDRAMINE HYDROCHLORIDE 50 MG/ML
50 INJECTION, SOLUTION INTRAMUSCULAR; INTRAVENOUS
OUTPATIENT
Start: 2025-12-18

## 2025-06-27 RX ORDER — EPINEPHRINE 1 MG/ML
0.3 INJECTION, SOLUTION, CONCENTRATE INTRAVENOUS PRN
OUTPATIENT
Start: 2025-12-18

## 2025-06-27 RX ORDER — ACETAMINOPHEN 325 MG/1
650 TABLET ORAL
OUTPATIENT
Start: 2025-12-18

## 2025-06-27 RX ORDER — SODIUM CHLORIDE 9 MG/ML
INJECTION, SOLUTION INTRAVENOUS CONTINUOUS
OUTPATIENT
Start: 2025-12-18

## 2025-06-27 RX ORDER — ALBUTEROL SULFATE 90 UG/1
4 INHALANT RESPIRATORY (INHALATION) PRN
OUTPATIENT
Start: 2025-12-18

## 2025-06-27 NOTE — PROGRESS NOTES
[facility]  [address], [address] [place] [address]  Office : [phone], Fax: [phone]        Osteoporosis pre-infusion/injection questionnaire:     1. In the past month, have you had or are you planning to have any dental surgery or major dental procedures?  No     2. Are you taking a calcium and vitamin D supplement? Yes     3. When was your last osteoporosis treatment?  12/23/2024     4. In the last year, have you had any fractures? No        Patient arrived ambulatory to infusion suite today for a repeat Prolia subcutaneous injection.  Pertinent labs drawn 06/23/2025 . Labs reviewed and are within medication administration parameters.  Prolia 60mg/ml injected SC into right arm. Patient tolerated injection well.    Advised patient to continue with calcium and vitamin D supplements post injection. Advised patient to call the ordering provider with any problems post injection.       Next Prolia appt scheduled 01/2026 prior to departure from infusion suite.     Pt ambulatory with steady gait out of infusion suite.

## 2025-06-30 ENCOUNTER — PATIENT MESSAGE (OUTPATIENT)
Dept: ORTHOPEDIC SURGERY | Age: 80
End: 2025-06-30

## 2025-06-30 DIAGNOSIS — M54.9 ACUTE MIDLINE BACK PAIN, UNSPECIFIED BACK LOCATION: Primary | ICD-10-CM

## 2025-07-01 ENCOUNTER — TELEPHONE (OUTPATIENT)
Dept: ORTHOPEDIC SURGERY | Age: 80
End: 2025-07-01

## 2025-07-01 NOTE — TELEPHONE ENCOUNTER
Orders placed for thoracic and lumbar x-ray to be obtained.  Reached out to Mr. Bryant via [employer] to get this completed.

## 2025-07-02 ENCOUNTER — RESULTS FOLLOW-UP (OUTPATIENT)
Dept: INTERNAL MEDICINE CLINIC | Facility: CLINIC | Age: 80
End: 2025-07-02

## 2025-07-02 ENCOUNTER — OFFICE VISIT (OUTPATIENT)
Dept: INTERNAL MEDICINE CLINIC | Facility: CLINIC | Age: 80
End: 2025-07-02
Payer: MEDICARE

## 2025-07-02 VITALS
WEIGHT: 105 LBS | OXYGEN SATURATION: 95 % | HEART RATE: 62 BPM | DIASTOLIC BLOOD PRESSURE: 68 MMHG | BODY MASS INDEX: 19.32 KG/M2 | SYSTOLIC BLOOD PRESSURE: 114 MMHG | HEIGHT: 62 IN

## 2025-07-02 DIAGNOSIS — S05.10XA: Primary | ICD-10-CM

## 2025-07-02 DIAGNOSIS — M47.816 FACET ARTHRITIS OF LUMBAR REGION: Primary | ICD-10-CM

## 2025-07-02 DIAGNOSIS — R41.0 CONFUSION: ICD-10-CM

## 2025-07-02 DIAGNOSIS — R30.0 DYSURIA: ICD-10-CM

## 2025-07-02 DIAGNOSIS — F32.0 CURRENT MILD EPISODE OF MAJOR DEPRESSIVE DISORDER WITHOUT PRIOR EPISODE: ICD-10-CM

## 2025-07-02 LAB
BILIRUBIN, URINE, POC: NEGATIVE
BLOOD URINE, POC: ABNORMAL
GLUCOSE URINE, POC: NEGATIVE
KETONES, URINE, POC: NEGATIVE
LEUKOCYTE ESTERASE, URINE, POC: ABNORMAL
NITRITE, URINE, POC: NEGATIVE
PH, URINE, POC: 5.5 (ref 4.6–8)
PROTEIN,URINE, POC: 100
SPECIFIC GRAVITY, URINE, POC: 1.03 (ref 1–1.03)
URINALYSIS CLARITY, POC: CLEAR
URINALYSIS COLOR, POC: ABNORMAL
UROBILINOGEN, POC: ABNORMAL

## 2025-07-02 PROCEDURE — 1090F PRES/ABSN URINE INCON ASSESS: CPT

## 2025-07-02 PROCEDURE — 1036F TOBACCO NON-USER: CPT

## 2025-07-02 PROCEDURE — 3074F SYST BP LT 130 MM HG: CPT

## 2025-07-02 PROCEDURE — 1160F RVW MEDS BY RX/DR IN RCRD: CPT

## 2025-07-02 PROCEDURE — G8427 DOCREV CUR MEDS BY ELIG CLIN: HCPCS

## 2025-07-02 PROCEDURE — G8399 PT W/DXA RESULTS DOCUMENT: HCPCS

## 2025-07-02 PROCEDURE — 1123F ACP DISCUSS/DSCN MKR DOCD: CPT

## 2025-07-02 PROCEDURE — 1159F MED LIST DOCD IN RCRD: CPT

## 2025-07-02 PROCEDURE — 3078F DIAST BP <80 MM HG: CPT

## 2025-07-02 PROCEDURE — G8420 CALC BMI NORM PARAMETERS: HCPCS

## 2025-07-02 PROCEDURE — 99214 OFFICE O/P EST MOD 30 MIN: CPT

## 2025-07-02 PROCEDURE — 81003 URINALYSIS AUTO W/O SCOPE: CPT

## 2025-07-02 RX ORDER — SULFAMETHOXAZOLE AND TRIMETHOPRIM 800; 160 MG/1; MG/1
1 TABLET ORAL 2 TIMES DAILY
Qty: 20 TABLET | Refills: 0 | Status: SHIPPED | OUTPATIENT
Start: 2025-07-02 | End: 2025-07-12

## 2025-07-02 ASSESSMENT — MINI MENTAL STATE EXAM
WHAT COUNTRY ARE WE IN?: 1
WHAT DAY OF THE WEEK IS THIS?: 0
WHAT MONTH IS THIS?: 0
WHAT CITY/TOWN ARE WE IN?: 1
HAND THE PERSON A PENCIL AND PAPER. SAY: WRITE ANY COMPLETE SENTENCE ON THAT
PIECE OF PAPER. (NOTE: THE SENTENCE MUST MAKE SENSE. IGNORE SPELLING ERRORS): 1
NOW WHAT WERE THE THREE OBJECTS I ASKED YOU TO REMEMBER?: 2
SAY: I WOULD LIKE YOU TO COUNT BACKWARD FROM 100 BY SEVENS: 2
WHAT IS THE NAME OF THIS BUILDING [IN FACILITY]?/WHAT IS THE STREET ADDRESS OF THIS HOUSE [IN HOME]?: 1
SUM ALL MMSE QUESTIONS FOR TOTAL SCORE [OUT OF 30].: 22
WHAT FLOOR ARE WE ON [IN FACILITY]?/ WHAT ROOM ARE WE IN [IN HOME]?: 1
SAY: PUT THE PAPER DOWN ON THE FLOOR, SCORE IF PAPER IS PLACED BACK ON FLOOR: 0
WHICH SEASON IS THIS?: 1
SAY: I WOULD LIKE YOU TO REPEAT THIS PHRASE AFTER ME: NO IFS, ANDS, OR BUTS.: 1
WHAT STATE [OR PROVINCE] ARE WE IN?: 1
ASK THE PERSON IF HE IS RIGHT OR LEFT-HANDED. TAKE A PIECE OF PAPER AND HOLD IT UP IN
FRONT OF THE PERSON. SAY: TAKE THIS PAPER IN YOUR RIGHT/LEFT HAND (WHICHEVER IS NON-
DOMINANT), SCORE IF PAPER IS PICKED UP IN CORRECT HAND.: 1
SAY: READ THE WORDS ON THE PAGE AND THEN DO WHAT IT SAYS. THEN HAND THE PERSON
THE SHEET WITH CLOSE YOUR EYES ON IT. IF THE SUBJECT READS AND DOES NOT CLOSE THEIR EYES, REPEAT UP TO THREE TIMES. SCORE ONLY IF SUBJECT CLOSES EYES.: 1
SAY: I AM GOING TO NAME THREE OBJECTS. WHEN I AM FINISHED, I WANT YOU TO REPEAT
THEM. REMEMBER WHAT THEY ARE BECAUSE I AM GOING TO ASK YOU TO NAME THEM AGAIN IN
A FEW MINUTES.  SAY THE FOLLOWING WORDS SLOWLY AT 1-SECOND INTERVALS - BALL/ CAR/ MAN [ITERATIONS FOR REPEAT ADMINISTRATION]: 3
WHAT YEAR IS THIS?: 1
SHOW: PENCIL [OBJECT] ASK: WHAT IS THIS CALLED?: 1
SAY: FOLD THE PAPER IN HALF ONCE WITH BOTH HANDS, SCORE IF PAPER IS CORRECTLY FOLDED IN HALF.: 1
WHAT IS TODAY'S DATE?: 0
SHOW: WRISTWATCH [OBJECT] ASK: WHAT IS THIS CALLED?: 1
PLACE DESIGN, ERASER AND PENCIL IN FRONT OF THE PERSON.  SAY:  COPY THIS DESIGN PLEASE.  SHOW: DESIGN. ALLOW: MULTIPLE TRIES. WAIT UNTIL PERSON IS FINISHED AND HANDS IT BACK. SCORE: ONLY FOR DIAGRAM WITH 4-SIDED FIGURE BETWEEN TWO 5-SIDED FIGURES: 1

## 2025-07-02 ASSESSMENT — ENCOUNTER SYMPTOMS
SHORTNESS OF BREATH: 0
CHEST TIGHTNESS: 0

## 2025-07-02 ASSESSMENT — VISUAL ACUITY: OU: 1

## 2025-07-02 NOTE — TELEPHONE ENCOUNTER
Call returned to patient's  and he wanted to let Dr. Faulkner know that pain management has ordered X-rays of the lumbar and thoracic spine. He also stated that they are following up with her PCP this morning in regards to the fall.

## 2025-07-02 NOTE — PROGRESS NOTES
Nery Bryant (:  1945) is a 79 y.o. female,Established patient, here for evaluation of the following chief complaint(s):  confusion after stroke (Increased confusion after stroke on 25. UTI symptoms since 25)    Patient Active Problem List   Diagnosis    Carotid stenosis    Iron deficiency anemia    Long-term use of high-risk medication    Osteoporosis    Colonic mass    Blood loss anemia    Type 2 diabetes mellitus with microalbuminuria, with long-term current use of insulin (HCC)    Type 2 diabetes mellitus with hyperglycemia, with long-term current use of insulin (HCC)    Essential hypertension    Long term current use of anticoagulant therapy    GERD (gastroesophageal reflux disease)    S/P AVR (aortic valve replacement)    Hypothyroidism, postsurgical    Paroxysmal atrial fibrillation (HCC)    Hypoxia    S/P mitral valve replacement    CAD (coronary artery disease)    Mixed hyperlipidemia    Abnormal CT of the chest    Pulmonary infiltration eosinophilia (HCC)    Lung nodules    Secondary hypercoagulable state    S/P transcatheter mitral valve replacement (TMVR)    S/P TAVR (transcatheter aortic valve replacement)    Thrombocytopenia    Pleural effusion on left    S/P biventricular cardiac pacemaker procedure    Oral mass    Hypocalcemia    Elevated parathyroid hormone    Anemia    Status post heart valve replacement    Anticoagulated    Personal history of rectal cancer    Chronic systolic (congestive) heart failure (HCC)    Chronic respiratory failure with hypoxia (J96.11)    Malignant neoplasm of transverse colon (HCC)   25:  Nery presents today with her  and daughter. She recently suffered a stroke in  of this year. Believes stroke resulted from holding her Eliquis 10 days to undergo colon surgery. Since returning home, she has become more and more confused. She fell Monday morning resulting in bilateral periorbital ecchymosis.  states her mental status

## 2025-07-05 LAB
BACTERIA SPEC CULT: ABNORMAL
BACTERIA SPEC CULT: ABNORMAL
SERVICE CMNT-IMP: ABNORMAL

## 2025-07-06 ENCOUNTER — RESULTS FOLLOW-UP (OUTPATIENT)
Age: 80
End: 2025-07-06

## 2025-07-07 ENCOUNTER — OFFICE VISIT (OUTPATIENT)
Dept: INTERNAL MEDICINE CLINIC | Facility: CLINIC | Age: 80
End: 2025-07-07
Payer: MEDICARE

## 2025-07-07 VITALS
OXYGEN SATURATION: 95 % | BODY MASS INDEX: 19.68 KG/M2 | HEART RATE: 67 BPM | WEIGHT: 107.6 LBS | SYSTOLIC BLOOD PRESSURE: 118 MMHG | DIASTOLIC BLOOD PRESSURE: 70 MMHG

## 2025-07-07 DIAGNOSIS — R41.0 DELIRIUM: Primary | ICD-10-CM

## 2025-07-07 DIAGNOSIS — M54.14 THORACIC RADICULOPATHY: Primary | ICD-10-CM

## 2025-07-07 PROCEDURE — 3078F DIAST BP <80 MM HG: CPT

## 2025-07-07 PROCEDURE — 1036F TOBACCO NON-USER: CPT

## 2025-07-07 PROCEDURE — 1159F MED LIST DOCD IN RCRD: CPT

## 2025-07-07 PROCEDURE — 1090F PRES/ABSN URINE INCON ASSESS: CPT

## 2025-07-07 PROCEDURE — G8420 CALC BMI NORM PARAMETERS: HCPCS

## 2025-07-07 PROCEDURE — 1123F ACP DISCUSS/DSCN MKR DOCD: CPT

## 2025-07-07 PROCEDURE — G8399 PT W/DXA RESULTS DOCUMENT: HCPCS

## 2025-07-07 PROCEDURE — 3074F SYST BP LT 130 MM HG: CPT

## 2025-07-07 PROCEDURE — G8427 DOCREV CUR MEDS BY ELIG CLIN: HCPCS

## 2025-07-07 PROCEDURE — 99213 OFFICE O/P EST LOW 20 MIN: CPT

## 2025-07-07 PROCEDURE — 1160F RVW MEDS BY RX/DR IN RCRD: CPT

## 2025-07-07 ASSESSMENT — VISUAL ACUITY: OU: 1

## 2025-07-07 ASSESSMENT — ENCOUNTER SYMPTOMS
SHORTNESS OF BREATH: 0
CHEST TIGHTNESS: 0
COLOR CHANGE: 1

## 2025-07-07 NOTE — PROGRESS NOTES
Nery Bryant (:  1945) is a 79 y.o. female,Established patient, here for evaluation of the following chief complaint(s):  Fall (Follow up fall )    Patient Active Problem List   Diagnosis    Carotid stenosis    Iron deficiency anemia    Long-term use of high-risk medication    Osteoporosis    Colonic mass    Blood loss anemia    Type 2 diabetes mellitus with microalbuminuria, with long-term current use of insulin (HCC)    Type 2 diabetes mellitus with hyperglycemia, with long-term current use of insulin (HCC)    Essential hypertension    Long term current use of anticoagulant therapy    GERD (gastroesophageal reflux disease)    S/P AVR (aortic valve replacement)    Hypothyroidism, postsurgical    Paroxysmal atrial fibrillation (HCC)    Hypoxia    S/P mitral valve replacement    CAD (coronary artery disease)    Mixed hyperlipidemia    Abnormal CT of the chest    Pulmonary infiltration eosinophilia (HCC)    Lung nodules    Secondary hypercoagulable state    S/P transcatheter mitral valve replacement (TMVR)    S/P TAVR (transcatheter aortic valve replacement)    Thrombocytopenia    Pleural effusion on left    S/P biventricular cardiac pacemaker procedure    Oral mass    Hypocalcemia    Elevated parathyroid hormone    Anemia    Status post heart valve replacement    Anticoagulated    Personal history of rectal cancer    Chronic systolic (congestive) heart failure (HCC)    Chronic respiratory failure with hypoxia (J96.11)    Malignant neoplasm of transverse colon (HCC)   25:  Patient presents with her  today to discuss previous concerns regarding cognitive function. At last visit, Nery had fallen and required a head CT and xray's to ensure her immediate safety.  states that she started improving Wednesday night and was back to baseline Thursday. She has also gained about 2 lbs since the last time she was here. She denies dysuria, headache, and cognitive decline.     25:  Nery

## 2025-07-08 ENCOUNTER — HOSPITAL ENCOUNTER (OUTPATIENT)
Dept: MRI IMAGING | Age: 80
Discharge: HOME | End: 2025-07-10
Payer: MEDICARE

## 2025-07-08 DIAGNOSIS — M54.14 THORACIC RADICULOPATHY: ICD-10-CM

## 2025-07-08 PROCEDURE — 72146 MRI CHEST SPINE W/O DYE: CPT

## 2025-07-09 ENCOUNTER — TELEPHONE (OUTPATIENT)
Age: 80
End: 2025-07-09

## 2025-07-09 DIAGNOSIS — M47.816 FACET ARTHRITIS OF LUMBAR REGION: Primary | ICD-10-CM

## 2025-07-09 DIAGNOSIS — M47.816 SPONDYLOSIS WITHOUT MYELOPATHY OR RADICULOPATHY, LUMBAR REGION: ICD-10-CM

## 2025-07-09 NOTE — TELEPHONE ENCOUNTER
Rogelio, do you guys have a brace you recommend for a compression fracture that is around T5 and 6 area. This lady cannot have a kypho due to having recent TIA and not being able to hold her blood thinner plus she has retropulsion at T6 but her  asked about bracing and I told them I was not sure if there was one that could help that high up but I would reach out to you guys to see if you had a suggestion on what might give her some support/comfort while she is healing.

## 2025-07-10 ENCOUNTER — TELEPHONE (OUTPATIENT)
Dept: ONCOLOGY | Age: 80
End: 2025-07-10

## 2025-07-10 ENCOUNTER — HOSPITAL ENCOUNTER (OUTPATIENT)
Dept: LAB | Age: 80
Discharge: HOME | End: 2025-07-10
Payer: MEDICARE

## 2025-07-10 ENCOUNTER — HOSPITAL ENCOUNTER (OUTPATIENT)
Age: 80
Setting detail: OBSERVATION
Discharge: HOME HEALTH | End: 2025-07-11
Attending: STUDENT IN AN ORGANIZED HEALTH CARE EDUCATION/TRAINING PROGRAM
Payer: MEDICARE

## 2025-07-10 DIAGNOSIS — D64.9 ANEMIA, UNSPECIFIED TYPE: ICD-10-CM

## 2025-07-10 DIAGNOSIS — E87.5 HYPERKALEMIA: Primary | ICD-10-CM

## 2025-07-10 DIAGNOSIS — C18.4 MALIGNANT NEOPLASM OF TRANSVERSE COLON (HCC): ICD-10-CM

## 2025-07-10 LAB
ALBUMIN SERPL-MCNC: 3.4 G/DL (ref 3.2–4.6)
ALBUMIN SERPL-MCNC: 3.4 G/DL (ref 3.2–4.6)
ALBUMIN/GLOB SERPL: 0.7 (ref 1–1.9)
ALBUMIN/GLOB SERPL: 0.7 (ref 1–1.9)
ALP SERPL-CCNC: 235 U/L (ref 35–104)
ALP SERPL-CCNC: 251 U/L (ref 35–104)
ALT SERPL-CCNC: 260 U/L (ref 8–45)
ALT SERPL-CCNC: 279 U/L (ref 8–45)
ANION GAP SERPL CALC-SCNC: 9 MMOL/L (ref 7–16)
ANION GAP SERPL CALC-SCNC: 9 MMOL/L (ref 7–16)
AST SERPL-CCNC: 177 U/L (ref 15–37)
AST SERPL-CCNC: 190 U/L (ref 15–37)
BASOPHILS # BLD: 0.04 K/UL (ref 0–0.2)
BASOPHILS # BLD: 0.06 K/UL (ref 0–0.2)
BASOPHILS NFR BLD: 0.6 % (ref 0–2)
BASOPHILS NFR BLD: 0.9 % (ref 0–2)
BILIRUB SERPL-MCNC: 0.4 MG/DL (ref 0–1.2)
BILIRUB SERPL-MCNC: 0.5 MG/DL (ref 0–1.2)
BUN SERPL-MCNC: 13 MG/DL (ref 8–23)
BUN SERPL-MCNC: 13 MG/DL (ref 8–23)
CALCIUM SERPL-MCNC: 8.2 MG/DL (ref 8.8–10.2)
CALCIUM SERPL-MCNC: 8.3 MG/DL (ref 8.8–10.2)
CEA SERPL-MCNC: 2.2 NG/ML (ref 0–3.8)
CHLORIDE SERPL-SCNC: 105 MMOL/L (ref 98–107)
CHLORIDE SERPL-SCNC: 107 MMOL/L (ref 98–107)
CO2 SERPL-SCNC: 22 MMOL/L (ref 20–29)
CO2 SERPL-SCNC: 23 MMOL/L (ref 20–29)
CREAT SERPL-MCNC: 0.71 MG/DL (ref 0.6–1.1)
CREAT SERPL-MCNC: 0.74 MG/DL (ref 0.6–1.1)
DIFFERENTIAL METHOD BLD: ABNORMAL
DIFFERENTIAL METHOD BLD: ABNORMAL
EKG ATRIAL RATE: 63 BPM
EKG DIAGNOSIS: NORMAL
EKG P AXIS: 79 DEGREES
EKG Q-T INTERVAL: 472 MS
EKG QRS DURATION: 142 MS
EKG QTC CALCULATION (BAZETT): 486 MS
EKG R AXIS: -40 DEGREES
EKG T AXIS: 95 DEGREES
EKG VENTRICULAR RATE: 64 BPM
EOSINOPHIL # BLD: 0.22 K/UL (ref 0–0.8)
EOSINOPHIL # BLD: 0.26 K/UL (ref 0–0.8)
EOSINOPHIL NFR BLD: 3.4 % (ref 0.5–7.8)
EOSINOPHIL NFR BLD: 4 % (ref 0.5–7.8)
ERYTHROCYTE [DISTWIDTH] IN BLOOD BY AUTOMATED COUNT: 15 % (ref 11.9–14.6)
ERYTHROCYTE [DISTWIDTH] IN BLOOD BY AUTOMATED COUNT: 15 % (ref 11.9–14.6)
FERRITIN SERPL-MCNC: 666 NG/ML (ref 8–388)
FOLATE SERPL-MCNC: 5.5 NG/ML (ref 3.1–17.5)
GLOBULIN SER CALC-MCNC: 4.7 G/DL (ref 2.3–3.5)
GLOBULIN SER CALC-MCNC: 4.9 G/DL (ref 2.3–3.5)
GLUCOSE BLD STRIP.AUTO-MCNC: 191 MG/DL (ref 65–100)
GLUCOSE BLD STRIP.AUTO-MCNC: 212 MG/DL (ref 65–100)
GLUCOSE BLD STRIP.AUTO-MCNC: 362 MG/DL (ref 65–100)
GLUCOSE SERPL-MCNC: 103 MG/DL (ref 70–99)
GLUCOSE SERPL-MCNC: 148 MG/DL (ref 70–99)
HCT VFR BLD AUTO: 45.7 % (ref 35.8–46.3)
HCT VFR BLD AUTO: 46.7 % (ref 35.8–46.3)
HGB BLD-MCNC: 14 G/DL (ref 11.7–15.4)
HGB BLD-MCNC: 14.4 G/DL (ref 11.7–15.4)
IMM GRANULOCYTES # BLD AUTO: 0.03 K/UL (ref 0–0.5)
IMM GRANULOCYTES # BLD AUTO: 0.03 K/UL (ref 0–0.5)
IMM GRANULOCYTES NFR BLD AUTO: 0.5 % (ref 0–5)
IMM GRANULOCYTES NFR BLD AUTO: 0.5 % (ref 0–5)
IRON SATN MFR SERPL: 38 % (ref 20–50)
IRON SERPL-MCNC: 78 UG/DL (ref 35–100)
LYMPHOCYTES # BLD: 1.72 K/UL (ref 0.5–4.6)
LYMPHOCYTES # BLD: 2.03 K/UL (ref 0.5–4.6)
LYMPHOCYTES NFR BLD: 26.4 % (ref 13–44)
LYMPHOCYTES NFR BLD: 31.2 % (ref 13–44)
MAGNESIUM SERPL-MCNC: 2 MG/DL (ref 1.8–2.4)
MCH RBC QN AUTO: 32.2 PG (ref 26.1–32.9)
MCH RBC QN AUTO: 32.7 PG (ref 26.1–32.9)
MCHC RBC AUTO-ENTMCNC: 30.6 G/DL (ref 31.4–35)
MCHC RBC AUTO-ENTMCNC: 30.8 G/DL (ref 31.4–35)
MCV RBC AUTO: 105.1 FL (ref 82–102)
MCV RBC AUTO: 105.9 FL (ref 82–102)
MONOCYTES # BLD: 0.42 K/UL (ref 0.1–1.3)
MONOCYTES # BLD: 0.45 K/UL (ref 0.1–1.3)
MONOCYTES NFR BLD: 6.5 % (ref 4–12)
MONOCYTES NFR BLD: 6.9 % (ref 4–12)
NEUTS SEG # BLD: 3.76 K/UL (ref 1.7–8.2)
NEUTS SEG # BLD: 3.99 K/UL (ref 1.7–8.2)
NEUTS SEG NFR BLD: 57.8 % (ref 43–78)
NEUTS SEG NFR BLD: 61.3 % (ref 43–78)
NRBC # BLD: 0 K/UL (ref 0–0.2)
NRBC # BLD: 0 K/UL (ref 0–0.2)
PLATELET # BLD AUTO: 187 K/UL (ref 150–450)
PLATELET # BLD AUTO: 190 K/UL (ref 150–450)
PMV BLD AUTO: 9.4 FL (ref 9.4–12.3)
PMV BLD AUTO: 9.5 FL (ref 9.4–12.3)
POTASSIUM SERPL-SCNC: 6.2 MMOL/L (ref 3.5–5.1)
POTASSIUM SERPL-SCNC: 6.2 MMOL/L (ref 3.5–5.1)
POTASSIUM SERPL-SCNC: 6.4 MMOL/L (ref 3.5–5.1)
PROT SERPL-MCNC: 8.1 G/DL (ref 6.3–8.2)
PROT SERPL-MCNC: 8.2 G/DL (ref 6.3–8.2)
RBC # BLD AUTO: 4.35 M/UL (ref 4.05–5.2)
RBC # BLD AUTO: 4.41 M/UL (ref 4.05–5.2)
SERVICE CMNT-IMP: ABNORMAL
SODIUM SERPL-SCNC: 137 MMOL/L (ref 136–145)
SODIUM SERPL-SCNC: 138 MMOL/L (ref 136–145)
TIBC SERPL-MCNC: 204 UG/DL (ref 240–450)
UIBC SERPL-MCNC: 126 UG/DL (ref 112–347)
VIT B12 SERPL-MCNC: 851 PG/ML (ref 193–986)
WBC # BLD AUTO: 6.5 K/UL (ref 4.3–11.1)
WBC # BLD AUTO: 6.5 K/UL (ref 4.3–11.1)

## 2025-07-10 PROCEDURE — G0378 HOSPITAL OBSERVATION PER HR: HCPCS

## 2025-07-10 PROCEDURE — 83540 ASSAY OF IRON: CPT

## 2025-07-10 PROCEDURE — 83550 IRON BINDING TEST: CPT

## 2025-07-10 PROCEDURE — 6360000002 HC RX W HCPCS: Performed by: STUDENT IN AN ORGANIZED HEALTH CARE EDUCATION/TRAINING PROGRAM

## 2025-07-10 PROCEDURE — 93010 ELECTROCARDIOGRAM REPORT: CPT | Performed by: INTERNAL MEDICINE

## 2025-07-10 PROCEDURE — 80053 COMPREHEN METABOLIC PANEL: CPT

## 2025-07-10 PROCEDURE — 93005 ELECTROCARDIOGRAM TRACING: CPT | Performed by: STUDENT IN AN ORGANIZED HEALTH CARE EDUCATION/TRAINING PROGRAM

## 2025-07-10 PROCEDURE — 84132 ASSAY OF SERUM POTASSIUM: CPT

## 2025-07-10 PROCEDURE — 83735 ASSAY OF MAGNESIUM: CPT

## 2025-07-10 PROCEDURE — 96375 TX/PRO/DX INJ NEW DRUG ADDON: CPT

## 2025-07-10 PROCEDURE — 2700000000 HC OXYGEN THERAPY PER DAY

## 2025-07-10 PROCEDURE — 82728 ASSAY OF FERRITIN: CPT

## 2025-07-10 PROCEDURE — 6370000000 HC RX 637 (ALT 250 FOR IP): Performed by: STUDENT IN AN ORGANIZED HEALTH CARE EDUCATION/TRAINING PROGRAM

## 2025-07-10 PROCEDURE — 99285 EMERGENCY DEPT VISIT HI MDM: CPT

## 2025-07-10 PROCEDURE — 85025 COMPLETE CBC W/AUTO DIFF WBC: CPT

## 2025-07-10 PROCEDURE — 2500000003 HC RX 250 WO HCPCS: Performed by: STUDENT IN AN ORGANIZED HEALTH CARE EDUCATION/TRAINING PROGRAM

## 2025-07-10 PROCEDURE — 82607 VITAMIN B-12: CPT

## 2025-07-10 PROCEDURE — 36415 COLL VENOUS BLD VENIPUNCTURE: CPT

## 2025-07-10 PROCEDURE — 82962 GLUCOSE BLOOD TEST: CPT

## 2025-07-10 PROCEDURE — 94760 N-INVAS EAR/PLS OXIMETRY 1: CPT

## 2025-07-10 PROCEDURE — 94640 AIRWAY INHALATION TREATMENT: CPT

## 2025-07-10 PROCEDURE — 82378 CARCINOEMBRYONIC ANTIGEN: CPT

## 2025-07-10 PROCEDURE — 6370000000 HC RX 637 (ALT 250 FOR IP)

## 2025-07-10 PROCEDURE — 82746 ASSAY OF FOLIC ACID SERUM: CPT

## 2025-07-10 PROCEDURE — 96374 THER/PROPH/DIAG INJ IV PUSH: CPT

## 2025-07-10 PROCEDURE — 2500000003 HC RX 250 WO HCPCS

## 2025-07-10 RX ORDER — INSULIN GLARGINE 100 [IU]/ML
14 INJECTION, SOLUTION SUBCUTANEOUS DAILY
Status: DISCONTINUED | OUTPATIENT
Start: 2025-07-10 | End: 2025-07-11 | Stop reason: HOSPADM

## 2025-07-10 RX ORDER — METOPROLOL SUCCINATE 25 MG/1
25 TABLET, EXTENDED RELEASE ORAL DAILY
Status: DISCONTINUED | OUTPATIENT
Start: 2025-07-11 | End: 2025-07-11 | Stop reason: HOSPADM

## 2025-07-10 RX ORDER — ATORVASTATIN CALCIUM 10 MG/1
10 TABLET, FILM COATED ORAL EVERY EVENING
Status: DISCONTINUED | OUTPATIENT
Start: 2025-07-10 | End: 2025-07-11 | Stop reason: HOSPADM

## 2025-07-10 RX ORDER — MAGNESIUM SULFATE IN WATER 40 MG/ML
2000 INJECTION, SOLUTION INTRAVENOUS PRN
Status: DISCONTINUED | OUTPATIENT
Start: 2025-07-10 | End: 2025-07-11 | Stop reason: HOSPADM

## 2025-07-10 RX ORDER — DEXTROSE MONOHYDRATE 100 MG/ML
INJECTION, SOLUTION INTRAVENOUS CONTINUOUS PRN
Status: DISCONTINUED | OUTPATIENT
Start: 2025-07-10 | End: 2025-07-11 | Stop reason: HOSPADM

## 2025-07-10 RX ORDER — PANTOPRAZOLE SODIUM 40 MG/1
40 TABLET, DELAYED RELEASE ORAL
Status: DISCONTINUED | OUTPATIENT
Start: 2025-07-11 | End: 2025-07-11 | Stop reason: HOSPADM

## 2025-07-10 RX ORDER — ONDANSETRON 4 MG/1
4 TABLET, ORALLY DISINTEGRATING ORAL EVERY 8 HOURS PRN
Status: DISCONTINUED | OUTPATIENT
Start: 2025-07-10 | End: 2025-07-11 | Stop reason: HOSPADM

## 2025-07-10 RX ORDER — POTASSIUM CHLORIDE 7.45 MG/ML
10 INJECTION INTRAVENOUS PRN
Status: DISCONTINUED | OUTPATIENT
Start: 2025-07-10 | End: 2025-07-11 | Stop reason: HOSPADM

## 2025-07-10 RX ORDER — CALCIUM GLUCONATE 20 MG/ML
1000 INJECTION, SOLUTION INTRAVENOUS ONCE
Status: COMPLETED | OUTPATIENT
Start: 2025-07-10 | End: 2025-07-10

## 2025-07-10 RX ORDER — ONDANSETRON 2 MG/ML
4 INJECTION INTRAMUSCULAR; INTRAVENOUS EVERY 6 HOURS PRN
Status: DISCONTINUED | OUTPATIENT
Start: 2025-07-10 | End: 2025-07-11 | Stop reason: HOSPADM

## 2025-07-10 RX ORDER — POLYETHYLENE GLYCOL 3350 17 G/17G
17 POWDER, FOR SOLUTION ORAL DAILY PRN
Status: DISCONTINUED | OUTPATIENT
Start: 2025-07-10 | End: 2025-07-11 | Stop reason: HOSPADM

## 2025-07-10 RX ORDER — SERTRALINE HYDROCHLORIDE 100 MG/1
50 TABLET, FILM COATED ORAL DAILY
Status: DISCONTINUED | OUTPATIENT
Start: 2025-07-11 | End: 2025-07-11 | Stop reason: HOSPADM

## 2025-07-10 RX ORDER — CALCIUM GLUCONATE 20 MG/ML
1000 INJECTION, SOLUTION INTRAVENOUS ONCE
Status: DISCONTINUED | OUTPATIENT
Start: 2025-07-10 | End: 2025-07-10

## 2025-07-10 RX ORDER — SODIUM CHLORIDE 0.9 % (FLUSH) 0.9 %
5-40 SYRINGE (ML) INJECTION EVERY 12 HOURS SCHEDULED
Status: DISCONTINUED | OUTPATIENT
Start: 2025-07-10 | End: 2025-07-11 | Stop reason: HOSPADM

## 2025-07-10 RX ORDER — IBUPROFEN 600 MG/1
1 TABLET ORAL PRN
Status: DISCONTINUED | OUTPATIENT
Start: 2025-07-10 | End: 2025-07-11 | Stop reason: HOSPADM

## 2025-07-10 RX ORDER — FUROSEMIDE 10 MG/ML
40 INJECTION INTRAMUSCULAR; INTRAVENOUS
Status: COMPLETED | OUTPATIENT
Start: 2025-07-10 | End: 2025-07-10

## 2025-07-10 RX ORDER — ALBUTEROL SULFATE 5 MG/ML
2.5 SOLUTION RESPIRATORY (INHALATION) ONCE
Status: COMPLETED | OUTPATIENT
Start: 2025-07-10 | End: 2025-07-10

## 2025-07-10 RX ORDER — ASPIRIN 81 MG/1
81 TABLET, CHEWABLE ORAL DAILY
Status: DISCONTINUED | OUTPATIENT
Start: 2025-07-11 | End: 2025-07-11 | Stop reason: HOSPADM

## 2025-07-10 RX ORDER — LOSARTAN POTASSIUM 50 MG/1
50 TABLET ORAL DAILY
Status: DISCONTINUED | OUTPATIENT
Start: 2025-07-10 | End: 2025-07-11 | Stop reason: HOSPADM

## 2025-07-10 RX ORDER — SODIUM CHLORIDE 0.9 % (FLUSH) 0.9 %
5-40 SYRINGE (ML) INJECTION PRN
Status: DISCONTINUED | OUTPATIENT
Start: 2025-07-10 | End: 2025-07-11 | Stop reason: HOSPADM

## 2025-07-10 RX ORDER — ACETAMINOPHEN 650 MG/1
650 SUPPOSITORY RECTAL EVERY 6 HOURS PRN
Status: DISCONTINUED | OUTPATIENT
Start: 2025-07-10 | End: 2025-07-11 | Stop reason: HOSPADM

## 2025-07-10 RX ORDER — POTASSIUM CHLORIDE 1500 MG/1
40 TABLET, EXTENDED RELEASE ORAL PRN
Status: DISCONTINUED | OUTPATIENT
Start: 2025-07-10 | End: 2025-07-11 | Stop reason: HOSPADM

## 2025-07-10 RX ORDER — LEVOTHYROXINE SODIUM 125 UG/1
125 TABLET ORAL
Status: DISCONTINUED | OUTPATIENT
Start: 2025-07-11 | End: 2025-07-11 | Stop reason: HOSPADM

## 2025-07-10 RX ORDER — INSULIN LISPRO 100 [IU]/ML
0-8 INJECTION, SOLUTION INTRAVENOUS; SUBCUTANEOUS
Status: DISCONTINUED | OUTPATIENT
Start: 2025-07-10 | End: 2025-07-11 | Stop reason: HOSPADM

## 2025-07-10 RX ORDER — SODIUM CHLORIDE 9 MG/ML
INJECTION, SOLUTION INTRAVENOUS PRN
Status: DISCONTINUED | OUTPATIENT
Start: 2025-07-10 | End: 2025-07-11 | Stop reason: HOSPADM

## 2025-07-10 RX ORDER — ACETAMINOPHEN 325 MG/1
650 TABLET ORAL EVERY 6 HOURS PRN
Status: DISCONTINUED | OUTPATIENT
Start: 2025-07-10 | End: 2025-07-11 | Stop reason: HOSPADM

## 2025-07-10 RX ORDER — DEXTROSE MONOHYDRATE 25 G/50ML
25 INJECTION, SOLUTION INTRAVENOUS
Status: COMPLETED | OUTPATIENT
Start: 2025-07-10 | End: 2025-07-10

## 2025-07-10 RX ORDER — FOLIC ACID 1 MG/1
1 TABLET ORAL DAILY
Status: DISCONTINUED | OUTPATIENT
Start: 2025-07-11 | End: 2025-07-11 | Stop reason: HOSPADM

## 2025-07-10 RX ADMIN — SODIUM ZIRCONIUM CYCLOSILICATE 10 G: 10 POWDER, FOR SUSPENSION ORAL at 15:42

## 2025-07-10 RX ADMIN — FUROSEMIDE 40 MG: 10 INJECTION, SOLUTION INTRAMUSCULAR; INTRAVENOUS at 15:42

## 2025-07-10 RX ADMIN — DEXTROSE MONOHYDRATE 25 G: 25 INJECTION, SOLUTION INTRAVENOUS at 16:15

## 2025-07-10 RX ADMIN — ALBUTEROL SULFATE 2.5 MG: 2.5 SOLUTION RESPIRATORY (INHALATION) at 16:12

## 2025-07-10 RX ADMIN — CALCIUM GLUCONATE 1000 MG: 20 INJECTION, SOLUTION INTRAVENOUS at 15:55

## 2025-07-10 RX ADMIN — INSULIN LISPRO 2 UNITS: 100 INJECTION, SOLUTION INTRAVENOUS; SUBCUTANEOUS at 21:12

## 2025-07-10 RX ADMIN — SODIUM CHLORIDE, PRESERVATIVE FREE 10 ML: 5 INJECTION INTRAVENOUS at 19:32

## 2025-07-10 RX ADMIN — INSULIN HUMAN 5 UNITS: 100 INJECTION, SOLUTION PARENTERAL at 16:34

## 2025-07-10 RX ADMIN — INSULIN LISPRO 2 UNITS: 100 INJECTION, SOLUTION INTRAVENOUS; SUBCUTANEOUS at 17:50

## 2025-07-10 RX ADMIN — INSULIN GLARGINE 14 UNITS: 100 INJECTION, SOLUTION SUBCUTANEOUS at 17:50

## 2025-07-10 RX ADMIN — APIXABAN 5 MG: 5 TABLET, FILM COATED ORAL at 19:32

## 2025-07-10 ASSESSMENT — ENCOUNTER SYMPTOMS
VOMITING: 0
EYE PAIN: 0
NAUSEA: 0
SHORTNESS OF BREATH: 0
WHEEZING: 0
EYE DISCHARGE: 0
SORE THROAT: 0
ABDOMINAL PAIN: 0

## 2025-07-10 ASSESSMENT — PAIN SCALES - GENERAL: PAINLEVEL_OUTOF10: 0

## 2025-07-10 NOTE — PROGRESS NOTES
TRANSFER - IN REPORT:    Verbal report received from Padmini BREWER on Nery Bryant  being received from ED for routine progression of patient care      Report consisted of patient's Situation, Background, Assessment and   Recommendations(SBAR).     Information from the following report(s) Nurse Handoff Report was reviewed with the receiving nurse.    Opportunity for questions and clarification was provided.      Assessment will be completed upon patient's arrival to unit and care assumed.

## 2025-07-10 NOTE — TELEPHONE ENCOUNTER
K+=6.4.  Chart reviewed by KARSON Breaux and KARSON Jasmine.  Recommended to have patient evaluated in ER.  Unsuccessful call to home number.  Left generic HIPAA approved message instructing to call office.  Unsuccessful call to mobile number.  LVM instructing patient to present to ER for high potassium.  Instructed to call triage for any questions.  Number provided.

## 2025-07-10 NOTE — ED TRIAGE NOTES
Patient had routine blood work with her oncologist and was told to come to ED due to high potassium levels. Patient states she feels at her baseline.

## 2025-07-10 NOTE — ED NOTES
TRANSFER - OUT REPORT:    Verbal report given to Tara BREWER on Nery Bryant  being transferred to [facility] for routine progression of patient care       Report consisted of patient's Situation, Background, Assessment and   Recommendations(SBAR).     Information from the following report(s) Nurse Handoff Report and ED SBAR was reviewed with the receiving nurse.    [place] Fall Assessment:    Presents to emergency department  because of falls (Syncope, seizure, or loss of consciousness): No  Age > 70: Yes  Altered Mental Status, Intoxication with alcohol or substance confusion (Disorientation, impaired judgment, poor safety awaremess, or inability to follow instructions): No  Impaired Mobility: Ambulates or transfers with assistive devices or assistance; Unable to ambulate or transer.: No  Nursing Judgement: No          Lines:   Peripheral IV 07/10/25 Left;Proximal;Anterior Forearm (Active)        Opportunity for questions and clarification was provided.      Patient transported with:  Padmini Stephenson RN  07/10/25 [id number]

## 2025-07-10 NOTE — TELEPHONE ENCOUNTER
Patient called back.  Instructed patient to present to ER for evaluation.  Verbalized understanding.

## 2025-07-10 NOTE — ED PROVIDER NOTES
and Aortic Valve Replacement, Dr. 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- Romo    MOUTH SURGERY Right 03/14/2024    TRANSORAL EXCISION OF RIGHT ORAL MASS performed by Alonzo Romo MD at [facility]    MOUTH SURGERY Right 03/18/2024    washout of R oral hematoma- Romo    MOUTH SURGERY N/A 03/18/2024    WASHOUT OF ORAL HEMATOMA USING BIPOLAR CAUTERY performed by Alonzo Romo MD at [facility] MAIN OR    ORTHOPEDIC SURGERY      L hip fracture/repair    OTHER SURGICAL HISTORY      ablation 10/6/2015, Dr Glass    ND UNLISTED PROCEDURE ABDOMEN PERITONEUM & OMENTUM  2021    colon surg    THORACOSCOPY Right 08/17/2023    THORACOSCOPY RIGHT WITH LUNG BIOPSY performed by Marina Ni MD at [facility] MAIN OR    THYROIDECTOMY      TUBAL LIGATION  1970        Social History     Socioeconomic History    Marital status:    Tobacco Use    Smoking status: Never    Smokeless tobacco: Never   Vaping Use    Vaping status: Never Used   Substance and Sexual Activity    Alcohol use: No    Drug use: No    Sexual activity: Not Currently     Partners: Male   Social History Narrative    Denies physical or sexual abuse     Social Drivers of Health     Financial Resource Strain: Not At Risk (6/23/2025)    Received from [facility] - Financial Strain     How hard is it for you to pay for the very basics like food, housing, medical care, and heating? Would you say it is:: Not hard at all   Food Insecurity: No Food Insecurity (6/23/2025)    Received from [facility]    Hunger Vital Sign     Worried About Running Out of Food in the Last Year: Never true     Ran Out of Food in the Last Year: Never true   Transportation Needs: Not At Risk (6/23/2025)    Received from [facility] - Transportation     In the past 12 months, has lack of reliable transportation kept you from medical appointments, meetings, work or from getting things needed for daily living?: No   Physical  Value Ref Range    Vitamin B-12 851 193 - 986 pg/mL         US ABDOMEN LIMITED Specify organ? LIVER    (Results Pending)                No results for input(s): \"COVID19\" in the last 72 hours.     Voice dictation software was used during the making of this note.  This software is not perfect and grammatical and other typographical errors may be present.  This note has not been completely proofread for errors.     Chirag Carlson MD  07/10/25 4224

## 2025-07-10 NOTE — H&P
Hospitalist History and Physical   Admit Date:  7/10/2025  1:36 PM   Name:  Nery Bryant   Age:  79 y.o.  Sex:  female  :  1945   MRN:  [id number]   Room:  ER/    Presenting/Chief Complaint: Abnormal Labs     Reason(s) for Admission: Hyperkalemia [E87.5]     History of Present Illness:   Nery Bryant is a 79 y.o. female with medical history of CAD, colon cancer, anemia, hypertension, GERD, hypothyroidism, iron deficiency anemia, A-fib on Eliquis, aortic and mitral valve replacement, insulin-dependent type 2 diabetes, who presented from oncologist office due to abnormal labs.  Patient's oncologist called patient stating that her potassium levels were dangerously high.  Patient was recently placed on and completed a course of Bactrim for suspected UTI as outpatient.  Patient denied any other complaints.  She did report a recent fall at home last week but denies any residual symptoms from this.  In the ER patient was mildly hypertensive but other vital signs are stable.  Potassium is 6.2.  , .  EKG reviewed and did show evidence of peaked T waves.  Patient treated with hyperkalemia cocktail in the ER.  ER recommended admission.      Assessment & Plan:     Principal Problem:    Hyperkalemia  - Likely secondary to Bactrim, however also on culprit meds  - Hold losartan and discontinue Bactrim  - Placed on telemetry  - Repeat CMP in a.m.    Abnormal LFTs  - Elevation in transaminases are also likely due to drug-induced liver injury from Bactrim  - Follow-up CMP in a.m.  - Follow-up liver ultrasound    A-fib on Eliquis  Secondary hypercoagulable state  - Continue Eliquis  - Toprol-XL for rate control    Hypothyroidism  - Synthroid    Insulin-dependent type 2 diabetes  - 14 units Lantus daily  - ISS  - Follow-up A1c    GERD  - Protonix          PT/OT evals ordered?  Not ordered; patient not expected to need rehab  Diet: ADULT DIET; Regular; Low Potassium (Less than 3000  Principal Discharge DX:	Atrial fibrillation, unspecified type  Secondary Diagnosis:	Troponin I above reference range

## 2025-07-10 NOTE — PROGRESS NOTES
4 Eyes Skin Assessment     NAME:  Nery Bryant  YOB: 1945  MEDICAL RECORD NUMBER:  [id number]    The patient is being assessed for  {Reason for Assessment:27010}    I agree that at least one RN has performed a thorough Head to Toe Skin Assessment on the patient. ALL assessment sites listed below have been assessed.      Areas assessed by both nurses:    Sacrum. Buttock, Coccyx, Ischium        Does the Patient have a Wound? No noted wound(s)       Raheem Prevention initiated by RN: Yes  Wound Care Orders initiated by RN: No    Pressure Injury (Stage 1,2,3,4, Unstageable, DTI, NWPT, and Complex wounds) if present, place Wound referral order by RN under : No    New Ostomies, if present place, Ostomy referral order under : No     Nurse 1 eSignature: Electronically signed by Tara De La Cruz RN on 7/10/25 at 6:29 PM EDT    **SHARE this note so that the co-signing nurse can place an eSignature**    Nurse 2 eSignature: {Esignature:[id number]}

## 2025-07-11 ENCOUNTER — APPOINTMENT (OUTPATIENT)
Dept: ULTRASOUND IMAGING | Age: 80
End: 2025-07-11
Payer: MEDICARE

## 2025-07-11 ENCOUNTER — TELEPHONE (OUTPATIENT)
Dept: ONCOLOGY | Age: 80
End: 2025-07-11

## 2025-07-11 VITALS
DIASTOLIC BLOOD PRESSURE: 61 MMHG | RESPIRATION RATE: 18 BRPM | WEIGHT: 106.92 LBS | SYSTOLIC BLOOD PRESSURE: 147 MMHG | HEART RATE: 59 BPM | BODY MASS INDEX: 17.81 KG/M2 | OXYGEN SATURATION: 97 % | TEMPERATURE: 97.3 F | HEIGHT: 65 IN

## 2025-07-11 DIAGNOSIS — D64.9 ANEMIA, UNSPECIFIED TYPE: ICD-10-CM

## 2025-07-11 DIAGNOSIS — C18.4 MALIGNANT NEOPLASM OF TRANSVERSE COLON (HCC): Primary | ICD-10-CM

## 2025-07-11 LAB
ALBUMIN SERPL-MCNC: 2.8 G/DL (ref 3.2–4.6)
ALBUMIN/GLOB SERPL: 0.7 (ref 1–1.9)
ALP SERPL-CCNC: 205 U/L (ref 35–104)
ALT SERPL-CCNC: 196 U/L (ref 8–45)
ANION GAP SERPL CALC-SCNC: 10 MMOL/L (ref 7–16)
AST SERPL-CCNC: 111 U/L (ref 15–37)
BASOPHILS # BLD: 0.06 K/UL (ref 0–0.2)
BASOPHILS NFR BLD: 0.9 % (ref 0–2)
BILIRUB SERPL-MCNC: 0.4 MG/DL (ref 0–1.2)
BUN SERPL-MCNC: 18 MG/DL (ref 8–23)
CALCIUM SERPL-MCNC: 8.1 MG/DL (ref 8.8–10.2)
CHLORIDE SERPL-SCNC: 107 MMOL/L (ref 98–107)
CO2 SERPL-SCNC: 20 MMOL/L (ref 20–29)
CREAT SERPL-MCNC: 0.7 MG/DL (ref 0.6–1.1)
DIFFERENTIAL METHOD BLD: ABNORMAL
EOSINOPHIL # BLD: 0.27 K/UL (ref 0–0.8)
EOSINOPHIL NFR BLD: 4.1 % (ref 0.5–7.8)
ERYTHROCYTE [DISTWIDTH] IN BLOOD BY AUTOMATED COUNT: 14.9 % (ref 11.9–14.6)
EST. AVERAGE GLUCOSE BLD GHB EST-MCNC: 146 MG/DL
GLOBULIN SER CALC-MCNC: 4.3 G/DL (ref 2.3–3.5)
GLUCOSE BLD STRIP.AUTO-MCNC: 158 MG/DL (ref 65–100)
GLUCOSE BLD STRIP.AUTO-MCNC: 201 MG/DL (ref 65–100)
GLUCOSE SERPL-MCNC: 134 MG/DL (ref 70–99)
HBA1C MFR BLD: 6.7 % (ref 0–5.6)
HCT VFR BLD AUTO: 41.7 % (ref 35.8–46.3)
HGB BLD-MCNC: 13.3 G/DL (ref 11.7–15.4)
IMM GRANULOCYTES # BLD AUTO: 0.02 K/UL (ref 0–0.5)
IMM GRANULOCYTES NFR BLD AUTO: 0.3 % (ref 0–5)
LYMPHOCYTES # BLD: 2.16 K/UL (ref 0.5–4.6)
LYMPHOCYTES NFR BLD: 32.7 % (ref 13–44)
MCH RBC QN AUTO: 33.3 PG (ref 26.1–32.9)
MCHC RBC AUTO-ENTMCNC: 31.9 G/DL (ref 31.4–35)
MCV RBC AUTO: 104.3 FL (ref 82–102)
MONOCYTES # BLD: 0.62 K/UL (ref 0.1–1.3)
MONOCYTES NFR BLD: 9.4 % (ref 4–12)
NEUTS SEG # BLD: 3.47 K/UL (ref 1.7–8.2)
NEUTS SEG NFR BLD: 52.6 % (ref 43–78)
NRBC # BLD: 0 K/UL (ref 0–0.2)
PLATELET # BLD AUTO: 163 K/UL (ref 150–450)
PMV BLD AUTO: 9.6 FL (ref 9.4–12.3)
POTASSIUM SERPL-SCNC: 5.3 MMOL/L (ref 3.5–5.1)
PROT SERPL-MCNC: 7.1 G/DL (ref 6.3–8.2)
RBC # BLD AUTO: 4 M/UL (ref 4.05–5.2)
SERVICE CMNT-IMP: ABNORMAL
SERVICE CMNT-IMP: ABNORMAL
SODIUM SERPL-SCNC: 137 MMOL/L (ref 136–145)
WBC # BLD AUTO: 6.6 K/UL (ref 4.3–11.1)

## 2025-07-11 PROCEDURE — 85025 COMPLETE CBC W/AUTO DIFF WBC: CPT

## 2025-07-11 PROCEDURE — 82962 GLUCOSE BLOOD TEST: CPT

## 2025-07-11 PROCEDURE — G0378 HOSPITAL OBSERVATION PER HR: HCPCS

## 2025-07-11 PROCEDURE — 2500000003 HC RX 250 WO HCPCS

## 2025-07-11 PROCEDURE — 6370000000 HC RX 637 (ALT 250 FOR IP)

## 2025-07-11 PROCEDURE — 76705 ECHO EXAM OF ABDOMEN: CPT

## 2025-07-11 PROCEDURE — 80053 COMPREHEN METABOLIC PANEL: CPT

## 2025-07-11 PROCEDURE — 36415 COLL VENOUS BLD VENIPUNCTURE: CPT

## 2025-07-11 PROCEDURE — 83036 HEMOGLOBIN GLYCOSYLATED A1C: CPT

## 2025-07-11 RX ORDER — FOLIC ACID 1 MG/1
1 TABLET ORAL DAILY
Qty: 30 TABLET | Refills: 0 | Status: SHIPPED | OUTPATIENT
Start: 2025-07-12

## 2025-07-11 RX ADMIN — APIXABAN 5 MG: 5 TABLET, FILM COATED ORAL at 09:38

## 2025-07-11 RX ADMIN — ASPIRIN 81 MG: 81 TABLET, CHEWABLE ORAL at 09:38

## 2025-07-11 RX ADMIN — ACETAMINOPHEN 650 MG: 325 TABLET ORAL at 05:06

## 2025-07-11 RX ADMIN — SODIUM CHLORIDE, PRESERVATIVE FREE 10 ML: 5 INJECTION INTRAVENOUS at 09:41

## 2025-07-11 RX ADMIN — INSULIN GLARGINE 14 UNITS: 100 INJECTION, SOLUTION SUBCUTANEOUS at 09:38

## 2025-07-11 RX ADMIN — LEVOTHYROXINE SODIUM 125 MCG: 0.12 TABLET ORAL at 05:04

## 2025-07-11 RX ADMIN — PANTOPRAZOLE SODIUM 40 MG: 40 TABLET, DELAYED RELEASE ORAL at 05:04

## 2025-07-11 RX ADMIN — FOLIC ACID 1 MG: 1 TABLET ORAL at 09:38

## 2025-07-11 RX ADMIN — METOPROLOL SUCCINATE 25 MG: 25 TABLET, EXTENDED RELEASE ORAL at 09:38

## 2025-07-11 ASSESSMENT — PAIN DESCRIPTION - PAIN TYPE: TYPE: CHRONIC PAIN

## 2025-07-11 ASSESSMENT — PAIN - FUNCTIONAL ASSESSMENT: PAIN_FUNCTIONAL_ASSESSMENT: PREVENTS OR INTERFERES SOME ACTIVE ACTIVITIES AND ADLS

## 2025-07-11 ASSESSMENT — PAIN DESCRIPTION - FREQUENCY: FREQUENCY: INTERMITTENT

## 2025-07-11 ASSESSMENT — PAIN DESCRIPTION - DESCRIPTORS: DESCRIPTORS: ACHING

## 2025-07-11 ASSESSMENT — PAIN SCALES - GENERAL
PAINLEVEL_OUTOF10: 0
PAINLEVEL_OUTOF10: 5
PAINLEVEL_OUTOF10: 0

## 2025-07-11 ASSESSMENT — PAIN DESCRIPTION - LOCATION: LOCATION: BACK

## 2025-07-11 ASSESSMENT — PAIN DESCRIPTION - ORIENTATION: ORIENTATION: UPPER

## 2025-07-11 ASSESSMENT — PAIN DESCRIPTION - ONSET: ONSET: GRADUAL

## 2025-07-11 NOTE — PROGRESS NOTES
Hct - 35.2  MCHC - 30.1              RDW - 15.3                            Iron % Sat - 15     9/30/24 - Met with Rheumatologist (EPIC)  Patient with PMH: Anesthesia complication, Anticoagulant long-term use, Aortic valve insufficiency, Atrial fibrillation (HCC), CAD (coronary artery disease), Colon cancer (HCC), COVID-19, Current use of long term anticoagulation, Diabetes mellitus, insulin dependent (IDDM), controlled, Fracture, femur (HCC), GERD (gastroesophageal reflux disease), Heart failure (HCC), History of anemia, History of cardioversion, History of colon cancer, History of petit-mal seizures, History of thyroidectomy, History of transcatheter aortic valve replacement (TAVR), Hyperlipidemia, Hypertension, Hypothyroidism, Mitral stenosis with insufficiency, On home O2, Oral mass, Other unknown and unspecified cause of morbidity or mortality, Pacemaker, Paroxysmal atrial fibrillation (HCC), PONV (postoperative nausea and vomiting), Seizures (HCC), SSS (sick sinus syndrome) (HCC), Thyroid nodule, Type 2 diabetes mellitus without complication (HCC), Unspecified essential hypertension, and Visit for monitoring Tikosyn therapy.   Patient is being seen in consultation for evaluation of osteoporosis.  Risk factors include menopause at age 46, diabetes, previous steroid use for hypersensitivity pneumonitis, multiple fractures: Left wrist, L1 compression fracture, left femoral neck fracture. Previously used forteo for 2 years.  Multiple cardiac issues have recently been addressed in the last year including replacement aortic and mitral valve earlier this year.  We discussed anabolic therapy due to her high-risk nature with Evenity and discussed the black box warning for increased cardiovascular events such as heart attack and stroke.   Discussed 3rd year of forteo but agreed to avoid this. She is uncomfortable with anabolic therapy and is open to pursuing Prolia.  Reviewed labs from PCP yesterday: CMP, vitamin

## 2025-07-11 NOTE — TELEPHONE ENCOUNTER
Jany, can you please put in a referral for [facility] specifically to Silverio Martines to see about getting Ms Bryant fitted for a Elsie brace versus TLSO for her new compression fracture?   yes

## 2025-07-11 NOTE — CARE COORDINATION
80 y/o F admitted under observation. Wilson letter given  Demographics review and verified.  Lives with spouse in a single story home   Independent with ADLs  Insured, has prescription coverage, established with a PCP last seen on 6/102025  Pt is . Her  brings her to appointments and will bring her home.  DME: RW, Rollator and home O2 at 1L serviced by [facility].  Pt is current with [facility] and will return home with their services.    No other needs have been identified during this admission.  Pt has met all goals of care and is in agreement with this DC plan.    ASSESSMENT NOTE    Attending Physician: Allie Scott MD  Admit Problem: Hyperkalemia [E87.5]  Date/Time of Admission: 7/10/2025  1:36 PM  Problem List:  Patient Active Problem List   Diagnosis    Carotid stenosis    Iron deficiency anemia    Long-term use of high-risk medication    Osteoporosis    Colonic mass    Blood loss anemia    Type 2 diabetes mellitus with microalbuminuria, with long-term current use of insulin (HCC)    Type 2 diabetes mellitus with hyperglycemia, with long-term current use of insulin (HCC)    Essential hypertension    Long term current use of anticoagulant therapy    GERD (gastroesophageal reflux disease)    S/P AVR (aortic valve replacement)    Hypothyroidism, postsurgical    Paroxysmal atrial fibrillation (HCC)    Hypoxia    S/P mitral valve replacement    CAD (coronary artery disease)    Mixed hyperlipidemia    Abnormal CT of the chest    Pulmonary infiltration eosinophilia (HCC)    Lung nodules    Secondary hypercoagulable state    S/P transcatheter mitral valve replacement (TMVR)    S/P TAVR (transcatheter aortic valve replacement)    Thrombocytopenia    Pleural effusion on left    S/P biventricular cardiac pacemaker procedure    Oral mass    Hypocalcemia    Elevated parathyroid hormone    Anemia    Status post heart valve replacement    Anticoagulated    Personal history of rectal cancer     Chronic systolic (congestive) heart failure (HCC)    Chronic respiratory failure with hypoxia (J96.11)    Malignant neoplasm of transverse colon (HCC)    Hyperkalemia          07/11/25 1229   Service Assessment   Patient Orientation Alert and Oriented   Cognition Alert   History Provided By Patient   Primary Caregiver Self   Support Systems Spouse/Significant Other   Patient's Healthcare Decision Maker is: Legal Next of Kin   PCP Verified by CM Yes   Last Visit to PCP Within last 3 months   Prior Functional Level Independent in ADLs/IADLs   Current Functional Level Independent in ADLs/IADLs   Can patient return to prior living arrangement Yes   Ability to make needs known: Good   Family able to assist with home care needs: Yes   Would you like for me to discuss the discharge plan with any other family members/significant others, and if so, who? No   Financial Resources Medicare   Social/Functional History   Lives With Spouse   Type of Home House   Prior Level of Assist for ADLs Independent   Prior Level of Assist for Homemaking Independent   Ambulation Assistance Independent   Prior Level of Assist for Transfers Independent   Active  Yes   Discharge Planning   Living Arrangements Spouse/Significant Other   Services At/After Discharge   Transition of Care Consult (CM Consult) Discharge Planning   Services At/After Discharge Home Health    Resource Information Provided? No   Mode of Transport at Discharge Other (see comment)  (family)   Confirm Follow Up Transport Family   Condition of Participation: Discharge Planning   The Plan for Transition of Care is related to the following treatment goals: Pt will discharge home with her family  and follow up with her PCP. Pt is [facility] with [facility]   The Patient and/or Patient Representative was provided with a Choice of Provider? Patient   The Patient and/Or Patient Representative agree with the Discharge Plan? Yes   Freedom of Choice list was provided with

## 2025-07-11 NOTE — DISCHARGE SUMMARY
Hospitalist Discharge Summary   Admit Date:  7/10/2025  1:36 PM   DC Note date: 2025  Name:  Nery Bryant   Age:  79 y.o.  Sex:  female  :  1945   MRN:  [id number]   Room[facility]  [facility]  PCP:  Elise Mayo FNP    Presenting Complaint: Abnormal Labs     Initial Admission Diagnosis: Hyperkalemia [E87.5]     Problem List for this Hospitalization (present on admission):    Principal Problem:    Hyperkalemia  Resolved Problems:    * No resolved hospital problems. *      Hospital Course:  Nery Bryant is a 79 y.o. CF w/ a PMH of CAD, colon cancer, anemia, HTN, GERD, hypothyroidism, iron deficiency anemia, A-fib on Eliquis, aortic and mitral valve replacement, insulin-dependent type 2 diabetes who presented from oncology office due to abnormal labs.  Patient's oncologist called stating that her potassium levels were dangerously high.  Patient had no other complaints on presentation.    She was recently placed on and completed a 10-DAY course of Bactrim for suspected UTI as outpatient.  Patient told me she was not symptomatic at the time but she had apparently had some confusion.  The urine culture grew a low colony count of Klebsiella.      In the ER: patient was mildly hypertensive but other vital signs stable.  Potassium 6.2.  , .  EKG reviewed and did show evidence of peaked T waves.  Patient treated with hyperkalemia cocktail in the ER.  She was placed under observation status.    US abd checked d/t transaminitis but results are unconcerning.  LFT are trending back to normal and K+ has also improved.  This was due to long course of Bactrim.  She has no evidence of infection and feels ready to return home w/ established Home health.  Her spouse is at the bedside and is in agreement w/ discharge home today, .      Disposition: Home with Home Health  Diet: ADULT DIET; Regular; Low Potassium (Less than 3000 mg/day)  Code Status: Full Code    Follow Ups:  Follow-up Information   (L) 1.0 - 1.9     Magnesium    Collection Time: 07/10/25  1:26 PM   Result Value Ref Range    Magnesium 2.0 1.8 - 2.4 mg/dL   Potassium    Collection Time: 07/10/25  2:43 PM   Result Value Ref Range    Potassium 6.2 (HH) 3.5 - 5.1 mmol/L   POCT Glucose    Collection Time: 07/10/25  4:30 PM   Result Value Ref Range    POC Glucose 362 (H) 65 - 100 mg/dL    Performed by: Amando    POCT Glucose    Collection Time: 07/10/25  5:41 PM   Result Value Ref Range    POC Glucose 191 (H) 65 - 100 mg/dL    Performed by: Skylar    POCT Glucose    Collection Time: 07/10/25  9:09 PM   Result Value Ref Range    POC Glucose 212 (H) 65 - 100 mg/dL    Performed by: Deb    Comprehensive Metabolic Panel w/ Reflex to MG    Collection Time: 07/11/25  5:42 AM   Result Value Ref Range    Sodium 137 136 - 145 mmol/L    Potassium 5.3 (H) 3.5 - 5.1 mmol/L    Chloride 107 98 - 107 mmol/L    CO2 20 20 - 29 mmol/L    Anion Gap 10 7 - 16 mmol/L    Glucose 134 (H) 70 - 99 mg/dL    BUN 18 8 - 23 MG/DL    Creatinine 0.70 0.60 - 1.10 MG/DL    Est, Glom Filt Rate 88 >60 ml/min/1.73m2    Calcium 8.1 (L) 8.8 - 10.2 MG/DL    Total Bilirubin 0.4 0.0 - 1.2 MG/DL     (H) 8 - 45 U/L     (H) 15 - 37 U/L    Alk Phosphatase 205 (H) 35 - 104 U/L    Total Protein 7.1 6.3 - 8.2 g/dL    Albumin 2.8 (L) 3.2 - 4.6 g/dL    Globulin 4.3 (H) 2.3 - 3.5 g/dL    Albumin/Globulin Ratio 0.7 (L) 1.0 - 1.9     CBC with Auto Differential    Collection Time: 07/11/25  5:42 AM   Result Value Ref Range    WBC 6.6 4.3 - 11.1 K/uL    RBC 4.00 (L) 4.05 - 5.2 M/uL    Hemoglobin 13.3 11.7 - 15.4 g/dL    Hematocrit 41.7 35.8 - 46.3 %    .3 (H) 82 - 102 FL    MCH 33.3 (H) 26.1 - 32.9 PG    MCHC 31.9 31.4 - 35.0 g/dL    RDW 14.9 (H) 11.9 - 14.6 %    Platelets 163 150 - 450 K/uL    MPV 9.6 9.4 - 12.3 FL    nRBC 0.00 0.0 - 0.2 K/uL    Differential Type AUTOMATED      Neutrophils % 52.6 43.0 - 78.0 %    Lymphocytes % 32.7 13.0 - 44.0 %    Monocytes

## 2025-07-12 NOTE — CARE COORDINATION
Care Transitions Outreach Attempt    Call within 2 business days of discharge: Yes   2nd Attempted to reach patient for transitions of care follow up. Unable to reach patient. According to notes patient attended Pulmonary appointment on 2023. Will re-open if phone call is returned. Episode Resolved. Patient: Wendy Logan Patient : 1945 MRN: [id number]    Last Discharge [address]       Date Complaint Diagnosis Description Type Department Provider    23 Shortness of Breath Acute respiratory failure with hypoxemia ([address] ) . .. ED to Hosp-Admission (Discharged) (ADMITTED) Hall Babinski, MD; Alix Blank . .. Was this an external facility discharge?  No Discharge Facility: [facility]    Noted following upcoming appointments from discharge chart review:   [facility] follow up appointment(s):   Future Appointments   Date Time Provider [address]   2023  2:00 PM Yas Ballard MD [facility] AMB   2023 10:45 AM [facility] PT UCDG G[facility] AMB   2023  9:30 AM Evelin Jerry APRN - NP [facility]IM [facility] AMB   10/19/2023  8:20 AM Ian Brooks MD PPS [facility] AMB   2024  1:30 PM [facility] ARSENIO ECHO 60 [facility] G[facility] AMB   2024  1:15 PM Adolfo Mata MD [facility] AMB     Non-[facility] follow up appointment(s): n/a
chase all pertinent systems normal

## 2025-07-17 ENCOUNTER — HOSPITAL ENCOUNTER (OUTPATIENT)
Dept: LAB | Age: 80
Discharge: HOME | End: 2025-07-17
Payer: MEDICARE

## 2025-07-17 ENCOUNTER — OFFICE VISIT (OUTPATIENT)
Dept: ONCOLOGY | Age: 80
End: 2025-07-17
Payer: MEDICARE

## 2025-07-17 ENCOUNTER — TELEPHONE (OUTPATIENT)
Dept: INTERNAL MEDICINE CLINIC | Facility: CLINIC | Age: 80
End: 2025-07-17

## 2025-07-17 VITALS
DIASTOLIC BLOOD PRESSURE: 66 MMHG | BODY MASS INDEX: 17.66 KG/M2 | WEIGHT: 109.9 LBS | HEIGHT: 66 IN | HEART RATE: 60 BPM | OXYGEN SATURATION: 97 % | SYSTOLIC BLOOD PRESSURE: 149 MMHG | TEMPERATURE: 97.6 F

## 2025-07-17 DIAGNOSIS — D50.8 OTHER IRON DEFICIENCY ANEMIA: ICD-10-CM

## 2025-07-17 DIAGNOSIS — Z95.0 S/P BIVENTRICULAR CARDIAC PACEMAKER PROCEDURE: Chronic | ICD-10-CM

## 2025-07-17 DIAGNOSIS — Z79.01 ANTICOAGULATED: ICD-10-CM

## 2025-07-17 DIAGNOSIS — R79.89 ELEVATED LFTS: ICD-10-CM

## 2025-07-17 DIAGNOSIS — C18.4 MALIGNANT NEOPLASM OF TRANSVERSE COLON (HCC): ICD-10-CM

## 2025-07-17 DIAGNOSIS — Z95.2 S/P MITRAL VALVE REPLACEMENT: Chronic | ICD-10-CM

## 2025-07-17 DIAGNOSIS — Z95.2 S/P TAVR (TRANSCATHETER AORTIC VALVE REPLACEMENT): ICD-10-CM

## 2025-07-17 DIAGNOSIS — Z85.048 PERSONAL HISTORY OF RECTAL CANCER: ICD-10-CM

## 2025-07-17 DIAGNOSIS — D64.9 ANEMIA, UNSPECIFIED TYPE: ICD-10-CM

## 2025-07-17 DIAGNOSIS — J96.11 CHRONIC RESPIRATORY FAILURE WITH HYPOXIA (HCC): ICD-10-CM

## 2025-07-17 DIAGNOSIS — C18.4 MALIGNANT NEOPLASM OF TRANSVERSE COLON (HCC): Primary | ICD-10-CM

## 2025-07-17 DIAGNOSIS — E87.5 HYPERKALEMIA: ICD-10-CM

## 2025-07-17 DIAGNOSIS — Z79.899 LONG-TERM USE OF HIGH-RISK MEDICATION: Chronic | ICD-10-CM

## 2025-07-17 DIAGNOSIS — G45.9 TIA ON MEDICATION: ICD-10-CM

## 2025-07-17 DIAGNOSIS — I48.0 PAROXYSMAL ATRIAL FIBRILLATION (HCC): Chronic | ICD-10-CM

## 2025-07-17 DIAGNOSIS — Z79.01 LONG TERM CURRENT USE OF ANTICOAGULANT THERAPY: Chronic | ICD-10-CM

## 2025-07-17 DIAGNOSIS — I67.1 BRAIN ANEURYSM: ICD-10-CM

## 2025-07-17 LAB
ALBUMIN SERPL-MCNC: 3.2 G/DL (ref 3.2–4.6)
ALBUMIN/GLOB SERPL: 0.8 (ref 1–1.9)
ALP SERPL-CCNC: 212 U/L (ref 35–104)
ALT SERPL-CCNC: 89 U/L (ref 8–45)
ANION GAP SERPL CALC-SCNC: 11 MMOL/L (ref 7–16)
AST SERPL-CCNC: 52 U/L (ref 15–37)
BASOPHILS # BLD: 0.05 K/UL (ref 0–0.2)
BASOPHILS NFR BLD: 0.8 % (ref 0–2)
BILIRUB SERPL-MCNC: 0.4 MG/DL (ref 0–1.2)
BUN SERPL-MCNC: 13 MG/DL (ref 8–23)
CALCIUM SERPL-MCNC: 8.1 MG/DL (ref 8.8–10.2)
CHLORIDE SERPL-SCNC: 107 MMOL/L (ref 98–107)
CO2 SERPL-SCNC: 23 MMOL/L (ref 20–29)
CREAT SERPL-MCNC: 0.68 MG/DL (ref 0.6–1.1)
DIFFERENTIAL METHOD BLD: ABNORMAL
EOSINOPHIL # BLD: 0.13 K/UL (ref 0–0.8)
EOSINOPHIL NFR BLD: 2.1 % (ref 0.5–7.8)
ERYTHROCYTE [DISTWIDTH] IN BLOOD BY AUTOMATED COUNT: 14.9 % (ref 11.9–14.6)
GLOBULIN SER CALC-MCNC: 4.2 G/DL (ref 2.3–3.5)
GLUCOSE SERPL-MCNC: 124 MG/DL (ref 70–99)
HCT VFR BLD AUTO: 42.2 % (ref 35.8–46.3)
HGB BLD-MCNC: 13.3 G/DL (ref 11.7–15.4)
IMM GRANULOCYTES # BLD AUTO: 0.01 K/UL (ref 0–0.5)
IMM GRANULOCYTES NFR BLD AUTO: 0.2 % (ref 0–5)
LYMPHOCYTES # BLD: 1.61 K/UL (ref 0.5–4.6)
LYMPHOCYTES NFR BLD: 26.3 % (ref 13–44)
MCH RBC QN AUTO: 32.2 PG (ref 26.1–32.9)
MCHC RBC AUTO-ENTMCNC: 31.5 G/DL (ref 31.4–35)
MCV RBC AUTO: 102.2 FL (ref 82–102)
MONOCYTES # BLD: 0.42 K/UL (ref 0.1–1.3)
MONOCYTES NFR BLD: 6.9 % (ref 4–12)
NEUTS SEG # BLD: 3.9 K/UL (ref 1.7–8.2)
NEUTS SEG NFR BLD: 63.7 % (ref 43–78)
NRBC # BLD: 0 K/UL (ref 0–0.2)
PLATELET # BLD AUTO: 151 K/UL (ref 150–450)
PMV BLD AUTO: 9.9 FL (ref 9.4–12.3)
POTASSIUM SERPL-SCNC: 4 MMOL/L (ref 3.5–5.1)
PROT SERPL-MCNC: 7.5 G/DL (ref 6.3–8.2)
RBC # BLD AUTO: 4.13 M/UL (ref 4.05–5.2)
SODIUM SERPL-SCNC: 141 MMOL/L (ref 136–145)
WBC # BLD AUTO: 6.1 K/UL (ref 4.3–11.1)

## 2025-07-17 PROCEDURE — 99215 OFFICE O/P EST HI 40 MIN: CPT | Performed by: INTERNAL MEDICINE

## 2025-07-17 PROCEDURE — G8427 DOCREV CUR MEDS BY ELIG CLIN: HCPCS | Performed by: INTERNAL MEDICINE

## 2025-07-17 PROCEDURE — G8399 PT W/DXA RESULTS DOCUMENT: HCPCS | Performed by: INTERNAL MEDICINE

## 2025-07-17 PROCEDURE — 85025 COMPLETE CBC W/AUTO DIFF WBC: CPT

## 2025-07-17 PROCEDURE — G8419 CALC BMI OUT NRM PARAM NOF/U: HCPCS | Performed by: INTERNAL MEDICINE

## 2025-07-17 PROCEDURE — 1126F AMNT PAIN NOTED NONE PRSNT: CPT | Performed by: INTERNAL MEDICINE

## 2025-07-17 PROCEDURE — 1123F ACP DISCUSS/DSCN MKR DOCD: CPT | Performed by: INTERNAL MEDICINE

## 2025-07-17 PROCEDURE — 3078F DIAST BP <80 MM HG: CPT | Performed by: INTERNAL MEDICINE

## 2025-07-17 PROCEDURE — 1036F TOBACCO NON-USER: CPT | Performed by: INTERNAL MEDICINE

## 2025-07-17 PROCEDURE — 80053 COMPREHEN METABOLIC PANEL: CPT

## 2025-07-17 PROCEDURE — 36415 COLL VENOUS BLD VENIPUNCTURE: CPT

## 2025-07-17 PROCEDURE — 3077F SYST BP >= 140 MM HG: CPT | Performed by: INTERNAL MEDICINE

## 2025-07-17 PROCEDURE — 1090F PRES/ABSN URINE INCON ASSESS: CPT | Performed by: INTERNAL MEDICINE

## 2025-07-17 PROCEDURE — G2211 COMPLEX E/M VISIT ADD ON: HCPCS | Performed by: INTERNAL MEDICINE

## 2025-07-17 NOTE — PATIENT INSTRUCTIONS
Patient Information from Today's Visit    The members of your Oncology [facility] are listed below:    Physician Provider: Dr. Jennifer Gibson   Advanced Practice Clinician: DAVE  Registered Nurse: Nolvia DIANA  Nurse Navigator: n/a  Medical Assistant: Allyson KIDD  : Ai STONER   Supportive Care Services: NEYDA Marmolejo    Diagnosis (Information Sheet Provided on Day of Diagnosis): ELIZABETH/colon cancer    Follow Up Instructions:   Labs reviewed.  CT ordered. Call to schedule.    Has Treatment Plan Been Finalized? N/a    Current Labs:   Hospital Outpatient Visit on 07/17/2025   Component Date Value Ref Range Status    WBC 07/17/2025 6.1  4.3 - 11.1 K/uL Final    RBC 07/17/2025 4.13  4.05 - 5.2 M/uL Final    Hemoglobin 07/17/2025 13.3  11.7 - 15.4 g/dL Final    Hematocrit 07/17/2025 42.2  35.8 - 46.3 % Final    MCV 07/17/2025 102.2 (H)  82.0 - 102.0 FL Final    MCH 07/17/2025 32.2  26.1 - 32.9 PG Final    MCHC 07/17/2025 31.5  31.4 - 35.0 g/dL Final    RDW 07/17/2025 14.9 (H)  11.9 - 14.6 % Final    Platelets 07/17/2025 151  150 - 450 K/uL Final    MPV 07/17/2025 9.9  9.4 - 12.3 FL Final    nRBC 07/17/2025 0.00  0.0 - 0.2 K/uL Final    **Note: Absolute NRBC parameter is now reported with Hemogram**    Neutrophils % 07/17/2025 63.7  43.0 - 78.0 % Final    Lymphocytes % 07/17/2025 26.3  13.0 - 44.0 % Final    Monocytes % 07/17/2025 6.9  4.0 - 12.0 % Final    Eosinophils % 07/17/2025 2.1  0.5 - 7.8 % Final    Basophils % 07/17/2025 0.8  0.0 - 2.0 % Final    Immature Granulocytes % 07/17/2025 0.2  0.0 - 5.0 % Final    Neutrophils Absolute 07/17/2025 3.90  1.70 - 8.20 K/UL Final    Lymphocytes Absolute 07/17/2025 1.61  0.50 - 4.60 K/UL Final    Monocytes Absolute 07/17/2025 0.42  0.10 - 1.30 K/UL Final    Eosinophils Absolute 07/17/2025 0.13  0.00 - 0.80 K/UL Final    Basophils Absolute 07/17/2025 0.05  0.00 - 0.20 K/UL Final    Immature Granulocytes Absolute 07/17/2025 0.01  0.00 - 0.50 K/UL Final    Differential

## 2025-07-19 NOTE — PROGRESS NOTES
[facility] CARDIOLOGY  [address] [place] [address], [address]  [place] [address]  PHONE: [phone]        NAME:  Nery Bryant  : 1945  MRN: [id number]     PCP:  Elise Mayo FNP      SUBJECTIVE:   Nery Bryant is a 79 y.o. female seen for a follow up visit regarding the following:     Chief Complaint   Patient presents with    Atrial Fibrillation       HPI:    Recovered from prolonged hospitalization with cardiogenic shock due to hemodynamically significant mitral and aortic stenosis.  She had successful TAVR and TMVR simultaneously in stepwise fashion 2024 with good result and presents today in follow-up.     Postoperatively she struggled with persistent atrial fibrillation with rapid ventricular response and intermittent bradycardia now status post AV node ablation and biventricular pacemaker implantation.  On Eliquis now.      Carotid duplex 2023:    Mild (<50%) stenosis in the right internal carotid artery.  Mild, calcific and irregular plaque (proximal) in the right internal carotid artery. Mild carotid bulb disease as well.    Occluded left internal carotid artery.  Severe and heterogeneous plaque in the left internal carotid artery.    Normal antegrade flow involving the right vertebral artery.    Normal antegrade flow involving the left vertebral artery.    Echocardiogram 3/1/2024:  Left Ventricle Normal left ventricular systolic function with a visually estimated EF of 55 - 60%. Left ventricle size is normal. Normal wall thickness. Normal wall motion. Indeterminate diastolic function.   Left Atrium Left atrium is moderately dilated.   Right Ventricle RV basal diameter is 4.0 cm. RV mid diameter is 2.4 cm. TAPSE 1.4cm Normal systolic function.   Right Atrium Right atrium is moderately dilated.   Aortic Valve 23 mm PATRIC ultra valve placed valve in valve for failed bioprosthetic surgical valve. No paravalvular regurgitation. Noted by the apical view. AV mean

## 2025-07-21 DIAGNOSIS — E03.9 ACQUIRED HYPOTHYROIDISM: ICD-10-CM

## 2025-07-21 LAB
T4 FREE SERPL-MCNC: 1 NG/DL (ref 0.9–1.7)
TSH, 3RD GENERATION: 11.1 UIU/ML (ref 0.27–4.2)

## 2025-07-22 PROBLEM — R79.89 ELEVATED LFTS: Status: ACTIVE | Noted: 2025-07-22

## 2025-07-22 PROBLEM — I67.1 BRAIN ANEURYSM: Status: ACTIVE | Noted: 2025-07-22

## 2025-07-22 PROBLEM — G45.9 TIA ON MEDICATION: Status: ACTIVE | Noted: 2025-07-22

## 2025-07-25 ENCOUNTER — CLINICAL SUPPORT (OUTPATIENT)
Age: 80
End: 2025-07-25

## 2025-07-25 ENCOUNTER — OFFICE VISIT (OUTPATIENT)
Age: 80
End: 2025-07-25
Payer: MEDICARE

## 2025-07-25 VITALS
DIASTOLIC BLOOD PRESSURE: 80 MMHG | BODY MASS INDEX: 18.78 KG/M2 | SYSTOLIC BLOOD PRESSURE: 145 MMHG | HEIGHT: 64 IN | HEART RATE: 60 BPM | WEIGHT: 110 LBS

## 2025-07-25 DIAGNOSIS — I49.5 SSS (SICK SINUS SYNDROME) (HCC): ICD-10-CM

## 2025-07-25 DIAGNOSIS — I65.23 BILATERAL CAROTID ARTERY STENOSIS: ICD-10-CM

## 2025-07-25 DIAGNOSIS — I50.22 CHRONIC SYSTOLIC (CONGESTIVE) HEART FAILURE (HCC): Primary | ICD-10-CM

## 2025-07-25 DIAGNOSIS — Z95.2 S/P TAVR (TRANSCATHETER AORTIC VALVE REPLACEMENT): ICD-10-CM

## 2025-07-25 DIAGNOSIS — I48.0 PAROXYSMAL ATRIAL FIBRILLATION (HCC): Chronic | ICD-10-CM

## 2025-07-25 DIAGNOSIS — I25.10 CORONARY ARTERY DISEASE INVOLVING NATIVE CORONARY ARTERY OF NATIVE HEART WITHOUT ANGINA PECTORIS: ICD-10-CM

## 2025-07-25 DIAGNOSIS — E78.2 MIXED HYPERLIPIDEMIA: Chronic | ICD-10-CM

## 2025-07-25 DIAGNOSIS — Z98.890 HX OF ATRIOVENTRICULAR NODE ABLATION: ICD-10-CM

## 2025-07-25 DIAGNOSIS — I10 ESSENTIAL HYPERTENSION: Chronic | ICD-10-CM

## 2025-07-25 DIAGNOSIS — Z95.2 S/P TRANSCATHETER MITRAL VALVE REPLACEMENT (TMVR): ICD-10-CM

## 2025-07-25 PROCEDURE — 1160F RVW MEDS BY RX/DR IN RCRD: CPT | Performed by: INTERNAL MEDICINE

## 2025-07-25 PROCEDURE — 1159F MED LIST DOCD IN RCRD: CPT | Performed by: INTERNAL MEDICINE

## 2025-07-25 PROCEDURE — G8427 DOCREV CUR MEDS BY ELIG CLIN: HCPCS | Performed by: INTERNAL MEDICINE

## 2025-07-25 PROCEDURE — 1036F TOBACCO NON-USER: CPT | Performed by: INTERNAL MEDICINE

## 2025-07-25 PROCEDURE — 1123F ACP DISCUSS/DSCN MKR DOCD: CPT | Performed by: INTERNAL MEDICINE

## 2025-07-25 PROCEDURE — G8420 CALC BMI NORM PARAMETERS: HCPCS | Performed by: INTERNAL MEDICINE

## 2025-07-25 PROCEDURE — 1090F PRES/ABSN URINE INCON ASSESS: CPT | Performed by: INTERNAL MEDICINE

## 2025-07-25 PROCEDURE — 3078F DIAST BP <80 MM HG: CPT | Performed by: INTERNAL MEDICINE

## 2025-07-25 PROCEDURE — G8399 PT W/DXA RESULTS DOCUMENT: HCPCS | Performed by: INTERNAL MEDICINE

## 2025-07-25 PROCEDURE — 3077F SYST BP >= 140 MM HG: CPT | Performed by: INTERNAL MEDICINE

## 2025-07-25 PROCEDURE — 99214 OFFICE O/P EST MOD 30 MIN: CPT | Performed by: INTERNAL MEDICINE

## 2025-08-05 NOTE — PROGRESS NOTES
Subjective   Patient ID: Sultana Scott is a 36 y.o. female who presents for Follow-up (Paperwork ).     1. Proof of immunity  I cannot find records of childhood vaccinations in [employer], [employer], or any[employer] else  Father and patient agree that she received all childhood vaccines  I signed her form, indicating this  Might need titers or other proof of immunity for this camp         Review of Systems   All other systems reviewed and are negative.      Objective   /83 (BP Location: Right arm, Patient Position: Sitting, BP Cuff Size: Adult)   Pulse 98   Temp 36.4 °C (97.5 °F) (Temporal)   Ht (!) 1.524 m (5')   Wt 74.1 kg (163 lb 6.4 oz)   SpO2 98%   BMI 31.91 kg/m²     Physical Exam  Vitals reviewed.   Constitutional:       Appearance: Normal appearance. She is obese.   HENT:      Head: Normocephalic and atraumatic.      Mouth/Throat:      Mouth: Mucous membranes are moist.      Pharynx: Oropharynx is clear.     Eyes:      Extraocular Movements: Extraocular movements intact.      Conjunctiva/sclera: Conjunctivae normal.      Pupils: Pupils are equal, round, and reactive to light.       Cardiovascular:      Rate and Rhythm: Normal rate and regular rhythm.      Pulses: Normal pulses.      Heart sounds: Normal heart sounds.   Pulmonary:      Effort: Pulmonary effort is normal.      Breath sounds: Normal breath sounds.   Abdominal:      General: Abdomen is flat. Bowel sounds are normal.      Palpations: Abdomen is soft.     Musculoskeletal:         General: Normal range of motion.      Cervical back: Normal range of motion and neck supple.     Skin:     General: Skin is warm and dry.      Capillary Refill: Capillary refill takes less than 2 seconds.     Neurological:      General: No focal deficit present.      Mental Status: She is alert.     Psychiatric:         Mood and Affect: Mood normal.         Behavior: Behavior normal.         Assessment/Plan   Problem List Items Addressed This Visit    None  Visit  Tablet strength and weekly dosing schedule confirmed today. On antibiotics for ten days, started on 02/07/2023. Will recheck her INR in one week. Diagnoses         Codes      Autism spectrum ([facility])    -  Primary F84.0                 Note: This documentaion was entered into the electronic medical record using JME[employer] medical dictation software, and was not necessarily edited thereafter. Please excuse any errors of spelling or grammar.

## 2025-08-06 ENCOUNTER — HOSPITAL ENCOUNTER (OUTPATIENT)
Dept: CT IMAGING | Age: 80
Discharge: HOME | End: 2025-08-08
Attending: INTERNAL MEDICINE
Payer: MEDICARE

## 2025-08-06 DIAGNOSIS — C18.4 MALIGNANT NEOPLASM OF TRANSVERSE COLON (HCC): ICD-10-CM

## 2025-08-06 PROCEDURE — 6360000004 HC RX CONTRAST MEDICATION: Performed by: INTERNAL MEDICINE

## 2025-08-06 PROCEDURE — 71260 CT THORAX DX C+: CPT

## 2025-08-06 RX ORDER — IOPAMIDOL 755 MG/ML
100 INJECTION, SOLUTION INTRAVASCULAR
Status: COMPLETED | OUTPATIENT
Start: 2025-08-06 | End: 2025-08-06

## 2025-08-06 RX ADMIN — IOPAMIDOL 100 ML: 755 INJECTION, SOLUTION INTRAVENOUS at 08:58

## 2025-08-14 ENCOUNTER — OFFICE VISIT (OUTPATIENT)
Age: 80
End: 2025-08-14
Payer: MEDICARE

## 2025-08-14 DIAGNOSIS — S22.050D CLOSED WEDGE COMPRESSION FRACTURE OF SIXTH THORACIC VERTEBRA WITH ROUTINE HEALING: Primary | ICD-10-CM

## 2025-08-14 DIAGNOSIS — S22.050D COMPRESSION FRACTURE OF T5 VERTEBRA WITH ROUTINE HEALING, SUBSEQUENT ENCOUNTER: ICD-10-CM

## 2025-08-14 PROBLEM — S22.000D COMPRESSION FRACTURE OF THORACIC VERTEBRA WITH ROUTINE HEALING: Status: ACTIVE | Noted: 2025-08-14

## 2025-08-14 PROCEDURE — G8420 CALC BMI NORM PARAMETERS: HCPCS | Performed by: PHYSICIAN ASSISTANT

## 2025-08-14 PROCEDURE — G8399 PT W/DXA RESULTS DOCUMENT: HCPCS | Performed by: PHYSICIAN ASSISTANT

## 2025-08-14 PROCEDURE — 99214 OFFICE O/P EST MOD 30 MIN: CPT | Performed by: PHYSICIAN ASSISTANT

## 2025-08-14 PROCEDURE — 1090F PRES/ABSN URINE INCON ASSESS: CPT | Performed by: PHYSICIAN ASSISTANT

## 2025-08-14 PROCEDURE — 1036F TOBACCO NON-USER: CPT | Performed by: PHYSICIAN ASSISTANT

## 2025-08-14 PROCEDURE — 1123F ACP DISCUSS/DSCN MKR DOCD: CPT | Performed by: PHYSICIAN ASSISTANT

## 2025-08-14 PROCEDURE — G8428 CUR MEDS NOT DOCUMENT: HCPCS | Performed by: PHYSICIAN ASSISTANT

## 2025-08-16 DIAGNOSIS — F32.0 CURRENT MILD EPISODE OF MAJOR DEPRESSIVE DISORDER WITHOUT PRIOR EPISODE: ICD-10-CM

## 2025-08-18 RX ORDER — AMOXICILLIN 500 MG/1
2000 CAPSULE ORAL AS NEEDED
Qty: 4 CAPSULE | Refills: 3 | Status: SHIPPED | OUTPATIENT
Start: 2025-08-18

## 2025-08-18 RX ORDER — AMOXICILLIN 500 MG/1
500 CAPSULE ORAL
Qty: 4 CAPSULE | Refills: 3 | Status: SHIPPED | OUTPATIENT
Start: 2025-08-18 | End: 2025-08-18

## 2025-08-25 ENCOUNTER — TELEMEDICINE (OUTPATIENT)
Dept: ONCOLOGY | Age: 80
End: 2025-08-25
Payer: MEDICARE

## 2025-08-25 DIAGNOSIS — C18.4 MALIGNANT NEOPLASM OF TRANSVERSE COLON (HCC): Primary | ICD-10-CM

## 2025-08-25 DIAGNOSIS — R91.8 LUNG NODULES: ICD-10-CM

## 2025-08-25 DIAGNOSIS — Z79.01 ANTICOAGULATED: ICD-10-CM

## 2025-08-25 DIAGNOSIS — S22.000A COMPRESSION FRACTURE OF THORACIC VERTEBRA, UNSPECIFIED THORACIC VERTEBRAL LEVEL, INITIAL ENCOUNTER (HCC): ICD-10-CM

## 2025-08-25 PROCEDURE — 1090F PRES/ABSN URINE INCON ASSESS: CPT | Performed by: INTERNAL MEDICINE

## 2025-08-25 PROCEDURE — G8399 PT W/DXA RESULTS DOCUMENT: HCPCS | Performed by: INTERNAL MEDICINE

## 2025-08-25 PROCEDURE — 99212 OFFICE O/P EST SF 10 MIN: CPT | Performed by: INTERNAL MEDICINE

## 2025-08-25 PROCEDURE — 1123F ACP DISCUSS/DSCN MKR DOCD: CPT | Performed by: INTERNAL MEDICINE

## 2025-08-25 PROCEDURE — G8427 DOCREV CUR MEDS BY ELIG CLIN: HCPCS | Performed by: INTERNAL MEDICINE

## 2025-08-27 ENCOUNTER — OFFICE VISIT (OUTPATIENT)
Age: 80
End: 2025-08-27
Payer: MEDICARE

## 2025-08-27 VITALS
BODY MASS INDEX: 18.33 KG/M2 | HEIGHT: 65 IN | SYSTOLIC BLOOD PRESSURE: 140 MMHG | HEART RATE: 60 BPM | DIASTOLIC BLOOD PRESSURE: 72 MMHG | WEIGHT: 110 LBS

## 2025-08-27 DIAGNOSIS — J96.11 CHRONIC RESPIRATORY FAILURE WITH HYPOXIA (HCC): ICD-10-CM

## 2025-08-27 DIAGNOSIS — S22.050A CLOSED WEDGE COMPRESSION FRACTURE OF T5 VERTEBRA, INITIAL ENCOUNTER (HCC): Primary | ICD-10-CM

## 2025-08-27 DIAGNOSIS — M80.08XA AGE-RELATED OSTEOPOROSIS WITH CURRENT PATHOLOGICAL FRACTURE, VERTEBRA(E), INITIAL ENCOUNTER FOR FRACTURE (HCC): ICD-10-CM

## 2025-08-27 DIAGNOSIS — Z85.038 HISTORY OF COLON CANCER: ICD-10-CM

## 2025-08-27 DIAGNOSIS — S22.050A CLOSED WEDGE COMPRESSION FRACTURE OF T6 VERTEBRA, INITIAL ENCOUNTER (HCC): ICD-10-CM

## 2025-08-27 PROCEDURE — 99204 OFFICE O/P NEW MOD 45 MIN: CPT | Performed by: PHYSICIAN ASSISTANT

## 2025-08-27 PROCEDURE — G8399 PT W/DXA RESULTS DOCUMENT: HCPCS | Performed by: PHYSICIAN ASSISTANT

## 2025-08-27 PROCEDURE — G8419 CALC BMI OUT NRM PARAM NOF/U: HCPCS | Performed by: PHYSICIAN ASSISTANT

## 2025-08-27 PROCEDURE — 3077F SYST BP >= 140 MM HG: CPT | Performed by: PHYSICIAN ASSISTANT

## 2025-08-27 PROCEDURE — 1159F MED LIST DOCD IN RCRD: CPT | Performed by: PHYSICIAN ASSISTANT

## 2025-08-27 PROCEDURE — 1090F PRES/ABSN URINE INCON ASSESS: CPT | Performed by: PHYSICIAN ASSISTANT

## 2025-08-27 PROCEDURE — 1123F ACP DISCUSS/DSCN MKR DOCD: CPT | Performed by: PHYSICIAN ASSISTANT

## 2025-08-27 PROCEDURE — 1125F AMNT PAIN NOTED PAIN PRSNT: CPT | Performed by: PHYSICIAN ASSISTANT

## 2025-08-27 PROCEDURE — G8427 DOCREV CUR MEDS BY ELIG CLIN: HCPCS | Performed by: PHYSICIAN ASSISTANT

## 2025-08-27 PROCEDURE — 3078F DIAST BP <80 MM HG: CPT | Performed by: PHYSICIAN ASSISTANT

## 2025-08-27 PROCEDURE — 1036F TOBACCO NON-USER: CPT | Performed by: PHYSICIAN ASSISTANT

## 2025-08-28 ASSESSMENT — ENCOUNTER SYMPTOMS: BACK PAIN: 1

## 2025-08-29 ENCOUNTER — TELEPHONE (OUTPATIENT)
Age: 80
End: 2025-08-29

## 2025-09-02 ENCOUNTER — PATIENT MESSAGE (OUTPATIENT)
Age: 80
End: 2025-09-02

## 2025-09-02 PROBLEM — S22.000A COMPRESSION FRACTURE OF THORACIC VERTEBRA (HCC): Status: ACTIVE | Noted: 2025-09-02

## 2025-09-02 RX ORDER — ENOXAPARIN SODIUM 100 MG/ML
1 INJECTION SUBCUTANEOUS 2 TIMES DAILY
Qty: 3 EACH | Refills: 0 | Status: SHIPPED | OUTPATIENT
Start: 2025-09-02 | End: 2025-09-03 | Stop reason: CLARIF

## 2025-09-03 RX ORDER — ENOXAPARIN SODIUM 100 MG/ML
1 INJECTION SUBCUTANEOUS 2 TIMES DAILY
Qty: 3 EACH | Refills: 0 | Status: SHIPPED | OUTPATIENT
Start: 2025-09-03

## 2025-09-03 RX ORDER — ENOXAPARIN SODIUM 100 MG/ML
1 INJECTION SUBCUTANEOUS 2 TIMES DAILY
Qty: 3 EACH | Refills: 0 | Status: SHIPPED | OUTPATIENT
Start: 2025-09-03 | End: 2025-09-03 | Stop reason: SDUPTHER

## 2025-09-04 ENCOUNTER — TELEPHONE (OUTPATIENT)
Age: 80
End: 2025-09-04

## 2025-09-04 ENCOUNTER — ANESTHESIA EVENT (OUTPATIENT)
Dept: INTERVENTIONAL RADIOLOGY/VASCULAR | Age: 80
End: 2025-09-04
Payer: MEDICARE

## 2025-09-04 RX ORDER — HALOPERIDOL 5 MG/ML
1 INJECTION INTRAMUSCULAR
OUTPATIENT
Start: 2025-09-04

## 2025-09-04 RX ORDER — PROCHLORPERAZINE EDISYLATE 5 MG/ML
5 INJECTION INTRAMUSCULAR; INTRAVENOUS
OUTPATIENT
Start: 2025-09-04

## 2025-09-04 RX ORDER — SODIUM CHLORIDE 0.9 % (FLUSH) 0.9 %
5-40 SYRINGE (ML) INJECTION PRN
OUTPATIENT
Start: 2025-09-04

## 2025-09-04 RX ORDER — SODIUM CHLORIDE 0.9 % (FLUSH) 0.9 %
5-40 SYRINGE (ML) INJECTION EVERY 12 HOURS SCHEDULED
OUTPATIENT
Start: 2025-09-04

## 2025-09-04 RX ORDER — LIDOCAINE HYDROCHLORIDE 10 MG/ML
1 INJECTION, SOLUTION INFILTRATION; PERINEURAL
OUTPATIENT
Start: 2025-09-04

## 2025-09-04 RX ORDER — SODIUM CHLORIDE, SODIUM LACTATE, POTASSIUM CHLORIDE, CALCIUM CHLORIDE 600; 310; 30; 20 MG/100ML; MG/100ML; MG/100ML; MG/100ML
INJECTION, SOLUTION INTRAVENOUS CONTINUOUS
OUTPATIENT
Start: 2025-09-04

## 2025-09-04 RX ORDER — ENOXAPARIN SODIUM 100 MG/ML
1 INJECTION SUBCUTANEOUS 2 TIMES DAILY
Qty: 3 EACH | Refills: 0 | Status: SHIPPED | OUTPATIENT
Start: 2025-09-04

## 2025-09-04 RX ORDER — SODIUM CHLORIDE 9 MG/ML
INJECTION, SOLUTION INTRAVENOUS PRN
OUTPATIENT
Start: 2025-09-04

## 2025-09-04 RX ORDER — OXYCODONE HYDROCHLORIDE 5 MG/1
5 TABLET ORAL
Refills: 0 | OUTPATIENT
Start: 2025-09-04

## 2025-09-04 RX ORDER — MIDAZOLAM HYDROCHLORIDE 2 MG/2ML
2 INJECTION, SOLUTION INTRAMUSCULAR; INTRAVENOUS
OUTPATIENT
Start: 2025-09-04

## 2025-09-04 RX ORDER — ACETAMINOPHEN 500 MG
1000 TABLET ORAL ONCE
OUTPATIENT
Start: 2025-09-04 | End: 2025-09-04

## 2025-09-04 RX ORDER — FENTANYL CITRATE 50 UG/ML
100 INJECTION, SOLUTION INTRAMUSCULAR; INTRAVENOUS
Refills: 0 | OUTPATIENT
Start: 2025-09-04

## 2025-09-05 ENCOUNTER — ANESTHESIA (OUTPATIENT)
Dept: INTERVENTIONAL RADIOLOGY/VASCULAR | Age: 80
End: 2025-09-05
Payer: MEDICARE

## 2025-09-05 ENCOUNTER — HOSPITAL ENCOUNTER (OUTPATIENT)
Dept: INTERVENTIONAL RADIOLOGY/VASCULAR | Age: 80
End: 2025-09-05
Payer: MEDICARE

## 2025-09-05 VITALS
WEIGHT: 110 LBS | BODY MASS INDEX: 18.33 KG/M2 | TEMPERATURE: 97.3 F | RESPIRATION RATE: 16 BRPM | OXYGEN SATURATION: 94 % | HEIGHT: 65 IN | HEART RATE: 60 BPM | SYSTOLIC BLOOD PRESSURE: 145 MMHG | DIASTOLIC BLOOD PRESSURE: 71 MMHG

## 2025-09-05 DIAGNOSIS — S22.050A CLOSED WEDGE COMPRESSION FRACTURE OF T6 VERTEBRA, INITIAL ENCOUNTER (HCC): ICD-10-CM

## 2025-09-05 DIAGNOSIS — M80.08XA AGE-RELATED OSTEOPOROSIS WITH CURRENT PATHOLOGICAL FRACTURE, VERTEBRA(E), INITIAL ENCOUNTER FOR FRACTURE (HCC): ICD-10-CM

## 2025-09-05 DIAGNOSIS — S22.050A CLOSED WEDGE COMPRESSION FRACTURE OF T5 VERTEBRA, INITIAL ENCOUNTER (HCC): ICD-10-CM

## 2025-09-05 LAB
GLUCOSE BLD STRIP.AUTO-MCNC: 113 MG/DL (ref 65–100)
SERVICE CMNT-IMP: ABNORMAL

## 2025-09-05 PROCEDURE — 2580000003 HC RX 258: Performed by: REGISTERED NURSE

## 2025-09-05 PROCEDURE — 88311 DECALCIFY TISSUE: CPT

## 2025-09-05 PROCEDURE — 6360000002 HC RX W HCPCS: Performed by: REGISTERED NURSE

## 2025-09-05 PROCEDURE — 2500000003 HC RX 250 WO HCPCS: Performed by: NURSE ANESTHETIST, CERTIFIED REGISTERED

## 2025-09-05 PROCEDURE — 82962 GLUCOSE BLOOD TEST: CPT

## 2025-09-05 PROCEDURE — 88307 TISSUE EXAM BY PATHOLOGIST: CPT

## 2025-09-05 PROCEDURE — 6360000002 HC RX W HCPCS: Performed by: RADIOLOGY

## 2025-09-05 PROCEDURE — 2500000003 HC RX 250 WO HCPCS: Performed by: REGISTERED NURSE

## 2025-09-05 PROCEDURE — C1713 ANCHOR/SCREW BN/BN,TIS/BN: HCPCS

## 2025-09-05 RX ORDER — LIDOCAINE HYDROCHLORIDE 20 MG/ML
INJECTION, SOLUTION EPIDURAL; INFILTRATION; INTRACAUDAL; PERINEURAL
Status: DISCONTINUED | OUTPATIENT
Start: 2025-09-05 | End: 2025-09-05 | Stop reason: SDUPTHER

## 2025-09-05 RX ORDER — SODIUM CHLORIDE, SODIUM LACTATE, POTASSIUM CHLORIDE, CALCIUM CHLORIDE 600; 310; 30; 20 MG/100ML; MG/100ML; MG/100ML; MG/100ML
INJECTION, SOLUTION INTRAVENOUS
Status: DISCONTINUED | OUTPATIENT
Start: 2025-09-05 | End: 2025-09-05 | Stop reason: SDUPTHER

## 2025-09-05 RX ORDER — DEXMEDETOMIDINE HYDROCHLORIDE 100 UG/ML
INJECTION, SOLUTION INTRAVENOUS
Status: DISCONTINUED | OUTPATIENT
Start: 2025-09-05 | End: 2025-09-05 | Stop reason: SDUPTHER

## 2025-09-05 RX ORDER — PROPOFOL 10 MG/ML
INJECTION, EMULSION INTRAVENOUS
Status: DISCONTINUED | OUTPATIENT
Start: 2025-09-05 | End: 2025-09-05 | Stop reason: SDUPTHER

## 2025-09-05 RX ORDER — EPHEDRINE SULFATE 5 MG/ML
INJECTION INTRAVENOUS
Status: DISCONTINUED | OUTPATIENT
Start: 2025-09-05 | End: 2025-09-05 | Stop reason: SDUPTHER

## 2025-09-05 RX ORDER — MORPHINE SULFATE 2 MG/ML
2 INJECTION, SOLUTION INTRAMUSCULAR; INTRAVENOUS
Refills: 0 | Status: ACTIVE | OUTPATIENT
Start: 2025-09-05

## 2025-09-05 RX ORDER — LIDOCAINE HYDROCHLORIDE 10 MG/ML
INJECTION, SOLUTION EPIDURAL; INFILTRATION; INTRACAUDAL; PERINEURAL PRN
Status: COMPLETED | OUTPATIENT
Start: 2025-09-05 | End: 2025-09-05

## 2025-09-05 RX ADMIN — LIDOCAINE HYDROCHLORIDE 8 ML: 10 INJECTION, SOLUTION EPIDURAL; INFILTRATION; INTRACAUDAL; PERINEURAL at 12:00

## 2025-09-05 RX ADMIN — SODIUM CHLORIDE, SODIUM LACTATE, POTASSIUM CHLORIDE, AND CALCIUM CHLORIDE: 600; 310; 30; 20 INJECTION, SOLUTION INTRAVENOUS at 11:41

## 2025-09-05 RX ADMIN — EPHEDRINE SULFATE 5 MG: 5 INJECTION INTRAVENOUS at 12:20

## 2025-09-05 RX ADMIN — DEXMEDETOMIDINE 8 MCG: 100 INJECTION, SOLUTION, CONCENTRATE INTRAVENOUS at 12:04

## 2025-09-05 RX ADMIN — DEXMEDETOMIDINE 4 MCG: 100 INJECTION, SOLUTION, CONCENTRATE INTRAVENOUS at 12:19

## 2025-09-05 RX ADMIN — DEXMEDETOMIDINE 4 MCG: 100 INJECTION, SOLUTION, CONCENTRATE INTRAVENOUS at 11:52

## 2025-09-05 RX ADMIN — PROPOFOL 20 MG: 10 INJECTION, EMULSION INTRAVENOUS at 12:04

## 2025-09-05 RX ADMIN — EPHEDRINE SULFATE 10 MG: 5 INJECTION INTRAVENOUS at 12:32

## 2025-09-05 RX ADMIN — WATER 2000 MG: 1 INJECTION INTRAMUSCULAR; INTRAVENOUS; SUBCUTANEOUS at 11:51

## 2025-09-05 RX ADMIN — LIDOCAINE HYDROCHLORIDE 20 MG: 20 INJECTION, SOLUTION EPIDURAL; INFILTRATION; INTRACAUDAL; PERINEURAL at 11:47

## 2025-09-05 RX ADMIN — PROPOFOL 50 MCG/KG/MIN: 10 INJECTION, EMULSION INTRAVENOUS at 11:48

## 2025-09-05 RX ADMIN — PROPOFOL 20 MG: 10 INJECTION, EMULSION INTRAVENOUS at 11:47

## 2025-09-05 RX ADMIN — DEXMEDETOMIDINE 4 MCG: 100 INJECTION, SOLUTION, CONCENTRATE INTRAVENOUS at 11:47

## 2025-09-05 ASSESSMENT — PAIN SCALES - GENERAL
PAINLEVEL_OUTOF10: 0

## (undated) DEVICE — SUTURE VCRL SZ 3-0 L27IN ABSRB UD L17MM RB-1 1/2 CIR J215H

## (undated) DEVICE — PROBE ABLATN NERVE BLOCK 180 DEG 8 MM BALL TIP CRYOSPHERE

## (undated) DEVICE — BOWL MED M 16OZ PLAS CAP GRAD

## (undated) DEVICE — SUTURE VCRL SZ 3-0 L18IN ABSRB UD L26MM SH 1/2 CIR J864D

## (undated) DEVICE — CONNECTOR TBNG OD5-7MM O2 END DISP

## (undated) DEVICE — SUTURE PERMAHAND SZ 3-0 L30IN NONABSORBABLE BLK SILK BRAID A304H

## (undated) DEVICE — PINNACLE TIF INTRODUCER SHEATH: Brand: PINNACLE

## (undated) DEVICE — BRONCHOSCOPE EXAM L23.6IN OD0.15IN ID0.047IN DISP 4 BRONCH 065476001000] TRI ANIM HEALTH SERVICES]

## (undated) DEVICE — NDL PRT INJ NSAF BLNT 18GX1.5 --

## (undated) DEVICE — SNARE ENDOSCP L240CM LOOP W27MM SHTH DIA2.4MM M CRESC STIFF

## (undated) DEVICE — STAPLER INT L34CM 60MM LNG ENDOSCP ARTC PWR + ECHELON FLX

## (undated) DEVICE — TROCAR: Brand: KII FIOS FIRST ENTRY

## (undated) DEVICE — PURSE STRING DEVICE: Brand: PURSTRING

## (undated) DEVICE — PERCLOSE™ PROSTYLE™ SUTURE-MEDIATED CLOSURE AND REPAIR SYSTEM: Brand: PERCLOSE™ PROSTYLE™

## (undated) DEVICE — GLOVE SURG SZ 65 L12IN FNGR THK79MIL GRN LTX FREE

## (undated) DEVICE — SUTURE ABSORBABLE MONOFILAMENT 3-0 PS 24 CM 26 MM VIO PDS +

## (undated) DEVICE — TUBING, SUCTION, 1/4" X 10', STRAIGHT: Brand: MEDLINE

## (undated) DEVICE — GUIDE NDL ST W/ 14 X 147CM TELESCOPICALLY FLD CIV FLX CVR

## (undated) DEVICE — DRESSING,GAUZE,PETROLATUM,CURAD,3"X9",ST: Brand: CURAD

## (undated) DEVICE — ARM DRAPE

## (undated) DEVICE — BASIC SINGLE BASIN-LF: Brand: MEDLINE INDUSTRIES, INC.

## (undated) DEVICE — CARDINAL HEALTH FLEXIBLE LIGHT HANDLE COVER: Brand: CARDINAL HEALTH

## (undated) DEVICE — REM POLYHESIVE ADULT PATIENT RETURN ELECTRODE: Brand: VALLEYLAB

## (undated) DEVICE — PENCIL ES L3M BTTN SWCH HOLSTER W/ BLDE ELECTRD EDGE

## (undated) DEVICE — SOLUTION IRRIG 1000ML 0.9% SOD CHL USP POUR PLAS BTL

## (undated) DEVICE — DRAPE,TOP,102X53,STERILE: Brand: MEDLINE

## (undated) DEVICE — PREP SKN CHLRAPRP APL 26ML STR --

## (undated) DEVICE — NO DESCRIPTION: Brand: SENTINEL

## (undated) DEVICE — GLIDESHEATH SLENDER STAINLESS STEEL KIT: Brand: GLIDESHEATH SLENDER

## (undated) DEVICE — CONTAINER FORMALIN PFILLED 10% NBF 40ML

## (undated) DEVICE — PLASMABLADE X PS210-030S-LIGHT 3.0SL: Brand: PLASMABLADE™ X

## (undated) DEVICE — WOUND RETRACTOR AND PROTECTOR: Brand: ALEXIS WOUND PROTECTOR-RETRACTOR

## (undated) DEVICE — CATHETER DIAG 6FR L110CM PIGTAILS CRV STYL PIG145 DXTERITY

## (undated) DEVICE — SUTURE VCRL SZ 4-0 L27IN ABSRB UD L19MM PS-2 3/8 CIR PRIM J426H

## (undated) DEVICE — MASTISOL ADHESIVE LIQ 2/3ML

## (undated) DEVICE — SUTURE PERMAHAND SZ 2-0 L30IN NONABSORBABLE BLK SILK W/O A305H

## (undated) DEVICE — AMD ANTIMICROBIAL GAUZE SPONGES,12 PLY USP TYPE VII, 0.2% POLYHEXAMETHYLENE BIGUANIDE HCI (PHMB): Brand: CURITY

## (undated) DEVICE — GUIDE COR SNUS L40CM DIA9FR 0.035IN STD CRV ADV UNIQUE

## (undated) DEVICE — TUBING PMP FOR CARTO SYS SMARTABLATE

## (undated) DEVICE — CANISTER, RIGID, 2000CC: Brand: MEDLINE INDUSTRIES, INC.

## (undated) DEVICE — REDUCER CANN ENDOWRIST 12-8MM -- DA VINCI XI - SNGL USE

## (undated) DEVICE — SOLUTION ANTIFOG VIS SYS CLEARIFY LAPSCP

## (undated) DEVICE — CATHETER DIAG AD 5FR L125CM COR NYL MP AMPLATZ 2 W/ 2 SIDE

## (undated) DEVICE — SUTURE PERMAHAND SZ 3-0 L18IN NONABSORBABLE BLK SILK BRAID A184H

## (undated) DEVICE — ROBOTIC COLON: Brand: MEDLINE INDUSTRIES, INC.

## (undated) DEVICE — INTRODUCER LD L13CM OD6FR SPLITTABLE DIL W/ J TIP GWIRE SYR

## (undated) DEVICE — YANKAUER,BULB TIP,W/O VENT,RIGID,STERILE: Brand: MEDLINE

## (undated) DEVICE — 2, DISPOSABLE SUCTION/IRRIGATOR WITHOUT DISPOSABLE TIP: Brand: STRYKEFLOW

## (undated) DEVICE — INTRODUCER LAT VEIN L62CM OD5.5FR ADV TELSCP SYS RENAL

## (undated) DEVICE — MICRODISSECTION NEEDLE STRAIGHT SLEEVE: Brand: COLORADO

## (undated) DEVICE — KIT PROCEDURE SURG HEAD AND NECK TOTE

## (undated) DEVICE — SUTURE PERMAHAND SZ 0 L30IN NONABSORBABLE BLK L30MM PSL 3/8 590H

## (undated) DEVICE — THORACOSCOPY: Brand: MEDLINE INDUSTRIES, INC.

## (undated) DEVICE — CATHETER DIAG AD 6FR L100CM 0.038IN COR POLYUR INT MAMM

## (undated) DEVICE — DRAPE,LAP,CHOLE,W/TROUGHS,STERILE: Brand: MEDLINE

## (undated) DEVICE — PROTECTOR ULN NRV PUR FOAM HK LOOP STRP ANATOMICALLY

## (undated) DEVICE — MONOPOLAR CAUTERY CORD

## (undated) DEVICE — SYR 10ML LUER LOK 1/5ML GRAD --

## (undated) DEVICE — SNARE POLYP SM W13MMXL240CM SHTH DIA2.4MM OVL FLX DISP

## (undated) DEVICE — TRUE™ DILATATION BALLOON VALVULOPLASTY CATHETER, 22 MM X 4.5 CM,110 CM CATHETER: Brand: TRUE DILATATION

## (undated) DEVICE — FORCEPS BX L240CM JAW DIA2.8MM L CAP W/ NDL MIC MESH TOOTH

## (undated) DEVICE — APPLICATOR FBR TIP L6IN COT TIP WOOD SHFT SWAB 2000 PER CA

## (undated) DEVICE — GOWN,BREATHABLE SLV,AURORA,LG,STRL: Brand: MEDLINE

## (undated) DEVICE — SOLUTION IRRIG 3000ML H2O STRL BAG

## (undated) DEVICE — 18G NG KIT WITH 96IN PROBE COVER (10 PK): Brand: SITE-RITE

## (undated) DEVICE — GLOVE SURG SZ 6.5 L11.2IN THK8.6MIL LT BRN LTX FREE

## (undated) DEVICE — Device

## (undated) DEVICE — GOWN,REINF,POLY,ECL,PP SLV,XL: Brand: MEDLINE

## (undated) DEVICE — SYSTEM SURG HEMSTAT PWD 1 GM POLYSACCHARIDE HEMOSPHERES

## (undated) DEVICE — SEAL

## (undated) DEVICE — COVER,TABLE,44X76,STERILE: Brand: MEDLINE

## (undated) DEVICE — SCOPE RECT LED W INSUFFLATOR

## (undated) DEVICE — SUTURE VCRL SZ 3-0 L27IN ABSRB UD L19MM PS-2 3/8 CIR PRIM J427H

## (undated) DEVICE — BUTTON SWITCH PENCIL BLADE ELECTRODE, HOLSTER: Brand: EDGE

## (undated) DEVICE — UNIVERSAL DRAPES: Brand: MEDLINE INDUSTRIES, INC.

## (undated) DEVICE — INTRODUCER TRANSSEPTAL GUID 8.5FR L63CM DIL 8.5FR L67CM

## (undated) DEVICE — ARMADA 35 PTA CATHETER 14 MM X 40 MM X 135 CM / OVER-THE-WIRE: Brand: ARMADA

## (undated) DEVICE — BIPOLAR CAUTERY CORD

## (undated) DEVICE — GLOVE ORANGE PI 7 1/2   MSG9075

## (undated) DEVICE — SELECTOR VEIN 5FR L75CM DIA0.046IN VERT UNIQUE HUB BRAID

## (undated) DEVICE — SINGLE USE SUCTION VALVE MAJ-209: Brand: SINGLE USE SUCTION VALVE (STERILE)

## (undated) DEVICE — PRESSURE MONITORING SET: Brand: TRUWAVE, VAMP PLUS

## (undated) DEVICE — LEGGINGS, PAIR, 31X48, STERILE: Brand: MEDLINE

## (undated) DEVICE — STAPLER 45 RELOAD BLUE: Brand: ENDOWRIST

## (undated) DEVICE — Device: Brand: NRG TRANSSEPTAL NEEDLE

## (undated) DEVICE — STAPLER INT DIA29MM CLS STPL H1.5-2.2MM OPN LEG L5.2MM 26

## (undated) DEVICE — PUMP SUC IRR TBNG L10FT W/ HNDPC ASSEMB STRYKEFLOW 2

## (undated) DEVICE — KIT ANGIO CNTRST ADMIN W O BWL WORLEY

## (undated) DEVICE — CATHETER COR DIAG 4.0 5FR 100CM 2 SIDE H

## (undated) DEVICE — TRAP SPEC RETRV CLR PLAS POLYP IN LN SUCT QUIK CTCH

## (undated) DEVICE — Device: Brand: SPOT EX ENDOSCOPIC TATTOO

## (undated) DEVICE — SHEATH INTRO L12CM DIA6FR W/ LUERLOCK HUB HEMSTAS VLV

## (undated) DEVICE — JELLY LUBRICATING 10GM PREFIL SYR LUBE

## (undated) DEVICE — SOLUTION IRRIG 3000ML 0.9% SOD CHL USP UROMATIC PLAS CONT

## (undated) DEVICE — TOTAL TRAY, DB, 100% SILI FOLEY, 16FR 10: Brand: MEDLINE

## (undated) DEVICE — 12 FOOT DISPOSABLE EXTENSION CABLE WITH SAFE CONNECT / SCREW-DOWN

## (undated) DEVICE — BLADE SURG NO15 S STL STR DISP GLASSVAN

## (undated) DEVICE — SUTURE CHROMIC GUT SZ 3-0 L27IN ABSRB BRN L19MM FS-2 3/8 636H

## (undated) DEVICE — DISPOSABLE GRASPER CARTRIDGE: Brand: DIRECT DRIVE REPOSABLE GRASPERS

## (undated) DEVICE — GUIDEWIRE 035IN 210CM PTFE COAT FIX COR J TIP 15MM FIRM BODY

## (undated) DEVICE — SUTURE VCRL SZ 0 L36IN ABSRB UD L36MM CT-1 1/2 CIR J946H

## (undated) DEVICE — SYR LR LCK 1ML GRAD NSAF 30ML --

## (undated) DEVICE — SYRINGE, LUER SLIP, STERILE, 60ML: Brand: MEDLINE

## (undated) DEVICE — SPONGE GZ W4XL4IN COT 12 PLY TYP VII WVN C FLD DSGN STERILE

## (undated) DEVICE — BAND COMPR L24CM REG CLR PLAS HEMSTAT EXT HK AND LOOP RETEN

## (undated) DEVICE — SPONGE LAP 18X18IN STRL -- 5/PK

## (undated) DEVICE — SYR 5ML 1/5 GRAD LL NSAF LF --

## (undated) DEVICE — GUIDEWIRE VASC L150CM DIA0.035IN FLX TIP L7CM PTFE STR FIX

## (undated) DEVICE — NEEDLE HYPO 21GA L1.5IN INTRAMUSCULAR S STL LATCH BVL UP

## (undated) DEVICE — RELOAD STPL L60MM H1.5-3.6MM REG TISS BLU GRIPPING SURF B

## (undated) DEVICE — GUIDEWIRE VASC L260CM DIA0.035IN BRECKER FOR COREVLV

## (undated) DEVICE — CANNULA NSL ORAL AD FOR CAPNOFLEX CO2 O2 AIRLFE

## (undated) DEVICE — BAND COMPR L21CM SHT CLR PLAS HEMSTAT EXT HK AND LOOP RETEN

## (undated) DEVICE — TUBING INSUFFLATION SMK EVAC HI FLO SET PNEUMOCLEAR

## (undated) DEVICE — NEEDLE SYR 18GA L1.5IN RED PLAS HUB S STL BLNT FILL W/O

## (undated) DEVICE — CATHETER DUAL LUMEN LANGSTON 6F L110CM GWIRE 0.038IN 1000PSI

## (undated) DEVICE — 3M™ IOBAN™ 2 ANTIMICROBIAL INCISE DRAPE 6650EZ: Brand: IOBAN™ 2

## (undated) DEVICE — SYRINGE MED 10ML LUERLOCK TIP W/O SFTY DISP

## (undated) DEVICE — THIN-FLEX DUAL STAGE VENOUS: Brand: THIN-FLEX DUAL STAGE VENOUS DRAINAGE CANNULA

## (undated) DEVICE — SYRINGE MED 3ML CLR PLAS STD N CTRL LUERLOCK TIP DISP

## (undated) DEVICE — AIRLIFE™ OXYGEN TUBING 7 FEET (2.1 M) CRUSH RESISTANT OXYGEN TUBING, VINYL TIPPED: Brand: AIRLIFE™

## (undated) DEVICE — BANDAGE,GAUZE,BULKEE II,4.5"X4.1YD,STRL: Brand: MEDLINE

## (undated) DEVICE — CATHETER DIAG AD 5FR L110CM 145DEG COR GRY HYDRPHLC NYL

## (undated) DEVICE — SUTURE ETHBND EXCEL SZ 0 L18IN NONABSORBABLE GRN L36MM CT-1 CX21D

## (undated) DEVICE — KENDALL RADIOLUCENT FOAM MONITORING ELECTRODE RECTANGULAR SHAPE: Brand: KENDALL

## (undated) DEVICE — Device: Brand: PROTRACK PIGTAIL WIRE

## (undated) DEVICE — MOUTHPIECE ENDOSCP L CTRL OPN AND SIDE PORTS DISP

## (undated) DEVICE — GUIDEWIRE VASC L260CM DIA0.035IN RAD 3MM J TIP L7CM PTFE

## (undated) DEVICE — SYR 3ML LL TIP 1/10ML GRAD --

## (undated) DEVICE — BLADELESS OBTURATOR: Brand: WECK VISTA

## (undated) DEVICE — CATHETER ABLAT 8FR L115CM 1-6-2MM SPC TIP 3.5MM FJ CRV

## (undated) DEVICE — 40585 XL ADVANCED TRENDELENBURG POSITIONING KIT: Brand: 40585 XL ADVANCED TRENDELENBURG POSITIONING KIT

## (undated) DEVICE — PERCUTANEOUS INSERTION KIT-ARTERIAL: Brand: PERCUTANEOUS INSERTION KIT-ARTERIAL

## (undated) DEVICE — LOGICUT SCISSOR LENGTH 320MM: Brand: LOGI - LAPAROSCOPIC INSTRUMENT SYSTEM

## (undated) DEVICE — CONTAINER PREFIL FRMLN 40ML --

## (undated) DEVICE — LUBE JELLY FOIL PACK 1.4 OZ: Brand: MEDLINE INDUSTRIES, INC.

## (undated) DEVICE — SUTURE VCRL SZ 3-0 L36IN ABSRB UD L36MM CT-1 1/2 CIR J944H

## (undated) DEVICE — PINNACLE INTRODUCER SHEATH: Brand: PINNACLE

## (undated) DEVICE — SPONGE GZ W6XL6IN COT 6 PLY SUP FLUF EXTRA ABSRB FOR PRE OP

## (undated) DEVICE — SINGLE USE BIOPSY VALVE MAJ-210: Brand: SINGLE USE BIOPSY VALVE (STERILE)

## (undated) DEVICE — STRAP,POSITIONING,KNEE/BODY,FOAM,4X60": Brand: MEDLINE

## (undated) DEVICE — SHEARS HARM INSRT CRV 8MM -- DA VINCI XI - SNGL USE

## (undated) DEVICE — ELECTRODE PT RET INF L9FT HI MOIST COND ADH HYDRGEL CORDED

## (undated) DEVICE — THE TORRENT IRRIGATION TUBING IS INTENDED TO PROVIDE IRRIGATION VIA IRRIGATION FLUIDS, SUCH AS STERILE WATER, DURING GASTROINTESTINAL ENDOSCOPIC PROCEDURES WHEN USED IN CONJUNCTION WITH AN IRRIGATION PUMP OR ELECTROSURGICAL UNIT.: Brand: TORRENT

## (undated) DEVICE — PERCUTANEOUS INSERTION KIT-VENOUS: Brand: PERCUTANEOUS INSERTION KIT-VENOUS

## (undated) DEVICE — SUTURE PROL SZ 5-0 L36IN NONABSORBABLE BLU L17MM RB-1 1/2 8556H

## (undated) DEVICE — TROCAR: Brand: KII® SLEEVE

## (undated) DEVICE — INTRO SHEATH TRANSSEPTAL 8.5FRX71CM

## (undated) DEVICE — DRESSING POSTOP AG PRISMASEAL 3.5X6IN

## (undated) DEVICE — COLUMN DRAPE

## (undated) DEVICE — DEVICE COMPR 17 CM 120 CC SAFEGUARD FOCUS LF

## (undated) DEVICE — AGENT HEMOSTATIC SURG ORIGINAL ABS 2X14IN LOOSE KNIT 12/BX

## (undated) DEVICE — CATHETER THORACENTESIS STR 28 FRX23 IN 6 EYELET TAPR TIP LF - SEE COMMENT

## (undated) DEVICE — DISPOSABLE STANDARD BIPOLAR FORCEPS, NON-STICK,: Brand: SPETZLER-MALIS

## (undated) DEVICE — VISUALIZATION SYSTEM: Brand: CLEARIFY

## (undated) DEVICE — CRADLE POS 3X5X24IN RASPBERRY ARM PRONE FOAM DISP

## (undated) DEVICE — PATCH REF EXT FOR CARTO 3 SYS (EA = 6 PACKS)

## (undated) DEVICE — GUIDEWIRE VASC L260CM DIA0.035IN TAPR L11CM FLPY TIP L4CM

## (undated) DEVICE — GUIDE WIRE WITH HYDROPHILIC COATING: Brand: ACUITY WHISPER VIEW™

## (undated) DEVICE — BLADE ES ELASTOMERIC COAT INSUL DURABLE BEND UPTO 90DEG

## (undated) DEVICE — TRAY,URINE METER,100% SILICONE,16FR10ML: Brand: MEDLINE

## (undated) DEVICE — MICROPUNCTURE INTRODUCER SET SILHOUETTE TRANSITIONLESS WITH NITINOL WIRE GUIDE: Brand: MICROPUNCTURE

## (undated) DEVICE — SURGICAL PROCEDURE KIT BRONCHSCP CUST

## (undated) DEVICE — GAUZE,SPONGE,4"X4",12PLY,WOVEN,NS,LF: Brand: MEDLINE

## (undated) DEVICE — SUTURE ABSORBABLE MONOFILAMENT 4-0 CV15 6 IN PUR V-LOC 90 VLOCM1203

## (undated) DEVICE — GUIDEWIRE WITH ICE™ HYDROPHILIC COATING: Brand: CHOICE™

## (undated) DEVICE — BLOCK BITE AD 60FR W/ VELC STRP ADDRESSES MOST PT AND

## (undated) DEVICE — COVER,TABLE,HEAVY DUTY,79"X110",STRL: Brand: MEDLINE

## (undated) DEVICE — TRAY PREP DRY W/ PREM GLV 2 APPL 6 SPNG 2 UNDPD 1 OVERWRAP

## (undated) DEVICE — DRAPE,UNDERBUTTOCKS,PCH,STERILE: Brand: MEDLINE

## (undated) DEVICE — HI-TORQUE VERSACORE MODIFIED J GUIDE WIRE SYSTEM 260 CM: Brand: HI-TORQUE VERSACORE

## (undated) DEVICE — DRAPE TWL SURG 16X26IN BLU ORB04] ALLCARE INC]

## (undated) DEVICE — DRAIN SURG SGL COLL PT TB FOR ATS BG OASIS

## (undated) DEVICE — SUTURE ETHBND EXCEL SZ 0 L18IN NONABSORBABLE GRN L26MM MO-6 CX45D

## (undated) DEVICE — SYSTEM CLOSURE 6-12 FR VEN VASC VASCADE MVP

## (undated) DEVICE — BANDAGE COBAN 4 IN COMPR W4INXL5YD FOAM COHESIVE QUIK STK SELF ADH SFT

## (undated) DEVICE — GARMENT,MEDLINE,DVT,INT,CALF,MED, GEN2: Brand: MEDLINE

## (undated) DEVICE — ELECTRO LUBE IS A SINGLE PATIENT USE DEVICE THAT IS INTENDED TO BE USED ON ELECTROSURGICAL ELECTRODES TO REDUCE STICKING.: Brand: KEY SURGICAL ELECTRO LUBE

## (undated) DEVICE — NEEDLE INJ 25GA P5MM SHFT L230CM SHTH DIA2.5MM S STL TEF

## (undated) DEVICE — CATHETER ANGIO INFIN 038IN AMPLATZ 534545T

## (undated) DEVICE — SYRINGE IRRIG 60ML SFT PLIABLE BLB EZ TO GRP 1 HND USE W/

## (undated) DEVICE — CORD ES L12FT BPLR FRCP

## (undated) DEVICE — LIQUIBAND RAPID ADHESIVE 36/CS 0.8ML: Brand: MEDLINE

## (undated) DEVICE — SINGLE PORT MANIFOLD: Brand: NEPTUNE 2

## (undated) DEVICE — SUTURE VCRL SZ 0 L18IN ABSRB UD L36MM CT-1 1/2 CIR J840D